# Patient Record
Sex: FEMALE | Race: WHITE | NOT HISPANIC OR LATINO
[De-identification: names, ages, dates, MRNs, and addresses within clinical notes are randomized per-mention and may not be internally consistent; named-entity substitution may affect disease eponyms.]

---

## 2017-01-10 ENCOUNTER — RECORD ABSTRACTING (OUTPATIENT)
Age: 52
End: 2017-01-10

## 2017-01-10 DIAGNOSIS — Z87.898 PERSONAL HISTORY OF OTHER SPECIFIED CONDITIONS: ICD-10-CM

## 2017-01-10 DIAGNOSIS — N83.201 UNSPECIFIED OVARIAN CYST, RIGHT SIDE: ICD-10-CM

## 2017-01-12 ENCOUNTER — APPOINTMENT (OUTPATIENT)
Dept: CARDIOLOGY | Facility: CLINIC | Age: 52
End: 2017-01-12

## 2017-01-12 VITALS
SYSTOLIC BLOOD PRESSURE: 140 MMHG | WEIGHT: 155 LBS | HEART RATE: 82 BPM | HEIGHT: 62 IN | DIASTOLIC BLOOD PRESSURE: 80 MMHG | BODY MASS INDEX: 28.52 KG/M2

## 2017-01-17 ENCOUNTER — OUTPATIENT (OUTPATIENT)
Dept: OUTPATIENT SERVICES | Facility: HOSPITAL | Age: 52
LOS: 1 days | Discharge: HOME | End: 2017-01-17

## 2017-01-18 RX ORDER — LEVETIRACETAM 250 MG/1
250 TABLET, FILM COATED ORAL TWICE DAILY
Refills: 0 | Status: ACTIVE | COMMUNITY

## 2017-01-19 ENCOUNTER — TRANSCRIPTION ENCOUNTER (OUTPATIENT)
Age: 52
End: 2017-01-19

## 2017-06-27 DIAGNOSIS — I25.10 ATHEROSCLEROTIC HEART DISEASE OF NATIVE CORONARY ARTERY WITHOUT ANGINA PECTORIS: ICD-10-CM

## 2017-06-27 DIAGNOSIS — R06.02 SHORTNESS OF BREATH: ICD-10-CM

## 2017-06-27 DIAGNOSIS — J44.9 CHRONIC OBSTRUCTIVE PULMONARY DISEASE, UNSPECIFIED: ICD-10-CM

## 2017-06-27 DIAGNOSIS — I10 ESSENTIAL (PRIMARY) HYPERTENSION: ICD-10-CM

## 2017-06-27 DIAGNOSIS — E78.4 OTHER HYPERLIPIDEMIA: ICD-10-CM

## 2017-06-27 DIAGNOSIS — F17.210 NICOTINE DEPENDENCE, CIGARETTES, UNCOMPLICATED: ICD-10-CM

## 2017-09-26 ENCOUNTER — APPOINTMENT (OUTPATIENT)
Dept: CARDIOLOGY | Facility: CLINIC | Age: 52
End: 2017-09-26

## 2017-09-26 VITALS
SYSTOLIC BLOOD PRESSURE: 132 MMHG | DIASTOLIC BLOOD PRESSURE: 80 MMHG | WEIGHT: 161 LBS | HEART RATE: 64 BPM | BODY MASS INDEX: 29.63 KG/M2 | HEIGHT: 62 IN

## 2017-09-26 DIAGNOSIS — F17.200 NICOTINE DEPENDENCE, UNSPECIFIED, UNCOMPLICATED: ICD-10-CM

## 2017-10-20 ENCOUNTER — INPATIENT (INPATIENT)
Facility: HOSPITAL | Age: 52
LOS: 4 days | Discharge: HOME | End: 2017-10-25
Attending: HOSPITALIST | Admitting: FAMILY MEDICINE

## 2017-10-20 DIAGNOSIS — F41.9 ANXIETY DISORDER, UNSPECIFIED: ICD-10-CM

## 2017-10-20 DIAGNOSIS — J45.909 UNSPECIFIED ASTHMA, UNCOMPLICATED: ICD-10-CM

## 2017-10-20 DIAGNOSIS — K29.00 ACUTE GASTRITIS WITHOUT BLEEDING: ICD-10-CM

## 2017-10-20 DIAGNOSIS — R07.89 OTHER CHEST PAIN: ICD-10-CM

## 2017-10-20 DIAGNOSIS — K44.9 DIAPHRAGMATIC HERNIA WITHOUT OBSTRUCTION OR GANGRENE: ICD-10-CM

## 2017-10-20 DIAGNOSIS — K63.5 POLYP OF COLON: ICD-10-CM

## 2017-10-20 DIAGNOSIS — G40.909 EPILEPSY, UNSPECIFIED, NOT INTRACTABLE, WITHOUT STATUS EPILEPTICUS: ICD-10-CM

## 2017-10-20 DIAGNOSIS — F93.0 SEPARATION ANXIETY DISORDER OF CHILDHOOD: ICD-10-CM

## 2017-10-20 DIAGNOSIS — G56.00 CARPAL TUNNEL SYNDROME, UNSPECIFIED UPPER LIMB: ICD-10-CM

## 2017-10-20 DIAGNOSIS — R56.9 UNSPECIFIED CONVULSIONS: ICD-10-CM

## 2017-10-20 DIAGNOSIS — K56.609 UNSPECIFIED INTESTINAL OBSTRUCTION, UNSPECIFIED AS TO PARTIAL VERSUS COMPLETE OBSTRUCTION: ICD-10-CM

## 2017-10-20 DIAGNOSIS — R53.1 WEAKNESS: ICD-10-CM

## 2017-10-20 DIAGNOSIS — G43.909 MIGRAINE, UNSPECIFIED, NOT INTRACTABLE, WITHOUT STATUS MIGRAINOSUS: ICD-10-CM

## 2017-10-26 DIAGNOSIS — R10.9 UNSPECIFIED ABDOMINAL PAIN: ICD-10-CM

## 2017-10-26 DIAGNOSIS — F32.9 MAJOR DEPRESSIVE DISORDER, SINGLE EPISODE, UNSPECIFIED: ICD-10-CM

## 2017-10-26 DIAGNOSIS — K63.5 POLYP OF COLON: ICD-10-CM

## 2017-10-26 DIAGNOSIS — K29.80 DUODENITIS WITHOUT BLEEDING: ICD-10-CM

## 2017-10-26 DIAGNOSIS — F41.1 GENERALIZED ANXIETY DISORDER: ICD-10-CM

## 2017-10-26 DIAGNOSIS — G40.909 EPILEPSY, UNSPECIFIED, NOT INTRACTABLE, WITHOUT STATUS EPILEPTICUS: ICD-10-CM

## 2017-10-26 DIAGNOSIS — Z72.0 TOBACCO USE: ICD-10-CM

## 2017-10-26 DIAGNOSIS — K20.9 ESOPHAGITIS, UNSPECIFIED: ICD-10-CM

## 2017-10-26 DIAGNOSIS — K29.70 GASTRITIS, UNSPECIFIED, WITHOUT BLEEDING: ICD-10-CM

## 2017-10-26 DIAGNOSIS — K59.00 CONSTIPATION, UNSPECIFIED: ICD-10-CM

## 2017-10-26 DIAGNOSIS — J45.909 UNSPECIFIED ASTHMA, UNCOMPLICATED: ICD-10-CM

## 2017-10-26 DIAGNOSIS — G43.909 MIGRAINE, UNSPECIFIED, NOT INTRACTABLE, WITHOUT STATUS MIGRAINOSUS: ICD-10-CM

## 2017-10-26 DIAGNOSIS — K52.9 NONINFECTIVE GASTROENTERITIS AND COLITIS, UNSPECIFIED: ICD-10-CM

## 2017-11-06 ENCOUNTER — EMERGENCY (EMERGENCY)
Facility: HOSPITAL | Age: 52
LOS: 0 days | Discharge: HOME | End: 2017-11-07
Admitting: FAMILY MEDICINE

## 2017-11-06 DIAGNOSIS — J45.909 UNSPECIFIED ASTHMA, UNCOMPLICATED: ICD-10-CM

## 2017-11-06 DIAGNOSIS — G56.00 CARPAL TUNNEL SYNDROME, UNSPECIFIED UPPER LIMB: ICD-10-CM

## 2017-11-06 DIAGNOSIS — Z79.899 OTHER LONG TERM (CURRENT) DRUG THERAPY: ICD-10-CM

## 2017-11-06 DIAGNOSIS — K44.9 DIAPHRAGMATIC HERNIA WITHOUT OBSTRUCTION OR GANGRENE: ICD-10-CM

## 2017-11-06 DIAGNOSIS — R10.9 UNSPECIFIED ABDOMINAL PAIN: ICD-10-CM

## 2017-11-06 DIAGNOSIS — Z88.8 ALLERGY STATUS TO OTHER DRUGS, MEDICAMENTS AND BIOLOGICAL SUBSTANCES: ICD-10-CM

## 2017-11-06 DIAGNOSIS — F93.0 SEPARATION ANXIETY DISORDER OF CHILDHOOD: ICD-10-CM

## 2017-11-06 DIAGNOSIS — G40.909 EPILEPSY, UNSPECIFIED, NOT INTRACTABLE, WITHOUT STATUS EPILEPTICUS: ICD-10-CM

## 2017-11-06 DIAGNOSIS — R10.84 GENERALIZED ABDOMINAL PAIN: ICD-10-CM

## 2017-11-06 DIAGNOSIS — F32.9 MAJOR DEPRESSIVE DISORDER, SINGLE EPISODE, UNSPECIFIED: ICD-10-CM

## 2017-11-06 DIAGNOSIS — R07.89 OTHER CHEST PAIN: ICD-10-CM

## 2017-11-06 DIAGNOSIS — K56.609 UNSPECIFIED INTESTINAL OBSTRUCTION, UNSPECIFIED AS TO PARTIAL VERSUS COMPLETE OBSTRUCTION: ICD-10-CM

## 2017-11-06 DIAGNOSIS — F41.9 ANXIETY DISORDER, UNSPECIFIED: ICD-10-CM

## 2017-11-06 DIAGNOSIS — R56.9 UNSPECIFIED CONVULSIONS: ICD-10-CM

## 2017-11-06 DIAGNOSIS — K63.5 POLYP OF COLON: ICD-10-CM

## 2017-11-06 DIAGNOSIS — Z87.891 PERSONAL HISTORY OF NICOTINE DEPENDENCE: ICD-10-CM

## 2017-11-06 DIAGNOSIS — Z88.1 ALLERGY STATUS TO OTHER ANTIBIOTIC AGENTS STATUS: ICD-10-CM

## 2017-11-06 DIAGNOSIS — Z90.711 ACQUIRED ABSENCE OF UTERUS WITH REMAINING CERVICAL STUMP: ICD-10-CM

## 2017-11-06 DIAGNOSIS — R11.2 NAUSEA WITH VOMITING, UNSPECIFIED: ICD-10-CM

## 2017-11-06 DIAGNOSIS — K29.00 ACUTE GASTRITIS WITHOUT BLEEDING: ICD-10-CM

## 2017-11-06 DIAGNOSIS — R53.1 WEAKNESS: ICD-10-CM

## 2017-11-06 DIAGNOSIS — G43.909 MIGRAINE, UNSPECIFIED, NOT INTRACTABLE, WITHOUT STATUS MIGRAINOSUS: ICD-10-CM

## 2017-11-18 ENCOUNTER — INPATIENT (INPATIENT)
Facility: HOSPITAL | Age: 52
LOS: 10 days | Discharge: HOME | End: 2017-11-29
Attending: SURGERY | Admitting: FAMILY MEDICINE

## 2017-11-18 DIAGNOSIS — G40.909 EPILEPSY, UNSPECIFIED, NOT INTRACTABLE, WITHOUT STATUS EPILEPTICUS: ICD-10-CM

## 2017-11-18 DIAGNOSIS — J45.909 UNSPECIFIED ASTHMA, UNCOMPLICATED: ICD-10-CM

## 2017-11-18 DIAGNOSIS — K44.9 DIAPHRAGMATIC HERNIA WITHOUT OBSTRUCTION OR GANGRENE: ICD-10-CM

## 2017-11-18 DIAGNOSIS — K56.609 UNSPECIFIED INTESTINAL OBSTRUCTION, UNSPECIFIED AS TO PARTIAL VERSUS COMPLETE OBSTRUCTION: ICD-10-CM

## 2017-11-18 DIAGNOSIS — R07.89 OTHER CHEST PAIN: ICD-10-CM

## 2017-11-18 DIAGNOSIS — F41.9 ANXIETY DISORDER, UNSPECIFIED: ICD-10-CM

## 2017-11-18 DIAGNOSIS — G43.909 MIGRAINE, UNSPECIFIED, NOT INTRACTABLE, WITHOUT STATUS MIGRAINOSUS: ICD-10-CM

## 2017-11-18 DIAGNOSIS — K29.00 ACUTE GASTRITIS WITHOUT BLEEDING: ICD-10-CM

## 2017-11-18 DIAGNOSIS — R53.1 WEAKNESS: ICD-10-CM

## 2017-11-18 DIAGNOSIS — K63.5 POLYP OF COLON: ICD-10-CM

## 2017-11-18 DIAGNOSIS — R56.9 UNSPECIFIED CONVULSIONS: ICD-10-CM

## 2017-11-18 DIAGNOSIS — G56.00 CARPAL TUNNEL SYNDROME, UNSPECIFIED UPPER LIMB: ICD-10-CM

## 2017-11-18 DIAGNOSIS — F93.0 SEPARATION ANXIETY DISORDER OF CHILDHOOD: ICD-10-CM

## 2017-12-01 DIAGNOSIS — E78.5 HYPERLIPIDEMIA, UNSPECIFIED: ICD-10-CM

## 2017-12-01 DIAGNOSIS — K44.9 DIAPHRAGMATIC HERNIA WITHOUT OBSTRUCTION OR GANGRENE: ICD-10-CM

## 2017-12-01 DIAGNOSIS — F32.9 MAJOR DEPRESSIVE DISORDER, SINGLE EPISODE, UNSPECIFIED: ICD-10-CM

## 2017-12-01 DIAGNOSIS — D64.9 ANEMIA, UNSPECIFIED: ICD-10-CM

## 2017-12-01 DIAGNOSIS — K29.70 GASTRITIS, UNSPECIFIED, WITHOUT BLEEDING: ICD-10-CM

## 2017-12-01 DIAGNOSIS — F41.9 ANXIETY DISORDER, UNSPECIFIED: ICD-10-CM

## 2017-12-01 DIAGNOSIS — K56.51 INTESTINAL ADHESIONS [BANDS], WITH PARTIAL OBSTRUCTION: ICD-10-CM

## 2017-12-01 DIAGNOSIS — G43.909 MIGRAINE, UNSPECIFIED, NOT INTRACTABLE, WITHOUT STATUS MIGRAINOSUS: ICD-10-CM

## 2017-12-01 DIAGNOSIS — G56.00 CARPAL TUNNEL SYNDROME, UNSPECIFIED UPPER LIMB: ICD-10-CM

## 2017-12-01 DIAGNOSIS — J45.909 UNSPECIFIED ASTHMA, UNCOMPLICATED: ICD-10-CM

## 2017-12-01 DIAGNOSIS — G89.29 OTHER CHRONIC PAIN: ICD-10-CM

## 2017-12-01 DIAGNOSIS — G40.909 EPILEPSY, UNSPECIFIED, NOT INTRACTABLE, WITHOUT STATUS EPILEPTICUS: ICD-10-CM

## 2017-12-07 DIAGNOSIS — K58.9 IRRITABLE BOWEL SYNDROME WITHOUT DIARRHEA: ICD-10-CM

## 2017-12-07 DIAGNOSIS — K56.1 INTUSSUSCEPTION: ICD-10-CM

## 2017-12-07 DIAGNOSIS — M79.7 FIBROMYALGIA: ICD-10-CM

## 2017-12-07 DIAGNOSIS — R18.8 OTHER ASCITES: ICD-10-CM

## 2018-01-25 ENCOUNTER — APPOINTMENT (OUTPATIENT)
Dept: CARDIOLOGY | Facility: CLINIC | Age: 53
End: 2018-01-25

## 2018-04-02 ENCOUNTER — OUTPATIENT (OUTPATIENT)
Dept: OUTPATIENT SERVICES | Facility: HOSPITAL | Age: 53
LOS: 1 days | Discharge: HOME | End: 2018-04-02

## 2018-04-02 VITALS
OXYGEN SATURATION: 97 % | TEMPERATURE: 96 F | HEART RATE: 79 BPM | HEIGHT: 62 IN | SYSTOLIC BLOOD PRESSURE: 143 MMHG | RESPIRATION RATE: 18 BRPM | WEIGHT: 149.91 LBS | DIASTOLIC BLOOD PRESSURE: 63 MMHG

## 2018-04-02 DIAGNOSIS — Z01.818 ENCOUNTER FOR OTHER PREPROCEDURAL EXAMINATION: ICD-10-CM

## 2018-04-02 DIAGNOSIS — Z98.890 OTHER SPECIFIED POSTPROCEDURAL STATES: Chronic | ICD-10-CM

## 2018-04-02 LAB
ALBUMIN SERPL ELPH-MCNC: 4.7 G/DL — SIGNIFICANT CHANGE UP (ref 3.5–5.2)
ALP SERPL-CCNC: 118 U/L — HIGH (ref 30–115)
ALT FLD-CCNC: 24 U/L — SIGNIFICANT CHANGE UP (ref 0–41)
ANION GAP SERPL CALC-SCNC: 14 MMOL/L — SIGNIFICANT CHANGE UP (ref 7–14)
APTT BLD: 30.8 SEC — SIGNIFICANT CHANGE UP (ref 27–39.2)
AST SERPL-CCNC: 21 U/L — SIGNIFICANT CHANGE UP (ref 0–41)
BASOPHILS # BLD AUTO: 0.03 K/UL — SIGNIFICANT CHANGE UP (ref 0–0.2)
BASOPHILS NFR BLD AUTO: 0.4 % — SIGNIFICANT CHANGE UP (ref 0–1)
BILIRUB SERPL-MCNC: 0.2 MG/DL — SIGNIFICANT CHANGE UP (ref 0.2–1.2)
BUN SERPL-MCNC: 21 MG/DL — HIGH (ref 10–20)
CALCIUM SERPL-MCNC: 9.5 MG/DL — SIGNIFICANT CHANGE UP (ref 8.5–10.1)
CHLORIDE SERPL-SCNC: 102 MMOL/L — SIGNIFICANT CHANGE UP (ref 98–110)
CO2 SERPL-SCNC: 28 MMOL/L — SIGNIFICANT CHANGE UP (ref 17–32)
CREAT SERPL-MCNC: 0.6 MG/DL — LOW (ref 0.7–1.5)
EOSINOPHIL # BLD AUTO: 0.03 K/UL — SIGNIFICANT CHANGE UP (ref 0–0.7)
EOSINOPHIL NFR BLD AUTO: 0.4 % — SIGNIFICANT CHANGE UP (ref 0–8)
GLUCOSE SERPL-MCNC: 105 MG/DL — HIGH (ref 70–99)
HCT VFR BLD CALC: 37 % — SIGNIFICANT CHANGE UP (ref 37–47)
HGB BLD-MCNC: 12.4 G/DL — SIGNIFICANT CHANGE UP (ref 12–16)
IMM GRANULOCYTES NFR BLD AUTO: 0.3 % — SIGNIFICANT CHANGE UP (ref 0.1–0.3)
INR BLD: 1.02 RATIO — SIGNIFICANT CHANGE UP (ref 0.65–1.3)
LYMPHOCYTES # BLD AUTO: 2.5 K/UL — SIGNIFICANT CHANGE UP (ref 1.2–3.4)
LYMPHOCYTES # BLD AUTO: 32.5 % — SIGNIFICANT CHANGE UP (ref 20.5–51.1)
MCHC RBC-ENTMCNC: 31.3 PG — HIGH (ref 27–31)
MCHC RBC-ENTMCNC: 33.5 G/DL — SIGNIFICANT CHANGE UP (ref 32–37)
MCV RBC AUTO: 93.4 FL — SIGNIFICANT CHANGE UP (ref 81–99)
MONOCYTES # BLD AUTO: 0.45 K/UL — SIGNIFICANT CHANGE UP (ref 0.1–0.6)
MONOCYTES NFR BLD AUTO: 5.8 % — SIGNIFICANT CHANGE UP (ref 1.7–9.3)
NEUTROPHILS # BLD AUTO: 4.67 K/UL — SIGNIFICANT CHANGE UP (ref 1.4–6.5)
NEUTROPHILS NFR BLD AUTO: 60.6 % — SIGNIFICANT CHANGE UP (ref 42.2–75.2)
NRBC # BLD: 0 /100 WBCS — SIGNIFICANT CHANGE UP (ref 0–0)
PLATELET # BLD AUTO: 241 K/UL — SIGNIFICANT CHANGE UP (ref 130–400)
POTASSIUM SERPL-MCNC: 4.2 MMOL/L — SIGNIFICANT CHANGE UP (ref 3.5–5)
POTASSIUM SERPL-SCNC: 4.2 MMOL/L — SIGNIFICANT CHANGE UP (ref 3.5–5)
PROT SERPL-MCNC: 7.5 G/DL — SIGNIFICANT CHANGE UP (ref 6–8)
PROTHROM AB SERPL-ACNC: 11 SEC — SIGNIFICANT CHANGE UP (ref 9.95–12.87)
RBC # BLD: 3.96 M/UL — LOW (ref 4.2–5.4)
RBC # FLD: 15.1 % — HIGH (ref 11.5–14.5)
SODIUM SERPL-SCNC: 144 MMOL/L — SIGNIFICANT CHANGE UP (ref 135–146)
WBC # BLD: 7.7 K/UL — SIGNIFICANT CHANGE UP (ref 4.8–10.8)
WBC # FLD AUTO: 7.7 K/UL — SIGNIFICANT CHANGE UP (ref 4.8–10.8)

## 2018-04-02 RX ORDER — CLONAZEPAM 1 MG
1 TABLET ORAL
Qty: 0 | Refills: 0 | COMMUNITY

## 2018-04-02 NOTE — H&P PST ADULT - PSH
History of surgery  4 rght knee sx  1 left knee sx  cholecystectomy  rotator cuff right  abdominal sx-- adhesions  c section  ovarian cystectomy  breast cystectomies  colonoscopy   endoscopies  parital hysterectomy

## 2018-04-02 NOTE — H&P PST ADULT - REASON FOR ADMISSION
excision of granuloma abdomen wall    operation 11/17-- due to abdominal adhesions- now for above proecdure

## 2018-04-02 NOTE — H&P PST ADULT - PMH
Anxiety    Back pain  henriated discs  GERD (gastroesophageal reflux disease)    Hiatal hernia    Hypercholesteremia    Migraines    Seasonal allergic rhinitis, unspecified trigger    Seizures  last one 6 yrs ago

## 2018-04-04 LAB — LEVETIRACETAM SERPL-MCNC: 4.1 MCG/ML — LOW (ref 12–46)

## 2018-04-13 NOTE — ASU PATIENT PROFILE, ADULT - PSH
H/O rotator cuff surgery  right  History of appendectomy    History of surgery  4 rght knee sx  1 left knee sx  cholecystectomy  rotator cuff right  abdominal sx-- adhesions  c section  ovarian cystectomy  breast cystectomies  colonoscopy   endoscopies  parital hysterectomy  S/P cholecystectomy    S/p partial hysterectomy with remaining cervical stump

## 2018-04-16 ENCOUNTER — RESULT REVIEW (OUTPATIENT)
Age: 53
End: 2018-04-16

## 2018-04-16 ENCOUNTER — OUTPATIENT (OUTPATIENT)
Dept: OUTPATIENT SERVICES | Facility: HOSPITAL | Age: 53
LOS: 1 days | Discharge: HOME | End: 2018-04-16

## 2018-04-16 VITALS
HEIGHT: 62 IN | DIASTOLIC BLOOD PRESSURE: 62 MMHG | RESPIRATION RATE: 17 BRPM | WEIGHT: 149.91 LBS | SYSTOLIC BLOOD PRESSURE: 102 MMHG | HEART RATE: 84 BPM | TEMPERATURE: 96 F | OXYGEN SATURATION: 94 %

## 2018-04-16 VITALS — HEART RATE: 69 BPM | RESPIRATION RATE: 18 BRPM | SYSTOLIC BLOOD PRESSURE: 136 MMHG | DIASTOLIC BLOOD PRESSURE: 74 MMHG

## 2018-04-16 DIAGNOSIS — L92.3 FOREIGN BODY GRANULOMA OF THE SKIN AND SUBCUTANEOUS TISSUE: ICD-10-CM

## 2018-04-16 DIAGNOSIS — Z98.890 OTHER SPECIFIED POSTPROCEDURAL STATES: Chronic | ICD-10-CM

## 2018-04-16 DIAGNOSIS — Z90.711 ACQUIRED ABSENCE OF UTERUS WITH REMAINING CERVICAL STUMP: Chronic | ICD-10-CM

## 2018-04-16 DIAGNOSIS — Z90.49 ACQUIRED ABSENCE OF OTHER SPECIFIED PARTS OF DIGESTIVE TRACT: Chronic | ICD-10-CM

## 2018-04-16 RX ORDER — OXYCODONE AND ACETAMINOPHEN 5; 325 MG/1; MG/1
1 TABLET ORAL ONCE
Qty: 0 | Refills: 0 | Status: DISCONTINUED | OUTPATIENT
Start: 2018-04-16 | End: 2018-04-16

## 2018-04-16 RX ORDER — MORPHINE SULFATE 50 MG/1
2 CAPSULE, EXTENDED RELEASE ORAL
Qty: 0 | Refills: 0 | Status: DISCONTINUED | OUTPATIENT
Start: 2018-04-16 | End: 2018-04-16

## 2018-04-16 RX ORDER — SODIUM CHLORIDE 9 MG/ML
1000 INJECTION, SOLUTION INTRAVENOUS
Qty: 0 | Refills: 0 | Status: DISCONTINUED | OUTPATIENT
Start: 2018-04-16 | End: 2018-05-01

## 2018-04-16 RX ORDER — ONDANSETRON 8 MG/1
4 TABLET, FILM COATED ORAL ONCE
Qty: 0 | Refills: 0 | Status: DISCONTINUED | OUTPATIENT
Start: 2018-04-16 | End: 2018-05-01

## 2018-04-16 RX ORDER — OXYCODONE AND ACETAMINOPHEN 5; 325 MG/1; MG/1
2 TABLET ORAL ONCE
Qty: 0 | Refills: 0 | Status: DISCONTINUED | OUTPATIENT
Start: 2018-04-16 | End: 2018-04-16

## 2018-04-16 RX ADMIN — SODIUM CHLORIDE 100 MILLILITER(S): 9 INJECTION, SOLUTION INTRAVENOUS at 09:19

## 2018-04-16 RX ADMIN — OXYCODONE AND ACETAMINOPHEN 2 TABLET(S): 5; 325 TABLET ORAL at 09:58

## 2018-04-16 NOTE — BRIEF OPERATIVE NOTE - PROCEDURE
<<-----Click on this checkbox to enter Procedure Excision  04/16/2018  granuloma abdominal wall  Active  ABULANOV

## 2018-04-17 LAB — SURGICAL PATHOLOGY STUDY: SIGNIFICANT CHANGE UP

## 2018-04-18 LAB
CULTURE RESULTS: SIGNIFICANT CHANGE UP
CULTURE RESULTS: SIGNIFICANT CHANGE UP
SPECIMEN SOURCE: SIGNIFICANT CHANGE UP
SPECIMEN SOURCE: SIGNIFICANT CHANGE UP

## 2018-08-13 ENCOUNTER — EMERGENCY (EMERGENCY)
Facility: HOSPITAL | Age: 53
LOS: 0 days | Discharge: HOME | End: 2018-08-13
Attending: EMERGENCY MEDICINE | Admitting: EMERGENCY MEDICINE

## 2018-08-13 VITALS
RESPIRATION RATE: 18 BRPM | HEART RATE: 72 BPM | OXYGEN SATURATION: 93 % | DIASTOLIC BLOOD PRESSURE: 67 MMHG | TEMPERATURE: 97 F | SYSTOLIC BLOOD PRESSURE: 163 MMHG

## 2018-08-13 DIAGNOSIS — R11.2 NAUSEA WITH VOMITING, UNSPECIFIED: ICD-10-CM

## 2018-08-13 DIAGNOSIS — Z98.890 OTHER SPECIFIED POSTPROCEDURAL STATES: Chronic | ICD-10-CM

## 2018-08-13 DIAGNOSIS — Z90.711 ACQUIRED ABSENCE OF UTERUS WITH REMAINING CERVICAL STUMP: Chronic | ICD-10-CM

## 2018-08-13 DIAGNOSIS — R14.0 ABDOMINAL DISTENSION (GASEOUS): ICD-10-CM

## 2018-08-13 DIAGNOSIS — Z79.899 OTHER LONG TERM (CURRENT) DRUG THERAPY: ICD-10-CM

## 2018-08-13 DIAGNOSIS — Z88.0 ALLERGY STATUS TO PENICILLIN: ICD-10-CM

## 2018-08-13 DIAGNOSIS — R10.9 UNSPECIFIED ABDOMINAL PAIN: ICD-10-CM

## 2018-08-13 DIAGNOSIS — R10.84 GENERALIZED ABDOMINAL PAIN: ICD-10-CM

## 2018-08-13 DIAGNOSIS — Z88.1 ALLERGY STATUS TO OTHER ANTIBIOTIC AGENTS STATUS: ICD-10-CM

## 2018-08-13 DIAGNOSIS — Z90.49 ACQUIRED ABSENCE OF OTHER SPECIFIED PARTS OF DIGESTIVE TRACT: Chronic | ICD-10-CM

## 2018-08-13 PROBLEM — K21.9 GASTRO-ESOPHAGEAL REFLUX DISEASE WITHOUT ESOPHAGITIS: Chronic | Status: ACTIVE | Noted: 2018-04-02

## 2018-08-13 PROBLEM — E78.00 PURE HYPERCHOLESTEROLEMIA, UNSPECIFIED: Chronic | Status: ACTIVE | Noted: 2018-04-02

## 2018-08-13 PROBLEM — F41.9 ANXIETY DISORDER, UNSPECIFIED: Chronic | Status: ACTIVE | Noted: 2018-04-02

## 2018-08-13 PROBLEM — M54.9 DORSALGIA, UNSPECIFIED: Chronic | Status: ACTIVE | Noted: 2018-04-02

## 2018-08-13 PROBLEM — G43.909 MIGRAINE, UNSPECIFIED, NOT INTRACTABLE, WITHOUT STATUS MIGRAINOSUS: Chronic | Status: ACTIVE | Noted: 2018-04-02

## 2018-08-13 PROBLEM — R56.9 UNSPECIFIED CONVULSIONS: Chronic | Status: ACTIVE | Noted: 2018-04-02

## 2018-08-13 PROBLEM — J30.2 OTHER SEASONAL ALLERGIC RHINITIS: Chronic | Status: ACTIVE | Noted: 2018-04-02

## 2018-08-13 PROBLEM — K44.9 DIAPHRAGMATIC HERNIA WITHOUT OBSTRUCTION OR GANGRENE: Chronic | Status: ACTIVE | Noted: 2018-04-02

## 2018-08-13 LAB
ALBUMIN SERPL ELPH-MCNC: 4.5 G/DL — SIGNIFICANT CHANGE UP (ref 3.5–5.2)
ALP SERPL-CCNC: 103 U/L — SIGNIFICANT CHANGE UP (ref 30–115)
ALT FLD-CCNC: 20 U/L — SIGNIFICANT CHANGE UP (ref 0–41)
ANION GAP SERPL CALC-SCNC: 14 MMOL/L — SIGNIFICANT CHANGE UP (ref 7–14)
AST SERPL-CCNC: 18 U/L — SIGNIFICANT CHANGE UP (ref 0–41)
BILIRUB SERPL-MCNC: <0.2 MG/DL — SIGNIFICANT CHANGE UP (ref 0.2–1.2)
BUN SERPL-MCNC: 10 MG/DL — SIGNIFICANT CHANGE UP (ref 10–20)
CALCIUM SERPL-MCNC: 9.6 MG/DL — SIGNIFICANT CHANGE UP (ref 8.5–10.1)
CHLORIDE SERPL-SCNC: 103 MMOL/L — SIGNIFICANT CHANGE UP (ref 98–110)
CO2 SERPL-SCNC: 24 MMOL/L — SIGNIFICANT CHANGE UP (ref 17–32)
CREAT SERPL-MCNC: 0.7 MG/DL — SIGNIFICANT CHANGE UP (ref 0.7–1.5)
GAS PNL BLDV: SIGNIFICANT CHANGE UP
GLUCOSE SERPL-MCNC: 97 MG/DL — SIGNIFICANT CHANGE UP (ref 70–99)
HCG SERPL QL: NEGATIVE — SIGNIFICANT CHANGE UP
HCT VFR BLD CALC: 37.9 % — SIGNIFICANT CHANGE UP (ref 37–47)
HGB BLD-MCNC: 13 G/DL — SIGNIFICANT CHANGE UP (ref 12–16)
LIDOCAIN IGE QN: 12 U/L — SIGNIFICANT CHANGE UP (ref 7–60)
MCHC RBC-ENTMCNC: 31.5 PG — HIGH (ref 27–31)
MCHC RBC-ENTMCNC: 34.3 G/DL — SIGNIFICANT CHANGE UP (ref 32–37)
MCV RBC AUTO: 91.8 FL — SIGNIFICANT CHANGE UP (ref 81–99)
NRBC # BLD: 0 /100 WBCS — SIGNIFICANT CHANGE UP (ref 0–0)
PLATELET # BLD AUTO: 238 K/UL — SIGNIFICANT CHANGE UP (ref 130–400)
POTASSIUM SERPL-MCNC: 4.2 MMOL/L — SIGNIFICANT CHANGE UP (ref 3.5–5)
POTASSIUM SERPL-SCNC: 4.2 MMOL/L — SIGNIFICANT CHANGE UP (ref 3.5–5)
PROT SERPL-MCNC: 7.3 G/DL — SIGNIFICANT CHANGE UP (ref 6–8)
RBC # BLD: 4.13 M/UL — LOW (ref 4.2–5.4)
RBC # FLD: 13.2 % — SIGNIFICANT CHANGE UP (ref 11.5–14.5)
SODIUM SERPL-SCNC: 141 MMOL/L — SIGNIFICANT CHANGE UP (ref 135–146)
TYPE + AB SCN PNL BLD: SIGNIFICANT CHANGE UP
WBC # BLD: 5.47 K/UL — SIGNIFICANT CHANGE UP (ref 4.8–10.8)
WBC # FLD AUTO: 5.47 K/UL — SIGNIFICANT CHANGE UP (ref 4.8–10.8)

## 2018-08-13 RX ORDER — ONDANSETRON 8 MG/1
4 TABLET, FILM COATED ORAL ONCE
Qty: 0 | Refills: 0 | Status: COMPLETED | OUTPATIENT
Start: 2018-08-13 | End: 2018-08-13

## 2018-08-13 RX ORDER — IOHEXOL 300 MG/ML
30 INJECTION, SOLUTION INTRAVENOUS ONCE
Qty: 0 | Refills: 0 | Status: COMPLETED | OUTPATIENT
Start: 2018-08-13 | End: 2018-08-13

## 2018-08-13 RX ORDER — SODIUM CHLORIDE 9 MG/ML
1000 INJECTION, SOLUTION INTRAVENOUS ONCE
Qty: 0 | Refills: 0 | Status: COMPLETED | OUTPATIENT
Start: 2018-08-13 | End: 2018-08-13

## 2018-08-13 RX ORDER — MORPHINE SULFATE 50 MG/1
4 CAPSULE, EXTENDED RELEASE ORAL ONCE
Qty: 0 | Refills: 0 | Status: DISCONTINUED | OUTPATIENT
Start: 2018-08-13 | End: 2018-08-13

## 2018-08-13 RX ADMIN — IOHEXOL 30 MILLILITER(S): 300 INJECTION, SOLUTION INTRAVENOUS at 12:47

## 2018-08-13 RX ADMIN — MORPHINE SULFATE 4 MILLIGRAM(S): 50 CAPSULE, EXTENDED RELEASE ORAL at 16:29

## 2018-08-13 RX ADMIN — ONDANSETRON 4 MILLIGRAM(S): 8 TABLET, FILM COATED ORAL at 14:47

## 2018-08-13 RX ADMIN — SODIUM CHLORIDE 1000 MILLILITER(S): 9 INJECTION, SOLUTION INTRAVENOUS at 14:47

## 2018-08-13 NOTE — ED PROVIDER NOTE - PHYSICAL EXAMINATION
Well appearing NAD non toxic. NCAT EOMI conjunctiva nml. No nasal discharge. MMM. Neck supple, non tender, full ROM. RRR no MRG +S1S2. CTA b/l. Abd s mildly tender throughout lower abdominal no rebound no guarding ND +BS, +closing abdominal scars. Ext WWP x4, moving all extremities, no edema. 2+ equal pulses throughout. Cooperative, appropriate.

## 2018-08-13 NOTE — ED PROVIDER NOTE - MEDICAL DECISION MAKING DETAILS
pt ct noted and reviewed with her, to follow up with Dr. Martinez and vascular as outpt, return precautions discussed with pt

## 2018-08-13 NOTE — ED PROVIDER NOTE - NS ED ROS FT
Constitutional:  See HPI.   Eyes:  No visual changes, eye pain or discharge.  ENMT:  No hearing changes, pain, discharge or infections. No neck pain or stiffness.  Cardiac:  No chest pain, SOB or edema. No chest pain with exertion.  Respiratory:  No cough or respiratory distress. No hemoptysis.  :  No dysuria, frequency, hematuria  MS:  No joint pain or back pain.  Neuro:  No LOC. No headache or weakness.    Skin:  No skin rash.

## 2018-08-13 NOTE — ED PROVIDER NOTE - ATTENDING CONTRIBUTION TO CARE
52 year old female, pmhx multiple abdominal surgeries, presenting with a several day history of worsening diffuse abdominal pain and distention. Also states she has not had a BM in several days.     Vital Signs: I have reviewed the initial vital signs.  Constitutional: NAD, well-nourished, appears stated age, no acute distress.  HEENT: Airway patent, moist MM, no erythema/swelling/deformity of oral structures. EOMI, PERRLA.  CV: regular rate, regular rhythm, well-perfused extremities, 2+ b/l DP and radial pulses equal.  Lungs: BCTA, no increased WOB.  ABD: (+) Diffusely tender abdomen, no guarding or rebound, no pulsatile mass, no hernias. (+) well healing abdominal scars  MSK: Neck supple, nontender, nl ROM, no stepoff. Chest nontender. Back nontender in TLS spine or to b/l bony structures or flanks. Ext nontender, nl rom, no deformity.   INTEG: Skin warm, dry, no rash.  NEURO: A&Ox3, CN II-XII intact, normal strength 5/5 all 4 ext, nl sensation throughout, normal speech and coordination.  PSYCH: Calm, cooperative, normal affect and interaction.    Will do labs, CT, consult surgery PRN, IVF

## 2018-08-13 NOTE — ED PROVIDER NOTE - OBJECTIVE STATEMENT
51yo F hx LBO MAKAYLA by Dr. Martinez, DL seizure do anxiety pw abdominal pain x3yrs since surgery but sig worse over past mo- this AM, +n/v x3, NB NB, no other complaints- no fevers/chills, chest pain, shortness of breath, numbness/weakness, back pain, dysuria/hematuria. +BM today

## 2018-08-31 ENCOUNTER — APPOINTMENT (OUTPATIENT)
Dept: VASCULAR SURGERY | Facility: CLINIC | Age: 53
End: 2018-08-31
Payer: MEDICARE

## 2018-08-31 VITALS
SYSTOLIC BLOOD PRESSURE: 100 MMHG | DIASTOLIC BLOOD PRESSURE: 80 MMHG | BODY MASS INDEX: 28.34 KG/M2 | HEIGHT: 62 IN | WEIGHT: 154 LBS

## 2018-08-31 DIAGNOSIS — I77.1 STRICTURE OF ARTERY: ICD-10-CM

## 2018-08-31 DIAGNOSIS — I70.0 ATHEROSCLEROSIS OF AORTA: ICD-10-CM

## 2018-08-31 PROCEDURE — 99203 OFFICE O/P NEW LOW 30 MIN: CPT

## 2018-11-01 ENCOUNTER — INPATIENT (INPATIENT)
Facility: HOSPITAL | Age: 53
LOS: 0 days | Discharge: LEFT BEFORE DISCHARGE | End: 2018-11-01
Attending: INTERNAL MEDICINE | Admitting: INTERNAL MEDICINE

## 2018-11-01 VITALS
DIASTOLIC BLOOD PRESSURE: 58 MMHG | RESPIRATION RATE: 18 BRPM | HEART RATE: 86 BPM | SYSTOLIC BLOOD PRESSURE: 124 MMHG | TEMPERATURE: 97 F | OXYGEN SATURATION: 96 %

## 2018-11-01 VITALS
TEMPERATURE: 97 F | HEART RATE: 85 BPM | RESPIRATION RATE: 18 BRPM | SYSTOLIC BLOOD PRESSURE: 130 MMHG | DIASTOLIC BLOOD PRESSURE: 60 MMHG | OXYGEN SATURATION: 98 %

## 2018-11-01 DIAGNOSIS — Z98.890 OTHER SPECIFIED POSTPROCEDURAL STATES: Chronic | ICD-10-CM

## 2018-11-01 DIAGNOSIS — Z90.711 ACQUIRED ABSENCE OF UTERUS WITH REMAINING CERVICAL STUMP: Chronic | ICD-10-CM

## 2018-11-01 DIAGNOSIS — Z90.49 ACQUIRED ABSENCE OF OTHER SPECIFIED PARTS OF DIGESTIVE TRACT: Chronic | ICD-10-CM

## 2018-11-01 DIAGNOSIS — Z88.1 ALLERGY STATUS TO OTHER ANTIBIOTIC AGENTS STATUS: ICD-10-CM

## 2018-11-01 LAB
ALBUMIN SERPL ELPH-MCNC: 4.4 G/DL — SIGNIFICANT CHANGE UP (ref 3.5–5.2)
ALP SERPL-CCNC: 92 U/L — SIGNIFICANT CHANGE UP (ref 30–115)
ALT FLD-CCNC: 30 U/L — SIGNIFICANT CHANGE UP (ref 0–41)
ANION GAP SERPL CALC-SCNC: 12 MMOL/L — SIGNIFICANT CHANGE UP (ref 7–14)
AST SERPL-CCNC: 24 U/L — SIGNIFICANT CHANGE UP (ref 0–41)
BASOPHILS # BLD AUTO: 0.03 K/UL — SIGNIFICANT CHANGE UP (ref 0–0.2)
BASOPHILS NFR BLD AUTO: 0.5 % — SIGNIFICANT CHANGE UP (ref 0–1)
BILIRUB SERPL-MCNC: 0.2 MG/DL — SIGNIFICANT CHANGE UP (ref 0.2–1.2)
BUN SERPL-MCNC: 12 MG/DL — SIGNIFICANT CHANGE UP (ref 10–20)
CALCIUM SERPL-MCNC: 9.4 MG/DL — SIGNIFICANT CHANGE UP (ref 8.5–10.1)
CHLORIDE SERPL-SCNC: 101 MMOL/L — SIGNIFICANT CHANGE UP (ref 98–110)
CO2 SERPL-SCNC: 26 MMOL/L — SIGNIFICANT CHANGE UP (ref 17–32)
CREAT SERPL-MCNC: 0.8 MG/DL — SIGNIFICANT CHANGE UP (ref 0.7–1.5)
D DIMER BLD IA.RAPID-MCNC: 70 NG/ML DDU — SIGNIFICANT CHANGE UP (ref 0–230)
EOSINOPHIL # BLD AUTO: 0.05 K/UL — SIGNIFICANT CHANGE UP (ref 0–0.7)
EOSINOPHIL NFR BLD AUTO: 0.8 % — SIGNIFICANT CHANGE UP (ref 0–8)
GLUCOSE SERPL-MCNC: 105 MG/DL — HIGH (ref 70–99)
HCT VFR BLD CALC: 35.9 % — LOW (ref 37–47)
HGB BLD-MCNC: 12 G/DL — SIGNIFICANT CHANGE UP (ref 12–16)
IMM GRANULOCYTES NFR BLD AUTO: 0.3 % — SIGNIFICANT CHANGE UP (ref 0.1–0.3)
LIDOCAIN IGE QN: 25 U/L — SIGNIFICANT CHANGE UP (ref 7–60)
LYMPHOCYTES # BLD AUTO: 2 K/UL — SIGNIFICANT CHANGE UP (ref 1.2–3.4)
LYMPHOCYTES # BLD AUTO: 32.5 % — SIGNIFICANT CHANGE UP (ref 20.5–51.1)
MCHC RBC-ENTMCNC: 32.1 PG — HIGH (ref 27–31)
MCHC RBC-ENTMCNC: 33.4 G/DL — SIGNIFICANT CHANGE UP (ref 32–37)
MCV RBC AUTO: 96 FL — SIGNIFICANT CHANGE UP (ref 81–99)
MONOCYTES # BLD AUTO: 0.42 K/UL — SIGNIFICANT CHANGE UP (ref 0.1–0.6)
MONOCYTES NFR BLD AUTO: 6.8 % — SIGNIFICANT CHANGE UP (ref 1.7–9.3)
NEUTROPHILS # BLD AUTO: 3.64 K/UL — SIGNIFICANT CHANGE UP (ref 1.4–6.5)
NEUTROPHILS NFR BLD AUTO: 59.1 % — SIGNIFICANT CHANGE UP (ref 42.2–75.2)
NRBC # BLD: 0 /100 WBCS — SIGNIFICANT CHANGE UP (ref 0–0)
PLATELET # BLD AUTO: 236 K/UL — SIGNIFICANT CHANGE UP (ref 130–400)
POTASSIUM SERPL-MCNC: 4.8 MMOL/L — SIGNIFICANT CHANGE UP (ref 3.5–5)
POTASSIUM SERPL-SCNC: 4.8 MMOL/L — SIGNIFICANT CHANGE UP (ref 3.5–5)
PROT SERPL-MCNC: 7.1 G/DL — SIGNIFICANT CHANGE UP (ref 6–8)
RBC # BLD: 3.74 M/UL — LOW (ref 4.2–5.4)
RBC # FLD: 13.8 % — SIGNIFICANT CHANGE UP (ref 11.5–14.5)
SODIUM SERPL-SCNC: 139 MMOL/L — SIGNIFICANT CHANGE UP (ref 135–146)
TROPONIN T SERPL-MCNC: <0.01 NG/ML — SIGNIFICANT CHANGE UP
WBC # BLD: 6.16 K/UL — SIGNIFICANT CHANGE UP (ref 4.8–10.8)
WBC # FLD AUTO: 6.16 K/UL — SIGNIFICANT CHANGE UP (ref 4.8–10.8)

## 2018-11-01 RX ORDER — ASPIRIN/CALCIUM CARB/MAGNESIUM 324 MG
325 TABLET ORAL ONCE
Qty: 0 | Refills: 0 | Status: COMPLETED | OUTPATIENT
Start: 2018-11-01 | End: 2018-11-01

## 2018-11-01 RX ADMIN — Medication 325 MILLIGRAM(S): at 13:41

## 2018-11-01 NOTE — H&P ADULT - NSHPLABSRESULTS_GEN_ALL_CORE
Labs:      11-01                          12.0   6.16  )-----------( 236      ( 01 Nov 2018 11:08 )             35.9       139  |  101  |  12  ----------------------------<  105<H>  4.8   |  26  |  0.8    Ca    9.4      01 Nov 2018 11:08    TPro  7.1  /  Alb  4.4  /  TBili  0.2  /  DBili  x   /  AST  24  /  ALT  30  /  AlkPhos  92  11-01    CARDIAC MARKERS ( 01 Nov 2018 11:08 )  x     / <0.01 ng/mL / x     / x     / x        LIVER FUNCTIONS - ( 01 Nov 2018 11:08 )  Alb: 4.4 g/dL / Pro: 7.1 g/dL / ALK PHOS: 92 U/L / ALT: 30 U/L / AST: 24 U/L / GGT: x           < from: Xray Chest 2 Views PA/Lat (11.01.18 @ 11:13) >    Impression:      No radiographic evidence of acute cardiopulmonary disease.    < end of copied text >

## 2018-11-01 NOTE — ED PROVIDER NOTE - MEDICAL DECISION MAKING DETAILS
52 Y/O F DYSLIPIDEMIA, +SMOKER, C/O CP TODAY. ALL DIAGNOSTIC TESTING REVIEWED. ABNORMAL CCTA IN 2015. DR. GIBSON PAGED, AWAITING CALL BACK. WILL ADMIT TO TELEMETRY FOR FURTHER EVALUATION.

## 2018-11-01 NOTE — ED PROVIDER NOTE - NS ED ROS FT
GEN:  no fever, no chills  NEURO:  + migraine headache, + dizziness  ENT: no sore throat, no runny nose  CV:  + chest pain, no SOB  RESP:  no dyspnea, + cough  GI:  no nausea, no vomiting, + chronic abdominal pain, no diarrhea, no constipation  :  no dysuria, no urinary frequency, no hematuria  MSK:  + chronic back pain, no edema  SKIN:  no rash, no bruising  HEME: no hematochezia, no melena

## 2018-11-01 NOTE — ED PROVIDER NOTE - PHYSICAL EXAMINATION
CONSTITUTIONAL: well developed, nontoxic appearing, in no acute distress, speaking in full sentences  SKIN: warm, dry, no rash, cap refill < 2 seconds  HEENT: normocephalic, atraumatic, no conjunctival erythema, moist mucous membranes, patent airway  NECK: supple, no masses  CV:  regular rate, regular rhythm, 2+ radial pulses bilaterally  RESP: no wheezes, no rales, no rhonchi, normal work of breathing  ABD: soft, nontender, nondistended, no rebound, no guarding  MSK: normal ROM, no cyanosis, no edema, + homans on L side, no swelling, no overlying erythema or warmth  NEURO: alert, oriented, grossly unremarkable  PSYCH: cooperative, appropriate

## 2018-11-01 NOTE — ED PROVIDER NOTE - OBJECTIVE STATEMENT
52 yo F with hx of anxiety, GERD, chronic back pain, hiatal hernia, HLD, migraines, seizures, LBO, MAKAYLA, allergic rhinitis, smoker who presents with acute onset of sharp, constant, midsternal cp radiating to L shoulder and under R breast which started at rest at 3 am today. Associated with tingling in L arm and lightheadedness. No sob, n, v, unilateral weakness, palpitations. Took pepcid and aspirin without improvement. Not worse with deep breaths. Pt has had similar sx before, had stress test and echo 2-3 yrs ago which was normal. No leg swelling, recent surgery, hormone use, hx of clots.     Cardiologist: Dr. Moreno 52 yo F with hx of anxiety, GERD, chronic back pain, hiatal hernia, HLD, migraines, seizures, LBO, MAKAYLA, allergic rhinitis, smoker who presents with acute onset of sharp, constant, midsternal cp radiating to L shoulder and under R breast which started at rest at 3 am today. Associated with tingling in L arm and lightheadedness. No sob, n, v, unilateral weakness, palpitations. Took pepcid and aspirin without improvement. Not worse with deep breaths. Pt has had similar sx before. Nuclear stress test 2015 showed mixed plaque in proximal LAD with mild luminal narrowing. No leg swelling, recent surgery, hormone use, hx of clots.     Cardiologist: Dr. Moreno

## 2018-11-01 NOTE — ED PROVIDER NOTE - PROGRESS NOTE DETAILS
Labs, trop, d-dimer, ekg, cxr normal. HEART score 3, TING score 2. Will admit to tele. Updated pt and  at bedside, agreeable with plan. I personally evaluated the patient. I reviewed the Resident’s or Physician Assistant’s note (as assigned above), and agree with the findings and plan except as documented in my note.   54 Y/O F HTN, DYSLIPIDEMIA, 1 PPD SMOKER, GERD, MULTIPLE PRIOR ABD SURGERIES, C/O L SIDED CHEST PAIN SINCE 3 AM TODAY. CP AWOKE PT FROM SLEEP. CP IS DULL AND RADIATES TO THE L SHOULDER AND R CHEST. + ASSOCIATED L ARM NUMBNESS. NO SOB, DIAPHORESIS, NAUSEA. CP IS NOT PLEURITIC OR EXERTIONAL. CCTA 2015 WAS ABNORMAL. I personally evaluated the patient. I reviewed the Resident’s or Physician Assistant’s note (as assigned above), and agree with the findings and plan except as documented in my note.   54 Y/O F DYSLIPIDEMIA, 1 PPD SMOKER, GERD, MULTIPLE PRIOR ABD SURGERIES, C/O L SIDED CHEST PAIN SINCE 3 AM TODAY. CP AWOKE PT FROM SLEEP. CP IS DULL AND RADIATES TO THE L SHOULDER AND R CHEST. + ASSOCIATED L ARM NUMBNESS. NO SOB, DIAPHORESIS, NAUSEA. CP IS NOT PLEURITIC OR EXERTIONAL. CCTA 2015 WAS ABNORMAL. NO COUGH, FEVER, CHILLS. NO LEG PAIN OR EDEMA. VITALS NOTED. ALERT OX3 NAD WELL APPEARING. NECK SUPPLE. NO JVD. LUNGS CLEAR B/L. RRR S1S2. ABD- SOFT NONTENDER. NO LEG EDEMA. NO CALF TENDERNESS. CASE D/W DR. GIBSON, PT WITH CARDIAC CATH 1/2017 WITH NO CORONARY DISEASE. RECOMMENDS PT BE DISCHARGED TO FOLLOW UP AS AN OUTPT IN HIS OFFICE.

## 2018-11-01 NOTE — ED ADULT NURSE NOTE - NSIMPLEMENTINTERV_GEN_ALL_ED
Implemented All Universal Safety Interventions:  [place] to call system. Call bell, personal items and telephone within reach. Instruct patient to call for assistance. Room bathroom lighting operational. Non-slip footwear when patient is off stretcher. Physically safe environment: no spills, clutter or unnecessary equipment. Stretcher in lowest position, wheels locked, appropriate side rails in place.

## 2018-11-01 NOTE — ED PROVIDER NOTE - NSFOLLOWUPINSTRUCTIONS_ED_ALL_ED_FT
Follow up with Dr. Moreno  Return to the emergency department if you develop worsening chest pain, shortness of breath, or palpitations

## 2018-11-08 DIAGNOSIS — Z90.710 ACQUIRED ABSENCE OF BOTH CERVIX AND UTERUS: ICD-10-CM

## 2018-11-08 DIAGNOSIS — R07.9 CHEST PAIN, UNSPECIFIED: ICD-10-CM

## 2018-11-08 DIAGNOSIS — F17.200 NICOTINE DEPENDENCE, UNSPECIFIED, UNCOMPLICATED: ICD-10-CM

## 2018-11-08 DIAGNOSIS — K21.9 GASTRO-ESOPHAGEAL REFLUX DISEASE WITHOUT ESOPHAGITIS: ICD-10-CM

## 2018-11-08 DIAGNOSIS — F41.9 ANXIETY DISORDER, UNSPECIFIED: ICD-10-CM

## 2018-11-08 DIAGNOSIS — E78.5 HYPERLIPIDEMIA, UNSPECIFIED: ICD-10-CM

## 2018-11-08 DIAGNOSIS — Z88.1 ALLERGY STATUS TO OTHER ANTIBIOTIC AGENTS STATUS: ICD-10-CM

## 2018-11-08 DIAGNOSIS — Z90.49 ACQUIRED ABSENCE OF OTHER SPECIFIED PARTS OF DIGESTIVE TRACT: ICD-10-CM

## 2018-11-08 DIAGNOSIS — M54.9 DORSALGIA, UNSPECIFIED: ICD-10-CM

## 2018-11-08 DIAGNOSIS — G40.909 EPILEPSY, UNSPECIFIED, NOT INTRACTABLE, WITHOUT STATUS EPILEPTICUS: ICD-10-CM

## 2018-11-08 DIAGNOSIS — Z88.0 ALLERGY STATUS TO PENICILLIN: ICD-10-CM

## 2018-11-08 DIAGNOSIS — G89.29 OTHER CHRONIC PAIN: ICD-10-CM

## 2018-11-08 DIAGNOSIS — K44.9 DIAPHRAGMATIC HERNIA WITHOUT OBSTRUCTION OR GANGRENE: ICD-10-CM

## 2018-11-15 ENCOUNTER — APPOINTMENT (OUTPATIENT)
Dept: CARDIOLOGY | Facility: CLINIC | Age: 53
End: 2018-11-15

## 2018-11-15 VITALS
HEART RATE: 83 BPM | WEIGHT: 156 LBS | SYSTOLIC BLOOD PRESSURE: 130 MMHG | BODY MASS INDEX: 28.71 KG/M2 | DIASTOLIC BLOOD PRESSURE: 80 MMHG | HEIGHT: 62 IN

## 2018-11-15 NOTE — REASON FOR VISIT
[Follow-Up - Clinic] : a clinic follow-up of [Coronary Artery Disease] : coronary artery disease [Hyperlipidemia] : hyperlipidemia [Hypertension] : hypertension [FreeTextEntry1] : COPD, atherosclerotic heart disease

## 2018-11-15 NOTE — ADDENDUM
[FreeTextEntry1] : This note was documented by Jayne Traore on 1/12/17 acting as scribe for Morris Moreno M.D..\par \par I, Morris Moreno M.D, certify that all of the documentation, medical decision making, and assessment within this note are true and correct.

## 2018-11-15 NOTE — HISTORY OF PRESENT ILLNESS
[FreeTextEntry1] : The patient is a 51 year old female presenting for a cardiac follow up with a history of HTN, HLD, COPD and atherosclerotic heart disease. Pt just left the hospital for chest pain. BP was extremely high. CHest pain began at lower chest, spread to left side of chest, then patient experienced pain on the left side of her face. Pt smokes, reduced to half a pack a day. Patient complains of SOB. \par CTA came back positive. Calcium plaque. \par Emphysema \par \par cath 1/2017 showed mild cad\par cholesterol is over 350\par rec increasing statin to 80mg a day

## 2018-11-15 NOTE — PHYSICAL EXAM
[General Appearance - Well Developed] : well developed [Normal Appearance] : normal appearance [Well Groomed] : well groomed [General Appearance - Well Nourished] : well nourished [No Deformities] : no deformities [General Appearance - In No Acute Distress] : no acute distress [Normal Conjunctiva] : the conjunctiva exhibited no abnormalities [Eyelids - No Xanthelasma] : the eyelids demonstrated no xanthelasmas [Normal Oral Mucosa] : normal oral mucosa [No Oral Pallor] : no oral pallor [No Oral Cyanosis] : no oral cyanosis [Normal Jugular Venous A Waves Present] : normal jugular venous A waves present [Normal Jugular Venous V Waves Present] : normal jugular venous V waves present [No Jugular Venous Worrell A Waves] : no jugular venous worrell A waves [Respiration, Rhythm And Depth] : normal respiratory rhythm and effort [Exaggerated Use Of Accessory Muscles For Inspiration] : no accessory muscle use [Auscultation Breath Sounds / Voice Sounds] : lungs were clear to auscultation bilaterally [Heart Rate And Rhythm] : heart rate and rhythm were normal [Heart Sounds] : normal S1 and S2 [Murmurs] : no murmurs present [Abdomen Soft] : soft [Abdomen Tenderness] : non-tender [Abdomen Mass (___ Cm)] : no abdominal mass palpated [Nail Clubbing] : no clubbing of the fingernails [Cyanosis, Localized] : no localized cyanosis [Petechial Hemorrhages (___cm)] : no petechial hemorrhages [Skin Color & Pigmentation] : normal skin color and pigmentation [] : no rash [No Venous Stasis] : no venous stasis [Skin Lesions] : no skin lesions [No Skin Ulcers] : no skin ulcer [No Xanthoma] : no  xanthoma was observed [Oriented To Time, Place, And Person] : oriented to person, place, and time [Affect] : the affect was normal [Mood] : the mood was normal [No Anxiety] : not feeling anxious [FreeTextEntry1] : no pedal edema

## 2019-02-14 ENCOUNTER — OUTPATIENT (OUTPATIENT)
Dept: OUTPATIENT SERVICES | Facility: HOSPITAL | Age: 54
LOS: 1 days | Discharge: HOME | End: 2019-02-14

## 2019-02-14 DIAGNOSIS — Z90.49 ACQUIRED ABSENCE OF OTHER SPECIFIED PARTS OF DIGESTIVE TRACT: Chronic | ICD-10-CM

## 2019-02-14 DIAGNOSIS — Z98.890 OTHER SPECIFIED POSTPROCEDURAL STATES: Chronic | ICD-10-CM

## 2019-02-14 DIAGNOSIS — J44.9 CHRONIC OBSTRUCTIVE PULMONARY DISEASE, UNSPECIFIED: ICD-10-CM

## 2019-02-14 DIAGNOSIS — J06.9 ACUTE UPPER RESPIRATORY INFECTION, UNSPECIFIED: ICD-10-CM

## 2019-02-14 DIAGNOSIS — Z01.82 ENCOUNTER FOR ALLERGY TESTING: ICD-10-CM

## 2019-02-14 DIAGNOSIS — K21.9 GASTRO-ESOPHAGEAL REFLUX DISEASE WITHOUT ESOPHAGITIS: ICD-10-CM

## 2019-02-14 DIAGNOSIS — G43.109 MIGRAINE WITH AURA, NOT INTRACTABLE, WITHOUT STATUS MIGRAINOSUS: ICD-10-CM

## 2019-02-14 DIAGNOSIS — Z90.711 ACQUIRED ABSENCE OF UTERUS WITH REMAINING CERVICAL STUMP: Chronic | ICD-10-CM

## 2019-02-14 DIAGNOSIS — R05 COUGH: ICD-10-CM

## 2019-02-14 DIAGNOSIS — K29.00 ACUTE GASTRITIS WITHOUT BLEEDING: ICD-10-CM

## 2019-02-14 DIAGNOSIS — J30.81 ALLERGIC RHINITIS DUE TO ANIMAL (CAT) (DOG) HAIR AND DANDER: ICD-10-CM

## 2019-03-14 ENCOUNTER — APPOINTMENT (OUTPATIENT)
Dept: OTOLARYNGOLOGY | Facility: CLINIC | Age: 54
End: 2019-03-14

## 2019-03-19 ENCOUNTER — APPOINTMENT (OUTPATIENT)
Dept: OTOLARYNGOLOGY | Facility: CLINIC | Age: 54
End: 2019-03-19
Payer: MEDICARE

## 2019-03-19 VITALS — BODY MASS INDEX: 28.71 KG/M2 | WEIGHT: 156 LBS | HEIGHT: 62 IN

## 2019-03-19 DIAGNOSIS — H61.20 IMPACTED CERUMEN, UNSPECIFIED EAR: ICD-10-CM

## 2019-03-19 PROCEDURE — 31575 DIAGNOSTIC LARYNGOSCOPY: CPT

## 2019-03-19 PROCEDURE — 99204 OFFICE O/P NEW MOD 45 MIN: CPT | Mod: 25

## 2019-03-19 PROCEDURE — 69210 REMOVE IMPACTED EAR WAX UNI: CPT

## 2019-03-19 NOTE — PHYSICAL EXAM
[de-identified] : left sided tender swelling. [de-identified] : right impacted wax cleaned with curette [Midline] : trachea located in midline position [Normal] : orientation to person, place, and time: normal

## 2019-03-19 NOTE — HISTORY OF PRESENT ILLNESS
[de-identified] : Patient presents today w/ sore throat and hoarseness. Patient admits this has been going on for 3 months. Patient saw another ENT told to have vocal cord nodule. Patient states throat is constantly dry. Diet is consisting of soup. Patient has been on flonase, nystatin and steroids. Current every day smoker. \par pt has bad allergies. ALso voice is changing. Starting to have dysphagia to solids.\par No weight loss.

## 2019-03-29 ENCOUNTER — FORM ENCOUNTER (OUTPATIENT)
Age: 54
End: 2019-03-29

## 2019-03-30 ENCOUNTER — OUTPATIENT (OUTPATIENT)
Dept: OUTPATIENT SERVICES | Facility: HOSPITAL | Age: 54
LOS: 1 days | Discharge: HOME | End: 2019-03-30

## 2019-03-30 DIAGNOSIS — Z98.890 OTHER SPECIFIED POSTPROCEDURAL STATES: Chronic | ICD-10-CM

## 2019-03-30 DIAGNOSIS — J38.7 OTHER DISEASES OF LARYNX: ICD-10-CM

## 2019-03-30 DIAGNOSIS — Z90.49 ACQUIRED ABSENCE OF OTHER SPECIFIED PARTS OF DIGESTIVE TRACT: Chronic | ICD-10-CM

## 2019-03-30 DIAGNOSIS — Z90.711 ACQUIRED ABSENCE OF UTERUS WITH REMAINING CERVICAL STUMP: Chronic | ICD-10-CM

## 2019-04-03 ENCOUNTER — APPOINTMENT (OUTPATIENT)
Dept: OTOLARYNGOLOGY | Facility: CLINIC | Age: 54
End: 2019-04-03
Payer: MEDICARE

## 2019-04-03 PROCEDURE — 99214 OFFICE O/P EST MOD 30 MIN: CPT | Mod: 25

## 2019-04-03 PROCEDURE — 31575 DIAGNOSTIC LARYNGOSCOPY: CPT

## 2019-04-03 NOTE — PHYSICAL EXAM
[de-identified] : left sided tender swelling. [de-identified] : right impacted wax cleaned with curette [Midline] : trachea located in midline position [Normal] : no rashes

## 2019-04-03 NOTE — HISTORY OF PRESENT ILLNESS
[de-identified] : Patient presents today w/ sore throat and hoarseness. Patient admits this has been going on for 3 months. Patient saw another ENT told to have vocal cord nodule. Patient states throat is constantly dry. Diet is consisting of soup. Patient has been on flonase, nystatin and steroids. Current every day smoker. \par pt has bad allergies. ALso voice is changing. Starting to have dysphagia to solids.\par No weight loss.  [FreeTextEntry1] : 4/3/19 Patient is following up for CT results that showed laryngeal swelling without discrete mass.

## 2019-04-03 NOTE — ASSESSMENT
[FreeTextEntry1] : CT is WNL. SMall superficial mucosal lesion on aryepiglottic fold, stable. \par Referred to SLP for swallowing evaluation.

## 2019-04-29 ENCOUNTER — OUTPATIENT (OUTPATIENT)
Dept: OUTPATIENT SERVICES | Facility: HOSPITAL | Age: 54
LOS: 1 days | Discharge: HOME | End: 2019-04-29

## 2019-04-29 DIAGNOSIS — Z90.711 ACQUIRED ABSENCE OF UTERUS WITH REMAINING CERVICAL STUMP: Chronic | ICD-10-CM

## 2019-04-29 DIAGNOSIS — Z98.890 OTHER SPECIFIED POSTPROCEDURAL STATES: Chronic | ICD-10-CM

## 2019-04-29 DIAGNOSIS — Z90.49 ACQUIRED ABSENCE OF OTHER SPECIFIED PARTS OF DIGESTIVE TRACT: Chronic | ICD-10-CM

## 2019-05-01 ENCOUNTER — APPOINTMENT (OUTPATIENT)
Dept: OTOLARYNGOLOGY | Facility: CLINIC | Age: 54
End: 2019-05-01

## 2019-05-10 DIAGNOSIS — R13.0 APHAGIA: ICD-10-CM

## 2019-05-17 ENCOUNTER — APPOINTMENT (OUTPATIENT)
Dept: OTOLARYNGOLOGY | Facility: CLINIC | Age: 54
End: 2019-05-17
Payer: MEDICARE

## 2019-05-17 PROCEDURE — 31575 DIAGNOSTIC LARYNGOSCOPY: CPT

## 2019-05-17 PROCEDURE — 69210 REMOVE IMPACTED EAR WAX UNI: CPT

## 2019-05-17 PROCEDURE — 99214 OFFICE O/P EST MOD 30 MIN: CPT | Mod: 25

## 2019-05-17 NOTE — HISTORY OF PRESENT ILLNESS
[de-identified] : Pt returns to the office as a follow up on dysphagia. Pt completed speech and swallow evaluation; results are in the chart and were reviewed today in details with the patient.\par A previous CT scan of the neck was WNL except slight thickening of the aryepiglottic fold.

## 2019-05-17 NOTE — PHYSICAL EXAM
[Midline] : trachea located in midline position [Normal] : no rashes [de-identified] : Pain on palpation of Left TMJ [de-identified] : impacted wax cleaned with curette

## 2019-07-15 ENCOUNTER — APPOINTMENT (OUTPATIENT)
Dept: OTOLARYNGOLOGY | Facility: CLINIC | Age: 54
End: 2019-07-15
Payer: MEDICARE

## 2019-07-15 VITALS — WEIGHT: 156 LBS | HEIGHT: 62 IN | BODY MASS INDEX: 28.71 KG/M2

## 2019-07-15 PROCEDURE — 69210 REMOVE IMPACTED EAR WAX UNI: CPT

## 2019-07-15 PROCEDURE — 31575 DIAGNOSTIC LARYNGOSCOPY: CPT

## 2019-07-15 PROCEDURE — 99214 OFFICE O/P EST MOD 30 MIN: CPT | Mod: 25

## 2019-07-15 NOTE — PHYSICAL EXAM
[de-identified] : bilateral impacted wax cleaned with curette [Midline] : trachea located in midline position [Normal] : no rashes

## 2019-07-15 NOTE — HISTORY OF PRESENT ILLNESS
[FreeTextEntry1] : Patient following up on dysphagia and sore throat. Patient still c/o throat burning and hoarse voice. She has been using Nystatin swish and swallow for the past 2 days. \par Patient had a previous CT neck WNL.\par \par She still complains of dysphagia with solids. She had seen SLP in the past, cleared for normal diet.

## 2019-08-08 ENCOUNTER — APPOINTMENT (OUTPATIENT)
Dept: CARDIOLOGY | Facility: CLINIC | Age: 54
End: 2019-08-08
Payer: MEDICARE

## 2019-08-08 ENCOUNTER — OTHER (OUTPATIENT)
Age: 54
End: 2019-08-08

## 2019-08-08 VITALS
DIASTOLIC BLOOD PRESSURE: 72 MMHG | BODY MASS INDEX: 29.44 KG/M2 | HEART RATE: 101 BPM | WEIGHT: 160 LBS | HEIGHT: 62 IN | SYSTOLIC BLOOD PRESSURE: 132 MMHG

## 2019-08-08 PROCEDURE — 93000 ELECTROCARDIOGRAM COMPLETE: CPT

## 2019-08-08 PROCEDURE — 99214 OFFICE O/P EST MOD 30 MIN: CPT

## 2019-08-08 NOTE — PHYSICAL EXAM
[General Appearance - Well Developed] : well developed [Normal Appearance] : normal appearance [Well Groomed] : well groomed [General Appearance - Well Nourished] : well nourished [General Appearance - In No Acute Distress] : no acute distress [No Deformities] : no deformities [Eyelids - No Xanthelasma] : the eyelids demonstrated no xanthelasmas [Normal Conjunctiva] : the conjunctiva exhibited no abnormalities [Normal Oral Mucosa] : normal oral mucosa [No Oral Cyanosis] : no oral cyanosis [No Oral Pallor] : no oral pallor [Normal Jugular Venous V Waves Present] : normal jugular venous V waves present [Normal Jugular Venous A Waves Present] : normal jugular venous A waves present [No Jugular Venous Worrell A Waves] : no jugular venous worrell A waves [Exaggerated Use Of Accessory Muscles For Inspiration] : no accessory muscle use [Respiration, Rhythm And Depth] : normal respiratory rhythm and effort [Auscultation Breath Sounds / Voice Sounds] : lungs were clear to auscultation bilaterally [Heart Rate And Rhythm] : heart rate and rhythm were normal [Heart Sounds] : normal S1 and S2 [Murmurs] : no murmurs present [Abdomen Tenderness] : non-tender [Abdomen Soft] : soft [Nail Clubbing] : no clubbing of the fingernails [Abdomen Mass (___ Cm)] : no abdominal mass palpated [Cyanosis, Localized] : no localized cyanosis [Petechial Hemorrhages (___cm)] : no petechial hemorrhages [Skin Color & Pigmentation] : normal skin color and pigmentation [] : no rash [No Venous Stasis] : no venous stasis [No Skin Ulcers] : no skin ulcer [Skin Lesions] : no skin lesions [Oriented To Time, Place, And Person] : oriented to person, place, and time [No Xanthoma] : no  xanthoma was observed [Affect] : the affect was normal [Mood] : the mood was normal [No Anxiety] : not feeling anxious [FreeTextEntry1] : no pedal edema

## 2019-08-09 ENCOUNTER — OUTPATIENT (OUTPATIENT)
Dept: OUTPATIENT SERVICES | Facility: HOSPITAL | Age: 54
LOS: 1 days | Discharge: HOME | End: 2019-08-09

## 2019-08-09 DIAGNOSIS — Z98.890 OTHER SPECIFIED POSTPROCEDURAL STATES: Chronic | ICD-10-CM

## 2019-08-09 DIAGNOSIS — Z90.711 ACQUIRED ABSENCE OF UTERUS WITH REMAINING CERVICAL STUMP: Chronic | ICD-10-CM

## 2019-08-09 DIAGNOSIS — Z90.49 ACQUIRED ABSENCE OF OTHER SPECIFIED PARTS OF DIGESTIVE TRACT: Chronic | ICD-10-CM

## 2019-08-12 ENCOUNTER — OUTPATIENT (OUTPATIENT)
Dept: OUTPATIENT SERVICES | Facility: HOSPITAL | Age: 54
LOS: 1 days | Discharge: HOME | End: 2019-08-12

## 2019-08-12 DIAGNOSIS — Z90.49 ACQUIRED ABSENCE OF OTHER SPECIFIED PARTS OF DIGESTIVE TRACT: Chronic | ICD-10-CM

## 2019-08-12 DIAGNOSIS — K02.63 DENTAL CARIES ON SMOOTH SURFACE PENETRATING INTO PULP: ICD-10-CM

## 2019-08-12 DIAGNOSIS — Z90.711 ACQUIRED ABSENCE OF UTERUS WITH REMAINING CERVICAL STUMP: Chronic | ICD-10-CM

## 2019-08-12 DIAGNOSIS — Z98.890 OTHER SPECIFIED POSTPROCEDURAL STATES: Chronic | ICD-10-CM

## 2019-09-16 ENCOUNTER — APPOINTMENT (OUTPATIENT)
Dept: CARDIOLOGY | Facility: CLINIC | Age: 54
End: 2019-09-16
Payer: MEDICARE

## 2019-09-16 PROCEDURE — 93306 TTE W/DOPPLER COMPLETE: CPT

## 2019-10-24 ENCOUNTER — OUTPATIENT (OUTPATIENT)
Dept: OUTPATIENT SERVICES | Facility: HOSPITAL | Age: 54
LOS: 1 days | Discharge: HOME | End: 2019-10-24

## 2019-10-24 DIAGNOSIS — Z98.890 OTHER SPECIFIED POSTPROCEDURAL STATES: Chronic | ICD-10-CM

## 2019-10-24 DIAGNOSIS — Z90.49 ACQUIRED ABSENCE OF OTHER SPECIFIED PARTS OF DIGESTIVE TRACT: Chronic | ICD-10-CM

## 2019-10-24 DIAGNOSIS — Z90.711 ACQUIRED ABSENCE OF UTERUS WITH REMAINING CERVICAL STUMP: Chronic | ICD-10-CM

## 2019-10-24 DIAGNOSIS — Z01.21 ENCOUNTER FOR DENTAL EXAMINATION AND CLEANING WITH ABNORMAL FINDINGS: ICD-10-CM

## 2019-11-11 ENCOUNTER — APPOINTMENT (OUTPATIENT)
Dept: OTOLARYNGOLOGY | Facility: CLINIC | Age: 54
End: 2019-11-11

## 2019-12-16 ENCOUNTER — APPOINTMENT (OUTPATIENT)
Dept: OTOLARYNGOLOGY | Facility: CLINIC | Age: 54
End: 2019-12-16
Payer: MEDICARE

## 2019-12-16 DIAGNOSIS — B37.0 CANDIDAL STOMATITIS: ICD-10-CM

## 2019-12-16 DIAGNOSIS — H92.09 OTALGIA, UNSPECIFIED EAR: ICD-10-CM

## 2019-12-16 PROCEDURE — 99214 OFFICE O/P EST MOD 30 MIN: CPT | Mod: 25

## 2019-12-16 PROCEDURE — 31575 DIAGNOSTIC LARYNGOSCOPY: CPT

## 2019-12-16 NOTE — HISTORY OF PRESENT ILLNESS
[FreeTextEntry1] : Pt returns to the office as a follow up on dysphagia, hoarseness and otalgia. \par She saw the GI doctor and still cannot swallow, solids and liquids. She had a previous CT neck that was unremarkable and was cleared by SLP in the past. She is on omeprazole and pepcid. \par Pt recently went to urgent care and was diagnosed with sinusitis and otitis externa on 12/11/19. She went to  because of the left ear pain for a couple of weeks. She was given antibiotics drops (cortisporin) and doxicycline. She still does not feel better. Still complains of throat pain and left ear pain. No dizziness.

## 2019-12-16 NOTE — PHYSICAL EXAM
[de-identified] : left TMJ pain on palpation [Midline] : trachea located in midline position [Normal] : no rashes

## 2019-12-29 ENCOUNTER — EMERGENCY (EMERGENCY)
Facility: HOSPITAL | Age: 54
LOS: 0 days | Discharge: HOME | End: 2019-12-29
Attending: EMERGENCY MEDICINE | Admitting: EMERGENCY MEDICINE
Payer: MEDICARE

## 2019-12-29 VITALS
RESPIRATION RATE: 20 BRPM | TEMPERATURE: 97 F | DIASTOLIC BLOOD PRESSURE: 74 MMHG | SYSTOLIC BLOOD PRESSURE: 162 MMHG | HEART RATE: 65 BPM | OXYGEN SATURATION: 98 % | HEIGHT: 62 IN | WEIGHT: 149.91 LBS

## 2019-12-29 DIAGNOSIS — G43.909 MIGRAINE, UNSPECIFIED, NOT INTRACTABLE, WITHOUT STATUS MIGRAINOSUS: ICD-10-CM

## 2019-12-29 DIAGNOSIS — R07.89 OTHER CHEST PAIN: ICD-10-CM

## 2019-12-29 DIAGNOSIS — Z88.1 ALLERGY STATUS TO OTHER ANTIBIOTIC AGENTS STATUS: ICD-10-CM

## 2019-12-29 DIAGNOSIS — Z98.890 OTHER SPECIFIED POSTPROCEDURAL STATES: Chronic | ICD-10-CM

## 2019-12-29 DIAGNOSIS — Z90.49 ACQUIRED ABSENCE OF OTHER SPECIFIED PARTS OF DIGESTIVE TRACT: Chronic | ICD-10-CM

## 2019-12-29 DIAGNOSIS — Z88.0 ALLERGY STATUS TO PENICILLIN: ICD-10-CM

## 2019-12-29 DIAGNOSIS — Z90.711 ACQUIRED ABSENCE OF UTERUS WITH REMAINING CERVICAL STUMP: Chronic | ICD-10-CM

## 2019-12-29 DIAGNOSIS — R56.9 UNSPECIFIED CONVULSIONS: ICD-10-CM

## 2019-12-29 DIAGNOSIS — Z88.6 ALLERGY STATUS TO ANALGESIC AGENT: ICD-10-CM

## 2019-12-29 DIAGNOSIS — I10 ESSENTIAL (PRIMARY) HYPERTENSION: ICD-10-CM

## 2019-12-29 DIAGNOSIS — F17.210 NICOTINE DEPENDENCE, CIGARETTES, UNCOMPLICATED: ICD-10-CM

## 2019-12-29 DIAGNOSIS — E78.5 HYPERLIPIDEMIA, UNSPECIFIED: ICD-10-CM

## 2019-12-29 DIAGNOSIS — R07.9 CHEST PAIN, UNSPECIFIED: ICD-10-CM

## 2019-12-29 LAB
ALBUMIN SERPL ELPH-MCNC: 4.1 G/DL — SIGNIFICANT CHANGE UP (ref 3.5–5.2)
ALP SERPL-CCNC: 137 U/L — HIGH (ref 30–115)
ALT FLD-CCNC: 52 U/L — HIGH (ref 0–41)
ANION GAP SERPL CALC-SCNC: 13 MMOL/L — SIGNIFICANT CHANGE UP (ref 7–14)
APTT BLD: 32.8 SEC — SIGNIFICANT CHANGE UP (ref 27–39.2)
AST SERPL-CCNC: 37 U/L — SIGNIFICANT CHANGE UP (ref 0–41)
BASOPHILS # BLD AUTO: 0.03 K/UL — SIGNIFICANT CHANGE UP (ref 0–0.2)
BASOPHILS NFR BLD AUTO: 0.7 % — SIGNIFICANT CHANGE UP (ref 0–1)
BILIRUB SERPL-MCNC: <0.2 MG/DL — SIGNIFICANT CHANGE UP (ref 0.2–1.2)
BUN SERPL-MCNC: 12 MG/DL — SIGNIFICANT CHANGE UP (ref 10–20)
CALCIUM SERPL-MCNC: 9.4 MG/DL — SIGNIFICANT CHANGE UP (ref 8.5–10.1)
CHLORIDE SERPL-SCNC: 103 MMOL/L — SIGNIFICANT CHANGE UP (ref 98–110)
CO2 SERPL-SCNC: 24 MMOL/L — SIGNIFICANT CHANGE UP (ref 17–32)
CREAT SERPL-MCNC: 0.7 MG/DL — SIGNIFICANT CHANGE UP (ref 0.7–1.5)
D DIMER BLD IA.RAPID-MCNC: 91 NG/ML DDU — SIGNIFICANT CHANGE UP (ref 0–230)
EOSINOPHIL # BLD AUTO: 0.04 K/UL — SIGNIFICANT CHANGE UP (ref 0–0.7)
EOSINOPHIL NFR BLD AUTO: 0.9 % — SIGNIFICANT CHANGE UP (ref 0–8)
GLUCOSE SERPL-MCNC: 95 MG/DL — SIGNIFICANT CHANGE UP (ref 70–99)
HCT VFR BLD CALC: 38.1 % — SIGNIFICANT CHANGE UP (ref 37–47)
HGB BLD-MCNC: 12.9 G/DL — SIGNIFICANT CHANGE UP (ref 12–16)
IMM GRANULOCYTES NFR BLD AUTO: 0.2 % — SIGNIFICANT CHANGE UP (ref 0.1–0.3)
INR BLD: 0.97 RATIO — SIGNIFICANT CHANGE UP (ref 0.65–1.3)
LYMPHOCYTES # BLD AUTO: 1.57 K/UL — SIGNIFICANT CHANGE UP (ref 1.2–3.4)
LYMPHOCYTES # BLD AUTO: 34.4 % — SIGNIFICANT CHANGE UP (ref 20.5–51.1)
MCHC RBC-ENTMCNC: 32.3 PG — HIGH (ref 27–31)
MCHC RBC-ENTMCNC: 33.9 G/DL — SIGNIFICANT CHANGE UP (ref 32–37)
MCV RBC AUTO: 95.5 FL — SIGNIFICANT CHANGE UP (ref 81–99)
MONOCYTES # BLD AUTO: 0.31 K/UL — SIGNIFICANT CHANGE UP (ref 0.1–0.6)
MONOCYTES NFR BLD AUTO: 6.8 % — SIGNIFICANT CHANGE UP (ref 1.7–9.3)
NEUTROPHILS # BLD AUTO: 2.6 K/UL — SIGNIFICANT CHANGE UP (ref 1.4–6.5)
NEUTROPHILS NFR BLD AUTO: 57 % — SIGNIFICANT CHANGE UP (ref 42.2–75.2)
NRBC # BLD: 0 /100 WBCS — SIGNIFICANT CHANGE UP (ref 0–0)
NT-PROBNP SERPL-SCNC: 88 PG/ML — SIGNIFICANT CHANGE UP (ref 0–300)
PLATELET # BLD AUTO: 214 K/UL — SIGNIFICANT CHANGE UP (ref 130–400)
POTASSIUM SERPL-MCNC: 4.3 MMOL/L — SIGNIFICANT CHANGE UP (ref 3.5–5)
POTASSIUM SERPL-SCNC: 4.3 MMOL/L — SIGNIFICANT CHANGE UP (ref 3.5–5)
PROT SERPL-MCNC: 7 G/DL — SIGNIFICANT CHANGE UP (ref 6–8)
PROTHROM AB SERPL-ACNC: 11.2 SEC — SIGNIFICANT CHANGE UP (ref 9.95–12.87)
RBC # BLD: 3.99 M/UL — LOW (ref 4.2–5.4)
RBC # FLD: 13 % — SIGNIFICANT CHANGE UP (ref 11.5–14.5)
SODIUM SERPL-SCNC: 140 MMOL/L — SIGNIFICANT CHANGE UP (ref 135–146)
TROPONIN T SERPL-MCNC: <0.01 NG/ML — SIGNIFICANT CHANGE UP
TROPONIN T SERPL-MCNC: <0.01 NG/ML — SIGNIFICANT CHANGE UP
WBC # BLD: 4.56 K/UL — LOW (ref 4.8–10.8)
WBC # FLD AUTO: 4.56 K/UL — LOW (ref 4.8–10.8)

## 2019-12-29 PROCEDURE — 71045 X-RAY EXAM CHEST 1 VIEW: CPT | Mod: 26

## 2019-12-29 PROCEDURE — 93010 ELECTROCARDIOGRAM REPORT: CPT | Mod: 76

## 2019-12-29 PROCEDURE — 99218: CPT

## 2019-12-29 PROCEDURE — 99284 EMERGENCY DEPT VISIT MOD MDM: CPT

## 2019-12-29 RX ORDER — FAMOTIDINE 10 MG/ML
20 INJECTION INTRAVENOUS ONCE
Refills: 0 | Status: COMPLETED | OUTPATIENT
Start: 2019-12-29 | End: 2019-12-29

## 2019-12-29 RX ORDER — ACETAMINOPHEN 500 MG
650 TABLET ORAL ONCE
Refills: 0 | Status: COMPLETED | OUTPATIENT
Start: 2019-12-29 | End: 2019-12-29

## 2019-12-29 RX ADMIN — FAMOTIDINE 104 MILLIGRAM(S): 10 INJECTION INTRAVENOUS at 20:36

## 2019-12-29 RX ADMIN — Medication 650 MILLIGRAM(S): at 17:26

## 2019-12-29 NOTE — ED PROVIDER NOTE - CLINICAL SUMMARY MEDICAL DECISION MAKING FREE TEXT BOX
Pt presented to ED for chest pain. HEART score medium. ED labs, EKG, CXR wnl. No evidence of acute ischemia. Placed in obs for chest pain protocol. Pt stable on disposition.

## 2019-12-29 NOTE — ED PROVIDER NOTE - OBJECTIVE STATEMENT
Pt is a 53 y/o female with hx of HTN, DLD, presents to ED for substernal chest pain for 2 days, moderate, sharp, nonexertional, radiates to left jaw. + diaphoresis during episode today. No SOB, n/v, leg pain/swelling, fever, cough.

## 2019-12-29 NOTE — ED CDU PROVIDER INITIAL DAY NOTE - OBJECTIVE STATEMENT
54 y.o. female with pmx of hld, migraines, seizure disorder, smoker comes to ed complain of chest pain x 2 days. states had stress test 2 years ago with dr. wyatt.

## 2019-12-29 NOTE — ED PROVIDER NOTE - OTHER FINDINGS
NSR at 87. Normal axis, normal intervals. no acute ST-T changes. NSR at 83. Normal axis. normal intervals. no acute ST-T changes.

## 2019-12-29 NOTE — ED CDU PROVIDER INITIAL DAY NOTE - PROGRESS NOTE DETAILS
received signout from Jaison Whitten - pt in obs for evaluation of cp in am; pt cardiologist is Dr. Moreno - last stress test 2 yrs ago wnl as per pt; pt currently c/o mild epigastric abdominal pain; 2nd ce drawn pending; will continue to follow;

## 2019-12-29 NOTE — ED CDU PROVIDER INITIAL DAY NOTE - ATTENDING CONTRIBUTION TO CARE
53yo F with PMHx HTN, HLD, presents for chest pain. Troponin negative. CXR WNL. EKG nonischemic. 55yo F with PMHx HTN, HLD, presents for chest pain. Troponin negative x1. CXR WNL. EKG nonischemic.

## 2019-12-29 NOTE — ED ADULT TRIAGE NOTE - CHIEF COMPLAINT QUOTE
Pt reports intermittent midsternal chest pain for two days which increased in severity and began radiating to left jaw today.

## 2019-12-30 VITALS
RESPIRATION RATE: 18 BRPM | OXYGEN SATURATION: 99 % | SYSTOLIC BLOOD PRESSURE: 172 MMHG | HEART RATE: 75 BPM | DIASTOLIC BLOOD PRESSURE: 79 MMHG | TEMPERATURE: 98 F

## 2019-12-30 PROCEDURE — 93010 ELECTROCARDIOGRAM REPORT: CPT

## 2019-12-30 PROCEDURE — 75574 CT ANGIO HRT W/3D IMAGE: CPT | Mod: 26

## 2019-12-30 PROCEDURE — 99217: CPT

## 2019-12-30 RX ORDER — METOPROLOL TARTRATE 50 MG
100 TABLET ORAL ONCE
Refills: 0 | Status: COMPLETED | OUTPATIENT
Start: 2019-12-30 | End: 2019-12-30

## 2019-12-30 RX ADMIN — Medication 100 MILLIGRAM(S): at 06:30

## 2019-12-30 NOTE — ED CDU PROVIDER DISPOSITION NOTE - CLINICAL COURSE
53yo F in EDOU for chest pain, resolved. CCTA showing: Mixed plaque in the proximal LAD contributing to mild stenosis. This appears similar to prior CTA coronary dated 5/18/2015. Calcium score 16, 89th percentile for age/gender/ethnicity. CAD-RADS 2. Troponin negative x2. CXR WNL. EKGs nonischemic. Cleared by cardiology for discharge and outpatient f/u.

## 2019-12-30 NOTE — CONSULT NOTE ADULT - SUBJECTIVE AND OBJECTIVE BOX
Date of Admission: 12-29-19    CHIEF COMPLAINT: Patient is a 54y old  Female who presents with a chief complaint of chest pain    HPI: 55 y/o female with hx of HTN, DLD, presents to ED for substernal chest pain for 2 days, moderate, sharp, nonexertional, radiates to left jaw. + diaphoresis during episode today. No SOB, n/v, leg pain/swelling, fever, cough.      PAST MEDICAL & SURGICAL HISTORY:  Anxiety  Hiatal hernia  GERD (gastroesophageal reflux disease)  Seasonal allergic rhinitis, unspecified trigger  Hypercholesteremia  Migraines  Seizures: last one 6 yrs ago  Back pain: henriated discs  S/P cholecystectomy  History of appendectomy  H/O rotator cuff surgery: right  S/p partial hysterectomy with remaining cervical stump  History of surgery: 4 rght knee sx  1 left knee sx  cholecystectomy  rotator cuff right  abdominal sx-- adhesions  c section  ovarian cystectomy  breast cystectomies  colonoscopy   endoscopies  parital hysterectomy      FAMILY HISTORY:  [x] no pertinent family history of premature cardiovascular disease in first degree relatives.    SOCIAL HISTORY:    [  ] Non-smoker  [  ] Ex-Smoker  [x] No alcohol use  [x] No illicit drug use    Allergies    Cipro (Anaphylaxis; Urticaria; Hives)  penicillins (Unknown)  tramadol (Unknown)  vancomycin (Unknown)    REVIEW OF SYSTEMS:  CONSTITUTIONAL: No fever, weight loss, or fatigue  CARDIOLOGY: (+) Chest pain. No dyspnea of exertion, or syncopal episodes.   RESPIRATORY: No shortness of breath, cough, wheezing.   NEUROLOGICAL: No weakness, no focal deficits to report.  GI: No BRBPR, no N,V,diarrhea.    PSYCHIATRY: Normal mood and affect.  HEENT: No nasal discharge, no ecchymosis  SKIN: No ecchymosis, no breakdown  MUSCULOSKELETAL: Full range of motion x4.   EXTREM: No leg swelling or erythema.    PHYSICAL EXAM:  T(C): 36.4 (12-30-19 @ 08:12), Max: 36.6 (12-30-19 @ 06:24)  HR: 58 (12-30-19 @ 08:34) (58 - 85)  BP: 162/77 (12-30-19 @ 08:12) (159/74 - 165/72)  RR: 18 (12-30-19 @ 08:12) (18 - 20)  SpO2: 96% (12-30-19 @ 08:12) (96% - 98%)  Wt(kg): --  I&O's Summary      General Appearance: Well appearing, normal for age and gender. 	  Neck: Normal JVP, no bruit.   Eyes: No xanthomalasia, Extra Ocular muscles intact.   Cardiovascular: Regular rate and rhythm S1 S2, No JVD, No murmurs.  Respiratory: Lungs clear to auscultation. No wheezes, rales or rhonchi.  Psychiatry: Alert and oriented x 3, Mood & affect appropriate  Gastrointestinal:  Soft, Non-tender  Skin/Integumen: No rashes, No ecchymoses, No cyanosis	  Neurologic: Non-focal deficits.  Musculoskeletal/ extremities: Normal range of motion, No clubbing, cyanosis or edema  Vascular: Peripheral pulses palpable 2+ bilaterally    LABS:	 	                        12.9   4.56  )-----------( 214      ( 29 Dec 2019 14:45 )             38.1     12-29    140  |  103  |  12  ----------------------------<  95  4.3   |  24  |  0.7    Ca    9.4      29 Dec 2019 14:45    TPro  7.0  /  Alb  4.1  /  TBili  <0.2  /  DBili  x   /  AST  37  /  ALT  52<H>  /  AlkPhos  137<H>  12-29    CARDIAC MARKERS ( 29 Dec 2019 19:00 )  x     / <0.01 ng/mL / x     / x     / x      CARDIAC MARKERS ( 29 Dec 2019 14:45 )  x     / <0.01 ng/mL / x     / x     / x          PT/INR - ( 29 Dec 2019 14:45 )   PT: 11.20 sec;   INR: 0.97 ratio    PTT - ( 29 Dec 2019 14:45 )  PTT:32.8 sec    CARDIAC MARKERS:      TELEMETRY EVENTS: 	    ECG: < from: 12 Lead ECG (12.30.19 @ 01:04) >  Diagnosis Line Normal sinus rhythm  Normal ECG     	  RADIOLOGY: < from: CT Heart with Coronaries (12.30.19 @ 09:23) >  There is a right dominant coronary arterial system.     Left Main Artery: Patent     Left Anterior Descending Artery: Mixed plaque in the proximal LAD contributing to mild stenosis. This appears similar to prior CTA coronary dated 5/18/2015    Left Circumflex Artery: Patent     Right Coronary Artery: Mildly degraded by motion. Appears patent.     CARDIAC MORPHOLOGY: The cardiac chambers are normal in size.There is no pericardial effusion.    IMAGED AORTA: The thoracic aorta is normal in caliber.    IMAGED EXTRACARDIAC FINDINGS:  Centrilobular emphysema. Bibasilar scarring/subsegmental atelectasis. Degenerative changes of the spine.    IMPRESSION:   Mixed plaque in the proximal LAD contributing to mild stenosis. This appears similar to prior CTA coronary dated 5/18/2015.    The total Agatston coronary artery calcium score equals 16, which corresponds to 89th percentile for age, gender and ethnicity.     CAD-RADS 2.	    PREVIOUS DIAGNOSTIC TESTING:    [x] Echocardiogram: 5/3/2014 Echo EF 55%, no regional wall abnormalities, trivial MVR  [x] Catheterization: 1/17/2017 Non-obstructive CAD. LM normal, CAD LI, Circ LI, RCA LI  [ ] Stress Test:  	  	  Home Medications:  atorvastatin 80 mg oral tablet: 1 tab(s) orally once a day (16 Apr 2018 10:21)  busPIRone 10 mg oral tablet: 1 tab(s) orally 3 times a day (16 Apr 2018 10:21)  Duexis 800 mg-26.6 mg oral tablet: 1 tab(s) orally 3 times a day (16 Apr 2018 10:21)  Fioricet oral tablet: 1 tab(s) orally 3 times a day, As Needed (16 Apr 2018 10:21)  Incruse Ellipta 62.5 mcg/inh inhalation powder: inhaled once a day (16 Apr 2018 10:21)  Keppra 250 mg oral tablet: 1 tab(s) orally 2 times a day (16 Apr 2018 10:21)  KlonoPIN 2 mg oral tablet: 1 tab(s) orally 4 times a day, As Needed (02 Apr 2018 15:16)  Linzess 145 mcg oral capsule: 1 cap(s) orally once a day (02 Apr 2018 15:16)  Symbicort 160 mcg-4.5 mcg/inh inhalation aerosol: 2 puff(s) inhaled 2 times a day, As Needed (02 Apr 2018 15:16)  venlafaxine extended release: 150 mg in am  75 mg in pm (02 Apr 2018 15:16)    MEDICATIONS  (STANDING):    MEDICATIONS  (PRN): Date of Admission: 12-29-19    CHIEF COMPLAINT: Patient is a 54y old  Female who presents with a chief complaint of chest pain    HPI: 55 y/o female with hx of HTN, DLD, Anxiety, Migraines, Seizure, Chronic back pain with h/o multiple back injections, GERD, hiatal hernia, h/o multiple abdominal surgery presented with parasternal chest pain described as moderate, sharp, nonexertional associated with neck pain. Pt denies SOB, cough, fever, chills, palpitations.    PAST MEDICAL & SURGICAL HISTORY:  Anxiety  Hiatal hernia  GERD (gastroesophageal reflux disease)  Seasonal allergic rhinitis, unspecified trigger  Hypercholesteremia  Migraines  Seizures: last one 6 yrs ago  Back pain: henriated discs  S/P cholecystectomy  History of appendectomy  H/O rotator cuff surgery: right  S/p partial hysterectomy with remaining cervical stump  History of surgery: 4 rght knee sx  1 left knee sx  cholecystectomy  rotator cuff right  abdominal sx-- adhesions  c section  ovarian cystectomy  breast cystectomies  colonoscopy   endoscopies  parital hysterectomy      FAMILY HISTORY:  [x] no pertinent family history of premature cardiovascular disease in first degree relatives.    SOCIAL HISTORY:    [x] Active Smoker 1/2 pack daily  [x] No alcohol use  [x] No illicit drug use    Allergies    Cipro (Anaphylaxis; Urticaria; Hives)  penicillins (Unknown)  tramadol (Unknown)  vancomycin (Unknown)    REVIEW OF SYSTEMS:  CONSTITUTIONAL: No fever, weight loss, or fatigue  CARDIOLOGY: (+) Chest pain. No dyspnea of exertion, or syncopal episodes.   RESPIRATORY: No shortness of breath, cough, wheezing.   NEUROLOGICAL: No weakness, no focal deficits to report.  GI: No BRBPR, no N,V,diarrhea.    PSYCHIATRY: Normal mood and affect.  HEENT: No nasal discharge, no ecchymosis  SKIN: No ecchymosis, no breakdown  MUSCULOSKELETAL: Full range of motion x4. (+) Neck and back pain.  EXTREM: No leg swelling or erythema.    PHYSICAL EXAM:  T(C): 36.4 (12-30-19 @ 08:12), Max: 36.6 (12-30-19 @ 06:24)  HR: 58 (12-30-19 @ 08:34) (58 - 85)  BP: 162/77 (12-30-19 @ 08:12) (159/74 - 165/72)  RR: 18 (12-30-19 @ 08:12) (18 - 20)  SpO2: 96% (12-30-19 @ 08:12) (96% - 98%)  Wt(kg): --  I&O's Summary      General Appearance: Well appearing, normal for age and gender. 	  Neck: Normal JVP, no bruit. (+) Neck tenderness.  Eyes: No xanthomalasia, Extra Ocular muscles intact.   Cardiovascular: Regular rate and rhythm S1 S2, No JVD, No murmurs. (+) Chest wall tenderness.  Respiratory: Decreased air entry bilaterally. No wheezes, rales or rhonchi.  Psychiatry: Alert and oriented x 3, Mood & affect appropriate  Gastrointestinal:  Soft, tenderness over RLQ, Non-distended. Multiple prior surgical scar.  Skin/Integumen: No rashes, No ecchymoses, No cyanosis	  Neurologic: Non-focal deficits.  Musculoskeletal/ extremities: Normal range of motion, No clubbing, cyanosis or edema. (+) Back tenderness.  Vascular: Peripheral pulses palpable bilaterally    LABS:	 	                        12.9   4.56  )-----------( 214      ( 29 Dec 2019 14:45 )             38.1     12-29    140  |  103  |  12  ----------------------------<  95  4.3   |  24  |  0.7    Ca    9.4      29 Dec 2019 14:45    TPro  7.0  /  Alb  4.1  /  TBili  <0.2  /  DBili  x   /  AST  37  /  ALT  52<H>  /  AlkPhos  137<H>  12-29    CARDIAC MARKERS ( 29 Dec 2019 19:00 )  x     / <0.01 ng/mL / x     / x     / x      CARDIAC MARKERS ( 29 Dec 2019 14:45 )  x     / <0.01 ng/mL / x     / x     / x          PT/INR - ( 29 Dec 2019 14:45 )   PT: 11.20 sec;   INR: 0.97 ratio    PTT - ( 29 Dec 2019 14:45 )  PTT:32.8 sec    CARDIAC MARKERS:      TELEMETRY EVENTS: 	    ECG: < from: 12 Lead ECG (12.30.19 @ 01:04) >  Diagnosis Line Normal sinus rhythm  Normal ECG     	  RADIOLOGY: < from: CT Heart with Coronaries (12.30.19 @ 09:23) >  There is a right dominant coronary arterial system.     Left Main Artery: Patent     Left Anterior Descending Artery: Mixed plaque in the proximal LAD contributing to mild stenosis. This appears similar to prior CTA coronary dated 5/18/2015    Left Circumflex Artery: Patent     Right Coronary Artery: Mildly degraded by motion. Appears patent.     CARDIAC MORPHOLOGY: The cardiac chambers are normal in size.There is no pericardial effusion.    IMAGED AORTA: The thoracic aorta is normal in caliber.    IMAGED EXTRACARDIAC FINDINGS:  Centrilobular emphysema. Bibasilar scarring/subsegmental atelectasis. Degenerative changes of the spine.    IMPRESSION:   Mixed plaque in the proximal LAD contributing to mild stenosis. This appears similar to prior CTA coronary dated 5/18/2015.    The total Agatston coronary artery calcium score equals 16, which corresponds to 89th percentile for age, gender and ethnicity.     CAD-RADS 2.	    PREVIOUS DIAGNOSTIC TESTING:    [x] Echocardiogram: 5/3/2014 Echo EF 55%, no regional wall abnormalities, trivial MVR  [x] Catheterization: 1/17/2017 Non-obstructive CAD. LM normal, CAD LI, Circ LI, RCA LI  	  Home Medications:  aspirin 81 mg po q24h  atorvastatin 80 mg oral tablet: 1 tab(s) orally once a day (16 Apr 2018 10:21)  busPIRone 10 mg oral tablet: 1 tab(s) orally 3 times a day (16 Apr 2018 10:21)  Duexis 800 mg-26.6 mg oral tablet: 1 tab(s) orally 3 times a day (16 Apr 2018 10:21)  Fioricet oral tablet: 1 tab(s) orally 3 times a day, As Needed (16 Apr 2018 10:21)  Incruse Ellipta 62.5 mcg/inh inhalation powder: inhaled once a day (16 Apr 2018 10:21)  Keppra 250 mg oral tablet: 1 tab(s) orally 2 times a day (16 Apr 2018 10:21)  KlonoPIN 2 mg oral tablet: 1 tab(s) orally 4 times a day, As Needed (02 Apr 2018 15:16)  Linzess 145 mcg oral capsule: 1 cap(s) orally once a day (02 Apr 2018 15:16)  Symbicort 160 mcg-4.5 mcg/inh inhalation aerosol: 2 puff(s) inhaled 2 times a day, As Needed (02 Apr 2018 15:16)  venlafaxine extended release: 150 mg in am  75 mg in pm (02 Apr 2018 15:16)    MEDICATIONS  (STANDING):    MEDICATIONS  (PRN):

## 2019-12-30 NOTE — ED CDU PROVIDER DISPOSITION NOTE - NSFOLLOWUPINSTRUCTIONS_ED_ALL_ED_FT
Chest Pain    Chest pain can be caused by many different conditions which may or may not be dangerous. Causes include heartburn, lung infections, heart attack, blood clot in lungs, skin infections, strain or damage to muscle, cartilage, or bones, etc. In addition to a history and physical examination, an electrocardiogram (ECG) or other lab tests may have been performed to determine the cause of your chest pain. Follow up with your primary care provider or with a cardiologist as instructed.     SEEK IMMEDIATE MEDICAL CARE IF YOU HAVE ANY OF THE FOLLOWING SYMPTOMS: worsening chest pain, coughing up blood, unexplained back/neck/jaw pain, severe abdominal pain, dizziness or lightheadedness, fainting, shortness of breath, sweaty or clammy skin, vomiting, or racing heart beat. These symptoms may represent a serious problem that is an emergency. Do not wait to see if the symptoms will go away. Get medical help right away. Call 911 and do not drive yourself to the hospital. Chest Pain    Chest pain can be caused by many different conditions which may or may not be dangerous. Causes include heartburn, lung infections, heart attack, blood clot in lungs, skin infections, strain or damage to muscle, cartilage, or bones, etc. In addition to a history and physical examination, an electrocardiogram (ECG) or other lab tests may have been performed to determine the cause of your chest pain. Follow up with your primary care provider or with a cardiologist as instructed.     SEEK IMMEDIATE MEDICAL CARE IF YOU HAVE ANY OF THE FOLLOWING SYMPTOMS: worsening chest pain, coughing up blood, unexplained back/neck/jaw pain, severe abdominal pain, dizziness or lightheadedness, fainting, shortness of breath, sweaty or clammy skin, vomiting, or racing heart beat. These symptoms may represent a serious problem that is an emergency. Do not wait to see if the symptoms will go away. Get medical help right away. Call 911 and do not drive yourself to the hospital.      Hypertension    Hypertension, commonly called high blood pressure, is when the force of blood pumping through your arteries is too strong. Hypertension forces your heart to work harder to pump blood. Your arteries may become narrow or stiff. Having untreated or uncontrolled hypertension for a long period of time can cause heart attack, stroke, kidney disease, and other problems. If started on a medication, take exactly as prescribed by your health care professional. Maintain a healthy lifestyle and follow up with your primary care physician.    SEEK IMMEDIATE MEDICAL CARE IF YOU HAVE ANY OF THE FOLLOWING SYMPTOMS: severe headache, confusion, chest pain, abdominal pain, vomiting, or shortness of breath.    PLease follow upwith dr. coker, please return for any concerns as discussed.

## 2019-12-30 NOTE — ED CDU PROVIDER SUBSEQUENT DAY NOTE - NS ED ROS FT
Constitutional: (-) fever  Eyes/ENT: (-) blurry vision, (-) epistaxis  Cardiovascular: (+) chest pain now resolved, (-) syncope  Respiratory: (-) cough, (-) shortness of breath  Gastrointestinal: (-) vomiting, (-) diarrhea  Musculoskeletal: (-) neck pain, (-) back pain, (-) joint pain  Integumentary: (-) rash, (-) edema  Neurological: (-) headache, (-) altered mental status  Psychiatric: (-) hallucinations  Allergic/Immunologic: (-) pruritus

## 2019-12-30 NOTE — ED CDU PROVIDER SUBSEQUENT DAY NOTE - HISTORY
54 y.o. female with pmx of hld, migraines, seizure disorder, smoker comes to ed complain of chest pain since yesterdays afternoon, substernal radiating to l. jaw and neck; now resolved. denies any sob, any swelling of her legs. states had stress test 2 years ago with dr. wyatt. no cough, congestion, runny nose. denies any inc. physical activity.

## 2019-12-30 NOTE — ED CDU PROVIDER SUBSEQUENT DAY NOTE - MEDICAL DECISION MAKING DETAILS
55yo F in EDOU for chest pain that resolved. CCTA showing: Mixed plaque in the proximal LAD contributing to mild stenosis. This appears similar to prior CTA coronary dated 5/18/2015. Calcium score 16, 89th percentile for age/gender/ethnicity. CAD-RADS 2. Troponin negative x2. CXR WNL. EKGs nonischemic. Cleared by cardiology for discharge and outpatient f/u.

## 2019-12-30 NOTE — ED CDU PROVIDER SUBSEQUENT DAY NOTE - PHYSICAL EXAMINATION
VITAL SIGNS: I have reviewed nursing notes and confirm.  CONSTITUTIONAL: Well-developed; well-nourished; in no acute distress. pt comfortable.  SKIN: skin exam is warm and dry, no acute rash.   HEAD: Normocephalic; atraumatic.  EYES:  EOM intact; conjunctiva and sclera clear.  ENT: No nasal discharge; airway clear. moist oral mucosa  NECK: Supple; non tender.  CARD: S1, S2 normal; no murmurs, gallops, or rubs. Regular rate and rhythm. posterior tibial and radial pulses 2+  RESP: No wheezes, rales or rhonchi. cta b/l. no use of accessory muscles. no retractions  ABD: Normal bowel sounds; soft; non-distended; non-tender; no rebound. .  EXT: Normal ROM. No  cyanosis or edema.  BACK: No cva tenderness  LYMPH: No acute cervical adenopathy.  NEURO: Alert, oriented, grossly unremarkable.    PSYCH: Cooperative, appropriate.

## 2019-12-30 NOTE — CONSULT NOTE ADULT - ASSESSMENT
Assessment:  Atypical chest pain likely 2/2 musculoskeletal etiology/GI etiology  Non-obstructive CAD as per CCTA and prior cath in 2017    Plan:  c/w aspirin and statin   encourage smoking cessation    awaiting attending evaluation Assessment:  Atypical chest pain likely 2/2 musculoskeletal etiology/GI etiology  Non-obstructive CAD as per CCTA and prior cath in 2017    Plan:  c/w aspirin and statin   encourage smoking cessation  outpatient follow up with Dr. Moreno  can discharge from cardiac stand point  pain control

## 2019-12-30 NOTE — ED CDU PROVIDER DISPOSITION NOTE - PATIENT PORTAL LINK FT
You can access the FollowMyHealth Patient Portal offered by [facility] by registering at the following website: [email or web address]. By joining [email or web address]’s FollowMyHealth portal, you will also be able to view your health information using other applications (apps) compatible with our system.

## 2019-12-30 NOTE — ED CDU PROVIDER SUBSEQUENT DAY NOTE - PROGRESS NOTE DETAILS
sign out received from JUSTICE LISA; pt has been asymptomatic. pt asymptomatic at the moment; had ccta done; waiting on results discussed with pt results; multiple attempts to call dr. dee coker Pt evaluated by Dr. Hossein stevens to be discharged.  pt will f/u otupatient.  discussed with pt. Pt evaluated by Dr. Ascencio; hilda to be discharged.  pt will f/u otupatient.  discussed with pt. discussed with pt all results and including track of BP. pt asymptomatic; feeling thankful and will follow up.

## 2019-12-30 NOTE — ED ADULT NURSE REASSESSMENT NOTE - NS ED NURSE REASSESS COMMENT FT1
Patient c/o chest pain .OBS PA notified .EKG done .No orders received . Will continue to moniter
Patient received from [facility] .On cardiac moniter right now .Will continue to moniter
Pt looks much comfortable after receiving medication for her chest pains. Deniers pain, vital signs checked and recorded, safety precautions in place.
Received pt alert andorientedx4, c/o epigastric and chest pain, JUSTICE Barron notified. Pt is talking on the phone, IV intact

## 2019-12-30 NOTE — ED CDU PROVIDER DISPOSITION NOTE - CARE PROVIDER_API CALL
Morris Moreno (MD)  Cardiovascular Disease; Internal Medicine; Interventional Cardiology  [address] [place] [address]  Phone: [phone]  Fax: [phone]  Follow Up Time:

## 2020-01-08 ENCOUNTER — APPOINTMENT (OUTPATIENT)
Dept: CARDIOLOGY | Facility: CLINIC | Age: 55
End: 2020-01-08
Payer: MEDICARE

## 2020-01-08 VITALS
BODY MASS INDEX: 29.44 KG/M2 | HEART RATE: 96 BPM | WEIGHT: 160 LBS | SYSTOLIC BLOOD PRESSURE: 152 MMHG | DIASTOLIC BLOOD PRESSURE: 76 MMHG | HEIGHT: 62 IN

## 2020-01-08 PROCEDURE — 93000 ELECTROCARDIOGRAM COMPLETE: CPT

## 2020-01-08 PROCEDURE — 99214 OFFICE O/P EST MOD 30 MIN: CPT

## 2020-01-08 RX ORDER — UMECLIDINIUM 62.5 UG/1
62.5 AEROSOL, POWDER ORAL DAILY
Refills: 3 | Status: ACTIVE | COMMUNITY

## 2020-01-08 NOTE — PHYSICAL EXAM
[General Appearance - Well Developed] : well developed [Normal Appearance] : normal appearance [Well Groomed] : well groomed [General Appearance - Well Nourished] : well nourished [General Appearance - In No Acute Distress] : no acute distress [Normal Conjunctiva] : the conjunctiva exhibited no abnormalities [No Deformities] : no deformities [No Oral Pallor] : no oral pallor [Normal Oral Mucosa] : normal oral mucosa [Eyelids - No Xanthelasma] : the eyelids demonstrated no xanthelasmas [Normal Jugular Venous A Waves Present] : normal jugular venous A waves present [No Oral Cyanosis] : no oral cyanosis [No Jugular Venous Worrell A Waves] : no jugular venous worrell A waves [Normal Jugular Venous V Waves Present] : normal jugular venous V waves present [Exaggerated Use Of Accessory Muscles For Inspiration] : no accessory muscle use [Respiration, Rhythm And Depth] : normal respiratory rhythm and effort [Heart Rate And Rhythm] : heart rate and rhythm were normal [Auscultation Breath Sounds / Voice Sounds] : lungs were clear to auscultation bilaterally [Murmurs] : no murmurs present [Heart Sounds] : normal S1 and S2 [Abdomen Soft] : soft [Abdomen Tenderness] : non-tender [Abdomen Mass (___ Cm)] : no abdominal mass palpated [Cyanosis, Localized] : no localized cyanosis [Nail Clubbing] : no clubbing of the fingernails [FreeTextEntry1] : no pedal edema [Petechial Hemorrhages (___cm)] : no petechial hemorrhages [Skin Color & Pigmentation] : normal skin color and pigmentation [] : no rash [No Venous Stasis] : no venous stasis [No Skin Ulcers] : no skin ulcer [Skin Lesions] : no skin lesions [Affect] : the affect was normal [No Xanthoma] : no  xanthoma was observed [Oriented To Time, Place, And Person] : oriented to person, place, and time [Mood] : the mood was normal [No Anxiety] : not feeling anxious

## 2020-01-08 NOTE — HISTORY OF PRESENT ILLNESS
[FreeTextEntry1] : The patient is a 51 year old female presenting for a cardiac follow up with a history of HTN, HLD, COPD and atherosclerotic heart disease. Pt just left the hospital for chest pain. BP was extremely high. CHest pain began at lower chest, spread to left side of chest, then patient experienced pain on the left side of her face. Pt smokes, reduced to half a pack a day. Patient complains of SOB. \par CTA came back positive. Calcium plaque. \par Emphysema \par \par cath 1/2017 showed mild cad\par cholesterol is over 350\par rec increasing statin to 80mg a day\par \par 12/30/2019 seen in er\par cta of coronary was done\par calcium score of 16\par mild plaque in lad

## 2020-01-09 ENCOUNTER — MEDICATION RENEWAL (OUTPATIENT)
Age: 55
End: 2020-01-09

## 2020-01-09 RX ORDER — ATORVASTATIN CALCIUM 80 MG/1
80 TABLET, FILM COATED ORAL
Qty: 90 | Refills: 3 | Status: ACTIVE | COMMUNITY
Start: 1900-01-01 | End: 1900-01-01

## 2020-01-13 ENCOUNTER — APPOINTMENT (OUTPATIENT)
Dept: OTOLARYNGOLOGY | Facility: CLINIC | Age: 55
End: 2020-01-13
Payer: MEDICARE

## 2020-01-13 PROCEDURE — 31575 DIAGNOSTIC LARYNGOSCOPY: CPT

## 2020-01-13 PROCEDURE — 99214 OFFICE O/P EST MOD 30 MIN: CPT | Mod: 25

## 2020-01-13 PROCEDURE — 69210 REMOVE IMPACTED EAR WAX UNI: CPT

## 2020-01-13 NOTE — ASSESSMENT
[FreeTextEntry1] : recurrent dysphonia and sore throat. History of laryngeal asymmetry on previous CT. Patient was not compliant with meds.

## 2020-01-13 NOTE — PHYSICAL EXAM
[Laryngoscopy Performed] : laryngoscopy was performed, see procedure section for findings [Midline] : trachea located in midline position [Normal] : no rashes [de-identified] : right impacted wax cleaned with curette

## 2020-01-13 NOTE — HISTORY OF PRESENT ILLNESS
[FreeTextEntry1] : Pt returns to the office as a follow up on dysphagia and hoarseness. \par Pt feels a lump in her throat some days. Other days she feels fine. Her voice also fluctuates. She has not been consistently using the Nystatin. She had a CT scan 8 months ago. No dysphagia. \par TMJ is better from a pain standpoint. \par She was admitted to the hospital for a heart issue about 3 weeks ago.

## 2020-01-18 ENCOUNTER — OUTPATIENT (OUTPATIENT)
Dept: OUTPATIENT SERVICES | Facility: HOSPITAL | Age: 55
LOS: 1 days | Discharge: HOME | End: 2020-01-18
Payer: MEDICARE

## 2020-01-18 DIAGNOSIS — Z90.711 ACQUIRED ABSENCE OF UTERUS WITH REMAINING CERVICAL STUMP: Chronic | ICD-10-CM

## 2020-01-18 DIAGNOSIS — J02.9 ACUTE PHARYNGITIS, UNSPECIFIED: ICD-10-CM

## 2020-01-18 DIAGNOSIS — Z98.890 OTHER SPECIFIED POSTPROCEDURAL STATES: Chronic | ICD-10-CM

## 2020-01-18 DIAGNOSIS — Z90.49 ACQUIRED ABSENCE OF OTHER SPECIFIED PARTS OF DIGESTIVE TRACT: Chronic | ICD-10-CM

## 2020-01-18 PROCEDURE — 70491 CT SOFT TISSUE NECK W/DYE: CPT | Mod: 26

## 2020-02-05 ENCOUNTER — APPOINTMENT (OUTPATIENT)
Dept: OTOLARYNGOLOGY | Facility: CLINIC | Age: 55
End: 2020-02-05
Payer: MEDICARE

## 2020-02-05 DIAGNOSIS — M26.609 UNSPECIFIED TEMPOROMANDIBULAR JOINT DISORDER: ICD-10-CM

## 2020-02-05 DIAGNOSIS — R49.0 DYSPHONIA: ICD-10-CM

## 2020-02-05 DIAGNOSIS — J38.7 OTHER DISEASES OF LARYNX: ICD-10-CM

## 2020-02-05 PROCEDURE — 99214 OFFICE O/P EST MOD 30 MIN: CPT | Mod: 25

## 2020-02-05 PROCEDURE — 69210 REMOVE IMPACTED EAR WAX UNI: CPT

## 2020-02-05 PROCEDURE — 31575 DIAGNOSTIC LARYNGOSCOPY: CPT

## 2020-02-05 NOTE — PHYSICAL EXAM
[Midline] : trachea located in midline position [Normal] : no rashes [de-identified] : bilateral impacted wax cleaned with curette

## 2020-02-05 NOTE — ASSESSMENT
[FreeTextEntry1] : CT images reviewed today with patient. \par \par TMJ instructions given. \par \par Referred to GI for bad acid reflux. \par \par

## 2020-02-05 NOTE — HISTORY OF PRESENT ILLNESS
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 Patient sent for CT neck;\par Patient still having dysphagia. mailnly in the morning. swallowing gets better throughout the day.  She feels her lump on the left side of her neck has increased in size. STill smoking. \par She complains of a left ear pain, she has a history of TMJ issues and was given instructions last visit.\par \par She has a history of a deviated septum, s/p surgery. \par \par SHe has acid reflux and takes pepcid and omeprazole.

## 2020-02-06 ENCOUNTER — APPOINTMENT (OUTPATIENT)
Dept: CARDIOLOGY | Facility: CLINIC | Age: 55
End: 2020-02-06
Payer: MEDICARE

## 2020-02-06 VITALS
WEIGHT: 159 LBS | HEIGHT: 62 IN | DIASTOLIC BLOOD PRESSURE: 78 MMHG | BODY MASS INDEX: 29.26 KG/M2 | SYSTOLIC BLOOD PRESSURE: 152 MMHG | HEART RATE: 92 BPM

## 2020-02-06 DIAGNOSIS — J44.9 CHRONIC OBSTRUCTIVE PULMONARY DISEASE, UNSPECIFIED: ICD-10-CM

## 2020-02-06 PROCEDURE — 93000 ELECTROCARDIOGRAM COMPLETE: CPT

## 2020-02-06 PROCEDURE — 99214 OFFICE O/P EST MOD 30 MIN: CPT

## 2020-05-21 NOTE — PHYSICAL EXAM
[General Appearance - Well Developed] : well developed [Normal Appearance] : normal appearance [Well Groomed] : well groomed [No Deformities] : no deformities [General Appearance - Well Nourished] : well nourished [General Appearance - In No Acute Distress] : no acute distress [Normal Conjunctiva] : the conjunctiva exhibited no abnormalities [Eyelids - No Xanthelasma] : the eyelids demonstrated no xanthelasmas [Normal Oral Mucosa] : normal oral mucosa [No Oral Pallor] : no oral pallor [Normal Jugular Venous A Waves Present] : normal jugular venous A waves present [No Oral Cyanosis] : no oral cyanosis [Normal Jugular Venous V Waves Present] : normal jugular venous V waves present [No Jugular Venous Worrell A Waves] : no jugular venous worrell A waves [Respiration, Rhythm And Depth] : normal respiratory rhythm and effort [Exaggerated Use Of Accessory Muscles For Inspiration] : no accessory muscle use [Auscultation Breath Sounds / Voice Sounds] : lungs were clear to auscultation bilaterally [Heart Rate And Rhythm] : heart rate and rhythm were normal [Heart Sounds] : normal S1 and S2 [Abdomen Tenderness] : non-tender [Abdomen Soft] : soft [Murmurs] : no murmurs present [Abdomen Mass (___ Cm)] : no abdominal mass palpated [Cyanosis, Localized] : no localized cyanosis [Petechial Hemorrhages (___cm)] : no petechial hemorrhages [Nail Clubbing] : no clubbing of the fingernails [Skin Color & Pigmentation] : normal skin color and pigmentation [] : no rash [No Venous Stasis] : no venous stasis [Skin Lesions] : no skin lesions [No Xanthoma] : no  xanthoma was observed [No Skin Ulcers] : no skin ulcer [Affect] : the affect was normal [Oriented To Time, Place, And Person] : oriented to person, place, and time [Mood] : the mood was normal [No Anxiety] : not feeling anxious [FreeTextEntry1] : no pedal edema

## 2020-05-21 NOTE — REASON FOR VISIT
[Coronary Artery Disease] : coronary artery disease [Follow-Up - Clinic] : a clinic follow-up of [Hypertension] : hypertension [Hyperlipidemia] : hyperlipidemia [FreeTextEntry1] : COPD, atherosclerotic heart disease

## 2020-05-31 ENCOUNTER — INPATIENT (INPATIENT)
Facility: HOSPITAL | Age: 55
LOS: 3 days | Discharge: HOME | End: 2020-06-04
Attending: SURGERY | Admitting: SURGERY
Payer: MEDICARE

## 2020-05-31 VITALS
HEART RATE: 120 BPM | SYSTOLIC BLOOD PRESSURE: 182 MMHG | DIASTOLIC BLOOD PRESSURE: 96 MMHG | OXYGEN SATURATION: 98 % | RESPIRATION RATE: 20 BRPM | TEMPERATURE: 98 F

## 2020-05-31 DIAGNOSIS — E78.5 HYPERLIPIDEMIA, UNSPECIFIED: ICD-10-CM

## 2020-05-31 DIAGNOSIS — R10.9 UNSPECIFIED ABDOMINAL PAIN: ICD-10-CM

## 2020-05-31 DIAGNOSIS — Z88.0 ALLERGY STATUS TO PENICILLIN: ICD-10-CM

## 2020-05-31 DIAGNOSIS — Z79.899 OTHER LONG TERM (CURRENT) DRUG THERAPY: ICD-10-CM

## 2020-05-31 DIAGNOSIS — Z98.890 OTHER SPECIFIED POSTPROCEDURAL STATES: Chronic | ICD-10-CM

## 2020-05-31 DIAGNOSIS — R33.9 RETENTION OF URINE, UNSPECIFIED: ICD-10-CM

## 2020-05-31 DIAGNOSIS — Z90.49 ACQUIRED ABSENCE OF OTHER SPECIFIED PARTS OF DIGESTIVE TRACT: Chronic | ICD-10-CM

## 2020-05-31 DIAGNOSIS — Z79.82 LONG TERM (CURRENT) USE OF ASPIRIN: ICD-10-CM

## 2020-05-31 DIAGNOSIS — Z90.711 ACQUIRED ABSENCE OF UTERUS WITH REMAINING CERVICAL STUMP: Chronic | ICD-10-CM

## 2020-05-31 DIAGNOSIS — K56.50 INTESTINAL ADHESIONS [BANDS], UNSPECIFIED AS TO PARTIAL VERSUS COMPLETE OBSTRUCTION: ICD-10-CM

## 2020-05-31 DIAGNOSIS — E83.42 HYPOMAGNESEMIA: ICD-10-CM

## 2020-05-31 DIAGNOSIS — M17.11 UNILATERAL PRIMARY OSTEOARTHRITIS, RIGHT KNEE: ICD-10-CM

## 2020-05-31 DIAGNOSIS — E78.00 PURE HYPERCHOLESTEROLEMIA, UNSPECIFIED: ICD-10-CM

## 2020-05-31 DIAGNOSIS — J30.2 OTHER SEASONAL ALLERGIC RHINITIS: ICD-10-CM

## 2020-05-31 DIAGNOSIS — F41.9 ANXIETY DISORDER, UNSPECIFIED: ICD-10-CM

## 2020-05-31 DIAGNOSIS — F32.9 MAJOR DEPRESSIVE DISORDER, SINGLE EPISODE, UNSPECIFIED: ICD-10-CM

## 2020-05-31 DIAGNOSIS — G40.909 EPILEPSY, UNSPECIFIED, NOT INTRACTABLE, WITHOUT STATUS EPILEPTICUS: ICD-10-CM

## 2020-05-31 DIAGNOSIS — Z87.891 PERSONAL HISTORY OF NICOTINE DEPENDENCE: ICD-10-CM

## 2020-05-31 DIAGNOSIS — G89.29 OTHER CHRONIC PAIN: ICD-10-CM

## 2020-05-31 DIAGNOSIS — M47.9 SPONDYLOSIS, UNSPECIFIED: ICD-10-CM

## 2020-05-31 DIAGNOSIS — I25.10 ATHEROSCLEROTIC HEART DISEASE OF NATIVE CORONARY ARTERY WITHOUT ANGINA PECTORIS: ICD-10-CM

## 2020-05-31 LAB
ALBUMIN SERPL ELPH-MCNC: 5.4 G/DL — HIGH (ref 3.5–5.2)
ALP SERPL-CCNC: 199 U/L — HIGH (ref 30–115)
ALT FLD-CCNC: 39 U/L — SIGNIFICANT CHANGE UP (ref 0–41)
ANION GAP SERPL CALC-SCNC: 21 MMOL/L — HIGH (ref 7–14)
APTT BLD: 34.8 SEC — SIGNIFICANT CHANGE UP (ref 27–39.2)
AST SERPL-CCNC: 32 U/L — SIGNIFICANT CHANGE UP (ref 0–41)
BASOPHILS # BLD AUTO: 0.04 K/UL — SIGNIFICANT CHANGE UP (ref 0–0.2)
BASOPHILS NFR BLD AUTO: 0.3 % — SIGNIFICANT CHANGE UP (ref 0–1)
BILIRUB SERPL-MCNC: 0.5 MG/DL — SIGNIFICANT CHANGE UP (ref 0.2–1.2)
BLD GP AB SCN SERPL QL: SIGNIFICANT CHANGE UP
BUN SERPL-MCNC: 22 MG/DL — HIGH (ref 10–20)
CALCIUM SERPL-MCNC: 10.5 MG/DL — HIGH (ref 8.5–10.1)
CHLORIDE SERPL-SCNC: 99 MMOL/L — SIGNIFICANT CHANGE UP (ref 98–110)
CO2 SERPL-SCNC: 23 MMOL/L — SIGNIFICANT CHANGE UP (ref 17–32)
CREAT SERPL-MCNC: 1.4 MG/DL — SIGNIFICANT CHANGE UP (ref 0.7–1.5)
EOSINOPHIL # BLD AUTO: 0.03 K/UL — SIGNIFICANT CHANGE UP (ref 0–0.7)
EOSINOPHIL NFR BLD AUTO: 0.3 % — SIGNIFICANT CHANGE UP (ref 0–8)
GLUCOSE SERPL-MCNC: 164 MG/DL — HIGH (ref 70–99)
HCT VFR BLD CALC: 45.1 % — SIGNIFICANT CHANGE UP (ref 37–47)
HGB BLD-MCNC: 15.9 G/DL — SIGNIFICANT CHANGE UP (ref 12–16)
IMM GRANULOCYTES NFR BLD AUTO: 0.4 % — HIGH (ref 0.1–0.3)
INR BLD: 1.05 RATIO — SIGNIFICANT CHANGE UP (ref 0.65–1.3)
LACTATE SERPL-SCNC: 2 MMOL/L — SIGNIFICANT CHANGE UP (ref 0.7–2)
LIDOCAIN IGE QN: 18 U/L — SIGNIFICANT CHANGE UP (ref 7–60)
LYMPHOCYTES # BLD AUTO: 1.62 K/UL — SIGNIFICANT CHANGE UP (ref 1.2–3.4)
LYMPHOCYTES # BLD AUTO: 13.8 % — LOW (ref 20.5–51.1)
MCHC RBC-ENTMCNC: 33 PG — HIGH (ref 27–31)
MCHC RBC-ENTMCNC: 35.3 G/DL — SIGNIFICANT CHANGE UP (ref 32–37)
MCV RBC AUTO: 93.6 FL — SIGNIFICANT CHANGE UP (ref 81–99)
MONOCYTES # BLD AUTO: 0.86 K/UL — HIGH (ref 0.1–0.6)
MONOCYTES NFR BLD AUTO: 7.3 % — SIGNIFICANT CHANGE UP (ref 1.7–9.3)
NEUTROPHILS # BLD AUTO: 9.11 K/UL — HIGH (ref 1.4–6.5)
NEUTROPHILS NFR BLD AUTO: 77.9 % — HIGH (ref 42.2–75.2)
NRBC # BLD: 0 /100 WBCS — SIGNIFICANT CHANGE UP (ref 0–0)
PLATELET # BLD AUTO: 319 K/UL — SIGNIFICANT CHANGE UP (ref 130–400)
POTASSIUM SERPL-MCNC: 4.1 MMOL/L — SIGNIFICANT CHANGE UP (ref 3.5–5)
POTASSIUM SERPL-SCNC: 4.1 MMOL/L — SIGNIFICANT CHANGE UP (ref 3.5–5)
PROT SERPL-MCNC: 8.9 G/DL — HIGH (ref 6–8)
PROTHROM AB SERPL-ACNC: 12.1 SEC — SIGNIFICANT CHANGE UP (ref 9.95–12.87)
RBC # BLD: 4.82 M/UL — SIGNIFICANT CHANGE UP (ref 4.2–5.4)
RBC # FLD: 12.7 % — SIGNIFICANT CHANGE UP (ref 11.5–14.5)
SODIUM SERPL-SCNC: 143 MMOL/L — SIGNIFICANT CHANGE UP (ref 135–146)
WBC # BLD: 11.71 K/UL — HIGH (ref 4.8–10.8)
WBC # FLD AUTO: 11.71 K/UL — HIGH (ref 4.8–10.8)

## 2020-05-31 PROCEDURE — 74018 RADEX ABDOMEN 1 VIEW: CPT | Mod: 26

## 2020-05-31 PROCEDURE — 71045 X-RAY EXAM CHEST 1 VIEW: CPT | Mod: 26

## 2020-05-31 PROCEDURE — 99223 1ST HOSP IP/OBS HIGH 75: CPT

## 2020-05-31 PROCEDURE — 43753 TX GASTRO INTUB W/ASP: CPT

## 2020-05-31 PROCEDURE — 99285 EMERGENCY DEPT VISIT HI MDM: CPT | Mod: 25

## 2020-05-31 PROCEDURE — 74177 CT ABD & PELVIS W/CONTRAST: CPT | Mod: 26

## 2020-05-31 RX ORDER — SODIUM CHLORIDE 9 MG/ML
1000 INJECTION INTRAMUSCULAR; INTRAVENOUS; SUBCUTANEOUS ONCE
Refills: 0 | Status: COMPLETED | OUTPATIENT
Start: 2020-05-31 | End: 2020-05-31

## 2020-05-31 RX ORDER — MORPHINE SULFATE 50 MG/1
4 CAPSULE, EXTENDED RELEASE ORAL ONCE
Refills: 0 | Status: DISCONTINUED | OUTPATIENT
Start: 2020-05-31 | End: 2020-05-31

## 2020-05-31 RX ORDER — IBUPROFEN AND FAMOTIDINE 26.6; 8 MG/1; MG/1
1 TABLET, FILM COATED ORAL
Qty: 0 | Refills: 0 | DISCHARGE

## 2020-05-31 RX ORDER — ONDANSETRON 8 MG/1
4 TABLET, FILM COATED ORAL ONCE
Refills: 0 | Status: COMPLETED | OUTPATIENT
Start: 2020-05-31 | End: 2020-05-31

## 2020-05-31 RX ORDER — VENLAFAXINE HCL 75 MG
0 CAPSULE, EXT RELEASE 24 HR ORAL
Qty: 0 | Refills: 0 | DISCHARGE

## 2020-05-31 RX ORDER — ATORVASTATIN CALCIUM 80 MG/1
1 TABLET, FILM COATED ORAL
Qty: 0 | Refills: 0 | DISCHARGE

## 2020-05-31 RX ORDER — SODIUM CHLORIDE 9 MG/ML
1000 INJECTION, SOLUTION INTRAVENOUS ONCE
Refills: 0 | Status: COMPLETED | OUTPATIENT
Start: 2020-05-31 | End: 2020-05-31

## 2020-05-31 RX ORDER — UMECLIDINIUM 62.5 UG/1
0 AEROSOL, POWDER ORAL
Qty: 0 | Refills: 0 | DISCHARGE

## 2020-05-31 RX ORDER — IOHEXOL 300 MG/ML
30 INJECTION, SOLUTION INTRAVENOUS ONCE
Refills: 0 | Status: COMPLETED | OUTPATIENT
Start: 2020-05-31 | End: 2020-05-31

## 2020-05-31 RX ORDER — LEVETIRACETAM 250 MG/1
1 TABLET, FILM COATED ORAL
Qty: 0 | Refills: 0 | DISCHARGE

## 2020-05-31 RX ORDER — CLONAZEPAM 1 MG
1 TABLET ORAL
Qty: 0 | Refills: 0 | DISCHARGE

## 2020-05-31 RX ORDER — LINACLOTIDE 145 UG/1
1 CAPSULE, GELATIN COATED ORAL
Qty: 0 | Refills: 0 | DISCHARGE

## 2020-05-31 RX ORDER — MORPHINE SULFATE 50 MG/1
2 CAPSULE, EXTENDED RELEASE ORAL EVERY 4 HOURS
Refills: 0 | Status: DISCONTINUED | OUTPATIENT
Start: 2020-05-31 | End: 2020-06-03

## 2020-05-31 RX ORDER — BUDESONIDE AND FORMOTEROL FUMARATE DIHYDRATE 160; 4.5 UG/1; UG/1
2 AEROSOL RESPIRATORY (INHALATION)
Qty: 0 | Refills: 0 | DISCHARGE

## 2020-05-31 RX ADMIN — ONDANSETRON 4 MILLIGRAM(S): 8 TABLET, FILM COATED ORAL at 13:17

## 2020-05-31 RX ADMIN — IOHEXOL 30 MILLILITER(S): 300 INJECTION, SOLUTION INTRAVENOUS at 13:18

## 2020-05-31 RX ADMIN — MORPHINE SULFATE 4 MILLIGRAM(S): 50 CAPSULE, EXTENDED RELEASE ORAL at 13:18

## 2020-05-31 RX ADMIN — SODIUM CHLORIDE 1000 MILLILITER(S): 9 INJECTION, SOLUTION INTRAVENOUS at 20:11

## 2020-05-31 RX ADMIN — MORPHINE SULFATE 2 MILLIGRAM(S): 50 CAPSULE, EXTENDED RELEASE ORAL at 20:55

## 2020-05-31 RX ADMIN — MORPHINE SULFATE 4 MILLIGRAM(S): 50 CAPSULE, EXTENDED RELEASE ORAL at 16:41

## 2020-05-31 RX ADMIN — SODIUM CHLORIDE 1000 MILLILITER(S): 9 INJECTION INTRAMUSCULAR; INTRAVENOUS; SUBCUTANEOUS at 13:17

## 2020-05-31 NOTE — ED PROVIDER NOTE - OBJECTIVE STATEMENT
55 y/o F, PMHx Hiatal Hernia, Dyslipidemia & Chronic Back Pain, presents to the ED with complaints of abdominal pain x two days. She admits to associated nausea and several episodes of emesis; denies fever, chills, chest pain, dyspnea, and diarrhea. Her last BM was two days ago. Her prior abdominal surgical hx includes a cholecystectomy, appendectomy, partial hysterectomy & lysis of adhesions x 2.

## 2020-05-31 NOTE — H&P ADULT - NSICDXPASTSURGICALHX_GEN_ALL_CORE_FT
PAST SURGICAL HISTORY:  H/O rotator cuff surgery right    History of appendectomy     History of surgery 4 rght knee sx  1 left knee sx  cholecystectomy  rotator cuff right  abdominal sx-- adhesions  c section  ovarian cystectomy  breast cystectomies  colonoscopy   endoscopies  parital hysterectomy    S/P cholecystectomy     S/p partial hysterectomy with remaining cervical stump

## 2020-05-31 NOTE — CONSULT NOTE ADULT - ASSESSMENT
54 year old female with a past medical history significant for several intra abdominal surgeries presents to ED with diffuse abdominal pain and not moving her bowels x 2 days. Her abdomen is tender on exam. CT is consistent with a small bowel obstruction. WBC 11.8, lactate 2.0.  - Place NG tube and put to suction  - IVF  - NPO except meds

## 2020-05-31 NOTE — H&P ADULT - ATTENDING COMMENTS
I examined the patient and discussed my plan with the resident   the patient has sbo with hx multiple surgeries for obstruction.  she has abdominal pain, and tenderness.  given the expectation that this is adhesive again I will offer her a course of non-operative management with the stipulation that she has a low chance of successful management without surgery.  if she clinically deteriorates she should go to the operating room

## 2020-05-31 NOTE — H&P ADULT - ASSESSMENT
54F with history of multiple high grade SBOs requiring MAKAYLA, last SBO 2 years ago now presenting with n/d No PO tolerance, no gas or BM for 4 days and distended diffusely tender abdomen. CT scan is consistent with SBO     Plan:   Admit to surgery   NPO, IVF  NGT, Gooden  Repeat labs, Including coags, type and screen,  obstructive series in AM   All meds IV

## 2020-05-31 NOTE — H&P ADULT - NSHPPHYSICALEXAM_GEN_ALL_CORE
PHYSICAL EXAM:  GENERAL: A&Ox3, NAD  HEENT: Normocephalic, atraumatic  CHEST/LUNG: Bilateral breath sounds  HEART: Regular rate and rhythm  ABDOMEN: distended, diffusely tender,  EXTREMITIES:  Moving all extremities, pulses throughout, no deformities

## 2020-05-31 NOTE — H&P ADULT - HISTORY OF PRESENT ILLNESS
54F PMHx hiatal hernia, dyslipidemia and chronic back pain, s/p cholecystectomy, appendectomy, partial hysterectomy,  section, MAKAYLA x 2 presents to the ED with abdominal pain x 2 days. States she has not has a bowel movement or passed gas x 2 days. Denies fever, SOB, diarrhea.

## 2020-05-31 NOTE — ED ADULT NURSE NOTE - NSIMPLEMENTINTERV_GEN_ALL_ED
Implemented All Universal Safety Interventions:  [place] to call system. Call bell, personal items and telephone within reach. Instruct patient to call for assistance. Room bathroom lighting operational. Non-slip footwear when patient is off stretcher. Physically safe environment: no spills, clutter or unnecessary equipment. Stretcher in lowest position, wheels locked, appropriate side rails in place.

## 2020-05-31 NOTE — ED PROVIDER NOTE - PROGRESS NOTE DETAILS
ATTENDING NOTE:   53 y/o F to ED with abdominal pain, Pt with HX of complicated abdominal surgeries including SPO and adhesion. Last surgery done by Dr. Szymanski. Pt reports n/v, unable to tolerate PO, and concern for SPO.   AVSS; exam as noted. CTAB. RRR. Abdomen with diffuse tenderness, (+) bowel sounds. Neuro nonfocal.

## 2020-05-31 NOTE — ED PROCEDURE NOTE - CPROC ED GASTRIC INTUB DETAIL1
The nasogastric tube (see size above) was inserted via the anatomic location./Placement was confirmed by aspiration of gastric secretions./Bowel sounds present to 4 quadrants.

## 2020-05-31 NOTE — ED ADULT NURSE NOTE - OBJECTIVE STATEMENT
Pt presents to ED c/o 2 day history of intermittent abdominal pain with N/V increasing in severity today. Pt denies fever, denies diarrhea.

## 2020-05-31 NOTE — CONSULT NOTE ADULT - SUBJECTIVE AND OBJECTIVE BOX
RAMÍREZ RODARTE [id number]    HPI: 54F PMHx hiatal hernia, dyslipidemia and chronic back pain, s/p cholecystectomy, appendectomy, partial hysterectomy,  section, MAKAYLA x 2 presents to the ED with abdominal pain x 2 days. States she has not has a bowel movement or passed gas x 2 days. Denies fever, SOB, diarrhea.      PAST MEDICAL & SURGICAL HISTORY:  Anxiety  Hiatal hernia  GERD (gastroesophageal reflux disease)  Seasonal allergic rhinitis, unspecified trigger  Hypercholesteremia  Migraines  Seizures: last one 6 yrs ago  Back pain: herniated discs  S/P cholecystectomy  History of appendectomy  H/O rotator cuff surgery: right  S/p partial hysterectomy with remaining cervical stump  History of surgery: 4 rght knee sx  1 left knee sx  cholecystectomy  rotator cuff right  abdominal sx-- adhesions  c section  ovarian cystectomy  breast cystectomies  colonoscopy   endoscopies  parital hysterectomy        MEDICATIONS  (STANDING):    MEDICATIONS  (PRN):      Allergies    Cipro (Anaphylaxis; Urticaria; Hives)  penicillins (Unknown)  tramadol (Unknown)  vancomycin (Unknown)    Intolerances        REVIEW OF SYSTEMS    [X] A ten-point review of systems was otherwise negative except as noted.  [ ] Due to altered mental status/intubation, subjective information were not able to be obtained from the patient. History was obtained, to the extent possible, from review of the chart and collateral sources of information.      Vital Signs Last 24 Hrs  T(C): 36.2 (31 May 2020 16:02), Max: 36.7 (31 May 2020 12:41)  T(F): 97.1 (31 May 2020 16:02), Max: 98.1 (31 May 2020 12:41)  HR: 94 (31 May 2020 16:02) (94 - 120)  BP: 129/65 (31 May 2020 16:02) (129/65 - 182/96)  BP(mean): --  RR: 18 (31 May 2020 16:02) (18 - 20)  SpO2: 97% (31 May 2020 16:02) (97% - 98%)    PHYSICAL EXAM:  GENERAL: NAD, well-appearing  CHEST/LUNG: Clear to auscultation bilaterally  HEART: Regular rate and rhythm  ABDOMEN: Soft, Nontender, Nondistended;   EXTREMITIES:  No clubbing, cyanosis, or edema      LABS:  Labs:  CAPILLARY BLOOD GLUCOSE                              15.9   11.71 )-----------( 319      ( 31 May 2020 13:27 )             45.1       Auto Neutrophil %: 77.9 % (20 @ 13:27)  Auto Immature Granulocyte %: 0.4 % (20 @ 13:27)        143  |  99  |  22<H>  ----------------------------<  164<H>  4.1   |  23  |  1.4      Calcium, Total Serum: 10.5 mg/dL (20 @ 13:27)      LFTs:             8.9  | 0.5  | 32       ------------------[199     ( 31 May 2020 13:27 )  5.4  | x    | 39          Lipase:18     Amylase:x         Lactate, Blood: 2.0 mmol/L (20 @ 13:27)      Coags:     12.10  ----< 1.05    ( 31 May 2020 14:12 )     34.8                        RADIOLOGY & ADDITIONAL STUDIES:  < from: Xray Chest 1 View- PORTABLE-Urgent (20 @ 14:50) >  IMPRESSION:      No radiographic evidence of acute cardiopulmonary disease.     No free air.    < end of copied text >    < from: Xray Abdomen 1 View Portable, IMMEDIATE (20 @ 14:49) >  INTERPRETATION:  Clinical History / Reason for exam: Abdominal pain  Single image  Comparison 2017  Contrast is seen in the stomach and several loops of mild to moderately distended small bowel. No contrast is seen in the colon.  No free air is seen.  Small bowel obstruction cannot be excluded on this exam. Follow-up studies suggested.  Impression  No contrast seen in the colon yet. Mild to moderately distended small bowel. Follow-up exam suggested.    < end of copied text >    < from: CT Abdomen and Pelvis w/ Oral Cont and w/ IV Cont (20 @ 15:33) >  IMPRESSION:     Multiple dilated and fluid-filled small bowel loops with abrupt change in caliber the lower mid abdomen/pelvis. Findings consistent with small bowel obstruction.    < end of copied text >

## 2020-05-31 NOTE — H&P ADULT - NSICDXPASTMEDICALHX_GEN_ALL_CORE_FT
PAST MEDICAL HISTORY:  Anxiety     Back pain henriated discs    GERD (gastroesophageal reflux disease)     Hiatal hernia     Hypercholesteremia     Migraines     Seasonal allergic rhinitis, unspecified trigger     Seizures last one 6 yrs ago

## 2020-05-31 NOTE — H&P ADULT - NSHPLABSRESULTS_GEN_ALL_CORE
LABS:                         15.9   11.71 )-----------( 319      ( 31 May 2020 13:27 )             45.1     05-31    143  |  99  |  22<H>  ----------------------------<  164<H>  4.1   |  23  |  1.4    Ca    10.5<H>      31 May 2020 13:27    TPro  8.9<H>  /  Alb  5.4<H>  /  TBili  0.5  /  DBili  x   /  AST  32  /  ALT  39  /  AlkPhos  199<H>  05-31    PT/INR - ( 31 May 2020 14:12 )   PT: 12.10 sec;   INR: 1.05 ratio    PTT - ( 31 May 2020 14:12 )  PTT:34.8 sec      Lactate, Blood: 2.0 mmol/L (05-31 @ 13:27)      RADIOLOGY, EKG & ADDITIONAL TESTS: Reviewed.   < from: CT Abdomen and Pelvis w/ Oral Cont and w/ IV Cont (05.31.20 @ 15:33) >    IMPRESSION:     Multiple dilated and fluid-filled small bowel loops with abrupt change in caliber the lower mid abdomen/pelvis. Findings consistent with small bowel obstruction.    < end of copied text >

## 2020-06-01 LAB
A1C WITH ESTIMATED AVERAGE GLUCOSE RESULT: 6.1 % — HIGH (ref 4–5.6)
ANION GAP SERPL CALC-SCNC: 11 MMOL/L — SIGNIFICANT CHANGE UP (ref 7–14)
ANION GAP SERPL CALC-SCNC: 12 MMOL/L — SIGNIFICANT CHANGE UP (ref 7–14)
ANION GAP SERPL CALC-SCNC: 13 MMOL/L — SIGNIFICANT CHANGE UP (ref 7–14)
APTT BLD: 31.8 SEC — SIGNIFICANT CHANGE UP (ref 27–39.2)
APTT BLD: 33 SEC — SIGNIFICANT CHANGE UP (ref 27–39.2)
BASOPHILS # BLD AUTO: 0.02 K/UL — SIGNIFICANT CHANGE UP (ref 0–0.2)
BASOPHILS # BLD AUTO: 0.03 K/UL — SIGNIFICANT CHANGE UP (ref 0–0.2)
BASOPHILS NFR BLD AUTO: 0.3 % — SIGNIFICANT CHANGE UP (ref 0–1)
BASOPHILS NFR BLD AUTO: 0.5 % — SIGNIFICANT CHANGE UP (ref 0–1)
BLD GP AB SCN SERPL QL: SIGNIFICANT CHANGE UP
BLD GP AB SCN SERPL QL: SIGNIFICANT CHANGE UP
BUN SERPL-MCNC: 14 MG/DL — SIGNIFICANT CHANGE UP (ref 10–20)
BUN SERPL-MCNC: 19 MG/DL — SIGNIFICANT CHANGE UP (ref 10–20)
BUN SERPL-MCNC: 19 MG/DL — SIGNIFICANT CHANGE UP (ref 10–20)
CALCIUM SERPL-MCNC: 9 MG/DL — SIGNIFICANT CHANGE UP (ref 8.5–10.1)
CALCIUM SERPL-MCNC: 9.3 MG/DL — SIGNIFICANT CHANGE UP (ref 8.5–10.1)
CALCIUM SERPL-MCNC: 9.5 MG/DL — SIGNIFICANT CHANGE UP (ref 8.5–10.1)
CHLORIDE SERPL-SCNC: 101 MMOL/L — SIGNIFICANT CHANGE UP (ref 98–110)
CHLORIDE SERPL-SCNC: 101 MMOL/L — SIGNIFICANT CHANGE UP (ref 98–110)
CHLORIDE SERPL-SCNC: 103 MMOL/L — SIGNIFICANT CHANGE UP (ref 98–110)
CK MB CFR SERPL CALC: 2.3 NG/ML — SIGNIFICANT CHANGE UP (ref 0.6–6.3)
CO2 SERPL-SCNC: 26 MMOL/L — SIGNIFICANT CHANGE UP (ref 17–32)
CO2 SERPL-SCNC: 27 MMOL/L — SIGNIFICANT CHANGE UP (ref 17–32)
CO2 SERPL-SCNC: 27 MMOL/L — SIGNIFICANT CHANGE UP (ref 17–32)
CREAT SERPL-MCNC: 0.6 MG/DL — LOW (ref 0.7–1.5)
CREAT SERPL-MCNC: 0.7 MG/DL — SIGNIFICANT CHANGE UP (ref 0.7–1.5)
CREAT SERPL-MCNC: 0.7 MG/DL — SIGNIFICANT CHANGE UP (ref 0.7–1.5)
EOSINOPHIL # BLD AUTO: 0.05 K/UL — SIGNIFICANT CHANGE UP (ref 0–0.7)
EOSINOPHIL # BLD AUTO: 0.07 K/UL — SIGNIFICANT CHANGE UP (ref 0–0.7)
EOSINOPHIL NFR BLD AUTO: 0.8 % — SIGNIFICANT CHANGE UP (ref 0–8)
EOSINOPHIL NFR BLD AUTO: 1.1 % — SIGNIFICANT CHANGE UP (ref 0–8)
ESTIMATED AVERAGE GLUCOSE: 128 MG/DL — HIGH (ref 68–114)
GAS PNL BLDA: SIGNIFICANT CHANGE UP
GLUCOSE BLDC GLUCOMTR-MCNC: 107 MG/DL — HIGH (ref 70–99)
GLUCOSE SERPL-MCNC: 105 MG/DL — HIGH (ref 70–99)
GLUCOSE SERPL-MCNC: 107 MG/DL — HIGH (ref 70–99)
GLUCOSE SERPL-MCNC: 112 MG/DL — HIGH (ref 70–99)
HCT VFR BLD CALC: 34.6 % — LOW (ref 37–47)
HCT VFR BLD CALC: 35.3 % — LOW (ref 37–47)
HCT VFR BLD CALC: 36.6 % — LOW (ref 37–47)
HCT VFR BLD CALC: 37 % — SIGNIFICANT CHANGE UP (ref 37–47)
HCV AB S/CO SERPL IA: 0.03 COI — SIGNIFICANT CHANGE UP
HCV AB SERPL-IMP: SIGNIFICANT CHANGE UP
HGB BLD-MCNC: 12 G/DL — SIGNIFICANT CHANGE UP (ref 12–16)
HGB BLD-MCNC: 12.3 G/DL — SIGNIFICANT CHANGE UP (ref 12–16)
HGB BLD-MCNC: 12.7 G/DL — SIGNIFICANT CHANGE UP (ref 12–16)
HGB BLD-MCNC: 12.8 G/DL — SIGNIFICANT CHANGE UP (ref 12–16)
IMM GRANULOCYTES NFR BLD AUTO: 0.3 % — SIGNIFICANT CHANGE UP (ref 0.1–0.3)
IMM GRANULOCYTES NFR BLD AUTO: 0.6 % — HIGH (ref 0.1–0.3)
INR BLD: 1.03 RATIO — SIGNIFICANT CHANGE UP (ref 0.65–1.3)
INR BLD: 1.06 RATIO — SIGNIFICANT CHANGE UP (ref 0.65–1.3)
LACTATE SERPL-SCNC: 0.8 MMOL/L — SIGNIFICANT CHANGE UP (ref 0.7–2)
LACTATE SERPL-SCNC: 1.1 MMOL/L — SIGNIFICANT CHANGE UP (ref 0.7–2)
LYMPHOCYTES # BLD AUTO: 1.64 K/UL — SIGNIFICANT CHANGE UP (ref 1.2–3.4)
LYMPHOCYTES # BLD AUTO: 1.93 K/UL — SIGNIFICANT CHANGE UP (ref 1.2–3.4)
LYMPHOCYTES # BLD AUTO: 25.5 % — SIGNIFICANT CHANGE UP (ref 20.5–51.1)
LYMPHOCYTES # BLD AUTO: 29.1 % — SIGNIFICANT CHANGE UP (ref 20.5–51.1)
MAGNESIUM SERPL-MCNC: 1.6 MG/DL — LOW (ref 1.8–2.4)
MAGNESIUM SERPL-MCNC: 1.8 MG/DL — SIGNIFICANT CHANGE UP (ref 1.8–2.4)
MAGNESIUM SERPL-MCNC: 1.9 MG/DL — SIGNIFICANT CHANGE UP (ref 1.8–2.4)
MCHC RBC-ENTMCNC: 32.7 PG — HIGH (ref 27–31)
MCHC RBC-ENTMCNC: 32.8 PG — HIGH (ref 27–31)
MCHC RBC-ENTMCNC: 33 PG — HIGH (ref 27–31)
MCHC RBC-ENTMCNC: 33.1 PG — HIGH (ref 27–31)
MCHC RBC-ENTMCNC: 34.6 G/DL — SIGNIFICANT CHANGE UP (ref 32–37)
MCHC RBC-ENTMCNC: 34.7 G/DL — SIGNIFICANT CHANGE UP (ref 32–37)
MCHC RBC-ENTMCNC: 34.7 G/DL — SIGNIFICANT CHANGE UP (ref 32–37)
MCHC RBC-ENTMCNC: 34.8 G/DL — SIGNIFICANT CHANGE UP (ref 32–37)
MCV RBC AUTO: 94.3 FL — SIGNIFICANT CHANGE UP (ref 81–99)
MCV RBC AUTO: 94.6 FL — SIGNIFICANT CHANGE UP (ref 81–99)
MCV RBC AUTO: 94.9 FL — SIGNIFICANT CHANGE UP (ref 81–99)
MCV RBC AUTO: 95.4 FL — SIGNIFICANT CHANGE UP (ref 81–99)
MONOCYTES # BLD AUTO: 0.57 K/UL — SIGNIFICANT CHANGE UP (ref 0.1–0.6)
MONOCYTES # BLD AUTO: 0.64 K/UL — HIGH (ref 0.1–0.6)
MONOCYTES NFR BLD AUTO: 10 % — HIGH (ref 1.7–9.3)
MONOCYTES NFR BLD AUTO: 8.6 % — SIGNIFICANT CHANGE UP (ref 1.7–9.3)
NEUTROPHILS # BLD AUTO: 3.99 K/UL — SIGNIFICANT CHANGE UP (ref 1.4–6.5)
NEUTROPHILS # BLD AUTO: 4.05 K/UL — SIGNIFICANT CHANGE UP (ref 1.4–6.5)
NEUTROPHILS NFR BLD AUTO: 60.1 % — SIGNIFICANT CHANGE UP (ref 42.2–75.2)
NEUTROPHILS NFR BLD AUTO: 63.1 % — SIGNIFICANT CHANGE UP (ref 42.2–75.2)
NRBC # BLD: 0 /100 WBCS — SIGNIFICANT CHANGE UP (ref 0–0)
PHOSPHATE SERPL-MCNC: 2.9 MG/DL — SIGNIFICANT CHANGE UP (ref 2.1–4.9)
PHOSPHATE SERPL-MCNC: 2.9 MG/DL — SIGNIFICANT CHANGE UP (ref 2.1–4.9)
PHOSPHATE SERPL-MCNC: 3.2 MG/DL — SIGNIFICANT CHANGE UP (ref 2.1–4.9)
PLATELET # BLD AUTO: 199 K/UL — SIGNIFICANT CHANGE UP (ref 130–400)
PLATELET # BLD AUTO: 203 K/UL — SIGNIFICANT CHANGE UP (ref 130–400)
PLATELET # BLD AUTO: 212 K/UL — SIGNIFICANT CHANGE UP (ref 130–400)
PLATELET # BLD AUTO: 235 K/UL — SIGNIFICANT CHANGE UP (ref 130–400)
POTASSIUM SERPL-MCNC: 3.8 MMOL/L — SIGNIFICANT CHANGE UP (ref 3.5–5)
POTASSIUM SERPL-MCNC: 3.9 MMOL/L — SIGNIFICANT CHANGE UP (ref 3.5–5)
POTASSIUM SERPL-MCNC: 3.9 MMOL/L — SIGNIFICANT CHANGE UP (ref 3.5–5)
POTASSIUM SERPL-SCNC: 3.8 MMOL/L — SIGNIFICANT CHANGE UP (ref 3.5–5)
POTASSIUM SERPL-SCNC: 3.9 MMOL/L — SIGNIFICANT CHANGE UP (ref 3.5–5)
POTASSIUM SERPL-SCNC: 3.9 MMOL/L — SIGNIFICANT CHANGE UP (ref 3.5–5)
PROTHROM AB SERPL-ACNC: 11.9 SEC — SIGNIFICANT CHANGE UP (ref 9.95–12.87)
PROTHROM AB SERPL-ACNC: 12.2 SEC — SIGNIFICANT CHANGE UP (ref 9.95–12.87)
RBC # BLD: 3.67 M/UL — LOW (ref 4.2–5.4)
RBC # BLD: 3.72 M/UL — LOW (ref 4.2–5.4)
RBC # BLD: 3.87 M/UL — LOW (ref 4.2–5.4)
RBC # BLD: 3.88 M/UL — LOW (ref 4.2–5.4)
RBC # FLD: 12.6 % — SIGNIFICANT CHANGE UP (ref 11.5–14.5)
RBC # FLD: 12.8 % — SIGNIFICANT CHANGE UP (ref 11.5–14.5)
RBC # FLD: 12.9 % — SIGNIFICANT CHANGE UP (ref 11.5–14.5)
RBC # FLD: 13 % — SIGNIFICANT CHANGE UP (ref 11.5–14.5)
SARS-COV-2 RNA SPEC QL NAA+PROBE: SIGNIFICANT CHANGE UP
SODIUM SERPL-SCNC: 140 MMOL/L — SIGNIFICANT CHANGE UP (ref 135–146)
SODIUM SERPL-SCNC: 140 MMOL/L — SIGNIFICANT CHANGE UP (ref 135–146)
SODIUM SERPL-SCNC: 141 MMOL/L — SIGNIFICANT CHANGE UP (ref 135–146)
TROPONIN T SERPL-MCNC: <0.01 NG/ML — SIGNIFICANT CHANGE UP
WBC # BLD: 6.41 K/UL — SIGNIFICANT CHANGE UP (ref 4.8–10.8)
WBC # BLD: 6.42 K/UL — SIGNIFICANT CHANGE UP (ref 4.8–10.8)
WBC # BLD: 6.63 K/UL — SIGNIFICANT CHANGE UP (ref 4.8–10.8)
WBC # BLD: 7.62 K/UL — SIGNIFICANT CHANGE UP (ref 4.8–10.8)
WBC # FLD AUTO: 6.41 K/UL — SIGNIFICANT CHANGE UP (ref 4.8–10.8)
WBC # FLD AUTO: 6.42 K/UL — SIGNIFICANT CHANGE UP (ref 4.8–10.8)
WBC # FLD AUTO: 6.63 K/UL — SIGNIFICANT CHANGE UP (ref 4.8–10.8)
WBC # FLD AUTO: 7.62 K/UL — SIGNIFICANT CHANGE UP (ref 4.8–10.8)

## 2020-06-01 PROCEDURE — 93306 TTE W/DOPPLER COMPLETE: CPT | Mod: 26

## 2020-06-01 PROCEDURE — 93970 EXTREMITY STUDY: CPT | Mod: 26

## 2020-06-01 PROCEDURE — 74021 RADEX ABDOMEN 3+ VIEWS: CPT | Mod: 26

## 2020-06-01 PROCEDURE — 71045 X-RAY EXAM CHEST 1 VIEW: CPT | Mod: 26

## 2020-06-01 PROCEDURE — 93010 ELECTROCARDIOGRAM REPORT: CPT

## 2020-06-01 PROCEDURE — 99233 SBSQ HOSP IP/OBS HIGH 50: CPT

## 2020-06-01 RX ORDER — PROCHLORPERAZINE MALEATE 5 MG
10 TABLET ORAL ONCE
Refills: 0 | Status: COMPLETED | OUTPATIENT
Start: 2020-06-01 | End: 2020-06-01

## 2020-06-01 RX ORDER — GLUCAGON INJECTION, SOLUTION 0.5 MG/.1ML
1 INJECTION, SOLUTION SUBCUTANEOUS ONCE
Refills: 0 | Status: DISCONTINUED | OUTPATIENT
Start: 2020-06-01 | End: 2020-06-01

## 2020-06-01 RX ORDER — DEXTROSE 50 % IN WATER 50 %
12.5 SYRINGE (ML) INTRAVENOUS ONCE
Refills: 0 | Status: DISCONTINUED | OUTPATIENT
Start: 2020-06-01 | End: 2020-06-01

## 2020-06-01 RX ORDER — MORPHINE SULFATE 50 MG/1
2 CAPSULE, EXTENDED RELEASE ORAL ONCE
Refills: 0 | Status: DISCONTINUED | OUTPATIENT
Start: 2020-06-01 | End: 2020-06-01

## 2020-06-01 RX ORDER — PANTOPRAZOLE SODIUM 20 MG/1
40 TABLET, DELAYED RELEASE ORAL ONCE
Refills: 0 | Status: COMPLETED | OUTPATIENT
Start: 2020-06-01 | End: 2020-06-01

## 2020-06-01 RX ORDER — ALBUTEROL 90 UG/1
2 AEROSOL, METERED ORAL EVERY 6 HOURS
Refills: 0 | Status: DISCONTINUED | OUTPATIENT
Start: 2020-06-01 | End: 2020-06-04

## 2020-06-01 RX ORDER — SODIUM CHLORIDE 9 MG/ML
1000 INJECTION, SOLUTION INTRAVENOUS
Refills: 0 | Status: DISCONTINUED | OUTPATIENT
Start: 2020-06-01 | End: 2020-06-01

## 2020-06-01 RX ORDER — PANTOPRAZOLE SODIUM 20 MG/1
40 TABLET, DELAYED RELEASE ORAL DAILY
Refills: 0 | Status: DISCONTINUED | OUTPATIENT
Start: 2020-06-01 | End: 2020-06-03

## 2020-06-01 RX ORDER — LEVETIRACETAM 250 MG/1
250 TABLET, FILM COATED ORAL EVERY 12 HOURS
Refills: 0 | Status: DISCONTINUED | OUTPATIENT
Start: 2020-06-01 | End: 2020-06-03

## 2020-06-01 RX ORDER — TIOTROPIUM BROMIDE 18 UG/1
1 CAPSULE ORAL; RESPIRATORY (INHALATION) DAILY
Refills: 0 | Status: DISCONTINUED | OUTPATIENT
Start: 2020-06-01 | End: 2020-06-04

## 2020-06-01 RX ORDER — DEXTROSE MONOHYDRATE, SODIUM CHLORIDE, AND POTASSIUM CHLORIDE 50; .745; 4.5 G/1000ML; G/1000ML; G/1000ML
1000 INJECTION, SOLUTION INTRAVENOUS
Refills: 0 | Status: DISCONTINUED | OUTPATIENT
Start: 2020-06-01 | End: 2020-06-03

## 2020-06-01 RX ORDER — DEXTROSE 50 % IN WATER 50 %
15 SYRINGE (ML) INTRAVENOUS ONCE
Refills: 0 | Status: DISCONTINUED | OUTPATIENT
Start: 2020-06-01 | End: 2020-06-01

## 2020-06-01 RX ORDER — ONDANSETRON 8 MG/1
4 TABLET, FILM COATED ORAL EVERY 6 HOURS
Refills: 0 | Status: DISCONTINUED | OUTPATIENT
Start: 2020-06-01 | End: 2020-06-04

## 2020-06-01 RX ORDER — HEPARIN SODIUM 5000 [USP'U]/ML
5000 INJECTION INTRAVENOUS; SUBCUTANEOUS EVERY 8 HOURS
Refills: 0 | Status: DISCONTINUED | OUTPATIENT
Start: 2020-06-01 | End: 2020-06-04

## 2020-06-01 RX ADMIN — MORPHINE SULFATE 2 MILLIGRAM(S): 50 CAPSULE, EXTENDED RELEASE ORAL at 09:59

## 2020-06-01 RX ADMIN — ONDANSETRON 4 MILLIGRAM(S): 8 TABLET, FILM COATED ORAL at 11:39

## 2020-06-01 RX ADMIN — MORPHINE SULFATE 2 MILLIGRAM(S): 50 CAPSULE, EXTENDED RELEASE ORAL at 17:21

## 2020-06-01 RX ADMIN — MORPHINE SULFATE 2 MILLIGRAM(S): 50 CAPSULE, EXTENDED RELEASE ORAL at 05:28

## 2020-06-01 RX ADMIN — MORPHINE SULFATE 2 MILLIGRAM(S): 50 CAPSULE, EXTENDED RELEASE ORAL at 01:05

## 2020-06-01 RX ADMIN — HEPARIN SODIUM 5000 UNIT(S): 5000 INJECTION INTRAVENOUS; SUBCUTANEOUS at 05:28

## 2020-06-01 RX ADMIN — ALBUTEROL 2 PUFF(S): 90 AEROSOL, METERED ORAL at 21:55

## 2020-06-01 RX ADMIN — PANTOPRAZOLE SODIUM 40 MILLIGRAM(S): 20 TABLET, DELAYED RELEASE ORAL at 05:28

## 2020-06-01 RX ADMIN — ONDANSETRON 4 MILLIGRAM(S): 8 TABLET, FILM COATED ORAL at 17:04

## 2020-06-01 RX ADMIN — DEXTROSE MONOHYDRATE, SODIUM CHLORIDE, AND POTASSIUM CHLORIDE 100 MILLILITER(S): 50; .745; 4.5 INJECTION, SOLUTION INTRAVENOUS at 17:01

## 2020-06-01 RX ADMIN — Medication 10 MILLIGRAM(S): at 18:05

## 2020-06-01 RX ADMIN — LEVETIRACETAM 400 MILLIGRAM(S): 250 TABLET, FILM COATED ORAL at 18:44

## 2020-06-01 RX ADMIN — MORPHINE SULFATE 2 MILLIGRAM(S): 50 CAPSULE, EXTENDED RELEASE ORAL at 18:08

## 2020-06-01 RX ADMIN — HEPARIN SODIUM 5000 UNIT(S): 5000 INJECTION INTRAVENOUS; SUBCUTANEOUS at 14:14

## 2020-06-01 RX ADMIN — MORPHINE SULFATE 2 MILLIGRAM(S): 50 CAPSULE, EXTENDED RELEASE ORAL at 20:38

## 2020-06-01 RX ADMIN — SODIUM CHLORIDE 100 MILLILITER(S): 9 INJECTION, SOLUTION INTRAVENOUS at 05:27

## 2020-06-01 RX ADMIN — MORPHINE SULFATE 2 MILLIGRAM(S): 50 CAPSULE, EXTENDED RELEASE ORAL at 14:12

## 2020-06-01 RX ADMIN — HEPARIN SODIUM 5000 UNIT(S): 5000 INJECTION INTRAVENOUS; SUBCUTANEOUS at 23:34

## 2020-06-01 RX ADMIN — PANTOPRAZOLE SODIUM 40 MILLIGRAM(S): 20 TABLET, DELAYED RELEASE ORAL at 18:07

## 2020-06-01 NOTE — PROGRESS NOTE ADULT - ATTENDING COMMENTS
I examined the patient and discussed my plan with the resident  the patient is showing some improvement.  she continues to complain of pain but she does not have tenderness when she is distracted  will obtain kub and book for tomorrow if she fails to improve

## 2020-06-01 NOTE — PROGRESS NOTE ADULT - SUBJECTIVE AND OBJECTIVE BOX
RAMÍREZ RODARTE  SI-N ER Hold 005 A (SI-N ER Hold)            Patient was evaluated and examined  by bedside, crying due to abdominal pain and nausea, Patient reports that abdominal pain got progressively worse over past 2 weeks. no diarrhea, no cough, no fever. post CT abd/pelvis assessment, pt. was dx. with SBO, tentatively scheduled for explorat. laparotomy in am. for now remains NPO, NGT to suction, IVF, pain management.    REVIEW OF SYSTEMS:  please see pertinent positives mentioned above, all other 12 ROS negative    PMH: Dyslipidemia, Anxiety, Chronic DJD of spine, Right knee OA, h/o Seizure( seizure free for past 20 years), GERD    PSH:  s/p cholecystectomy, appendectomy, partial hysterectomy,  section    Allergies: Cipro, Toradol, Penicillin.     SH: smokes 1/3 PPD, occasional alcohol use, uses prescribed inhaled medical marijuana in oil based form.     FH: mother- breast ca, father- prostate ca      T(C): , Max: 37.1 (20 @ 15:54)  HR: 987 (20 @ 15:54)  BP: 127/73 (20 @ 15:54)  RR: 18 (20 @ 15:54)  SpO2: 96% (20 @ 15:54)  CAPILLARY BLOOD GLUCOSE      POCT Blood Glucose.: 107 mg/dL (2020 12:54)      PHYSICAL EXAM:  General: NAD, AAOX3, patient is laying comfortably in bed  HEENT: AT, NC, Supple, NO JVD, NO CB, NGT   Lungs: good breath sounds  B/L, no wheezing, no rhonchi  CVS: normal S1, S2, RRR, NO M/G/R  Abdomen:  bowel sounds present hypoactive, tender diffusely, distended, well healed scars present on abd. wall.  Extremities: no edema, no clubbing, no cyanosis, positive peripheral pulses b/l  Neuro: no acute focal neurological deficits  Skin: no rash, no ecchymosis      LAB  CBC  Date: 20 @ 11:30  Mean cell Llnpzhpvsd60.1  Mean cell Hemoglobin Conc34.8  Mean cell Volum 94.9  Platelet count-Automate 199  RBC Count 3.72  Red Cell Distrib Width12.8  WBC Count6.41  % Albumin, Urine--  Hematocrit 35.3  Hemoglobin 12.3  CBC  Date: 20 @ 05:40  Mean cell Oeybdmxghd42.0  Mean cell Hemoglobin Conc34.6  Mean cell Volum 95.4  Platelet count-Automate 212  RBC Count 3.88  Red Cell Distrib Width13.0  WBC Count6.63  % Albumin, Urine--  Hematocrit 37.0  Hemoglobin 12.8  CBC  Date: 20 @ 02:40  Mean cell Piplxkwfix00.8  Mean cell Hemoglobin Conc34.7  Mean cell Volum 94.6  Platelet count-Automate 235  RBC Count 3.87  Red Cell Distrib Width12.9  WBC Count7.62  % Albumin, Urine--  Hematocrit 36.6  Hemoglobin 12.7  CBC  Date: 20 @ 13:27  Mean cell Hkdpqfgatr37.0  Mean cell Hemoglobin Conc35.3  Mean cell Volum 93.6  Platelet count-Automate 319  RBC Count 4.82  Red Cell Distrib Width12.7  WBC Count11.71  % Albumin, Urine--  Hematocrit 45.1  Hemoglobin 15.9    [facility]  20 @ 05:40  Blood Gas Arterial-Calcium,Ionized--  Blood Urea Nitrogen, Serum 19 mg/dL [10 - 20]  Carbon Dioxide, Serum27 mmol/L [17 - 32]  Chloride, Dbjoz653 mmol/L [98 - 110]  Creatinie, Serum0.7 mg/dL [0.7 - 1.5]  Glucose, Ydfpy628 mg/dL<H> [70 - 99]  Potassium, Serum3.8 mmol/L [3.5 - 5.0]  Sodium, Serum 141 mmol/L [135 - 146]  [facility]  20 @ 02:40  Blood Gas Arterial-Calcium,Ionized--  Blood Urea Nitrogen, Serum 19 mg/dL [10 - 20]  Carbon Dioxide, Serum26 mmol/L [17 - 32]  Chloride, Agvpt214 mmol/L [98 - 110]  Creatinie, Serum0.7 mg/dL [0.7 - 1.5]  Glucose, Mkcuk898 mg/dL<H> [70 - 99]  Potassium, Serum3.9 mmol/L [3.5 - 5.0]  Sodium, Serum 140 mmol/L [135 - 146]  [facility]  20 @ 02:13  Blood Gas Arterial-Calcium,Ionized1.21 mmoL/L [1.12 - 1.30]    [facility]  20 @ 13:27  Blood Gas Arterial-Calcium,Ionized--  Blood Urea Nitrogen, Serum 22 mg/dL<H> [10 - 20]  Carbon Dioxide, Serum23 mmol/L [17 - 32]  Chloride, Serum99 mmol/L [98 - 110]  Creatinie, Serum1.4 mg/dL [0.7 - 1.5]  Glucose, Zuhxr410 mg/dL<H> [70 - 99]  Potassium, Serum4.1 mmol/L [3.5 - 5.0]  Sodium, Serum 143 mmol/L [135 - 146]        PT/INR - ( 2020 05:40 )   PT: 11.90 sec;   INR: 1.03 ratio         PTT - ( 2020 05:40 )  PTT:31.8 sec          Medications:  dextrose 5% + sodium chloride 0.45% with potassium chloride 20 mEq/L 1000 milliLiter(s) IV Continuous <Continuous>  heparin   Injectable 5000 Unit(s) SubCutaneous every 8 hours  morphine  - Injectable 2 milliGRAM(s) IV Push every 4 hours PRN  morphine  - Injectable 2 milliGRAM(s) IV Push once  ondansetron Injectable 4 milliGRAM(s) IV Push every 6 hours PRN  pantoprazole  Injectable 40 milliGRAM(s) IV Push daily  prochlorperazine   Injectable 10 milliGRAM(s) IV Push once        Assessment and Plan:  # SBO- maintain NPO, IVF, NGT to suction, patient is medically stable for Surgical repair of SBO.  RCRI preop score- O , class 1 risk.   2d echo - normal  CXR-no opacities, no pulmonary congestion  -covid screening -negative    # h/o Anxiety- patient was taking Klonopin 2 mg po every 6 hours - meanwhile NPO- switch to IV Ativan - 0.5 mg every 6 hours (hold if pt. sedated)    # H/o Seizures- patient reports that she was seizure free for past 20 years , resume on Keppra tx. 250 mg IV twice daily.    # Chronic DJD of spine with chronic back pain, right knee OA- patient was scheduled for elective epidural injection with pain management.    # h/o Dyslipidemia- resume on statins once patient will be started on PO diet    # h/o Depression- stable, resume on po meds once patient will be started on diet.     daily GI and DVT proph.

## 2020-06-01 NOTE — PROGRESS NOTE ADULT - ASSESSMENT
54F with history of multiple high grade SBOs requiring MAKAYLA, last SBO 2 years ago now presenting with n/d. No PO tolerance, no gas or BM for 4 days and distended diffusely tender abdomen. CT scan is consistent with SBO. NGT placed with ~200cc output.    Plan:   ·	NPO, IVF  ·	NGT to LCWS  ·	plan to trial non-operative management  ·	add on to OR schedule tomorrow for ex lap, possible bowel resection, possible ostomy

## 2020-06-01 NOTE — PROGRESS NOTE ADULT - SUBJECTIVE AND OBJECTIVE BOX
GENERAL SURGERY PROGRESS NOTE     GÓMEZDICKSONRAMÍREZ CHENG  54y  Female  Hospital day :1d  POD:  Procedure:     OVERNIGHT EVENTS:  NGT with ~500cc bilious output. No flatus or BM.     T(F): 98.5 (06-01-20 @ 07:29), Max: 98.5 (06-01-20 @ 07:29)  HR: 70 (06-01-20 @ 07:29) (70 - 120)  BP: 109/53 (06-01-20 @ 07:29) (109/53 - 182/96)  RR: 18 (06-01-20 @ 07:29) (16 - 20)  SpO2: 96% (06-01-20 @ 07:29) (96% - 99%)    DIET/FLUIDS: lactated ringers. 1000 milliLiter(s) IV Continuous <Continuous>    URINE:  Indwelling Urethral Catheter:     Connect To:  Straight Drainage/Gravity    Indication:  Urine Output Monitoring in Critically Ill (05-31-20 @ 15:50)    AC/ proph: heparin   Injectable 5000 Unit(s) SubCutaneous every 8 hours    PHYSICAL EXAM:  GENERAL: NAD, NGT in place  CHEST/LUNG: Clear to auscultation bilaterally  HEART: Regular rate and rhythm  ABDOMEN: Soft, mildly TTP  EXTREMITIES: No clubbing, cyanosis, or edema    LABS                   12.8   6.63  )-----------( 212      ( 01 Jun 2020 05:40 )             37.0       Auto Neutrophil %: 60.1 % (06-01-20 @ 05:40)  Auto Immature Granulocyte %: 0.6 % (06-01-20 @ 05:40)  Auto Neutrophil %: 77.9 % (05-31-20 @ 13:27)  Auto Immature Granulocyte %: 0.4 % (05-31-20 @ 13:27)    06-01    140  |  103  |  19  ----------------------------<  112<H>  3.9   |  26  |  0.7    Calcium, Total Serum: 9.3 mg/dL (06-01-20 @ 02:40)    LFTs:             8.9  | 0.5  | 32       ------------------[199     ( 31 May 2020 13:27 )  5.4  | x    | 39          Lipase:18     Amylase:x        Lactate, Blood: 0.8 mmol/L (06-01-20 @ 02:40)  Blood Gas Arterial, Lactate: 0.7 mmoL/L (06-01-20 @ 02:13)  Lactate, Blood: 2.0 mmol/L (05-31-20 @ 13:27)    ABG - ( 01 Jun 2020 02:13 )  pH: 7.37  /  pCO2: 48    /  pO2: 69    / HCO3: 28    / Base Excess: 1.9   /  SaO2: 93        Coags:     11.90  ----< 1.03    ( 01 Jun 2020 05:40 )     31.8     CARDIAC MARKERS ( 01 Jun 2020 01:48 )  x     / <0.01 ng/mL / x     / x     / 2.3 ng/mL      RADIOLOGY & ADDITIONAL TESTS:  < from: CT Abdomen and Pelvis w/ Oral Cont and w/ IV Cont (05.31.20 @ 15:33) >  PERITONEUM/MESENTERY/BOWEL: Multiple dilated and fluid-filled loops of small bowel, measuring up to 4.5 cm. There is abrupt change in caliber in the lower mid abdomen/pelvis (series 3 image 55). Findings are consistent with small bowel obstruction. Prior appendectomy. No pneumoperitoneum or ascites.    BONES/SOFT TISSUES: No acute osseous abnormality. Mild degenerative changes of the visualized thoracolumbar spine.    OTHER: Calcified atherosclerotic disease of the aorta and branch vessels.      IMPRESSION:   Multiple dilated and fluid-filled small bowel loops with abrupt change in caliber the lower mid abdomen/pelvis. Findings consistent with small bowel obstruction.

## 2020-06-02 LAB
ANION GAP SERPL CALC-SCNC: 14 MMOL/L — SIGNIFICANT CHANGE UP (ref 7–14)
BASOPHILS # BLD AUTO: 0.02 K/UL — SIGNIFICANT CHANGE UP (ref 0–0.2)
BASOPHILS NFR BLD AUTO: 0.4 % — SIGNIFICANT CHANGE UP (ref 0–1)
BUN SERPL-MCNC: 5 MG/DL — LOW (ref 10–20)
CALCIUM SERPL-MCNC: 9.5 MG/DL — SIGNIFICANT CHANGE UP (ref 8.5–10.1)
CHLORIDE SERPL-SCNC: 99 MMOL/L — SIGNIFICANT CHANGE UP (ref 98–110)
CO2 SERPL-SCNC: 28 MMOL/L — SIGNIFICANT CHANGE UP (ref 17–32)
CREAT SERPL-MCNC: 0.5 MG/DL — LOW (ref 0.7–1.5)
EOSINOPHIL # BLD AUTO: 0.03 K/UL — SIGNIFICANT CHANGE UP (ref 0–0.7)
EOSINOPHIL NFR BLD AUTO: 0.5 % — SIGNIFICANT CHANGE UP (ref 0–8)
GLUCOSE SERPL-MCNC: 121 MG/DL — HIGH (ref 70–99)
HCT VFR BLD CALC: 34.8 % — LOW (ref 37–47)
HGB BLD-MCNC: 11.9 G/DL — LOW (ref 12–16)
IMM GRANULOCYTES NFR BLD AUTO: 0.4 % — HIGH (ref 0.1–0.3)
LYMPHOCYTES # BLD AUTO: 1.26 K/UL — SIGNIFICANT CHANGE UP (ref 1.2–3.4)
LYMPHOCYTES # BLD AUTO: 22.3 % — SIGNIFICANT CHANGE UP (ref 20.5–51.1)
MAGNESIUM SERPL-MCNC: 1.7 MG/DL — LOW (ref 1.8–2.4)
MCHC RBC-ENTMCNC: 31.5 PG — HIGH (ref 27–31)
MCHC RBC-ENTMCNC: 34.2 G/DL — SIGNIFICANT CHANGE UP (ref 32–37)
MCV RBC AUTO: 92.1 FL — SIGNIFICANT CHANGE UP (ref 81–99)
MONOCYTES # BLD AUTO: 0.55 K/UL — SIGNIFICANT CHANGE UP (ref 0.1–0.6)
MONOCYTES NFR BLD AUTO: 9.8 % — HIGH (ref 1.7–9.3)
MRSA PCR RESULT.: NEGATIVE — SIGNIFICANT CHANGE UP
NEUTROPHILS # BLD AUTO: 3.76 K/UL — SIGNIFICANT CHANGE UP (ref 1.4–6.5)
NEUTROPHILS NFR BLD AUTO: 66.6 % — SIGNIFICANT CHANGE UP (ref 42.2–75.2)
NRBC # BLD: 0 /100 WBCS — SIGNIFICANT CHANGE UP (ref 0–0)
PHOSPHATE SERPL-MCNC: 3.7 MG/DL — SIGNIFICANT CHANGE UP (ref 2.1–4.9)
PLATELET # BLD AUTO: 212 K/UL — SIGNIFICANT CHANGE UP (ref 130–400)
POTASSIUM SERPL-MCNC: 3.5 MMOL/L — SIGNIFICANT CHANGE UP (ref 3.5–5)
POTASSIUM SERPL-SCNC: 3.5 MMOL/L — SIGNIFICANT CHANGE UP (ref 3.5–5)
RBC # BLD: 3.78 M/UL — LOW (ref 4.2–5.4)
RBC # FLD: 12.1 % — SIGNIFICANT CHANGE UP (ref 11.5–14.5)
SODIUM SERPL-SCNC: 141 MMOL/L — SIGNIFICANT CHANGE UP (ref 135–146)
WBC # BLD: 5.64 K/UL — SIGNIFICANT CHANGE UP (ref 4.8–10.8)
WBC # FLD AUTO: 5.64 K/UL — SIGNIFICANT CHANGE UP (ref 4.8–10.8)

## 2020-06-02 PROCEDURE — 99232 SBSQ HOSP IP/OBS MODERATE 35: CPT

## 2020-06-02 PROCEDURE — 99233 SBSQ HOSP IP/OBS HIGH 50: CPT

## 2020-06-02 RX ORDER — POLYETHYLENE GLYCOL 3350 17 G/17G
17 POWDER, FOR SOLUTION ORAL
Refills: 0 | Status: DISCONTINUED | OUTPATIENT
Start: 2020-06-02 | End: 2020-06-04

## 2020-06-02 RX ORDER — SODIUM CHLORIDE 9 MG/ML
500 INJECTION, SOLUTION INTRAVENOUS ONCE
Refills: 0 | Status: COMPLETED | OUTPATIENT
Start: 2020-06-02 | End: 2020-06-02

## 2020-06-02 RX ORDER — MAGNESIUM SULFATE 500 MG/ML
2 VIAL (ML) INJECTION ONCE
Refills: 0 | Status: COMPLETED | OUTPATIENT
Start: 2020-06-02 | End: 2020-06-02

## 2020-06-02 RX ORDER — SENNA PLUS 8.6 MG/1
2 TABLET ORAL AT BEDTIME
Refills: 0 | Status: DISCONTINUED | OUTPATIENT
Start: 2020-06-02 | End: 2020-06-04

## 2020-06-02 RX ORDER — MORPHINE SULFATE 50 MG/1
2 CAPSULE, EXTENDED RELEASE ORAL ONCE
Refills: 0 | Status: DISCONTINUED | OUTPATIENT
Start: 2020-06-02 | End: 2020-06-02

## 2020-06-02 RX ADMIN — HEPARIN SODIUM 5000 UNIT(S): 5000 INJECTION INTRAVENOUS; SUBCUTANEOUS at 06:10

## 2020-06-02 RX ADMIN — DEXTROSE MONOHYDRATE, SODIUM CHLORIDE, AND POTASSIUM CHLORIDE 100 MILLILITER(S): 50; .745; 4.5 INJECTION, SOLUTION INTRAVENOUS at 06:10

## 2020-06-02 RX ADMIN — POLYETHYLENE GLYCOL 3350 17 GRAM(S): 17 POWDER, FOR SOLUTION ORAL at 19:12

## 2020-06-02 RX ADMIN — Medication 10 MILLIGRAM(S): at 19:11

## 2020-06-02 RX ADMIN — Medication 50 GRAM(S): at 04:18

## 2020-06-02 RX ADMIN — MORPHINE SULFATE 2 MILLIGRAM(S): 50 CAPSULE, EXTENDED RELEASE ORAL at 16:57

## 2020-06-02 RX ADMIN — MORPHINE SULFATE 2 MILLIGRAM(S): 50 CAPSULE, EXTENDED RELEASE ORAL at 21:06

## 2020-06-02 RX ADMIN — ALBUTEROL 2 PUFF(S): 90 AEROSOL, METERED ORAL at 03:00

## 2020-06-02 RX ADMIN — MORPHINE SULFATE 2 MILLIGRAM(S): 50 CAPSULE, EXTENDED RELEASE ORAL at 01:33

## 2020-06-02 RX ADMIN — ONDANSETRON 4 MILLIGRAM(S): 8 TABLET, FILM COATED ORAL at 19:11

## 2020-06-02 RX ADMIN — HEPARIN SODIUM 5000 UNIT(S): 5000 INJECTION INTRAVENOUS; SUBCUTANEOUS at 21:22

## 2020-06-02 RX ADMIN — SODIUM CHLORIDE 500 MILLILITER(S): 9 INJECTION, SOLUTION INTRAVENOUS at 06:47

## 2020-06-02 RX ADMIN — MORPHINE SULFATE 2 MILLIGRAM(S): 50 CAPSULE, EXTENDED RELEASE ORAL at 03:40

## 2020-06-02 RX ADMIN — PANTOPRAZOLE SODIUM 40 MILLIGRAM(S): 20 TABLET, DELAYED RELEASE ORAL at 12:09

## 2020-06-02 RX ADMIN — HEPARIN SODIUM 5000 UNIT(S): 5000 INJECTION INTRAVENOUS; SUBCUTANEOUS at 13:20

## 2020-06-02 RX ADMIN — LEVETIRACETAM 400 MILLIGRAM(S): 250 TABLET, FILM COATED ORAL at 21:20

## 2020-06-02 RX ADMIN — SENNA PLUS 2 TABLET(S): 8.6 TABLET ORAL at 21:20

## 2020-06-02 RX ADMIN — DEXTROSE MONOHYDRATE, SODIUM CHLORIDE, AND POTASSIUM CHLORIDE 100 MILLILITER(S): 50; .745; 4.5 INJECTION, SOLUTION INTRAVENOUS at 21:22

## 2020-06-02 RX ADMIN — LEVETIRACETAM 400 MILLIGRAM(S): 250 TABLET, FILM COATED ORAL at 06:10

## 2020-06-02 NOTE — PROGRESS NOTE ADULT - SUBJECTIVE AND OBJECTIVE BOX
GENERAL SURGERY PROGRESS NOTE     RAMÍREZ RODARTE  [address] [place] [address] day :2d  POD:  Procedure:   Surgical Attending: Charlie Cohn  Overnight events: C/o of abdominal pain overnight, crampy in nature.     T(F): 98 (06-01-20 @ 20:34), Max: 98.7 (06-01-20 @ 15:54)  HR: 85 (06-01-20 @ 20:34) (70 - 987)  BP: 126/60 (06-01-20 @ 20:34) (109/53 - 134/68)  ABP: --  ABP(mean): --  RR: 18 (06-01-20 @ 20:34) (18 - 18)  SpO2: 95% (06-01-20 @ 20:34) (95% - 98%)    06-01-20 @ 07:01  -  06-02-20 @ 02:34  --------------------------------------------------------  IN:    dextrose 5% + sodium chloride 0.45% with potassium chloride 20 mEq/L: 400 mL  Total IN: 400 mL    OUT:    Indwelling Catheter - Urethral: 200 mL    Voided: 200 mL  Total OUT: 400 mL    Total NET: 0 mL    DIET/FLUIDS: dextrose 5% + sodium chloride 0.45% with potassium chloride 20 mEq/L 1000 milliLiter(s) (100 mL/Hr) IV Continuous <Continuous>    GI proph:  pantoprazole  Injectable 40 milliGRAM(s) IV Push daily    AC/ proph: heparin   Injectable 5000 Unit(s) SubCutaneous every 8 hours    ABx:     PHYSICAL EXAM:  GENERAL: NAD, well-appearing  CHEST/LUNG: Clear to auscultation bilaterally  HEART: Regular rate and rhythm  ABDOMEN: Soft, distended, TTP in upper quadrants no guarding or rebound  EXTREMITIES:  No clubbing, cyanosis, or edema    LABS  Labs:  CAPILLARY BLOOD GLUCOSE      POCT Blood Glucose.: 107 mg/dL (01 Jun 2020 12:54)                      12.0   6.42  )-----------( 203      ( 01 Jun 2020 20:58 )             34.6       Auto Neutrophil %: 63.1 % (06-01-20 @ 20:58)  Auto Immature Granulocyte %: 0.3 % (06-01-20 @ 20:58)  Auto Neutrophil %: 60.1 % (06-01-20 @ 05:40)  Auto Immature Granulocyte %: 0.6 % (06-01-20 @ 05:40)    06-01    140  |  101  |  14  ----------------------------<  107<H>  3.9   |  27  |  0.6<L>      Calcium, Total Serum: 9.0 mg/dL (06-01-20 @ 20:58)    LFTs:             8.9  | 0.5  | 32       ------------------[199     ( 31 May 2020 13:27 )  5.4  | x    | 39          Lipase:18     Amylase:x         Lactate, Blood: 1.1 mmol/L (06-01-20 @ 05:40)  Lactate, Blood: 0.8 mmol/L (06-01-20 @ 02:40)  Blood Gas Arterial, Lactate: 0.7 mmoL/L (06-01-20 @ 02:13)  Lactate, Blood: 2.0 mmol/L (05-31-20 @ 13:27)    ABG - ( 01 Jun 2020 02:13 )  pH: 7.37  /  pCO2: 48    /  pO2: 69    / HCO3: 28    / Base Excess: 1.9   /  SaO2: 93            Coags:     11.90  ----< 1.03    ( 01 Jun 2020 05:40 )     31.8        CARDIAC MARKERS ( 01 Jun 2020 11:30 )  x     / <0.01 ng/mL / x     / x     / x      CARDIAC MARKERS ( 01 Jun 2020 05:40 )  x     / <0.01 ng/mL / x     / x     / x      CARDIAC MARKERS ( 01 Jun 2020 01:48 )  x     / <0.01 ng/mL / x     / x     / 2.3 ng/mL        RADIOLOGY & ADDITIONAL TESTS:  no new imaging

## 2020-06-02 NOTE — CONSULT NOTE ADULT - SUBJECTIVE AND OBJECTIVE BOX
Patient is a 54y old  Female who presents with a chief complaint of MEDICAL CONSULT (2020 17:49)    HPI:  54F PMHx hiatal hernia, dyslipidemia and chronic back pain, s/p cholecystectomy, appendectomy, partial hysterectomy,  section, MAKAYLA x 2 presents to the ED with abdominal pain x 2 days. States she has not has a bowel movement or passed gas x 2 days. Denies fever, SOB, diarrhea. (31 May 2020 18:00)    cardiology fellow addendum:  pt was found to have SBO and Exploratory laparotomy with possible bowel resection/ ostomy surgery was scheduled. Cardiology was consulted for preop risk assessment. pt has chronic knee pain and back pain which limits her functional ability but otherwise denies any anginal symptoms at baseline. Pt states that she had stabbing b/l chest pain when she first  presented to ED with abd pain however the pain was resolved with pain meds given in ED. Currently pt denies any CP or SOB.       ROS:  All other systems reviewed and are negative    PAST MEDICAL & SURGICAL HISTORY  Anxiety  Hiatal hernia  GERD (gastroesophageal reflux disease)  Seasonal allergic rhinitis, unspecified trigger  Hypercholesteremia  Migraines  Seizures: last one 6 yrs ago  Back pain: henriated discs  S/P cholecystectomy  History of appendectomy  H/O rotator cuff surgery: right  S/p partial hysterectomy with remaining cervical stump  History of surgery: 4 rght knee sx  1 left knee sx  cholecystectomy  rotator cuff right  abdominal sx-- adhesions  c section  ovarian cystectomy  breast cystectomies  colonoscopy   endoscopies  parital hysterectomy      FAMILY HISTORY:  FAMILY HISTORY:  NC    SOCIAL HISTORY:  [+]smoker  [-]Alcohol  [-]Drug    ALLERGIES:  Cipro (Anaphylaxis; Urticaria; Hives)  penicillins (Unknown)  tramadol (Unknown)  vancomycin (Unknown)      MEDICATIONS:  MEDICATIONS  (STANDING):  ALBUTerol    90 MICROgram(s) HFA Inhaler 2 Puff(s) Inhalation every 6 hours  dextrose 5% + sodium chloride 0.45% with potassium chloride 20 mEq/L 1000 milliLiter(s) (100 mL/Hr) IV Continuous <Continuous>  heparin   Injectable 5000 Unit(s) SubCutaneous every 8 hours  lactated ringers Bolus 500 milliLiter(s) IV Bolus once  levETIRAcetam  IVPB 250 milliGRAM(s) IV Intermittent every 12 hours  pantoprazole  Injectable 40 milliGRAM(s) IV Push daily  tiotropium 18 MICROgram(s) Capsule 1 Capsule(s) Inhalation daily    MEDICATIONS  (PRN):  LORazepam   Injectable 0.5 milliGRAM(s) IV Push every 6 hours PRN Anxiety  morphine  - Injectable 2 milliGRAM(s) IV Push every 4 hours PRN Severe Pain (7 - 10)  ondansetron Injectable 4 milliGRAM(s) IV Push every 6 hours PRN Nausea      HOME MEDICATIONS:  Home Medications:  atorvastatin 80 mg oral tablet: 1 tab(s) orally once a day (31 May 2020 21:25)  busPIRone 10 mg oral tablet: 1 tab(s) orally 2 times a day (31 May 2020 21:25)  cyclobenzaprine 10 mg oral tablet: 1 tab(s) orally 3 times a day (31 May 2020 21:25)  desloratadine 5 mg oral tablet: 1 tab(s) orally once a day (31 May 2020 21:25)  dicyclomine 20 mg oral tablet: 1 tab(s) orally 4 times a day (31 May 2020 21:25)  Incruse Ellipta 62.5 mcg/inh inhalation powder:  (31 May 2020 21:24)  KlonoPIN 2 mg oral tablet: 1 tab(s) orally 3 times a day (31 May 2020 21:24)  omeprazole 40 mg oral delayed release capsule: 1 cap(s) orally once a day (31 May 2020 21:24)  rizatriptan 10 mg oral tablet: 1 tab(s) orally once a day (31 May 2020 21:24)  tiZANidine 4 mg oral tablet: 2 tab(s) orally every 8 hours (31 May 2020 21:24)      VITALS:   T(F): 97.4 ( @ 05:14), Max: 98.7 ( @ 15:54)  HR: 80 ( @ 05:14) (70 - 987)  BP: 122/59 ( @ 05:14) (109/53 - 182/96)  BP(mean): --  RR: 18 ( @ 05:14) (16 - 20)  SpO2: 95% ( @ 20:34) (95% - 99%)    I&O's Summary    2020 07:01  -  2020 06:43  --------------------------------------------------------  IN: 600 mL / OUT: 400 mL / NET: 200 mL        PHYSICAL EXAM:  GEN: Alert and oriented X 3, No acute distress, appears weak   HEENT: no pallor  NECK: Supple, no JVD  LUNGS: Clear to auscultation bilaterally  CARDIOVASCULAR: S1/S2 regular, no murmurs or rubs  ABD: BS+  EXT: No Lower extremity edema/cyanosis  NEURO: AAOx3    LABS:                        12.0   6.42  )-----------( 203      ( 2020 20:58 )             34.6     06-01    140  |  101  |  14  ----------------------------<  107<H>  3.9   |  27  |  0.6<L>    Ca    9.0      2020 20:58  Phos  2.9     06-01  Mg     1.6     06-01    TPro  8.9<H>  /  Alb  5.4<H>  /  TBili  0.5  /  DBili  x   /  AST  32  /  ALT  39  /  AlkPhos  199<H>  05-31    PT/INR - ( 2020 05:40 )   PT: 11.90 sec;   INR: 1.03 ratio         PTT - ( 2020 05:40 )  PTT:31.8 sec  Troponin T, Serum: <0.01 ng/mL (20 @ 11:30)    CARDIAC MARKERS ( 2020 11:30 )  x     / <0.01 ng/mL / x     / x     / x      CARDIAC MARKERS ( 2020 05:40 )  x     / <0.01 ng/mL / x     / x     / x      CARDIAC MARKERS ( 2020 01:48 )  x     / <0.01 ng/mL / x     / x     / 2.3 ng/mL        Troponin trend:        Hemoglobin A1C   Thyroid      RADIOLOGY:  -CXR:  < from: Xray Chest 1 View AP/PA (20 @ 18:12) >  IMPRESSION:      No radiographic evidence of acute cardiopulmonary disease.    Enteric tube tip is seen in the stomach.    < end of copied text >    -CT:  < from: CT Abdomen and Pelvis w/ Oral Cont and w/ IV Cont (20 @ 15:33) >  IMPRESSION:     Multiple dilated and fluid-filled small bowel loops with abrupt change in caliber the lower mid abdomen/pelvis. Findings consistent with small bowel obstruction.    < end of copied text >    -TTE:  < from: Transthoracic Echocardiogram (20 @ 16:27) >  Summary:   1. LV Ejection Fraction by Walker's Method with a biplane EF of 77 %.   2. Normal left ventricular size and wall thicknesses, with normalsystolic and diastolic function.   3. The mean global longitudinal peak strain by speckle tracking is -17.0% which is borderline normal.   4. LA volume Index is 10.3 ml/m² ml/m2.    < end of copied text >    -CCTA:  < from: CT Heart with Coronaries (19 @ 09:23) >    IMPRESSION:   Mixed plaque in the proximal LAD contributing to mild stenosis. This appears similar to prior CTA coronary dated 2015.    The total Agatston coronary artery calcium score equals 16, which corresponds to 89th percentile for age, gender and ethnicity.     CAD-RADS 2.    < end of copied text >    -CATHETERIZATION:  and 2017: No significant CAD except minor luminal irregularities     ECG:  < from: 12 Lead ECG (20 @ 02:15) >  Diagnosis Line *** Poor data quality, interpretation may be adversely affected  Normal sinus rhythm  Baseline artifact  Abnormal ECG    < end of copied text >    TELEMETRY EVENTS:  none Patient is a 54y old  Female who presents with a chief complaint of abdominal pain (2020 17:49)    HPI:  54F PMHx hiatal hernia, dyslipidemia and chronic back pain, s/p cholecystectomy, appendectomy, partial hysterectomy,  section, MAKAYLA x 2 presents to the ED with abdominal pain x 2 days. States she has not has a bowel movement or passed gas x 2 days. Denies fever, SOB, diarrhea. (31 May 2020 18:00)    cardiology fellow addendum:  pt was found to have SBO and Exploratory laparotomy with possible bowel resection/ ostomy surgery was scheduled. Cardiology was consulted for preop risk assessment. pt has chronic knee pain and back pain which limits her functional ability but otherwise denies any anginal symptoms at baseline. Pt states that she had stabbing b/l chest pain when she first presented to ED with abd pain however the pain was resolved with pain meds given in ED. Currently pt denies any CP or SOB.         PAST MEDICAL & SURGICAL HISTORY  Anxiety  Hiatal hernia  GERD (gastroesophageal reflux disease)  Seasonal allergic rhinitis, unspecified trigger  Hypercholesteremia  Migraines  Seizures: last one 6 yrs ago  Back pain: henriated discs  S/P cholecystectomy  History of appendectomy  H/O rotator cuff surgery: right  S/p partial hysterectomy with remaining cervical stump  History of surgery: 4 rght knee sx  1 left knee sx  cholecystectomy  rotator cuff right  abdominal sx-- adhesions  c section  ovarian cystectomy  breast cystectomies  colonoscopy   endoscopies  parital hysterectomy      FAMILY HISTORY:  FAMILY HISTORY:  NC    SOCIAL HISTORY:  [+]smoker  [-]Alcohol  [-]Drug    ALLERGIES:  Cipro (Anaphylaxis; Urticaria; Hives)  penicillins (Unknown)  tramadol (Unknown)  vancomycin (Unknown)      MEDICATIONS  (STANDING):  ALBUTerol    90 MICROgram(s) HFA Inhaler 2 Puff(s) Inhalation every 6 hours  dextrose 5% + sodium chloride 0.45% with potassium chloride 20 mEq/L 1000 milliLiter(s) (100 mL/Hr) IV Continuous <Continuous>  heparin   Injectable 5000 Unit(s) SubCutaneous every 8 hours  lactated ringers Bolus 500 milliLiter(s) IV Bolus once  levETIRAcetam  IVPB 250 milliGRAM(s) IV Intermittent every 12 hours  pantoprazole  Injectable 40 milliGRAM(s) IV Push daily  tiotropium 18 MICROgram(s) Capsule 1 Capsule(s) Inhalation daily    MEDICATIONS  (PRN):  LORazepam   Injectable 0.5 milliGRAM(s) IV Push every 6 hours PRN Anxiety  morphine  - Injectable 2 milliGRAM(s) IV Push every 4 hours PRN Severe Pain (7 - 10)  ondansetron Injectable 4 milliGRAM(s) IV Push every 6 hours PRN Nausea      Home Medications:  atorvastatin 80 mg oral tablet: 1 tab(s) orally once a day (31 May 2020 21:25)  busPIRone 10 mg oral tablet: 1 tab(s) orally 2 times a day (31 May 2020 21:25)  cyclobenzaprine 10 mg oral tablet: 1 tab(s) orally 3 times a day (31 May 2020 21:25)  desloratadine 5 mg oral tablet: 1 tab(s) orally once a day (31 May 2020 21:25)  dicyclomine 20 mg oral tablet: 1 tab(s) orally 4 times a day (31 May 2020 21:25)  Incruse Ellipta 62.5 mcg/inh inhalation powder:  (31 May 2020 21:24)  KlonoPIN 2 mg oral tablet: 1 tab(s) orally 3 times a day (31 May 2020 21:24)  omeprazole 40 mg oral delayed release capsule: 1 cap(s) orally once a day (31 May 2020 21:24)  rizatriptan 10 mg oral tablet: 1 tab(s) orally once a day (31 May 2020 21:24)  tiZANidine 4 mg oral tablet: 2 tab(s) orally every 8 hours (31 May 2020 21:24)      REVIEW OF SYSTEMS:  CONSTITUTIONAL: No fever, weight loss, fatigue  NECK: No pain or stiffness  RESPIRATORY: No cough, wheezing, shortness of breath  CARDIOVASCULAR: See HPI  GASTROINTESTINAL: See HPI  GENITOURINARY: No dysuria, frequency, hematuria, incontinence  NEUROLOGICAL: No headaches, memory loss, loss of strength, numbness, tremors  SKIN: No itching, burning, rashes, lesions   ENDOCRINE: No heat/cold intolerance or hair loss  MUSCULOSKELETAL: No joint pain or swelling  HEME/LYMPH: No easy bruising or bleeding gums    VITALS:   T(F): 97.4 ( @ 05:14), Max: 98.7 ( @ 15:54)  HR: 80 ( @ 05:14) (70 - 987)  BP: 122/59 ( @ 05:14) (109/53 - 182/96)  BP(mean): --  RR: 18 ( @ 05:14) (16 - 20)  SpO2: 95% ( @ 20:34) (95% - 99%)    I&O's Summary    2020 07:01  -  2020 06:43  --------------------------------------------------------  IN: 600 mL / OUT: 400 mL / NET: 200 mL        PHYSICAL EXAM:  GEN: Alert and oriented X 3, No acute distress, appears weak   HEENT: no pallor  NECK: Supple, no JVD  LUNGS: Clear to auscultation bilaterally  CARDIOVASCULAR: S1/S2 regular, no murmurs or rubs  ABD: BS+  EXT: No Lower extremity edema/cyanosis  NEURO: AAOx3      LABS:                        12.0   6.42  )-----------( 203      ( 2020 20:58 )             34.6     06-01    140  |  101  |  14  ----------------------------<  107<H>  3.9   |  27  |  0.6<L>    Ca    9.0      2020 20:58  Phos  2.9     06-01  Mg     1.6     06-01    TPro  8.9<H>  /  Alb  5.4<H>  /  TBili  0.5  /  DBili  x   /  AST  32  /  ALT  39  /  AlkPhos  199<H>  05-31    PT/INR - ( 2020 05:40 )   PT: 11.90 sec;   INR: 1.03 ratio      PTT - ( 2020 05:40 )  PTT:31.8 sec      Troponin T, Serum: <0.01 ng/mL (20 @ 11:30)    CARDIAC MARKERS ( 2020 11:30 )  x     / <0.01 ng/mL / x     / x     / x      CARDIAC MARKERS ( 2020 05:40 )  x     / <0.01 ng/mL / x     / x     / x      CARDIAC MARKERS ( 2020 01:48 )  x     / <0.01 ng/mL / x     / x     / 2.3 ng/mL          < from: Xray Chest 1 View AP/PA (20 @ 18:12) >  IMPRESSION:      No radiographic evidence of acute cardiopulmonary disease.    Enteric tube tip is seen in the stomach.    < end of copied text >        < from: CT Abdomen and Pelvis w/ Oral Cont and w/ IV Cont (20 @ 15:33) >  IMPRESSION:     Multiple dilated and fluid-filled small bowel loops with abrupt change in caliber the lower mid abdomen/pelvis. Findings consistent with small bowel obstruction.    < end of copied text >        < from: Transthoracic Echocardiogram (20 @ 16:27) >  Summary:   1. LV Ejection Fraction by Walker's Method with a biplane EF of 77 %.   2. Normal left ventricular size and wall thicknesses, with normal systolic and diastolic function.   3. The mean global longitudinal peak strain by speckle tracking is -17.0% which is borderline normal.   4. LA volume Index is 10.3 ml/m² ml/m2.    < end of copied text >        < from: CT Heart with Coronaries (19 @ 09:23) >    IMPRESSION:   Mixed plaque in the proximal LAD contributing to mild stenosis. This appears similar to prior CTA coronary dated 2015.    The total Agatston coronary artery calcium score equals 16, which corresponds to 89th percentile for age, gender and ethnicity.     CAD-RADS 2.    < end of copied text >    -CATHETERIZATION:  and 2017: No significant CAD except minor luminal irregularities       < from: 12 Lead ECG (20 @ 02:15) >  Diagnosis Line *** Poor data quality, interpretation may be adversely affected  Normal sinus rhythm  Baseline artifact  Abnormal ECG    < end of copied text >    TELEMETRY EVENTS:  none

## 2020-06-02 NOTE — CONSULT NOTE ADULT - ASSESSMENT
54F PMHx hiatal hernia, dyslipidemia and chronic back pain, s/p cholecystectomy, appendectomy, partial hysterectomy,  section, MAKAYLA x 2 presents to the ED with abdominal pain x 2 days. pt was found to have SBO and Exploratory laparotomy with possible bowel resection/ ostomy surgery was scheduled. Cardiology was consulted for preop risk assessment.     METs ~4  RCRI 0  denies any anginal symptoms  recent chest pain is atypical 54F PMHx hiatal hernia, dyslipidemia and chronic back pain, s/p cholecystectomy, appendectomy, partial hysterectomy,  section, MAKAYLA x 2 presents to the ED with abdominal pain x 2 days. pt was found to have SBO and Exploratory laparotomy with possible bowel resection/ ostomy surgery was scheduled. Cardiology was consulted for preop risk assessment.     METs ~4  RCRI 0  denies any anginal symptoms  recent chest pain is atypical and likely due to GI etiology ; CE -ve x 3  ECG- SR, +artifact, otherwise no significant ST-T changes   ECHO- unremarkable  Tele- no events  h/o mild stable CAD based on prior cardiac caths (, ) and CCTA 6 months ago  Pt is at low to moderate cardiac risk for the planned intermediate risk surgery  no further cardiac work up needed  replete Mg  Medical therapy with ASA and statin (can start them when surgically cleared)  smoking cessation counseling

## 2020-06-02 NOTE — CHART NOTE - NSCHARTNOTEFT_GEN_A_CORE
Patient: RAMÍREZ RODARTE  MRN: [id number]  54y Female  Location: [facility] [id number]  20 @ 03:36    Vitals:  T(F): 98 (20 @ 20:34), Max: 98.7 (20 @ 15:54)  HR: 85 (20 @ 20:34) (70 - 987)  BP: 126/60 (20 @ 20:34) (109/53 - 134/68)  RR: 18 (20 @ 20:34) (18 - 18)  SpO2: 95% (20 @ 20:34) (95% - 98%)    Procedure:   Consent in Chart: [x] Yes [  ] No  Diet: Please avoid eating any foods that are high in fat/grease, or foods that make you gassy or cause you to experience GI upset, including spicy foods for 2-3 weeks after your procedure.  Fluids: dextrose 5% + sodium chloride 0.45% with potassium chloride 20 mEq/L 1000 milliLiter(s) (100 mL/Hr) IV Continuous <Continuous>  magnesium sulfate  IVPB 2 Gram(s) IV Intermittent once    EK Lead ECG:   Ventricular Rate 84 BPM    Atrial Rate 84 BPM    P-R Interval 148 ms    QRS Duration 70 ms    Q-T Interval 378 ms    QTC Calculation(Bezet) 446 ms    P Axis 63 degrees    R Axis 33 degrees    T Axis 21 degrees    Diagnosis Line *** Poor data quality, interpretation may be adversely affected  Normal sinus rhythm  Baseline artifact  Abnormal ECG    Confirmed by Valencia Moreno MD ([id number]) on 2020 8:06:44 AM (20 @ 02:15)    CXR:  Xray Chest 1 View- PORTABLE-Urgent:   EXAM:  XR CHEST PORTABLE URGENT 1V            PROCEDURE DATE:  2020            INTERPRETATION:  Clinical History / Reason for exam: Chest pain    Comparison : Chest radiograph 2020.    Technique/Positioning: Frontal radiograph of the chest.    Findings:    Support devices: Enteric tube with tip in the stomach    Cardiac/mediastinum/hilum: Stable.    Lung parenchyma/Pleura: No focal consolidation, pleural effusion or pneumothorax.    Skeleton/soft tissues: Stable.    Impression:    Noradiographic evidence of acute cardiopulmonary disease.                  HILARIO WONG M.D., RESIDENT RADIOLOGIST  This document has been electronically signed.  SAIRA ALMAZAN M.D., ATTENDING RADIOLOGIST  This document has been electronically signed. 2020 10:53AM             (20 @ 02:37)    Pre-OP Labs:  CAPILLARY BLOOD GLUCOSE      POCT Blood Glucose.: 107 mg/dL (2020 12:54)                          12.0   6.42  )-----------( 203      ( 2020 20:58 )             34.6     06-    140  |  101  |  14  ----------------------------<  107<H>  3.9   |  27  |  0.6<L>    Ca    9.0      2020 20:58  Phos  2.9     06  Mg     1.6         TPro  8.9<H>  /  Alb  5.4<H>  /  TBili  0.5  /  DBili  x   /  AST  32  /  ALT  39  /  AlkPhos  199<H>  05-31    PT/INR - ( 2020 05:40 )   PT: 11.90 sec;   INR: 1.03 ratio         PTT - ( 2020 05:40 )  PTT:31.8 sec    Type & Screen:     Anticoagulation/Antiplatelet: HSQ

## 2020-06-02 NOTE — CONSULT NOTE ADULT - ATTENDING COMMENTS
Cardiologist:  Dr. Morris Moreno    Agree with assessment above    Stable mild nonobstructive 1v CAD    EKG:  Poor quality, NSR, no acute ischemic ST changes    Low-risk for perioperative MACE

## 2020-06-02 NOTE — PROGRESS NOTE ADULT - ASSESSMENT
54F with history of multiple high grade SBOs requiring MAKAYLA, last SBO 2 years ago now presenting with n/d. No PO tolerance, no gas or BM for 4 days and distended diffusely tender abdomen. CT scan is consistent with SBO. NGT placed.    Plan:   NPO, IVF  NGT to [facility]  Plan for OR today  ex lap, possible bowel resection, possible ostomy  Consent in chart

## 2020-06-02 NOTE — PROGRESS NOTE ADULT - ATTENDING COMMENTS
I examined the patient and discussed my plan with the resident  the patient has significant clinical improvement though she continues to deny flatus.  kub shows transit of contrast to right colon.  I removed her ngt and I'm encouraging walking.  n oor for now

## 2020-06-03 DIAGNOSIS — M54.9 DORSALGIA, UNSPECIFIED: ICD-10-CM

## 2020-06-03 LAB
ANION GAP SERPL CALC-SCNC: 13 MMOL/L — SIGNIFICANT CHANGE UP (ref 7–14)
BASOPHILS # BLD AUTO: 0.02 K/UL — SIGNIFICANT CHANGE UP (ref 0–0.2)
BASOPHILS NFR BLD AUTO: 0.4 % — SIGNIFICANT CHANGE UP (ref 0–1)
BUN SERPL-MCNC: 5 MG/DL — LOW (ref 10–20)
CALCIUM SERPL-MCNC: 9.6 MG/DL — SIGNIFICANT CHANGE UP (ref 8.5–10.1)
CHLORIDE SERPL-SCNC: 96 MMOL/L — LOW (ref 98–110)
CO2 SERPL-SCNC: 26 MMOL/L — SIGNIFICANT CHANGE UP (ref 17–32)
CREAT SERPL-MCNC: 0.5 MG/DL — LOW (ref 0.7–1.5)
EOSINOPHIL # BLD AUTO: 0.05 K/UL — SIGNIFICANT CHANGE UP (ref 0–0.7)
EOSINOPHIL NFR BLD AUTO: 0.9 % — SIGNIFICANT CHANGE UP (ref 0–8)
GLUCOSE BLDC GLUCOMTR-MCNC: 120 MG/DL — HIGH (ref 70–99)
GLUCOSE BLDC GLUCOMTR-MCNC: 148 MG/DL — HIGH (ref 70–99)
GLUCOSE SERPL-MCNC: 122 MG/DL — HIGH (ref 70–99)
HCT VFR BLD CALC: 33.8 % — LOW (ref 37–47)
HGB BLD-MCNC: 11.7 G/DL — LOW (ref 12–16)
IMM GRANULOCYTES NFR BLD AUTO: 0.4 % — HIGH (ref 0.1–0.3)
LYMPHOCYTES # BLD AUTO: 1.7 K/UL — SIGNIFICANT CHANGE UP (ref 1.2–3.4)
LYMPHOCYTES # BLD AUTO: 29.9 % — SIGNIFICANT CHANGE UP (ref 20.5–51.1)
MAGNESIUM SERPL-MCNC: 1.6 MG/DL — LOW (ref 1.8–2.4)
MCHC RBC-ENTMCNC: 31.7 PG — HIGH (ref 27–31)
MCHC RBC-ENTMCNC: 34.6 G/DL — SIGNIFICANT CHANGE UP (ref 32–37)
MCV RBC AUTO: 91.6 FL — SIGNIFICANT CHANGE UP (ref 81–99)
MONOCYTES # BLD AUTO: 0.56 K/UL — SIGNIFICANT CHANGE UP (ref 0.1–0.6)
MONOCYTES NFR BLD AUTO: 9.9 % — HIGH (ref 1.7–9.3)
NEUTROPHILS # BLD AUTO: 3.33 K/UL — SIGNIFICANT CHANGE UP (ref 1.4–6.5)
NEUTROPHILS NFR BLD AUTO: 58.5 % — SIGNIFICANT CHANGE UP (ref 42.2–75.2)
NRBC # BLD: 0 /100 WBCS — SIGNIFICANT CHANGE UP (ref 0–0)
PHOSPHATE SERPL-MCNC: 3.3 MG/DL — SIGNIFICANT CHANGE UP (ref 2.1–4.9)
PLATELET # BLD AUTO: 221 K/UL — SIGNIFICANT CHANGE UP (ref 130–400)
POTASSIUM SERPL-MCNC: 3.7 MMOL/L — SIGNIFICANT CHANGE UP (ref 3.5–5)
POTASSIUM SERPL-SCNC: 3.7 MMOL/L — SIGNIFICANT CHANGE UP (ref 3.5–5)
RBC # BLD: 3.69 M/UL — LOW (ref 4.2–5.4)
RBC # FLD: 12.1 % — SIGNIFICANT CHANGE UP (ref 11.5–14.5)
SODIUM SERPL-SCNC: 135 MMOL/L — SIGNIFICANT CHANGE UP (ref 135–146)
WBC # BLD: 5.68 K/UL — SIGNIFICANT CHANGE UP (ref 4.8–10.8)
WBC # FLD AUTO: 5.68 K/UL — SIGNIFICANT CHANGE UP (ref 4.8–10.8)

## 2020-06-03 PROCEDURE — 99232 SBSQ HOSP IP/OBS MODERATE 35: CPT

## 2020-06-03 RX ORDER — LORATADINE 10 MG/1
10 TABLET ORAL DAILY
Refills: 0 | Status: DISCONTINUED | OUTPATIENT
Start: 2020-06-03 | End: 2020-06-04

## 2020-06-03 RX ORDER — CLONAZEPAM 1 MG
2 TABLET ORAL THREE TIMES A DAY
Refills: 0 | Status: DISCONTINUED | OUTPATIENT
Start: 2020-06-03 | End: 2020-06-04

## 2020-06-03 RX ORDER — ATORVASTATIN CALCIUM 80 MG/1
80 TABLET, FILM COATED ORAL AT BEDTIME
Refills: 0 | Status: DISCONTINUED | OUTPATIENT
Start: 2020-06-03 | End: 2020-06-04

## 2020-06-03 RX ORDER — ACETAMINOPHEN 500 MG
650 TABLET ORAL EVERY 6 HOURS
Refills: 0 | Status: DISCONTINUED | OUTPATIENT
Start: 2020-06-03 | End: 2020-06-04

## 2020-06-03 RX ORDER — MINERAL OIL
30 OIL (ML) MISCELLANEOUS DAILY
Refills: 0 | Status: DISCONTINUED | OUTPATIENT
Start: 2020-06-03 | End: 2020-06-04

## 2020-06-03 RX ORDER — KETOROLAC TROMETHAMINE 30 MG/ML
15 SYRINGE (ML) INJECTION EVERY 6 HOURS
Refills: 0 | Status: DISCONTINUED | OUTPATIENT
Start: 2020-06-03 | End: 2020-06-03

## 2020-06-03 RX ORDER — POTASSIUM CHLORIDE 20 MEQ
20 PACKET (EA) ORAL ONCE
Refills: 0 | Status: COMPLETED | OUTPATIENT
Start: 2020-06-03 | End: 2020-06-03

## 2020-06-03 RX ORDER — LEVETIRACETAM 250 MG/1
250 TABLET, FILM COATED ORAL
Refills: 0 | Status: DISCONTINUED | OUTPATIENT
Start: 2020-06-03 | End: 2020-06-04

## 2020-06-03 RX ORDER — MINERAL OIL
30 OIL (ML) MISCELLANEOUS ONCE
Refills: 0 | Status: COMPLETED | OUTPATIENT
Start: 2020-06-03 | End: 2020-06-03

## 2020-06-03 RX ORDER — PANTOPRAZOLE SODIUM 20 MG/1
40 TABLET, DELAYED RELEASE ORAL
Refills: 0 | Status: DISCONTINUED | OUTPATIENT
Start: 2020-06-03 | End: 2020-06-04

## 2020-06-03 RX ORDER — DEXTROSE MONOHYDRATE, SODIUM CHLORIDE, AND POTASSIUM CHLORIDE 50; .745; 4.5 G/1000ML; G/1000ML; G/1000ML
1000 INJECTION, SOLUTION INTRAVENOUS
Refills: 0 | Status: DISCONTINUED | OUTPATIENT
Start: 2020-06-03 | End: 2020-06-04

## 2020-06-03 RX ORDER — MAGNESIUM SULFATE 500 MG/ML
1 VIAL (ML) INJECTION ONCE
Refills: 0 | Status: COMPLETED | OUTPATIENT
Start: 2020-06-03 | End: 2020-06-03

## 2020-06-03 RX ADMIN — LEVETIRACETAM 250 MILLIGRAM(S): 250 TABLET, FILM COATED ORAL at 18:08

## 2020-06-03 RX ADMIN — POLYETHYLENE GLYCOL 3350 17 GRAM(S): 17 POWDER, FOR SOLUTION ORAL at 05:56

## 2020-06-03 RX ADMIN — MORPHINE SULFATE 2 MILLIGRAM(S): 50 CAPSULE, EXTENDED RELEASE ORAL at 11:48

## 2020-06-03 RX ADMIN — Medication 10 MILLIGRAM(S): at 18:07

## 2020-06-03 RX ADMIN — MORPHINE SULFATE 2 MILLIGRAM(S): 50 CAPSULE, EXTENDED RELEASE ORAL at 05:54

## 2020-06-03 RX ADMIN — Medication 100 GRAM(S): at 01:46

## 2020-06-03 RX ADMIN — Medication 2 MILLIGRAM(S): at 22:08

## 2020-06-03 RX ADMIN — Medication 10 MILLIGRAM(S): at 11:34

## 2020-06-03 RX ADMIN — Medication 50 MILLIEQUIVALENT(S): at 06:16

## 2020-06-03 RX ADMIN — Medication 650 MILLIGRAM(S): at 23:51

## 2020-06-03 RX ADMIN — DEXTROSE MONOHYDRATE, SODIUM CHLORIDE, AND POTASSIUM CHLORIDE 75 MILLILITER(S): 50; .745; 4.5 INJECTION, SOLUTION INTRAVENOUS at 18:06

## 2020-06-03 RX ADMIN — DEXTROSE MONOHYDRATE, SODIUM CHLORIDE, AND POTASSIUM CHLORIDE 100 MILLILITER(S): 50; .745; 4.5 INJECTION, SOLUTION INTRAVENOUS at 11:17

## 2020-06-03 RX ADMIN — HEPARIN SODIUM 5000 UNIT(S): 5000 INJECTION INTRAVENOUS; SUBCUTANEOUS at 21:26

## 2020-06-03 RX ADMIN — Medication 10 MILLIGRAM(S): at 06:18

## 2020-06-03 RX ADMIN — MORPHINE SULFATE 2 MILLIGRAM(S): 50 CAPSULE, EXTENDED RELEASE ORAL at 01:06

## 2020-06-03 RX ADMIN — SENNA PLUS 2 TABLET(S): 8.6 TABLET ORAL at 21:26

## 2020-06-03 RX ADMIN — POLYETHYLENE GLYCOL 3350 17 GRAM(S): 17 POWDER, FOR SOLUTION ORAL at 18:08

## 2020-06-03 RX ADMIN — LORATADINE 10 MILLIGRAM(S): 10 TABLET ORAL at 18:06

## 2020-06-03 RX ADMIN — HEPARIN SODIUM 5000 UNIT(S): 5000 INJECTION INTRAVENOUS; SUBCUTANEOUS at 05:58

## 2020-06-03 RX ADMIN — Medication 30 MILLILITER(S): at 11:33

## 2020-06-03 RX ADMIN — Medication 10 MILLIGRAM(S): at 18:12

## 2020-06-03 RX ADMIN — Medication 650 MILLIGRAM(S): at 18:12

## 2020-06-03 RX ADMIN — HEPARIN SODIUM 5000 UNIT(S): 5000 INJECTION INTRAVENOUS; SUBCUTANEOUS at 14:18

## 2020-06-03 RX ADMIN — PANTOPRAZOLE SODIUM 40 MILLIGRAM(S): 20 TABLET, DELAYED RELEASE ORAL at 11:35

## 2020-06-03 RX ADMIN — Medication 50 MILLIEQUIVALENT(S): at 03:12

## 2020-06-03 RX ADMIN — ATORVASTATIN CALCIUM 80 MILLIGRAM(S): 80 TABLET, FILM COATED ORAL at 21:26

## 2020-06-03 RX ADMIN — LEVETIRACETAM 400 MILLIGRAM(S): 250 TABLET, FILM COATED ORAL at 09:45

## 2020-06-03 NOTE — CHART NOTE - NSCHARTNOTEFT_GEN_A_CORE
Spoke with pain management, [place]  Patient with home cannabis prescription   Recommend 15 mg IV toradol PRM, Tylenol OTC  Toradol 10 mg PO x5 days + Tylenol for discharge  Agree with plan  Will follow pain control

## 2020-06-03 NOTE — PHYSICAL THERAPY INITIAL EVALUATION ADULT - ADDITIONAL COMMENTS
2nd floor apartment with elevator, as per pt baseline R knee issues, uses a brace, unsure what type.

## 2020-06-03 NOTE — PROGRESS NOTE ADULT - SUBJECTIVE AND OBJECTIVE BOX
GENERAL SURGERY PROGRESS NOTE     RAMÍREZ RODARTE  54y  Female  Hospital day :3d  POD:  Procedure:   OVERNIGHT EVENTS: Uneventful    T(F): 98.7 (06-03-20 @ 04:55), Max: 98.9 (06-02-20 @ 14:18)  HR: 86 (06-03-20 @ 04:55) (80 - 86)  BP: 184/77 (06-03-20 @ 04:55) (122/59 - 184/77)  ABP: --  ABP(mean): --  RR: 18 (06-03-20 @ 04:55) (18 - 18)  SpO2: 95% (06-02-20 @ 18:46) (95% - 95%)    DIET/FLUIDS: dextrose 5% + sodium chloride 0.45% with potassium chloride 20 mEq/L 1000 milliLiter(s) IV Continuous <Continuous>    NG:                                                                                DRAINS:     BM:     EMESIS:     URINE:   06-02-20 @ 07:01  -  06-03-20 @ 07:00  --------------------------------------------------------  OUT: 3200 mL     Indwelling Urethral Catheter:     Connect To:  Straight Drainage/[place]    Indication:  Urinary Retention / Obstruction (06-02-20 @ 23:43)    GI proph:  pantoprazole  Injectable 40 milliGRAM(s) IV Push daily    AC/ proph: heparin   Injectable 5000 Unit(s) SubCutaneous every 8 hours    ABx:     PHYSICAL EXAM:  GENERAL: NAD, well-appearing  CHEST/LUNG: Clear to auscultation bilaterally  HEART: Regular rate and rhythm  ABDOMEN: Soft, Nontender, Nondistended;   EXTREMITIES:  No clubbing, cyanosis, or edema      LABS  Labs:  CAPILLARY BLOOD GLUCOSE                              11.9   5.64  )-----------( 212      ( 02 Jun 2020 21:23 )             34.8       Auto Neutrophil %: 66.6 % (06-02-20 @ 21:23)  Auto Immature Granulocyte %: 0.4 % (06-02-20 @ 21:23)    06-02    141  |  99  |  5<L>  ----------------------------<  121<H>  3.5   |  28  |  0.5<L>      eGFR if Non African American: 109 mL/min/1.73M2 (06-02-20 @ 21:23)      LFTs:     Lactate, Blood: 1.1 mmol/L (06-01-20 @ 05:40)  Lactate, Blood: 0.8 mmol/L (06-01-20 @ 02:40)  Blood Gas Arterial, Lactate: 0.7 mmoL/L (06-01-20 @ 02:13)  Lactate, Blood: 2.0 mmol/L (05-31-20 @ 13:27)    ABG - ( 01 Jun 2020 02:13 )  pH: 7.37  /  pCO2: 48    /  pO2: 69    / HCO3: 28    / Base Excess: 1.9   /  SaO2: 93                Coags:    CARDIAC MARKERS ( 01 Jun 2020 11:30 )  x     / <0.01 ng/mL / x     / x     / x                      RADIOLOGY & ADDITIONAL TESTS:      No new studies

## 2020-06-03 NOTE — CONSULT NOTE ADULT - PROBLEM SELECTOR RECOMMENDATION 9
Con't acetaminophen   Tablet .. 650 milliGRAM(s) Oral every 6 hours  Con't clonazePAM  Tablet 2 milliGRAM(s) Oral three times a day PRN  Start Tramadol 50mg PO Q6hrs PRN   Patient to f/u with her pain service after discharge

## 2020-06-03 NOTE — CONSULT NOTE ADULT - SUBJECTIVE AND OBJECTIVE BOX
54F PMHx hiatal hernia, dyslipidemia and chronic back pain, s/p cholecystectomy, appendectomy, partial hysterectomy,  section. Presented to the ED with abdominal pain x 2 days. Patient had BM today. Patient is a client of Dr. Bourgeois. Patient had an appt. for tomorrow to get an epidural inj.       Allergies    Cipro (Anaphylaxis; Urticaria; Hives)  penicillins (Unknown)  Toradol (Rash; Urticaria; Anaphylaxis (Mild to Mod))  tramadol (Unknown)  vancomycin (Unknown)    Intolerances        PAST MEDICAL & SURGICAL HISTORY:  Anxiety  Hiatal hernia  GERD (gastroesophageal reflux disease)  Seasonal allergic rhinitis, unspecified trigger  Hypercholesteremia  Migraines  Seizures: last one 6 yrs ago  Back pain: henriated discs  S/P cholecystectomy  History of appendectomy  H/O rotator cuff surgery: right  S/p partial hysterectomy with remaining cervical stump  History of surgery: 4 rght knee sx  1 left knee sx  cholecystectomy  rotator cuff right  abdominal sx-- adhesions  c section  ovarian cystectomy  breast cystectomies  colonoscopy   endoscopies  parital hysterectomy        SOCIAL HISTORY:  Denies Smoking, Alcohol, or Drug Use    Cipro (Anaphylaxis; Urticaria; Hives)  penicillins (Unknown)  Toradol (Rash; Urticaria; Anaphylaxis (Mild to Mod))  tramadol (Unknown)  vancomycin (Unknown)      Current PAIN MEDICATIONS:  acetaminophen   Tablet .. 650 milliGRAM(s) Oral every 6 hours  busPIRone 10 milliGRAM(s) Oral two times a day  clonazePAM  Tablet 2 milliGRAM(s) Oral three times a day PRN  levETIRAcetam 250 milliGRAM(s) Oral two times a day  ondansetron Injectable 4 milliGRAM(s) IV Push every 6 hours PRN    Heme:  heparin   Injectable 5000 Unit(s) SubCutaneous every 8 hours    Antibiotics:    Cardiovascular:    GI:  bisacodyl Suppository 10 milliGRAM(s) Rectal <User Schedule>  mineral oil 30 milliLiter(s) Oral daily  pantoprazole    Tablet 40 milliGRAM(s) Oral before breakfast  polyethylene glycol 3350 17 Gram(s) Oral two times a day  senna 2 Tablet(s) Oral at bedtime    Endocrine:  atorvastatin 80 milliGRAM(s) Oral at bedtime    All Other Medications:  dextrose 5% + sodium chloride 0.45% with potassium chloride 20 mEq/L 1000 milliLiter(s) IV Continuous <Continuous>      Vital Signs Last 24 Hrs  T(C): 36.2 (2020 14:49), Max: 37.1 (2020 21:00)  T(F): 97.1 (2020 14:49), Max: 98.8 (2020 21:00)  HR: 88 (2020 14:49) (84 - 90)  BP: 132/79 (2020 14:49) (132/79 - 184/77)  BP(mean): --  RR: 18 (2020 14:49) (18 - 18)  SpO2: 95% (2020 18:46) (95% - 95%)      20 @ 07:01  -  20 @ 07:00  --------------------------------------------------------  IN: 1260 mL / OUT: 3200 mL / NET: -1940 mL    20 @ 07:01  -  20 @ 17:25  --------------------------------------------------------  IN: 0 mL / OUT: 550 mL / NET: -550 mL        LABS:                          11.9   5.64  )-----------( 212      ( 2020 21:23 )             34.8     0602    141  |  99  |  5<L>  ----------------------------<  121<H>  3.5   |  28  |  0.5<L>    Ca    9.5      2020 21:23  Phos  3.7     0602  Mg     1.7     0602            RADIOLOGY:  EXAM:  DUPLEX SCAN EXT VEINS LOWER BI            PROCEDURE DATE:  2020            INTERPRETATION:  Clinical History / Reason for exam: The patient is a 54-year-old female with leg swelling. A venous duplex examination was performed to evaluate the patient for deep venous thrombosis of the lower extremities.    The common femoral, great saphenous, femoral, popliteal and small saphenous veins were visualized bilaterally with no evidence of deep venous thrombosis    All veins were fully compressible.  There was presence of spontaneous flow, augmentation with distal compression and phasicity.    The anterior tibial veins were  patent     The posterior tibial veins were  patent      The peroneal veins were patent.    Impression:    No evidence of deep venous thrombosis or superficial thrombophlebitis in the bilateral lower extremities.    ICD-10:M79.89    ROSA ELENA SMITH M.D., ATTENDING VASCULAR  This document has been electronically signed. 2020  7:46AM      Drug Screen:        [ ]  [place]  Reviewed on 6/3/20, 5:31P  Patient Name: Jonn ElliottBirth Date: 1965  Address: [address] [place] [id number]: Female  Rx Written	Rx Dispensed	Drug	Quantity	Days Supply	Prescriber Name  2020	clonazepam 2 mg tablet	120	30	Raghavendra Umana MD  Payment Method Medicare  Dispenser [facility] #[id number]    Patient Name: Jonn NelsonBirth Date: 1965  Address: [address] [place] [id number]: Female  Rx Written	Rx Dispensed	Drug	Quantity	Days Supply	Prescriber Name  2020	vireo red (19:1) 10mg thc and 0.53mg cbd/0.42ml oral soln	1	10	Luis Mcmullen MD  Payment Method Cash  [facility] [place]  10/24/2019	2019	vireo yellow (6:1) 10mg thc and 1.76mg cbd/0.417ml oral soln	1	25	Luis Mcmullen MD  [facility] Method Cash  [facility]  [facility]	10/25/2019	vireo yellow (6:1) 10mg thc and 1.76mg cbd/0.417ml oral soln	1	13	Luis Mcmullen MD  Payment Method Cash  [facility]  2019	vireo yellow (6:1) 10mg thc and 1.67mg cbd/0.417ml oral soln	1	15	Luis Mcmullen MD  Payment Method Cash  [facility]  2019	forte lozenge 5mg thc: <0.1mg cbd/lozenge	1	15	Luis Mcmullen MD  Payment Method Cash  [facility]  [facility]	balance equal 8mg thc and 8mg cbd/capsule 15s	1	15	Luis Mcmullen MD  Payment Method Cash  [facility]  2019	balance equal 8mg thc and 8mg cbd/capsule 30s	1	15	Luis Mcmullen MD  Payment Method Cash  [facility]  2019	balance equal 8mg thc and 8mg cbd/capsule 15s	1	20	Luis Mcmullen MD  Payment Method Cash  [facility]  2019	balance equal 8mg thc and 8mg cbd/capsule 15s	1	7	Luis Mcmullen MD  Payment Method Cash  [facility]  2019	balance equal 8mg thc and 8mg cbd/capsule 15s	1	10	Luis Mcmullen MD  Payment Method Cash  [place] [place]    Patient Name: Jonn Ryan Date: 1965  Address: [address] [place] [id number]: Female  Rx Written	Rx Dispensed	Drug	Quantity	Days Supply	Prescriber Name  2020	04/15/2020	clonazepam 2 mg tablet	120	30	Raghavendra Umana MD  Payment Method Medicare  Dispenser [facility] #[id number]  2020	clonazepam 2 mg tablet	120	30	Raghavendra Umana MD  Payment Method Medicare  Dispenser [facility] #[id number]  2020	clonazepam 2 mg tablet	120	30	Raghavendra Umana MD  Payment Method Other  Dispenser [facility] #[id number]  01/15/2020	2020	clonazepam 2 mg tablet	120	30	Raghavendra Umana MD  Payment Method Other  Dispenser [facility] #[id number]  2019	clonazepam 2 mg tablet	120	30	Raghavendra Umana MD  Payment Method Medicare  Dispenser [facility] #[id number]

## 2020-06-03 NOTE — PROGRESS NOTE ADULT - ATTENDING COMMENTS
I examined the patient and discussed my plan with the resident  the patient is passing gas.  she can have clears, arauz out, ambulate  she continues to have pain but I suspect this is not related to obstruction but rather is chronic pain from adhesions and scaring from previous operations.  I will have pain management see her for chronic abdominal pain

## 2020-06-03 NOTE — CONSULT NOTE ADULT - ASSESSMENT
REVIEW OF SYSTEMS    General:	NAD   Skin/Breast:	Neg  Ophthalmologic:	Neg  ENMT: Neg	  Respiratory and Thorax: Neg	  Cardiovascular:	Neg  Gastrointestinal:	chronic pain   Genitourinary:	Neg  Musculoskeletal:	Chronic pain   Neurological:	Neg  Psychiatric:	Neg  Hematology/Lymphatics:	Neg  Endocrine:	Neg        PHYSICAL EXAM:    GENERAL: NAD, well-groomed, well-developed  HEAD:  Atraumatic, Normocephalic  EYES: EOMI, PERRLA, conjunctiva and sclera clear  ENMT: No tonsillar erythema, exudates, or enlargement; Moist mucous membranes, Good dentition, No lesions  NECK: Supple, No JVD, Normal thyroid  NERVOUS SYSTEM:  Alert & Oriented X3, Good concentration; Motor Strength 5/5 B/L upper and lower extremities; DTRs  CHEST/LUNG: Clear to percussion bilaterally; No rales, rhonchi, wheezing, or rubs  HEART: Regular rate and rhythm; No murmurs, rubs, or gallops  ABDOMEN: Soft, Nontender, Nondistended; Bowel sounds present  EXTREMITIES: No clubbing, cyanosis, or edema  LYMPH: No lymphadenopathy noted  SKIN: No rashes or lesions      Patient lying in bed supine. Patient with c/o lower back pain. Patient with complaint of abd pain as well. Pain is 8-9/10 to the back and less to the abd. Pain described as dull, constant, non-radiating. Patient states she get Hives with Toradol, but does take coated ASA. Most likely not allergic to NSAID. Patient is ambulatory.

## 2020-06-03 NOTE — PROGRESS NOTE ADULT - ASSESSMENT
54F with history of multiple high grade SBOs requiring MAKAYLA, last SBO 2 years ago now presenting with n/d. No PO tolerance, no gas or BM for 4 days and distended diffusely tender abdomen. CT scan is consistent with SBO. NGT discontinued 6/2.     Plan:   NPO, IVF  NGT to LCWS  FU GI function

## 2020-06-03 NOTE — CONSULT NOTE ADULT - CONSULT REASON
Patient presented with abdominal pain second to recurrent SBO. Patient has history of multiple abdominal surgeries, chronic back pain. SBO resolved, pain managment consult requested.

## 2020-06-04 ENCOUNTER — TRANSCRIPTION ENCOUNTER (OUTPATIENT)
Age: 55
End: 2020-06-04

## 2020-06-04 VITALS
RESPIRATION RATE: 20 BRPM | SYSTOLIC BLOOD PRESSURE: 160 MMHG | TEMPERATURE: 98 F | DIASTOLIC BLOOD PRESSURE: 70 MMHG | HEART RATE: 84 BPM

## 2020-06-04 PROCEDURE — 99238 HOSP IP/OBS DSCHRG MGMT 30/<: CPT

## 2020-06-04 RX ORDER — MAGNESIUM SULFATE 500 MG/ML
2 VIAL (ML) INJECTION ONCE
Refills: 0 | Status: COMPLETED | OUTPATIENT
Start: 2020-06-04 | End: 2020-06-04

## 2020-06-04 RX ORDER — TRAMADOL HYDROCHLORIDE 50 MG/1
0.5 TABLET ORAL
Qty: 6 | Refills: 0
Start: 2020-06-04 | End: 2020-06-06

## 2020-06-04 RX ORDER — TRAMADOL HYDROCHLORIDE 50 MG/1
25 TABLET ORAL EVERY 6 HOURS
Refills: 0 | Status: DISCONTINUED | OUTPATIENT
Start: 2020-06-04 | End: 2020-06-04

## 2020-06-04 RX ORDER — LEVETIRACETAM 250 MG/1
1 TABLET, FILM COATED ORAL
Qty: 0 | Refills: 0 | DISCHARGE
Start: 2020-06-04

## 2020-06-04 RX ORDER — ACETAMINOPHEN 500 MG
2 TABLET ORAL
Qty: 0 | Refills: 0 | DISCHARGE
Start: 2020-06-04

## 2020-06-04 RX ADMIN — HEPARIN SODIUM 5000 UNIT(S): 5000 INJECTION INTRAVENOUS; SUBCUTANEOUS at 06:01

## 2020-06-04 RX ADMIN — Medication 650 MILLIGRAM(S): at 12:23

## 2020-06-04 RX ADMIN — Medication 30 MILLILITER(S): at 12:23

## 2020-06-04 RX ADMIN — Medication 10 MILLIGRAM(S): at 06:01

## 2020-06-04 RX ADMIN — POLYETHYLENE GLYCOL 3350 17 GRAM(S): 17 POWDER, FOR SOLUTION ORAL at 05:51

## 2020-06-04 RX ADMIN — Medication 50 GRAM(S): at 05:51

## 2020-06-04 RX ADMIN — PANTOPRAZOLE SODIUM 40 MILLIGRAM(S): 20 TABLET, DELAYED RELEASE ORAL at 08:24

## 2020-06-04 RX ADMIN — Medication 650 MILLIGRAM(S): at 06:01

## 2020-06-04 RX ADMIN — LEVETIRACETAM 250 MILLIGRAM(S): 250 TABLET, FILM COATED ORAL at 06:02

## 2020-06-04 RX ADMIN — LORATADINE 10 MILLIGRAM(S): 10 TABLET ORAL at 12:23

## 2020-06-04 NOTE — DISCHARGE NOTE PROVIDER - CARE PROVIDERS DIRECT ADDRESSES
[email or web address]

## 2020-06-04 NOTE — DISCHARGE NOTE PROVIDER - CARE PROVIDER_API CALL
Amrit Bourgeois (MD)  Anesthesiology; Pain Medicine  [address] [place] [address]  Phone: [phone]  Fax: [phone]  Follow Up Time:     Charlie Cohn  SURGERY  [address] [place] [id number]  Phone: [phone]  Fax: [phone]  Follow Up Time:

## 2020-06-04 NOTE — PROGRESS NOTE ADULT - SUBJECTIVE AND OBJECTIVE BOX
Pain Management Progress Note -     54F PMHx hiatal hernia, dyslipidemia and chronic back pain, s/p cholecystectomy, appendectomy, partial hysterectomy,  section, MAKAYLA x 2 presented to the ED with abdominal pain x 2 days. Discovered to have SBO which has resolved. Re-confirmed today with patient regarding her allergies. Patient stated clearly yesterday and today that she is not allergic to Tramadol as she has taken it in the past with no issues. Reconciled the allergy list. Patient sitting on the bed with minimal pain. Moving all ext's. Back pain is controlled. Patient will f/u with Dr. Bourgeois. Will sign off. Recall for any uncontrolled pain.     Cipro (Anaphylaxis; Urticaria; Hives)  Toradol (Rash; Urticaria; Anaphylaxis (Mild to Mod))      SMALL BOWEL OBSTRUCTION  ^SMALL BOWEL OBSTRUCTION  Handoff  MEWS Score  Anxiety  Hiatal hernia  GERD (gastroesophageal reflux disease)  Seasonal allergic rhinitis, unspecified trigger  Hypercholesteremia  Migraines  Seizures  Back pain  Small bowel obstruction  Back pain  S/P cholecystectomy  History of appendectomy  H/O rotator cuff surgery  S/p partial hysterectomy with remaining cervical stump  History of surgery  ABD PAIN VOMITING  90+      heparin   Injectable  ondansetron Injectable  tiotropium 18 MICROgram(s) Capsule  ALBUTerol    90 MICROgram(s) HFA Inhaler  polyethylene glycol 3350  senna  bisacodyl Suppository  mineral oil  clonazePAM  Tablet  levETIRAcetam  loratadine  atorvastatin  busPIRone  pantoprazole    Tablet  acetaminophen   Tablet ..      acetaminophen   Tablet .. 650 milliGRAM(s) Oral every 6 hours  ALBUTerol    90 MICROgram(s) HFA Inhaler 2 Puff(s) Inhalation every 6 hours  atorvastatin 80 milliGRAM(s) Oral at bedtime  bisacodyl Suppository 10 milliGRAM(s) Rectal <User Schedule>  busPIRone 10 milliGRAM(s) Oral two times a day  clonazePAM  Tablet 2 milliGRAM(s) Oral three times a day PRN  heparin   Injectable 5000 Unit(s) SubCutaneous every 8 hours  levETIRAcetam 250 milliGRAM(s) Oral two times a day  loratadine 10 milliGRAM(s) Oral daily  mineral oil 30 milliLiter(s) Oral daily  ondansetron Injectable 4 milliGRAM(s) IV Push every 6 hours PRN  pantoprazole    Tablet 40 milliGRAM(s) Oral before breakfast  polyethylene glycol 3350 17 Gram(s) Oral two times a day  senna 2 Tablet(s) Oral at bedtime  tiotropium 18 MICROgram(s) Capsule 1 Capsule(s) Inhalation daily       @ 21:89939 mL/min/1.73M2          Hemoglobin: 11.7 g/dL ( @ 21:00)  Hemoglobin: 11.9 g/dL ( @ 21:23)        T(C): 35.7 (20 @ 05:20), Max: 36.2 (20 @ 14:49)  HR: 66 (20 @ 05:20) (66 - 88)  BP: 120/65 (20 @ 05:20) (120/65 - 166/77)  RR: 18 (20 @ 05:20) (18 - 18)  SpO2: 100% (20 @ 05:20) (95% - 100%)  Wt(kg): --     REVIEW OF SYSTEMS    General:	NAD   Skin/Breast:	Neg  Ophthalmologic:	Neg  ENMT: Neg	  Respiratory and Thorax: Neg	  Cardiovascular:	Neg  Gastrointestinal:	Neg  Genitourinary:	Neg  Musculoskeletal:	Neg  Neurological:	Neg  Psychiatric:	Neg  Hematology/Lymphatics:	Neg  Endocrine:	Neg        PHYSICAL EXAM:    GENERAL: NAD, well-groomed, well-developed  HEAD:  Atraumatic, Normocephalic  EYES: EOMI, PERRLA, conjunctiva and sclera clear  ENMT: No tonsillar erythema, exudates, or enlargement; Moist mucous membranes, Good dentition, No lesions  NECK: Supple, No JVD, Normal thyroid  NERVOUS SYSTEM:  Alert & Oriented X3, Good concentration; Motor Strength 5/5 B/L upper and lower extremities; DTRs 2+ intact and symmetric  CHEST/LUNG: Clear to percussion bilaterally; No rales, rhonchi, wheezing, or rubs  HEART: Regular rate and rhythm; No murmurs, rubs, or gallops  ABDOMEN: Soft, Nontender, Nondistended; Bowel sounds present  EXTREMITIES:  2+ Peripheral Pulses, No clubbing, cyanosis, or edema  LYMPH: No lymphadenopathy noted  SKIN: No rashes or lesions

## 2020-06-04 NOTE — DISCHARGE NOTE PROVIDER - NSDCFUSCHEDAPPT_GEN_ALL_CORE_FT
RAMÍREZ RODARTE ; 06/24/2020 ; NPP Otolaryng [address] ; 07/08/2020 ; NPP Cardio [address] ; 07/09/2020 ; NPP Cardio [address]

## 2020-06-04 NOTE — DISCHARGE NOTE PROVIDER - HOSPITAL COURSE
54F PMHx hiatal hernia, dyslipidemia and chronic back pain, s/p cholecystectomy, appendectomy, partial hysterectomy,  section, MAKAYLA x 2 presents to the ED with abdominal pain x 2 days. States she has not has a bowel movement or passed gas x 2 days. Denies fever, SOB, diarrhea. CT scan showed SBO, a NGT was placed and the patient was managed non-operatively. The patient had bowel function, NG tube was removed and diet was advanced. The patient tolerated diet, ambulated and is still passing gas, having bowel movements. The patient will be discharged to home with follow up with Dr. Cohn in 2 weeks. The patient was seen by pain management and will be seen in the office today,  after discharge.

## 2020-06-04 NOTE — PROGRESS NOTE ADULT - ASSESSMENT
54F with history of multiple high grade SBOs requiring MAKAYLA, last SBO 2 years ago now presenting with n/d. No PO tolerance, no gas or BM for 4 days and distended diffusely tender abdomen. CT scan is consistent with SBO. NGT discontinued 6/2.  patient had bowel movement and passed gas with tolerating clears .     Plan:   F/U diet advancement   FU GI function  pain control

## 2020-06-04 NOTE — DISCHARGE NOTE NURSING/CASE MANAGEMENT/SOCIAL WORK - PATIENT PORTAL LINK FT
You can access the FollowMyHealth Patient Portal offered by [facility] by registering at the following website: [email or web address]. By joining [facility]’s FollowMyHealth portal, you will also be able to view your health information using other applications (apps) compatible with our system.

## 2020-06-04 NOTE — DISCHARGE NOTE PROVIDER - NSDCMRMEDTOKEN_GEN_ALL_CORE_FT
acetaminophen 325 mg oral tablet: 2 tab(s) orally every 6 hours  atorvastatin 80 mg oral tablet: 1 tab(s) orally once a day  busPIRone 10 mg oral tablet: 1 tab(s) orally 2 times a day  cyclobenzaprine 10 mg oral tablet: 1 tab(s) orally 3 times a day  desloratadine 5 mg oral tablet: 1 tab(s) orally once a day  dicyclomine 20 mg oral tablet: 1 tab(s) orally 4 times a day  Incruse Ellipta 62.5 mcg/inh inhalation powder:   KlonoPIN 2 mg oral tablet: 1 tab(s) orally 3 times a day  levETIRAcetam 250 mg oral tablet: 1 tab(s) orally 2 times a day  omeprazole 40 mg oral delayed release capsule: 1 cap(s) orally once a day  rizatriptan 10 mg oral tablet: 1 tab(s) orally once a day  tiZANidine 4 mg oral tablet: 2 tab(s) orally every 8 hours  traMADol 50 mg oral tablet: 0.5 tab(s) orally every 6 hours, As needed, Severe Pain (7 - 10) MDD:4

## 2020-06-04 NOTE — PROGRESS NOTE ADULT - ATTENDING COMMENTS
I examined the patient with the pa and discussed my plan with the resident  the patient is having bowel function, tolerating po diet and ambulating.  she reports some residual pain but this is controlled  she can be discharged with outpatient follow-up

## 2020-06-04 NOTE — PROGRESS NOTE ADULT - SUBJECTIVE AND OBJECTIVE BOX
GENERAL SURGERY PROGRESS NOTE     RAMÍREZ RODARTE  54y  Female  Hospital day :4d  POD:  Procedure:   OVERNIGHT EVENTS: no acute overnight events     T(F): 96.9 (06-03-20 @ 21:25), Max: 98.7 (06-03-20 @ 04:55)  HR: 67 (06-03-20 @ 21:25) (67 - 90)  BP: 166/77 (06-03-20 @ 21:25) (132/79 - 184/77)  RR: 18 (06-03-20 @ 21:25) (18 - 18)  SpO2: 95% (06-03-20 @ 21:25) (95% - 95%)    DIET/FLUIDS: dextrose 5% + sodium chloride 0.45% with potassium chloride 20 mEq/L 1000 milliLiter(s) IV Continuous <Continuous>      06-02-20 @ 07:01  -  06-03-20 @ 07:00  --------------------------------------------------------  OUT: 3200 mL       GI proph:  pantoprazole    Tablet 40 milliGRAM(s) Oral before breakfast    AC/ proph: heparin   Injectable 5000 Unit(s) SubCutaneous every 8 hours    ABx:     PHYSICAL EXAM:  GENERAL: NAD, well-appearing  CHEST/LUNG: Clear to auscultation bilaterally  HEART: Regular rate and rhythm  ABDOMEN: Soft, Nontender, Nondistended;   EXTREMITIES:  No clubbing, cyanosis, or edema      LABS  Labs:  CAPILLARY BLOOD GLUCOSE      POCT Blood Glucose.: 120 mg/dL (03 Jun 2020 17:58)  POCT Blood Glucose.: 148 mg/dL (03 Jun 2020 12:48)                          11.7   5.68  )-----------( 221      ( 03 Jun 2020 21:00 )             33.8       Auto Neutrophil %: 58.5 % (06-03-20 @ 21:00)  Auto Immature Granulocyte %: 0.4 % (06-03-20 @ 21:00)    06-03    135  |  96<L>  |  5<L>  ----------------------------<  122<H>  3.7   |  26  |  0.5<L>      Calcium, Total Serum: 9.6 mg/dL (06-03-20 @ 21:00)      LFTs:     Lactate, Blood: 1.1 mmol/L (06-01-20 @ 05:40)    ABG - ( 01 Jun 2020 02:13 )  pH: 7.37  /  pCO2: 48    /  pO2: 69    / HCO3: 28    / Base Excess: 1.9   /  SaO2: 93              RADIOLOGY & ADDITIONAL TESTS:  No new studies

## 2020-06-04 NOTE — DISCHARGE NOTE PROVIDER - NSDCCPCAREPLAN_GEN_ALL_CORE_FT
PRINCIPAL DISCHARGE DIAGNOSIS  Diagnosis: Small bowel obstruction  Assessment and Plan of Treatment: Continue regular diet  Follow up with Dr. Cohn in 2 weeks, call to schedule the appointment, [phone].  Please call the office or return to ED if unable to tolerate diet or symptoms of SBO return.      SECONDARY DISCHARGE DIAGNOSES  Diagnosis: Chronic pain  Assessment and Plan of Treatment: Please follow up with your pain management physician for management of chronic pain.

## 2020-06-25 NOTE — ED PROVIDER NOTE - NS ED ATTENDING STATEMENT MOD
----- Message from Bola Jolley MD sent at 6/25/2020  9:54 AM CDT -----  Regarding: Repeat CBC  Repeat CBC recommended for patient in 2 weeks.  Patient will probably request external labs to be performed, I sent for a print the should have been be electronically signed.  Thank you!     I have personally seen and examined this patient.  I have fully participated in the care of this patient. I have reviewed all pertinent clinical information, including history, physical exam, plan and the Resident’s note and agree except as noted.

## 2020-07-08 ENCOUNTER — APPOINTMENT (OUTPATIENT)
Dept: CARDIOLOGY | Facility: CLINIC | Age: 55
End: 2020-07-08

## 2020-07-09 ENCOUNTER — APPOINTMENT (OUTPATIENT)
Dept: CARDIOLOGY | Facility: CLINIC | Age: 55
End: 2020-07-09

## 2020-07-13 ENCOUNTER — APPOINTMENT (OUTPATIENT)
Dept: OTOLARYNGOLOGY | Facility: CLINIC | Age: 55
End: 2020-07-13
Payer: MEDICARE

## 2020-07-13 DIAGNOSIS — Z87.09 PERSONAL HISTORY OF OTHER DISEASES OF THE RESPIRATORY SYSTEM: ICD-10-CM

## 2020-07-13 DIAGNOSIS — H61.20 IMPACTED CERUMEN, UNSPECIFIED EAR: ICD-10-CM

## 2020-07-13 PROCEDURE — 69210 REMOVE IMPACTED EAR WAX UNI: CPT

## 2020-07-13 PROCEDURE — 99214 OFFICE O/P EST MOD 30 MIN: CPT | Mod: 25

## 2020-07-13 PROCEDURE — 31575 DIAGNOSTIC LARYNGOSCOPY: CPT

## 2020-07-13 NOTE — HISTORY OF PRESENT ILLNESS
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 Patient admits voice comes and goes. Some dysphagia depending on the food she eats. Patient was recently hospitalized secondary to stomach issues and states to have had episodes of vomiting, which have worsened her symptoms.  She continues to take medications prescribed by her GI. She continues to have dysphagia with solid foods. 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 Patient sent for CT neck;\par Patient still having dysphagia. mainly in the morning. swallowing gets better throughout the day.  She feels her lump on the left side of her neck has increased in size. STill smoking. \par She complains of a left ear pain, she has a history of TMJ issues and was given instructions last visit.\par \par She has a history of a deviated septum, s/p surgery. \par \par SHe has acid reflux and takes pepcid and omeprazole.

## 2020-07-13 NOTE — PHYSICAL EXAM
[de-identified] : impacted waqx cleaned with curette [Midline] : trachea located in midline position [Normal] : no rashes

## 2020-07-15 NOTE — CDI QUERY NOTE - NSCDIOTHERTXTBX_GEN_ALL_CORE_HH
Pt's creatinine was 1.4 on admission  6/1/20: Pt's creatinine was 0.7  6/2/20: creatinine was 0.5    IVf of 1000cc's of N/S bolus given on 5/31/20  Creatinine was monitored      Based on the above clinical indicators and your professional judgement please determine:   >KELLI  >Other (please specify)  >Unable to determine
Dr. Kilgore,    55 y/o female admitted with SBO on 5/31/20.       Magnesium on admission was 1.8       Magnesium on 6/1/20 was 1.6    Treatment:  Magnesium sulfate IV 6/2/20                    Magnesium sulfate IV 6/3/20    Please clarify the correlating condition associated with the above clinical indicators and treatment.           Hypomagnesemia         Other (please specify)         Unable to determine      Thank you for your assistance.  Ophelia Vang

## 2020-09-12 ENCOUNTER — OUTPATIENT (OUTPATIENT)
Dept: OUTPATIENT SERVICES | Facility: HOSPITAL | Age: 55
LOS: 1 days | Discharge: HOME | End: 2020-09-12

## 2020-09-12 DIAGNOSIS — Z98.890 OTHER SPECIFIED POSTPROCEDURAL STATES: Chronic | ICD-10-CM

## 2020-09-12 DIAGNOSIS — Z90.711 ACQUIRED ABSENCE OF UTERUS WITH REMAINING CERVICAL STUMP: Chronic | ICD-10-CM

## 2020-09-12 DIAGNOSIS — Z90.49 ACQUIRED ABSENCE OF OTHER SPECIFIED PARTS OF DIGESTIVE TRACT: Chronic | ICD-10-CM

## 2020-09-12 DIAGNOSIS — Z11.59 ENCOUNTER FOR SCREENING FOR OTHER VIRAL DISEASES: ICD-10-CM

## 2020-10-21 ENCOUNTER — APPOINTMENT (OUTPATIENT)
Dept: CARDIOLOGY | Facility: CLINIC | Age: 55
End: 2020-10-21
Payer: MEDICARE

## 2020-10-21 VITALS
DIASTOLIC BLOOD PRESSURE: 80 MMHG | BODY MASS INDEX: 27.98 KG/M2 | SYSTOLIC BLOOD PRESSURE: 152 MMHG | HEART RATE: 88 BPM | TEMPERATURE: 97.2 F | WEIGHT: 153 LBS

## 2020-10-21 PROCEDURE — 99214 OFFICE O/P EST MOD 30 MIN: CPT

## 2020-10-21 PROCEDURE — 99072 ADDL SUPL MATRL&STAF TM PHE: CPT

## 2020-10-21 PROCEDURE — 93000 ELECTROCARDIOGRAM COMPLETE: CPT

## 2020-10-21 NOTE — PHYSICAL EXAM
[General Appearance - Well Developed] : well developed [Normal Appearance] : normal appearance [Well Groomed] : well groomed [General Appearance - Well Nourished] : well nourished [No Deformities] : no deformities [General Appearance - In No Acute Distress] : no acute distress [Normal Conjunctiva] : the conjunctiva exhibited no abnormalities [Eyelids - No Xanthelasma] : the eyelids demonstrated no xanthelasmas [Respiration, Rhythm And Depth] : normal respiratory rhythm and effort [Exaggerated Use Of Accessory Muscles For Inspiration] : no accessory muscle use [Auscultation Breath Sounds / Voice Sounds] : lungs were clear to auscultation bilaterally [Heart Rate And Rhythm] : heart rate and rhythm were normal [Heart Sounds] : normal S1 and S2 [Murmurs] : no murmurs present [Abdomen Soft] : soft [Abdomen Tenderness] : non-tender [Nail Clubbing] : no clubbing of the fingernails [Cyanosis, Localized] : no localized cyanosis [Skin Color & Pigmentation] : normal skin color and pigmentation [] : no rash [No Skin Ulcers] : no skin ulcer [Oriented To Time, Place, And Person] : oriented to person, place, and time [FreeTextEntry1] : Reproducible substernal anterior chest wall pain with bony deformity

## 2020-10-21 NOTE — DISCUSSION/SUMMARY
[FreeTextEntry1] : Musculoskeletal chest wall / sternum pain / possible fracture?\par Continue ASA and Lipitor 80 mg\par Follow up with PCP - consider dedicated XR rib series\par Follow up 6 months

## 2020-10-21 NOTE — HISTORY OF PRESENT ILLNESS
[FreeTextEntry1] : 54 yo F with h/o HTN, HLD, COPD and atherosclerotic heart disease. Pt just left the hospital for chest pain. BP was extremely high. CHest pain began at lower chest, spread to left side of chest, then patient experienced pain on the left side of her face. Pt smokes, reduced to half a pack a day. Patient complains of SOB.\par \par CTA came back positive. Calcium plaque. \par Emphysema \par \par cath 1/2017 showed mild cad\par cholesterol is over 350\par rec increasing statin to 80 mg a day\par \par 12/30/2019 seen in er\par cta of coronary was done\par calcium score of 16\par mild plaque in lad\par \par Reproducible substernal anterior chest wall pain with bony deformity\par Pain is positional, radiates to jaw, limits mobility\par No angina\par CTA reviewed\par \par \par EKG (10/21/2020):  NSR, no ST-T changes\par

## 2020-10-23 ENCOUNTER — APPOINTMENT (OUTPATIENT)
Dept: OTOLARYNGOLOGY | Facility: CLINIC | Age: 55
End: 2020-10-23

## 2020-12-09 ENCOUNTER — INPATIENT (INPATIENT)
Facility: HOSPITAL | Age: 55
LOS: 1 days | Discharge: HOME | End: 2020-12-11
Attending: HOSPITALIST | Admitting: HOSPITALIST
Payer: MEDICARE

## 2020-12-09 VITALS
HEIGHT: 62 IN | TEMPERATURE: 98 F | HEART RATE: 87 BPM | OXYGEN SATURATION: 95 % | RESPIRATION RATE: 20 BRPM | DIASTOLIC BLOOD PRESSURE: 72 MMHG | SYSTOLIC BLOOD PRESSURE: 131 MMHG

## 2020-12-09 DIAGNOSIS — Z98.890 OTHER SPECIFIED POSTPROCEDURAL STATES: Chronic | ICD-10-CM

## 2020-12-09 DIAGNOSIS — Z90.711 ACQUIRED ABSENCE OF UTERUS WITH REMAINING CERVICAL STUMP: Chronic | ICD-10-CM

## 2020-12-09 DIAGNOSIS — Z90.49 ACQUIRED ABSENCE OF OTHER SPECIFIED PARTS OF DIGESTIVE TRACT: Chronic | ICD-10-CM

## 2020-12-09 LAB
ALBUMIN SERPL ELPH-MCNC: 3.7 G/DL — SIGNIFICANT CHANGE UP (ref 3.5–5.2)
ALP SERPL-CCNC: 112 U/L — SIGNIFICANT CHANGE UP (ref 30–115)
ALT FLD-CCNC: 57 U/L — HIGH (ref 0–41)
ANION GAP SERPL CALC-SCNC: 10 MMOL/L — SIGNIFICANT CHANGE UP (ref 7–14)
APPEARANCE UR: CLEAR — SIGNIFICANT CHANGE UP
APTT BLD: 29.1 SEC — SIGNIFICANT CHANGE UP (ref 27–39.2)
AST SERPL-CCNC: 35 U/L — SIGNIFICANT CHANGE UP (ref 0–41)
BASOPHILS # BLD AUTO: 0.03 K/UL — SIGNIFICANT CHANGE UP (ref 0–0.2)
BASOPHILS NFR BLD AUTO: 0.5 % — SIGNIFICANT CHANGE UP (ref 0–1)
BILIRUB SERPL-MCNC: <0.2 MG/DL — SIGNIFICANT CHANGE UP (ref 0.2–1.2)
BILIRUB UR-MCNC: NEGATIVE — SIGNIFICANT CHANGE UP
BLD GP AB SCN SERPL QL: SIGNIFICANT CHANGE UP
BUN SERPL-MCNC: 15 MG/DL — SIGNIFICANT CHANGE UP (ref 10–20)
CALCIUM SERPL-MCNC: 9.1 MG/DL — SIGNIFICANT CHANGE UP (ref 8.5–10.1)
CHLORIDE SERPL-SCNC: 108 MMOL/L — SIGNIFICANT CHANGE UP (ref 98–110)
CO2 SERPL-SCNC: 25 MMOL/L — SIGNIFICANT CHANGE UP (ref 17–32)
COLOR SPEC: COLORLESS — SIGNIFICANT CHANGE UP
CREAT SERPL-MCNC: 0.7 MG/DL — SIGNIFICANT CHANGE UP (ref 0.7–1.5)
DIFF PNL FLD: NEGATIVE — SIGNIFICANT CHANGE UP
EOSINOPHIL # BLD AUTO: 0.06 K/UL — SIGNIFICANT CHANGE UP (ref 0–0.7)
EOSINOPHIL NFR BLD AUTO: 1 % — SIGNIFICANT CHANGE UP (ref 0–8)
GLUCOSE SERPL-MCNC: 111 MG/DL — HIGH (ref 70–99)
GLUCOSE UR QL: NEGATIVE — SIGNIFICANT CHANGE UP
HCT VFR BLD CALC: 32.7 % — LOW (ref 37–47)
HGB BLD-MCNC: 11 G/DL — LOW (ref 12–16)
IMM GRANULOCYTES NFR BLD AUTO: 0.5 % — HIGH (ref 0.1–0.3)
INR BLD: 0.97 RATIO — SIGNIFICANT CHANGE UP (ref 0.65–1.3)
KETONES UR-MCNC: NEGATIVE — SIGNIFICANT CHANGE UP
LACTATE SERPL-SCNC: 1 MMOL/L — SIGNIFICANT CHANGE UP (ref 0.7–2)
LEUKOCYTE ESTERASE UR-ACNC: NEGATIVE — SIGNIFICANT CHANGE UP
LIDOCAIN IGE QN: 15 U/L — SIGNIFICANT CHANGE UP (ref 7–60)
LYMPHOCYTES # BLD AUTO: 1.37 K/UL — SIGNIFICANT CHANGE UP (ref 1.2–3.4)
LYMPHOCYTES # BLD AUTO: 21.7 % — SIGNIFICANT CHANGE UP (ref 20.5–51.1)
MAGNESIUM SERPL-MCNC: 1.6 MG/DL — LOW (ref 1.8–2.4)
MCHC RBC-ENTMCNC: 32.1 PG — HIGH (ref 27–31)
MCHC RBC-ENTMCNC: 33.6 G/DL — SIGNIFICANT CHANGE UP (ref 32–37)
MCV RBC AUTO: 95.3 FL — SIGNIFICANT CHANGE UP (ref 81–99)
MONOCYTES # BLD AUTO: 0.47 K/UL — SIGNIFICANT CHANGE UP (ref 0.1–0.6)
MONOCYTES NFR BLD AUTO: 7.5 % — SIGNIFICANT CHANGE UP (ref 1.7–9.3)
NEUTROPHILS # BLD AUTO: 4.34 K/UL — SIGNIFICANT CHANGE UP (ref 1.4–6.5)
NEUTROPHILS NFR BLD AUTO: 68.8 % — SIGNIFICANT CHANGE UP (ref 42.2–75.2)
NITRITE UR-MCNC: NEGATIVE — SIGNIFICANT CHANGE UP
NRBC # BLD: 0 /100 WBCS — SIGNIFICANT CHANGE UP (ref 0–0)
PH UR: 6.5 — SIGNIFICANT CHANGE UP (ref 5–8)
PLATELET # BLD AUTO: 241 K/UL — SIGNIFICANT CHANGE UP (ref 130–400)
POTASSIUM SERPL-MCNC: 3.7 MMOL/L — SIGNIFICANT CHANGE UP (ref 3.5–5)
POTASSIUM SERPL-SCNC: 3.7 MMOL/L — SIGNIFICANT CHANGE UP (ref 3.5–5)
PROT SERPL-MCNC: 5.9 G/DL — LOW (ref 6–8)
PROT UR-MCNC: NEGATIVE — SIGNIFICANT CHANGE UP
PROTHROM AB SERPL-ACNC: 11.2 SEC — SIGNIFICANT CHANGE UP (ref 9.95–12.87)
RBC # BLD: 3.43 M/UL — LOW (ref 4.2–5.4)
RBC # FLD: 13.1 % — SIGNIFICANT CHANGE UP (ref 11.5–14.5)
SARS-COV-2 RNA SPEC QL NAA+PROBE: SIGNIFICANT CHANGE UP
SODIUM SERPL-SCNC: 143 MMOL/L — SIGNIFICANT CHANGE UP (ref 135–146)
SP GR SPEC: 1.01 — SIGNIFICANT CHANGE UP (ref 1.01–1.03)
TROPONIN T SERPL-MCNC: <0.01 NG/ML — SIGNIFICANT CHANGE UP
UROBILINOGEN FLD QL: SIGNIFICANT CHANGE UP
WBC # BLD: 6.3 K/UL — SIGNIFICANT CHANGE UP (ref 4.8–10.8)
WBC # FLD AUTO: 6.3 K/UL — SIGNIFICANT CHANGE UP (ref 4.8–10.8)

## 2020-12-09 PROCEDURE — 93010 ELECTROCARDIOGRAM REPORT: CPT

## 2020-12-09 PROCEDURE — 71046 X-RAY EXAM CHEST 2 VIEWS: CPT | Mod: 26

## 2020-12-09 PROCEDURE — 99285 EMERGENCY DEPT VISIT HI MDM: CPT

## 2020-12-09 PROCEDURE — 74177 CT ABD & PELVIS W/CONTRAST: CPT | Mod: 26

## 2020-12-09 RX ORDER — MAGNESIUM SULFATE 500 MG/ML
2 VIAL (ML) INJECTION ONCE
Refills: 0 | Status: COMPLETED | OUTPATIENT
Start: 2020-12-09 | End: 2020-12-09

## 2020-12-09 RX ORDER — IOHEXOL 300 MG/ML
30 INJECTION, SOLUTION INTRAVENOUS ONCE
Refills: 0 | Status: COMPLETED | OUTPATIENT
Start: 2020-12-09 | End: 2020-12-09

## 2020-12-09 RX ORDER — ONDANSETRON 8 MG/1
4 TABLET, FILM COATED ORAL ONCE
Refills: 0 | Status: COMPLETED | OUTPATIENT
Start: 2020-12-09 | End: 2020-12-09

## 2020-12-09 RX ORDER — MORPHINE SULFATE 50 MG/1
4 CAPSULE, EXTENDED RELEASE ORAL ONCE
Refills: 0 | Status: DISCONTINUED | OUTPATIENT
Start: 2020-12-09 | End: 2020-12-09

## 2020-12-09 RX ADMIN — MORPHINE SULFATE 4 MILLIGRAM(S): 50 CAPSULE, EXTENDED RELEASE ORAL at 13:58

## 2020-12-09 RX ADMIN — MORPHINE SULFATE 4 MILLIGRAM(S): 50 CAPSULE, EXTENDED RELEASE ORAL at 16:57

## 2020-12-09 RX ADMIN — ONDANSETRON 4 MILLIGRAM(S): 8 TABLET, FILM COATED ORAL at 13:58

## 2020-12-09 RX ADMIN — MORPHINE SULFATE 4 MILLIGRAM(S): 50 CAPSULE, EXTENDED RELEASE ORAL at 22:14

## 2020-12-09 RX ADMIN — Medication 50 GRAM(S): at 17:02

## 2020-12-09 RX ADMIN — IOHEXOL 30 MILLILITER(S): 300 INJECTION, SOLUTION INTRAVENOUS at 13:58

## 2020-12-09 NOTE — ED PROVIDER NOTE - ATTENDING CONTRIBUTION TO CARE
55 yr old f w/ a pmh significant for SBO, hiatal hernia, HLD, chronic back pain s/p cholecystectomy, appendectomy, partial hysterectomy,  section who presents today with abdominal pain. Pt had an endoscopy on  and states that her abdominal pain, has gotten progressively worse. Pt has not had a bowel movement, and has not passed any gas.     VITAL SIGNS: I have reviewed nursing notes and confirm.  CONSTITUTIONAL: non-toxic, well appearing  SKIN: no rash, no petechiae.  EYES: PERRL, EOMI, pink conjunctiva, anicteric  ENT: tongue midline, no exudates, MMM  NECK: Supple; no meningismus, no JVD  CARD: RRR, no murmurs, equal radial pulses bilaterally 2+  RESP: CTAB, no respiratory distress  ABD: Soft, diffuse abdominal tenderness , non-distended, no peritoneal signs, no HSM, no CVA tenderness  EXT: Normal ROM x4. No edema. No calves tenderness  NEURO: Alert, oriented. CN2-12 intact, equal strength bilaterally, nl gait.  PSYCH: Cooperative, appropriate.    a/p  55 yr old f that presents with abdominal pain   -labs  -imaging  -pain management  -dispo as per above

## 2020-12-09 NOTE — ED PROVIDER NOTE - PHYSICAL EXAMINATION
VITAL SIGNS: I have reviewed nursing notes and confirm.  CONSTITUTIONAL: Well-developed; well-nourished; in no acute distress.  SKIN: Skin exam is warm and dry, no acute rash.  HEAD: Normocephalic; atraumatic.  EYES: PERRL, EOM intact; conjunctiva and sclera clear.  ENT: No nasal discharge; airway clear.   NECK: Supple; non tender.  CARD: S1, S2 normal; no murmurs, gallops, or rubs. Regular rate and rhythm.  RESP: Clear to auscultation bilaterally. No wheezes, rales or rhonchi.  ABD: Normal bowel sounds; soft; non-distended; +diffuse tenderness. No rebound tenderness or guarding.   EXT: Normal ROM. No edema.  LYMPH: No acute cervical adenopathy.  NEURO: Alert, oriented. Grossly unremarkable. No focal deficits.  PSYCH: Cooperative, appropriate.

## 2020-12-09 NOTE — CONSULT NOTE ADULT - SUBJECTIVE AND OBJECTIVE BOX
GENERAL SURGERY CONSULT NOTE    Patient: RAMÍREZ RODARTE , 55y (63)Female   MRN: [id number]  Location: [facility] ED  Visit: 20 Emergency  Date: 20 @ 20:54    HPI: 54 yo F with no pmhx hiatal hernia, dyslipidemia and chronic back pain, s/p cholecystectomy, appendectomy, partial hysterectomy,  section, MAKAYLA x 2 presenting with abdominal pain x weeks. Symptoms are moderate. Patient start to complain of abdominal pain since thanksgiving which is increased by eating and relieved by defecation. patient usually have 3 bowel during week . bowel movements improves with bowel reg which the patient usually takes.  No alleviating/aggravating factors. Reports nausea and vomiting. No cp, sob, fever, chills, diarrhea, back pain, urinary symptoms, headache, dizziness, paresthesias, or weakness. Last bowel movement 4 days ago.      surgical history   2017 exp lap , MAKAYLA   2018 WIDE EXICION OF ABDOMINAL GRANULOMA     PAST MEDICAL & SURGICAL HISTORY:  Anxiety    Hiatal hernia    GERD (gastroesophageal reflux disease)    Seasonal allergic rhinitis, unspecified trigger    Hypercholesteremia    Migraines    Seizures  last one 6 yrs ago    Back pain  henriated discs    S/P cholecystectomy    History of appendectomy    H/O rotator cuff surgery  right    S/p partial hysterectomy with remaining cervical stump    History of surgery  4 rght knee sx  1 left knee sx  cholecystectomy  rotator cuff right  abdominal sx-- adhesions  c section  ovarian cystectomy  breast cystectomies  colonoscopy   endoscopies  parital hysterectomy        Home Medications:  acetaminophen 325 mg oral tablet: 2 tab(s) orally every 6 hours (2020 13:31)  atorvastatin 80 mg oral tablet: 1 tab(s) orally once a day (2020 12:55)  busPIRone 10 mg oral tablet: 1 tab(s) orally 2 times a day (2020 12:55)  cyclobenzaprine 10 mg oral tablet: 1 tab(s) orally 3 times a day (2020 12:55)  desloratadine 5 mg oral tablet: 1 tab(s) orally once a day (2020 12:55)  dicyclomine 20 mg oral tablet: 1 tab(s) orally 4 times a day (2020 12:55)  Incruse Ellipta 62.5 mcg/inh inhalation powder:  (2020 12:55)  KlonoPIN 2 mg oral tablet: 1 tab(s) orally 3 times a day (2020 12:55)  levETIRAcetam 250 mg oral tablet: 1 tab(s) orally 2 times a day (2020 13:31)  omeprazole 40 mg oral delayed release capsule: 1 cap(s) orally once a day (2020 12:55)  rizatriptan 10 mg oral tablet: 1 tab(s) orally once a day (2020 12:55)  tiZANidine 4 mg oral tablet: 2 tab(s) orally every 8 hours (2020 12:55)        VITALS:  T(F): 97.9 (20 @ 16:07), Max: 98.4 (20 @ 12:46)  HR: 77 (20 @ 16:07) (77 - 87)  BP: 152/75 (20 @ 16:07) (131/72 - 152/75)  RR: 20 (20 @ 16:07) (20 - 20)  SpO2: 98% (20 @ 16:07) (95% - 98%)    PHYSICAL EXAM:  General: NAD, AAOx3, calm and cooperative  HEENT: NCAT, ABDI, EOMI, Trachea ML, Neck supple  Cardiac: RRR S1, S2, no Murmurs, rubs or gallops  Respiratory: CTAB, normal respiratory effort, breath sounds equal BL, no wheeze, rhonchi or crackles  Abdomen: Soft, non-distended, mild epigastric tenderness , no rebound, no guarding. +BS.      MEDICATIONS  (STANDING):    MEDICATIONS  (PRN):      LAB/STUDIES:                        11.0   6.30  )-----------( 241      ( 09 Dec 2020 14:25 )             32.7         143  |  108  |  15  ----------------------------<  111<H>  3.7   |  25  |  0.7    Ca    9.1      09 Dec 2020 14:25  Mg     1.6     12    TPro  5.9<L>  /  Alb  3.7  /  TBili  <0.2  /  DBili  x   /  AST  35  /  ALT  57<H>  /  AlkPhos  112  12    PT/INR - ( 09 Dec 2020 14:25 )   PT: 11.20 sec;   INR: 0.97 ratio         PTT - ( 09 Dec 2020 14:25 )  PTT:29.1 sec  LIVER FUNCTIONS - ( 09 Dec 2020 14:25 )  Alb: 3.7 g/dL / Pro: 5.9 g/dL / ALK PHOS: 112 U/L / ALT: 57 U/L / AST: 35 U/L / GGT: x           Urinalysis Basic - ( 09 Dec 2020 13:37 )    Color: Colorless / Appearance: Clear / S.008 / pH: x  Gluc: x / Ketone: Negative  / Bili: Negative / Urobili: <2 mg/dL   Blood: x / Protein: Negative / Nitrite: Negative   Leuk Esterase: Negative / RBC: x / WBC x   Sq Epi: x / Non Sq Epi: x / Bacteria: x      CARDIAC MARKERS ( 09 Dec 2020 14:25 )  x     / <0.01 ng/mL / x     / x     / x                  IMAGING:    < from: CT Abdomen and Pelvis w/ Oral Cont and w/ IV Cont (20 @ 17:18) >  IMPRESSION:  No evidence of acute/inflammatory process in the abdomen or pelvis. Oral contrast reaches the mid transverse colon.      < end of copied text >     GENERAL SURGERY CONSULT NOTE    Patient: RAMÍREZ RODARTE , 55y (63)Female   MRN: [id number]  Location: [facility] ED  Visit: 20 Emergency  Date: 20 @ 20:54    HPI: 56 yo F with no pmhx hiatal hernia, dyslipidemia and chronic back pain, s/p cholecystectomy, appendectomy, partial hysterectomy,  section, MAKAYLA x 2 presenting with abdominal pain x weeks. Symptoms are moderate. Patient start to complain of abdominal pain since thanksgiving which is increased by eating and relieved by defecation. patient usually have 3 bowel during week . bowel movements improves with bowel reg which the patient usually takes.  No alleviating/aggravating factors. Reports nausea and vomiting. No cp, sob, fever, chills, diarrhea, back pain, urinary symptoms, headache, dizziness, paresthesias, or weakness. Last bowel movement 4 days ago.      surgical history    open resection of appendiceal stump   2017 exp lap , MAKAYLA   2018 WIDE EXICION OF ABDOMINAL GRANULOMA     PAST MEDICAL & SURGICAL HISTORY:  Anxiety    Hiatal hernia    GERD (gastroesophageal reflux disease)    Seasonal allergic rhinitis, unspecified trigger    Hypercholesteremia    Migraines    Seizures  last one 6 yrs ago    Back pain  henriated discs    S/P cholecystectomy    History of appendectomy    H/O rotator cuff surgery  right    S/p partial hysterectomy with remaining cervical stump    History of surgery  4 rght knee sx  1 left knee sx  cholecystectomy  rotator cuff right  abdominal sx-- adhesions  c section  ovarian cystectomy  breast cystectomies  colonoscopy   endoscopies  parital hysterectomy        Home Medications:  acetaminophen 325 mg oral tablet: 2 tab(s) orally every 6 hours (2020 13:31)  atorvastatin 80 mg oral tablet: 1 tab(s) orally once a day (2020 12:55)  busPIRone 10 mg oral tablet: 1 tab(s) orally 2 times a day (2020 12:55)  cyclobenzaprine 10 mg oral tablet: 1 tab(s) orally 3 times a day (2020 12:55)  desloratadine 5 mg oral tablet: 1 tab(s) orally once a day (2020 12:55)  dicyclomine 20 mg oral tablet: 1 tab(s) orally 4 times a day (2020 12:55)  Incruse Ellipta 62.5 mcg/inh inhalation powder:  (2020 12:55)  KlonoPIN 2 mg oral tablet: 1 tab(s) orally 3 times a day (2020 12:55)  levETIRAcetam 250 mg oral tablet: 1 tab(s) orally 2 times a day (2020 13:31)  omeprazole 40 mg oral delayed release capsule: 1 cap(s) orally once a day (2020 12:55)  rizatriptan 10 mg oral tablet: 1 tab(s) orally once a day (2020 12:55)  tiZANidine 4 mg oral tablet: 2 tab(s) orally every 8 hours (2020 12:55)        VITALS:  T(F): 97.9 (20 @ 16:07), Max: 98.4 (20 @ 12:46)  HR: 77 (20 @ 16:07) (77 - 87)  BP: 152/75 (20 @ 16:07) (131/72 - 152/75)  RR: 20 (20 @ 16:07) (20 - 20)  SpO2: 98% (20 @ 16:07) (95% - 98%)    PHYSICAL EXAM:  General: NAD, AAOx3, calm and cooperative  HEENT: NCAT, ABDI, EOMI, Trachea ML, Neck supple  Cardiac: RRR S1, S2, no Murmurs, rubs or gallops  Respiratory: CTAB, normal respiratory effort, breath sounds equal BL, no wheeze, rhonchi or crackles  Abdomen: Soft, non-distended, mild epigastric tenderness , no rebound, no guarding. +BS.      MEDICATIONS  (STANDING):    MEDICATIONS  (PRN):      LAB/STUDIES:                        11.0   6.30  )-----------( 241      ( 09 Dec 2020 14:25 )             32.7         143  |  108  |  15  ----------------------------<  111<H>  3.7   |  25  |  0.7    Ca    9.1      09 Dec 2020 14:25  Mg     1.6         TPro  5.9<L>  /  Alb  3.7  /  TBili  <0.2  /  DBili  x   /  AST  35  /  ALT  57<H>  /  AlkPhos  112  12-09    PT/INR - ( 09 Dec 2020 14:25 )   PT: 11.20 sec;   INR: 0.97 ratio         PTT - ( 09 Dec 2020 14:25 )  PTT:29.1 sec  LIVER FUNCTIONS - ( 09 Dec 2020 14:25 )  Alb: 3.7 g/dL / Pro: 5.9 g/dL / ALK PHOS: 112 U/L / ALT: 57 U/L / AST: 35 U/L / GGT: x           Urinalysis Basic - ( 09 Dec 2020 13:37 )    Color: Colorless / Appearance: Clear / S.008 / pH: x  Gluc: x / Ketone: Negative  / Bili: Negative / Urobili: <2 mg/dL   Blood: x / Protein: Negative / Nitrite: Negative   Leuk Esterase: Negative / RBC: x / WBC x   Sq Epi: x / Non Sq Epi: x / Bacteria: x      CARDIAC MARKERS ( 09 Dec 2020 14:25 )  x     / <0.01 ng/mL / x     / x     / x                  IMAGING:    < from: CT Abdomen and Pelvis w/ Oral Cont and w/ IV Cont (20 @ 17:18) >  IMPRESSION:  No evidence of acute/inflammatory process in the abdomen or pelvis. Oral contrast reaches the mid transverse colon.      < end of copied text >

## 2020-12-09 NOTE — CONSULT NOTE ADULT - ASSESSMENT
ASSESSMENT:  55yF w/  pmhx hiatal hernia, dyslipidemia and chronic back pain who presented with abdominal pain . Physical exam findings, imaging, and labs as documented above.     PLAN:  - no acute surgical intervention , no signs of bowel obstruction on ct scan   - CLD and the advance diet as tolerated   - bowel reg senna minalax   - monitor bowel function   - GI consult   - admit to medicine     Above plan discussed with Dr. Martinez ASSESSMENT:  55yF w/  pmhx hiatal hernia, dyslipidemia and chronic back pain who presented with abdominal pain . Physical exam findings, imaging, and labs as documented above.     PLAN:  - no acute surgical intervention , no signs of bowel obstruction on ct scan   - CLD and the advance diet as tolerated   - bowel reg senna minalax   - monitor bowel function   - GI consult   - admit to medicine     Above plan discussed with Dr. Martinez    Senior Resident Note:  ASSESSMENT  This is a 56 y/o F patient with extensive PSH that includes multiple Ex lap and MAKAYLA for adhesive SBO since 2011, presenting with 2-3 weeks of epigastric abdominal pain, nausea, and vomiting of varying severity. Pain seems to be exacerbated by large meals however patient is a poor historian and unable to provide specific details regarding her chief complaint. In the ED , she has mild tenderness in the epigastrium, but abdomen is soft non-distended. Labs and imaging without significant abnormality. Contrast from CT A/P progresses to the mid transverse colon. She follows with a pain management specialist for chronic abdominal pain and fibromyalgia which she is on multiple anxiolytics an narcotics. She has constipation and takes Metamucil. Normal bowel habit is 2-3 BM/week. Last upper endoscopy was 12/1 with esophagitis, gastritis, and duodenitis.    PLAN  Constipation, avoid narcotics, start bowel regimen: senna, miralax, and Dulcolax sup  Can be placed on liquid diet and advanced as tolerated, IVF   Zofran PRN for nausea, PPI for reflux, consider Carafate if no resolution of GERD Sx  GI consult  Patient will need to ambulate, monitor for bowel function, no clinical or objective evidence of obstruction given imaging findings as above  No acute surgical intervention, team will follow  Senior Surgical Resident Note  I have edited the above note and agree with the current treatment plan  Above plan was discussed with Dr. Martinez , patient, and the ED team  ---------------------------------------------------------------------------------------  12-09-20 @ 21:13

## 2020-12-09 NOTE — ED PROVIDER NOTE - PROGRESS NOTE DETAILS
Surgery consulted Surgery bedside Spoke to GI fellow covering Dr. Avila service states will see patient tomorrow if patient is admitted.

## 2020-12-09 NOTE — ED PROVIDER NOTE - CLINICAL SUMMARY MEDICAL DECISION MAKING FREE TEXT BOX
55 yr old f that presents with abdominal pain. labs, ekg, imaging obtained. pt cleared by surgery. pt admitted to medicine for intractable abdominal pain.

## 2020-12-09 NOTE — ED PROVIDER NOTE - NS ED ROS FT
Review of Systems:  	•	CONSTITUTIONAL - no fever, no diaphoresis, no chills  	•	SKIN - no rash  	•	HEMATOLOGIC - no bleeding, no bruising  	•	EYES - no eye pain, no blurry vision  	•	ENT - no congestion  	•	RESPIRATORY - no shortness of breath, no cough  	•	CARDIAC - no chest pain, no palpitations  	•	GI - + abd pain, + nausea, + vomiting, no diarrhea, no constipation  	•	GENITO-URINARY - no dysuria; no hematuria, no increased urinary frequency  	•	MUSCULOSKELETAL - no joint paint, no swelling, no redness  	•	NEUROLOGIC - no weakness, no headache, no paresthesias, no LOC  	•	PSYCH - no anxiety, no depression  	All other ROS are negative except as documented in HPI.

## 2020-12-09 NOTE — ED PROVIDER NOTE - OBJECTIVE STATEMENT
54 yo F with no pmhx hiatal hernia, dyslipidemia and chronic back pain, s/p cholecystectomy, appendectomy, partial hysterectomy,  section, MAKAYLA x 2 presenting with abdominal pain x weeks. Symptoms are moderate. No alleviating/aggravating factors. Reports nausea and vomiting. No cp, sob, fever, chills, diarrhea, back pain, urinary symptoms, headache, dizziness, paresthesias, or weakness. Last bowel movement 4 days ago.

## 2020-12-10 LAB
ALBUMIN SERPL ELPH-MCNC: 3.7 G/DL — SIGNIFICANT CHANGE UP (ref 3.5–5.2)
ALP SERPL-CCNC: 118 U/L — HIGH (ref 30–115)
ALT FLD-CCNC: 66 U/L — HIGH (ref 0–41)
ANION GAP SERPL CALC-SCNC: 10 MMOL/L — SIGNIFICANT CHANGE UP (ref 7–14)
AST SERPL-CCNC: 57 U/L — HIGH (ref 0–41)
BASOPHILS # BLD AUTO: 0.02 K/UL — SIGNIFICANT CHANGE UP (ref 0–0.2)
BASOPHILS NFR BLD AUTO: 0.4 % — SIGNIFICANT CHANGE UP (ref 0–1)
BILIRUB SERPL-MCNC: 0.8 MG/DL — SIGNIFICANT CHANGE UP (ref 0.2–1.2)
BUN SERPL-MCNC: 10 MG/DL — SIGNIFICANT CHANGE UP (ref 10–20)
CALCIUM SERPL-MCNC: 8.9 MG/DL — SIGNIFICANT CHANGE UP (ref 8.5–10.1)
CHLORIDE SERPL-SCNC: 104 MMOL/L — SIGNIFICANT CHANGE UP (ref 98–110)
CO2 SERPL-SCNC: 25 MMOL/L — SIGNIFICANT CHANGE UP (ref 17–32)
CREAT SERPL-MCNC: 0.6 MG/DL — LOW (ref 0.7–1.5)
CULTURE RESULTS: SIGNIFICANT CHANGE UP
EOSINOPHIL # BLD AUTO: 0.06 K/UL — SIGNIFICANT CHANGE UP (ref 0–0.7)
EOSINOPHIL NFR BLD AUTO: 1.3 % — SIGNIFICANT CHANGE UP (ref 0–8)
GLUCOSE SERPL-MCNC: 101 MG/DL — HIGH (ref 70–99)
HCT VFR BLD CALC: 33.2 % — LOW (ref 37–47)
HGB BLD-MCNC: 11 G/DL — LOW (ref 12–16)
IMM GRANULOCYTES NFR BLD AUTO: 0.2 % — SIGNIFICANT CHANGE UP (ref 0.1–0.3)
LYMPHOCYTES # BLD AUTO: 1.59 K/UL — SIGNIFICANT CHANGE UP (ref 1.2–3.4)
LYMPHOCYTES # BLD AUTO: 33.5 % — SIGNIFICANT CHANGE UP (ref 20.5–51.1)
MAGNESIUM SERPL-MCNC: 1.8 MG/DL — SIGNIFICANT CHANGE UP (ref 1.8–2.4)
MCHC RBC-ENTMCNC: 31.2 PG — HIGH (ref 27–31)
MCHC RBC-ENTMCNC: 33.1 G/DL — SIGNIFICANT CHANGE UP (ref 32–37)
MCV RBC AUTO: 94.1 FL — SIGNIFICANT CHANGE UP (ref 81–99)
MONOCYTES # BLD AUTO: 0.38 K/UL — SIGNIFICANT CHANGE UP (ref 0.1–0.6)
MONOCYTES NFR BLD AUTO: 8 % — SIGNIFICANT CHANGE UP (ref 1.7–9.3)
NEUTROPHILS # BLD AUTO: 2.68 K/UL — SIGNIFICANT CHANGE UP (ref 1.4–6.5)
NEUTROPHILS NFR BLD AUTO: 56.6 % — SIGNIFICANT CHANGE UP (ref 42.2–75.2)
NRBC # BLD: 0 /100 WBCS — SIGNIFICANT CHANGE UP (ref 0–0)
PLATELET # BLD AUTO: 234 K/UL — SIGNIFICANT CHANGE UP (ref 130–400)
POTASSIUM SERPL-MCNC: 3.6 MMOL/L — SIGNIFICANT CHANGE UP (ref 3.5–5)
POTASSIUM SERPL-SCNC: 3.6 MMOL/L — SIGNIFICANT CHANGE UP (ref 3.5–5)
PROT SERPL-MCNC: 6 G/DL — SIGNIFICANT CHANGE UP (ref 6–8)
RBC # BLD: 3.53 M/UL — LOW (ref 4.2–5.4)
RBC # FLD: 13.1 % — SIGNIFICANT CHANGE UP (ref 11.5–14.5)
SODIUM SERPL-SCNC: 139 MMOL/L — SIGNIFICANT CHANGE UP (ref 135–146)
SPECIMEN SOURCE: SIGNIFICANT CHANGE UP
WBC # BLD: 4.74 K/UL — LOW (ref 4.8–10.8)
WBC # FLD AUTO: 4.74 K/UL — LOW (ref 4.8–10.8)

## 2020-12-10 PROCEDURE — 99223 1ST HOSP IP/OBS HIGH 75: CPT

## 2020-12-10 PROCEDURE — 99223 1ST HOSP IP/OBS HIGH 75: CPT | Mod: AI

## 2020-12-10 RX ORDER — SENNA PLUS 8.6 MG/1
2 TABLET ORAL AT BEDTIME
Refills: 0 | Status: DISCONTINUED | OUTPATIENT
Start: 2020-12-10 | End: 2020-12-11

## 2020-12-10 RX ORDER — OMEPRAZOLE 10 MG/1
1 CAPSULE, DELAYED RELEASE ORAL
Qty: 0 | Refills: 0 | DISCHARGE

## 2020-12-10 RX ORDER — LEVETIRACETAM 250 MG/1
250 TABLET, FILM COATED ORAL
Refills: 0 | Status: DISCONTINUED | OUTPATIENT
Start: 2020-12-10 | End: 2020-12-11

## 2020-12-10 RX ORDER — MAGNESIUM SULFATE 500 MG/ML
2 VIAL (ML) INJECTION ONCE
Refills: 0 | Status: DISCONTINUED | OUTPATIENT
Start: 2020-12-10 | End: 2020-12-10

## 2020-12-10 RX ORDER — CLONAZEPAM 1 MG
2 TABLET ORAL EVERY 8 HOURS
Refills: 0 | Status: DISCONTINUED | OUTPATIENT
Start: 2020-12-10 | End: 2020-12-11

## 2020-12-10 RX ORDER — ENOXAPARIN SODIUM 100 MG/ML
40 INJECTION SUBCUTANEOUS DAILY
Refills: 0 | Status: DISCONTINUED | OUTPATIENT
Start: 2020-12-10 | End: 2020-12-11

## 2020-12-10 RX ORDER — LACTULOSE 10 G/15ML
10 SOLUTION ORAL EVERY 8 HOURS
Refills: 0 | Status: DISCONTINUED | OUTPATIENT
Start: 2020-12-10 | End: 2020-12-10

## 2020-12-10 RX ORDER — IPRATROPIUM BROMIDE 0.2 MG/ML
1 SOLUTION, NON-ORAL INHALATION EVERY 6 HOURS
Refills: 0 | Status: DISCONTINUED | OUTPATIENT
Start: 2020-12-10 | End: 2020-12-11

## 2020-12-10 RX ORDER — CYCLOBENZAPRINE HYDROCHLORIDE 10 MG/1
1 TABLET, FILM COATED ORAL
Qty: 0 | Refills: 0 | DISCHARGE

## 2020-12-10 RX ORDER — POLYETHYLENE GLYCOL 3350 17 G/17G
17 POWDER, FOR SOLUTION ORAL EVERY 12 HOURS
Refills: 0 | Status: DISCONTINUED | OUTPATIENT
Start: 2020-12-10 | End: 2020-12-11

## 2020-12-10 RX ORDER — PANTOPRAZOLE SODIUM 20 MG/1
40 TABLET, DELAYED RELEASE ORAL EVERY 12 HOURS
Refills: 0 | Status: DISCONTINUED | OUTPATIENT
Start: 2020-12-10 | End: 2020-12-11

## 2020-12-10 RX ORDER — FAMOTIDINE 10 MG/ML
40 INJECTION INTRAVENOUS DAILY
Refills: 0 | Status: DISCONTINUED | OUTPATIENT
Start: 2020-12-10 | End: 2020-12-11

## 2020-12-10 RX ORDER — INFLUENZA VIRUS VACCINE 15; 15; 15; 15 UG/.5ML; UG/.5ML; UG/.5ML; UG/.5ML
0.5 SUSPENSION INTRAMUSCULAR ONCE
Refills: 0 | Status: DISCONTINUED | OUTPATIENT
Start: 2020-12-10 | End: 2020-12-11

## 2020-12-10 RX ORDER — MORPHINE SULFATE 50 MG/1
2 CAPSULE, EXTENDED RELEASE ORAL ONCE
Refills: 0 | Status: DISCONTINUED | OUTPATIENT
Start: 2020-12-10 | End: 2020-12-10

## 2020-12-10 RX ORDER — METHOCARBAMOL 500 MG/1
500 TABLET, FILM COATED ORAL EVERY 8 HOURS
Refills: 0 | Status: DISCONTINUED | OUTPATIENT
Start: 2020-12-10 | End: 2020-12-11

## 2020-12-10 RX ORDER — ACETAMINOPHEN 500 MG
650 TABLET ORAL EVERY 6 HOURS
Refills: 0 | Status: DISCONTINUED | OUTPATIENT
Start: 2020-12-10 | End: 2020-12-11

## 2020-12-10 RX ORDER — TRAMADOL HYDROCHLORIDE 50 MG/1
25 TABLET ORAL EVERY 6 HOURS
Refills: 0 | Status: DISCONTINUED | OUTPATIENT
Start: 2020-12-10 | End: 2020-12-10

## 2020-12-10 RX ORDER — CHLORHEXIDINE GLUCONATE 213 G/1000ML
1 SOLUTION TOPICAL
Refills: 0 | Status: DISCONTINUED | OUTPATIENT
Start: 2020-12-10 | End: 2020-12-11

## 2020-12-10 RX ORDER — SODIUM CHLORIDE 9 MG/ML
1000 INJECTION, SOLUTION INTRAVENOUS
Refills: 0 | Status: DISCONTINUED | OUTPATIENT
Start: 2020-12-10 | End: 2020-12-11

## 2020-12-10 RX ORDER — SIMETHICONE 80 MG/1
80 TABLET, CHEWABLE ORAL EVERY 8 HOURS
Refills: 0 | Status: DISCONTINUED | OUTPATIENT
Start: 2020-12-10 | End: 2020-12-11

## 2020-12-10 RX ORDER — ONDANSETRON 8 MG/1
4 TABLET, FILM COATED ORAL ONCE
Refills: 0 | Status: COMPLETED | OUTPATIENT
Start: 2020-12-10 | End: 2020-12-10

## 2020-12-10 RX ORDER — POLYETHYLENE GLYCOL 3350 17 G/17G
17 POWDER, FOR SOLUTION ORAL
Refills: 0 | Status: DISCONTINUED | OUTPATIENT
Start: 2020-12-10 | End: 2020-12-11

## 2020-12-10 RX ORDER — RIZATRIPTAN BENZOATE 5 MG/1
1 TABLET ORAL
Qty: 0 | Refills: 0 | DISCHARGE

## 2020-12-10 RX ORDER — DESLORATADINE 5 MG/1
1 TABLET, FILM COATED ORAL
Qty: 0 | Refills: 0 | DISCHARGE

## 2020-12-10 RX ORDER — TRAMADOL HYDROCHLORIDE 50 MG/1
50 TABLET ORAL EVERY 8 HOURS
Refills: 0 | Status: DISCONTINUED | OUTPATIENT
Start: 2020-12-10 | End: 2020-12-11

## 2020-12-10 RX ORDER — ATORVASTATIN CALCIUM 80 MG/1
80 TABLET, FILM COATED ORAL AT BEDTIME
Refills: 0 | Status: DISCONTINUED | OUTPATIENT
Start: 2020-12-10 | End: 2020-12-11

## 2020-12-10 RX ADMIN — TRAMADOL HYDROCHLORIDE 50 MILLIGRAM(S): 50 TABLET ORAL at 22:25

## 2020-12-10 RX ADMIN — Medication 650 MILLIGRAM(S): at 18:39

## 2020-12-10 RX ADMIN — Medication 10 MILLIGRAM(S): at 06:30

## 2020-12-10 RX ADMIN — TRAMADOL HYDROCHLORIDE 50 MILLIGRAM(S): 50 TABLET ORAL at 13:30

## 2020-12-10 RX ADMIN — FAMOTIDINE 40 MILLIGRAM(S): 10 INJECTION INTRAVENOUS at 12:50

## 2020-12-10 RX ADMIN — SIMETHICONE 80 MILLIGRAM(S): 80 TABLET, CHEWABLE ORAL at 13:02

## 2020-12-10 RX ADMIN — PANTOPRAZOLE SODIUM 40 MILLIGRAM(S): 20 TABLET, DELAYED RELEASE ORAL at 17:28

## 2020-12-10 RX ADMIN — ENOXAPARIN SODIUM 40 MILLIGRAM(S): 100 INJECTION SUBCUTANEOUS at 12:51

## 2020-12-10 RX ADMIN — LEVETIRACETAM 250 MILLIGRAM(S): 250 TABLET, FILM COATED ORAL at 06:29

## 2020-12-10 RX ADMIN — TRAMADOL HYDROCHLORIDE 25 MILLIGRAM(S): 50 TABLET ORAL at 01:38

## 2020-12-10 RX ADMIN — ATORVASTATIN CALCIUM 80 MILLIGRAM(S): 80 TABLET, FILM COATED ORAL at 21:58

## 2020-12-10 RX ADMIN — TRAMADOL HYDROCHLORIDE 50 MILLIGRAM(S): 50 TABLET ORAL at 12:49

## 2020-12-10 RX ADMIN — Medication 2 MILLIGRAM(S): at 17:46

## 2020-12-10 RX ADMIN — LACTULOSE 10 GRAM(S): 10 SOLUTION ORAL at 13:01

## 2020-12-10 RX ADMIN — PANTOPRAZOLE SODIUM 40 MILLIGRAM(S): 20 TABLET, DELAYED RELEASE ORAL at 06:30

## 2020-12-10 RX ADMIN — METHOCARBAMOL 500 MILLIGRAM(S): 500 TABLET, FILM COATED ORAL at 06:29

## 2020-12-10 RX ADMIN — METHOCARBAMOL 500 MILLIGRAM(S): 500 TABLET, FILM COATED ORAL at 21:58

## 2020-12-10 RX ADMIN — POLYETHYLENE GLYCOL 3350 17 GRAM(S): 17 POWDER, FOR SOLUTION ORAL at 17:29

## 2020-12-10 RX ADMIN — TRAMADOL HYDROCHLORIDE 50 MILLIGRAM(S): 50 TABLET ORAL at 21:11

## 2020-12-10 RX ADMIN — SENNA PLUS 2 TABLET(S): 8.6 TABLET ORAL at 21:58

## 2020-12-10 RX ADMIN — Medication 10 MILLIGRAM(S): at 17:45

## 2020-12-10 RX ADMIN — LEVETIRACETAM 250 MILLIGRAM(S): 250 TABLET, FILM COATED ORAL at 17:45

## 2020-12-10 RX ADMIN — Medication 1 ENEMA: at 17:23

## 2020-12-10 RX ADMIN — POLYETHYLENE GLYCOL 3350 17 GRAM(S): 17 POWDER, FOR SOLUTION ORAL at 06:30

## 2020-12-10 RX ADMIN — SIMETHICONE 80 MILLIGRAM(S): 80 TABLET, CHEWABLE ORAL at 21:59

## 2020-12-10 RX ADMIN — METHOCARBAMOL 500 MILLIGRAM(S): 500 TABLET, FILM COATED ORAL at 13:01

## 2020-12-10 RX ADMIN — SODIUM CHLORIDE 75 MILLILITER(S): 9 INJECTION, SOLUTION INTRAVENOUS at 01:54

## 2020-12-10 RX ADMIN — ONDANSETRON 4 MILLIGRAM(S): 8 TABLET, FILM COATED ORAL at 06:28

## 2020-12-10 RX ADMIN — Medication 2 MILLIGRAM(S): at 06:29

## 2020-12-10 RX ADMIN — Medication 1 PUFF(S): at 08:23

## 2020-12-10 RX ADMIN — Medication 650 MILLIGRAM(S): at 17:28

## 2020-12-10 RX ADMIN — MORPHINE SULFATE 2 MILLIGRAM(S): 50 CAPSULE, EXTENDED RELEASE ORAL at 03:13

## 2020-12-10 RX ADMIN — SENNA PLUS 2 TABLET(S): 8.6 TABLET ORAL at 01:40

## 2020-12-10 NOTE — H&P ADULT - HISTORY OF PRESENT ILLNESS
Chief Complaint: Abdominal Pain       Past Medical History: Abdominal adhesions s/p lysis, GERD with Arellano's Esophagus, Gastritis, Esophageal erosions, Seizure disorder, Migraines, HLD, and anxiety       Past Surgical History: Appendectomy at age 15, in 2011 had open resection of appendiceal stump, 2017 exp lap lysis of adhesions, 2018 wide excision of abdominal granuloma          History of present illness goes back to around Thanksgiving when the patient began to have worsening abdominal pain on top of her baseline chronic pain. She said it was moderate in intensity was constant, and relieved with defecation. She had an EDG done by her GI DrAnant Avila which showed diffuse inflammation from the esophagus to the duodenum. She presented to the ED for further evaluation since her pain was worsening. She endorsed constipation of 4 days and abdominal pain.       In the ED, her vitals were stable. CT A/P was unremarkable. Surgery was consulted and recommended no intervention, likely constipation. She will be admitted for management of abdominal pain and GI consultation.       ROS: Denies headache, changes in vision, chest pain, palpitations, shortness of breath, cough, fever, chills, nausea, vomiting, diarrhea, and

## 2020-12-10 NOTE — H&P ADULT - NSHPLABSRESULTS_GEN_ALL_CORE
( @ 14:25)                      11.0  6.30 )-----------( 241                 32.7    Neutrophils = 4.34 (68.8%)  Lymphocytes = 1.37 (21.7%)  Eosinophils = 0.06 (1.0%)  Basophils = 0.03 (0.5%)  Monocytes = 0.47 (7.5%)  Bands = --%        143  |  108  |  15  ----------------------------<  111<H>  3.7   |  25  |  0.7    Ca    9.1      09 Dec 2020 14:25  Mg     1.6         TPro  5.9<L>  /  Alb  3.7  /  TBili  <0.2  /  DBili  x   /  AST  35  /  ALT  57<H>  /  AlkPhos  112      ( 09 Dec 2020 14:25 )   PT: 11.20 sec;   INR: 0.97 ratio;       PTT:29.1 sec  CARDIAC MARKERS ( 09 Dec 2020 14:25 )  Trop <0.01 ng/mL / CK x     / CKMB x           RVP:          Tox:         Urinalysis Basic - ( 09 Dec 2020 13:37 )    Color: Colorless / Appearance: Clear / S.008 / pH: x  Gluc: x / Ketone: Negative  / Bili: Negative / Urobili: <2 mg/dL   Blood: x / Protein: Negative / Nitrite: Negative   Leuk Esterase: Negative / RBC: x / WBC x   Sq Epi: x / Non Sq Epi: x / Bacteria: x

## 2020-12-10 NOTE — CONSULT NOTE ADULT - SUBJECTIVE AND OBJECTIVE BOX
Gastroenterology Consultation:    Patient is a 55y old  Female who presents with a chief complaint of Abdominal Pain (10 Dec 2020 10:46)      Admitted on: 12-09-20  HPI:  Chief Complaint: Abdominal Pain       Past Medical History: Abdominal adhesions s/p lysis, GERD with Arellano's Esophagus, Gastritis, Esophageal erosions, Seizure disorder, Migraines, HLD, and anxiety       Past Surgical History: Appendectomy at age 15, in 2011 had open resection of appendiceal stump, 2017 exp lap lysis of adhesions, 2018 wide excision of abdominal granuloma          History of present illness goes back to around Thanksgiving when the patient began to have worsening abdominal pain on top of her baseline chronic pain. She said it was moderate in intensity was constant, and relieved with defecation. She had an EDG done by her GI DrAnant Avila which showed diffuse inflammation from the esophagus to the duodenum. She presented to the ED for further evaluation since her pain was worsening. She endorsed constipation of 4 days and abdominal pain.       In the ED, her vitals were stable. CT A/P was unremarkable. Surgery was consulted and recommended no intervention, likely constipation. She will be admitted for management of abdominal pain and GI consultation.       ROS: Denies headache, changes in vision, chest pain, palpitations, shortness of breath, cough, fever, chills, nausea, vomiting, diarrhea, and  (10 Dec 2020 00:33)      Prior records Reviewed (Y/N):  History obtained from person other than patient (Y/N):    Prior EGD:    Prior Colonoscopy:      PAST MEDICAL & SURGICAL HISTORY:  Anxiety  Hiatal hernia  GERD (gastroesophageal reflux disease)  Seasonal allergic rhinitis, unspecified trigger  Hypercholesteremia  Migraines  Seizures last one 6 yrs ago  Back pain henriated discs  S/P cholecystectomy  History of appendectomy  H/O rotator cuff surgery right  S/p partial hysterectomy with remaining cervical stump  History of surgery  4 rght knee sx  1 left knee sx  cholecystectomy  rotator cuff right  abdominal sx-- adhesions  c section  ovarian cystectomy  breast cystectomies  colonoscopy   endoscopies  parital hysterectomy        FAMILY HISTORY:      Social History:  Tobacco:  Alcohol:  Drugs:    Home Medications:  acetaminophen 325 mg oral tablet: 2 tab(s) orally every 6 hours (10 Dec 2020 00:54)  atorvastatin 80 mg oral tablet: 1 tab(s) orally once a day (10 Dec 2020 00:54)  busPIRone 10 mg oral tablet: 1 tab(s) orally 2 times a day (10 Dec 2020 00:54)  Fioricet oral tablet: 1 tab(s) orally every 4 hours, As Needed (10 Dec 2020 00:54)  Incruse Ellipta 62.5 mcg/inh inhalation powder:  (10 Dec 2020 00:54)  KlonoPIN 2 mg oral tablet: 1 tab(s) orally 3 times a day (10 Dec 2020 00:54)  levETIRAcetam 250 mg oral tablet: 1 tab(s) orally 2 times a day (10 Dec 2020 00:54)  Pepcid 40 mg oral tablet: 1 tab(s) orally once a day (at bedtime) (10 Dec 2020 00:54)  Protonix 40 mg oral delayed release tablet: 1 tab(s) orally 2 times a day (10 Dec 2020 00:54)  tiZANidine 4 mg oral tablet: 2 tab(s) orally every 8 hours (10 Dec 2020 00:54)    MEDICATIONS  (STANDING):  atorvastatin 80 milliGRAM(s) Oral at bedtime  busPIRone 10 milliGRAM(s) Oral two times a day  chlorhexidine 4% Liquid 1 Application(s) Topical <User Schedule>  enoxaparin Injectable 40 milliGRAM(s) SubCutaneous daily  famotidine    Tablet 40 milliGRAM(s) Oral daily  influenza   Vaccine 0.5 milliLiter(s) IntraMuscular once  ipratropium 17 MICROgram(s) HFA Inhaler 1 Puff(s) Inhalation every 6 hours  lactated ringers. 1000 milliLiter(s) (75 mL/Hr) IV Continuous <Continuous>  lactulose Syrup 10 Gram(s) Oral every 8 hours  levETIRAcetam 250 milliGRAM(s) Oral two times a day  methocarbamol 500 milliGRAM(s) Oral every 8 hours  pantoprazole    Tablet 40 milliGRAM(s) Oral every 12 hours  senna 2 Tablet(s) Oral at bedtime  simethicone 80 milliGRAM(s) Chew every 8 hours    MEDICATIONS  (PRN):  acetaminophen   Tablet .. 650 milliGRAM(s) Oral every 6 hours PRN Mild Pain (1 - 3)  clonazePAM  Tablet 2 milliGRAM(s) Oral every 8 hours PRN anxiety  polyethylene glycol 3350 17 Gram(s) Oral every 12 hours PRN Constipation  traMADol 50 milliGRAM(s) Oral every 8 hours PRN Moderate Pain (4 - 6)      Allergies  Cipro (Anaphylaxis; Urticaria; Hives)  Toradol (Rash; Urticaria; Anaphylaxis (Mild to Mod))      Review of Systems:       Physical Examination:  T(C): 36 (12-10-20 @ 12:20), Max: 36.6 (12-09-20 @ 16:07)  HR: 80 (12-10-20 @ 12:20) (60 - 80)  BP: 139/65 (12-10-20 @ 12:20) (139/65 - 152/75)  RR: 19 (12-10-20 @ 12:20) (18 - 20)  SpO2: 95% (12-10-20 @ 08:27) (95% - 98%)    Data: (reviewed by attending)                        11.0   4.74  )-----------( 234      ( 10 Dec 2020 05:32 )             33.2     Hgb Trend:  11.0  12-10-20 @ 05:32  11.0  12-09-20 @ 14:25      12-10    139  |  104  |  10  ----------------------------<  101<H>  3.6   |  25  |  0.6<L>    Ca    8.9      10 Dec 2020 05:32  Mg     1.8     12-10    TPro  6.0  /  Alb  3.7  /  TBili  0.8  /  DBili  x   /  AST  57<H>  /  ALT  66<H>  /  AlkPhos  118<H>  12-10    Liver panel trend:  TBili 0.8   /   AST 57   /   ALT 66   /   AlkP 118   /   Tptn 6.0   /   Alb 3.7    /   DBili --      12-10  TBili <0.2   /   AST 35   /   ALT 57   /   AlkP 112   /   Tptn 5.9   /   Alb 3.7    /   DBili --      12-09      PT/INR - ( 09 Dec 2020 14:25 )   PT: 11.20 sec;   INR: 0.97 ratio         PTT - ( 09 Dec 2020 14:25 )  PTT:29.1 sec        Radiology:(reviewed by attending)  CT Abdomen and Pelvis w/ Oral Cont and w/ IV Cont:   EXAM:  CT ABDOMEN AND PELVIS OC IC            PROCEDURE DATE:  12/09/2020            INTERPRETATION:  CLINICAL STATEMENT: Abdominal pain.    TECHNIQUE: Contiguous axial CT images were obtained from the lower chest to the pubic symphysis following administration of 100 cc Omnipaque 350 intravenous contrast.  Oral contrast was administered. Reformatted images in the coronal and sagittal planes were acquired.    COMPARISON CT: 5/31/2020    FINDINGS:    LOWER CHEST: Bibasilar subsegmental atelectasis.    HEPATOBILIARY: Patient is post cholecystectomy. Mild intrahepatic and common biliary ductal dilatation, likely secondary to postcholecystectomy state.    SPLEEN: Unremarkable.    PANCREAS: Unremarkable.    ADRENAL GLANDS: Unremarkable.    KIDNEYS: Symmetric renal enhancement. No hydronephrosis. Bilateral renal hypodensities without significant change.    ABDOMINOPELVIC NODES: No enlarged abdominal or pelvic lymph nodes.    PELVIC ORGANS: Uterus is not definitely visualized. Correlate with surgical history.    PERITONEUM/MESENTERY/BOWEL: No bowel obstruction, ascites or free intraperitoneal air. Patient is post appendectomy. Oral contrast reaches the mid transverse colon. Sigmoid diverticulosis.    BONES/SOFT TISSUES: Degenerative changes of the spine. Atherosclerotic vascular calcifications.      IMPRESSION:  No evidence of acute/inflammatory process in the abdomen or pelvis. Oral contrast reaches the mid transverse colon.              ZEFERINO GUPTA MD; Attending Radiologist  This document has been electronically signed. Dec  9 2020  6:34PM (12-09-20 @ 17:18)       Gastroenterology Consultation:    Patient is a 55y old  Female who presents with a chief complaint of Abdominal Pain (10 Dec 2020 10:46)      Admitted on: 12-09-20  HPI:  Chief Complaint: Abdominal Pain       Past Medical History: IBS-C, Abdominal adhesions s/p lysis, GERD with Arellano's Esophagus, Gastritis, Esophageal erosions, Seizure disorder, Migraines, HLD, and anxiety       Past Surgical History: Appendectomy at age 15, in 2011 had open resection of appendiceal stump, 2017 exp lap lysis of adhesions, 2018 wide excision of abdominal granuloma          History of present illness goes back to around Thanksgiving when the patient began to have worsening abdominal pain. the pain is diffuse, excruciating, does not radiate, worse in RLQ and epigastric, relieved with BM. she also tool linzess and had a BM and that releived her pain. but her pain recurred and last BM last weekend despite being on Linzess for 4 days. she is also on miralax qd or bid without relief  her pain occurs at night, the constipation was associated with minimal amount intermittent blood per rectum. no weight loss  she also complains of nausea, vomiting and inability to tolerate oral intake for the last couple of days.       In the ED, her vitals were stable. CT A/P was unremarkable. Surgery was consulted and recommended no intervention, likely constipation. She will be admitted for management of abdominal pain and GI consultation.     Prior records Reviewed (Y/N):Y  History obtained from person other than patient (Y/N):N    Prior EGD: Esophagitis grade II, Baret's, gastritis, duodenitis     Prior Colonoscopy: last year, does not remember       PAST MEDICAL & SURGICAL HISTORY:  Anxiety  Hiatal hernia  GERD (gastroesophageal reflux disease)  Seasonal allergic rhinitis, unspecified trigger  Hypercholesteremia  Migraines  Seizures last one 6 yrs ago  Back pain henriated discs  S/P cholecystectomy  History of appendectomy  H/O rotator cuff surgery right  S/p partial hysterectomy with remaining cervical stump  History of surgery  4 rght knee sx  1 left knee sx  cholecystectomy  rotator cuff right  abdominal sx-- adhesions  c section  ovarian cystectomy  breast cystectomies  colonoscopy   endoscopies  parital hysterectomy        FAMILY HISTORY: breast cancer (mother)       Social History:  Tobacco: 0.5 pack/day  Alcohol: glass of wine socially   Drugs:ashley. on medical marijuana     Home Medications:  acetaminophen 325 mg oral tablet: 2 tab(s) orally every 6 hours (10 Dec 2020 00:54)  atorvastatin 80 mg oral tablet: 1 tab(s) orally once a day (10 Dec 2020 00:54)  busPIRone 10 mg oral tablet: 1 tab(s) orally 2 times a day (10 Dec 2020 00:54)  Fioricet oral tablet: 1 tab(s) orally every 4 hours, As Needed (10 Dec 2020 00:54)  Incruse Ellipta 62.5 mcg/inh inhalation powder:  (10 Dec 2020 00:54)  KlonoPIN 2 mg oral tablet: 1 tab(s) orally 3 times a day (10 Dec 2020 00:54)  levETIRAcetam 250 mg oral tablet: 1 tab(s) orally 2 times a day (10 Dec 2020 00:54)  Pepcid 40 mg oral tablet: 1 tab(s) orally once a day (at bedtime) (10 Dec 2020 00:54)  Protonix 40 mg oral delayed release tablet: 1 tab(s) orally 2 times a day (10 Dec 2020 00:54)  tiZANidine 4 mg oral tablet: 2 tab(s) orally every 8 hours (10 Dec 2020 00:54)    MEDICATIONS  (STANDING):  atorvastatin 80 milliGRAM(s) Oral at bedtime  busPIRone 10 milliGRAM(s) Oral two times a day  chlorhexidine 4% Liquid 1 Application(s) Topical <User Schedule>  enoxaparin Injectable 40 milliGRAM(s) SubCutaneous daily  famotidine    Tablet 40 milliGRAM(s) Oral daily  influenza   Vaccine 0.5 milliLiter(s) IntraMuscular once  ipratropium 17 MICROgram(s) HFA Inhaler 1 Puff(s) Inhalation every 6 hours  lactated ringers. 1000 milliLiter(s) (75 mL/Hr) IV Continuous <Continuous>  lactulose Syrup 10 Gram(s) Oral every 8 hours  levETIRAcetam 250 milliGRAM(s) Oral two times a day  methocarbamol 500 milliGRAM(s) Oral every 8 hours  pantoprazole    Tablet 40 milliGRAM(s) Oral every 12 hours  senna 2 Tablet(s) Oral at bedtime  simethicone 80 milliGRAM(s) Chew every 8 hours    MEDICATIONS  (PRN):  acetaminophen   Tablet .. 650 milliGRAM(s) Oral every 6 hours PRN Mild Pain (1 - 3)  clonazePAM  Tablet 2 milliGRAM(s) Oral every 8 hours PRN anxiety  polyethylene glycol 3350 17 Gram(s) Oral every 12 hours PRN Constipation  traMADol 50 milliGRAM(s) Oral every 8 hours PRN Moderate Pain (4 - 6)      Allergies  Cipro (Anaphylaxis; Urticaria; Hives)  Toradol (Rash; Urticaria; Anaphylaxis (Mild to Mod))      Review of Systems:   CONSTITUTIONAL: No weakness, fevers or chills  EYES/ENT: No visual changes;  No vertigo or throat pain   NECK: No pain or stiffness  RESPIRATORY: No cough, wheezing, hemoptysis; No shortness of breath  CARDIOVASCULAR: No chest pain or palpitations  GASTROINTESTINAL: see HPI  GENITOURINARY: +polyuria   NEUROLOGICAL: No numbness or weakness  SKIN: No itching, rashes    Physical Examination:  T(C): 36 (12-10-20 @ 12:20), Max: 36.6 (12-09-20 @ 16:07)  HR: 80 (12-10-20 @ 12:20) (60 - 80)  BP: 139/65 (12-10-20 @ 12:20) (139/65 - 152/75)  RR: 19 (12-10-20 @ 12:20) (18 - 20)  SpO2: 95% (12-10-20 @ 08:27) (95% - 98%)    GENERAL: NAD, speaks in full sentences, no signs of respiratory distress  HEAD:  Atraumatic, Normocephalic  EYES: EOMI, PERRLA, non-icteric, no injected sclera  NECK: Supple, No JVD  CHEST/LUNG: Clear to auscultation bilaterally; No wheeze; No crackles; No accessory muscles used  HEART: Regular rate and rhythm; No murmurs;   ABDOMEN: Soft, Nondistended; Bowel sounds present; No guarding, RLQ and epigastric tenderness   PSYCH: AAOx3  NEUROLOGY: non-focal  SKIN: No rashes or lesions    Data: (reviewed by attending)                        11.0   4.74  )-----------( 234      ( 10 Dec 2020 05:32 )             33.2     Hgb Trend:  11.0  12-10-20 @ 05:32  11.0  12-09-20 @ 14:25      12-10    139  |  104  |  10  ----------------------------<  101<H>  3.6   |  25  |  0.6<L>    Ca    8.9      10 Dec 2020 05:32  Mg     1.8     12-10    TPro  6.0  /  Alb  3.7  /  TBili  0.8  /  DBili  x   /  AST  57<H>  /  ALT  66<H>  /  AlkPhos  118<H>  12-10    Liver panel trend:  TBili 0.8   /   AST 57   /   ALT 66   /   AlkP 118   /   Tptn 6.0   /   Alb 3.7    /   DBili --      12-10  TBili <0.2   /   AST 35   /   ALT 57   /   AlkP 112   /   Tptn 5.9   /   Alb 3.7    /   DBili --      12-09      PT/INR - ( 09 Dec 2020 14:25 )   PT: 11.20 sec;   INR: 0.97 ratio         PTT - ( 09 Dec 2020 14:25 )  PTT:29.1 sec        Radiology:(reviewed by attending)  CT Abdomen and Pelvis w/ Oral Cont and w/ IV Cont:   EXAM:  CT ABDOMEN AND PELVIS OC IC            PROCEDURE DATE:  12/09/2020            INTERPRETATION:  CLINICAL STATEMENT: Abdominal pain.    TECHNIQUE: Contiguous axial CT images were obtained from the lower chest to the pubic symphysis following administration of 100 cc Omnipaque 350 intravenous contrast.  Oral contrast was administered. Reformatted images in the coronal and sagittal planes were acquired.    COMPARISON CT: 5/31/2020    FINDINGS:    LOWER CHEST: Bibasilar subsegmental atelectasis.    HEPATOBILIARY: Patient is post cholecystectomy. Mild intrahepatic and common biliary ductal dilatation, likely secondary to postcholecystectomy state.    SPLEEN: Unremarkable.    PANCREAS: Unremarkable.    ADRENAL GLANDS: Unremarkable.    KIDNEYS: Symmetric renal enhancement. No hydronephrosis. Bilateral renal hypodensities without significant change.    ABDOMINOPELVIC NODES: No enlarged abdominal or pelvic lymph nodes.    PELVIC ORGANS: Uterus is not definitely visualized. Correlate with surgical history.    PERITONEUM/MESENTERY/BOWEL: No bowel obstruction, ascites or free intraperitoneal air. Patient is post appendectomy. Oral contrast reaches the mid transverse colon. Sigmoid diverticulosis.    BONES/SOFT TISSUES: Degenerative changes of the spine. Atherosclerotic vascular calcifications.      IMPRESSION:  No evidence of acute/inflammatory process in the abdomen or pelvis. Oral contrast reaches the mid transverse colon.              ZEFERINO GUPTA MD; Attending Radiologist  This document has been electronically signed. Dec  9 2020  6:34PM (12-09-20 @ 17:18)       Gastroenterology Consultation:    Patient is a 55y old  Female who presents with a chief complaint of Abdominal Pain (10 Dec 2020 10:46)      Admitted on: 12-09-20  HPI:  Chief Complaint: Abdominal Pain       Past Medical History: IBS-C, Abdominal adhesions s/p lysis, GERD with Arellano's Esophagus, Gastritis, Esophageal erosions, Seizure disorder, Migraines, HLD, and anxiety       Past Surgical History: Appendectomy at age 15, in 2011 had open resection of appendiceal stump, 2017 exp lap lysis of adhesions, 2018 wide excision of abdominal granuloma          History of present illness goes back to around Thanksgiving when the patient began to have worsening abdominal pain. the pain is diffuse, excruciating, does not radiate, worse in RLQ and epigastric, relieved with BM. she also tool linzess and had a BM and that releived her pain. but her pain recurred and last BM last weekend despite being on Linzess for 4 days. (she is not compliant with Linzess and takes it PRN more than daily) she is also on miralax qd or bid.  her pain occurs at night, the constipation was associated with minimal amount intermittent blood per rectum that she attributes to urologic procedures recently done. no weight loss  she also complains of nausea, vomiting and inability to tolerate oral intake for the last couple of days.       In the ED, her vitals were stable. CT A/P was unremarkable. Surgery was consulted and recommended no intervention, likely constipation. She will be admitted for management of abdominal pain and GI consultation.     Prior records Reviewed (Y/N):Y  History obtained from person other than patient (Y/N):N    Prior EGD: Esophagitis grade II, Baret's, gastritis, duodenitis     Prior Colonoscopy: 2017 good prep, dr Nur, 2 polyps (colonic mucosa)      PAST MEDICAL & SURGICAL HISTORY:  Anxiety  Hiatal hernia  GERD (gastroesophageal reflux disease)  Seasonal allergic rhinitis, unspecified trigger  Hypercholesteremia  Migraines  Seizures last one 6 yrs ago  Back pain henriated discs  S/P cholecystectomy  History of appendectomy  H/O rotator cuff surgery right  S/p partial hysterectomy with remaining cervical stump  History of surgery  4 rght knee sx  1 left knee sx  cholecystectomy  rotator cuff right  abdominal sx-- adhesions  c section  ovarian cystectomy  breast cystectomies  colonoscopy   endoscopies  parital hysterectomy        FAMILY HISTORY: breast cancer (mother)       Social History:  Tobacco: 0.5 pack/day  Alcohol: glass of wine socially   Drugs:ashley. on medical marijuana     Home Medications:  acetaminophen 325 mg oral tablet: 2 tab(s) orally every 6 hours (10 Dec 2020 00:54)  atorvastatin 80 mg oral tablet: 1 tab(s) orally once a day (10 Dec 2020 00:54)  busPIRone 10 mg oral tablet: 1 tab(s) orally 2 times a day (10 Dec 2020 00:54)  Fioricet oral tablet: 1 tab(s) orally every 4 hours, As Needed (10 Dec 2020 00:54)  Incruse Ellipta 62.5 mcg/inh inhalation powder:  (10 Dec 2020 00:54)  KlonoPIN 2 mg oral tablet: 1 tab(s) orally 3 times a day (10 Dec 2020 00:54)  levETIRAcetam 250 mg oral tablet: 1 tab(s) orally 2 times a day (10 Dec 2020 00:54)  Pepcid 40 mg oral tablet: 1 tab(s) orally once a day (at bedtime) (10 Dec 2020 00:54)  Protonix 40 mg oral delayed release tablet: 1 tab(s) orally 2 times a day (10 Dec 2020 00:54)  tiZANidine 4 mg oral tablet: 2 tab(s) orally every 8 hours (10 Dec 2020 00:54)    MEDICATIONS  (STANDING):  atorvastatin 80 milliGRAM(s) Oral at bedtime  busPIRone 10 milliGRAM(s) Oral two times a day  chlorhexidine 4% Liquid 1 Application(s) Topical <User Schedule>  enoxaparin Injectable 40 milliGRAM(s) SubCutaneous daily  famotidine    Tablet 40 milliGRAM(s) Oral daily  influenza   Vaccine 0.5 milliLiter(s) IntraMuscular once  ipratropium 17 MICROgram(s) HFA Inhaler 1 Puff(s) Inhalation every 6 hours  lactated ringers. 1000 milliLiter(s) (75 mL/Hr) IV Continuous <Continuous>  lactulose Syrup 10 Gram(s) Oral every 8 hours  levETIRAcetam 250 milliGRAM(s) Oral two times a day  methocarbamol 500 milliGRAM(s) Oral every 8 hours  pantoprazole    Tablet 40 milliGRAM(s) Oral every 12 hours  senna 2 Tablet(s) Oral at bedtime  simethicone 80 milliGRAM(s) Chew every 8 hours    MEDICATIONS  (PRN):  acetaminophen   Tablet .. 650 milliGRAM(s) Oral every 6 hours PRN Mild Pain (1 - 3)  clonazePAM  Tablet 2 milliGRAM(s) Oral every 8 hours PRN anxiety  polyethylene glycol 3350 17 Gram(s) Oral every 12 hours PRN Constipation  traMADol 50 milliGRAM(s) Oral every 8 hours PRN Moderate Pain (4 - 6)      Allergies  Cipro (Anaphylaxis; Urticaria; Hives)  Toradol (Rash; Urticaria; Anaphylaxis (Mild to Mod))      Review of Systems:   CONSTITUTIONAL: No weakness, fevers or chills  EYES/ENT: No visual changes;  No vertigo or throat pain   NECK: No pain or stiffness  RESPIRATORY: No cough, wheezing, hemoptysis; No shortness of breath  CARDIOVASCULAR: No chest pain or palpitations  GASTROINTESTINAL: see HPI  GENITOURINARY: +polyuria   NEUROLOGICAL: No numbness or weakness  SKIN: No itching, rashes    Physical Examination:  T(C): 36 (12-10-20 @ 12:20), Max: 36.6 (12-09-20 @ 16:07)  HR: 80 (12-10-20 @ 12:20) (60 - 80)  BP: 139/65 (12-10-20 @ 12:20) (139/65 - 152/75)  RR: 19 (12-10-20 @ 12:20) (18 - 20)  SpO2: 95% (12-10-20 @ 08:27) (95% - 98%)    GENERAL: NAD, speaks in full sentences, no signs of respiratory distress  HEAD:  Atraumatic, Normocephalic  EYES: EOMI, PERRLA, non-icteric, no injected sclera  NECK: Supple, No JVD  CHEST/LUNG: Clear to auscultation bilaterally; No wheeze; No crackles; No accessory muscles used  HEART: Regular rate and rhythm; No murmurs;   ABDOMEN: Soft, Nondistended; Bowel sounds present; No guarding, RLQ and epigastric tenderness   PSYCH: AAOx3  NEUROLOGY: non-focal  SKIN: No rashes or lesions    Data: (reviewed by attending)                        11.0   4.74  )-----------( 234      ( 10 Dec 2020 05:32 )             33.2     Hgb Trend:  11.0  12-10-20 @ 05:32  11.0  12-09-20 @ 14:25      12-10    139  |  104  |  10  ----------------------------<  101<H>  3.6   |  25  |  0.6<L>    Ca    8.9      10 Dec 2020 05:32  Mg     1.8     12-10    TPro  6.0  /  Alb  3.7  /  TBili  0.8  /  DBili  x   /  AST  57<H>  /  ALT  66<H>  /  AlkPhos  118<H>  12-10    Liver panel trend:  TBili 0.8   /   AST 57   /   ALT 66   /   AlkP 118   /   Tptn 6.0   /   Alb 3.7    /   DBili --      12-10  TBili <0.2   /   AST 35   /   ALT 57   /   AlkP 112   /   Tptn 5.9   /   Alb 3.7    /   DBili --      12-09      PT/INR - ( 09 Dec 2020 14:25 )   PT: 11.20 sec;   INR: 0.97 ratio         PTT - ( 09 Dec 2020 14:25 )  PTT:29.1 sec        Radiology:(reviewed by attending)  CT Abdomen and Pelvis w/ Oral Cont and w/ IV Cont:   EXAM:  CT ABDOMEN AND PELVIS OC IC            PROCEDURE DATE:  12/09/2020            INTERPRETATION:  CLINICAL STATEMENT: Abdominal pain.    TECHNIQUE: Contiguous axial CT images were obtained from the lower chest to the pubic symphysis following administration of 100 cc Omnipaque 350 intravenous contrast.  Oral contrast was administered. Reformatted images in the coronal and sagittal planes were acquired.    COMPARISON CT: 5/31/2020    FINDINGS:    LOWER CHEST: Bibasilar subsegmental atelectasis.    HEPATOBILIARY: Patient is post cholecystectomy. Mild intrahepatic and common biliary ductal dilatation, likely secondary to postcholecystectomy state.    SPLEEN: Unremarkable.    PANCREAS: Unremarkable.    ADRENAL GLANDS: Unremarkable.    KIDNEYS: Symmetric renal enhancement. No hydronephrosis. Bilateral renal hypodensities without significant change.    ABDOMINOPELVIC NODES: No enlarged abdominal or pelvic lymph nodes.    PELVIC ORGANS: Uterus is not definitely visualized. Correlate with surgical history.    PERITONEUM/MESENTERY/BOWEL: No bowel obstruction, ascites or free intraperitoneal air. Patient is post appendectomy. Oral contrast reaches the mid transverse colon. Sigmoid diverticulosis.    BONES/SOFT TISSUES: Degenerative changes of the spine. Atherosclerotic vascular calcifications.      IMPRESSION:  No evidence of acute/inflammatory process in the abdomen or pelvis. Oral contrast reaches the mid transverse colon.              ZEFERINO GUPTA MD; Attending Radiologist  This document has been electronically signed. Dec  9 2020  6:34PM (12-09-20 @ 17:18)

## 2020-12-10 NOTE — H&P ADULT - ATTENDING COMMENTS
56 YO F with a PMH of Abdominal adhesions s/p lysis, GERD with Arellano's Esophagus, Gastritis, Esophageal erosions, SZ disorder, Migraines, HLD, and anxiety who presents to the hospital with a c/o ABD pain for the past x 2 weeks. Pt has difficulty describing pain, but states its diffuse. Alleviated with BM. Denies any N/V/D, fevers/chills, hematemesis, or black/bloody stools. ROS positive for constipation for the past x 4 days. In the ED, CT-AP w/ PO contrast was negative for acute process. Surgery consulted and states there is no acute surgical intervention, wants GI consult, and bowel regimen.     Physical exam shows pt in NAD. VSS, afebrile, not hypoxic on RA. A&Ox3. Non-focal neuro exam. Muscle strength/sensation intact. CTA B/L with no W/C/R. RRR, no M/G/R. ABD is soft and non-tender, normoactive BSs. LEs without swelling. No rashes. Labs and radiology as above.     ABD pain, possibly due to constipation. Surgery is following. Bowel regimen. Advance diet as tolerated. Anti-emetics PRN. No opioids. PPI.     Magnesium deficiency. Replace. No QTc prolongation.     Hx of Abdominal adhesions s/p lysis, GERD with Arellano's Esophagus, Gastritis, Esophageal erosions, SZ disorder, Migraines, HLD, and anxiety. Restart home meds. DVT PPX. Inform PCP of pt's admission to hospital. My note supersedes the residents note.

## 2020-12-10 NOTE — CONSULT NOTE ADULT - ATTENDING COMMENTS
above noted discussed case with surgical resident abdomen soft ct scan noted
54 yo f with constipation and distension  Plans as noted above

## 2020-12-10 NOTE — H&P ADULT - NSHPPHYSICALEXAM_GEN_ALL_CORE
PHYSICAL EXAM:  GENERAL: NAD, lying in bed comfortably  HEAD:  Atraumatic, Normocephalic  EYES: EOMI, PERRLA, conjunctiva and sclera clear  ENT: Moist mucous membranes  NECK: Supple, No JVD  CHEST/LUNG: Clear to auscultation bilaterally; No rales, rhonchi, wheezing, or rubs. Unlabored respirations  HEART: Regular rate and rhythm; No murmurs, rubs, or gallops  ABDOMEN: Bowel sounds present; Soft, Nontender, Nondistended. No hepatomegaly  EXTREMITIES:  2+ Peripheral Pulses, brisk capillary refill. No clubbing, cyanosis, or edema  NERVOUS SYSTEM:  Alert & Oriented X3, speech clear. No deficits   MSK: FROM all 4 extremities, full and equal strength  SKIN: numerous scars from abdominal surgeries

## 2020-12-10 NOTE — PROGRESS NOTE ADULT - SUBJECTIVE AND OBJECTIVE BOX
RAMÍREZ RODARTE 55y Female  MRN#: [id number]   CODE STATUS:________      SUBJECTIVE  Patient is a 55y old Female who presents with a chief complaint of Abdominal Pain (10 Dec 2020 09:54)  Currently admitted to medicine with the primary diagnosis of Intractable abdominal pain      Today is hospital day 1d, and this morning she is           OBJECTIVE  PAST MEDICAL & SURGICAL HISTORY  Anxiety    Hiatal hernia    GERD (gastroesophageal reflux disease)    Seasonal allergic rhinitis, unspecified trigger    Hypercholesteremia    Migraines    Seizures  last one 6 yrs ago    Back pain  henriated discs    S/P cholecystectomy    History of appendectomy    H/O rotator cuff surgery  right    S/p partial hysterectomy with remaining cervical stump    History of surgery  4 rght knee sx  1 left knee sx  cholecystectomy  rotator cuff right  abdominal sx-- adhesions  c section  ovarian cystectomy  breast cystectomies  colonoscopy   endoscopies  parital hysterectomy      ALLERGIES:  Cipro (Anaphylaxis; Urticaria; Hives)  Toradol (Rash; Urticaria; Anaphylaxis (Mild to Mod))    MEDICATIONS:  STANDING MEDICATIONS  atorvastatin 80 milliGRAM(s) Oral at bedtime  busPIRone 10 milliGRAM(s) Oral two times a day  chlorhexidine 4% Liquid 1 Application(s) Topical <User Schedule>  enoxaparin Injectable 40 milliGRAM(s) SubCutaneous daily  famotidine    Tablet 40 milliGRAM(s) Oral daily  influenza   Vaccine 0.5 milliLiter(s) IntraMuscular once  ipratropium 17 MICROgram(s) HFA Inhaler 1 Puff(s) Inhalation every 6 hours  lactated ringers. 1000 milliLiter(s) IV Continuous <Continuous>  levETIRAcetam 250 milliGRAM(s) Oral two times a day  methocarbamol 500 milliGRAM(s) Oral every 8 hours  pantoprazole    Tablet 40 milliGRAM(s) Oral every 12 hours  senna 2 Tablet(s) Oral at bedtime  simethicone 80 milliGRAM(s) Chew every 8 hours    PRN MEDICATIONS  acetaminophen   Tablet .. 650 milliGRAM(s) Oral every 6 hours PRN  clonazePAM  Tablet 2 milliGRAM(s) Oral every 8 hours PRN  polyethylene glycol 3350 17 Gram(s) Oral every 12 hours PRN  traMADol 50 milliGRAM(s) Oral every 8 hours PRN      VITAL SIGNS: Last 24 Hours  T(C): 35.8 (10 Dec 2020 05:01), Max: 36.9 (09 Dec 2020 12:46)  T(F): 96.4 (10 Dec 2020 05:01), Max: 98.4 (09 Dec 2020 12:46)  HR: 60 (10 Dec 2020 08:27) (60 - 87)  BP: 150/63 (10 Dec 2020 05:01) (131/72 - 152/75)  BP(mean): --  RR: 18 (10 Dec 2020 05:01) (18 - 20)  SpO2: 95% (10 Dec 2020 08:27) (95% - 98%)    LABS:                        11.0   4.74  )-----------( 234      ( 10 Dec 2020 05:32 )             33.2     1210    139  |  104  |  10  ----------------------------<  101<H>  3.6   |  25  |  0.6<L>    Ca    8.9      10 Dec 2020 05:32  Mg     1.8     1210    TPro  6.0  /  Alb  3.7  /  TBili  0.8  /  DBili  x   /  AST  57<H>  /  ALT  66<H>  /  AlkPhos  118<H>  12-10    PT/INR - ( 09 Dec 2020 14:25 )   PT: 11.20 sec;   INR: 0.97 ratio         PTT - ( 09 Dec 2020 14:25 )  PTT:29.1 sec  Urinalysis Basic - ( 09 Dec 2020 13:37 )    Color: Colorless / Appearance: Clear / S.008 / pH: x  Gluc: x / Ketone: Negative  / Bili: Negative / Urobili: <2 mg/dL   Blood: x / Protein: Negative / Nitrite: Negative   Leuk Esterase: Negative / RBC: x / WBC x   Sq Epi: x / Non Sq Epi: x / Bacteria: x        Troponin T, Serum: <0.01 ng/mL (20 @ 14:25)  Lactate, Blood: 1.0 mmol/L (20 @ 14:25)      CARDIAC MARKERS ( 09 Dec 2020 14:25 )  x     / <0.01 ng/mL / x     / x     / x          RADIOLOGY:      PHYSICAL EXAM:    GENERAL: NAD, well-developed, AAOx3  HEENT:  Atraumatic, Normocephalic. EOMI, PERRLA, conjunctiva and sclera clear, No JVD  PULMONARY: Clear to auscultation bilaterally; No wheeze  CARDIOVASCULAR: Regular rate and rhythm; No murmurs, rubs, or gallops  GASTROINTESTINAL: Soft, Nontender, Nondistended; Bowel sounds present  MUSCULOSKELETAL:  2+ Peripheral Pulses, No clubbing, cyanosis, or edema  NEUROLOGY: non-focal  SKIN: No rashes or lesions       RAMÍREZ BRANCH 55y Female  MRN#: [id number]   CODE STATUS:________      SUBJECTIVE  Patient is a 55y old Female who presents with a chief complaint of Abdominal Pain (10 Dec 2020 09:54)  Currently admitted to medicine with the primary diagnosis of Intractable abdominal pain      Today is hospital day 1d. Ms. Branch was seen this morning and is in pain. She has diffuse abdominal pain motioning over the RLQ rated at 8/10 in intensity. This is the same pain she had on presentation to the ED. She is conversational but speaks slowly and has some trouble remembering events in her past when asked about her history of present illness. She answers all questions appropriately and is AAOx3. She has not had a bowel movement for the past 5 days but can ambulate at her baseline, however she mentions she has significant pain after ambulation which is normal for her. She ate half her breakfast but says she is nauseous midway during the interview and pushes the breakfast away. She denies headache, dizziness, chest pain, palpitations, shortness of breath, urinary symptoms, new joint pains, and rashes.          OBJECTIVE  PAST MEDICAL & SURGICAL HISTORY  Anxiety    Hiatal hernia    GERD (gastroesophageal reflux disease)    Seasonal allergic rhinitis, unspecified trigger    Hypercholesteremia    Migraines    Seizures  last one 6 yrs ago    Back pain  henriated discs    S/P cholecystectomy    History of appendectomy    H/O rotator cuff surgery  right    S/p partial hysterectomy with remaining cervical stump    History of surgery  4 rght knee sx  1 left knee sx  cholecystectomy  rotator cuff right  abdominal sx-- adhesions  c section  ovarian cystectomy  breast cystectomies  colonoscopy   endoscopies  parital hysterectomy      ALLERGIES:  Cipro (Anaphylaxis; Urticaria; Hives)  Toradol (Rash; Urticaria; Anaphylaxis (Mild to Mod))    MEDICATIONS:  STANDING MEDICATIONS  atorvastatin 80 milliGRAM(s) Oral at bedtime  busPIRone 10 milliGRAM(s) Oral two times a day  chlorhexidine 4% Liquid 1 Application(s) Topical <User Schedule>  enoxaparin Injectable 40 milliGRAM(s) SubCutaneous daily  famotidine    Tablet 40 milliGRAM(s) Oral daily  influenza   Vaccine 0.5 milliLiter(s) IntraMuscular once  ipratropium 17 MICROgram(s) HFA Inhaler 1 Puff(s) Inhalation every 6 hours  lactated ringers. 1000 milliLiter(s) IV Continuous <Continuous>  levETIRAcetam 250 milliGRAM(s) Oral two times a day  methocarbamol 500 milliGRAM(s) Oral every 8 hours  pantoprazole    Tablet 40 milliGRAM(s) Oral every 12 hours  senna 2 Tablet(s) Oral at bedtime  simethicone 80 milliGRAM(s) Chew every 8 hours    PRN MEDICATIONS  acetaminophen   Tablet .. 650 milliGRAM(s) Oral every 6 hours PRN  clonazePAM  Tablet 2 milliGRAM(s) Oral every 8 hours PRN  polyethylene glycol 3350 17 Gram(s) Oral every 12 hours PRN  traMADol 50 milliGRAM(s) Oral every 8 hours PRN      VITAL SIGNS: Last 24 Hours  T(C): 35.8 (10 Dec 2020 05:01), Max: 36.9 (09 Dec 2020 12:46)  T(F): 96.4 (10 Dec 2020 05:01), Max: 98.4 (09 Dec 2020 12:46)  HR: 60 (10 Dec 2020 08:27) (60 - 87)  BP: 150/63 (10 Dec 2020 05:01) (131/72 - 152/75)  BP(mean): --  RR: 18 (10 Dec 2020 05:01) (18 - 20)  SpO2: 95% (10 Dec 2020 08:27) (95% - 98%)    LABS:                        11.0   4.74  )-----------( 234      ( 10 Dec 2020 05:32 )             33.2     12-10    139  |  104  |  10  ----------------------------<  101<H>  3.6   |  25  |  0.6<L>    Ca    8.9      10 Dec 2020 05:32  Mg     1.8     12-10    TPro  6.0  /  Alb  3.7  /  TBili  0.8  /  DBili  x   /  AST  57<H>  /  ALT  66<H>  /  AlkPhos  118<H>  12-10    PT/INR - ( 09 Dec 2020 14:25 )   PT: 11.20 sec;   INR: 0.97 ratio         PTT - ( 09 Dec 2020 14:25 )  PTT:29.1 sec  Urinalysis Basic - ( 09 Dec 2020 13:37 )    Color: Colorless / Appearance: Clear / S.008 / pH: x  Gluc: x / Ketone: Negative  / Bili: Negative / Urobili: <2 mg/dL   Blood: x / Protein: Negative / Nitrite: Negative   Leuk Esterase: Negative / RBC: x / WBC x   Sq Epi: x / Non Sq Epi: x / Bacteria: x        Troponin T, Serum: <0.01 ng/mL (20 @ 14:25)  Lactate, Blood: 1.0 mmol/L (20 @ 14:25)      CARDIAC MARKERS ( 09 Dec 2020 14:25 )  x     / <0.01 ng/mL / x     / x     / x          RADIOLOGY:      PHYSICAL EXAM:    GENERAL: NAD, well-developed, AAOx3,   HEENT:  Atraumatic, Normocephalic. EOMI, PERRLA, conjunctiva and sclera clear, No JVD  PULMONARY: Clear to auscultation bilaterally; No wheeze  CARDIOVASCULAR: chest wall examination demonstrates slight pectus cavernatum with prominent costochondral junctions, S1 and S2 faintly heard, early systolic grade I/VI decrescendo murmur at LUSB, Regular rate and rhythm; No rubs, or gallops  GASTROINTESTINAL: multiple surgical scars present on lower abdomen, Soft, slightly distended, particularly tender in RLQ, but tenderness also present in RUQ and LUQ as well; Bowel sounds hyperactive   MUSCULOSKELETAL:  tenderness over bilateral second costochondral junction, and medial and lateral humeral epicondyles bilaterally. 2+ Peripheral Pulses, No clubbing, cyanosis, or edema  Lower extremities: No edema, no erythema, nontender to palpation bilaterally  NEUROLOGY: non-focal  SKIN: No rashes or lesions

## 2020-12-10 NOTE — PROGRESS NOTE ADULT - SUBJECTIVE AND OBJECTIVE BOX
Progress Note: General Surgery  Patient: RAMÍREZ RODARTE , 55y (1965)Female   MRN: [id number]  Location: [address]   Visit: 20 Inpatient  Date: 12-10-20 @ 09:54    Procedure/Diagnosis:     Events/ 24h: No acute events overnight. Pain controlled.    Vitals: T(F): 96.4 (12-10-20 @ 05:01), Max: 98.4 (20 @ 12:46)  HR: 60 (12-10-20 @ 08:27)  BP: 150/63 (12-10-20 @ 05:01) (131/72 - 152/75)  RR: 18 (12-10-20 @ 05:01)  SpO2: 95% (12-10-20 @ 08:27)    In:   Out:   Net:     Diet: Diet, DASH/TLC:   Sodium & Cholesterol Restricted (12-10-20 @ 01:13)    IV Fluids: lactated ringers. 1000 milliLiter(s) (75 mL/Hr) IV Continuous <Continuous>      Physical Examination:  General Appearance: NAD  HEENT: EOMI, sclera non-icteric.  Heart: RRR   Lungs: CTABL.   Abdomen:  Soft, signs of tenderness don't align with abdominal palpation, nondistended.   MSK/Extremities: Warm & well-perfused.   Skin: Warm, dry. No jaundice.       Medications: [Standing]  atorvastatin 80 milliGRAM(s) Oral at bedtime  busPIRone 10 milliGRAM(s) Oral two times a day  chlorhexidine 4% Liquid 1 Application(s) Topical <User Schedule>  enoxaparin Injectable 40 milliGRAM(s) SubCutaneous daily  famotidine    Tablet 40 milliGRAM(s) Oral daily  influenza   Vaccine 0.5 milliLiter(s) IntraMuscular once  ipratropium 17 MICROgram(s) HFA Inhaler 1 Puff(s) Inhalation every 6 hours  lactated ringers. 1000 milliLiter(s) (75 mL/Hr) IV Continuous <Continuous>  levETIRAcetam 250 milliGRAM(s) Oral two times a day  methocarbamol 500 milliGRAM(s) Oral every 8 hours  pantoprazole    Tablet 40 milliGRAM(s) Oral every 12 hours  senna 2 Tablet(s) Oral at bedtime    DVT Prophylaxis: enoxaparin Injectable 40 milliGRAM(s) SubCutaneous daily    GI Prophylaxis: famotidine    Tablet 40 milliGRAM(s) Oral daily  pantoprazole    Tablet 40 milliGRAM(s) Oral every 12 hours    Antibiotics:   Anticoagulation:   Medications:[PRN]  acetaminophen   Tablet .. 650 milliGRAM(s) Oral every 6 hours PRN  clonazePAM  Tablet 2 milliGRAM(s) Oral every 8 hours PRN  polyethylene glycol 3350 17 Gram(s) Oral every 12 hours PRN      Labs:                        11.0   4.74  )-----------( 234      ( 10 Dec 2020 05:32 )             33.2     12-10    139  |  104  |  10  ----------------------------<  101<H>  3.6   |  25  |  0.6<L>    Ca    8.9      10 Dec 2020 05:32  Mg     1.8     12-10    TPro  6.0  /  Alb  3.7  /  TBili  0.8  /  DBili  x   /  AST  57<H>  /  ALT  66<H>  /  AlkPhos  118<H>  12-10    LIVER FUNCTIONS - ( 10 Dec 2020 05:32 )  Alb: 3.7 g/dL / Pro: 6.0 g/dL / ALK PHOS: 118 U/L / ALT: 66 U/L / AST: 57 U/L / GGT: x           PT/INR - ( 09 Dec 2020 14:25 )   PT: 11.20 sec;   INR: 0.97 ratio         PTT - ( 09 Dec 2020 14:25 )  PTT:29.1 sec    CARDIAC MARKERS ( 09 Dec 2020 14:25 )  x     / <0.01 ng/mL / x     / x     / x          Urine/Micro:    Urinalysis Basic - ( 09 Dec 2020 13:37 )    Color: Colorless / Appearance: Clear / S.008 / pH: x  Gluc: x / Ketone: Negative  / Bili: Negative / Urobili: <2 mg/dL   Blood: x / Protein: Negative / Nitrite: Negative   Leuk Esterase: Negative / RBC: x / WBC x   Sq Epi: x / Non Sq Epi: x / Bacteria: x        Imaging:     Assessment:  55y Female patient admitted for constipation.    Plan:  Avoid Opiates except home dose of methadone   Aggressive bowel reg  Low fiber diet  Ambulate multiple times a day      Date/Time: 12-10-20 @ 09:54

## 2020-12-10 NOTE — CONSULT NOTE ADULT - ASSESSMENT
a 56 yo with history of IBS-C, Abdominal adhesions s/p lysis, GERD with Arellano's Esophagus, Gastritis, Esophageal erosions, Seizure disorder, Migraines, HLD, and anxiety presented for abdominal pain and constipation    *IBS-Constipation  -patient coming with worsening abdominal pain and constipation. passing flatus. no clinical signs of obstruction   -CT noted no obstruction, fecal load mainly descending colon  -Lipase normal, lactate normal, LFTs normal, No leukocytosis, no fever no chills  -patient needs bowel regimen: please start miralax BID, give 2 fleet enemas now  -discontinue lactulose, it is not a good long term standing medications (abdominal bloating and discomfort)  -avoid opioids  -check TSH  -correct electrolytes as needed   -patient needs repeat colonoscopy as outpatient in the setting of intermittent minimal blood per rectum  -will follow     *Patient does not have her denture, consider soft mashed diet to avoid food impaction

## 2020-12-10 NOTE — H&P ADULT - ASSESSMENT
55F with an extensive GI and surgical history presenting for evaluation of progressive abdominal pain    # Abdominal Pain  - Could be secondary to constipation or factitious given she has requested morphine. No obstruction or volvulus on CT.   - Will place GI consult to Dr. Avila  - continue with home pain regimen for now  - Bowel regimen with Senna and Miralax   - Nothing to do from a surgical standpoint   - Abdomen not acute    # GERD/BE/gastritis  - Continue Protonix 40mg PO twice daily and Pepcid 40mg PO daily   - Can add Carafate if pain persists   - S/p EGD on 12/1   - GI follow-up     # HLD  - Lipitor 80mg PO daily     # Seizure disorder  - Keppra 250mg PO twice daily     # Migraine  - Takes Fioricet at home   - treat PRN    # Anxiety  - Klonopin 2mg PO three times daily PRN     DVT ppx: Lovenox 40 mg SubQ daily   GI ppx: Protonix 40mg twice daily   Diet: DASH/TLC  Activity: IAT   Dispo: From home   Code: FULL

## 2020-12-11 ENCOUNTER — TRANSCRIPTION ENCOUNTER (OUTPATIENT)
Age: 55
End: 2020-12-11

## 2020-12-11 VITALS — HEART RATE: 60 BPM | OXYGEN SATURATION: 95 %

## 2020-12-11 LAB
ALBUMIN SERPL ELPH-MCNC: 3.6 G/DL — SIGNIFICANT CHANGE UP (ref 3.5–5.2)
ALP SERPL-CCNC: 120 U/L — HIGH (ref 30–115)
ALT FLD-CCNC: 66 U/L — HIGH (ref 0–41)
ANION GAP SERPL CALC-SCNC: 10 MMOL/L — SIGNIFICANT CHANGE UP (ref 7–14)
AST SERPL-CCNC: 41 U/L — SIGNIFICANT CHANGE UP (ref 0–41)
BASOPHILS # BLD AUTO: 0.01 K/UL — SIGNIFICANT CHANGE UP (ref 0–0.2)
BASOPHILS NFR BLD AUTO: 0.3 % — SIGNIFICANT CHANGE UP (ref 0–1)
BILIRUB SERPL-MCNC: 0.5 MG/DL — SIGNIFICANT CHANGE UP (ref 0.2–1.2)
BUN SERPL-MCNC: 9 MG/DL — LOW (ref 10–20)
CALCIUM SERPL-MCNC: 9.1 MG/DL — SIGNIFICANT CHANGE UP (ref 8.5–10.1)
CHLORIDE SERPL-SCNC: 105 MMOL/L — SIGNIFICANT CHANGE UP (ref 98–110)
CO2 SERPL-SCNC: 27 MMOL/L — SIGNIFICANT CHANGE UP (ref 17–32)
CREAT SERPL-MCNC: 0.6 MG/DL — LOW (ref 0.7–1.5)
EOSINOPHIL # BLD AUTO: 0.05 K/UL — SIGNIFICANT CHANGE UP (ref 0–0.7)
EOSINOPHIL NFR BLD AUTO: 1.3 % — SIGNIFICANT CHANGE UP (ref 0–8)
GLUCOSE SERPL-MCNC: 108 MG/DL — HIGH (ref 70–99)
HCT VFR BLD CALC: 32.1 % — LOW (ref 37–47)
HGB BLD-MCNC: 10.9 G/DL — LOW (ref 12–16)
IMM GRANULOCYTES NFR BLD AUTO: 0.3 % — SIGNIFICANT CHANGE UP (ref 0.1–0.3)
LYMPHOCYTES # BLD AUTO: 1.2 K/UL — SIGNIFICANT CHANGE UP (ref 1.2–3.4)
LYMPHOCYTES # BLD AUTO: 32.2 % — SIGNIFICANT CHANGE UP (ref 20.5–51.1)
MAGNESIUM SERPL-MCNC: 1.6 MG/DL — LOW (ref 1.8–2.4)
MCHC RBC-ENTMCNC: 31.8 PG — HIGH (ref 27–31)
MCHC RBC-ENTMCNC: 34 G/DL — SIGNIFICANT CHANGE UP (ref 32–37)
MCV RBC AUTO: 93.6 FL — SIGNIFICANT CHANGE UP (ref 81–99)
MONOCYTES # BLD AUTO: 0.29 K/UL — SIGNIFICANT CHANGE UP (ref 0.1–0.6)
MONOCYTES NFR BLD AUTO: 7.8 % — SIGNIFICANT CHANGE UP (ref 1.7–9.3)
NEUTROPHILS # BLD AUTO: 2.17 K/UL — SIGNIFICANT CHANGE UP (ref 1.4–6.5)
NEUTROPHILS NFR BLD AUTO: 58.1 % — SIGNIFICANT CHANGE UP (ref 42.2–75.2)
NRBC # BLD: 0 /100 WBCS — SIGNIFICANT CHANGE UP (ref 0–0)
PLATELET # BLD AUTO: 208 K/UL — SIGNIFICANT CHANGE UP (ref 130–400)
POTASSIUM SERPL-MCNC: 3.2 MMOL/L — LOW (ref 3.5–5)
POTASSIUM SERPL-SCNC: 3.2 MMOL/L — LOW (ref 3.5–5)
PROT SERPL-MCNC: 5.8 G/DL — LOW (ref 6–8)
RBC # BLD: 3.43 M/UL — LOW (ref 4.2–5.4)
RBC # FLD: 12.7 % — SIGNIFICANT CHANGE UP (ref 11.5–14.5)
SODIUM SERPL-SCNC: 142 MMOL/L — SIGNIFICANT CHANGE UP (ref 135–146)
TSH SERPL-MCNC: 2.86 UIU/ML — SIGNIFICANT CHANGE UP (ref 0.27–4.2)
WBC # BLD: 3.73 K/UL — LOW (ref 4.8–10.8)
WBC # FLD AUTO: 3.73 K/UL — LOW (ref 4.8–10.8)

## 2020-12-11 PROCEDURE — 99232 SBSQ HOSP IP/OBS MODERATE 35: CPT

## 2020-12-11 PROCEDURE — 99239 HOSP IP/OBS DSCHRG MGMT >30: CPT

## 2020-12-11 RX ORDER — POLYETHYLENE GLYCOL 3350 17 G/17G
17 POWDER, FOR SOLUTION ORAL
Qty: 0 | Refills: 0 | DISCHARGE
Start: 2020-12-11

## 2020-12-11 RX ORDER — TRAMADOL HYDROCHLORIDE 50 MG/1
1 TABLET ORAL
Qty: 0 | Refills: 0 | DISCHARGE
Start: 2020-12-11 | End: 2020-12-13

## 2020-12-11 RX ORDER — TRAMADOL HYDROCHLORIDE 50 MG/1
0.5 TABLET ORAL
Qty: 6 | Refills: 0
Start: 2020-12-11 | End: 2020-12-13

## 2020-12-11 RX ADMIN — METHOCARBAMOL 500 MILLIGRAM(S): 500 TABLET, FILM COATED ORAL at 05:38

## 2020-12-11 RX ADMIN — Medication 10 MILLIGRAM(S): at 05:38

## 2020-12-11 RX ADMIN — TRAMADOL HYDROCHLORIDE 50 MILLIGRAM(S): 50 TABLET ORAL at 05:39

## 2020-12-11 RX ADMIN — LEVETIRACETAM 250 MILLIGRAM(S): 250 TABLET, FILM COATED ORAL at 05:38

## 2020-12-11 RX ADMIN — TRAMADOL HYDROCHLORIDE 50 MILLIGRAM(S): 50 TABLET ORAL at 06:53

## 2020-12-11 RX ADMIN — SIMETHICONE 80 MILLIGRAM(S): 80 TABLET, CHEWABLE ORAL at 05:37

## 2020-12-11 RX ADMIN — PANTOPRAZOLE SODIUM 40 MILLIGRAM(S): 20 TABLET, DELAYED RELEASE ORAL at 05:38

## 2020-12-11 RX ADMIN — POLYETHYLENE GLYCOL 3350 17 GRAM(S): 17 POWDER, FOR SOLUTION ORAL at 05:38

## 2020-12-11 NOTE — PROGRESS NOTE ADULT - SUBJECTIVE AND OBJECTIVE BOX
Gastroenterology progress note:     Patient is a 55y old  Female who presents with a chief complaint of Abdominal Pain (11 Dec 2020 11:22)       Admitted on: 12-09-20    We are following the patient for IBS-C     Interval History: patient is post enema, has multiple BM, pain improved from 10/10 to 7/10 and feels this is around her baseline     Patient's medical problems are improving    Prior records reviewed (Y/N): Y  History obtained from someone other than patient (Y/N):N       PAST MEDICAL & SURGICAL HISTORY:  Anxiety    Hiatal hernia    GERD (gastroesophageal reflux disease)    Seasonal allergic rhinitis, unspecified trigger    Hypercholesteremia    Migraines    Seizures  last one 6 yrs ago    Back pain  henriated discs    S/P cholecystectomy    History of appendectomy    H/O rotator cuff surgery  right    S/p partial hysterectomy with remaining cervical stump    History of surgery  4 rght knee sx  1 left knee sx  cholecystectomy  rotator cuff right  abdominal sx-- adhesions  c section  ovarian cystectomy  breast cystectomies  colonoscopy   endoscopies  parital hysterectomy        MEDICATIONS  (STANDING):  atorvastatin 80 milliGRAM(s) Oral at bedtime  busPIRone 10 milliGRAM(s) Oral two times a day  chlorhexidine 4% Liquid 1 Application(s) Topical <User Schedule>  enoxaparin Injectable 40 milliGRAM(s) SubCutaneous daily  famotidine    Tablet 40 milliGRAM(s) Oral daily  influenza   Vaccine 0.5 milliLiter(s) IntraMuscular once  ipratropium 17 MICROgram(s) HFA Inhaler 1 Puff(s) Inhalation every 6 hours  levETIRAcetam 250 milliGRAM(s) Oral two times a day  methocarbamol 500 milliGRAM(s) Oral every 8 hours  pantoprazole    Tablet 40 milliGRAM(s) Oral every 12 hours  polyethylene glycol 3350 17 Gram(s) Oral two times a day  saline laxative (FLEET) Rectal Enema 1 Enema Rectal daily  senna 2 Tablet(s) Oral at bedtime  simethicone 80 milliGRAM(s) Chew every 8 hours    MEDICATIONS  (PRN):  acetaminophen   Tablet .. 650 milliGRAM(s) Oral every 6 hours PRN Mild Pain (1 - 3)  clonazePAM  Tablet 2 milliGRAM(s) Oral every 8 hours PRN anxiety  polyethylene glycol 3350 17 Gram(s) Oral every 12 hours PRN Constipation  traMADol 50 milliGRAM(s) Oral every 8 hours PRN Moderate Pain (4 - 6)      Allergies  Cipro (Anaphylaxis; Urticaria; Hives)  Toradol (Rash; Urticaria; Anaphylaxis (Mild to Mod))      Review of Systems:   General: no fever  HEENT: no hemoptysis  Cardiovascular:  No Chest Pain, No Palpitations  Respiratory:  No Cough, No Dyspnea  Gastrointestinal:  As described in HPI  Hematology: no bruising or hematoma   Neurology: no new motor deficit  Skin: no new rash    Physical Examination:  T(C): 35.7 (12-11-20 @ 05:06), Max: 36.2 (12-10-20 @ 20:28)  HR: 60 (12-11-20 @ 08:10) (60 - 72)  BP: 130/64 (12-11-20 @ 05:06) (130/64 - 158/76)  RR: 18 (12-11-20 @ 05:06) (18 - 19)  SpO2: 95% (12-11-20 @ 08:10) (95% - 99%)        Constitutional: No acute distress.  Head: normocephalic  Neck: no palpable thyroid  Eyes: EOMI  Respiratory:  No signs of respiratory distress. Lung sounds are clear bilaterally.  Cardiovascular:  S1 S2, Regular rate and rhythm.  Abdominal: Abdomen is soft, symmetric, and non-tender without distention.    Extremities: no pitting edema  Skin: No rashes, No Jaundice.        Data: (reviewed by attending)                        10.9   3.73  )-----------( 208      ( 11 Dec 2020 07:20 )             32.1     Hgb trend:  10.9  12-11-20 @ 07:20  11.0  12-10-20 @ 05:32  11.0  12-09-20 @ 14:25      12-11    142  |  105  |  9<L>  ----------------------------<  108<H>  3.2<L>   |  27  |  0.6<L>    Ca    9.1      11 Dec 2020 07:20  Mg     1.6     12-11    TPro  5.8<L>  /  Alb  3.6  /  TBili  0.5  /  DBili  x   /  AST  41  /  ALT  66<H>  /  AlkPhos  120<H>  12-11    Liver panel trend:  TBili 0.5   /   AST 41   /   ALT 66   /   AlkP 120   /   Tptn 5.8   /   Alb 3.6    /   DBili --      12-11  TBili 0.8   /   AST 57   /   ALT 66   /   AlkP 118   /   Tptn 6.0   /   Alb 3.7    /   DBili --      12-10  TBili <0.2   /   AST 35   /   ALT 57   /   AlkP 112   /   Tptn 5.9   /   Alb 3.7    /   DBili --      12-09      PT/INR - ( 09 Dec 2020 14:25 )   PT: 11.20 sec;   INR: 0.97 ratio         PTT - ( 09 Dec 2020 14:25 )  PTT:29.1 sec    Culture - Urine (collected 09 Dec 2020 13:37)  Source: .Urine Clean Catch (Midstream)  Final Report (10 Dec 2020 17:25):    <10,000 CFU/mL Normal Urogenital Agustina         Radiology: (reviewed by attending)  none new

## 2020-12-11 NOTE — PROGRESS NOTE ADULT - SUBJECTIVE AND OBJECTIVE BOX
RAMÍREZ RODARTE 55y Female  MRN#: [id number]   CODE STATUS:________      SUBJECTIVE  Patient is a 55y old Female who presents with a chief complaint of Abdominal Pain (11 Dec 2020 09:38)  Currently admitted to medicine with the primary diagnosis of Intractable abdominal pain      Today is hospital day 2d, and this morning she is           OBJECTIVE  PAST MEDICAL & SURGICAL HISTORY  Anxiety    Hiatal hernia    GERD (gastroesophageal reflux disease)    Seasonal allergic rhinitis, unspecified trigger    Hypercholesteremia    Migraines    Seizures  last one 6 yrs ago    Back pain  henriated discs    S/P cholecystectomy    History of appendectomy    H/O rotator cuff surgery  right    S/p partial hysterectomy with remaining cervical stump    History of surgery  4 rght knee sx  1 left knee sx  cholecystectomy  rotator cuff right  abdominal sx-- adhesions  c section  ovarian cystectomy  breast cystectomies  colonoscopy   endoscopies  parital hysterectomy      ALLERGIES:  Cipro (Anaphylaxis; Urticaria; Hives)  Toradol (Rash; Urticaria; Anaphylaxis (Mild to Mod))    MEDICATIONS:  STANDING MEDICATIONS  atorvastatin 80 milliGRAM(s) Oral at bedtime  busPIRone 10 milliGRAM(s) Oral two times a day  chlorhexidine 4% Liquid 1 Application(s) Topical <User Schedule>  enoxaparin Injectable 40 milliGRAM(s) SubCutaneous daily  famotidine    Tablet 40 milliGRAM(s) Oral daily  influenza   Vaccine 0.5 milliLiter(s) IntraMuscular once  ipratropium 17 MICROgram(s) HFA Inhaler 1 Puff(s) Inhalation every 6 hours  levETIRAcetam 250 milliGRAM(s) Oral two times a day  methocarbamol 500 milliGRAM(s) Oral every 8 hours  pantoprazole    Tablet 40 milliGRAM(s) Oral every 12 hours  polyethylene glycol 3350 17 Gram(s) Oral two times a day  saline laxative (FLEET) Rectal Enema 1 Enema Rectal daily  senna 2 Tablet(s) Oral at bedtime  simethicone 80 milliGRAM(s) Chew every 8 hours    PRN MEDICATIONS  acetaminophen   Tablet .. 650 milliGRAM(s) Oral every 6 hours PRN  clonazePAM  Tablet 2 milliGRAM(s) Oral every 8 hours PRN  polyethylene glycol 3350 17 Gram(s) Oral every 12 hours PRN  traMADol 50 milliGRAM(s) Oral every 8 hours PRN      VITAL SIGNS: Last 24 Hours  T(C): 35.7 (11 Dec 2020 05:06), Max: 36.2 (10 Dec 2020 20:28)  T(F): 96.3 (11 Dec 2020 05:06), Max: 97.1 (10 Dec 2020 20:28)  HR: 60 (11 Dec 2020 08:10) (60 - 80)  BP: 130/64 (11 Dec 2020 05:06) (130/64 - 158/76)  BP(mean): --  RR: 18 (11 Dec 2020 05:06) (18 - 19)  SpO2: 95% (11 Dec 2020 08:10) (95% - 99%)    LABS:                        10.9   3.73  )-----------( 208      ( 11 Dec 2020 07:20 )             32.1     12-    142  |  105  |  9<L>  ----------------------------<  108<H>  3.2<L>   |  27  |  0.6<L>    Ca    9.1      11 Dec 2020 07:20  Mg     1.6     12-11    TPro  5.8<L>  /  Alb  3.6  /  TBili  0.5  /  DBili  x   /  AST  41  /  ALT  66<H>  /  AlkPhos  120<H>  12-11    PT/INR - ( 09 Dec 2020 14:25 )   PT: 11.20 sec;   INR: 0.97 ratio         PTT - ( 09 Dec 2020 14:25 )  PTT:29.1 sec  Urinalysis Basic - ( 09 Dec 2020 13:37 )    Color: Colorless / Appearance: Clear / S.008 / pH: x  Gluc: x / Ketone: Negative  / Bili: Negative / Urobili: <2 mg/dL   Blood: x / Protein: Negative / Nitrite: Negative   Leuk Esterase: Negative / RBC: x / WBC x   Sq Epi: x / Non Sq Epi: x / Bacteria: x            Culture - Urine (collected 09 Dec 2020 13:37)  Source: .Urine Clean Catch (Midstream)  Final Report (10 Dec 2020 17:25):    <10,000 CFU/mL Normal Urogenital Agustina      CARDIAC MARKERS ( 09 Dec 2020 14:25 )  x     / <0.01 ng/mL / x     / x     / x          RADIOLOGY:      PHYSICAL EXAM:    GENERAL: NAD, well-developed, AAOx3  HEENT:  Atraumatic, Normocephalic. EOMI, PERRLA, conjunctiva and sclera clear, No JVD  PULMONARY: Clear to auscultation bilaterally; No wheeze  CARDIOVASCULAR: Regular rate and rhythm; No murmurs, rubs, or gallops  GASTROINTESTINAL: Soft, Nontender, Nondistended; Bowel sounds present  MUSCULOSKELETAL:  2+ Peripheral Pulses, No clubbing, cyanosis, or edema  NEUROLOGY: non-focal  SKIN: No rashes or lesions       RAMÍREZ BRANCH 55y Female  MRN#: [id number]   CODE STATUS:________      SUBJECTIVE  Patient is a 55y old Female who presents with a chief complaint of Abdominal Pain (11 Dec 2020 09:38)  Currently admitted to medicine with the primary diagnosis of Intractable abdominal pain      Today is hospital day 2d. Ms. Branch was seen at bedside this morning and reports slight symptomatic improvement. She confirms 4 bowel movements last night. Her pain is only minimally improved, now rated as 7/10 rather than the 8/10 it was yesterday. She urinates without issues. She is able to eat without issues. She walks at her baseline. She denies fevers, chills, headache, dizziness, nausea, vomiting, dysuria, joint pains, or rashes.          OBJECTIVE  PAST MEDICAL & SURGICAL HISTORY  Anxiety    Hiatal hernia    GERD (gastroesophageal reflux disease)    Seasonal allergic rhinitis, unspecified trigger    Hypercholesteremia    Migraines    Seizures  last one 6 yrs ago    Back pain  henriated discs    S/P cholecystectomy    History of appendectomy    H/O rotator cuff surgery  right    S/p partial hysterectomy with remaining cervical stump    History of surgery  4 rght knee sx  1 left knee sx  cholecystectomy  rotator cuff right  abdominal sx-- adhesions  c section  ovarian cystectomy  breast cystectomies  colonoscopy   endoscopies  parital hysterectomy      ALLERGIES:  Cipro (Anaphylaxis; Urticaria; Hives)  Toradol (Rash; Urticaria; Anaphylaxis (Mild to Mod))    MEDICATIONS:  STANDING MEDICATIONS  atorvastatin 80 milliGRAM(s) Oral at bedtime  busPIRone 10 milliGRAM(s) Oral two times a day  chlorhexidine 4% Liquid 1 Application(s) Topical <User Schedule>  enoxaparin Injectable 40 milliGRAM(s) SubCutaneous daily  famotidine    Tablet 40 milliGRAM(s) Oral daily  influenza   Vaccine 0.5 milliLiter(s) IntraMuscular once  ipratropium 17 MICROgram(s) HFA Inhaler 1 Puff(s) Inhalation every 6 hours  levETIRAcetam 250 milliGRAM(s) Oral two times a day  methocarbamol 500 milliGRAM(s) Oral every 8 hours  pantoprazole    Tablet 40 milliGRAM(s) Oral every 12 hours  polyethylene glycol 3350 17 Gram(s) Oral two times a day  saline laxative (FLEET) Rectal Enema 1 Enema Rectal daily  senna 2 Tablet(s) Oral at bedtime  simethicone 80 milliGRAM(s) Chew every 8 hours    PRN MEDICATIONS  acetaminophen   Tablet .. 650 milliGRAM(s) Oral every 6 hours PRN  clonazePAM  Tablet 2 milliGRAM(s) Oral every 8 hours PRN  polyethylene glycol 3350 17 Gram(s) Oral every 12 hours PRN  traMADol 50 milliGRAM(s) Oral every 8 hours PRN      VITAL SIGNS: Last 24 Hours  T(C): 35.7 (11 Dec 2020 05:06), Max: 36.2 (10 Dec 2020 20:28)  T(F): 96.3 (11 Dec 2020 05:06), Max: 97.1 (10 Dec 2020 20:28)  HR: 60 (11 Dec 2020 08:10) (60 - 80)  BP: 130/64 (11 Dec 2020 05:06) (130/64 - 158/76)  BP(mean): --  RR: 18 (11 Dec 2020 05:06) (18 - 19)  SpO2: 95% (11 Dec 2020 08:10) (95% - 99%)    LABS:                        10.9   3.73  )-----------( 208      ( 11 Dec 2020 07:20 )             32.1     12-    142  |  105  |  9<L>  ----------------------------<  108<H>  3.2<L>   |  27  |  0.6<L>    Ca    9.1      11 Dec 2020 07:20  Mg     1.6     12-    TPro  5.8<L>  /  Alb  3.6  /  TBili  0.5  /  DBili  x   /  AST  41  /  ALT  66<H>  /  AlkPhos  120<H>  12-11    PT/INR - ( 09 Dec 2020 14:25 )   PT: 11.20 sec;   INR: 0.97 ratio         PTT - ( 09 Dec 2020 14:25 )  PTT:29.1 sec  Urinalysis Basic - ( 09 Dec 2020 13:37 )    Color: Colorless / Appearance: Clear / S.008 / pH: x  Gluc: x / Ketone: Negative  / Bili: Negative / Urobili: <2 mg/dL   Blood: x / Protein: Negative / Nitrite: Negative   Leuk Esterase: Negative / RBC: x / WBC x   Sq Epi: x / Non Sq Epi: x / Bacteria: x            Culture - Urine (collected 09 Dec 2020 13:37)  Source: .Urine Clean Catch (Midstream)  Final Report (10 Dec 2020 17:25):    <10,000 CFU/mL Normal Urogenital Agustina      CARDIAC MARKERS ( 09 Dec 2020 14:25 )  x     / <0.01 ng/mL / x     / x     / x          RADIOLOGY:      PHYSICAL EXAM:    GENERAL: NAD, AAOx3  HEENT:  Atraumatic, Normocephalic, conjunctiva and sclera clear  PULMONARY: Clear to auscultation bilaterally; No wheeze  CARDIOVASCULAR: S1 and S2 present, regular rate and rhythm; No murmurs, rubs, or gallops  GASTROINTESTINAL: surgical scars present, diffusely tender to palpation, slightly distended; Bowel sounds present  NEUROLOGY: non-focal  LOWER EXTREMITIES: No edema, no erythema, nontender to palpation bilaterally

## 2020-12-11 NOTE — DISCHARGE NOTE PROVIDER - HOSPITAL COURSE
Chief Complaint: Abdominal Pain   Past Medical History: Abdominal adhesions s/p lysis, GERD with Arellano's Esophagus, Gastritis, Esophageal erosions, Seizure disorder, Migraines, HLD, and anxiety   Past Surgical History: Appendectomy at age 15, in 2011 had open resection of appendiceal stump, 2017 exp lap lysis of adhesions, 2018 wide excision of abdominal granuloma    History of present illness goes back to around Thanksgiving when the patient began to have worsening abdominal pain on top of her baseline chronic pain. She said it was moderate in intensity was constant, and relieved with defecation. She had an EDG done by her GI DrAnant Avila which showed diffuse inflammation from the esophagus to the duodenum. She presented to the ED for further evaluation since her pain was worsening. She endorsed constipation of 4 days and abdominal pain.   In the ED, her vitals were stable. CT A/P was unremarkable. Surgery was consulted and recommended no intervention, likely constipation. She will be admitted for management of abdominal pain and GI consultation.   ROS: Denies headache, changes in vision, chest pain, palpitations, shortness of breath, cough, fever, chills, nausea, vomiting, diarrhea.    # Abdominal Pain  - Could be secondary to constipation or factitious given she has requested morphine. No obstruction or volvulus on CT.   - spoke to Dr. Avila and recommends for Dr. Yanez to see the patient   - continue with home pain regimen for now  - Bowel regimen with Senna and Miralax   - For pain, avoid opioids. On tramadol 50mg q8  - Nothing to do from a surgical standpoint, recommends multiple ambulation  - F/u GI outpatient    # GERD/BE/gastritis  - Continue Protonix 40mg PO twice daily and Pepcid 40mg PO daily   - Can add Carafate if pain persists   - S/p EGD on 12/1   - GI follow-up     # HLD  - Lipitor 80mg PO daily     # Seizure disorder  - Keppra 250mg PO twice daily     # Migraine  - Takes Fioricet at home   - treat PRN    # Anxiety  - Klonopin 2mg PO three times daily PRN

## 2020-12-11 NOTE — DISCHARGE NOTE PROVIDER - NSDCMRMEDTOKEN_GEN_ALL_CORE_FT
atorvastatin 80 mg oral tablet: 1 tab(s) orally once a day  busPIRone 10 mg oral tablet: 1 tab(s) orally 2 times a day  Fioricet oral tablet: 1 tab(s) orally every 4 hours, As Needed  Incruse Ellipta 62.5 mcg/inh inhalation powder:   KlonoPIN 2 mg oral tablet: 1 tab(s) orally 3 times a day  levETIRAcetam 250 mg oral tablet: 1 tab(s) orally 2 times a day  Pepcid 40 mg oral tablet: 1 tab(s) orally once a day (at bedtime)  polyethylene glycol 3350 oral powder for reconstitution: 17 gram(s) orally 2 times a day  Protonix 40 mg oral delayed release tablet: 1 tab(s) orally 2 times a day  tiZANidine 4 mg oral tablet: 2 tab(s) orally every 8 hours  traMADol 50 mg oral tablet: 0.5 tab(s) orally every 6 hours, As needed, Severe Pain (7 - 10) MDD:2 full tabs

## 2020-12-11 NOTE — PROGRESS NOTE ADULT - ASSESSMENT
a 54 yo with history of IBS-C, Abdominal adhesions s/p lysis, GERD with Arellano's Esophagus, Gastritis, Esophageal erosions, Seizure disorder, Migraines, HLD, and anxiety presented for abdominal pain and constipation    *IBS-Constipation  -pain improved, has multiple BM  -CT noted no obstruction, fecal load mainly descending colon  -Lipase normal, lactate normal, LFTs normal, No leukocytosis, no fever no chills  -c/w miralax BID and senna. Miralax can be titrated depending on patient's response   -avoid opioids  -check TSH  -correct electrolytes as needed   -patient needs repeat colonoscopy as outpatient in the setting of intermittent minimal blood per rectum  -will follow     *Patient does not have her denture, consider soft mashed diet to avoid food impaction  call for questions
55F with an extensive GI and surgical history presenting for evaluation of progressive abdominal pain    # Abdominal Pain  - Could be secondary to constipation or factitious given she has requested morphine. No obstruction or volvulus on CT.   - spoke to Dr. Avila and recommends for Dr. Yanez to see the patient   - continue with home pain regimen for now  - Bowel regimen with Senna and Miralax   - For pain, avoid opioids. On tramadol 50mg q8  - Nothing to do from a surgical standpoint, recommends multiple ambulation  - F/u GI outpatient    # GERD/BE/gastritis  - Continue Protonix 40mg PO twice daily and Pepcid 40mg PO daily   - Can add Carafate if pain persists   - S/p EGD on 12/1   - GI follow-up     # HLD  - Lipitor 80mg PO daily     # Seizure disorder  - Keppra 250mg PO twice daily     # Migraine  - Takes Fioricet at home   - treat PRN    # Anxiety  - Klonopin 2mg PO three times daily PRN     DVT ppx: Lovenox 40 mg SubQ daily   GI ppx: Protonix 40mg twice daily   Diet: low fiber diet  Activity: IAT   Dispo: From home   Code: FULL
55F with an extensive GI and surgical history presenting for evaluation of progressive abdominal pain    # Abdominal Pain  - Could be secondary to constipation or factitious given she has requested morphine. No obstruction or volvulus on CT.   - spoke to Dr. Avila and recommends for Dr. Yanez to see the patient   - continue with home pain regimen for now  - Bowel regimen with Senna and Miralax   - For pain, avoid opioids. On tramadol 50mg q8  - Start bentyl 10mg PO q6 if abdominal cramps  - Nothing to do from a surgical standpoint, recommends multiple ambulation  - Lactulose q8    # GERD/BE/gastritis  - Continue Protonix 40mg PO twice daily and Pepcid 40mg PO daily   - Can add Carafate if pain persists   - S/p EGD on 12/1   - GI follow-up     # HLD  - Lipitor 80mg PO daily     # Seizure disorder  - Keppra 250mg PO twice daily     # Migraine  - Takes Fioricet at home   - treat PRN    # Anxiety  - Klonopin 2mg PO three times daily PRN     DVT ppx: Lovenox 40 mg SubQ daily   GI ppx: Protonix 40mg twice daily   Diet: low fiber diet  Activity: IAT   Dispo: From home   Code: FULL

## 2020-12-11 NOTE — PROGRESS NOTE ADULT - ATTENDING COMMENTS
above noted discussed case with surgical resident abdomen soft no distension mild tenderness
54 yo f with constipation.  Follow up with Dr Avila, continue bowel regimen.  Workup to be done as outpatient with Dr Avila  See above for details

## 2020-12-11 NOTE — DISCHARGE NOTE PROVIDER - CARE PROVIDER_API CALL
Raghavendra Umana  Pediatrics  [facility] [place] [address]  Phone: [phone]  Fax: [phone]  Follow Up Time: 2 weeks    Javier Avila  GASTROENTEROLOGY  [address] [place] [address]  Phone: [phone]  Fax: [phone]  Follow Up Time: 1 week

## 2020-12-11 NOTE — DISCHARGE NOTE PROVIDER - NSDCCPCAREPLAN_GEN_ALL_CORE_FT
PRINCIPAL DISCHARGE DIAGNOSIS  Diagnosis: Intractable abdominal pain  Assessment and Plan of Treatment: We think that your abdominal pain was like related to constipation. We put you on some bowel regiment and you were able to have some bowel movement.   We recommend a low fat diet and a low fiber diet.  We recommend continuing miralax and senna  Continue to manage pain with Tramadol  F/u with GI

## 2020-12-11 NOTE — PROGRESS NOTE ADULT - SUBJECTIVE AND OBJECTIVE BOX
Progress Note: General Surgery  Patient: RAMÍREZ RODARTE , 55y (1965)Female   MRN: [id number]  Location: [address]  Visit: 20 Inpatient  Date: 20 @ 09:38    Procedure/Diagnosis:     Events/ 24h: No acute events overnight. Pain controlled. Pt has 4 bowel movements    Vitals: T(F): 96.3 (20 @ 05:06), Max: 97.1 (12-10-20 @ 20:28)  HR: 60 (20 @ 08:10)  BP: 130/64 (20 @ 05:06) (130/64 - 158/76)  RR: 18 (20 @ 05:06)  SpO2: 95% (20 @ 08:10)    In:   Out:   Net:     Diet: Diet, DASH/TLC:   Sodium & Cholesterol Restricted (12-10-20 @ 01:13)    IV Fluids:     Physical Examination:  General Appearance: NAD  HEENT: EOMI, sclera non-icteric.  Heart: RRR   Lungs: CTABL.   Abdomen:  Soft, nontender, nondistended.   MSK/Extremities: Warm & well-perfused.   Skin: Warm, dry. No jaundice.       Medications: [Standing]  atorvastatin 80 milliGRAM(s) Oral at bedtime  busPIRone 10 milliGRAM(s) Oral two times a day  chlorhexidine 4% Liquid 1 Application(s) Topical <User Schedule>  enoxaparin Injectable 40 milliGRAM(s) SubCutaneous daily  famotidine    Tablet 40 milliGRAM(s) Oral daily  influenza   Vaccine 0.5 milliLiter(s) IntraMuscular once  ipratropium 17 MICROgram(s) HFA Inhaler 1 Puff(s) Inhalation every 6 hours  levETIRAcetam 250 milliGRAM(s) Oral two times a day  methocarbamol 500 milliGRAM(s) Oral every 8 hours  pantoprazole    Tablet 40 milliGRAM(s) Oral every 12 hours  polyethylene glycol 3350 17 Gram(s) Oral two times a day  saline laxative (FLEET) Rectal Enema 1 Enema Rectal daily  senna 2 Tablet(s) Oral at bedtime  simethicone 80 milliGRAM(s) Chew every 8 hours    DVT Prophylaxis: enoxaparin Injectable 40 milliGRAM(s) SubCutaneous daily    GI Prophylaxis: famotidine    Tablet 40 milliGRAM(s) Oral daily  pantoprazole    Tablet 40 milliGRAM(s) Oral every 12 hours    Antibiotics:   Anticoagulation:   Medications:[PRN]  acetaminophen   Tablet .. 650 milliGRAM(s) Oral every 6 hours PRN  clonazePAM  Tablet 2 milliGRAM(s) Oral every 8 hours PRN  polyethylene glycol 3350 17 Gram(s) Oral every 12 hours PRN  traMADol 50 milliGRAM(s) Oral every 8 hours PRN      Labs:                        10.9   3.73  )-----------( 208      ( 11 Dec 2020 07:20 )             32.1     12-    142  |  105  |  9<L>  ----------------------------<  108<H>  3.2<L>   |  27  |  0.6<L>    Ca    9.1      11 Dec 2020 07:20  Mg     1.6     12    TPro  5.8<L>  /  Alb  3.6  /  TBili  0.5  /  DBili  x   /  AST  41  /  ALT  66<H>  /  AlkPhos  120<H>  12-    LIVER FUNCTIONS - ( 11 Dec 2020 07:20 )  Alb: 3.6 g/dL / Pro: 5.8 g/dL / ALK PHOS: 120 U/L / ALT: 66 U/L / AST: 41 U/L / GGT: x           PT/INR - ( 09 Dec 2020 14:25 )   PT: 11.20 sec;   INR: 0.97 ratio         PTT - ( 09 Dec 2020 14:25 )  PTT:29.1 sec    CARDIAC MARKERS ( 09 Dec 2020 14:25 )  x     / <0.01 ng/mL / x     / x     / x          Urine/Micro:    Culture - Urine (collected 09 Dec 2020 13:37)  Source: .Urine Clean Catch (Midstream)  Final Report (10 Dec 2020 17:25):    <10,000 CFU/mL Normal Urogenital Agustina      Urinalysis Basic - ( 09 Dec 2020 13:37 )    Color: Colorless / Appearance: Clear / S.008 / pH: x  Gluc: x / Ketone: Negative  / Bili: Negative / Urobili: <2 mg/dL   Blood: x / Protein: Negative / Nitrite: Negative   Leuk Esterase: Negative / RBC: x / WBC x   Sq Epi: x / Non Sq Epi: x / Bacteria: x        Imaging:     Assessment:  55y Female patient with constipation    Plan:  avoid narcotics  bowel reg  DVT/GI ppx  OOBAT  IS  Pain control    Thank you for the consult. Call surgery PRN    Date/Time: 20 @ 09:38

## 2020-12-11 NOTE — DISCHARGE NOTE NURSING/CASE MANAGEMENT/SOCIAL WORK - PATIENT PORTAL LINK FT
You can access the FollowMyHealth Patient Portal offered by [facility] by registering at the following website: [email or web address]. By joining [employer]’s FollowMyHealth portal, you will also be able to view your health information using other applications (apps) compatible with our system.

## 2020-12-11 NOTE — DISCHARGE NOTE PROVIDER - PROVIDER TOKENS
PROVIDER:[TOKEN:[[id number]],FOLLOWUP:[2 weeks]],PROVIDER:[TOKEN:[[id number]],FOLLOWUP:[1 week]]

## 2020-12-15 DIAGNOSIS — M54.9 DORSALGIA, UNSPECIFIED: ICD-10-CM

## 2020-12-15 DIAGNOSIS — G43.909 MIGRAINE, UNSPECIFIED, NOT INTRACTABLE, WITHOUT STATUS MIGRAINOSUS: ICD-10-CM

## 2020-12-15 DIAGNOSIS — R10.9 UNSPECIFIED ABDOMINAL PAIN: ICD-10-CM

## 2020-12-15 DIAGNOSIS — E61.2 MAGNESIUM DEFICIENCY: ICD-10-CM

## 2020-12-15 DIAGNOSIS — K59.00 CONSTIPATION, UNSPECIFIED: ICD-10-CM

## 2020-12-15 DIAGNOSIS — K21.9 GASTRO-ESOPHAGEAL REFLUX DISEASE WITHOUT ESOPHAGITIS: ICD-10-CM

## 2020-12-15 DIAGNOSIS — Z88.8 ALLERGY STATUS TO OTHER DRUGS, MEDICAMENTS AND BIOLOGICAL SUBSTANCES: ICD-10-CM

## 2020-12-15 DIAGNOSIS — K22.70 BARRETT'S ESOPHAGUS WITHOUT DYSPLASIA: ICD-10-CM

## 2020-12-15 DIAGNOSIS — F41.9 ANXIETY DISORDER, UNSPECIFIED: ICD-10-CM

## 2020-12-15 DIAGNOSIS — K29.70 GASTRITIS, UNSPECIFIED, WITHOUT BLEEDING: ICD-10-CM

## 2020-12-15 DIAGNOSIS — E78.5 HYPERLIPIDEMIA, UNSPECIFIED: ICD-10-CM

## 2020-12-15 DIAGNOSIS — G40.909 EPILEPSY, UNSPECIFIED, NOT INTRACTABLE, WITHOUT STATUS EPILEPTICUS: ICD-10-CM

## 2020-12-15 DIAGNOSIS — G89.29 OTHER CHRONIC PAIN: ICD-10-CM

## 2020-12-23 PROBLEM — Z87.09 HISTORY OF SORE THROAT: Status: RESOLVED | Noted: 2019-03-19 | Resolved: 2020-12-23

## 2021-01-27 NOTE — ED CDU PROVIDER DISPOSITION NOTE - NS ED MD EM OBS MULTI DAY STAY
[id number] - High Complexity

## 2021-03-02 PROBLEM — Z00.00 ENCOUNTER FOR PREVENTIVE HEALTH EXAMINATION: Noted: 2021-03-02

## 2021-03-04 ENCOUNTER — APPOINTMENT (OUTPATIENT)
Dept: OBGYN | Facility: CLINIC | Age: 56
End: 2021-03-04
Payer: MEDICARE

## 2021-03-04 VITALS — HEIGHT: 62 IN | TEMPERATURE: 97.2 F | BODY MASS INDEX: 26.68 KG/M2 | WEIGHT: 145 LBS

## 2021-03-04 DIAGNOSIS — N89.8 OTHER SPECIFIED NONINFLAMMATORY DISORDERS OF VAGINA: ICD-10-CM

## 2021-03-04 DIAGNOSIS — N95.2 POSTMENOPAUSAL ATROPHIC VAGINITIS: ICD-10-CM

## 2021-03-04 LAB
BILIRUB UR QL STRIP: NORMAL
CLARITY UR: CLEAR
GLUCOSE UR-MCNC: NORMAL
HCG UR QL: NORMAL EU/DL
HGB UR QL STRIP.AUTO: NORMAL
KETONES UR-MCNC: NORMAL
LEUKOCYTE ESTERASE UR QL STRIP: NORMAL
NITRITE UR QL STRIP: NORMAL
PH UR STRIP: 5.5
PROT UR STRIP-MCNC: NORMAL
SP GR UR STRIP: 1.02

## 2021-03-04 PROCEDURE — 81003 URINALYSIS AUTO W/O SCOPE: CPT | Mod: QW

## 2021-03-04 PROCEDURE — 99386 PREV VISIT NEW AGE 40-64: CPT

## 2021-03-04 PROCEDURE — 99072 ADDL SUPL MATRL&STAF TM PHE: CPT

## 2021-03-08 LAB — HPV HIGH+LOW RISK DNA PNL CVX: NOT DETECTED

## 2021-03-15 LAB — CYTOLOGY CVX/VAG DOC THIN PREP: ABNORMAL

## 2021-04-18 ENCOUNTER — EMERGENCY (EMERGENCY)
Facility: HOSPITAL | Age: 56
LOS: 0 days | Discharge: HOME | End: 2021-04-18
Attending: EMERGENCY MEDICINE | Admitting: EMERGENCY MEDICINE
Payer: MEDICARE

## 2021-04-18 VITALS
TEMPERATURE: 98 F | WEIGHT: 145.06 LBS | DIASTOLIC BLOOD PRESSURE: 87 MMHG | RESPIRATION RATE: 18 BRPM | OXYGEN SATURATION: 96 % | SYSTOLIC BLOOD PRESSURE: 202 MMHG | HEART RATE: 80 BPM

## 2021-04-18 VITALS
DIASTOLIC BLOOD PRESSURE: 78 MMHG | HEART RATE: 75 BPM | RESPIRATION RATE: 16 BRPM | OXYGEN SATURATION: 95 % | TEMPERATURE: 97 F | SYSTOLIC BLOOD PRESSURE: 182 MMHG

## 2021-04-18 DIAGNOSIS — E78.5 HYPERLIPIDEMIA, UNSPECIFIED: ICD-10-CM

## 2021-04-18 DIAGNOSIS — Z98.890 OTHER SPECIFIED POSTPROCEDURAL STATES: Chronic | ICD-10-CM

## 2021-04-18 DIAGNOSIS — R51.9 HEADACHE, UNSPECIFIED: ICD-10-CM

## 2021-04-18 DIAGNOSIS — M79.602 PAIN IN LEFT ARM: ICD-10-CM

## 2021-04-18 DIAGNOSIS — Z90.711 ACQUIRED ABSENCE OF UTERUS WITH REMAINING CERVICAL STUMP: Chronic | ICD-10-CM

## 2021-04-18 DIAGNOSIS — Z88.1 ALLERGY STATUS TO OTHER ANTIBIOTIC AGENTS STATUS: ICD-10-CM

## 2021-04-18 DIAGNOSIS — M54.9 DORSALGIA, UNSPECIFIED: ICD-10-CM

## 2021-04-18 DIAGNOSIS — F17.210 NICOTINE DEPENDENCE, CIGARETTES, UNCOMPLICATED: ICD-10-CM

## 2021-04-18 DIAGNOSIS — Z90.49 ACQUIRED ABSENCE OF OTHER SPECIFIED PARTS OF DIGESTIVE TRACT: Chronic | ICD-10-CM

## 2021-04-18 DIAGNOSIS — R07.9 CHEST PAIN, UNSPECIFIED: ICD-10-CM

## 2021-04-18 DIAGNOSIS — Z88.0 ALLERGY STATUS TO PENICILLIN: ICD-10-CM

## 2021-04-18 DIAGNOSIS — R68.84 JAW PAIN: ICD-10-CM

## 2021-04-18 DIAGNOSIS — Z88.9 ALLERGY STATUS TO UNSPECIFIED DRUGS, MEDICAMENTS AND BIOLOGICAL SUBSTANCES: ICD-10-CM

## 2021-04-18 DIAGNOSIS — I25.10 ATHEROSCLEROTIC HEART DISEASE OF NATIVE CORONARY ARTERY WITHOUT ANGINA PECTORIS: ICD-10-CM

## 2021-04-18 DIAGNOSIS — R07.89 OTHER CHEST PAIN: ICD-10-CM

## 2021-04-18 DIAGNOSIS — I10 ESSENTIAL (PRIMARY) HYPERTENSION: ICD-10-CM

## 2021-04-18 DIAGNOSIS — Z88.6 ALLERGY STATUS TO ANALGESIC AGENT: ICD-10-CM

## 2021-04-18 LAB
ALBUMIN SERPL ELPH-MCNC: 4.5 G/DL — SIGNIFICANT CHANGE UP (ref 3.5–5.2)
ALP SERPL-CCNC: 122 U/L — HIGH (ref 30–115)
ALT FLD-CCNC: 66 U/L — HIGH (ref 0–41)
ANION GAP SERPL CALC-SCNC: 13 MMOL/L — SIGNIFICANT CHANGE UP (ref 7–14)
AST SERPL-CCNC: 35 U/L — SIGNIFICANT CHANGE UP (ref 0–41)
BASOPHILS # BLD AUTO: 0.02 K/UL — SIGNIFICANT CHANGE UP (ref 0–0.2)
BASOPHILS NFR BLD AUTO: 0.3 % — SIGNIFICANT CHANGE UP (ref 0–1)
BILIRUB SERPL-MCNC: <0.2 MG/DL — SIGNIFICANT CHANGE UP (ref 0.2–1.2)
BUN SERPL-MCNC: 17 MG/DL — SIGNIFICANT CHANGE UP (ref 10–20)
CALCIUM SERPL-MCNC: 9.9 MG/DL — SIGNIFICANT CHANGE UP (ref 8.5–10.1)
CHLORIDE SERPL-SCNC: 104 MMOL/L — SIGNIFICANT CHANGE UP (ref 98–110)
CO2 SERPL-SCNC: 26 MMOL/L — SIGNIFICANT CHANGE UP (ref 17–32)
CREAT SERPL-MCNC: 0.7 MG/DL — SIGNIFICANT CHANGE UP (ref 0.7–1.5)
D DIMER BLD IA.RAPID-MCNC: 110 NG/ML DDU — SIGNIFICANT CHANGE UP (ref 0–230)
EOSINOPHIL # BLD AUTO: 0.05 K/UL — SIGNIFICANT CHANGE UP (ref 0–0.7)
EOSINOPHIL NFR BLD AUTO: 0.8 % — SIGNIFICANT CHANGE UP (ref 0–8)
GLUCOSE SERPL-MCNC: 124 MG/DL — HIGH (ref 70–99)
HCG SERPL QL: NEGATIVE — SIGNIFICANT CHANGE UP
HCT VFR BLD CALC: 38.9 % — SIGNIFICANT CHANGE UP (ref 37–47)
HGB BLD-MCNC: 13.3 G/DL — SIGNIFICANT CHANGE UP (ref 12–16)
IMM GRANULOCYTES NFR BLD AUTO: 0.3 % — SIGNIFICANT CHANGE UP (ref 0.1–0.3)
LYMPHOCYTES # BLD AUTO: 2.07 K/UL — SIGNIFICANT CHANGE UP (ref 1.2–3.4)
LYMPHOCYTES # BLD AUTO: 31.6 % — SIGNIFICANT CHANGE UP (ref 20.5–51.1)
MAGNESIUM SERPL-MCNC: 1.5 MG/DL — LOW (ref 1.8–2.4)
MCHC RBC-ENTMCNC: 31 PG — SIGNIFICANT CHANGE UP (ref 27–31)
MCHC RBC-ENTMCNC: 34.2 G/DL — SIGNIFICANT CHANGE UP (ref 32–37)
MCV RBC AUTO: 90.7 FL — SIGNIFICANT CHANGE UP (ref 81–99)
MONOCYTES # BLD AUTO: 0.51 K/UL — SIGNIFICANT CHANGE UP (ref 0.1–0.6)
MONOCYTES NFR BLD AUTO: 7.8 % — SIGNIFICANT CHANGE UP (ref 1.7–9.3)
NEUTROPHILS # BLD AUTO: 3.89 K/UL — SIGNIFICANT CHANGE UP (ref 1.4–6.5)
NEUTROPHILS NFR BLD AUTO: 59.2 % — SIGNIFICANT CHANGE UP (ref 42.2–75.2)
NRBC # BLD: 0 /100 WBCS — SIGNIFICANT CHANGE UP (ref 0–0)
NT-PROBNP SERPL-SCNC: 169 PG/ML — SIGNIFICANT CHANGE UP (ref 0–300)
PLATELET # BLD AUTO: 250 K/UL — SIGNIFICANT CHANGE UP (ref 130–400)
POTASSIUM SERPL-MCNC: 3.3 MMOL/L — LOW (ref 3.5–5)
POTASSIUM SERPL-SCNC: 3.3 MMOL/L — LOW (ref 3.5–5)
PROT SERPL-MCNC: 6.9 G/DL — SIGNIFICANT CHANGE UP (ref 6–8)
RBC # BLD: 4.29 M/UL — SIGNIFICANT CHANGE UP (ref 4.2–5.4)
RBC # FLD: 14.1 % — SIGNIFICANT CHANGE UP (ref 11.5–14.5)
SARS-COV-2 RNA SPEC QL NAA+PROBE: SIGNIFICANT CHANGE UP
SODIUM SERPL-SCNC: 143 MMOL/L — SIGNIFICANT CHANGE UP (ref 135–146)
TROPONIN T SERPL-MCNC: <0.01 NG/ML — SIGNIFICANT CHANGE UP
TROPONIN T SERPL-MCNC: <0.01 NG/ML — SIGNIFICANT CHANGE UP
WBC # BLD: 6.56 K/UL — SIGNIFICANT CHANGE UP (ref 4.8–10.8)
WBC # FLD AUTO: 6.56 K/UL — SIGNIFICANT CHANGE UP (ref 4.8–10.8)

## 2021-04-18 PROCEDURE — 99236 HOSP IP/OBS SAME DATE HI 85: CPT

## 2021-04-18 PROCEDURE — 93016 CV STRESS TEST SUPVJ ONLY: CPT

## 2021-04-18 PROCEDURE — 78452 HT MUSCLE IMAGE SPECT MULT: CPT | Mod: 26,MA

## 2021-04-18 PROCEDURE — 93010 ELECTROCARDIOGRAM REPORT: CPT

## 2021-04-18 PROCEDURE — 93018 CV STRESS TEST I&R ONLY: CPT

## 2021-04-18 PROCEDURE — 71275 CT ANGIOGRAPHY CHEST: CPT | Mod: 26,MA

## 2021-04-18 PROCEDURE — 71045 X-RAY EXAM CHEST 1 VIEW: CPT | Mod: 26

## 2021-04-18 PROCEDURE — 70450 CT HEAD/BRAIN W/O DYE: CPT | Mod: 26,MA

## 2021-04-18 PROCEDURE — 74174 CTA ABD&PLVS W/CONTRAST: CPT | Mod: 26,MA

## 2021-04-18 RX ORDER — PANTOPRAZOLE SODIUM 20 MG/1
40 TABLET, DELAYED RELEASE ORAL ONCE
Refills: 0 | Status: COMPLETED | OUTPATIENT
Start: 2021-04-18 | End: 2021-04-18

## 2021-04-18 RX ORDER — SODIUM CHLORIDE 9 MG/ML
1000 INJECTION, SOLUTION INTRAVENOUS ONCE
Refills: 0 | Status: COMPLETED | OUTPATIENT
Start: 2021-04-18 | End: 2021-04-18

## 2021-04-18 RX ORDER — MORPHINE SULFATE 50 MG/1
2 CAPSULE, EXTENDED RELEASE ORAL ONCE
Refills: 0 | Status: DISCONTINUED | OUTPATIENT
Start: 2021-04-18 | End: 2021-04-18

## 2021-04-18 RX ORDER — LEVETIRACETAM 250 MG/1
250 TABLET, FILM COATED ORAL
Refills: 0 | Status: DISCONTINUED | OUTPATIENT
Start: 2021-04-18 | End: 2021-04-18

## 2021-04-18 RX ORDER — POTASSIUM CHLORIDE 20 MEQ
40 PACKET (EA) ORAL ONCE
Refills: 0 | Status: DISCONTINUED | OUTPATIENT
Start: 2021-04-18 | End: 2021-04-18

## 2021-04-18 RX ORDER — MAGNESIUM SULFATE 500 MG/ML
2 VIAL (ML) INJECTION ONCE
Refills: 0 | Status: COMPLETED | OUTPATIENT
Start: 2021-04-18 | End: 2021-04-18

## 2021-04-18 RX ORDER — LEVETIRACETAM 250 MG/1
250 TABLET, FILM COATED ORAL ONCE
Refills: 0 | Status: COMPLETED | OUTPATIENT
Start: 2021-04-18 | End: 2021-04-18

## 2021-04-18 RX ORDER — MAGNESIUM SULFATE 500 MG/ML
2 VIAL (ML) INJECTION ONCE
Refills: 0 | Status: DISCONTINUED | OUTPATIENT
Start: 2021-04-18 | End: 2021-04-18

## 2021-04-18 RX ORDER — POTASSIUM CHLORIDE 20 MEQ
20 PACKET (EA) ORAL ONCE
Refills: 0 | Status: COMPLETED | OUTPATIENT
Start: 2021-04-18 | End: 2021-04-18

## 2021-04-18 RX ORDER — LIDOCAINE 4 G/100G
1 CREAM TOPICAL
Qty: 12 | Refills: 0
Start: 2021-04-18 | End: 2021-04-20

## 2021-04-18 RX ORDER — ADENOSINE 3 MG/ML
60 INJECTION INTRAVENOUS ONCE
Refills: 0 | Status: DISCONTINUED | OUTPATIENT
Start: 2021-04-18 | End: 2021-04-18

## 2021-04-18 RX ORDER — DIPHENHYDRAMINE HCL 50 MG
50 CAPSULE ORAL ONCE
Refills: 0 | Status: COMPLETED | OUTPATIENT
Start: 2021-04-18 | End: 2021-04-18

## 2021-04-18 RX ORDER — DEXAMETHASONE 0.5 MG/5ML
10 ELIXIR ORAL ONCE
Refills: 0 | Status: COMPLETED | OUTPATIENT
Start: 2021-04-18 | End: 2021-04-18

## 2021-04-18 RX ORDER — METOCLOPRAMIDE HCL 10 MG
10 TABLET ORAL ONCE
Refills: 0 | Status: COMPLETED | OUTPATIENT
Start: 2021-04-18 | End: 2021-04-18

## 2021-04-18 RX ORDER — LISINOPRIL 2.5 MG/1
10 TABLET ORAL ONCE
Refills: 0 | Status: COMPLETED | OUTPATIENT
Start: 2021-04-18 | End: 2021-04-18

## 2021-04-18 RX ORDER — CLONAZEPAM 1 MG
2 TABLET ORAL ONCE
Refills: 0 | Status: DISCONTINUED | OUTPATIENT
Start: 2021-04-18 | End: 2021-04-18

## 2021-04-18 RX ORDER — LISINOPRIL 2.5 MG/1
5 TABLET ORAL ONCE
Refills: 0 | Status: COMPLETED | OUTPATIENT
Start: 2021-04-18 | End: 2021-04-18

## 2021-04-18 RX ADMIN — LEVETIRACETAM 250 MILLIGRAM(S): 250 TABLET, FILM COATED ORAL at 10:40

## 2021-04-18 RX ADMIN — Medication 2 MILLIGRAM(S): at 17:16

## 2021-04-18 RX ADMIN — Medication 104 MILLIGRAM(S): at 04:53

## 2021-04-18 RX ADMIN — MORPHINE SULFATE 2 MILLIGRAM(S): 50 CAPSULE, EXTENDED RELEASE ORAL at 09:46

## 2021-04-18 RX ADMIN — Medication 102 MILLIGRAM(S): at 04:53

## 2021-04-18 RX ADMIN — PANTOPRAZOLE SODIUM 40 MILLIGRAM(S): 20 TABLET, DELAYED RELEASE ORAL at 13:28

## 2021-04-18 RX ADMIN — Medication 50 GRAM(S): at 06:32

## 2021-04-18 RX ADMIN — Medication 20 MILLIEQUIVALENT(S): at 06:32

## 2021-04-18 RX ADMIN — MORPHINE SULFATE 2 MILLIGRAM(S): 50 CAPSULE, EXTENDED RELEASE ORAL at 08:00

## 2021-04-18 RX ADMIN — LISINOPRIL 5 MILLIGRAM(S): 2.5 TABLET ORAL at 17:16

## 2021-04-18 RX ADMIN — SODIUM CHLORIDE 1000 MILLILITER(S): 9 INJECTION, SOLUTION INTRAVENOUS at 04:53

## 2021-04-18 NOTE — ED CDU PROVIDER INITIAL DAY NOTE - PROGRESS NOTE DETAILS
received signout from Dr. Valderrama; pt states had cath by Dr. Moreno with mild disease - report not found in out system; initial ce/ekg neg; covid sent; pt had ct chest negative for dissection; will repeat ce/ekg and plan for pharm nuclear stress test in am; Pharm nuc unremarkable, appropriate EF. Lengthy discussion with patient concerning possible causes including being on the lookout for appearance of vesicular rash, smoking and coffee cessation as she has GERD, and following up with her pain management providers as she has chronic back pain undergoing nerve blocks. Provided copies of all labs and testing. Discussed "linear hypodensity in small bowel" with radiologist. Likely thought to be a calcification within lumen, or something ingested. Non-contrast CT to better characterize is non-emergent. Pt has had multiple bowel surgeries but no CTs available for comparison in PACs. This was noted to patient, provided CT copy and instructed to f/u with PMD. Pharm nuc unremarkable, appropriate EF. Lengthy discussion with patient concerning possible causes including being on the lookout for appearance of vesicular rash, smoking and coffee cessation as she has GERD, and following up with her pain management providers as she has chronic back pain undergoing nerve blocks. Provided copies of all labs and testing. Pt requesting her daily dose of Klonopin "10mg". Verified with patient's pharmacy and pt is on Klonopin 2mg QID.

## 2021-04-18 NOTE — ED ADULT NURSE REASSESSMENT NOTE - NS ED NURSE REASSESS COMMENT FT1
patient complaining of chest pain, NSR noted on monitor. [facility] [place] made aware. PA to assess patient at bedside.

## 2021-04-18 NOTE — ED CDU PROVIDER DISPOSITION NOTE - PROVIDER TOKENS
PROVIDER:[TOKEN:[[id number]]],PROVIDER:[TOKEN:[[id number]]],PROVIDER:[TOKEN:[[id number]]]

## 2021-04-18 NOTE — ED CDU PROVIDER INITIAL DAY NOTE - OBJECTIVE STATEMENT
55 yold female to ED Pmhx Htn, Hld, cad; pt c/o chest pain started last night described as pressure/tightness with mild radiation to Left arm/jaw with mild nausea, sob; pt states had cath ~ 1 yr ago by Dr. Moreno - show mild disease not stents as per pt; pt denies n/v/fever, chills, neck or back pain;

## 2021-04-18 NOTE — ED PROVIDER NOTE - NSCAREINITIATED _GEN_ER
Vaccine Information Statement(s) was given today. This has been reviewed, questions answered, and verbal consent given by Patient for injection(s) and administration of Influenza (Inactivated).    1. Does the patient have a moderate to severe fever?  No  2. Has the patient had a serious reaction to a flu shot before?   No  3. Has the patient ever had Guillian Cranston Syndrome within 6 weeks of a previous flu shot?  No  4. Does the patient have a serious allergy to eggs?  No  5. Is the patient less that 6 months of age?  No    Patient is eligible to receive the vaccine based on all questions being answered as 'No'.          Patient tolerated without incident. See immunization grid for documentation.  
Luis Omalley(Resident)

## 2021-04-18 NOTE — ED ADULT NURSE NOTE - NSIMPLEMENTINTERV_GEN_ALL_ED
Implemented All Universal Safety Interventions:  [place] to call system. Call bell, personal items and telephone within reach. Instruct patient to call for assistance. Room bathroom lighting operational. Non-slip footwear when patient is off stretcher. Physically safe environment: no spills, clutter or unnecessary equipment. Stretcher in lowest position, wheels locked, appropriate side rails in place.

## 2021-04-18 NOTE — ED CDU PROVIDER DISPOSITION NOTE - CARE PROVIDERS DIRECT ADDRESSES
,[email or web address] - [email or web address]

## 2021-04-18 NOTE — ED CDU PROVIDER INITIAL DAY NOTE - ATTENDING CONTRIBUTION TO CARE
Pt has a history of HTN, no tob use, + reflux, hx of cardiac cath by Dr. Moreno showed a small branch disease. This study not found in the EMR. PT reports she cannot take aspirin due to her stomach. Take cholesterol medication. Pt states yesterday her blood pressure was elevated and she was having chest pain that radiated to he jaw and left arm. No associated nausea, vomiting, diaphoresis or shortness of breath.

## 2021-04-18 NOTE — ED CDU PROVIDER DISPOSITION NOTE - PATIENT PORTAL LINK FT
You can access the FollowMyHealth Patient Portal offered by [facility] by registering at the following website: [email or web address]. By joining "[employer]’s FollowMyHealth portal, you will also be able to view your health information using other applications (apps) compatible with our system.

## 2021-04-18 NOTE — ED CDU PROVIDER INITIAL DAY NOTE - MEDICAL DECISION MAKING DETAILS
Pt presents with chest pain. Pt did a recent angiogram, however this report cannot be found. Will do stress test.

## 2021-04-18 NOTE — ED ADULT TRIAGE NOTE - CHIEF COMPLAINT QUOTE
Pt c/o chest pain radiates to LUE, back and neck started couple hours ago. C/o sob and headache. Has history of migraines.

## 2021-04-18 NOTE — ED PROVIDER NOTE - PHYSICAL EXAMINATION
CONSTITUTIONAL: Well-developed; well-nourished; in mild distress  SKIN: warm, dry  HEAD: Normocephalic; atraumatic.  EYES: PERRL, EOMI, normal sclera and conjunctiva   ENT: No nasal discharge; airway clear.  NECK: Supple; non tender.  CARD: S1, S2 normal; no murmurs, gallops, or rubs. Regular rate and rhythm.   RESP: No wheezes, rales or rhonchi.  ABD: soft ntnd  EXT: Normal ROM.  No clubbing, cyanosis or edema.   LYMPH: No acute cervical adenopathy.  NEURO: Alert, oriented, grossly unremarkable  PSYCH: Cooperative, appropriate.

## 2021-04-18 NOTE — ED CDU PROVIDER INITIAL DAY NOTE - FAMILY HISTORY
Father  Still living? Yes, Estimated age: Age Unknown  Coronary artery disease, Age at diagnosis: 61-70

## 2021-04-18 NOTE — ED CDU PROVIDER DISPOSITION NOTE - CARE PROVIDER_API CALL
Morris Moreno)  Cardiovascular Disease; Internal Medicine; Interventional Cardiology  [address] [place] [address] [address]  [place] [address]  Phone: [phone]  Fax: [phone]  Follow Up Time:     Amrit Bourgeois)  Anesthesiology; Pain Medicine  [address] [place] [address]  Phone: [phone]  Fax: [phone]  Follow Up Time:     Javier Avila  GASTROENTEROLOGY  [address] [place] [address]  Phone: [phone]  Fax: [phone]  Follow Up Time:

## 2021-04-18 NOTE — ED PROVIDER NOTE - ATTENDING CONTRIBUTION TO CARE
Patient presents to ED for evaluation of the chest pain with intermittent radiation to neck/head, no trauma.   Vitals reviewed.   Lungs: CTA  abd: +BS, NT, ND, soft  CNS: awake, alert, o x 3, no focal neurologic deficits.   A/P: Chest pain,   labs, EKG, imaging,   reevaluation.

## 2021-04-18 NOTE — ED PROVIDER NOTE - OBJECTIVE STATEMENT
pt is a 55 yof w/ htn, dld here for constant chest pain for the last few hours. pt states it radiates up to jaw and head. no n/v/d, no fever, no abd pain.

## 2021-04-23 ENCOUNTER — RESULT CHARGE (OUTPATIENT)
Age: 56
End: 2021-04-23

## 2021-04-23 ENCOUNTER — APPOINTMENT (OUTPATIENT)
Dept: CARDIOLOGY | Facility: CLINIC | Age: 56
End: 2021-04-23
Payer: MEDICARE

## 2021-04-23 VITALS
BODY MASS INDEX: 24.51 KG/M2 | DIASTOLIC BLOOD PRESSURE: 80 MMHG | HEART RATE: 88 BPM | TEMPERATURE: 96.5 F | SYSTOLIC BLOOD PRESSURE: 110 MMHG | WEIGHT: 134 LBS

## 2021-04-23 VITALS
SYSTOLIC BLOOD PRESSURE: 120 MMHG | BODY MASS INDEX: 25.97 KG/M2 | HEART RATE: 74 BPM | DIASTOLIC BLOOD PRESSURE: 70 MMHG | TEMPERATURE: 97.8 F | WEIGHT: 142 LBS

## 2021-04-23 DIAGNOSIS — E78.2 MIXED HYPERLIPIDEMIA: ICD-10-CM

## 2021-04-23 PROCEDURE — 93000 ELECTROCARDIOGRAM COMPLETE: CPT

## 2021-04-23 PROCEDURE — 99072 ADDL SUPL MATRL&STAF TM PHE: CPT

## 2021-04-23 PROCEDURE — 99214 OFFICE O/P EST MOD 30 MIN: CPT

## 2021-04-23 RX ORDER — OMEPRAZOLE 40 MG/1
40 CAPSULE, DELAYED RELEASE ORAL
Refills: 3 | Status: DISCONTINUED | COMMUNITY
End: 2021-04-23

## 2021-04-23 RX ORDER — NIFEDIPINE 60 MG/1
60 TABLET, EXTENDED RELEASE ORAL
Qty: 120 | Refills: 0 | Status: DISCONTINUED | COMMUNITY
Start: 2021-04-23 | End: 2021-04-23

## 2021-04-23 RX ORDER — BENZONATATE 100 MG/1
100 CAPSULE ORAL 3 TIMES DAILY
Qty: 12 | Refills: 0 | Status: DISCONTINUED | COMMUNITY
End: 2021-04-23

## 2021-04-23 RX ORDER — UBROGEPANT 50 MG/1
50 TABLET ORAL DAILY
Refills: 0 | Status: DISCONTINUED | COMMUNITY
End: 2021-04-23

## 2021-04-23 RX ORDER — IPRATROPIUM BROMIDE 42 UG/1
0.06 SPRAY NASAL 3 TIMES DAILY
Refills: 0 | Status: DISCONTINUED | COMMUNITY
End: 2021-04-23

## 2021-04-23 RX ORDER — CLONAZEPAM 2 MG/1
2 TABLET ORAL 4 TIMES DAILY
Refills: 0 | Status: DISCONTINUED | COMMUNITY
End: 2021-04-23

## 2021-04-23 RX ORDER — TIZANIDINE HYDROCHLORIDE 2 MG/1
2 CAPSULE ORAL 4 TIMES DAILY
Refills: 0 | Status: DISCONTINUED | COMMUNITY
End: 2021-04-23

## 2021-04-23 RX ORDER — FAMOTIDINE 40 MG/1
40 TABLET, FILM COATED ORAL TWICE DAILY
Refills: 0 | Status: DISCONTINUED | COMMUNITY
End: 2021-04-23

## 2021-04-23 NOTE — HISTORY OF PRESENT ILLNESS
[FreeTextEntry1] : 54 yo F with HTN, HLD, current smoker, recent admission for hypertensive urgency and chest pain.  Imaging and records reviewed.  CTA negative for dissection.  Adenosine NST on 4/18/2021 was negative.  New elevated BP for the past two months.  Dr. Umana prescribed Lisinopril 5 mg.  BP log reviewed 130-170 / .    Chronic pain goes to pain management.\par \par \par EKG (4/23/2021):  NSR, no ST-T changes\par CCTA (2019):  pLAD <50%, CACS 16\par Cath (1/17/17):  Diffuse MLI, Mild global LV dysfunction, EF 45%\par \par 7/2017\par Chol  360\par LDL  245\par HDL  74\par Trig  206\par

## 2021-04-23 NOTE — PHYSICAL EXAM
[General Appearance - Well Developed] : well developed [General Appearance - In No Acute Distress] : no acute distress [Normal Conjunctiva] : the conjunctiva exhibited no abnormalities [Eyelids - No Xanthelasma] : the eyelids demonstrated no xanthelasmas [Respiration, Rhythm And Depth] : normal respiratory rhythm and effort [Exaggerated Use Of Accessory Muscles For Inspiration] : no accessory muscle use [Auscultation Breath Sounds / Voice Sounds] : lungs were clear to auscultation bilaterally [Heart Rate And Rhythm] : heart rate and rhythm were normal [Heart Sounds] : normal S1 and S2 [Murmurs] : no murmurs present [Abdomen Soft] : soft [Abdomen Tenderness] : non-tender [Abdomen Mass (___ Cm)] : no abdominal mass palpated [Abnormal Walk] : normal gait [Gait - Sufficient For Exercise Testing] : the gait was sufficient for exercise testing [Nail Clubbing] : no clubbing of the fingernails [Cyanosis, Localized] : no localized cyanosis [Petechial Hemorrhages (___cm)] : no petechial hemorrhages [Skin Color & Pigmentation] : normal skin color and pigmentation [] : no rash [No Skin Ulcers] : no skin ulcer [Oriented To Time, Place, And Person] : oriented to person, place, and time [FreeTextEntry1] : no JVD

## 2021-04-23 NOTE — HISTORY OF PRESENT ILLNESS
[FreeTextEntry1] : 56 yo F with HTN, HLD, current smoker, recent admission for hypertensive urgency and chest pain.  Imaging and records reviewed.  CTA negative for dissection.  Adenosine NST on 4/18/2021 was negative.  New elevated BP for the past two months.  Dr. Umana prescribed Lisinopril 5 mg.  BP log reviewed 130-170 / .    Chronic pain goes to pain management.\par \par \par EKG (4/23/2021):  NSR, no ST-T changes\par CCTA (2019):  pLAD <50%, CACS 16\par Cath (1/17/17):  Diffuse MLI, Mild global LV dysfunction, EF 45%\par \par 7/2017\par Chol  360\par LDL  245\par HDL  74\par Trig  206\par

## 2021-04-23 NOTE — ASSESSMENT
[FreeTextEntry1] : New onset Uncontrolled Hypertension with recent admission for hypertensive urgency\par Nonobstructive CAD\par \par Risk Factors:  HTN, HLD, current smoker\par \par EKG (4/23/2021):  NSR, no ST-T changes\par \par Start Nifedipine 60 mg BID\par Continue Lisinopril\par Renal duplex US to r/o renal artery stenosis\par Follow up with Dr. Umana on Monday\par Follow up 1 month

## 2021-04-23 NOTE — PHYSICAL EXAM
[General Appearance - Well Developed] : well developed [General Appearance - In No Acute Distress] : no acute distress [Normal Conjunctiva] : the conjunctiva exhibited no abnormalities [Eyelids - No Xanthelasma] : the eyelids demonstrated no xanthelasmas [Respiration, Rhythm And Depth] : normal respiratory rhythm and effort [Exaggerated Use Of Accessory Muscles For Inspiration] : no accessory muscle use [Auscultation Breath Sounds / Voice Sounds] : lungs were clear to auscultation bilaterally [Heart Rate And Rhythm] : heart rate and rhythm were normal [Heart Sounds] : normal S1 and S2 [Murmurs] : no murmurs present [Abdomen Soft] : soft [Abdomen Tenderness] : non-tender [Abdomen Mass (___ Cm)] : no abdominal mass palpated [Gait - Sufficient For Exercise Testing] : the gait was sufficient for exercise testing [Abnormal Walk] : normal gait [Nail Clubbing] : no clubbing of the fingernails [Cyanosis, Localized] : no localized cyanosis [Petechial Hemorrhages (___cm)] : no petechial hemorrhages [Skin Color & Pigmentation] : normal skin color and pigmentation [] : no rash [No Skin Ulcers] : no skin ulcer [Oriented To Time, Place, And Person] : oriented to person, place, and time [FreeTextEntry1] : no JVD

## 2021-04-29 ENCOUNTER — APPOINTMENT (OUTPATIENT)
Dept: OBGYN | Facility: CLINIC | Age: 56
End: 2021-04-29
Payer: MEDICARE

## 2021-04-29 PROCEDURE — 99072 ADDL SUPL MATRL&STAF TM PHE: CPT

## 2021-04-29 PROCEDURE — 76830 TRANSVAGINAL US NON-OB: CPT

## 2021-05-03 ENCOUNTER — RESULT REVIEW (OUTPATIENT)
Age: 56
End: 2021-05-03

## 2021-05-03 ENCOUNTER — OUTPATIENT (OUTPATIENT)
Dept: OUTPATIENT SERVICES | Facility: HOSPITAL | Age: 56
LOS: 1 days | Discharge: HOME | End: 2021-05-03
Payer: MEDICARE

## 2021-05-03 DIAGNOSIS — I10 ESSENTIAL (PRIMARY) HYPERTENSION: ICD-10-CM

## 2021-05-03 DIAGNOSIS — Z98.890 OTHER SPECIFIED POSTPROCEDURAL STATES: Chronic | ICD-10-CM

## 2021-05-03 DIAGNOSIS — Z90.49 ACQUIRED ABSENCE OF OTHER SPECIFIED PARTS OF DIGESTIVE TRACT: Chronic | ICD-10-CM

## 2021-05-03 DIAGNOSIS — Z90.711 ACQUIRED ABSENCE OF UTERUS WITH REMAINING CERVICAL STUMP: Chronic | ICD-10-CM

## 2021-05-03 PROCEDURE — 76775 US EXAM ABDO BACK WALL LIM: CPT | Mod: 26,76

## 2021-05-25 NOTE — ED CDU PROVIDER SUBSEQUENT DAY NOTE - ATTENDING CONTRIBUTION TO CARE
4 53yo F with PMHx HLD, seizure disorder, migraines, +smoker, presents for chest pain for 1 day, substernal, radiating to left neck, noexertional. Pain has since resolved. Denies SOB, nausea/vomiting, diaphoresis, leg swelling, cough, fever. Staes had negative stress test 2 years ago (Dr. Moreno).     No overnight events. In EDOU for chest pain protocol. Pending CCTA.    Vital signs reviewed  GENERAL: Patient nontoxic appearing, NAD  HEAD: NCAT  EYES: Anicteric  ENT: MMM  RESPIRATORY: Normal respiratory effort. CTA B/L. No wheezing, rales, rhonchi  CARDIOVASCULAR: Regular rate and rhythm  ABDOMEN: Soft. Nondistended. Nontender. No guarding or rebound.   MUSCULOSKELETAL/EXTREMITIES: Brisk cap refill. Equal radial pulses. No leg edema. No chest wall tenderness  SKIN:  Warm and dry  NEURO: AAOx3. No gross FND.

## 2021-05-26 ENCOUNTER — RESULT REVIEW (OUTPATIENT)
Age: 56
End: 2021-05-26

## 2021-05-26 ENCOUNTER — APPOINTMENT (OUTPATIENT)
Dept: CARDIOLOGY | Facility: CLINIC | Age: 56
End: 2021-05-26
Payer: MEDICARE

## 2021-05-26 VITALS
OXYGEN SATURATION: 96 % | TEMPERATURE: 97.3 F | DIASTOLIC BLOOD PRESSURE: 80 MMHG | WEIGHT: 137 LBS | HEART RATE: 96 BPM | BODY MASS INDEX: 25.06 KG/M2 | SYSTOLIC BLOOD PRESSURE: 140 MMHG

## 2021-05-26 PROCEDURE — 99214 OFFICE O/P EST MOD 30 MIN: CPT

## 2021-05-26 PROCEDURE — 99072 ADDL SUPL MATRL&STAF TM PHE: CPT

## 2021-05-26 RX ORDER — LISINOPRIL 5 MG/1
5 TABLET ORAL DAILY
Qty: 90 | Refills: 3 | Status: DISCONTINUED | COMMUNITY
End: 2021-05-26

## 2021-05-26 NOTE — PHYSICAL EXAM
[Well Developed] : well developed [Well Nourished] : well nourished [No Acute Distress] : no acute distress [Normal Conjunctiva] : normal conjunctiva [Normal Venous Pressure] : normal venous pressure [No Carotid Bruit] : no carotid bruit [Normal S1, S2] : normal S1, S2 [No Murmur] : no murmur [No Rub] : no rub [No Gallop] : no gallop [Clear Lung Fields] : clear lung fields [Good Air Entry] : good air entry [No Respiratory Distress] : no respiratory distress  [Soft] : abdomen soft [Non Tender] : non-tender [No Masses/organomegaly] : no masses/organomegaly [Normal Bowel Sounds] : normal bowel sounds [Normal Gait] : normal gait [No Edema] : no edema [No Cyanosis] : no cyanosis [No Clubbing] : no clubbing [No Varicosities] : no varicosities [No Rash] : no rash [No Skin Lesions] : no skin lesions [Moves all extremities] : moves all extremities [No Focal Deficits] : no focal deficits [Normal Speech] : normal speech [Normal memory] : normal memory [Alert and Oriented] : alert and oriented

## 2021-05-26 NOTE — ASSESSMENT
[FreeTextEntry1] : Renal US duplex consistent with bilateral Renal artery stenosis\par New onset Uncontrolled Hypertension with recent admission for hypertensive urgency\par Nonobstructive CAD\par \par Risk Factors:  HTN, HLD, current smoker\par \par EKG (4/23/2021):  NSR, no ST-T changes\par \par CTA Pelvis to evaluate bilateral RA stenosis\par Discontinue Lisinopril\par Continue Nifedipine 60 mg BID\par Follow up 2-4 weeks

## 2021-05-26 NOTE — HISTORY OF PRESENT ILLNESS
[FreeTextEntry1] : 54 yo F with HTN, HLD, current smoker, recent admission for hypertensive urgency and chest pain.  Imaging and records reviewed.  CTA negative for dissection.  Adenosine NST on 4/18/2021 was negative.  New elevated BP for the past two months.  Dr. Umana prescribed Lisinopril 5 mg.  BP log reviewed 130-170 / .  Chronic pain goes to pain management.\par \par Renal duplex consistent with bilateral renal artery stenosis.\par \par \par EKG (4/23/2021):  NSR, no ST-T changes\par CCTA (2019):  pLAD <50%, CACS 16\par Cath (1/17/17):  Diffuse MLI, Mild global LV dysfunction, EF 45%\par \par 7/2017\par Chol  360\par LDL  245\par HDL  74\par Trig  206\par

## 2021-06-07 LAB
ANION GAP SERPL CALC-SCNC: 13 MMOL/L
BUN SERPL-MCNC: 17 MG/DL
CALCIUM SERPL-MCNC: 9.9 MG/DL
CHLORIDE SERPL-SCNC: 103 MMOL/L
CO2 SERPL-SCNC: 26 MMOL/L
CREAT SERPL-MCNC: 0.6 MG/DL
GLUCOSE SERPL-MCNC: 112 MG/DL
POTASSIUM SERPL-SCNC: 4.3 MMOL/L
SODIUM SERPL-SCNC: 142 MMOL/L

## 2021-06-23 ENCOUNTER — APPOINTMENT (OUTPATIENT)
Dept: CARDIOLOGY | Facility: CLINIC | Age: 56
End: 2021-06-23

## 2021-07-06 LAB
ALBUMIN SERPL ELPH-MCNC: 4.5 G/DL
ALP BLD-CCNC: 131 U/L
ALT SERPL-CCNC: 41 U/L
ANION GAP SERPL CALC-SCNC: 11 MMOL/L
AST SERPL-CCNC: 26 U/L
BILIRUB SERPL-MCNC: <0.2 MG/DL
BUN SERPL-MCNC: 12 MG/DL
CALCIUM SERPL-MCNC: 9.7 MG/DL
CHLORIDE SERPL-SCNC: 106 MMOL/L
CO2 SERPL-SCNC: 27 MMOL/L
CREAT SERPL-MCNC: 0.9 MG/DL
GLUCOSE SERPL-MCNC: 118 MG/DL
POTASSIUM SERPL-SCNC: 4.9 MMOL/L
PROT SERPL-MCNC: 6.8 G/DL
SODIUM SERPL-SCNC: 144 MMOL/L

## 2021-07-26 PROBLEM — I10 ESSENTIAL (PRIMARY) HYPERTENSION: Chronic | Status: ACTIVE | Noted: 2021-04-18

## 2021-08-31 ENCOUNTER — APPOINTMENT (OUTPATIENT)
Dept: CARDIOLOGY | Facility: CLINIC | Age: 56
End: 2021-08-31

## 2021-09-08 ENCOUNTER — RESULT CHARGE (OUTPATIENT)
Age: 56
End: 2021-09-08

## 2021-09-08 ENCOUNTER — APPOINTMENT (OUTPATIENT)
Dept: CARDIOLOGY | Facility: CLINIC | Age: 56
End: 2021-09-08
Payer: MEDICARE

## 2021-09-08 VITALS
WEIGHT: 135 LBS | HEART RATE: 95 BPM | SYSTOLIC BLOOD PRESSURE: 140 MMHG | HEIGHT: 62 IN | BODY MASS INDEX: 24.84 KG/M2 | DIASTOLIC BLOOD PRESSURE: 70 MMHG | TEMPERATURE: 97.2 F

## 2021-09-08 PROCEDURE — 99214 OFFICE O/P EST MOD 30 MIN: CPT

## 2021-09-08 PROCEDURE — 93000 ELECTROCARDIOGRAM COMPLETE: CPT

## 2021-09-08 NOTE — HISTORY OF PRESENT ILLNESS
[FreeTextEntry1] : 55 yo F with HTN, HLD, current smoker, recent admission for hypertensive urgency and chest pain.  Imaging and records reviewed.  CTA negative for dissection.  Adenosine NST on 4/18/2021 was negative.  New elevated BP for the past two months.  Dr. Umana prescribed Lisinopril 5 mg.  BP log reviewed 130-170 / .  Chronic pain goes to pain management.\par \par Renal duplex consistent with bilateral renal artery stenosis.  [facility] refused to do the test.\par \par \par EKG (9/8/2021):  NSR, no ST-T changes\par EKG (4/23/2021):  NSR, no ST-T changes\par CCTA (2019):  pLAD <50%, CACS 16\par Cath (1/17/17):  Diffuse MLI, Mild global LV dysfunction, EF 45%\par \par 7/2017\par Chol  360\par LDL  245\par HDL  74\par Trig  206\par

## 2021-09-08 NOTE — ASSESSMENT
[FreeTextEntry1] : Renal US duplex consistent with bilateral Renal artery stenosis\par New onset uncontrolled Hypertension with recent admission for hypertensive urgency\par Nonobstructive CAD\par \par Risk Factors:  HTN, HLD, current smoker\par \par EKG (4/23/2021):  NSR, no ST-T changes\par \par CTA ordered but [facility] refused to do the test.\par \par CTA Pelvis to evaluate bilateral RA stenosis reordered - [facility]\par Continue Nifedipine 60 mg BID\par Avoid ACEi/ARB\par Follow up 1 month

## 2021-09-24 LAB
ANION GAP SERPL CALC-SCNC: 13 MMOL/L
BUN SERPL-MCNC: 13 MG/DL
CALCIUM SERPL-MCNC: 9.2 MG/DL
CHLORIDE SERPL-SCNC: 104 MMOL/L
CO2 SERPL-SCNC: 26 MMOL/L
CREAT SERPL-MCNC: 0.6 MG/DL
GLUCOSE SERPL-MCNC: 81 MG/DL
POTASSIUM SERPL-SCNC: 4 MMOL/L
SODIUM SERPL-SCNC: 143 MMOL/L

## 2021-10-08 ENCOUNTER — OUTPATIENT (OUTPATIENT)
Dept: OUTPATIENT SERVICES | Facility: HOSPITAL | Age: 56
LOS: 1 days | Discharge: HOME | End: 2021-10-08
Payer: MEDICARE

## 2021-10-08 DIAGNOSIS — Z98.890 OTHER SPECIFIED POSTPROCEDURAL STATES: Chronic | ICD-10-CM

## 2021-10-08 DIAGNOSIS — I70.1 ATHEROSCLEROSIS OF RENAL ARTERY: ICD-10-CM

## 2021-10-08 DIAGNOSIS — Z90.711 ACQUIRED ABSENCE OF UTERUS WITH REMAINING CERVICAL STUMP: Chronic | ICD-10-CM

## 2021-10-08 DIAGNOSIS — Z90.49 ACQUIRED ABSENCE OF OTHER SPECIFIED PARTS OF DIGESTIVE TRACT: Chronic | ICD-10-CM

## 2021-10-08 PROCEDURE — 74174 CTA ABD&PLVS W/CONTRAST: CPT | Mod: 26

## 2021-11-01 ENCOUNTER — INPATIENT (INPATIENT)
Facility: HOSPITAL | Age: 56
LOS: 11 days | Discharge: HOME HEALTH | End: 2021-11-13
Attending: SURGERY | Admitting: SURGERY
Payer: MEDICARE

## 2021-11-01 VITALS
WEIGHT: 139.99 LBS | HEART RATE: 101 BPM | OXYGEN SATURATION: 97 % | TEMPERATURE: 98 F | RESPIRATION RATE: 20 BRPM | SYSTOLIC BLOOD PRESSURE: 149 MMHG | HEIGHT: 62 IN | DIASTOLIC BLOOD PRESSURE: 87 MMHG

## 2021-11-01 DIAGNOSIS — Z90.711 ACQUIRED ABSENCE OF UTERUS WITH REMAINING CERVICAL STUMP: Chronic | ICD-10-CM

## 2021-11-01 DIAGNOSIS — Z90.49 ACQUIRED ABSENCE OF OTHER SPECIFIED PARTS OF DIGESTIVE TRACT: Chronic | ICD-10-CM

## 2021-11-01 DIAGNOSIS — Z98.890 OTHER SPECIFIED POSTPROCEDURAL STATES: Chronic | ICD-10-CM

## 2021-11-01 LAB
ALBUMIN SERPL ELPH-MCNC: 4.3 G/DL — SIGNIFICANT CHANGE UP (ref 3.5–5.2)
ALP SERPL-CCNC: 140 U/L — HIGH (ref 30–115)
ALT FLD-CCNC: 24 U/L — SIGNIFICANT CHANGE UP (ref 0–41)
ANION GAP SERPL CALC-SCNC: 10 MMOL/L — SIGNIFICANT CHANGE UP (ref 7–14)
APPEARANCE UR: CLEAR — SIGNIFICANT CHANGE UP
AST SERPL-CCNC: 18 U/L — SIGNIFICANT CHANGE UP (ref 0–41)
BACTERIA # UR AUTO: NEGATIVE — SIGNIFICANT CHANGE UP
BASOPHILS # BLD AUTO: 0.03 K/UL — SIGNIFICANT CHANGE UP (ref 0–0.2)
BASOPHILS NFR BLD AUTO: 0.5 % — SIGNIFICANT CHANGE UP (ref 0–1)
BILIRUB SERPL-MCNC: 0.2 MG/DL — SIGNIFICANT CHANGE UP (ref 0.2–1.2)
BILIRUB UR-MCNC: NEGATIVE — SIGNIFICANT CHANGE UP
BUN SERPL-MCNC: 9 MG/DL — LOW (ref 10–20)
CALCIUM SERPL-MCNC: 9.5 MG/DL — SIGNIFICANT CHANGE UP (ref 8.5–10.1)
CHLORIDE SERPL-SCNC: 106 MMOL/L — SIGNIFICANT CHANGE UP (ref 98–110)
CO2 SERPL-SCNC: 27 MMOL/L — SIGNIFICANT CHANGE UP (ref 17–32)
COLOR SPEC: YELLOW — SIGNIFICANT CHANGE UP
CREAT SERPL-MCNC: 0.7 MG/DL — SIGNIFICANT CHANGE UP (ref 0.7–1.5)
DIFF PNL FLD: NEGATIVE — SIGNIFICANT CHANGE UP
EOSINOPHIL # BLD AUTO: 0.07 K/UL — SIGNIFICANT CHANGE UP (ref 0–0.7)
EOSINOPHIL NFR BLD AUTO: 1.2 % — SIGNIFICANT CHANGE UP (ref 0–8)
EPI CELLS # UR: 3 /HPF — SIGNIFICANT CHANGE UP (ref 0–5)
GLUCOSE SERPL-MCNC: 107 MG/DL — HIGH (ref 70–99)
GLUCOSE UR QL: NEGATIVE — SIGNIFICANT CHANGE UP
HCT VFR BLD CALC: 37.5 % — SIGNIFICANT CHANGE UP (ref 37–47)
HGB BLD-MCNC: 12.8 G/DL — SIGNIFICANT CHANGE UP (ref 12–16)
HYALINE CASTS # UR AUTO: 0 /LPF — SIGNIFICANT CHANGE UP (ref 0–7)
IMM GRANULOCYTES NFR BLD AUTO: 0.2 % — SIGNIFICANT CHANGE UP (ref 0.1–0.3)
KETONES UR-MCNC: NEGATIVE — SIGNIFICANT CHANGE UP
LACTATE SERPL-SCNC: 0.7 MMOL/L — SIGNIFICANT CHANGE UP (ref 0.7–2)
LEUKOCYTE ESTERASE UR-ACNC: NEGATIVE — SIGNIFICANT CHANGE UP
LIDOCAIN IGE QN: 10 U/L — SIGNIFICANT CHANGE UP (ref 7–60)
LYMPHOCYTES # BLD AUTO: 1.43 K/UL — SIGNIFICANT CHANGE UP (ref 1.2–3.4)
LYMPHOCYTES # BLD AUTO: 24.8 % — SIGNIFICANT CHANGE UP (ref 20.5–51.1)
MCHC RBC-ENTMCNC: 32 PG — HIGH (ref 27–31)
MCHC RBC-ENTMCNC: 34.1 G/DL — SIGNIFICANT CHANGE UP (ref 32–37)
MCV RBC AUTO: 93.8 FL — SIGNIFICANT CHANGE UP (ref 81–99)
MONOCYTES # BLD AUTO: 0.51 K/UL — SIGNIFICANT CHANGE UP (ref 0.1–0.6)
MONOCYTES NFR BLD AUTO: 8.8 % — SIGNIFICANT CHANGE UP (ref 1.7–9.3)
NEUTROPHILS # BLD AUTO: 3.72 K/UL — SIGNIFICANT CHANGE UP (ref 1.4–6.5)
NEUTROPHILS NFR BLD AUTO: 64.5 % — SIGNIFICANT CHANGE UP (ref 42.2–75.2)
NITRITE UR-MCNC: NEGATIVE — SIGNIFICANT CHANGE UP
NRBC # BLD: 0 /100 WBCS — SIGNIFICANT CHANGE UP (ref 0–0)
PH UR: 7 — SIGNIFICANT CHANGE UP (ref 5–8)
PLATELET # BLD AUTO: 245 K/UL — SIGNIFICANT CHANGE UP (ref 130–400)
POTASSIUM SERPL-MCNC: 4.4 MMOL/L — SIGNIFICANT CHANGE UP (ref 3.5–5)
POTASSIUM SERPL-SCNC: 4.4 MMOL/L — SIGNIFICANT CHANGE UP (ref 3.5–5)
PROT SERPL-MCNC: 6.8 G/DL — SIGNIFICANT CHANGE UP (ref 6–8)
PROT UR-MCNC: NEGATIVE — SIGNIFICANT CHANGE UP
RBC # BLD: 4 M/UL — LOW (ref 4.2–5.4)
RBC # FLD: 13.2 % — SIGNIFICANT CHANGE UP (ref 11.5–14.5)
RBC CASTS # UR COMP ASSIST: 1 /HPF — SIGNIFICANT CHANGE UP (ref 0–4)
SARS-COV-2 RNA SPEC QL NAA+PROBE: SIGNIFICANT CHANGE UP
SODIUM SERPL-SCNC: 143 MMOL/L — SIGNIFICANT CHANGE UP (ref 135–146)
SP GR SPEC: 1.01 — SIGNIFICANT CHANGE UP (ref 1.01–1.03)
UROBILINOGEN FLD QL: SIGNIFICANT CHANGE UP
WBC # BLD: 5.77 K/UL — SIGNIFICANT CHANGE UP (ref 4.8–10.8)
WBC # FLD AUTO: 5.77 K/UL — SIGNIFICANT CHANGE UP (ref 4.8–10.8)
WBC UR QL: 1 /HPF — SIGNIFICANT CHANGE UP (ref 0–5)

## 2021-11-01 PROCEDURE — 71045 X-RAY EXAM CHEST 1 VIEW: CPT | Mod: 26

## 2021-11-01 PROCEDURE — 74177 CT ABD & PELVIS W/CONTRAST: CPT | Mod: 26,MA

## 2021-11-01 PROCEDURE — 93010 ELECTROCARDIOGRAM REPORT: CPT | Mod: 77

## 2021-11-01 PROCEDURE — 93010 ELECTROCARDIOGRAM REPORT: CPT

## 2021-11-01 PROCEDURE — 74018 RADEX ABDOMEN 1 VIEW: CPT | Mod: 26

## 2021-11-01 PROCEDURE — 99285 EMERGENCY DEPT VISIT HI MDM: CPT

## 2021-11-01 RX ORDER — ATORVASTATIN CALCIUM 80 MG/1
80 TABLET, FILM COATED ORAL AT BEDTIME
Refills: 0 | Status: DISCONTINUED | OUTPATIENT
Start: 2021-11-01 | End: 2021-11-03

## 2021-11-01 RX ORDER — NIFEDIPINE 30 MG
60 TABLET, EXTENDED RELEASE 24 HR ORAL DAILY
Refills: 0 | Status: DISCONTINUED | OUTPATIENT
Start: 2021-11-01 | End: 2021-11-03

## 2021-11-01 RX ORDER — TIOTROPIUM BROMIDE 18 UG/1
1 CAPSULE ORAL; RESPIRATORY (INHALATION) DAILY
Refills: 0 | Status: DISCONTINUED | OUTPATIENT
Start: 2021-11-01 | End: 2021-11-03

## 2021-11-01 RX ORDER — MORPHINE SULFATE 50 MG/1
2 CAPSULE, EXTENDED RELEASE ORAL EVERY 6 HOURS
Refills: 0 | Status: DISCONTINUED | OUTPATIENT
Start: 2021-11-01 | End: 2021-11-02

## 2021-11-01 RX ORDER — ENOXAPARIN SODIUM 100 MG/ML
40 INJECTION SUBCUTANEOUS DAILY
Refills: 0 | Status: DISCONTINUED | OUTPATIENT
Start: 2021-11-01 | End: 2021-11-03

## 2021-11-01 RX ORDER — TIZANIDINE 4 MG/1
2 TABLET ORAL
Qty: 0 | Refills: 0 | DISCHARGE

## 2021-11-01 RX ORDER — MORPHINE SULFATE 50 MG/1
4 CAPSULE, EXTENDED RELEASE ORAL ONCE
Refills: 0 | Status: DISCONTINUED | OUTPATIENT
Start: 2021-11-01 | End: 2021-11-01

## 2021-11-01 RX ORDER — LEVETIRACETAM 250 MG/1
250 TABLET, FILM COATED ORAL
Refills: 0 | Status: DISCONTINUED | OUTPATIENT
Start: 2021-11-01 | End: 2021-11-02

## 2021-11-01 RX ORDER — ONDANSETRON 8 MG/1
4 TABLET, FILM COATED ORAL EVERY 8 HOURS
Refills: 0 | Status: DISCONTINUED | OUTPATIENT
Start: 2021-11-01 | End: 2021-11-03

## 2021-11-01 RX ORDER — MORPHINE SULFATE 50 MG/1
2 CAPSULE, EXTENDED RELEASE ORAL ONCE
Refills: 0 | Status: DISCONTINUED | OUTPATIENT
Start: 2021-11-01 | End: 2021-11-01

## 2021-11-01 RX ORDER — PANTOPRAZOLE SODIUM 20 MG/1
40 TABLET, DELAYED RELEASE ORAL DAILY
Refills: 0 | Status: DISCONTINUED | OUTPATIENT
Start: 2021-11-01 | End: 2021-11-03

## 2021-11-01 RX ORDER — ONDANSETRON 8 MG/1
4 TABLET, FILM COATED ORAL ONCE
Refills: 0 | Status: COMPLETED | OUTPATIENT
Start: 2021-11-01 | End: 2021-11-01

## 2021-11-01 RX ORDER — CLONAZEPAM 1 MG
2 TABLET ORAL
Refills: 0 | Status: DISCONTINUED | OUTPATIENT
Start: 2021-11-01 | End: 2021-11-03

## 2021-11-01 RX ORDER — SODIUM CHLORIDE 9 MG/ML
1000 INJECTION INTRAMUSCULAR; INTRAVENOUS; SUBCUTANEOUS ONCE
Refills: 0 | Status: COMPLETED | OUTPATIENT
Start: 2021-11-01 | End: 2021-11-01

## 2021-11-01 RX ORDER — CLONAZEPAM 1 MG
1 TABLET ORAL
Qty: 0 | Refills: 0 | DISCHARGE

## 2021-11-01 RX ORDER — PANTOPRAZOLE SODIUM 20 MG/1
40 TABLET, DELAYED RELEASE ORAL
Refills: 0 | Status: DISCONTINUED | OUTPATIENT
Start: 2021-11-01 | End: 2021-11-01

## 2021-11-01 RX ORDER — FAMOTIDINE 10 MG/ML
1 INJECTION INTRAVENOUS
Qty: 0 | Refills: 0 | DISCHARGE

## 2021-11-01 RX ORDER — SODIUM CHLORIDE 9 MG/ML
1000 INJECTION, SOLUTION INTRAVENOUS
Refills: 0 | Status: DISCONTINUED | OUTPATIENT
Start: 2021-11-01 | End: 2021-11-03

## 2021-11-01 RX ADMIN — MORPHINE SULFATE 2 MILLIGRAM(S): 50 CAPSULE, EXTENDED RELEASE ORAL at 23:38

## 2021-11-01 RX ADMIN — SODIUM CHLORIDE 1000 MILLILITER(S): 9 INJECTION INTRAMUSCULAR; INTRAVENOUS; SUBCUTANEOUS at 10:47

## 2021-11-01 RX ADMIN — MORPHINE SULFATE 4 MILLIGRAM(S): 50 CAPSULE, EXTENDED RELEASE ORAL at 19:24

## 2021-11-01 RX ADMIN — ONDANSETRON 4 MILLIGRAM(S): 8 TABLET, FILM COATED ORAL at 10:48

## 2021-11-01 RX ADMIN — SODIUM CHLORIDE 1000 MILLILITER(S): 9 INJECTION INTRAMUSCULAR; INTRAVENOUS; SUBCUTANEOUS at 19:24

## 2021-11-01 RX ADMIN — MORPHINE SULFATE 2 MILLIGRAM(S): 50 CAPSULE, EXTENDED RELEASE ORAL at 19:24

## 2021-11-01 RX ADMIN — MORPHINE SULFATE 4 MILLIGRAM(S): 50 CAPSULE, EXTENDED RELEASE ORAL at 10:48

## 2021-11-01 RX ADMIN — ONDANSETRON 4 MILLIGRAM(S): 8 TABLET, FILM COATED ORAL at 19:30

## 2021-11-01 RX ADMIN — Medication 60 MILLIGRAM(S): at 23:29

## 2021-11-01 RX ADMIN — MORPHINE SULFATE 4 MILLIGRAM(S): 50 CAPSULE, EXTENDED RELEASE ORAL at 19:30

## 2021-11-01 RX ADMIN — MORPHINE SULFATE 2 MILLIGRAM(S): 50 CAPSULE, EXTENDED RELEASE ORAL at 14:39

## 2021-11-01 NOTE — ED PROVIDER NOTE - OBJECTIVE STATEMENT
57 yo F pmhx HTN, HLD, shx: appendectomy, hysterectomy, cholecystectomy presenting to the ED for evaluation of diffuse, crampy, constant, abd pain, worsening over 2 weeks, today associated with nausea and vomiting. Pt GI Dr Avila, prescribed Linzess recently for constipation, last BM yesterday, passing flatus today. Pt reports she had CTA abdomen and pelvis 10/08/21 outpatient, no evidence of RADHA. Denies any alleviating or worsening factors for symptoms. Denies fever, chills, diarrhea, chest pain, sob, weakness.

## 2021-11-01 NOTE — CONSULT NOTE ADULT - ASSESSMENT
ASSESSMENT:  Pt is a 55y/o F with a PMHx of HTN, HLD, anxiety, seizures, migraines GERD, and a SHX of appendectomy x2, cholecystectomy, hysterectomy unilateral oophorectomy, Hiatal hernia present to the ED with a chronic intermittent abdominal pain that gradually worsend in the past 2 weeks. Physical exam showed diffused tenderness focused in the RLQ w/ r/g and a CT scan finding as documented above     PLAN:  - medical opitmization  - cardiology consult  - possible OR this admission  - please obtain EKG and Preop labs (CBC. BMP, Mg, Phos, Coags, and T&S), Covid PCR  - Serial abdominal exams  - DVT and GI ppx      Lines/Tubes: PIV,     Above plan discussed with Dr. Juan Gomez: [id number]   ASSESSMENT:  Pt is a 55y/o F with a PMHx of HTN, HLD, anxiety, seizures, migraines GERD, and a SHX of appendectomy x2, cholecystectomy, hysterectomy unilateral oophorectomy, Hiatal hernia present to the ED with a chronic intermittent abdominal pain that gradually worsend in the past 2 weeks. Physical exam showed diffused tenderness focused in the RLQ w/ r/g and a CT scan finding as documented above     PLAN:  - medical optimization  - cardiology consult  - possible OR this admission  - please obtain EKG and Preop labs (CBC. BMP, Mg, Phos, Coags, and T&S), Covid PCR  - Serial abdominal exams  - DVT and GI ppx      Lines/Tubes: PIV,     Above plan discussed with Dr. Juan Gomez: [id number]   ASSESSMENT:  Pt is a 57y/o F with a PMHx of HTN, HLD, anxiety, seizures, migraines GERD, and a SHX of appendectomy x2, cholecystectomy, hysterectomy unilateral oophorectomy, Hiatal hernia present to the ED with a chronic intermittent abdominal pain that gradually worsend in the past 2 weeks. Physical exam showed diffused tenderness focused in the RLQ w/ r/g and a CT scan finding as documented above     PLAN:  - medical optimization  - cardiology consult  - possible OR this admission  - please obtain EKG and Preop labs (CBC. BMP, Mg, Phos, Coags, and T&S), Covid PCR  - Serial abdominal exams  - DVT and GI ppx      Lines/Tubes: PIV,   Senior Resident Addendum  Narrative as above, with abdominal pain with persistent foreign body, may need surgical intervention on this admission, will need risk stratification prior to OR.  Above plan discussed with Dr. Juan Gomez: [id number]

## 2021-11-01 NOTE — ED ADULT NURSE NOTE - NSICDXPASTMEDICALHX_GEN_ALL_CORE_FT
PAST MEDICAL HISTORY:  Anxiety     Back pain henriated discs    GERD (gastroesophageal reflux disease)     Hiatal hernia     HTN (hypertension)     Hypercholesteremia     Migraines     Seasonal allergic rhinitis, unspecified trigger     Seizures last one 6 yrs ago

## 2021-11-01 NOTE — H&P ADULT - HISTORY OF PRESENT ILLNESS
57 y/o F with a PMH of HTN, COPD, HLD, anxiety, seizure d/o, migraine, GERD, and multiple abdominal surgeries including appendectomy/resection of appendiceal stump, cholecystectomy, exploratory laparotomy with lysis of adhesions,  hysterectomy unilateral oophorectomy, Hiatal hernia present to the ED with a chronic intermittent abdominal pain that gradually worsened in the past 2 weeks. Pt states that the pain is periumbilical, and is associated with distention and constipation. Pt states that she has not had a BM in 2 days after she took MG citrate and has not passed flatus in 1 day. Pt reports that this pain is not new and has multiple bouts of SBO that have resolved. Pt last EGD and colonoscopy where earlier this year and follows-up with her GI regularly. She denies any h/o fever, cough, chest pain, headache, recent seizures, ingestion of foreign body, dizziness.  In the ED her vitals were stable, Labs were unremarkable, CT abdomen and pelvis with IV contrast showed - Mildly dilated small bowel loop in the mid abdomen with adjacent fat stranding/edema and an indeterminate 6 cm intraluminal tubular structure; no intraperitoneal free air. Findings may represent focal small bowel obstruction or infectious/inflammatory enteritis with focal ileus, likely related to the indeterminate tubular structure.  Patient is being admitted for the management of abdominal pain secondary enteritis vs obstruction d/t indeterminate 6cm intraluminal tubular structure - possible foreign body.

## 2021-11-01 NOTE — ED PROVIDER NOTE - PROGRESS NOTE DETAILS
ED Attending SHANON Garrido  Pt feeling better at this time, aware of current results, pending ct imaging results.  Pt endorsed to Dr. Morgan, please follow up imaging reassess. NC: surgery at bedside NC: Spoke to Dr Galaviz, admit to medicine for GI consult and surgery will follow pt, no plan for surgical intervention at this time.

## 2021-11-01 NOTE — ED PROVIDER NOTE - CLINICAL SUMMARY MEDICAL DECISION MAKING FREE TEXT BOX
56 female here for abdominal pain. Had screening labs imaging medications and reevaluation, seen by Surgical consultation for concern of SBO, plan is for inpatient admission for continued management.

## 2021-11-01 NOTE — ED PROVIDER NOTE - ATTENDING CONTRIBUTION TO CARE
57 yo f w/ pmhx of abdominal adhesions s/p lysis, sbo in 2017, GERD with Arellnao's Esophagus, Gastritis, Esophageal erosions, Seizure disorder, Migraines, HLD, anxiety  Appendectomy at age 15, in 2011 had open resection of appendiceal stump, 2017 exp lap lysis of adhesions, 2018 wide excision of abdominal granuloma  , hysterectomy, cholecystectomy, presents for ~ 2 weeks of generalized abd pain, crampy, constant, gradually worsening, moderate in intensity, non-radiating, no alleviating or precipitating factors, associated with nausea and vomiting, nbnb. Gi Dr. Avila- states on Linzess for constipation. pt repots on october 8th had cta abd and pelvis done to rule out renal artery stenosis, which was negative, as pt was having hard time controlling bp - follows with Dr. Moreno.    No fever, chills, cp,  pleuritic cp, sob, palpitations, diaphoresis, cough, ha/lh/dizziness, numbness/tingling, neck pain/ stiffness, diarrhea, constipation, melena/brbpr, urinary symptoms, vaginal bleeding/discharge/odor, trauma edema, calf pain/swelling/erythema, sick contacts, recent travel or rash.     On Exam: Vital Signs: I have reviewed the initial vital signs. Constitutional: Non toxic appearing pt sitting on stretcher speaking full sentences reporting pain to abd. Integumentary: No rash. No jaundice. EYES: No sclera Icterus. ENT: MMM NECK: Supple, non-tender, no meningeal signs. Cardiovascular: RRR, radial pulses 2/4 b/l. No JVD. Respiratory: BS present b/l, ctabl, no wheezing or crackles, no accessory muscle use, no stridor. Gastrointestinal: BS present throughout all 4 quadrants, soft, distended, diffuse pain to palpation, no rebound tenderness or guarding, no cvat. Musculoskeletal: FROM, no edema, no calf pain/swelling/erythema. Neurologic: AAOx3, motor 5/5 and sensation intact throughout upper and lowe ext, CN II-XII intact, No facial droop or slurring of speech. No focal deficits.

## 2021-11-01 NOTE — ED PROVIDER NOTE - NS ED MD DISPO DIVISION
Last office visit 1-18-18  Next office visit none scheduled  Last refill 1-3-18     [facility]

## 2021-11-01 NOTE — ED PROVIDER NOTE - CARE PLAN
Assessment and plan of treatment:	Plan: EKG, CXR, labs, ivf, pain control, anti emetic, urine, imaging, reassess.   Principal Discharge DX:	Abdominal pain  Assessment and plan of treatment:	Plan: EKG, CXR, labs, ivf, pain control, anti emetic, urine, imaging, reassess.  Secondary Diagnosis:	Enteritis  Secondary Diagnosis:	Small bowel obstruction  Secondary Diagnosis:	Nausea & vomiting   1

## 2021-11-01 NOTE — CONSULT NOTE ADULT - SUBJECTIVE AND OBJECTIVE BOX
Cardiologist: Dr. Moreno    HPI:  55 y/o F with a PMH of HTN, COPD, HLD, anxiety, seizure d/o, migraine, GERD, and multiple abdominal surgeries including appendectomy/resection of appendiceal stump, cholecystectomy, exploratory laparotomy with lysis of adhesions,  hysterectomy unilateral oophorectomy, Hiatal hernia present to the ED with a chronic intermittent abdominal pain that gradually worsened in the past 2 weeks. Pt states that the pain is periumbilical, and is associated with distention and constipation. Pt states that she has not had a BM in 2 days after she took MG citrate and has not passed flatus in 1 day. Pt reports that this pain is not new and has multiple bouts of SBO that have resolved. Pt last EGD and colonoscopy where earlier this year and follows-up with her GI regularly. She denies any h/o fever, cough, chest pain, headache, recent seizures, ingestion of foreign body, dizziness.  In the ED her vitals were stable, Labs were unremarkable, CT abdomen and pelvis with IV contrast showed - Mildly dilated small bowel loop in the mid abdomen with adjacent fat stranding/edema and an indeterminate 6 cm intraluminal tubular structure; no intraperitoneal free air. Findings may represent focal small bowel obstruction or infectious/inflammatory enteritis with focal ileus, likely related to the indeterminate tubular structure.  Patient is being admitted for the management of abdominal pain secondary enteritis vs obstruction d/t indeterminate 6cm intraluminal tubular structure - possible foreign body.   (01 Nov 2021 22:32)      PAST MEDICAL & SURGICAL HISTORY  Back pain  henriated discs    Seizures  last one 6 yrs ago    Migraines    Hypercholesteremia    Seasonal allergic rhinitis, unspecified trigger    GERD (gastroesophageal reflux disease)    Hiatal hernia    Anxiety    HTN (hypertension)    History of surgery  4 rght knee sx  1 left knee sx  cholecystectomy  rotator cuff right  abdominal sx-- adhesions  c section  ovarian cystectomy  breast cystectomies  colonoscopy   endoscopies  parital hysterectomy    S/p partial hysterectomy with remaining cervical stump    H/O rotator cuff surgery  right    History of appendectomy    S/P cholecystectomy    FAMILY HISTORY:  FAMILY HISTORY:  Coronary artery disease (Father)  dad cad @78    [ ] no pertinent family history of premature cardiovascular disease in first degree relatives.  Mother:   Father:   Siblings:     SOCIAL HISTORY:  []smoker  []Alcohol  []Drug    ALLERGIES:  Cipro (Anaphylaxis; Urticaria; Hives)  Cipro (Unknown)  Neurontin (Unknown)  penicillin (Unknown)  Toradol (Rash; Urticaria; Anaphylaxis (Mild to Mod))  Toradol (Unknown)    MEDICATIONS:  MEDICATIONS  (STANDING):  atorvastatin 80 milliGRAM(s) Oral at bedtime  enoxaparin Injectable 40 milliGRAM(s) SubCutaneous daily  lactated ringers. 1000 milliLiter(s) (100 mL/Hr) IV Continuous <Continuous>  levETIRAcetam 250 milliGRAM(s) Oral two times a day  NIFEdipine XL 60 milliGRAM(s) Oral daily  pantoprazole  Injectable 40 milliGRAM(s) IV Push daily  tiotropium 18 MICROgram(s) Capsule 1 Capsule(s) Inhalation daily    MEDICATIONS  (PRN):  clonazePAM  Tablet 2 milliGRAM(s) Oral four times a day PRN Anxiety  morphine  - Injectable 2 milliGRAM(s) IV Push every 6 hours PRN Severe Pain (7 - 10)  ondansetron Injectable 4 milliGRAM(s) IV Push every 8 hours PRN Nausea and/or Vomiting    HOME MEDICATIONS:  Home Medications:  atorvastatin 80 mg oral tablet: 1 tab(s) orally once a day (10 Dec 2020 00:54)  diclofenac sodium 75 mg oral delayed release tablet: 1 tab(s) orally 2 times a day (01 Nov 2021 22:45)  Fioricet oral tablet: 1 tab(s) orally 3 times a day, As Needed (01 Nov 2021 22:39)  Incruse Ellipta 62.5 mcg/inh inhalation powder:  (10 Dec 2020 00:54)  KlonoPIN 2 mg oral tablet: 1 tab(s) orally 4 times a day (01 Nov 2021 22:40)  levETIRAcetam 250 mg oral tablet: 1 tab(s) orally 2 times a day (10 Dec 2020 00:54)  Linzess 290 mcg oral capsule: 1 cap(s) orally once a day (01 Nov 2021 22:46)  Myrbetriq 50 mg oral tablet, extended release: 1 tab(s) orally once a day (01 Nov 2021 22:45)  NIFEdipine 60 mg oral tablet, extended release: 1 tab(s) orally 2 times a day (01 Nov 2021 22:47)  Protonix 40 mg oral delayed release tablet: 1 tab(s) orally 2 times a day (10 Dec 2020 00:54)  tiZANidine 4 mg oral tablet: 1 tab(s) orally once a day (01 Nov 2021 22:42)  traMADol 50 mg oral tablet: 1 tab(s) orally every 8 hours, As Needed - Severe Pain (7 - 10) - 10) MDD:2 full tabs (01 Nov 2021 22:39)  Ubrelvy 100 mg oral tablet: 1 tab(s) orally 2 times a day (01 Nov 2021 22:46)      VITALS:   T(F): 97.8 (11-01 @ 16:24), Max: 97.8 (11-01 @ 09:25)  HR: 91 (11-01 @ 16:24) (91 - 101)  BP: 170/71 (11-01 @ 16:24) (149/87 - 170/71)  BP(mean): --  RR: 20 (11-01 @ 16:24) (20 - 20)  SpO2: 96% (11-01 @ 16:24) (96% - 97%)      REVIEW OF SYSTEMS:  CONSTITUTIONAL: No weakness, fevers or chills  EYES: No visual changes  ENT: No vertigo or throat pain   NECK: No pain or stiffness  RESPIRATORY: No cough, wheezing, hemoptysis; No shortness of breath  CARDIOVASCULAR: No chest pain or palpitations  GASTROINTESTINAL: + abd pain pain. + nausea, vomiting. No hematemesis; No diarrhea. + constipation. No melena or hematochezia.  GENITOURINARY: No dysuria, frequency or hematuria  NEUROLOGICAL: No numbness or weakness  SKIN: No itching, no rashes  MSK: FROM x4    PHYSICAL EXAM:  NEURO: patient is awake , alert and oriented  GEN: Not in acute distress  NECK: no thyroid enlargement, no JVD  LUNGS: Clear to auscultation bilaterally   CARDIOVASCULAR: S1/S2 present, RRR , no murmurs or rubs, no carotid bruits,  + PP bilaterally  ABD: tender  EXT: No CAIO  SKIN: Intact    LABS:                        12.8   5.77  )-----------( 245      ( 01 Nov 2021 10:10 )             37.5     11-01    143  |  106  |  9<L>  ----------------------------<  107<H>  4.4   |  27  |  0.7    Ca    9.5      01 Nov 2021 10:10    TPro  6.8  /  Alb  4.3  /  TBili  0.2  /  DBili  x   /  AST  18  /  ALT  24  /  AlkPhos  140<H>  11-01      Lactate, Blood: 0.7 mmol/L (11-01-21 @ 10:10)    RADIOLOGY:  -CXR:  < from: Xray Chest 1 View- PORTABLE-Urgent (11.01.21 @ 12:37) >  Impression:    No radiographic evidence of acute cardiopulmonary disease.    < end of copied text >    -TTE:  < from: Transthoracic Echocardiogram (06.01.20 @ 16:27) >  Summary:   1. LV Ejection Fraction by Walker's Method with a biplane EF of 77 %.   2. Normal left ventricular size and wall thicknesses, with normalsystolic and diastolic function.   3. The mean global longitudinal peak strain by speckle tracking is -17.0% which is borderline normal.   4. LA volume Index is 10.3 ml/m² ml/m2.    < end of copied text >    -CCTA:  < from: CT Abdomen and Pelvis w/ IV Cont (11.01.21 @ 17:03) >  IMPRESSION:    Mildly dilated small bowel loop in the mid abdomen with adjacent fat stranding/edema and an indeterminate 6 cm intraluminal tubular structure; no intraperitoneal free air. Findings may represent focal small bowel obstruction or infectious/inflammatory enteritis with focal ileus, likely related to the indeterminate tubular structure.    < end of copied text >    -STRESS TEST:  < from: NM Nuclear Stress Pharmacologic Multiple (04.18.21 @ 15:11) >  IMPRESSION:    1. NORMAL ADENOSINE / REST MYOCARDIAL PERFUSION SPECT TOMOGRAPHY, WITH NO EVIDENCE FOR ISCHEMIA DURING ADENOSINE INFUSION.    2. NORMAL RESTING LEFT VENTRICULAR WALL MOTION AND WALL THICKENING.    3. LEFT VENTRICULAR EJECTION FRACTION OF  84 % WHICH IS WITHIN THE HYPERDYNAMIC RANGE.    < end of copied text >    -CATHETERIZATION:    ECG:  < from: 12 Lead ECG (11.01.21 @ 10:46) >  Ventricular Rate 76 BPM    Atrial Rate 76 BPM    P-R Interval 152 ms    QRS Duration 72 ms    Q-T Interval 412 ms    QTC Calculation(Bazett) 463 ms    P Axis 71 degrees    R Axis 36 degrees    T Axis 53 degrees    Diagnosis Line Normal sinus rhythm  Normal ECG    < end of copied text >    TELEMETRY EVENTS:

## 2021-11-01 NOTE — ED PROVIDER NOTE - NS ED ROS FT
Constitutional: (-) fever (-) malaise (-) diaphoresis (-) chills (-) wt. loss (-) body aches (-) night sweats  Eyes: (-) visual changes (-) eye pain (-) eye discharge (-) photophobia (-) FB sensation  ENMT: (-) nasal or chest congestion (-) runny nose (-) sore throat (-) hoarseness  (-) hearing changes (-) ear pain (-) ear discharge or infections (-) neck pain (-) neck stiffness  Cardiac: (-) chest pain  (-) palpitations (-) syncope (-) edema  Respiratory: (-) cough (-) SOB (-) BOWDEN  GI: (+) nausea (+) vomiting (-) diarrhea (+) abdominal pain  : (-) dysuria (-) increased frequency  (-) hematuria (-) incontinence  MS: (-) back pain (-) myalgia (-) muscle weakness (-)  joint pain  Neuro: (-) headache (-) dizziness (-) numbness/tingling to extremities B/L (-) weakness   Skin: (-) rash (-) laceration  GYN: (-) pelvic pain (-) abnormal bleeding (-) abnormal discharge or odor  Except as documented in the HPI, all other systems are negative.

## 2021-11-01 NOTE — ED PROVIDER NOTE - PHYSICAL EXAMINATION
GENERAL: Well-nourished, Well-developed. NAD.  HEAD: No visible or palpable bumps or hematomas. No ecchymosis behind ears B/L.  Eyes: PERRLA, EOMI. No asymmetry. No nystagmus. No conjunctival injection. Non-icteric sclera.  ENMT: MMM.   CVS: RRR. Normal S1,S2. No murmurs appreciated on auscultation   RESP: No use of accessory muscles. Chest rise symmetrical with good expansion. Lungs clear to auscultation B/L. No wheezing, rales, or rhonchi auscultated.  GI: Normal auscultation of bowel sounds in all 4 quadrants. (+)diffuse ttp. Soft, Nondistended. No guarding or rebound tenderness. No CVAT B/L.  Skin: Warm, Dry. No rashes or lesions. Good cap refill < 2 sec B/L.  EXT: Radial and pedal pulses present B/L. No calf tenderness or swelling B/L. No palpable cords. No pedal edema B/L.  Neuro: AA&O x 3. Sensation grossly intact. Strength 5/5 B/L. Gait within normal limits.

## 2021-11-01 NOTE — H&P ADULT - ASSESSMENT
55 y/o F with PMH of HTN, HLD, Anxiety, seizure d/o, migraine, GERD, multiple prior abdominal surgeries presenting with worsening colicky abdominal pain. Found to have dilated small bowel loops with adjascent fat stranding/edema and 6cm intraluminal tubular structure. Admitted for management of abdominal pain.    # Abdominal pain  # Possible intestinal foreign body  - Multiple prior surgeries  - Worsening abdominal pain - now constant  - Last BM 2 days ago; not passing flatus since yesterday  - CT abdomen (10/31/21) - Mildly dilated small bowel loop in the mid abdomen with adjacent fat stranding/edema and an indeterminate 6 cm intraluminal tubular structure; no intraperitoneal free air. Findings may represent focal small bowel obstruction or infectious/inflammatory enteritis with focal ileus, likely related to the indeterminate tubular structure.  - The intraluminal tubular hyperdensity present in prior CT abdomen as well since 04/21.   - surgery following  - Possible surgical intervention this admission  - GI eval  - IV hydration  - NPO  - Serial abdominal exam  - Preop cardiac risk stratification  - Pain control - morphine PRN    # HTN  - continue with nifedipine    # HLD  - continue with atorvastatin    # Anxiety  - continue with clonazepam    # COPD:   - Duonebs PRN  - On Incruse at home - Tiotropium while admitted    # Seizure disorder  - last seizure 15 years ago  - continue with keppra    # GERD  - PPI    # GI PPX: PPI  # DVT PPX: Lovenox  # Activity: IAT  # Diet: NPO  # Full code

## 2021-11-01 NOTE — ED ADULT NURSE NOTE - NSICDXFAMILYHX_GEN_ALL_CORE_FT
FAMILY HISTORY:  Father  Still living? Yes, Estimated age: Age Unknown  Coronary artery disease, Age at diagnosis: 61-70

## 2021-11-01 NOTE — CONSULT NOTE ADULT - ASSESSMENT
* Patient-based characteristics (Functional capacity)  Patient is able to achieve more than 4 MET (walk 4 blocks, climb 2 flights of stairs, etc...)          Y [X] / N []    High-risk patient features:  - Recent (<30 days) or active MI          Y [] / N [X]  - Unstable or severe angina          Y [] / N [X]  - Decompensated heart failure, or worsening or new-onset heart failure          Y [] / N [X]  - Severe valvular disease          Y [] / N [X]  - Significant arrhythmia (Tachy- or Bradyarrhythmia)          Y [] / N [X]    * Surgery/Procedure-based characteristics (Type of surgery)  - Low-risk procedure (outpatient procedure, elective, endoscopy, etc...)          Y [] / N []  - Elevated or Moderate-risk procedure (Inpatient)          Y [X] / N []  - High-risk procedure (urgent/emergent procedure, Intrathoracic, vascular, etc...)          Y [] / N []    * Revised Cardiac Risk Index (RCRI)  1- History of ischemic heart disease          Y [] / N [X]  2- History of congestive heart failure          Y [] / N [X]  3- History of stroke/TIA          Y [] / N [X]  4- History of insulin-dependent diabetes          Y [] / N [X]  5- Chronic kidney disease (Cr >2mg/dL)          Y [] / N [X]  6- Undergoing suprainguinal vascular, intraperitoneal, or intrathoracic surgery          Y [X] / N []    Class II risk (One factor) --> 6% risk (30-day risk of death, MI, or cardiac arrest)    * IMPRESSION & RECOMMENDATIONS:  -Moderate / High (>25%) -risk patient for a Moderate / High (>7%) -risk surgery/procedure  -This consult serves only as a olaf-operative cardiac risk stratification and evaluation to predict 30-days cardiac complications risk and mortality. The decision to proceed with the surgery/procedure is made by the performing physician and the patient

## 2021-11-01 NOTE — ED PROVIDER NOTE - IV ALTEPLASE DOOR HIDDEN
show
I will STOP taking the medications listed below when I get home from the hospital:    levothyroxine 50 mcg (0.05 mg) oral tablet  -- 1 tab(s) by mouth once a day    Vimpat 200 mg oral tablet  -- 1 tab(s) by mouth 2 times a day    Opdivo 10 mg/mL intravenous solution  --  intravenous    Avastin 25 mg/mL intravenous solution  --  intravenous    megestrol 40 mg/mL oral suspension  -- 5 milliliter(s) by mouth once a day    amLODIPine 5 mg oral tablet  -- 1 tab(s) by mouth once a day    minocycline  -- 2 cap(s) by mouth 2 times a day

## 2021-11-01 NOTE — H&P ADULT - NSHPSOCIALHISTORY_GEN_ALL_CORE
Substance Use (street drugs): ( ) never used  (  ) other: Smokes marijuana daily  Tobacco Usage:  (  ) never smoked   (   ) former smoker   (  x ) current smoker  1/2 PPD : >30 pack years (    ) pack year  Alcohol Usage: occasional

## 2021-11-01 NOTE — H&P ADULT - ATTENDING COMMENTS
patient seen and examined independently on morning rounds for the first time this morning, chart reviewed and discussed with medicine resident on interdisciplinary rounds and agree with the above overnight H/P and assessment and plan with the following addendum:     in brief, 55 yo woman with h/o htn, copd, anxiety, dyslipidemia, seizure, chronic migraine and h/o multiple abdominal surgeries (including JUAN with unilat salpingooophorectomy, hiatal hernia, appendectomy, cholecystectomy and ex lap with lysis of adhesions)_ who p/w worsening abdominal pain over last 2 weeks.  Admitted to Medicine service and initial CT abd/pelvis shows partial SBO with indeterminate 6 cm intraluminal tubular structure (? Foreign body--but chronic since prior ct)    ROS- as per above otherwise review of systems unremarkable    pcp- Dr. Umana    PE:  GEN-NAD, AAOx3, mild distress  HEENT- NCAT, PERRLA, EOMI, +NGT to suction  PULM- fair air entry with decreased bs bilateral bases  CVS- +s1/s2 RRR no murmurs  GI- soft +ttp RLQ/LLQ- ND +bs  EXT- no edema  SKIN- no rashes/no lesions    labs/radiology reviewed    a/p:  #Abdominal pain- partial SBO- ? intraluminal 6cm tubular structure- ? foreign body  -pain control  -keep npo  -NGT--confirm location with xray  -surgery following---plan for OR tomorrow 11/3 for likely ex lap--cardiology evaluated--patient with no h/o prior reaction to anaestesia, at baseline METS>4-  h/o multiple prior abdominal surgical procedures---RCRI score 2---6.6% risk for cardiac death, nonfaatal MI and nonfatal cardiac arrest  -patient is moderate to high risk for intermediate to high risk procedure  -appreciate cardiology recomm  -post-op pain control, early ambulation, monitor vs closely  -serial abdominal exam---stat KUB for any change in abd exam  -monitor vs closely    DVT/GI ppx  overall guarded prognosis    npo for OR tomorrow---upgrade to more monitored setting if any clinical change overnight    FULL CODE

## 2021-11-01 NOTE — H&P ADULT - NSHPLABSRESULTS_GEN_ALL_CORE
LABS: Personally reviewed labs, imaging, and ECG                          12.8   5.77  )-----------( 245      ( 2021 10:10 )             37.5           143  |  106  |  9<L>  ----------------------------<  107<H>  4.4   |  27  |  0.7    Ca    9.5      2021 10:10    TPro  6.8  /  Alb  4.3  /  TBili  0.2  /  DBili  x   /  AST  18  /  ALT  24  /  AlkPhos  140<H>         LIVER FUNCTIONS - ( 2021 10:10 )  Alb: 4.3 g/dL / Pro: 6.8 g/dL / ALK PHOS: 140 U/L / ALT: 24 U/L / AST: 18 U/L / GGT: x                    Urinalysis Basic - ( 2021 12:56 )    Color: Yellow / Appearance: Clear / S.010 / pH: x  Gluc: x / Ketone: Negative  / Bili: Negative / Urobili: <2 mg/dL   Blood: x / Protein: Negative / Nitrite: Negative   Leuk Esterase: Negative / RBC: 1 /HPF / WBC 1 /HPF   Sq Epi: x / Non Sq Epi: 3 /HPF / Bacteria: Negative            Lactate Trend   @ 10:10 Lactate:0.7       < from: CT Abdomen and Pelvis w/ IV Cont (21 @ 17:03) >    FINDINGS:    LOWER CHEST: Bibasilar dependent atelectasis.    HEPATOBILIARY:  Post cholecystectomy. Unremarkable liver.    SPLEEN: Unremarkable.    PANCREAS: Unremarkable.    ADRENAL GLANDS: Unremarkable.    KIDNEYS: Symmetric renal enhancement bilaterally. No hydronephrosis. Subcentimeter hypodensities in both kidneys, too small to characterize. Right lower pole cortical scarring.    ABDOMINOPELVIC NODES: No lymphadenopathy.    PELVIC ORGANS: Post supracervical hysterectomy.    PERITONEUM/MESENTERY/BOWEL: An approximately 6 cm tubular hyperdense structure seen within a focally dilated, fecalized small bowel loop in the mid abdomen measuring 4 cm with adjacent mesenteric fat stranding/edema. No pneumoperitoneum. Post appendectomy.    BONES/SOFT TISSUES: Unremarkable.    OTHER: Atherosclerotic vascular calcifications.      IMPRESSION:    Mildly dilated small bowel loop in the mid abdomen with adjacent fat stranding/edema and an indeterminate 6 cm intraluminal tubular structure; no intraperitoneal free air. Findings may represent focal small bowel obstruction or infectious/inflammatory enteritis with focal ileus, likely related to the indeterminate tubular structure.    < end of copied text >

## 2021-11-01 NOTE — H&P ADULT - NSHPPHYSICALEXAM_GEN_ALL_CORE
General: No acute distress; Pallor (-), Icterus (-), Cyanosis (-), Clubbing (-)  HEENT: Normocephalic, atraumatic, PERRLA, EOMI  PULM: Bilaterally equal and clear breath sounds, wheeze (-), rubs (-), crackles (-)  CVS: Normal S1 and S2, murmurs (-), rubs (-), gallops (-)   GI: Midline scar +; Soft, Distended, diffuse tenderness to palpation,  BS +  MSK: Edema (-), no muscle, bone or joint tenderness noted  SKIN: Warm and well perfused, no rashes noted  NEURO:  Alert and Oriented x 3; No gross focal neurological deficit noted

## 2021-11-01 NOTE — CONSULT NOTE ADULT - SUBJECTIVE AND OBJECTIVE BOX
RAMÍREZ RODARTE [id number]  56y Female      HPI:  Pt is a 55y/o F with a PMHx of HTN, HLD, anxiety, seizures, migraines GERD, and a SHX of appendectomy x2, cholecystectomy, hysterectomy unilateral oophorectomy, Hiatal hernia present to the ED with a chronic intermittent abdominal pain that gradually worsened in the past 2 weeks. Pt states that it is periumbilical, and is associated with distention and constipation. Pt states that she has not had a BM in 2 days after she took MG citrate and has not passed flatus in 1 day. Pt reports that this pain is not new and has multiple bouts of SBO that have resolved. Pt last EGD and colonoscopy where earlier this year and follows-up with her GI regularly. Pt denies HA, vision changes, SOB, Chest pain, palpitations, and dysuria. Pt states that she last ate yesterday. and is a 1/2 ppd smoker of tobacco and a daily user of medical marijuana     Surgery was consulted due to ct finding os dilated loops of small bowel around a FB.     In the ED upon initial assessment in the ED pt was vitally stable however she was in moderate distress and had generalized tenderness worsen in the RLQ w/ rebound and guarding. Laboratory results did not show an acute inflammatory process with a WBC of 5.7, latate of 0.7 and Lipase of 10 with normal electrolytes.     Pt was reexamined after discussion with attending. Pt states that she "went out for a smoke" upon exam pt was altered, more somnolent however was easily arousable and was  on exam.     PAST MEDICAL & SURGICAL HISTORY:  Back pain  henriated discs    Seizures  last one 6 yrs ago    Migraines    Hypercholesteremia    Seasonal allergic rhinitis, unspecified trigger    GERD (gastroesophageal reflux disease)    Hiatal hernia    Anxiety    HTN (hypertension)    History of surgery  4 rght knee sx  1 left knee sx  cholecystectomy  rotator cuff right  abdominal sx-- adhesions  c section  ovarian cystectomy  breast cystectomies  colonoscopy   endoscopies  parital hysterectomy    S/p partial hysterectomy with remaining cervical stump    H/O rotator cuff surgery  right    History of appendectomy    S/P cholecystectomy    MEDICATIONS  (STANDING):    MEDICATIONS  (PRN):    Allergies    Cipro (Anaphylaxis; Urticaria; Hives)  Cipro (Unknown)  Neurontin (Unknown)  penicillin (Unknown)  Toradol (Rash; Urticaria; Anaphylaxis (Mild to Mod))  Toradol (Unknown)    Intolerances    REVIEW OF SYSTEMS    [x ] A ten-point review of systems was otherwise negative except as noted.  [ ] Due to altered mental status/intubation, subjective information were not able to be obtained from the patient. History was obtained, to the extent possible, from review of the chart and collateral sources of information.    Vital Signs Last 24 Hrs  T(C): 36.6 (2021 16:24), Max: 36.6 (2021 09:25)  T(F): 97.8 (2021 16:24), Max: 97.8 (2021 09:25)  HR: 91 (2021 16:24) (91 - 101)  BP: 170/71 (2021 16:24) (149/87 - 170/71)  BP(mean): --  RR: 20 (2021 16:24) (20 - 20)  SpO2: 96% (:24) (96% - 97%)    PHYSICAL EXAM:  GENERAL: NAD, well-appearing  CHEST/LUNG: Clear to auscultation bilaterally  HEART: Regular rate and rhythm  ABDOMEN: Soft, diffuse tenderness in RLQ w/ rebound and guarding  EXTREMITIES:  No clubbing, cyanosis, or edema    LABS:  Labs:  CAPILLARY BLOOD GLUCOSE                     12.8   5.77  )-----------( 245      ( 2021 10:10 )             37.5       Auto Neutrophil %: 64.5 % (21 @ 10:10)  Auto Immature Granulocyte %: 0.2 % (21 @ 10:10)        143  |  106  |  9<L>  ----------------------------<  107<H>  4.4   |  27  |  0.7      Calcium, Total Serum: 9.5 mg/dL (21 @ 10:10)      LFTs:             6.8  | 0.2  | 18       ------------------[140     ( 2021 10:10 )  4.3  | x    | 24          Lipase:10     Amylase:x         Lactate, Blood: 0.7 mmol/L (21 @ 10:10)    Coags:    Urinalysis Basic - ( 2021 12:56 )    Color: Yellow / Appearance: Clear / S.010 / pH: x  Gluc: x / Ketone: Negative  / Bili: Negative / Urobili: <2 mg/dL   Blood: x / Protein: Negative / Nitrite: Negative   Leuk Esterase: Negative / RBC: 1 /HPF / WBC 1 /HPF   Sq Epi: x / Non Sq Epi: 3 /HPF / Bacteria: Negative    RADIOLOGY & ADDITIONAL STUDIES:  < from: CT Abdomen and Pelvis w/ IV Cont (21 @ 17:03) >    IMPRESSION:    Mildly dilated small bowel loop in the mid abdomen with adjacent fat stranding/edema and an indeterminate 6 cm intraluminal tubular structure; no intraperitoneal free air. Findings may represent focal small bowel obstruction or infectious/inflammatory enteritis with focal ileus, likely related to the indeterminate tubular structure.    < end of copied text >    < from: Xray Chest 1 View- PORTABLE-Urgent (21 @ 12:37) >    Impression:    No radiographic evidence of acute cardiopulmonary disease.    < end of copied text >

## 2021-11-02 LAB
A1C WITH ESTIMATED AVERAGE GLUCOSE RESULT: 6 % — HIGH (ref 4–5.6)
ALBUMIN SERPL ELPH-MCNC: 3.8 G/DL — SIGNIFICANT CHANGE UP (ref 3.5–5.2)
ALBUMIN SERPL ELPH-MCNC: 4 G/DL — SIGNIFICANT CHANGE UP (ref 3.5–5.2)
ALP SERPL-CCNC: 146 U/L — HIGH (ref 30–115)
ALP SERPL-CCNC: 165 U/L — HIGH (ref 30–115)
ALT FLD-CCNC: 36 U/L — SIGNIFICANT CHANGE UP (ref 0–41)
ALT FLD-CCNC: 48 U/L — HIGH (ref 0–41)
ANION GAP SERPL CALC-SCNC: 15 MMOL/L — HIGH (ref 7–14)
ANION GAP SERPL CALC-SCNC: 18 MMOL/L — HIGH (ref 7–14)
APTT BLD: 33.7 SEC — SIGNIFICANT CHANGE UP (ref 27–39.2)
APTT BLD: 40.4 SEC — HIGH (ref 27–39.2)
APTT BLD: 42.5 SEC — HIGH (ref 27–39.2)
AST SERPL-CCNC: 28 U/L — SIGNIFICANT CHANGE UP (ref 0–41)
AST SERPL-CCNC: 44 U/L — HIGH (ref 0–41)
BASOPHILS # BLD AUTO: 0.03 K/UL — SIGNIFICANT CHANGE UP (ref 0–0.2)
BASOPHILS # BLD AUTO: 0.03 K/UL — SIGNIFICANT CHANGE UP (ref 0–0.2)
BASOPHILS NFR BLD AUTO: 0.6 % — SIGNIFICANT CHANGE UP (ref 0–1)
BASOPHILS NFR BLD AUTO: 0.6 % — SIGNIFICANT CHANGE UP (ref 0–1)
BILIRUB SERPL-MCNC: 0.2 MG/DL — SIGNIFICANT CHANGE UP (ref 0.2–1.2)
BILIRUB SERPL-MCNC: 0.3 MG/DL — SIGNIFICANT CHANGE UP (ref 0.2–1.2)
BLD GP AB SCN SERPL QL: SIGNIFICANT CHANGE UP
BLD GP AB SCN SERPL QL: SIGNIFICANT CHANGE UP
BUN SERPL-MCNC: 10 MG/DL — SIGNIFICANT CHANGE UP (ref 10–20)
BUN SERPL-MCNC: 8 MG/DL — LOW (ref 10–20)
CALCIUM SERPL-MCNC: 8.6 MG/DL — SIGNIFICANT CHANGE UP (ref 8.5–10.1)
CALCIUM SERPL-MCNC: 9.1 MG/DL — SIGNIFICANT CHANGE UP (ref 8.5–10.1)
CHLORIDE SERPL-SCNC: 101 MMOL/L — SIGNIFICANT CHANGE UP (ref 98–110)
CHLORIDE SERPL-SCNC: 99 MMOL/L — SIGNIFICANT CHANGE UP (ref 98–110)
CHOLEST SERPL-MCNC: 149 MG/DL — SIGNIFICANT CHANGE UP
CO2 SERPL-SCNC: 21 MMOL/L — SIGNIFICANT CHANGE UP (ref 17–32)
CO2 SERPL-SCNC: 23 MMOL/L — SIGNIFICANT CHANGE UP (ref 17–32)
CREAT SERPL-MCNC: 0.5 MG/DL — LOW (ref 0.7–1.5)
CREAT SERPL-MCNC: 0.6 MG/DL — LOW (ref 0.7–1.5)
EOSINOPHIL # BLD AUTO: 0.03 K/UL — SIGNIFICANT CHANGE UP (ref 0–0.7)
EOSINOPHIL # BLD AUTO: 0.04 K/UL — SIGNIFICANT CHANGE UP (ref 0–0.7)
EOSINOPHIL NFR BLD AUTO: 0.6 % — SIGNIFICANT CHANGE UP (ref 0–8)
EOSINOPHIL NFR BLD AUTO: 0.8 % — SIGNIFICANT CHANGE UP (ref 0–8)
ESTIMATED AVERAGE GLUCOSE: 126 MG/DL — HIGH (ref 68–114)
GLUCOSE SERPL-MCNC: 72 MG/DL — SIGNIFICANT CHANGE UP (ref 70–99)
GLUCOSE SERPL-MCNC: 81 MG/DL — SIGNIFICANT CHANGE UP (ref 70–99)
HCT VFR BLD CALC: 33.8 % — LOW (ref 37–47)
HCT VFR BLD CALC: 34.6 % — LOW (ref 37–47)
HDLC SERPL-MCNC: 42 MG/DL — LOW
HGB BLD-MCNC: 11.4 G/DL — LOW (ref 12–16)
HGB BLD-MCNC: 11.7 G/DL — LOW (ref 12–16)
IMM GRANULOCYTES NFR BLD AUTO: 0.2 % — SIGNIFICANT CHANGE UP (ref 0.1–0.3)
IMM GRANULOCYTES NFR BLD AUTO: 0.2 % — SIGNIFICANT CHANGE UP (ref 0.1–0.3)
INR BLD: 0.97 RATIO — SIGNIFICANT CHANGE UP (ref 0.65–1.3)
INR BLD: 0.97 RATIO — SIGNIFICANT CHANGE UP (ref 0.65–1.3)
INR BLD: 1.03 RATIO — SIGNIFICANT CHANGE UP (ref 0.65–1.3)
LACTATE SERPL-SCNC: 1.2 MMOL/L — SIGNIFICANT CHANGE UP (ref 0.7–2)
LIPID PNL WITH DIRECT LDL SERPL: 77 MG/DL — SIGNIFICANT CHANGE UP
LYMPHOCYTES # BLD AUTO: 1.37 K/UL — SIGNIFICANT CHANGE UP (ref 1.2–3.4)
LYMPHOCYTES # BLD AUTO: 1.85 K/UL — SIGNIFICANT CHANGE UP (ref 1.2–3.4)
LYMPHOCYTES # BLD AUTO: 26.6 % — SIGNIFICANT CHANGE UP (ref 20.5–51.1)
LYMPHOCYTES # BLD AUTO: 35.9 % — SIGNIFICANT CHANGE UP (ref 20.5–51.1)
MAGNESIUM SERPL-MCNC: 1.3 MG/DL — LOW (ref 1.8–2.4)
MAGNESIUM SERPL-MCNC: 1.5 MG/DL — LOW (ref 1.8–2.4)
MCHC RBC-ENTMCNC: 31.8 PG — HIGH (ref 27–31)
MCHC RBC-ENTMCNC: 32.3 PG — HIGH (ref 27–31)
MCHC RBC-ENTMCNC: 33.7 G/DL — SIGNIFICANT CHANGE UP (ref 32–37)
MCHC RBC-ENTMCNC: 33.8 G/DL — SIGNIFICANT CHANGE UP (ref 32–37)
MCV RBC AUTO: 94.4 FL — SIGNIFICANT CHANGE UP (ref 81–99)
MCV RBC AUTO: 95.6 FL — SIGNIFICANT CHANGE UP (ref 81–99)
MONOCYTES # BLD AUTO: 0.47 K/UL — SIGNIFICANT CHANGE UP (ref 0.1–0.6)
MONOCYTES # BLD AUTO: 0.49 K/UL — SIGNIFICANT CHANGE UP (ref 0.1–0.6)
MONOCYTES NFR BLD AUTO: 9.1 % — SIGNIFICANT CHANGE UP (ref 1.7–9.3)
MONOCYTES NFR BLD AUTO: 9.5 % — HIGH (ref 1.7–9.3)
MRSA PCR RESULT.: NEGATIVE — SIGNIFICANT CHANGE UP
NEUTROPHILS # BLD AUTO: 2.74 K/UL — SIGNIFICANT CHANGE UP (ref 1.4–6.5)
NEUTROPHILS # BLD AUTO: 3.24 K/UL — SIGNIFICANT CHANGE UP (ref 1.4–6.5)
NEUTROPHILS NFR BLD AUTO: 53 % — SIGNIFICANT CHANGE UP (ref 42.2–75.2)
NEUTROPHILS NFR BLD AUTO: 62.9 % — SIGNIFICANT CHANGE UP (ref 42.2–75.2)
NON HDL CHOLESTEROL: 107 MG/DL — SIGNIFICANT CHANGE UP
NRBC # BLD: 0 /100 WBCS — SIGNIFICANT CHANGE UP (ref 0–0)
NRBC # BLD: 0 /100 WBCS — SIGNIFICANT CHANGE UP (ref 0–0)
PHOSPHATE SERPL-MCNC: 3.5 MG/DL — SIGNIFICANT CHANGE UP (ref 2.1–4.9)
PLATELET # BLD AUTO: 208 K/UL — SIGNIFICANT CHANGE UP (ref 130–400)
PLATELET # BLD AUTO: 221 K/UL — SIGNIFICANT CHANGE UP (ref 130–400)
POTASSIUM SERPL-MCNC: 3.6 MMOL/L — SIGNIFICANT CHANGE UP (ref 3.5–5)
POTASSIUM SERPL-MCNC: 3.8 MMOL/L — SIGNIFICANT CHANGE UP (ref 3.5–5)
POTASSIUM SERPL-SCNC: 3.6 MMOL/L — SIGNIFICANT CHANGE UP (ref 3.5–5)
POTASSIUM SERPL-SCNC: 3.8 MMOL/L — SIGNIFICANT CHANGE UP (ref 3.5–5)
PROT SERPL-MCNC: 6 G/DL — SIGNIFICANT CHANGE UP (ref 6–8)
PROT SERPL-MCNC: 6.2 G/DL — SIGNIFICANT CHANGE UP (ref 6–8)
PROTHROM AB SERPL-ACNC: 11.2 SEC — SIGNIFICANT CHANGE UP (ref 9.95–12.87)
PROTHROM AB SERPL-ACNC: 11.2 SEC — SIGNIFICANT CHANGE UP (ref 9.95–12.87)
PROTHROM AB SERPL-ACNC: 11.8 SEC — SIGNIFICANT CHANGE UP (ref 9.95–12.87)
RBC # BLD: 3.58 M/UL — LOW (ref 4.2–5.4)
RBC # BLD: 3.62 M/UL — LOW (ref 4.2–5.4)
RBC # FLD: 12.6 % — SIGNIFICANT CHANGE UP (ref 11.5–14.5)
RBC # FLD: 13.2 % — SIGNIFICANT CHANGE UP (ref 11.5–14.5)
SODIUM SERPL-SCNC: 138 MMOL/L — SIGNIFICANT CHANGE UP (ref 135–146)
SODIUM SERPL-SCNC: 139 MMOL/L — SIGNIFICANT CHANGE UP (ref 135–146)
TRIGL SERPL-MCNC: 122 MG/DL — SIGNIFICANT CHANGE UP
WBC # BLD: 5.15 K/UL — SIGNIFICANT CHANGE UP (ref 4.8–10.8)
WBC # BLD: 5.16 K/UL — SIGNIFICANT CHANGE UP (ref 4.8–10.8)
WBC # FLD AUTO: 5.15 K/UL — SIGNIFICANT CHANGE UP (ref 4.8–10.8)
WBC # FLD AUTO: 5.16 K/UL — SIGNIFICANT CHANGE UP (ref 4.8–10.8)

## 2021-11-02 PROCEDURE — 99223 1ST HOSP IP/OBS HIGH 75: CPT

## 2021-11-02 PROCEDURE — 99222 1ST HOSP IP/OBS MODERATE 55: CPT

## 2021-11-02 PROCEDURE — 71045 X-RAY EXAM CHEST 1 VIEW: CPT | Mod: 26

## 2021-11-02 RX ORDER — MAGNESIUM SULFATE 500 MG/ML
2 VIAL (ML) INJECTION
Refills: 0 | Status: COMPLETED | OUTPATIENT
Start: 2021-11-02 | End: 2021-11-03

## 2021-11-02 RX ORDER — LEVETIRACETAM 250 MG/1
250 TABLET, FILM COATED ORAL EVERY 12 HOURS
Refills: 0 | Status: DISCONTINUED | OUTPATIENT
Start: 2021-11-02 | End: 2021-11-03

## 2021-11-02 RX ORDER — INFLUENZA VIRUS VACCINE 15; 15; 15; 15 UG/.5ML; UG/.5ML; UG/.5ML; UG/.5ML
0.5 SUSPENSION INTRAMUSCULAR ONCE
Refills: 0 | Status: DISCONTINUED | OUTPATIENT
Start: 2021-11-02 | End: 2021-11-13

## 2021-11-02 RX ORDER — ACETAMINOPHEN 500 MG
1000 TABLET ORAL ONCE
Refills: 0 | Status: COMPLETED | OUTPATIENT
Start: 2021-11-02 | End: 2021-11-02

## 2021-11-02 RX ORDER — HYDROMORPHONE HYDROCHLORIDE 2 MG/ML
0.5 INJECTION INTRAMUSCULAR; INTRAVENOUS; SUBCUTANEOUS ONCE
Refills: 0 | Status: DISCONTINUED | OUTPATIENT
Start: 2021-11-02 | End: 2021-11-02

## 2021-11-02 RX ORDER — HYDROMORPHONE HYDROCHLORIDE 2 MG/ML
0.5 INJECTION INTRAMUSCULAR; INTRAVENOUS; SUBCUTANEOUS EVERY 4 HOURS
Refills: 0 | Status: DISCONTINUED | OUTPATIENT
Start: 2021-11-02 | End: 2021-11-03

## 2021-11-02 RX ADMIN — Medication 400 MILLIGRAM(S): at 16:44

## 2021-11-02 RX ADMIN — MORPHINE SULFATE 2 MILLIGRAM(S): 50 CAPSULE, EXTENDED RELEASE ORAL at 04:58

## 2021-11-02 RX ADMIN — PANTOPRAZOLE SODIUM 40 MILLIGRAM(S): 20 TABLET, DELAYED RELEASE ORAL at 11:30

## 2021-11-02 RX ADMIN — SODIUM CHLORIDE 100 MILLILITER(S): 9 INJECTION, SOLUTION INTRAVENOUS at 01:29

## 2021-11-02 RX ADMIN — MORPHINE SULFATE 2 MILLIGRAM(S): 50 CAPSULE, EXTENDED RELEASE ORAL at 06:18

## 2021-11-02 RX ADMIN — HYDROMORPHONE HYDROCHLORIDE 0.5 MILLIGRAM(S): 2 INJECTION INTRAMUSCULAR; INTRAVENOUS; SUBCUTANEOUS at 12:17

## 2021-11-02 RX ADMIN — LEVETIRACETAM 400 MILLIGRAM(S): 250 TABLET, FILM COATED ORAL at 21:28

## 2021-11-02 RX ADMIN — ENOXAPARIN SODIUM 40 MILLIGRAM(S): 100 INJECTION SUBCUTANEOUS at 11:28

## 2021-11-02 RX ADMIN — ONDANSETRON 4 MILLIGRAM(S): 8 TABLET, FILM COATED ORAL at 08:12

## 2021-11-02 RX ADMIN — Medication 1000 MILLIGRAM(S): at 16:44

## 2021-11-02 RX ADMIN — LEVETIRACETAM 250 MILLIGRAM(S): 250 TABLET, FILM COATED ORAL at 06:18

## 2021-11-02 RX ADMIN — TIOTROPIUM BROMIDE 1 CAPSULE(S): 18 CAPSULE ORAL; RESPIRATORY (INHALATION) at 09:35

## 2021-11-02 RX ADMIN — HYDROMORPHONE HYDROCHLORIDE 0.5 MILLIGRAM(S): 2 INJECTION INTRAMUSCULAR; INTRAVENOUS; SUBCUTANEOUS at 22:05

## 2021-11-02 RX ADMIN — MORPHINE SULFATE 4 MILLIGRAM(S): 50 CAPSULE, EXTENDED RELEASE ORAL at 01:27

## 2021-11-02 RX ADMIN — HYDROMORPHONE HYDROCHLORIDE 0.5 MILLIGRAM(S): 2 INJECTION INTRAMUSCULAR; INTRAVENOUS; SUBCUTANEOUS at 12:45

## 2021-11-02 RX ADMIN — HYDROMORPHONE HYDROCHLORIDE 0.5 MILLIGRAM(S): 2 INJECTION INTRAMUSCULAR; INTRAVENOUS; SUBCUTANEOUS at 21:44

## 2021-11-02 RX ADMIN — HYDROMORPHONE HYDROCHLORIDE 0.5 MILLIGRAM(S): 2 INJECTION INTRAMUSCULAR; INTRAVENOUS; SUBCUTANEOUS at 08:45

## 2021-11-02 RX ADMIN — Medication 25 GRAM(S): at 23:57

## 2021-11-02 RX ADMIN — HYDROMORPHONE HYDROCHLORIDE 0.5 MILLIGRAM(S): 2 INJECTION INTRAMUSCULAR; INTRAVENOUS; SUBCUTANEOUS at 17:22

## 2021-11-02 RX ADMIN — HYDROMORPHONE HYDROCHLORIDE 0.5 MILLIGRAM(S): 2 INJECTION INTRAMUSCULAR; INTRAVENOUS; SUBCUTANEOUS at 08:11

## 2021-11-02 RX ADMIN — Medication 2 MILLIGRAM(S): at 02:28

## 2021-11-02 NOTE — PROGRESS NOTE ADULT - SUBJECTIVE AND OBJECTIVE BOX
Patient is a 56y old  Female who presents with a chief complaint of Abdominal Pain (2021 09:49)      INTERVAL HPI/OVERNIGHT EVENTS: Pt reports being in pain overnight, and mild difficulty sleeping. Pt's abd pain is minimally decrease despite morphine. Pt reports last BM 2d ago, and inability to pass gas over 1d ago.       REVIEW OF SYSTEMS:  CONSTITUTIONAL: No fever or fatigue.  RESPIRATORY: No cough, wheezing, chills or hemoptysis; No shortness of breath  CARDIOVASCULAR: No chest pain, palpitations, dizziness, or leg swelling  GASTROINTESTINAL: (+) as per HPI  GENITOURINARY: No dysuria, frequency, hematuria, or incontinence      FAMILY HISTORY:  Coronary artery disease (Father)  dad cad @78      T(C): 36.7 (21 @ 02:00), Max: 36.7 (21 @ 02:00)  HR: 100 (21 @ 02:00) (87 - 100)  BP: 147/75 (21 @ 02:00) (135/67 - 170/71)  RR: 18 (21 @ 02:00) (18 - 20)  SpO2: 96% (21 @ 02:00) (96% - 100%)  Wt(kg): --Vital Signs Last 24 Hrs  T(C): 36.7 (2021 02:00), Max: 36.7 (2021 02:00)  T(F): 98 (2021 02:00), Max: 98 (2021 02:00)  HR: 100 (2021 02:00) (87 - 100)  BP: 147/75 (2021 02:00) (135/67 - 170/71)  BP(mean): --  RR: 18 (2021 02:00) (18 - 20)  SpO2: 96% (2021 02:00) (96% - 100%)  Cipro (Anaphylaxis; Urticaria; Hives)  Cipro (Unknown)  Neurontin (Unknown)  penicillin (Unknown)  Toradol (Rash; Urticaria; Anaphylaxis (Mild to Mod))  Toradol (Unknown)      PHYSICAL EXAM:  GENERAL: Appears in mild distress.  ENMT: No tonsillar erythema, exudates, or enlargement; Moist mucous membranes, Poor dentitionN  NERVOUS SYSTEM:  Alert & Oriented X3, Good concentration;   CHEST/LUNG: Clear to auscultation bilaterally; No rales, rhonchi, wheezing, or rubs  HEART: Regular rate and rhythm; No murmurs, rubs, or gallops  ABDOMEN: Distended, generalized pain throughout on light palpation. (+) BS  EXTREMITIES:  No clubbing, cyanosis, or edema      LABS:                        11.7   5.15  )-----------( 221      ( 2021 09:40 )             34.6           139  |  101  |  8<L>  ----------------------------<  81  3.8   |  23  |  0.6<L>    Ca    9.1      2021 09:40  Phos  3.5       Mg     1.5         TPro  6.2  /  Alb  3.8  /  TBili  0.3  /  DBili  x   /  AST  44<H>  /  ALT  48<H>  /  AlkPhos  165<H>              Urinalysis Basic - ( 2021 12:56 )    Color: Yellow / Appearance: Clear / S.010 / pH: x  Gluc: x / Ketone: Negative  / Bili: Negative / Urobili: <2 mg/dL   Blood: x / Protein: Negative / Nitrite: Negative   Leuk Esterase: Negative / RBC: 1 /HPF / WBC 1 /HPF   Sq Epi: x / Non Sq Epi: 3 /HPF / Bacteria: Negative      PT/INR - ( 2021 09:40 )   PT: 11.20 sec;   INR: 0.97 ratio       PTT - ( 2021 09:40 )  PTT:33.7 sec      RADIOLOGY & ADDITIONAL TESTS:    Xray Kidney Ureter Bladder (21 @ 23:37) >  Nonobstructive bowel gas pattern. Tubular radiopaque density seen on prior CT is faintly visualized to the right of the L4 vertebral body. Excreted contrast seen within the urinary bladder. Surgical clips are seen in the right upper quadrant of the abdomen. Stable visualized osseous structures.      CT Abdomen and Pelvis w/ IV Cont (21 @ 17:03) >  IMPRESSION:  Mildly dilated small bowel loop in the mid abdomen with adjacent fat stranding/edema and an indeterminate 6 cm intraluminal tubular structure; no intraperitoneal free air. Findings may represent focal small bowel obstruction or infectious/inflammatory enteritis with focal ileus, likely related to the indeterminate tubular structure.      Xray Chest 1 View- PORTABLE-Urgent (21 @ 12:37) >  Impression:  No radiographic evidence of acute cardiopulmonary disease.    CT Angio Abdomen and Pelvis w/ IV Cont (10.08.21 @ 14:50) >  IMPRESSION:  No CTA evidence for significant renal artery stenosis.  Intraluminal tubular small bowel linear hyperdensity, progressed more distal since prior examination.      CT Angio Abdomen and Pelvis w/ IV Cont (21 @ 05:22) >  MPRESSION:  No CTA evidence of aortic dissection or intramural hematoma.  Focal linear hyperdensity within loop of small bowel in the left lower quadrant. Follow-up noncontrast CT of the abdomen is recommended for further evaluation.        CT Abdomen and Pelvis w/ Oral Cont and w/ IV Cont (20 @ 17:18) >  IMPRESSION:  No evidence of acute/inflammatory process in the abdomen or pelvis. Oral contrast reaches the mid transverse colon.        Diagnosis Line Normal sinus rhythm  Normal ECG        atorvastatin 80 milliGRAM(s) Oral at bedtime  clonazePAM  Tablet 2 milliGRAM(s) Oral four times a day PRN  enoxaparin Injectable 40 milliGRAM(s) SubCutaneous daily  HYDROmorphone  Injectable 0.5 milliGRAM(s) IV Push every 4 hours PRN  influenza   Vaccine 0.5 milliLiter(s) IntraMuscular once  lactated ringers. 1000 milliLiter(s) IV Continuous <Continuous>  levETIRAcetam 250 milliGRAM(s) Oral two times a day  NIFEdipine XL 60 milliGRAM(s) Oral daily  ondansetron Injectable 4 milliGRAM(s) IV Push every 8 hours PRN  pantoprazole  Injectable 40 milliGRAM(s) IV Push daily  tiotropium 18 MICROgram(s) Capsule 1 Capsule(s) Inhalation daily         Patient is a 56y old  Female who presents with a chief complaint of Abdominal Pain (2021 09:49)      INTERVAL HPI/OVERNIGHT EVENTS: Pt reports being in pain overnight, and mild difficulty sleeping. Pt's abd pain is minimally decrease despite morphine. Pt reports last BM 2d ago, and inability to pass gas over 1d ago.       REVIEW OF SYSTEMS:  CONSTITUTIONAL: No fever or fatigue.  RESPIRATORY: No cough, wheezing, chills or hemoptysis; No shortness of breath  CARDIOVASCULAR: No chest pain, palpitations, dizziness, or leg swelling  GASTROINTESTINAL: (+) as per HPI  GENITOURINARY: No dysuria, frequency, hematuria, or incontinence      FAMILY HISTORY:  Coronary artery disease (Father)  dad cad @78      T(C): 36.7 (21 @ 02:00), Max: 36.7 (21 @ 02:00)  HR: 100 (21 @ 02:00) (87 - 100)  BP: 147/75 (21 @ 02:00) (135/67 - 170/71)  RR: 18 (21 @ 02:00) (18 - 20)  SpO2: 96% (21 @ 02:00) (96% - 100%)  Wt(kg): --Vital Signs Last 24 Hrs  T(C): 36.7 (2021 02:00), Max: 36.7 (2021 02:00)  T(F): 98 (2021 02:00), Max: 98 (2021 02:00)  HR: 100 (2021 02:00) (87 - 100)  BP: 147/75 (2021 02:00) (135/67 - 170/71)  BP(mean): --  RR: 18 (2021 02:00) (18 - 20)  SpO2: 96% (2021 02:00) (96% - 100%)  Cipro (Anaphylaxis; Urticaria; Hives)  Cipro (Unknown)  Neurontin (Unknown)  penicillin (Unknown)  Toradol (Rash; Urticaria; Anaphylaxis (Mild to Mod))  Toradol (Unknown)      PHYSICAL EXAM:  GENERAL: Appears in mild distress.  ENMT: No tonsillar erythema, exudates, or enlargement; Moist mucous membranes, Poor dentitionN  NERVOUS SYSTEM:  Alert & Oriented X3, Good concentration;   CHEST/LUNG: Clear to auscultation bilaterally; No rales, rhonchi, wheezing, or rubs  HEART: Regular rate and rhythm; No murmurs, rubs, or gallops  ABDOMEN: Distended, tender to light palpation in all 4Qs, 10/10. (+) BS  EXTREMITIES:  No clubbing, cyanosis, or edema      LABS:                        11.7   5.15  )-----------( 221      ( 2021 09:40 )             34.6       11    139  |  101  |  8<L>  ----------------------------<  81  3.8   |  23  |  0.6<L>    Ca    9.1      2021 09:40  Phos  3.5       Mg     1.5         TPro  6.2  /  Alb  3.8  /  TBili  0.3  /  DBili  x   /  AST  44<H>  /  ALT  48<H>  /  AlkPhos  165<H>              Urinalysis Basic - ( 2021 12:56 )    Color: Yellow / Appearance: Clear / S.010 / pH: x  Gluc: x / Ketone: Negative  / Bili: Negative / Urobili: <2 mg/dL   Blood: x / Protein: Negative / Nitrite: Negative   Leuk Esterase: Negative / RBC: 1 /HPF / WBC 1 /HPF   Sq Epi: x / Non Sq Epi: 3 /HPF / Bacteria: Negative      PT/INR - ( 2021 09:40 )   PT: 11.20 sec;   INR: 0.97 ratio       PTT - ( 2021 09:40 )  PTT:33.7 sec      RADIOLOGY & ADDITIONAL TESTS:    Xray Kidney Ureter Bladder (21 @ 23:37) >  Nonobstructive bowel gas pattern. Tubular radiopaque density seen on prior CT is faintly visualized to the right of the L4 vertebral body. Excreted contrast seen within the urinary bladder. Surgical clips are seen in the right upper quadrant of the abdomen. Stable visualized osseous structures.      CT Abdomen and Pelvis w/ IV Cont (21 @ 17:03) >  IMPRESSION:  Mildly dilated small bowel loop in the mid abdomen with adjacent fat stranding/edema and an indeterminate 6 cm intraluminal tubular structure; no intraperitoneal free air. Findings may represent focal small bowel obstruction or infectious/inflammatory enteritis with focal ileus, likely related to the indeterminate tubular structure.      Xray Chest 1 View- PORTABLE-Urgent (21 @ 12:37) >  Impression:  No radiographic evidence of acute cardiopulmonary disease.    CT Angio Abdomen and Pelvis w/ IV Cont (10.08.21 @ 14:50) >  IMPRESSION:  No CTA evidence for significant renal artery stenosis.  Intraluminal tubular small bowel linear hyperdensity, progressed more distal since prior examination.      CT Angio Abdomen and Pelvis w/ IV Cont (21 @ 05:22) >  MPRESSION:  No CTA evidence of aortic dissection or intramural hematoma.  Focal linear hyperdensity within loop of small bowel in the left lower quadrant. Follow-up noncontrast CT of the abdomen is recommended for further evaluation.        CT Abdomen and Pelvis w/ Oral Cont and w/ IV Cont (20 @ 17:18) >  IMPRESSION:  No evidence of acute/inflammatory process in the abdomen or pelvis. Oral contrast reaches the mid transverse colon.        Diagnosis Line Normal sinus rhythm  Normal ECG        atorvastatin 80 milliGRAM(s) Oral at bedtime  clonazePAM  Tablet 2 milliGRAM(s) Oral four times a day PRN  enoxaparin Injectable 40 milliGRAM(s) SubCutaneous daily  HYDROmorphone  Injectable 0.5 milliGRAM(s) IV Push every 4 hours PRN  influenza   Vaccine 0.5 milliLiter(s) IntraMuscular once  lactated ringers. 1000 milliLiter(s) IV Continuous <Continuous>  levETIRAcetam 250 milliGRAM(s) Oral two times a day  NIFEdipine XL 60 milliGRAM(s) Oral daily  ondansetron Injectable 4 milliGRAM(s) IV Push every 8 hours PRN  pantoprazole  Injectable 40 milliGRAM(s) IV Push daily  tiotropium 18 MICROgram(s) Capsule 1 Capsule(s) Inhalation daily

## 2021-11-02 NOTE — PROGRESS NOTE ADULT - ASSESSMENT
Pt is a 57 yo FM w/ PMH of HTN, HLD, anxiety, seizure 15 yrs ago, migraines, GERD, COPD, and extensive PSHx of cholecystectomy, appendectomy, hysterectomy, u/l oophorectomy, and hiatal hernia presented to the ED due to a 2wk hx of periumbilical intermittent worsening abdominal pain, that has progressed to involve the entire abdomen. Pt has had prior episodes of SBO which self-resolved. Pt presented to the ED Vitally stable, and with worsening abdominal pain. CT A/P demonstrated mildly dilated SB loops in the mid abdomen w/ adjacent fat stranding and an indeterminate 6cm intraluminal tubular structure without intraperitoneal free air. Given radiographic findings, pt's clinical hx and physical exam findings, surgery was consulted for SBO, and planning for an exploratory laparotomy with possible ostomy.     #Abd Pain 2/2 SBO, with 6cm intraluminal tubular structure  -Abd pain worsening: now generalized   -switched morphine po 2mg po q6 to Dilaudid 0.5mg IV q4  -11/1 - cardio consult has been done: Moderate risk for MACE; EKG normal  -Plan is for OR today: ex lap w/ possible ostomy.   -NPO  -LR @ 120  - NGT placed 11/2; f/u CXR done  - cont Zofran IV 4mg q8      #HTN   - cont Nifedipine 60mg po qd    #HLD  - cont Atorvastatin 80mg po qd    #Anxiety  - cont Klonopin 2mg po q6 prn     #seizure  - last episode 15yrs ago  - cont Keppra 250mg po q12    #COPD  - cont tiotropium 18microg qd      GI PPx: PTX  DVT PPx: LVX Pt is a 55 yo FM w/ PMH of HTN, HLD, anxiety, seizure 15 yrs ago, migraines, GERD, COPD, and extensive PSHx of cholecystectomy, appendectomy, hysterectomy, u/l oophorectomy, and hiatal hernia presented to the ED due to a 2wk hx of periumbilical intermittent worsening abdominal pain, that has progressed to involve the entire abdomen. Pt has had prior episodes of SBO which self-resolved. Pt presented to the ED Vitally stable, and with worsening abdominal pain. CT A/P demonstrated mildly dilated SB loops in the mid abdomen w/ adjacent fat stranding and an indeterminate 6cm intraluminal tubular structure without intraperitoneal free air. Given radiographic findings, pt's clinical hx and physical exam findings, surgery was consulted for SBO, and planning for an exploratory laparotomy with possible ostomy.     #Abd Pain 2/2 SBO, with 6cm intraluminal tubular structure  -Abd pain worsening: now generalized   -switched morphine po 2mg po q6 to Dilaudid 0.5mg IV q4  -11/1 - cardio clearance has been done: Moderate risk for MACE; EKG normal  -Plan is for OR likely tomorrow: ex lap w/ possible ostomy.   -NPO  -LR @ 120  - NGT placed 11/2; f/u CXR demonstrates proper placement  - cont Zofran IV 4mg q8  - serial abd exams  - Surgery requesting medical clearance   #HTN   - cont Nifedipine 60mg po qd    #HLD  - cont Atorvastatin 80mg po qd    #Anxiety  - cont Klonopin 2mg po q6 prn     #seizure  - last episode 15yrs ago  - cont Keppra 250mg po q12    #COPD  - cont tiotropium 18microg qd      GI PPx: PTX  DVT PPx: LVX  Activity; IAT  Full Code Pt is a 57 yo FM w/ PMH of HTN, HLD, anxiety, seizure 15 yrs ago, migraines, GERD, COPD, and extensive PSHx of cholecystectomy, appendectomy, hysterectomy, u/l oophorectomy, and hiatal hernia presented to the ED due to a 2wk hx of periumbilical intermittent worsening abdominal pain, that has progressed to involve the entire abdomen. Pt has had prior episodes of SBO which self-resolved. Pt presented to the ED Vitally stable, and with worsening abdominal pain. CT A/P demonstrated mildly dilated SB loops in the mid abdomen w/ adjacent fat stranding and an indeterminate 6cm intraluminal tubular structure without intraperitoneal free air. Given radiographic findings, pt's clinical hx and physical exam findings, surgery was consulted for SBO, and planning for an exploratory laparotomy with possible ostomy.     #Abd Pain 2/2 suspected SBO vs enteritis 6cm intraluminal tubular structure  -Abd pain worsening: now generalized   - CT A&P: Mildly dilated small bowel loop in the mid abdomen with adjacent fat stranding/edema and an indeterminate 6 cm intraluminal tubular structure; no intraperitoneal free air. Findings may represent focal small bowel obstruction or infectious/inflammatory enteritis with focal ileus, likely related to the indeterminate tubular structure.  -switched morphine po 2mg po q6 to Dilaudid 0.5mg IV q4  -11/1 - cardio clearance has been done: Moderate risk for MACE; EKG normal  -Plan is for OR likely tomorrow: ex lap w/ possible ostomy.   -NPO  -LR @ 120  - NGT placed 11/2; f/u CXR demonstrates proper placement  - cont Zofran IV 4mg q8  - serial abd exams  - Surgery requesting medical clearance   - tried to call Pt's gastroenterologist Dr. Isabel, left message with .     #HTN   - cont Nifedipine 60mg po qd    #HLD  - cont Atorvastatin 80mg po qd    #Anxiety  - cont Klonopin 2mg po q6 prn     #seizure  - last episode 15yrs ago  - cont Keppra 250mg po q12    #COPD  - cont tiotropium 18microg qd      GI PPx: PTX  DVT PPx: LVX  Activity; IAT  Full Code Pt is a 55 yo FM w/ PMH of HTN, HLD, anxiety, seizure 15 yrs ago, migraines, GERD, COPD, and extensive PSHx of cholecystectomy, appendectomy, hysterectomy, u/l oophorectomy, and hiatal hernia presented to the ED due to a 2wk hx of periumbilical intermittent worsening abdominal pain, that has progressed to involve the entire abdomen. Pt has had prior episodes of SBO which self-resolved. Pt presented to the ED Vitally stable, and with worsening abdominal pain. CT A/P demonstrated mildly dilated SB loops in the mid abdomen w/ adjacent fat stranding and an indeterminate 6cm intraluminal tubular structure without intraperitoneal free air. Given radiographic findings, pt's clinical hx and physical exam findings, surgery was consulted for SBO, and planning for an exploratory laparotomy with possible ostomy.     # Abd Pain 2/2 suspected SBO vs enteritis  # Intraluminal foreign object  -Abd pain worsening: now generalized   - CT A&P: Mildly dilated small bowel loop in the mid abdomen with adjacent fat stranding/edema and an indeterminate 6 cm intraluminal tubular structure; no intraperitoneal free air. Findings may represent focal small bowel obstruction or infectious/inflammatory enteritis with focal ileus, likely related to the indeterminate tubular structure.  - switched morphine po 2mg po q6 to Dilaudid 0.5mg IV q4  -11/1 - cardio clearance has been done: Moderate risk for MACE; EKG normal  -Plan is for OR likely tomorrow: ex lap w/ possible ostomy.   -NPO  -LR @ 120  - NGT placed 11/2; f/u CXR demonstrates proper placement  - cont Zofran IV 4mg q8  - serial abd exams  - Surgery requesting medical clearance   - tried to call Pt's gastroenterologist Dr. Isabel, left message with .     #HTN   - cont Nifedipine 60mg po qd    #HLD  - cont Atorvastatin 80mg po qd    #Anxiety  - cont Klonopin 2mg po q6 prn     #seizure  - last episode 15yrs ago  - cont Keppra 250mg po q12    #COPD  - cont tiotropium 18microg qd    GI PPx: PTX  DVT PPx: LVX  Activity; IAT  Full Code

## 2021-11-02 NOTE — PRE-ANESTHESIA EVALUATION ADULT - NSANTHPMHFT_GEN_ALL_CORE
5 y/o F with a PMH of HTN, COPD, HLD, anxiety, seizure d/o, migraine, GERD, and multiple abdominal surgeries including appendectomy/resection of appendiceal stump, cholecystectomy, exploratory laparotomy with lysis of adhesions,  hysterectomy unilateral oophorectomy, Hiatal hernia present to the ED with a chronic intermittent abdominal pain that gradually worsened in the past 2 weeks. Pt states that the pain is periumbilical, and is associated with distention and constipation. Pt states that she has not had a BM in 2 days after she took MG citrate and has not passed flatus in 1 day. Pt reports that this pain is not new and has multiple bouts of SBO that have resolved. Pt last EGD and colonoscopy where earlier this year and follows-up with her GI regularly. She denies any h/o fever, cough, chest pain, headache, recent seizures, ingestion of foreign body, dizziness.  In the ED her vitals were stable, Labs were unremarkable, CT abdomen and pelvis with IV contrast showed - Mildly dilated small bowel loop in the mid abdomen with adjacent fat stranding/edema and an indeterminate 6 cm intraluminal tubular structure; no intraperitoneal free air. Findings may represent focal small bowel obstruction or infectious/inflammatory enteritis with focal ileus, likely related to the indeterminate tubular structure.  Patient is being admitted for the management of abdominal pain secondary enteritis vs obstruction d/t indeterminate 6cm intraluminal tubular structure - possible foreign body.   (01 Nov 2021 22:32)      PAST MEDICAL & SURGICAL HISTORY  Back pain  henriated discs    Seizures  last one 6 yrs ago    Migraines    Hypercholesteremia    Seasonal allergic rhinitis, unspecified trigger    GERD (gastroesophageal reflux disease)    Hiatal hernia    Anxiety    HTN (hypertension)    History of surgery  4 rght knee sx  1 left knee sx  cholecystectomy  rotator cuff right  abdominal sx-- adhesions  c section  ovarian cystectomy  breast cystectomies  colonoscopy   endoscopies  parital hysterectomy    S/p partial hysterectomy with remaining cervical stump    H/O rotator cuff surgery  right    History of appendectomy    S/P cholecystectomy    FAMILY HISTORY:  FAMILY HISTORY:  Coronary artery disease (Father)  dad cad @78    [ ] no pertinent family history of premature cardiovascular disease in first degree relatives.  Mother:   Father:   Siblings:     SOCIAL HISTORY:  []smoker  []Alcohol  []Drug    ALLERGIES:  Cipro (Anaphylaxis; Urticaria; Hives)  Cipro (Unknown)  Neurontin (Unknown)  penicillin (Unknown)  Toradol (Rash; Urticaria; Anaphylaxis (Mild to Mod))  Toradol (Unknown)    MEDICATIONS:  MEDICATIONS  (STANDING):  atorvastatin 80 milliGRAM(s) Oral at bedtime  enoxaparin Injectable 40 milliGRAM(s) SubCutaneous daily  lactated ringers. 1000 milliLiter(s) (100 mL/Hr) IV Continuous <Continuous>  levETIRAcetam 250 milliGRAM(s) Oral two times a day  NIFEdipine XL 60 milliGRAM(s) Oral daily  pantoprazole  Injectable 40 milliGRAM(s) IV Push daily  tiotropium 18 MICROgram(s) Capsule 1 Capsule(s) Inhalation daily    MEDICATIONS  (PRN):  clonazePAM  Tablet 2 milliGRAM(s) Oral four times a day PRN Anxiety  morphine  - Injectable 2 milliGRAM(s) IV Push every 6 hours PRN Severe Pain (7 - 10)  ondansetron Injectable 4 milliGRAM(s) IV Push every 8 hours PRN Nausea and/or Vomiting    HOME MEDICATIONS:  Home Medications:  atorvastatin 80 mg oral tablet: 1 tab(s) orally once a day (10 Dec 2020 00:54)  diclofenac sodium 75 mg oral delayed release tablet: 1 tab(s) orally 2 times a day (01 Nov 2021 22:45)  Fioricet oral tablet: 1 tab(s) orally 3 times a day, As Needed (01 Nov 2021 22:39)  Incruse Ellipta 62.5 mcg/inh inhalation powder:  (10 Dec 2020 00:54)  KlonoPIN 2 mg oral tablet: 1 tab(s) orally 4 times a day (01 Nov 2021 22:40)  levETIRAcetam 250 mg oral tablet: 1 tab(s) orally 2 times a day (10 Dec 2020 00:54)  Linzess 290 mcg oral capsule: 1 cap(s) orally once a day (01 Nov 2021 22:46)  Myrbetriq 50 mg oral tablet, extended release: 1 tab(s) orally once a day (01 Nov 2021 22:45)  NIFEdipine 60 mg oral tablet, extended release: 1 tab(s) orally 2 times a day (01 Nov 2021 22:47)  Protonix 40 mg oral delayed release tablet: 1 tab(s) orally 2 times a day (10 Dec 2020 00:54)  tiZANidine 4 mg oral tablet: 1 tab(s) orally once a day (01 Nov 2021 22:42)  traMADol 50 mg oral tablet: 1 tab(s) orally every 8 hours, As Needed - Severe Pain (7 - 10) - 10) MDD:2 full tabs (01 Nov 2021 22:39)  Ubrelvy 100 mg oral tablet: 1 tab(s) orally 2 times a day (01 Nov 2021 22:46)      VITALS:   T(F): 97.8 (11-01 @ 16:24), Max: 97.8 (11-01 @ 09:25)  HR: 91 (11-01 @ 16:24) (91 - 101)  BP: 170/71 (11-01 @ 16:24) (149/87 - 170/71)  BP(mean): --  RR: 20 (11-01 @ 16:24) (20 - 20)  SpO2: 96% (11-01 @ 16:24) (96% - 97%)      REVIEW OF SYSTEMS:  CONSTITUTIONAL: No weakness, fevers or chills  EYES: No visual changes  ENT: No vertigo or throat pain   NECK: No pain or stiffness  RESPIRATORY: No cough, wheezing, hemoptysis; No shortness of breath  CARDIOVASCULAR: No chest pain or palpitations  GASTROINTESTINAL: + abd pain pain. + nausea, vomiting. No hematemesis; No diarrhea. + constipation. No melena or hematochezia.  GENITOURINARY: No dysuria, frequency or hematuria  NEUROLOGICAL: No numbness or weakness  SKIN: No itching, no rashes  MSK: FROM x4    PHYSICAL EXAM:  NEURO: patient is awake , alert and oriented  GEN: Not in acute distress  NECK: no thyroid enlargement, no JVD  LUNGS: Clear to auscultation bilaterally   CARDIOVASCULAR: S1/S2 present, RRR , no murmurs or rubs, no carotid bruits,  + PP bilaterally  ABD: tender  EXT: No CAIO  SKIN: Intact    LABS:                        12.8   5.77  )-----------( 245      ( 01 Nov 2021 10:10 )             37.5     11-01    143  |  106  |  9<L>  ----------------------------<  107<H>  4.4   |  27  |  0.7    Ca    9.5      01 Nov 2021 10:10    TPro  6.8  /  Alb  4.3  /  TBili  0.2  /  DBili  x   /  AST  18  /  ALT  24  /  AlkPhos  140<H>  11-01      Lactate, Blood: 0.7 mmol/L (11-01-21 @ 10:10)    RADIOLOGY:  -CXR:  < from: Xray Chest 1 View- PORTABLE-Urgent (11.01.21 @ 12:37) >  Impression:    No radiographic evidence of acute cardiopulmonary disease.    < end of copied text >    -TTE:  < from: Transthoracic Echocardiogram (06.01.20 @ 16:27) >  Summary:   1. LV Ejection Fraction by Walker's Method with a biplane EF of 77 %.   2. Normal left ventricular size and wall thicknesses, with normalsystolic and diastolic function.   3. The mean global longitudinal peak strain by speckle tracking is -17.0% which is borderline normal.   4. LA volume Index is 10.3 ml/m² ml/m2.    < end of copied text >    -CCTA:  < from: CT Abdomen and Pelvis w/ IV Cont (11.01.21 @ 17:03) >  IMPRESSION:    Mildly dilated small bowel loop in the mid abdomen with adjacent fat stranding/edema and an indeterminate 6 cm intraluminal tubular structure; no intraperitoneal free air. Findings may represent focal small bowel obstruction or infectious/inflammatory enteritis with focal ileus, likely related to the indeterminate tubular structure.    < end of copied text >    -STRESS TEST:  < from: NM Nuclear Stress Pharmacologic Multiple (04.18.21 @ 15:11) >  IMPRESSION:    1. NORMAL ADENOSINE / REST MYOCARDIAL PERFUSION SPECT TOMOGRAPHY, WITH NO EVIDENCE FOR ISCHEMIA DURING ADENOSINE INFUSION.    2. NORMAL RESTING LEFT VENTRICULAR WALL MOTION AND WALL THICKENING.    3. LEFT VENTRICULAR EJECTION FRACTION OF  84 % WHICH IS WITHIN THE HYPERDYNAMIC RANGE.    < end of copied text >    -CATHETERIZATION:    ECG:  < from: 12 Lead ECG (11.01.21 @ 10:46) >  Ventricular Rate 76 BPM    Atrial Rate 76 BPM    P-R Interval 152 ms

## 2021-11-02 NOTE — CONSULT NOTE ADULT - ATTENDING COMMENTS
Proceed with sx as planned  Moderate risk for MACE  recall prn
SBO due to a foreign body awaiting OR. Continue NG tube suction until surgery and strict NPO.
above noted discussed case with surgical resident and pt abdomen soft mild distension and tenderness ct scan noted

## 2021-11-02 NOTE — CONSULT NOTE ADULT - SUBJECTIVE AND OBJECTIVE BOX
Chief Complaint: Patient is a 56y old  Female who presents with a chief complaint of Abdominal Pain (01 Nov 2021 23:10)      HPI:    55 y/o F with a PMH of HTN, COPD, HLD, anxiety, seizure d/o, migraine, GERD, and multiple abdominal surgeries including appendectomy/resection of appendiceal stump, cholecystectomy, exploratory laparotomy with lysis of adhesions,  hysterectomy unilateral oophorectomy, Hiatal hernia present to the ED with a chronic intermittent abdominal pain that gradually worsened in the past 2 weeks. Pt states that the pain is periumbilical, and is associated with distention and constipation. Pt states that she has not had a BM in 2 days after she took MG citrate and has not passed flatus in 1 day. Pt reports that this pain is not new and has multiple bouts of SBO that have resolved. Pt last EGD and colonoscopy where earlier this year and follows-up with her GI regularly. She denies any h/o fever, cough, chest pain, headache, recent seizures, ingestion of foreign body, dizziness.  In the ED her vitals were stable, Labs were unremarkable,     CT abdomen and pelvis with IV contrast showed - Mildly dilated small bowel loop in the mid abdomen with adjacent fat stranding/edema and an indeterminate 6 cm intraluminal tubular structure; no intraperitoneal free air. Findings may represent focal small bowel obstruction or infectious/inflammatory enteritis with focal ileus, likely related to the indeterminate tubular structure.  Patient is being admitted for the management of abdominal pain secondary enteritis vs obstruction d/t indeterminate 6cm intraluminal tubular structure - possible foreign body.     Medications:  atorvastatin 80 milliGRAM(s) Oral at bedtime  clonazePAM  Tablet 2 milliGRAM(s) Oral four times a day PRN  enoxaparin Injectable 40 milliGRAM(s) SubCutaneous daily  influenza   Vaccine 0.5 milliLiter(s) IntraMuscular once  lactated ringers. 1000 milliLiter(s) IV Continuous <Continuous>  levETIRAcetam 250 milliGRAM(s) Oral two times a day  morphine  - Injectable 2 milliGRAM(s) IV Push every 6 hours PRN  NIFEdipine XL 60 milliGRAM(s) Oral daily  ondansetron Injectable 4 milliGRAM(s) IV Push every 8 hours PRN  pantoprazole  Injectable 40 milliGRAM(s) IV Push daily  tiotropium 18 MICROgram(s) Capsule 1 Capsule(s) Inhalation daily      PMHX/PSHX:  Back pain    Seizures    Migraines    Hypercholesteremia    Seasonal allergic rhinitis, unspecified trigger    GERD (gastroesophageal reflux disease)    Hiatal hernia    Anxiety    HTN (hypertension)    Osteoarthritis    Sciatica    Tobacco use disorder    History of surgery    S/p partial hysterectomy with remaining cervical stump    H/O rotator cuff surgery    History of appendectomy    S/P cholecystectomy        Family history:  Coronary artery disease (Father)        Social History:     Allergies:  Cipro (Anaphylaxis; Urticaria; Hives)  Cipro (Unknown)  Neurontin (Unknown)  penicillin (Unknown)  Toradol (Rash; Urticaria; Anaphylaxis (Mild to Mod))  Toradol (Unknown)        Review of Systems:  General:  No wt loss, fevers, chills, night sweats, fatigue or pruritis.  Eyes:  Good vision, no reported pain or redness.  ENT:  No sore throat, pain, runny nose, or difficulty swallowing  CV:  No pain, palpitations, hypo/hypertension  Resp:  No dyspnea, cough, tachypnea, wheezing  GI:  No pain, nausea, vomiting, dysphagia, heartburn, diarrhea, constipation, or weight loss. , No rectal bleeding, tarry stools, or hematemesis.  :  No pain, bleeding/discharges, incontinence, nocturia  Musculoskeletal:  No pain, weakness or fasciculations.  Neuro:  No weakness, tingling, memory problems or paresthesias  Psych:  No fatigue, insomnia, mood problems, depression  Endocrine:  No polyuria, polydipsia, cold/heat intolerance  Heme:  No petechiae, ecchymosis, easy bruisability  Skin:  No rash, pruritis, tattoos, scars, or edema      PHYSICAL EXAM:   Vital Signs:  Vital Signs Last 24 Hrs  T(C): 36.7 (02 Nov 2021 02:00), Max: 36.7 (02 Nov 2021 02:00)  T(F): 98 (02 Nov 2021 02:00), Max: 98 (02 Nov 2021 02:00)  HR: 100 (02 Nov 2021 02:00) (87 - 100)  BP: 147/75 (02 Nov 2021 02:00) (135/67 - 170/71)  BP(mean): --  RR: 18 (02 Nov 2021 02:00) (18 - 20)  SpO2: 96% (02 Nov 2021 02:00) (96% - 100%)  Daily Height in cm: 157.48 (02 Nov 2021 08:44)    Daily     T(C): 36.7 (11-02-21 @ 02:00), Max: 36.7 (11-02-21 @ 02:00)  HR: 100 (11-02-21 @ 02:00) (87 - 100)  BP: 147/75 (11-02-21 @ 02:00) (135/67 - 170/71)  RR: 18 (11-02-21 @ 02:00) (18 - 20)  SpO2: 96% (11-02-21 @ 02:00) (96% - 100%)    GENERAL:  Appears stated age, well-groomed, well-nourished, no distress  HEENT:  Conjunctivae clear and pink, no thyromegaly, nodules, adenopathy, no JVD, sclera -anicteric  CHEST:  Full & symmetric excursion, no increased effort, breath sounds clear  HEART:  Regular rhythm, S1, S2, no murmur/rub/S3/S4, no abdominal bruit, no edema  ABDOMEN:  Soft, non-tender, non-distended, normoactive bowel sounds,  no masses ,no hepato-splenomegaly, no signs of chronic liver disease  EXTREMITIES  no cyanosis,clubbing or edema  SKIN:  No rash/erythema/ecchymoses/petechiae/wounds/abscess/warm/dry  NEURO:  Alert, oriented, no asterixis, no tremor, no encephalopathy    LABS:                        12.8   5.77  )-----------( 245      ( 01 Nov 2021 10:10 )             37.5     11-01    143  |  106  |  9<L>  ----------------------------<  107<H>  4.4   |  27  |  0.7    Ca    9.5      01 Nov 2021 10:10    TPro  6.8  /  Alb  4.3  /  TBili  0.2  /  DBili  x   /  AST  18  /  ALT  24  /  AlkPhos  140<H>  11-01    LIVER FUNCTIONS - ( 01 Nov 2021 10:10 )  Alb: 4.3 g/dL / Pro: 6.8 g/dL / ALK PHOS: 140 U/L / ALT: 24 U/L / AST: 18 U/L / GGT: x               Amylase Serum--      Lipase serum10       Ammonia--    GGT--        Imaging:      < from: CT Abdomen and Pelvis w/ IV Cont (11.01.21 @ 17:03) >  IMPRESSION:    Mildly dilated small bowel loop in the mid abdomen with adjacent fat stranding/edema and an indeterminate 6 cm intraluminal tubular structure; no intraperitoneal free air. Findings may represent focal small bowel obstruction or infectious/inflammatory enteritis with focal ileus, likely related to the indeterminate tubular structure.      < end of copied text >      Assessment and Plan:    55 y/o F with a PMH of HTN, COPD, HLD, anxiety, seizure d/o, migraine, GERD, recurrent SBO and multiple abdominal surgeries including appendectomy/resection of appendiceal stump, cholecystectomy, exploratory laparotomy with lysis of adhesions,  hysterectomy unilateral oophorectomy, Hiatal hernia present to the ED with a chronic intermittent periumbilical abdominal pain that gradually worsened in the past 2 weeks associated with constipation and distension. Pain similar to previous episodes of SBO. Last EGD and colonoscopy where earlier this year. Found to have possible focal small bowel obstruction or infectious/inflammatory enteritis with focal ileus, likely related to the indeterminate tubular structure of 6cm on CT A/P. Gastroenterology consulted for 6cm tubular intraluminal structure.    # Abdominal pain possibly 2/2 SBO in setting of 6 cm tubular structure  - OR today        incomplete note          Chief Complaint: Patient is a 56y old  Female who presents with a chief complaint of Abdominal Pain (01 Nov 2021 23:10)      HPI:    55 y/o F with a PMH of HTN, COPD, HLD, anxiety, seizure d/o, migraine, GERD, and multiple abdominal surgeries including appendectomy/resection of appendiceal stump, cholecystectomy, exploratory laparotomy with lysis of adhesions,  hysterectomy unilateral oophorectomy, Hiatal hernia present to the ED with a chronic intermittent abdominal pain that gradually worsened in the past 2 weeks. Pt states that the pain is periumbilical, and is associated with distention and constipation. Pt states that she has not had a BM in 2 days after she took MG citrate and has not passed flatus in 1 day. Pt reports that this pain is not new and has multiple bouts of SBO that have resolved. Pt last EGD and colonoscopy where earlier this year and follows-up with her GI regularly. She denies any h/o fever, cough, chest pain, headache, recent seizures, ingestion of foreign body, dizziness.  In the ED her vitals were stable, Labs were unremarkable,     CT abdomen and pelvis with IV contrast showed - Mildly dilated small bowel loop in the mid abdomen with adjacent fat stranding/edema and an indeterminate 6 cm intraluminal tubular structure; no intraperitoneal free air. Findings may represent focal small bowel obstruction or infectious/inflammatory enteritis with focal ileus, likely related to the indeterminate tubular structure.  Patient is being admitted for the management of abdominal pain secondary enteritis vs obstruction d/t indeterminate 6cm intraluminal tubular structure - possible foreign body.       Medications:  atorvastatin 80 milliGRAM(s) Oral at bedtime  clonazePAM  Tablet 2 milliGRAM(s) Oral four times a day PRN  enoxaparin Injectable 40 milliGRAM(s) SubCutaneous daily  influenza   Vaccine 0.5 milliLiter(s) IntraMuscular once  lactated ringers. 1000 milliLiter(s) IV Continuous <Continuous>  levETIRAcetam 250 milliGRAM(s) Oral two times a day  morphine  - Injectable 2 milliGRAM(s) IV Push every 6 hours PRN  NIFEdipine XL 60 milliGRAM(s) Oral daily  ondansetron Injectable 4 milliGRAM(s) IV Push every 8 hours PRN  pantoprazole  Injectable 40 milliGRAM(s) IV Push daily  tiotropium 18 MICROgram(s) Capsule 1 Capsule(s) Inhalation daily      PMHX/PSHX:  Back pain    Seizures    Migraines    Hypercholesteremia    Seasonal allergic rhinitis, unspecified trigger    GERD (gastroesophageal reflux disease)    Hiatal hernia    Anxiety    HTN (hypertension)    Osteoarthritis    Sciatica    Tobacco use disorder    History of surgery    S/p partial hysterectomy with remaining cervical stump    H/O rotator cuff surgery    History of appendectomy    S/P cholecystectomy        Family history:  Coronary artery disease (Father)        Social History:     Allergies:  Cipro (Anaphylaxis; Urticaria; Hives)  Cipro (Unknown)  Neurontin (Unknown)  penicillin (Unknown)  Toradol (Rash; Urticaria; Anaphylaxis (Mild to Mod))  Toradol (Unknown)        Review of Systems:  General:  No wt loss, fevers, chills, night sweats, fatigue or pruritis.  Eyes:  Good vision, no reported pain or redness.  ENT:  No sore throat, pain, runny nose, or difficulty swallowing  CV:  No pain, palpitations, hypo/hypertension  Resp:  No dyspnea, cough, tachypnea, wheezing  GI:  + abdominal pain / nausea , constipation (last BM 2 days ago) , not passing gas   :  No pain, bleeding/discharges, incontinence, nocturia  Musculoskeletal:  No pain, weakness or fasciculations.  Neuro:  No weakness, tingling, memory problems or paresthesias  Psych:  No fatigue, insomnia, mood problems, depression  Endocrine:  No polyuria, polydipsia, cold/heat intolerance  Heme:  No petechiae, ecchymosis, easy bruisability  Skin:  No rash, pruritis, tattoos, scars, or edema      PHYSICAL EXAM:   Vital Signs:  Vital Signs Last 24 Hrs  T(C): 36.7 (02 Nov 2021 02:00), Max: 36.7 (02 Nov 2021 02:00)  T(F): 98 (02 Nov 2021 02:00), Max: 98 (02 Nov 2021 02:00)  HR: 100 (02 Nov 2021 02:00) (87 - 100)  BP: 147/75 (02 Nov 2021 02:00) (135/67 - 170/71)  BP(mean): --  RR: 18 (02 Nov 2021 02:00) (18 - 20)  SpO2: 96% (02 Nov 2021 02:00) (96% - 100%)  Daily Height in cm: 157.48 (02 Nov 2021 08:44)    Daily     T(C): 36.7 (11-02-21 @ 02:00), Max: 36.7 (11-02-21 @ 02:00)  HR: 100 (11-02-21 @ 02:00) (87 - 100)  BP: 147/75 (11-02-21 @ 02:00) (135/67 - 170/71)  RR: 18 (11-02-21 @ 02:00) (18 - 20)  SpO2: 96% (11-02-21 @ 02:00) (96% - 100%)    GENERAL:  Appears stated age, well-groomed, well-nourished, no distress  HEENT:  Conjunctivae clear and pink, no thyromegaly, nodules, adenopathy, no JVD, sclera -anicteric  CHEST:  Full & symmetric excursion, no increased effort, breath sounds clear  HEART:  Regular rhythm, S1, S2, no murmur/rub/S3/S4, no abdominal bruit, no edema  ABDOMEN:  Soft,  diffusely tender, non-distended,  bowel sounds hypoactive ,  no masses   EXTREMITIES  no cyanosis,clubbing or edema  SKIN:  No rash/erythema/ecchymoses/petechiae/wounds/abscess/warm/dry  NEURO:  Alert, oriented, no asterixis, no tremor, no encephalopathy    LABS:                        12.8   5.77  )-----------( 245      ( 01 Nov 2021 10:10 )             37.5     11-01    143  |  106  |  9<L>  ----------------------------<  107<H>  4.4   |  27  |  0.7    Ca    9.5      01 Nov 2021 10:10    TPro  6.8  /  Alb  4.3  /  TBili  0.2  /  DBili  x   /  AST  18  /  ALT  24  /  AlkPhos  140<H>  11-01    LIVER FUNCTIONS - ( 01 Nov 2021 10:10 )  Alb: 4.3 g/dL / Pro: 6.8 g/dL / ALK PHOS: 140 U/L / ALT: 24 U/L / AST: 18 U/L / GGT: x               Amylase Serum--      Lipase serum10       Ammonia--    GGT--        Imaging:      < from: CT Abdomen and Pelvis w/ IV Cont (11.01.21 @ 17:03) >  IMPRESSION:    Mildly dilated small bowel loop in the mid abdomen with adjacent fat stranding/edema and an indeterminate 6 cm intraluminal tubular structure; no intraperitoneal free air. Findings may represent focal small bowel obstruction or infectious/inflammatory enteritis with focal ileus, likely related to the indeterminate tubular structure.      < end of copied text >      Assessment and Plan:    55 y/o F with a PMH of HTN, COPD, HLD, anxiety, seizure d/o, migraine, GERD, recurrent SBO and multiple abdominal surgeries including appendectomy/resection of appendiceal stump, cholecystectomy, exploratory laparotomy with lysis of adhesions,  hysterectomy unilateral oophorectomy, Hiatal hernia present to the ED with a chronic intermittent periumbilical abdominal pain that gradually worsened in the past 2 weeks associated with constipation and distension. Pain similar to previous episodes of SBO. Last EGD and colonoscopy where earlier this year. Found to have possible focal small bowel obstruction or infectious/inflammatory enteritis with focal ileus, likely related to the indeterminate tubular structure of 6cm on CT A/P. Gastroenterology consulted for 6cm tubular intraluminal structure.    # Abdominal pain possibly 2/2 SBO in setting of 6 cm tubular structure  - CT A/P Mildly dilated small bowel loop in the mid abdomen with adjacent fat stranding/edema and an indeterminate 6 cm intraluminal tubular structure; no intraperitoneal free air. Findings may represent focal small bowel obstruction or infectious/inflammatory enteritis with focal ileus, likely related to the indeterminate tubular structure.  - OR with Surgery     incomplete note          Chief Complaint: Patient is a 56y old  Female who presents with a chief complaint of Abdominal Pain (01 Nov 2021 23:10)      HPI:    55 y/o F with a PMH of HTN, COPD, HLD, anxiety, seizure d/o, migraine, GERD, and multiple abdominal surgeries including appendectomy/resection of appendiceal stump, cholecystectomy, exploratory laparotomy with lysis of adhesions,  hysterectomy unilateral oophorectomy, Hiatal hernia present to the ED with a chronic intermittent abdominal pain that gradually worsened in the past 2 weeks. Pt states that the pain is periumbilical, and is associated with distention and constipation. Pt states that she has not had a BM in 2 days after she took MG citrate and has not passed flatus in 1 day. Pt reports that this pain is not new and has multiple bouts of SBO that have resolved. Pt last EGD and colonoscopy where earlier this year and follows-up with her GI regularly. She denies any h/o fever, cough, chest pain, headache, recent seizures, ingestion of foreign body, dizziness.  In the ED her vitals were stable, Labs were unremarkable,     CT abdomen and pelvis with IV contrast showed - Mildly dilated small bowel loop in the mid abdomen with adjacent fat stranding/edema and an indeterminate 6 cm intraluminal tubular structure; no intraperitoneal free air. Findings may represent focal small bowel obstruction or infectious/inflammatory enteritis with focal ileus, likely related to the indeterminate tubular structure.  Patient is being admitted for the management of abdominal pain secondary enteritis vs obstruction d/t indeterminate 6cm intraluminal tubular structure - possible foreign body.       Medications:  atorvastatin 80 milliGRAM(s) Oral at bedtime  clonazePAM  Tablet 2 milliGRAM(s) Oral four times a day PRN  enoxaparin Injectable 40 milliGRAM(s) SubCutaneous daily  influenza   Vaccine 0.5 milliLiter(s) IntraMuscular once  lactated ringers. 1000 milliLiter(s) IV Continuous <Continuous>  levETIRAcetam 250 milliGRAM(s) Oral two times a day  morphine  - Injectable 2 milliGRAM(s) IV Push every 6 hours PRN  NIFEdipine XL 60 milliGRAM(s) Oral daily  ondansetron Injectable 4 milliGRAM(s) IV Push every 8 hours PRN  pantoprazole  Injectable 40 milliGRAM(s) IV Push daily  tiotropium 18 MICROgram(s) Capsule 1 Capsule(s) Inhalation daily      PMHX/PSHX:  Back pain    Seizures    Migraines    Hypercholesteremia    Seasonal allergic rhinitis, unspecified trigger    GERD (gastroesophageal reflux disease)    Hiatal hernia    Anxiety    HTN (hypertension)    Osteoarthritis    Sciatica    Tobacco use disorder    History of surgery    S/p partial hysterectomy with remaining cervical stump    H/O rotator cuff surgery    History of appendectomy    S/P cholecystectomy        Family history:  Coronary artery disease (Father)        Social History:     Allergies:  Cipro (Anaphylaxis; Urticaria; Hives)  Cipro (Unknown)  Neurontin (Unknown)  penicillin (Unknown)  Toradol (Rash; Urticaria; Anaphylaxis (Mild to Mod))  Toradol (Unknown)        Review of Systems:  General:  No wt loss, fevers, chills, night sweats, fatigue or pruritis.  Eyes:  Good vision, no reported pain or redness.  ENT:  No sore throat, pain, runny nose, or difficulty swallowing  CV:  No pain, palpitations, hypo/hypertension  Resp:  No dyspnea, cough, tachypnea, wheezing  GI:  + abdominal pain / nausea , constipation (last BM 2 days ago) , not passing gas   :  No pain, bleeding/discharges, incontinence, nocturia  Musculoskeletal:  No pain, weakness or fasciculations.  Neuro:  No weakness, tingling, memory problems or paresthesias  Psych:  No fatigue, insomnia, mood problems, depression  Endocrine:  No polyuria, polydipsia, cold/heat intolerance  Heme:  No petechiae, ecchymosis, easy bruisability  Skin:  No rash, pruritis, tattoos, scars, or edema      PHYSICAL EXAM:   Vital Signs:  Vital Signs Last 24 Hrs  T(C): 36.7 (02 Nov 2021 02:00), Max: 36.7 (02 Nov 2021 02:00)  T(F): 98 (02 Nov 2021 02:00), Max: 98 (02 Nov 2021 02:00)  HR: 100 (02 Nov 2021 02:00) (87 - 100)  BP: 147/75 (02 Nov 2021 02:00) (135/67 - 170/71)  BP(mean): --  RR: 18 (02 Nov 2021 02:00) (18 - 20)  SpO2: 96% (02 Nov 2021 02:00) (96% - 100%)  Daily Height in cm: 157.48 (02 Nov 2021 08:44)    Daily     T(C): 36.7 (11-02-21 @ 02:00), Max: 36.7 (11-02-21 @ 02:00)  HR: 100 (11-02-21 @ 02:00) (87 - 100)  BP: 147/75 (11-02-21 @ 02:00) (135/67 - 170/71)  RR: 18 (11-02-21 @ 02:00) (18 - 20)  SpO2: 96% (11-02-21 @ 02:00) (96% - 100%)    GENERAL:  Appears stated age, well-groomed, well-nourished, no distress  HEENT:  Conjunctivae clear and pink, no thyromegaly, nodules, adenopathy, no JVD, sclera -anicteric  CHEST:  Full & symmetric excursion, no increased effort, breath sounds clear  HEART:  Regular rhythm, S1, S2, no murmur/rub/S3/S4, no abdominal bruit, no edema  ABDOMEN:  Soft,  diffusely tender, non-distended,  bowel sounds hypoactive ,  no masses   EXTREMITIES  no cyanosis,clubbing or edema  SKIN:  No rash/erythema/ecchymoses/petechiae/wounds/abscess/warm/dry  NEURO:  Alert, oriented, no asterixis, no tremor, no encephalopathy    LABS:                        12.8   5.77  )-----------( 245      ( 01 Nov 2021 10:10 )             37.5     11-01    143  |  106  |  9<L>  ----------------------------<  107<H>  4.4   |  27  |  0.7    Ca    9.5      01 Nov 2021 10:10    TPro  6.8  /  Alb  4.3  /  TBili  0.2  /  DBili  x   /  AST  18  /  ALT  24  /  AlkPhos  140<H>  11-01    LIVER FUNCTIONS - ( 01 Nov 2021 10:10 )  Alb: 4.3 g/dL / Pro: 6.8 g/dL / ALK PHOS: 140 U/L / ALT: 24 U/L / AST: 18 U/L / GGT: x               Amylase Serum--      Lipase serum10       Ammonia--    GGT--        Imaging:      < from: CT Abdomen and Pelvis w/ IV Cont (11.01.21 @ 17:03) >  IMPRESSION:    Mildly dilated small bowel loop in the mid abdomen with adjacent fat stranding/edema and an indeterminate 6 cm intraluminal tubular structure; no intraperitoneal free air. Findings may represent focal small bowel obstruction or infectious/inflammatory enteritis with focal ileus, likely related to the indeterminate tubular structure.      < end of copied text >      Assessment and Plan:    55 y/o F with a PMH of HTN, COPD, HLD, anxiety, seizure d/o, migraine, GERD, recurrent SBO and multiple abdominal surgeries including appendectomy/resection of appendiceal stump, cholecystectomy, exploratory laparotomy with lysis of adhesions,  hysterectomy unilateral oophorectomy, Hiatal hernia present to the ED with a chronic intermittent periumbilical abdominal pain that gradually worsened in the past 2 weeks associated with constipation and distension. Pain similar to previous episodes of SBO. Last EGD and colonoscopy where earlier this year. Found to have possible focal small bowel obstruction or infectious/inflammatory enteritis with focal ileus, likely related to the indeterminate tubular structure of 6cm on CT A/P. Gastroenterology consulted for 6cm tubular intraluminal structure.    # Abdominal pain possibly 2/2 SBO in setting of 6 cm tubular structure  - CT A/P Mildly dilated small bowel loop in the mid abdomen with adjacent fat stranding/edema and an indeterminate 6 cm intraluminal tubular structure; no intraperitoneal free air. Findings may represent focal small bowel obstruction or infectious/inflammatory enteritis with focal ileus, likely related to the indeterminate tubular structure.  - OR with Surgery

## 2021-11-03 ENCOUNTER — RESULT REVIEW (OUTPATIENT)
Age: 56
End: 2021-11-03

## 2021-11-03 LAB
BLD GP AB SCN SERPL QL: SIGNIFICANT CHANGE UP
COVID-19 SPIKE DOMAIN AB INTERP: POSITIVE
COVID-19 SPIKE DOMAIN ANTIBODY RESULT: 22.1 U/ML — HIGH
CULTURE RESULTS: SIGNIFICANT CHANGE UP
MAGNESIUM SERPL-MCNC: 1.8 MG/DL — SIGNIFICANT CHANGE UP (ref 1.8–2.4)
SARS-COV-2 IGG+IGM SERPL QL IA: 22.1 U/ML — HIGH
SARS-COV-2 IGG+IGM SERPL QL IA: POSITIVE
SPECIMEN SOURCE: SIGNIFICANT CHANGE UP

## 2021-11-03 PROCEDURE — 99233 SBSQ HOSP IP/OBS HIGH 50: CPT

## 2021-11-03 PROCEDURE — 88300 SURGICAL PATH GROSS: CPT | Mod: 26

## 2021-11-03 RX ORDER — HYDROMORPHONE HYDROCHLORIDE 2 MG/ML
1 INJECTION INTRAMUSCULAR; INTRAVENOUS; SUBCUTANEOUS
Refills: 0 | Status: DISCONTINUED | OUTPATIENT
Start: 2021-11-03 | End: 2021-11-03

## 2021-11-03 RX ORDER — SODIUM CHLORIDE 9 MG/ML
1000 INJECTION, SOLUTION INTRAVENOUS
Refills: 0 | Status: DISCONTINUED | OUTPATIENT
Start: 2021-11-03 | End: 2021-11-03

## 2021-11-03 RX ORDER — ENOXAPARIN SODIUM 100 MG/ML
40 INJECTION SUBCUTANEOUS DAILY
Refills: 0 | Status: DISCONTINUED | OUTPATIENT
Start: 2021-11-03 | End: 2021-11-13

## 2021-11-03 RX ORDER — NALOXONE HYDROCHLORIDE 4 MG/.1ML
0.1 SPRAY NASAL
Refills: 0 | Status: DISCONTINUED | OUTPATIENT
Start: 2021-11-03 | End: 2021-11-13

## 2021-11-03 RX ORDER — HYDROMORPHONE HYDROCHLORIDE 2 MG/ML
0.5 INJECTION INTRAMUSCULAR; INTRAVENOUS; SUBCUTANEOUS
Refills: 0 | Status: DISCONTINUED | OUTPATIENT
Start: 2021-11-03 | End: 2021-11-08

## 2021-11-03 RX ORDER — MEROPENEM 1 G/30ML
2000 INJECTION INTRAVENOUS EVERY 12 HOURS
Refills: 0 | Status: DISCONTINUED | OUTPATIENT
Start: 2021-11-03 | End: 2021-11-04

## 2021-11-03 RX ORDER — ONDANSETRON 8 MG/1
4 TABLET, FILM COATED ORAL EVERY 6 HOURS
Refills: 0 | Status: DISCONTINUED | OUTPATIENT
Start: 2021-11-03 | End: 2021-11-13

## 2021-11-03 RX ORDER — PANTOPRAZOLE SODIUM 20 MG/1
40 TABLET, DELAYED RELEASE ORAL DAILY
Refills: 0 | Status: DISCONTINUED | OUTPATIENT
Start: 2021-11-03 | End: 2021-11-13

## 2021-11-03 RX ORDER — KETOROLAC TROMETHAMINE 30 MG/ML
15 SYRINGE (ML) INJECTION ONCE
Refills: 0 | Status: DISCONTINUED | OUTPATIENT
Start: 2021-11-03 | End: 2021-11-03

## 2021-11-03 RX ORDER — SODIUM CHLORIDE 9 MG/ML
1000 INJECTION, SOLUTION INTRAVENOUS
Refills: 0 | Status: DISCONTINUED | OUTPATIENT
Start: 2021-11-03 | End: 2021-11-05

## 2021-11-03 RX ORDER — LEVETIRACETAM 250 MG/1
250 TABLET, FILM COATED ORAL EVERY 12 HOURS
Refills: 0 | Status: DISCONTINUED | OUTPATIENT
Start: 2021-11-03 | End: 2021-11-09

## 2021-11-03 RX ORDER — TIOTROPIUM BROMIDE 18 UG/1
1 CAPSULE ORAL; RESPIRATORY (INHALATION) DAILY
Refills: 0 | Status: DISCONTINUED | OUTPATIENT
Start: 2021-11-03 | End: 2021-11-13

## 2021-11-03 RX ORDER — ONDANSETRON 8 MG/1
4 TABLET, FILM COATED ORAL EVERY 8 HOURS
Refills: 0 | Status: DISCONTINUED | OUTPATIENT
Start: 2021-11-03 | End: 2021-11-13

## 2021-11-03 RX ORDER — HYDROMORPHONE HYDROCHLORIDE 2 MG/ML
30 INJECTION INTRAMUSCULAR; INTRAVENOUS; SUBCUTANEOUS
Refills: 0 | Status: DISCONTINUED | OUTPATIENT
Start: 2021-11-03 | End: 2021-11-08

## 2021-11-03 RX ORDER — MAGNESIUM SULFATE 500 MG/ML
2 VIAL (ML) INJECTION ONCE
Refills: 0 | Status: DISCONTINUED | OUTPATIENT
Start: 2021-11-03 | End: 2021-11-03

## 2021-11-03 RX ADMIN — Medication 15 MILLIGRAM(S): at 06:40

## 2021-11-03 RX ADMIN — SODIUM CHLORIDE 120 MILLILITER(S): 9 INJECTION, SOLUTION INTRAVENOUS at 08:21

## 2021-11-03 RX ADMIN — SODIUM CHLORIDE 120 MILLILITER(S): 9 INJECTION, SOLUTION INTRAVENOUS at 23:26

## 2021-11-03 RX ADMIN — SODIUM CHLORIDE 120 MILLILITER(S): 9 INJECTION, SOLUTION INTRAVENOUS at 19:30

## 2021-11-03 RX ADMIN — LEVETIRACETAM 400 MILLIGRAM(S): 250 TABLET, FILM COATED ORAL at 05:29

## 2021-11-03 RX ADMIN — HYDROMORPHONE HYDROCHLORIDE 30 MILLILITER(S): 2 INJECTION INTRAMUSCULAR; INTRAVENOUS; SUBCUTANEOUS at 19:30

## 2021-11-03 RX ADMIN — PANTOPRAZOLE SODIUM 40 MILLIGRAM(S): 20 TABLET, DELAYED RELEASE ORAL at 11:19

## 2021-11-03 RX ADMIN — HYDROMORPHONE HYDROCHLORIDE 0.5 MILLIGRAM(S): 2 INJECTION INTRAMUSCULAR; INTRAVENOUS; SUBCUTANEOUS at 02:24

## 2021-11-03 RX ADMIN — Medication 15 MILLIGRAM(S): at 06:13

## 2021-11-03 RX ADMIN — ONDANSETRON 4 MILLIGRAM(S): 8 TABLET, FILM COATED ORAL at 08:53

## 2021-11-03 RX ADMIN — Medication 25 GRAM(S): at 02:26

## 2021-11-03 RX ADMIN — Medication 2.5 MILLIGRAM(S): at 23:24

## 2021-11-03 RX ADMIN — HYDROMORPHONE HYDROCHLORIDE 0.5 MILLIGRAM(S): 2 INJECTION INTRAMUSCULAR; INTRAVENOUS; SUBCUTANEOUS at 10:24

## 2021-11-03 RX ADMIN — Medication 25 GRAM(S): at 05:31

## 2021-11-03 RX ADMIN — HYDROMORPHONE HYDROCHLORIDE 0.5 MILLIGRAM(S): 2 INJECTION INTRAMUSCULAR; INTRAVENOUS; SUBCUTANEOUS at 02:50

## 2021-11-03 RX ADMIN — HYDROMORPHONE HYDROCHLORIDE 0.5 MILLIGRAM(S): 2 INJECTION INTRAMUSCULAR; INTRAVENOUS; SUBCUTANEOUS at 08:53

## 2021-11-03 RX ADMIN — LEVETIRACETAM 400 MILLIGRAM(S): 250 TABLET, FILM COATED ORAL at 20:18

## 2021-11-03 NOTE — BRIEF OPERATIVE NOTE - OPERATION/FINDINGS
Frozen abdomen extensive enterolysis required. matted small bowel but foreign body was able to be palpated and enterotomy was done and foreign body was removed. Small bowel was closed with interrupted 2-O silk.

## 2021-11-03 NOTE — PROGRESS NOTE ADULT - SUBJECTIVE AND OBJECTIVE BOX
RAMÍREZ RODARTE 56y Female  MRN#: [id number]   Hospital Day: 2d    HPI:  57 y/o F with a PMH of HTN, COPD, HLD, anxiety, seizure d/o, migraine, GERD, and multiple abdominal surgeries including appendectomy/resection of appendiceal stump, cholecystectomy, exploratory laparotomy with lysis of adhesions,  hysterectomy unilateral oophorectomy, Hiatal hernia present to the ED with a chronic intermittent abdominal pain that gradually worsened in the past 2 weeks. Pt states that the pain is periumbilical, and is associated with distention and constipation. Pt states that she has not had a BM in 2 days after she took MG citrate and has not passed flatus in 1 day. Pt reports that this pain is not new and has multiple bouts of SBO that have resolved. Pt last EGD and colonoscopy where earlier this year and follows-up with her GI regularly. She denies any h/o fever, cough, chest pain, headache, recent seizures, ingestion of foreign body, dizziness.  In the ED her vitals were stable, Labs were unremarkable, CT abdomen and pelvis with IV contrast showed - Mildly dilated small bowel loop in the mid abdomen with adjacent fat stranding/edema and an indeterminate 6 cm intraluminal tubular structure; no intraperitoneal free air. Findings may represent focal small bowel obstruction or infectious/inflammatory enteritis with focal ileus, likely related to the indeterminate tubular structure.  Patient is being admitted for the management of abdominal pain secondary enteritis vs obstruction d/t indeterminate 6cm intraluminal tubular structure - possible foreign body.    (01 Nov 2021 22:32)      SUBJECTIVE  Patient is a 56y old Female who presents with a chief complaint of Abdominal Pain (03 Nov 2021 14:12)  Currently admitted to medicine with the primary diagnosis of Abdominal pain      INTERVAL HPI AND OVERNIGHT EVENTS:  Patient was examined and seen at bedside. This morning she is resting comfortably in bed and reports no issues or overnight events.    OBJECTIVE  PAST MEDICAL & SURGICAL HISTORY  Back pain  henriated discs    Seizures  last one 6 yrs ago    Migraines    Hypercholesteremia    Seasonal allergic rhinitis, unspecified trigger    GERD (gastroesophageal reflux disease)    Hiatal hernia    Anxiety    HTN (hypertension)    Osteoarthritis    Sciatica    Tobacco use disorder    History of surgery  4 rght knee sx  1 left knee sx  cholecystectomy  rotator cuff right  abdominal sx-- adhesions  c section  ovarian cystectomy  breast cystectomies  colonoscopy   endoscopies  parital hysterectomy    S/p partial hysterectomy with remaining cervical stump    H/O rotator cuff surgery  right    History of appendectomy    S/P cholecystectomy      ALLERGIES:  Cipro (Anaphylaxis; Urticaria; Hives)  Cipro (Unknown)  Neurontin (Unknown)  penicillin (Unknown)  Toradol (Rash; Urticaria; Anaphylaxis (Mild to Mod))  Toradol (Unknown)    MEDICATIONS:  STANDING MEDICATIONS  influenza   Vaccine 0.5 milliLiter(s) IntraMuscular once    PRN MEDICATIONS      VITAL SIGNS: Last 24 Hours  T(C): 36.7 (03 Nov 2021 13:58), Max: 36.9 (02 Nov 2021 20:50)  T(F): 98.1 (03 Nov 2021 13:53), Max: 98.5 (02 Nov 2021 20:50)  HR: 96 (03 Nov 2021 13:58) (83 - 96)  BP: 164/87 (03 Nov 2021 13:58) (127/75 - 172/74)  BP(mean): --  RR: 18 (03 Nov 2021 13:58) (18 - 20)  SpO2: 96% (03 Nov 2021 13:58) (92% - 97%)    LABS:                        11.4   5.16  )-----------( 208      ( 02 Nov 2021 20:00 )             33.8     11-02    138  |  99  |  10  ----------------------------<  72  3.6   |  21  |  0.5<L>    Ca    8.6      02 Nov 2021 20:00  Phos  3.5     11-02  Mg     1.8     11-03    TPro  6.0  /  Alb  4.0  /  TBili  0.2  /  DBili  x   /  AST  28  /  ALT  36  /  AlkPhos  146<H>  11-02    PT/INR - ( 02 Nov 2021 20:00 )   PT: 11.20 sec;   INR: 0.97 ratio         PTT - ( 02 Nov 2021 20:00 )  PTT:40.4 sec    RADIOLOGY: No New Imaging      PHYSICAL EXAM:  GENERAL: Appears in mild distress. NG tube in place at time of exam  ENMT: No tonsillar erythema, exudates, or enlargement; Moist mucous membranes, Poor dentition  NERVOUS SYSTEM:  Alert & Oriented X3, Good concentration; follows commands, moves all extremities  CHEST/LUNG: Clear to auscultation bilaterally; No rales, rhonchi, wheezing, or rubs  HEART: Regular rate and rhythm; No murmurs, rubs, or gallops  ABDOMEN: Distended, tender to light palpation in all 4Qs, 10/10. (+) BS  EXTREMITIES:  No clubbing, cyanosis, or edema   RAMÍREZ RODARTE 56y Female  MRN#: [id number]   Hospital Day: 2d    HPI:  55 y/o F with a PMH of HTN, COPD, HLD, anxiety, seizure d/o, migraine, GERD, and multiple abdominal surgeries including appendectomy/resection of appendiceal stump, cholecystectomy, exploratory laparotomy with lysis of adhesions,  hysterectomy unilateral oophorectomy, Hiatal hernia present to the ED with a chronic intermittent abdominal pain that gradually worsened in the past 2 weeks. Pt states that the pain is periumbilical, and is associated with distention and constipation. Pt states that she has not had a BM in 2 days after she took MG citrate and has not passed flatus in 1 day. Pt reports that this pain is not new and has multiple bouts of SBO that have resolved. Pt last EGD and colonoscopy where earlier this year and follows-up with her GI regularly. She denies any h/o fever, cough, chest pain, headache, recent seizures, ingestion of foreign body, dizziness.  In the ED her vitals were stable, Labs were unremarkable, CT abdomen and pelvis with IV contrast showed - Mildly dilated small bowel loop in the mid abdomen with adjacent fat stranding/edema and an indeterminate 6 cm intraluminal tubular structure; no intraperitoneal free air. Findings may represent focal small bowel obstruction or infectious/inflammatory enteritis with focal ileus, likely related to the indeterminate tubular structure.  Patient is being admitted for the management of abdominal pain secondary enteritis vs obstruction d/t indeterminate 6cm intraluminal tubular structure - possible foreign body.    (01 Nov 2021 22:32)      SUBJECTIVE  Patient is a 56y old Female who presents with a chief complaint of Abdominal Pain (03 Nov 2021 14:12)  Currently admitted to medicine with the primary diagnosis of Abdominal pain      INTERVAL HPI AND OVERNIGHT EVENTS:  Patient was examined and seen at bedside. This morning she is resting comfortably in bed and reports no issues or overnight events.    Complains of nausea but has not vomiting    OBJECTIVE  PAST MEDICAL & SURGICAL HISTORY  Back pain  henriated discs    Seizures  last one 6 yrs ago    Migraines    Hypercholesteremia    Seasonal allergic rhinitis, unspecified trigger    GERD (gastroesophageal reflux disease)    Hiatal hernia    Anxiety    HTN (hypertension)    Osteoarthritis    Sciatica    Tobacco use disorder    History of surgery  4 rght knee sx  1 left knee sx  cholecystectomy  rotator cuff right  abdominal sx-- adhesions  c section  ovarian cystectomy  breast cystectomies  colonoscopy   endoscopies  parital hysterectomy    S/p partial hysterectomy with remaining cervical stump    H/O rotator cuff surgery  right    History of appendectomy    S/P cholecystectomy      ALLERGIES:  Cipro (Anaphylaxis; Urticaria; Hives)  Cipro (Unknown)  Neurontin (Unknown)  penicillin (Unknown)  Toradol (Rash; Urticaria; Anaphylaxis (Mild to Mod))  Toradol (Unknown)    MEDICATIONS:  STANDING MEDICATIONS  influenza   Vaccine 0.5 milliLiter(s) IntraMuscular once    PRN MEDICATIONS      VITAL SIGNS: Last 24 Hours  T(C): 36.7 (03 Nov 2021 13:58), Max: 36.9 (02 Nov 2021 20:50)  T(F): 98.1 (03 Nov 2021 13:53), Max: 98.5 (02 Nov 2021 20:50)  HR: 96 (03 Nov 2021 13:58) (83 - 96)  BP: 164/87 (03 Nov 2021 13:58) (127/75 - 172/74)  BP(mean): --  RR: 18 (03 Nov 2021 13:58) (18 - 20)  SpO2: 96% (03 Nov 2021 13:58) (92% - 97%)    LABS:                        11.4   5.16  )-----------( 208      ( 02 Nov 2021 20:00 )             33.8     11-02    138  |  99  |  10  ----------------------------<  72  3.6   |  21  |  0.5<L>    Ca    8.6      02 Nov 2021 20:00  Phos  3.5     11-02  Mg     1.8     11-03    TPro  6.0  /  Alb  4.0  /  TBili  0.2  /  DBili  x   /  AST  28  /  ALT  36  /  AlkPhos  146<H>  11-02    PT/INR - ( 02 Nov 2021 20:00 )   PT: 11.20 sec;   INR: 0.97 ratio         PTT - ( 02 Nov 2021 20:00 )  PTT:40.4 sec    RADIOLOGY: No New Imaging      PHYSICAL EXAM:  GENERAL: Appears in mild distress. NG tube in place at time of exam  ENMT: No tonsillar erythema, exudates, or enlargement; Moist mucous membranes, Poor dentition  NERVOUS SYSTEM:  Alert & Oriented X3, Good concentration; follows commands, moves all extremities  CHEST/LUNG: Clear to auscultation bilaterally; No rales, rhonchi, wheezing, or rubs  HEART: Regular rate and rhythm; No murmurs, rubs, or gallops  ABDOMEN: Distended, tender to light palpation in all 4Qs, 10/10. (+) BS  EXTREMITIES:  No clubbing, cyanosis, or edema

## 2021-11-03 NOTE — PRE-ANESTHESIA EVALUATION ADULT - NSANTHOSAYNRD_GEN_A_CORE
No. SARKIS screening performed.  STOP BANG Legend: 0-2 = LOW Risk; 3-4 = INTERMEDIATE Risk; 5-8 = HIGH Risk

## 2021-11-03 NOTE — PROGRESS NOTE ADULT - ASSESSMENT
55 yo woman with h/o htn, copd, anxiety, dyslipidemia, seizure, chronic migraine and h/o multiple abdominal surgeries (including JUAN with unilat salpingooophorectomy, hiatal hernia, appendectomy, cholecystectomy and ex lap with lysis of adhesions)_ who p/w worsening abdominal pain over last 2 weeks.  Admitted to Medicine service and initial CT abd/pelvis shows partial SBO with indeterminate 6 cm intraluminal tubular structure (? Foreign body--but chronic since prior ct)    #Abdominal pain/Nausea/Vomiting - R/O SBO  -CT reviewed- ?intraluminal 6cm tubular structure/foreign body   -scheduled for ex lap with surgery today  -NPO, IVF  -NGT came out today while patient was vomiting  -pain control, antiemetics  -ambulate as tolerated  -serial abdominal exams   -patient follows with Dr. Avila as outpatient    #HTN/DL  - on Nifedipine and statin     #Anxiety   - on clonazepam prn    #seizure disorder  - last episode 15yrs ago  - cont Keppra 250mg IV q12 - switch to oral when able to take meds by mouth     #COPD - not in exacerbation   - cont tiotropium 18microg qd      Progress Note Handoff  Pending Consults: none  Pending Tests: labs  Pending Results: labs  Family Discussion: discussed nausea/vomiting, OR, labs and overall plan of care with pt and medical resident in detail -all questions answered.    Disposition: Home__x___/SNF______/Other_____/Unknown at this time_____    Please call me with any questions at extension [phone]

## 2021-11-03 NOTE — PROGRESS NOTE ADULT - SUBJECTIVE AND OBJECTIVE BOX
RAMÍREZ RODARTE  56y  Female      Patient is a 56y old female who presents with a chief complaint of Abdominal Pain (03 Nov 2021 04:07)      INTERVAL HPI/OVERNIGHT EVENTS:  Patient seen and examined earlier this morning.  Sitting comfortably in bed  Patient states she vomited this morning and her NGT came out  Patient denies abdominal pain      REVIEW OF SYSTEMS:  CONSTITUTIONAL: No fever, weight loss, or fatigue  EYES: No eye pain, visual disturbances, or discharge  ENMT:  No difficulty hearing, tinnitus, vertigo; No sinus or throat pain  NECK: No pain or stiffness  RESPIRATORY: No cough, wheezing, chills or hemoptysis; No shortness of breath  CARDIOVASCULAR: No chest pain, palpitations, dizziness, or leg swelling  GASTROINTESTINAL: No abdominal or epigastric pain. +nausea/vomiting  GENITOURINARY: No dysuria, frequency, hematuria, or incontinence  NEUROLOGICAL: No headaches, memory loss, loss of strength, numbness, or tremors  SKIN: No itching, burning, rashes, or lesions   LYMPH NODES: No enlarged glands  ENDOCRINE: No heat or cold intolerance; No hair loss  MUSCULOSKELETAL: No joint pain or swelling; No muscle, back, or extremity pain  PSYCHIATRIC: No depression, anxiety, mood swings, or difficulty sleeping  HEME/LYMPH: No easy bruising, or bleeding gums  ALLERY AND IMMUNOLOGIC: No hives or eczema    T(C): 36.7 (11-03-21 @ 13:58), Max: 38.2 (11-02-21 @ 15:08)  HR: 96 (11-03-21 @ 13:58) (83 - 99)  BP: 164/87 (11-03-21 @ 13:58) (127/75 - 172/74)  RR: 18 (11-03-21 @ 13:58) (18 - 20)  SpO2: 96% (11-03-21 @ 13:58) (92% - 97%) on ra    PHYSICAL EXAM:  GENERAL: NAD, well-groomed, well-developed  HEAD:  Atraumatic, Normocephalic  EYES: EOMI, PERRLA, conjunctiva and sclera clear  ENMT: No tonsillar erythema, exudates, or enlargement; Moist mucous membranes, Good dentition, No lesions  NECK: Supple, No JVD, Normal thyroid  NERVOUS SYSTEM:  Alert & Oriented X3, Good concentration; moves all extremities   CHEST/LUNG: good air entry   HEART: Regular rate and rhythm; No murmurs, rubs, or gallops  ABDOMEN: Soft, Nontender, Nondistended; Bowel sounds present  EXTREMITIES:  2+ Peripheral Pulses, No clubbing, cyanosis, or edema  LYMPH: No lymphadenopathy noted  SKIN: No rashes or lesions    Consultant(s) Notes Reviewed:  [x ] YES  [ ] NO  Care Discussed with Consultants/Other Providers [ x] YES  [ ] NO    LAB:                        11.4   5.16  )-----------( 208      ( 02 Nov 2021 20:00 )             33.8     11-02    138  |  99  |  10  ----------------------------<  72  3.6   |  21  |  0.5<L>    Ca    8.6      02 Nov 2021 20:00  Phos  3.5     11-02  Mg     1.8     11-03    TPro  6.0  /  Alb  4.0  /  TBili  0.2  /  DBili  x   /  AST  28  /  ALT  36  /  AlkPhos  146<H>  11-02    LIVER FUNCTIONS - ( 02 Nov 2021 20:00 )  Alb: 4.0 g/dL / Pro: 6.0 g/dL / ALK PHOS: 146 U/L / ALT: 36 U/L / AST: 28 U/L / GGT: x                 Drug Dosing Weight  Height (cm): 157.5 (03 Nov 2021 13:58)  Weight (kg): 63.6 (03 Nov 2021 13:58)  BMI (kg/m2): 25.6 (03 Nov 2021 13:58)  BSA (m2): 1.64 (03 Nov 2021 13:58)          I&O's Summary    02 Nov 2021 07:01  -  03 Nov 2021 07:00  --------------------------------------------------------  IN: 1080 mL / OUT: 1300 mL / NET: -220 mL    03 Nov 2021 07:01  -  03 Nov 2021 14:12  --------------------------------------------------------  IN: 0 mL / OUT: 200 mL / NET: -200 mL          RADIOLOGY & ADDITIONAL TESTS:  Imaging Personally Reviewed:  [x] YES  [ ] NO  < from: CT Abdomen and Pelvis w/ IV Cont (11.01.21 @ 17:03) >  IMPRESSION:  Mildly dilated small bowel loop in the mid abdomen with adjacent fat stranding/edema and an indeterminate 6 cm intraluminal tubular structure; no intraperitoneal free air. Findings may represent focal small bowel obstruction or infectious/inflammatory enteritis with focal ileus, likely related to the indeterminate tubular structure.  < end of copied text >        MEDS:  atorvastatin 80 milliGRAM(s) Oral at bedtime  clonazePAM  Tablet 2 milliGRAM(s) Oral four times a day PRN  enoxaparin Injectable 40 milliGRAM(s) SubCutaneous daily  HYDROmorphone  Injectable 0.5 milliGRAM(s) IV Push every 4 hours PRN  influenza   Vaccine 0.5 milliLiter(s) IntraMuscular once  lactated ringers. 1000 milliLiter(s) IV Continuous <Continuous>  levETIRAcetam  IVPB 250 milliGRAM(s) IV Intermittent every 12 hours  NIFEdipine XL 60 milliGRAM(s) Oral daily  ondansetron Injectable 4 milliGRAM(s) IV Push every 8 hours PRN  pantoprazole  Injectable 40 milliGRAM(s) IV Push daily  tiotropium 18 MICROgram(s) Capsule 1 Capsule(s) Inhalation daily

## 2021-11-03 NOTE — BRIEF OPERATIVE NOTE - NSICDXBRIEFPOSTOP_GEN_ALL_CORE_FT
POST-OP DIAGNOSIS:  Enteritis of small intestine due to foreign body 03-Nov-2021 18:28:05  Deni Pastor

## 2021-11-03 NOTE — PROGRESS NOTE ADULT - SUBJECTIVE AND OBJECTIVE BOX
GENERAL SURGERY PROGRESS NOTE    Patient: RAMÍREZ RODARTE , 56y (63)Female   MRN: [id number]  Location: [address]  Visit: 21 Inpatient  Date: 21 @ 04:08    Hospital Day #: 3      Procedure/Dx/Injuries: foreign body in small bowel    Events of past 24 hours:    PAST MEDICAL & SURGICAL HISTORY:  Back pain  henriated discs    Seizures  last one 6 yrs ago    Migraines    Hypercholesteremia    Seasonal allergic rhinitis, unspecified trigger    GERD (gastroesophageal reflux disease)    Hiatal hernia    Anxiety    HTN (hypertension)    Osteoarthritis    Sciatica    Tobacco use disorder    History of surgery  4 rght knee sx  1 left knee sx  cholecystectomy  rotator cuff right  abdominal sx-- adhesions  c section  ovarian cystectomy  breast cystectomies  colonoscopy   endoscopies  parital hysterectomy    S/p partial hysterectomy with remaining cervical stump    H/O rotator cuff surgery  right    History of appendectomy    S/P cholecystectomy        Vitals:   T(F): 98.5 (21 @ 20:50), Max: 100.8 (21 @ 15:08)  HR: 83 (21 @ 20:50)  BP: 127/75 (21 @ 20:50)  RR: 18 (21 @ 20:50)  SpO2: 97% (21 @ 20:50)      Diet, NPO after Midnight:      NPO Start Date: 2021,   NPO Start Time: 23:59  Diet, NPO      PHYSICAL EXAM:  General: NAD, AAOx3, calm and cooperative  Cardiac: RRR   Respiratory: CTAB, normal respiratory effort, breath sounds equal BL  Abdomen: Soft, non-distended, tender to palpation      MEDICATIONS  (STANDING):  atorvastatin 80 milliGRAM(s) Oral at bedtime  enoxaparin Injectable 40 milliGRAM(s) SubCutaneous daily  influenza   Vaccine 0.5 milliLiter(s) IntraMuscular once  lactated ringers. 1000 milliLiter(s) (120 mL/Hr) IV Continuous <Continuous>  levETIRAcetam  IVPB 250 milliGRAM(s) IV Intermittent every 12 hours  magnesium sulfate  IVPB 2 Gram(s) IV Intermittent every 2 hours  NIFEdipine XL 60 milliGRAM(s) Oral daily  pantoprazole  Injectable 40 milliGRAM(s) IV Push daily  tiotropium 18 MICROgram(s) Capsule 1 Capsule(s) Inhalation daily    MEDICATIONS  (PRN):  clonazePAM  Tablet 2 milliGRAM(s) Oral four times a day PRN Anxiety  HYDROmorphone  Injectable 0.5 milliGRAM(s) IV Push every 4 hours PRN Severe Pain (7 - 10)  ondansetron Injectable 4 milliGRAM(s) IV Push every 8 hours PRN Nausea and/or Vomiting      DVT PROPHYLAXIS: enoxaparin Injectable 40 milliGRAM(s) SubCutaneous daily    GI PROPHYLAXIS: pantoprazole  Injectable 40 milliGRAM(s) IV Push daily    ANTICOAGULATION:   ANTIBIOTICS:            LAB/STUDIES:  Labs:  CAPILLARY BLOOD GLUCOSE                              11.4   5.16  )-----------( 208      ( 2021 20:00 )             33.8       Auto Neutrophil %: 53.0 % (21 @ 20:00)  Auto Immature Granulocyte %: 0.2 % (21 @ 20:00)  Auto Neutrophil %: 62.9 % (21 @ 09:40)  Auto Immature Granulocyte %: 0.2 % (21 @ 09:40)        138  |  99  |  10  ----------------------------<  72  3.6   |  21  |  0.5<L>      Calcium, Total Serum: 8.6 mg/dL (21 @ 20:00)      LFTs:             6.0  | 0.2  | 28       ------------------[146     ( 2021 20:00 )  4.0  | x    | 36          Lipase:x      Amylase:x         Lactate, Blood: 1.2 mmol/L (21 @ 09:40)  Lactate, Blood: 0.7 mmol/L (21 @ 10:10)      Coags:     11.20  ----< 0.97    ( 2021 20:00 )     40.4                Urinalysis Basic - ( 2021 12:56 )    Color: Yellow / Appearance: Clear / S.010 / pH: x  Gluc: x / Ketone: Negative  / Bili: Negative / Urobili: <2 mg/dL   Blood: x / Protein: Negative / Nitrite: Negative   Leuk Esterase: Negative / RBC: 1 /HPF / WBC 1 /HPF   Sq Epi: x / Non Sq Epi: 3 /HPF / Bacteria: Negative    IMAGING:  INTERPRETATION:  CLINICAL STATEMENT: Diffuse abdominal pain    TECHNIQUE: Contiguous axial CT images were obtained from the lower chest to the pubic symphysis following administration of 100cc Optiray 320 intravenous contrast.  Oral contrast was not administered.  Reformatted images in the coronal and sagittal planes were acquired.    COMPARISON CT: 10/8/2021    OTHER STUDIES USED FOR CORRELATION: None.      FINDINGS:    LOWER CHEST: Bibasilar dependent atelectasis.    HEPATOBILIARY:  Post cholecystectomy. Unremarkable liver.    SPLEEN: Unremarkable.    PANCREAS: Unremarkable.    ADRENAL GLANDS: Unremarkable.    KIDNEYS: Symmetric renal enhancement bilaterally. No hydronephrosis. Subcentimeter hypodensities in both kidneys, too small to characterize. Right lower pole cortical scarring.    ABDOMINOPELVIC NODES: No lymphadenopathy.    PELVIC ORGANS: Post supracervical hysterectomy.    PERITONEUM/MESENTERY/BOWEL: An approximately 6 cm tubular hyperdense structure seen within a focally dilated, fecalized small bowel loop in the mid abdomen measuring 4 cm with adjacent mesenteric fat stranding/edema. No pneumoperitoneum. Post appendectomy.    BONES/SOFT TISSUES: Unremarkable.    OTHER: Atherosclerotic vascular calcifications.      IMPRESSION:    Mildly dilated small bowel loop in the mid abdomen with adjacent fat stranding/edema and an indeterminate 6 cm intraluminal tubular structure; no intraperitoneal free air. Findings may represent focal small bowel obstruction or infectious/inflammatory enteritis with focal ileus, likely related to the indeterminate tubular structure.    --- End of Report ---            ACCESS/ DEVICES:  [x ] Peripheral IV  [ ] Central Venous Line	[ ] R	[ ] L	[ ] IJ	[ ] Fem	[ ] SC	Placed:   [ ] Arterial Line		[ ] R	[ ] L	[ ] Fem	[ ] Rad	[ ] Ax	Placed:   [ ] PICC:					[ ] Mediport  [ ] Urinary Catheter,  Date Placed:   [ ] Chest tube: [ ] Right, [ ] Left  [ ] YARELI/Saw Drains     GENERAL SURGERY PROGRESS NOTE    Patient: RAMÍREZ RODARTE , 56y (63)Female   MRN: [id number]  Location: [address]  Visit: 21 Inpatient  Date: 21 @ 04:08    Hospital Day #: 3      Procedure/Dx/Injuries: foreign body in small bowel    Events of past 24 hours:    PAST MEDICAL & SURGICAL HISTORY:  Back pain  henriated discs    Seizures  last one 6 yrs ago    Migraines    Hypercholesteremia    Seasonal allergic rhinitis, unspecified trigger    GERD (gastroesophageal reflux disease)    Hiatal hernia    Anxiety    HTN (hypertension)    Osteoarthritis    Sciatica    Tobacco use disorder    History of surgery  4 rght knee sx  1 left knee sx  cholecystectomy  rotator cuff right  abdominal sx-- adhesions  c section  ovarian cystectomy  breast cystectomies  colonoscopy   endoscopies  parital hysterectomy    S/p partial hysterectomy with remaining cervical stump    H/O rotator cuff surgery  right    History of appendectomy    S/P cholecystectomy        Vitals:   T(F): 98.5 (21 @ 20:50), Max: 100.8 (21 @ 15:08)  HR: 83 (21 @ 20:50)  BP: 127/75 (21 @ 20:50)  RR: 18 (21 @ 20:50)  SpO2: 97% (21 @ 20:50)      Diet, NPO after Midnight:      NPO Start Date: 2021,   NPO Start Time: 23:59  Diet, NPO  LR @ 120 ml/hr   cc  UO: 900 cc    PHYSICAL EXAM:  General: NAD, AAOx3, calm and cooperative  Cardiac: RRR   Respiratory: CTAB, normal respiratory effort, breath sounds equal BL  Abdomen: Soft, non-distended, tender to palpation      MEDICATIONS  (STANDING):  atorvastatin 80 milliGRAM(s) Oral at bedtime  enoxaparin Injectable 40 milliGRAM(s) SubCutaneous daily  influenza   Vaccine 0.5 milliLiter(s) IntraMuscular once  lactated ringers. 1000 milliLiter(s) (120 mL/Hr) IV Continuous <Continuous>  levETIRAcetam  IVPB 250 milliGRAM(s) IV Intermittent every 12 hours  magnesium sulfate  IVPB 2 Gram(s) IV Intermittent every 2 hours  NIFEdipine XL 60 milliGRAM(s) Oral daily  pantoprazole  Injectable 40 milliGRAM(s) IV Push daily  tiotropium 18 MICROgram(s) Capsule 1 Capsule(s) Inhalation daily    MEDICATIONS  (PRN):  clonazePAM  Tablet 2 milliGRAM(s) Oral four times a day PRN Anxiety  HYDROmorphone  Injectable 0.5 milliGRAM(s) IV Push every 4 hours PRN Severe Pain (7 - 10)  ondansetron Injectable 4 milliGRAM(s) IV Push every 8 hours PRN Nausea and/or Vomiting      DVT PROPHYLAXIS: enoxaparin Injectable 40 milliGRAM(s) SubCutaneous daily    GI PROPHYLAXIS: pantoprazole  Injectable 40 milliGRAM(s) IV Push daily    ANTICOAGULATION:   ANTIBIOTICS:            LAB/STUDIES:  Labs:  CAPILLARY BLOOD GLUCOSE                              11.4   5.16  )-----------( 208      ( 2021 20:00 )             33.8       Auto Neutrophil %: 53.0 % (21 @ 20:00)  Auto Immature Granulocyte %: 0.2 % (21 @ 20:00)  Auto Neutrophil %: 62.9 % (21 @ 09:40)  Auto Immature Granulocyte %: 0.2 % (21 @ 09:40)        138  |  99  |  10  ----------------------------<  72  3.6   |  21  |  0.5<L>      Calcium, Total Serum: 8.6 mg/dL (21 @ 20:00)      LFTs:             6.0  | 0.2  | 28       ------------------[146     ( 2021 20:00 )  4.0  | x    | 36          Lipase:x      Amylase:x         Lactate, Blood: 1.2 mmol/L (21 @ 09:40)  Lactate, Blood: 0.7 mmol/L (21 @ 10:10)      Coags:     11.20  ----< 0.97    ( 2021 20:00 )     40.4                Urinalysis Basic - ( 2021 12:56 )    Color: Yellow / Appearance: Clear / S.010 / pH: x  Gluc: x / Ketone: Negative  / Bili: Negative / Urobili: <2 mg/dL   Blood: x / Protein: Negative / Nitrite: Negative   Leuk Esterase: Negative / RBC: 1 /HPF / WBC 1 /HPF   Sq Epi: x / Non Sq Epi: 3 /HPF / Bacteria: Negative    IMAGING:  INTERPRETATION:  CLINICAL STATEMENT: Diffuse abdominal pain    TECHNIQUE: Contiguous axial CT images were obtained from the lower chest to the pubic symphysis following administration of 100cc Optiray 320 intravenous contrast.  Oral contrast was not administered.  Reformatted images in the coronal and sagittal planes were acquired.    COMPARISON CT: 10/8/2021    OTHER STUDIES USED FOR CORRELATION: None.      FINDINGS:    LOWER CHEST: Bibasilar dependent atelectasis.    HEPATOBILIARY:  Post cholecystectomy. Unremarkable liver.    SPLEEN: Unremarkable.    PANCREAS: Unremarkable.    ADRENAL GLANDS: Unremarkable.    KIDNEYS: Symmetric renal enhancement bilaterally. No hydronephrosis. Subcentimeter hypodensities in both kidneys, too small to characterize. Right lower pole cortical scarring.    ABDOMINOPELVIC NODES: No lymphadenopathy.    PELVIC ORGANS: Post supracervical hysterectomy.    PERITONEUM/MESENTERY/BOWEL: An approximately 6 cm tubular hyperdense structure seen within a focally dilated, fecalized small bowel loop in the mid abdomen measuring 4 cm with adjacent mesenteric fat stranding/edema. No pneumoperitoneum. Post appendectomy.    BONES/SOFT TISSUES: Unremarkable.    OTHER: Atherosclerotic vascular calcifications.      IMPRESSION:    Mildly dilated small bowel loop in the mid abdomen with adjacent fat stranding/edema and an indeterminate 6 cm intraluminal tubular structure; no intraperitoneal free air. Findings may represent focal small bowel obstruction or infectious/inflammatory enteritis with focal ileus, likely related to the indeterminate tubular structure.    --- End of Report ---            ACCESS/ DEVICES:  [x ] Peripheral IV  [ ] Central Venous Line	[ ] R	[ ] L	[ ] IJ	[ ] Fem	[ ] SC	Placed:   [ ] Arterial Line		[ ] R	[ ] L	[ ] Fem	[ ] Rad	[ ] Ax	Placed:   [ ] PICC:					[ ] Mediport  [ ] Urinary Catheter,  Date Placed:   [ ] Chest tube: [ ] Right, [ ] Left  [ ] YARELI/Saw Drains

## 2021-11-03 NOTE — BRIEF OPERATIVE NOTE - NSICDXBRIEFPREOP_GEN_ALL_CORE_FT
PRE-OP DIAGNOSIS:  Enteritis of small intestine due to foreign body 03-Nov-2021 18:28:01  Deni Pastor

## 2021-11-03 NOTE — PROGRESS NOTE ADULT - ASSESSMENT
Pt is a 55 yo FM w/ PMH of HTN, HLD, anxiety, seizure 15 yrs ago, migraines, GERD, COPD, and extensive PSHx of cholecystectomy, appendectomy, hysterectomy, u/l oophorectomy, and hiatal hernia presented to the ED due to a 2wk hx of periumbilical intermittent worsening abdominal pain, that has progressed to involve the entire abdomen. Pt has had prior episodes of SBO which self-resolved. Pt presented to the ED Vitally stable, and with worsening abdominal pain. CT A/P demonstrated mildly dilated SB loops in the mid abdomen w/ adjacent fat stranding and an indeterminate 6cm intraluminal tubular structure without intraperitoneal free air. Given radiographic findings, pt's clinical hx and physical exam findings, surgery was consulted for SBO, and planning for an exploratory laparotomy with possible ostomy.     # Abd Pain 2/2 suspected SBO vs enteritis  # Intraluminal foreign object  -Abd pain worsening: now generalized   - CT A&P: Mildly dilated small bowel loop in the mid abdomen with adjacent fat stranding/edema and an indeterminate 6 cm intraluminal tubular structure; no intraperitoneal free air. Findings may represent focal small bowel obstruction or infectious/inflammatory enteritis with focal ileus, likely related to the indeterminate tubular structure.  - switched morphine po 2mg po q6 to Dilaudid 0.5mg IV q4  -11/1 - cardio clearance has been done: Moderate risk for MACE; EKG normal  -Plan is for OR likely tomorrow: ex lap w/ possible ostomy.   -NPO  -LR @ 120  - NGT placed 11/2; f/u CXR demonstrates proper placement  - cont Zofran IV 4mg q8  - serial abd exams  - surgery was consulted for SBO, and 11/3 for an exploratory laparotomy with possible ostomy.   - tried to call Pt's gastroenterologist Dr. Isabel, left message with .     #HTN   - cont Nifedipine 60mg po qd    #HLD  - cont Atorvastatin 80mg po qd    #Anxiety  - cont Klonopin 2mg po q6 prn     #seizure  - last episode 15yrs ago  - cont Keppra 250mg po q12    #COPD  - cont tiotropium 18microg qd    GI PPx: PTX  DVT PPx: LVX  Activity; IAT  Full Code Pt is a 55 yo FM w/ PMH of HTN, HLD, anxiety, seizure 15 yrs ago, migraines, GERD, COPD, and extensive PSHx of cholecystectomy, appendectomy, hysterectomy, u/l oophorectomy, and hiatal hernia presented to the ED due to a 2wk hx of periumbilical intermittent worsening abdominal pain, that has progressed to involve the entire abdomen. Pt has had prior episodes of SBO which self-resolved. Pt presented to the ED Vitally stable, and with worsening abdominal pain. CT A/P demonstrated mildly dilated SB loops in the mid abdomen w/ adjacent fat stranding and an indeterminate 6cm intraluminal tubular structure without intraperitoneal free air. Given radiographic findings, pt's clinical hx and physical exam findings, surgery was consulted for SBO, and planning for an exploratory laparotomy with possible ostomy.     # Abd Pain 2/2 suspected SBO vs enteritis  # Intraluminal foreign object  -Abd pain worsening: now generalized   - CT A&P: Mildly dilated small bowel loop in the mid abdomen with adjacent fat stranding/edema and an indeterminate 6 cm intraluminal tubular structure; no intraperitoneal free air. Findings may represent focal small bowel obstruction or infectious/inflammatory enteritis with focal ileus, likely related to the indeterminate tubular structure.  - switched morphine po 2mg po q6 to Dilaudid 0.5mg IV q4  -11/1 - cardio clearance has been done: Moderate risk for MACE; EKG normal  -Plan is for OR likely tomorrow: ex lap w/ possible ostomy.   -NPO  -LR @ 120  - NGT placed 11/2; f/u CXR demonstrates proper placement  - cont Zofran IV 4mg q8  - serial abd exams  - surgery was consulted for SBO, and 11/3 for an exploratory laparotomy with possible ostomy, plan to transfer to surgical service post Ex. Lap   - tried to call Pt's gastroenterologist Dr. Isabel, left message with .     #HTN   - cont Nifedipine 60mg po qd    #HLD  - cont Atorvastatin 80mg po qd    #Anxiety  - cont Klonopin 2mg po q6 prn     #seizure  - last episode 15yrs ago  - cont Keppra 250mg po q12    #COPD  - cont tiotropium 18microg qd    GI PPx: PTX  DVT PPx: LVX  Activity; IAT  Full Code

## 2021-11-03 NOTE — BRIEF OPERATIVE NOTE - NSICDXBRIEFPROCEDURE_GEN_ALL_CORE_FT
PROCEDURES:  Exploratory laparotomy 03-Nov-2021 18:23:40  Deni Pastor  Enterolysis 03-Nov-2021 18:23:56  Deni Pastor  Removal, foreign body, abdomen 03-Nov-2021 18:24:51  Deni Pastor

## 2021-11-04 LAB
ANION GAP SERPL CALC-SCNC: 17 MMOL/L — HIGH (ref 7–14)
ANION GAP SERPL CALC-SCNC: 19 MMOL/L — HIGH (ref 7–14)
APPEARANCE UR: ABNORMAL
BACTERIA # UR AUTO: NEGATIVE — SIGNIFICANT CHANGE UP
BASOPHILS # BLD AUTO: 0.01 K/UL — SIGNIFICANT CHANGE UP (ref 0–0.2)
BASOPHILS # BLD AUTO: 0.01 K/UL — SIGNIFICANT CHANGE UP (ref 0–0.2)
BASOPHILS # BLD AUTO: 0.02 K/UL — SIGNIFICANT CHANGE UP (ref 0–0.2)
BASOPHILS NFR BLD AUTO: 0.1 % — SIGNIFICANT CHANGE UP (ref 0–1)
BASOPHILS NFR BLD AUTO: 0.1 % — SIGNIFICANT CHANGE UP (ref 0–1)
BASOPHILS NFR BLD AUTO: 0.2 % — SIGNIFICANT CHANGE UP (ref 0–1)
BILIRUB UR-MCNC: NEGATIVE — SIGNIFICANT CHANGE UP
BUN SERPL-MCNC: 5 MG/DL — LOW (ref 10–20)
BUN SERPL-MCNC: 5 MG/DL — LOW (ref 10–20)
CALCIUM SERPL-MCNC: 8.1 MG/DL — LOW (ref 8.5–10.1)
CALCIUM SERPL-MCNC: 8.2 MG/DL — LOW (ref 8.5–10.1)
CHLORIDE SERPL-SCNC: 100 MMOL/L — SIGNIFICANT CHANGE UP (ref 98–110)
CHLORIDE SERPL-SCNC: 102 MMOL/L — SIGNIFICANT CHANGE UP (ref 98–110)
CO2 SERPL-SCNC: 16 MMOL/L — LOW (ref 17–32)
CO2 SERPL-SCNC: 19 MMOL/L — SIGNIFICANT CHANGE UP (ref 17–32)
COLOR SPEC: YELLOW — SIGNIFICANT CHANGE UP
CREAT SERPL-MCNC: 0.6 MG/DL — LOW (ref 0.7–1.5)
CREAT SERPL-MCNC: 0.6 MG/DL — LOW (ref 0.7–1.5)
DIFF PNL FLD: ABNORMAL
EOSINOPHIL # BLD AUTO: 0 K/UL — SIGNIFICANT CHANGE UP (ref 0–0.7)
EOSINOPHIL # BLD AUTO: 0.02 K/UL — SIGNIFICANT CHANGE UP (ref 0–0.7)
EOSINOPHIL # BLD AUTO: 0.02 K/UL — SIGNIFICANT CHANGE UP (ref 0–0.7)
EOSINOPHIL NFR BLD AUTO: 0 % — SIGNIFICANT CHANGE UP (ref 0–8)
EOSINOPHIL NFR BLD AUTO: 0.2 % — SIGNIFICANT CHANGE UP (ref 0–8)
EOSINOPHIL NFR BLD AUTO: 0.3 % — SIGNIFICANT CHANGE UP (ref 0–8)
EPI CELLS # UR: 1 /HPF — SIGNIFICANT CHANGE UP (ref 0–5)
GLUCOSE BLDC GLUCOMTR-MCNC: 137 MG/DL — HIGH (ref 70–99)
GLUCOSE SERPL-MCNC: 102 MG/DL — HIGH (ref 70–99)
GLUCOSE SERPL-MCNC: 114 MG/DL — HIGH (ref 70–99)
GLUCOSE UR QL: NEGATIVE — SIGNIFICANT CHANGE UP
HCG UR QL: NEGATIVE — SIGNIFICANT CHANGE UP
HCT VFR BLD CALC: 25.8 % — LOW (ref 37–47)
HCT VFR BLD CALC: 27.7 % — LOW (ref 37–47)
HCT VFR BLD CALC: 28.5 % — LOW (ref 37–47)
HGB BLD-MCNC: 9 G/DL — LOW (ref 12–16)
HGB BLD-MCNC: 9.6 G/DL — LOW (ref 12–16)
HGB BLD-MCNC: 9.7 G/DL — LOW (ref 12–16)
HYALINE CASTS # UR AUTO: 2 /LPF — SIGNIFICANT CHANGE UP (ref 0–7)
IMM GRANULOCYTES NFR BLD AUTO: 0.4 % — HIGH (ref 0.1–0.3)
IMM GRANULOCYTES NFR BLD AUTO: 0.4 % — HIGH (ref 0.1–0.3)
IMM GRANULOCYTES NFR BLD AUTO: 0.5 % — HIGH (ref 0.1–0.3)
KETONES UR-MCNC: ABNORMAL
LEUKOCYTE ESTERASE UR-ACNC: NEGATIVE — SIGNIFICANT CHANGE UP
LYMPHOCYTES # BLD AUTO: 0.68 K/UL — LOW (ref 1.2–3.4)
LYMPHOCYTES # BLD AUTO: 0.69 K/UL — LOW (ref 1.2–3.4)
LYMPHOCYTES # BLD AUTO: 0.69 K/UL — LOW (ref 1.2–3.4)
LYMPHOCYTES # BLD AUTO: 8 % — LOW (ref 20.5–51.1)
LYMPHOCYTES # BLD AUTO: 8.4 % — LOW (ref 20.5–51.1)
LYMPHOCYTES # BLD AUTO: 9.6 % — LOW (ref 20.5–51.1)
MAGNESIUM SERPL-MCNC: 1.2 MG/DL — LOW (ref 1.8–2.4)
MAGNESIUM SERPL-MCNC: 1.6 MG/DL — LOW (ref 1.8–2.4)
MCHC RBC-ENTMCNC: 31.4 PG — HIGH (ref 27–31)
MCHC RBC-ENTMCNC: 32.1 PG — HIGH (ref 27–31)
MCHC RBC-ENTMCNC: 32.4 PG — HIGH (ref 27–31)
MCHC RBC-ENTMCNC: 34 G/DL — SIGNIFICANT CHANGE UP (ref 32–37)
MCHC RBC-ENTMCNC: 34.7 G/DL — SIGNIFICANT CHANGE UP (ref 32–37)
MCHC RBC-ENTMCNC: 34.9 G/DL — SIGNIFICANT CHANGE UP (ref 32–37)
MCV RBC AUTO: 92.2 FL — SIGNIFICANT CHANGE UP (ref 81–99)
MCV RBC AUTO: 92.6 FL — SIGNIFICANT CHANGE UP (ref 81–99)
MCV RBC AUTO: 92.8 FL — SIGNIFICANT CHANGE UP (ref 81–99)
MONOCYTES # BLD AUTO: 0.46 K/UL — SIGNIFICANT CHANGE UP (ref 0.1–0.6)
MONOCYTES # BLD AUTO: 0.53 K/UL — SIGNIFICANT CHANGE UP (ref 0.1–0.6)
MONOCYTES # BLD AUTO: 0.59 K/UL — SIGNIFICANT CHANGE UP (ref 0.1–0.6)
MONOCYTES NFR BLD AUTO: 6.2 % — SIGNIFICANT CHANGE UP (ref 1.7–9.3)
MONOCYTES NFR BLD AUTO: 6.4 % — SIGNIFICANT CHANGE UP (ref 1.7–9.3)
MONOCYTES NFR BLD AUTO: 7.2 % — SIGNIFICANT CHANGE UP (ref 1.7–9.3)
NEUTROPHILS # BLD AUTO: 6.01 K/UL — SIGNIFICANT CHANGE UP (ref 1.4–6.5)
NEUTROPHILS # BLD AUTO: 6.82 K/UL — HIGH (ref 1.4–6.5)
NEUTROPHILS # BLD AUTO: 7.26 K/UL — HIGH (ref 1.4–6.5)
NEUTROPHILS NFR BLD AUTO: 83.2 % — HIGH (ref 42.2–75.2)
NEUTROPHILS NFR BLD AUTO: 83.5 % — HIGH (ref 42.2–75.2)
NEUTROPHILS NFR BLD AUTO: 85.3 % — HIGH (ref 42.2–75.2)
NITRITE UR-MCNC: NEGATIVE — SIGNIFICANT CHANGE UP
NRBC # BLD: 0 /100 WBCS — SIGNIFICANT CHANGE UP (ref 0–0)
PH UR: 6 — SIGNIFICANT CHANGE UP (ref 5–8)
PHOSPHATE SERPL-MCNC: 2.8 MG/DL — SIGNIFICANT CHANGE UP (ref 2.1–4.9)
PHOSPHATE SERPL-MCNC: 4.1 MG/DL — SIGNIFICANT CHANGE UP (ref 2.1–4.9)
PLATELET # BLD AUTO: 189 K/UL — SIGNIFICANT CHANGE UP (ref 130–400)
PLATELET # BLD AUTO: 209 K/UL — SIGNIFICANT CHANGE UP (ref 130–400)
PLATELET # BLD AUTO: 213 K/UL — SIGNIFICANT CHANGE UP (ref 130–400)
POTASSIUM SERPL-MCNC: 3.8 MMOL/L — SIGNIFICANT CHANGE UP (ref 3.5–5)
POTASSIUM SERPL-MCNC: 4.2 MMOL/L — SIGNIFICANT CHANGE UP (ref 3.5–5)
POTASSIUM SERPL-SCNC: 3.8 MMOL/L — SIGNIFICANT CHANGE UP (ref 3.5–5)
POTASSIUM SERPL-SCNC: 4.2 MMOL/L — SIGNIFICANT CHANGE UP (ref 3.5–5)
PROT UR-MCNC: ABNORMAL
RBC # BLD: 2.78 M/UL — LOW (ref 4.2–5.4)
RBC # BLD: 2.99 M/UL — LOW (ref 4.2–5.4)
RBC # BLD: 3.09 M/UL — LOW (ref 4.2–5.4)
RBC # FLD: 12.6 % — SIGNIFICANT CHANGE UP (ref 11.5–14.5)
RBC # FLD: 12.7 % — SIGNIFICANT CHANGE UP (ref 11.5–14.5)
RBC # FLD: 12.9 % — SIGNIFICANT CHANGE UP (ref 11.5–14.5)
RBC CASTS # UR COMP ASSIST: 8 /HPF — HIGH (ref 0–4)
SODIUM SERPL-SCNC: 136 MMOL/L — SIGNIFICANT CHANGE UP (ref 135–146)
SODIUM SERPL-SCNC: 137 MMOL/L — SIGNIFICANT CHANGE UP (ref 135–146)
SP GR SPEC: 1.03 — HIGH (ref 1.01–1.03)
UROBILINOGEN FLD QL: SIGNIFICANT CHANGE UP
WBC # BLD: 7.22 K/UL — SIGNIFICANT CHANGE UP (ref 4.8–10.8)
WBC # BLD: 8.18 K/UL — SIGNIFICANT CHANGE UP (ref 4.8–10.8)
WBC # BLD: 8.51 K/UL — SIGNIFICANT CHANGE UP (ref 4.8–10.8)
WBC # FLD AUTO: 7.22 K/UL — SIGNIFICANT CHANGE UP (ref 4.8–10.8)
WBC # FLD AUTO: 8.18 K/UL — SIGNIFICANT CHANGE UP (ref 4.8–10.8)
WBC # FLD AUTO: 8.51 K/UL — SIGNIFICANT CHANGE UP (ref 4.8–10.8)
WBC UR QL: 3 /HPF — SIGNIFICANT CHANGE UP (ref 0–5)

## 2021-11-04 PROCEDURE — 71045 X-RAY EXAM CHEST 1 VIEW: CPT | Mod: 26

## 2021-11-04 PROCEDURE — 71045 X-RAY EXAM CHEST 1 VIEW: CPT | Mod: 26,77

## 2021-11-04 RX ORDER — MAGNESIUM SULFATE 500 MG/ML
2 VIAL (ML) INJECTION ONCE
Refills: 0 | Status: COMPLETED | OUTPATIENT
Start: 2021-11-04 | End: 2021-11-04

## 2021-11-04 RX ORDER — ACETAMINOPHEN 500 MG
650 TABLET ORAL ONCE
Refills: 0 | Status: COMPLETED | OUTPATIENT
Start: 2021-11-04 | End: 2021-11-04

## 2021-11-04 RX ORDER — SODIUM CHLORIDE 9 MG/ML
500 INJECTION, SOLUTION INTRAVENOUS ONCE
Refills: 0 | Status: COMPLETED | OUTPATIENT
Start: 2021-11-04 | End: 2021-11-04

## 2021-11-04 RX ORDER — MEROPENEM 1 G/30ML
2000 INJECTION INTRAVENOUS ONCE
Refills: 0 | Status: DISCONTINUED | OUTPATIENT
Start: 2021-11-04 | End: 2021-11-04

## 2021-11-04 RX ORDER — PIPERACILLIN AND TAZOBACTAM 4; .5 G/20ML; G/20ML
3.38 INJECTION, POWDER, LYOPHILIZED, FOR SOLUTION INTRAVENOUS ONCE
Refills: 0 | Status: COMPLETED | OUTPATIENT
Start: 2021-11-04 | End: 2021-11-04

## 2021-11-04 RX ORDER — MAGNESIUM SULFATE 500 MG/ML
2 VIAL (ML) INJECTION
Refills: 0 | Status: COMPLETED | OUTPATIENT
Start: 2021-11-04 | End: 2021-11-04

## 2021-11-04 RX ORDER — ACETAMINOPHEN 500 MG
650 TABLET ORAL EVERY 6 HOURS
Refills: 0 | Status: DISCONTINUED | OUTPATIENT
Start: 2021-11-04 | End: 2021-11-04

## 2021-11-04 RX ORDER — PIPERACILLIN AND TAZOBACTAM 4; .5 G/20ML; G/20ML
3.38 INJECTION, POWDER, LYOPHILIZED, FOR SOLUTION INTRAVENOUS EVERY 8 HOURS
Refills: 0 | Status: COMPLETED | OUTPATIENT
Start: 2021-11-04 | End: 2021-11-11

## 2021-11-04 RX ORDER — PIPERACILLIN AND TAZOBACTAM 4; .5 G/20ML; G/20ML
3.38 INJECTION, POWDER, LYOPHILIZED, FOR SOLUTION INTRAVENOUS EVERY 8 HOURS
Refills: 0 | Status: DISCONTINUED | OUTPATIENT
Start: 2021-11-04 | End: 2021-11-04

## 2021-11-04 RX ORDER — MEROPENEM 1 G/30ML
2000 INJECTION INTRAVENOUS ONCE
Refills: 0 | Status: COMPLETED | OUTPATIENT
Start: 2021-11-04 | End: 2021-11-04

## 2021-11-04 RX ADMIN — ONDANSETRON 4 MILLIGRAM(S): 8 TABLET, FILM COATED ORAL at 11:40

## 2021-11-04 RX ADMIN — SODIUM CHLORIDE 1000 MILLILITER(S): 9 INJECTION, SOLUTION INTRAVENOUS at 02:00

## 2021-11-04 RX ADMIN — LEVETIRACETAM 400 MILLIGRAM(S): 250 TABLET, FILM COATED ORAL at 05:17

## 2021-11-04 RX ADMIN — Medication 50 GRAM(S): at 05:37

## 2021-11-04 RX ADMIN — Medication 650 MILLIGRAM(S): at 18:49

## 2021-11-04 RX ADMIN — Medication 50 GRAM(S): at 06:49

## 2021-11-04 RX ADMIN — PIPERACILLIN AND TAZOBACTAM 200 GRAM(S): 4; .5 INJECTION, POWDER, LYOPHILIZED, FOR SOLUTION INTRAVENOUS at 11:28

## 2021-11-04 RX ADMIN — SODIUM CHLORIDE 120 MILLILITER(S): 9 INJECTION, SOLUTION INTRAVENOUS at 14:56

## 2021-11-04 RX ADMIN — ENOXAPARIN SODIUM 40 MILLIGRAM(S): 100 INJECTION SUBCUTANEOUS at 11:29

## 2021-11-04 RX ADMIN — SODIUM CHLORIDE 1000 MILLILITER(S): 9 INJECTION, SOLUTION INTRAVENOUS at 00:38

## 2021-11-04 RX ADMIN — SODIUM CHLORIDE 120 MILLILITER(S): 9 INJECTION, SOLUTION INTRAVENOUS at 06:49

## 2021-11-04 RX ADMIN — PANTOPRAZOLE SODIUM 40 MILLIGRAM(S): 20 TABLET, DELAYED RELEASE ORAL at 11:30

## 2021-11-04 RX ADMIN — LEVETIRACETAM 400 MILLIGRAM(S): 250 TABLET, FILM COATED ORAL at 17:50

## 2021-11-04 RX ADMIN — PIPERACILLIN AND TAZOBACTAM 25 GRAM(S): 4; .5 INJECTION, POWDER, LYOPHILIZED, FOR SOLUTION INTRAVENOUS at 13:16

## 2021-11-04 RX ADMIN — PIPERACILLIN AND TAZOBACTAM 25 GRAM(S): 4; .5 INJECTION, POWDER, LYOPHILIZED, FOR SOLUTION INTRAVENOUS at 21:10

## 2021-11-04 RX ADMIN — TIOTROPIUM BROMIDE 1 CAPSULE(S): 18 CAPSULE ORAL; RESPIRATORY (INHALATION) at 08:52

## 2021-11-04 RX ADMIN — MEROPENEM 200 MILLIGRAM(S): 1 INJECTION INTRAVENOUS at 06:25

## 2021-11-04 NOTE — CONSULT NOTE ADULT - ASSESSMENT
57 y/o F with a PMH of HTN, COPD, HLD, anxiety, seizure d/o, migraine, GERD, and multiple abdominal surgeries including appendectomy/resection of appendiceal stump, cholecystectomy, exploratory laparotomy with lysis of adhesions,  hysterectomy unilateral oophorectomy, Hiatal hernia present to the ED with a chronic intermittent abdominal pain that gradually worsened in the past 2 weeks.   11/1 CT A/P : Mildly dilated small bowel loop in the mid abdomen with adjacent fat stranding/edema and an indeterminate 6 cm intraluminal tubular structure; no intraperitoneal free air. Findings may represent focal small bowel obstruction or infectious/inflammatory enteritis with focal ileus, likely related to the indeterminate tubular structure.      IMPRESSION;   Enteritis of small intestine due to foreign body   11/3 : Ex lap ; Enterolysis. Removal of foreign body    RECOMMENDATIONS;  Zosyn 4.5 gm iv q6h for circa 7 days starting 11/3  d/c meropenem  recall prn please

## 2021-11-04 NOTE — PROGRESS NOTE ADULT - SUBJECTIVE AND OBJECTIVE BOX
GENERAL SURGERY PROGRESS NOTE    Patient: RAMÍREZ RODARET , 56y ([birth year])Female   MRN: [id number]  Location: [address]  Visit: 11-01-21 Inpatient  Date: 11-04-21 @ 04:19    Hospital Day #: 4  Post-Op Day #: 1    Procedure/Dx/Injuries: exploratory laparotomy, enterolysis, removal of foreign body    Events of past 24 hours: hypotensive overnight, given 1L of IVF bolus with improvement in blood pressure    PAST MEDICAL & SURGICAL HISTORY:  Back pain  henriated discs    Seizures  last one 6 yrs ago    Migraines    Hypercholesteremia    Seasonal allergic rhinitis, unspecified trigger    GERD (gastroesophageal reflux disease)    Hiatal hernia    Anxiety    HTN (hypertension)    Osteoarthritis    Sciatica    Tobacco use disorder    History of surgery  4 rght knee sx  1 left knee sx  cholecystectomy  rotator cuff right  abdominal sx-- adhesions  c section  ovarian cystectomy  breast cystectomies  colonoscopy   endoscopies  parital hysterectomy    S/p partial hysterectomy with remaining cervical stump    H/O rotator cuff surgery  right    History of appendectomy    S/P cholecystectomy        Vitals:   T(F): 99.4 (11-03-21 @ 21:48), Max: 99.4 (11-03-21 @ 21:48)  HR: 92 (11-04-21 @ 03:02)  BP: 101/51 (11-04-21 @ 03:02)  RR: 16 (11-04-21 @ 00:15)  SpO2: 99% (11-04-21 @ 00:15)      Diet, NPO      Fluids: lactated ringers.: Solution, 1000 milliLiter(s) infuse at 120 mL/Hr      I & O's:    11-02-21 @ 07:01  -  11-03-21 @ 07:00  --------------------------------------------------------  IN:    Lactated Ringers: 1080 mL  Total IN: 1080 mL    OUT:    Nasogastric/Oral tube (mL): 400 mL    Voided (mL): 900 mL  Total OUT: 1300 mL    Total NET: -220 mL        Bowel Movement: : [] YES [] NO  Flatus: : [] YES [] NO    Physical Examination:  General Appearance: NAD, +ngt  Heart: RRR  Lungs: CTABL  Abdomen:  Soft, mildly tender, appropriate for post-op course, nondistended. No rigidity, guarding, or rebound tenderness. + emeterio with serosangenous output, +arauz  MSK/Extremities: Warm & well-perfused. Peripheral pulses intact.  Incisions/Wounds: midline abdominal Dressings in place, clean, dry and intact, no signs of active bleeding/drainage,    MEDICATIONS  (STANDING):  enoxaparin Injectable 40 milliGRAM(s) SubCutaneous daily  HYDROmorphone PCA (1 mG/mL) 30 milliLiter(s) PCA Continuous PCA Continuous  influenza   Vaccine 0.5 milliLiter(s) IntraMuscular once  lactated ringers. 1000 milliLiter(s) (120 mL/Hr) IV Continuous <Continuous>  levETIRAcetam  IVPB 250 milliGRAM(s) IV Intermittent every 12 hours  magnesium sulfate  IVPB 2 Gram(s) IV Intermittent every 2 hours  meropenem  IVPB 2000 milliGRAM(s) IV Intermittent every 12 hours  pantoprazole  Injectable 40 milliGRAM(s) IV Push daily  tiotropium 18 MICROgram(s) Capsule 1 Capsule(s) Inhalation daily    MEDICATIONS  (PRN):  HYDROmorphone  Injectable 0.5 milliGRAM(s) IV Push every 10 minutes PRN Moderate Pain (4 - 6)  naloxone Injectable 0.1 milliGRAM(s) IV Push every 3 minutes PRN For ANY of the following changes in patient status:  A. RR LESS THAN 10 breaths per minute, B. Oxygen saturation LESS THAN 90%, C. Sedation score of 6  ondansetron Injectable 4 milliGRAM(s) IV Push every 8 hours PRN Nausea and/or Vomiting  ondansetron Injectable 4 milliGRAM(s) IV Push every 6 hours PRN Nausea      DVT PROPHYLAXIS: enoxaparin Injectable 40 milliGRAM(s) SubCutaneous daily    GI PROPHYLAXIS: pantoprazole  Injectable 40 milliGRAM(s) IV Push daily    ANTICOAGULATION:   ANTIBIOTICS:  meropenem  IVPB 2000 milliGRAM(s)    LAB/STUDIES:  Labs:  CAPILLARY BLOOD GLUCOSE                          9.6    8.51  )-----------( 213      ( 04 Nov 2021 02:22 )             27.7       Auto Neutrophil %: 85.3 % (11-04-21 @ 02:22)  Auto Immature Granulocyte %: 0.4 % (11-04-21 @ 02:22)    11-04    137  |  102  |  5<L>  ----------------------------<  102<H>  4.2   |  16<L>  |  0.6<L>      Calcium, Total Serum: 8.1 mg/dL (11-04-21 @ 02:22)      LFTs:             6.0  | 0.2  | 28       ------------------[146     ( 02 Nov 2021 20:00 )  4.0  | x    | 36          Lipase:x      Amylase:x         Lactate, Blood: 1.2 mmol/L (11-02-21 @ 09:40)  Lactate, Blood: 0.7 mmol/L (11-01-21 @ 10:10)      Coags:     11.20  ----< 0.97    ( 02 Nov 2021 20:00 )     40.4      Culture - Urine (collected 01 Nov 2021 12:56)  Source: Clean Catch Clean Catch (Midstream)  Final Report (03 Nov 2021 16:31):    <10,000 CFU/mL Normal Urogenital Nohemy    IMAGING:      ACCESS/ DEVICES:  [ x] Peripheral IV  [ ] Central Venous Line	[ ] R	[ ] L	[ ] IJ	[ ] Fem	[ ] SC	Placed:   [ ] Arterial Line		[ ] R	[ ] L	[ ] Fem	[ ] Rad	[ ] Ax	Placed:   [ ] PICC:					[ ] Mediport  [ ] Urinary Catheter,  Date Placed:   [ ] Chest tube: [ ] Right, [ ] Left  [ ] EMETERIO/Saw Drains

## 2021-11-04 NOTE — PHARMACOTHERAPY INTERVENTION NOTE - COMMENTS
s/w md - recommended to change to alternative/ needs ID approval. also- pt has pcn allergy- as per md pt has rash to pcn & pt still needs the medication. recommended to d/c q12 dosing & just enter one time dose until seen by ID

## 2021-11-04 NOTE — CONSULT NOTE ADULT - SUBJECTIVE AND OBJECTIVE BOX
RAMÍREZ RODARTE  56y, Female  Allergy: Cipro (Anaphylaxis; Urticaria; Hives)  Cipro (Unknown)  Neurontin (Unknown)  penicillin (Unknown)  Toradol (Rash; Urticaria; Anaphylaxis (Mild to Mod))  Toradol (Unknown)      All historical available data reviewed.    HPI:  55 y/o F with a PMH of HTN, COPD, HLD, anxiety, seizure d/o, migraine, GERD, and multiple abdominal surgeries including appendectomy/resection of appendiceal stump, cholecystectomy, exploratory laparotomy with lysis of adhesions,  hysterectomy unilateral oophorectomy, Hiatal hernia present to the ED with a chronic intermittent abdominal pain that gradually worsened in the past 2 weeks. Pt states that the pain is periumbilical, and is associated with distention and constipation. Pt states that she has not had a BM in 2 days after she took MG citrate and has not passed flatus in 1 day. Pt reports that this pain is not new and has multiple bouts of SBO that have resolved. Pt last EGD and colonoscopy where earlier this year and follows-up with her GI regularly. She denies any h/o fever, cough, chest pain, headache, recent seizures, ingestion of foreign body, dizziness.   CT abdomen and pelvis with IV contrast showed - Mildly dilated small bowel loop in the mid abdomen with adjacent fat stranding/edema and an indeterminate 6 cm intraluminal tubular structure; no intraperitoneal free air. Findings may represent focal small bowel obstruction or infectious/inflammatory enteritis with focal ileus, likely related to the indeterminate tubular structure.  Patient is being admitted for the management of abdominal pain secondary enteritis vs obstruction d/t indeterminate 6cm intraluminal tubular structure - possible foreign body.    (01 Nov 2021 22:32)    Enteritis of small intestine due to foreign body 03-Nov-2021 18:28:05  Deni Pastor.  11/3 PROCEDURES:  Exploratory laparotomy 03-Nov-2021 18:23:40  Deni Pastor  Enterolysis 03-Nov-2021 18:23:56  Deni Pastor  Removal, foreign body, abdomen    FAMILY HISTORY:  Coronary artery disease (Father)  dad cad @78      PAST MEDICAL & SURGICAL HISTORY:  Back pain  henriated discs    Seizures  last one 6 yrs ago    Migraines    Hypercholesteremia    Seasonal allergic rhinitis, unspecified trigger    GERD (gastroesophageal reflux disease)    Hiatal hernia    Anxiety    HTN (hypertension)    Osteoarthritis    Sciatica    Tobacco use disorder    History of surgery  4 rght knee sx  1 left knee sx  cholecystectomy  rotator cuff right  abdominal sx-- adhesions  c section  ovarian cystectomy  breast cystectomies  colonoscopy   endoscopies  parital hysterectomy    S/p partial hysterectomy with remaining cervical stump    H/O rotator cuff surgery  right    History of appendectomy    S/P cholecystectomy          VITALS:  T(F): 99.4, Max: 99.4 (11-03-21 @ 21:48)  HR: 94  BP: 109/57  RR: 16Vital Signs Last 24 Hrs  T(C): 37.4 (03 Nov 2021 21:48), Max: 37.4 (03 Nov 2021 21:48)  T(F): 99.4 (03 Nov 2021 21:48), Max: 99.4 (03 Nov 2021 21:48)  HR: 94 (04 Nov 2021 07:30) (75 - 104)  BP: 109/57 (04 Nov 2021 04:15) (85/49 - 164/87)  BP(mean): --  RR: 16 (04 Nov 2021 00:15) (11 - 20)  SpO2: 92% (04 Nov 2021 07:30) (90% - 99%)    TESTS & MEASUREMENTS:                        9.6    8.51  )-----------( 213      ( 04 Nov 2021 02:22 )             27.7     11-04    137  |  102  |  5<L>  ----------------------------<  102<H>  4.2   |  16<L>  |  0.6<L>    Ca    8.1<L>      04 Nov 2021 02:22  Phos  4.1     11-04  Mg     1.2     11-04    TPro  6.0  /  Alb  4.0  /  TBili  0.2  /  DBili  x   /  AST  28  /  ALT  36  /  AlkPhos  146<H>  11-02    LIVER FUNCTIONS - ( 02 Nov 2021 20:00 )  Alb: 4.0 g/dL / Pro: 6.0 g/dL / ALK PHOS: 146 U/L / ALT: 36 U/L / AST: 28 U/L / GGT: x             Culture - Urine (collected 11-01-21 @ 12:56)  Source: Clean Catch Clean Catch (Midstream)  Final Report (11-03-21 @ 16:31):    <10,000 CFU/mL Normal Urogenital Agustina            RADIOLOGY & ADDITIONAL TESTS:  Personal review of radiological diagnostics performed  Echo and EKG results noted when applicable.     MEDICATIONS:  enoxaparin Injectable 40 milliGRAM(s) SubCutaneous daily  HYDROmorphone  Injectable 0.5 milliGRAM(s) IV Push every 10 minutes PRN  HYDROmorphone PCA (1 mG/mL) 30 milliLiter(s) PCA Continuous PCA Continuous  influenza   Vaccine 0.5 milliLiter(s) IntraMuscular once  lactated ringers. 1000 milliLiter(s) IV Continuous <Continuous>  levETIRAcetam  IVPB 250 milliGRAM(s) IV Intermittent every 12 hours  naloxone Injectable 0.1 milliGRAM(s) IV Push every 3 minutes PRN  ondansetron Injectable 4 milliGRAM(s) IV Push every 8 hours PRN  ondansetron Injectable 4 milliGRAM(s) IV Push every 6 hours PRN  pantoprazole  Injectable 40 milliGRAM(s) IV Push daily  tiotropium 18 MICROgram(s) Capsule 1 Capsule(s) Inhalation daily      ANTIBIOTICS:

## 2021-11-04 NOTE — PROGRESS NOTE ADULT - ASSESSMENT
Assessment:  56yF s/p exploratory laparotomy, enterolysis, removal of foreign body    Plan:  - NPO, IVF  - Monitor vitals  - Monitor post-op labs and replete as necessary  - Monitor for bowel function  - Continue Pain Medications if necessary  - Continue Antibiotics   - Monitor urine output and trial of void once Gooden removed  - monitor emeterio output   - monitor NGT output   - DVT and GI Prophylaxis  - Monitor wound and dressing for changes, redress as needed.    spectra [id number]

## 2021-11-05 LAB
ALBUMIN SERPL ELPH-MCNC: 3 G/DL — LOW (ref 3.5–5.2)
ALP SERPL-CCNC: 93 U/L — SIGNIFICANT CHANGE UP (ref 30–115)
ALT FLD-CCNC: 21 U/L — SIGNIFICANT CHANGE UP (ref 0–41)
ANION GAP SERPL CALC-SCNC: 12 MMOL/L — SIGNIFICANT CHANGE UP (ref 7–14)
ANION GAP SERPL CALC-SCNC: 12 MMOL/L — SIGNIFICANT CHANGE UP (ref 7–14)
AST SERPL-CCNC: 22 U/L — SIGNIFICANT CHANGE UP (ref 0–41)
BASOPHILS # BLD AUTO: 0.01 K/UL — SIGNIFICANT CHANGE UP (ref 0–0.2)
BASOPHILS NFR BLD AUTO: 0.1 % — SIGNIFICANT CHANGE UP (ref 0–1)
BILIRUB DIRECT SERPL-MCNC: <0.2 MG/DL — SIGNIFICANT CHANGE UP (ref 0–0.2)
BILIRUB INDIRECT FLD-MCNC: >0.1 MG/DL — LOW (ref 0.2–1.2)
BILIRUB SERPL-MCNC: 0.3 MG/DL — SIGNIFICANT CHANGE UP (ref 0.2–1.2)
BUN SERPL-MCNC: 4 MG/DL — LOW (ref 10–20)
BUN SERPL-MCNC: 6 MG/DL — LOW (ref 10–20)
CALCIUM SERPL-MCNC: 8.3 MG/DL — LOW (ref 8.5–10.1)
CALCIUM SERPL-MCNC: 8.5 MG/DL — SIGNIFICANT CHANGE UP (ref 8.5–10.1)
CHLORIDE SERPL-SCNC: 101 MMOL/L — SIGNIFICANT CHANGE UP (ref 98–110)
CHLORIDE SERPL-SCNC: 98 MMOL/L — SIGNIFICANT CHANGE UP (ref 98–110)
CO2 SERPL-SCNC: 23 MMOL/L — SIGNIFICANT CHANGE UP (ref 17–32)
CO2 SERPL-SCNC: 24 MMOL/L — SIGNIFICANT CHANGE UP (ref 17–32)
CREAT SERPL-MCNC: 0.5 MG/DL — LOW (ref 0.7–1.5)
CREAT SERPL-MCNC: <0.5 MG/DL — LOW (ref 0.7–1.5)
CULTURE RESULTS: NO GROWTH — SIGNIFICANT CHANGE UP
EOSINOPHIL # BLD AUTO: 0.08 K/UL — SIGNIFICANT CHANGE UP (ref 0–0.7)
EOSINOPHIL NFR BLD AUTO: 1 % — SIGNIFICANT CHANGE UP (ref 0–8)
GLUCOSE SERPL-MCNC: 121 MG/DL — HIGH (ref 70–99)
GLUCOSE SERPL-MCNC: 142 MG/DL — HIGH (ref 70–99)
HCT VFR BLD CALC: 21.9 % — LOW (ref 37–47)
HGB BLD-MCNC: 7.9 G/DL — LOW (ref 12–16)
IMM GRANULOCYTES NFR BLD AUTO: 0.5 % — HIGH (ref 0.1–0.3)
LYMPHOCYTES # BLD AUTO: 0.78 K/UL — LOW (ref 1.2–3.4)
LYMPHOCYTES # BLD AUTO: 9.9 % — LOW (ref 20.5–51.1)
MAGNESIUM SERPL-MCNC: 1.4 MG/DL — LOW (ref 1.8–2.4)
MAGNESIUM SERPL-MCNC: 1.7 MG/DL — LOW (ref 1.8–2.4)
MCHC RBC-ENTMCNC: 32.4 PG — HIGH (ref 27–31)
MCHC RBC-ENTMCNC: 36.1 G/DL — SIGNIFICANT CHANGE UP (ref 32–37)
MCV RBC AUTO: 89.8 FL — SIGNIFICANT CHANGE UP (ref 81–99)
MONOCYTES # BLD AUTO: 0.39 K/UL — SIGNIFICANT CHANGE UP (ref 0.1–0.6)
MONOCYTES NFR BLD AUTO: 4.9 % — SIGNIFICANT CHANGE UP (ref 1.7–9.3)
NEUTROPHILS # BLD AUTO: 6.59 K/UL — HIGH (ref 1.4–6.5)
NEUTROPHILS NFR BLD AUTO: 83.6 % — HIGH (ref 42.2–75.2)
NRBC # BLD: 0 /100 WBCS — SIGNIFICANT CHANGE UP (ref 0–0)
PHOSPHATE SERPL-MCNC: 1.4 MG/DL — LOW (ref 2.1–4.9)
PHOSPHATE SERPL-MCNC: 2.7 MG/DL — SIGNIFICANT CHANGE UP (ref 2.1–4.9)
PLATELET # BLD AUTO: 194 K/UL — SIGNIFICANT CHANGE UP (ref 130–400)
POTASSIUM SERPL-MCNC: 3.5 MMOL/L — SIGNIFICANT CHANGE UP (ref 3.5–5)
POTASSIUM SERPL-MCNC: 3.6 MMOL/L — SIGNIFICANT CHANGE UP (ref 3.5–5)
POTASSIUM SERPL-SCNC: 3.5 MMOL/L — SIGNIFICANT CHANGE UP (ref 3.5–5)
POTASSIUM SERPL-SCNC: 3.6 MMOL/L — SIGNIFICANT CHANGE UP (ref 3.5–5)
PROT SERPL-MCNC: 4.9 G/DL — LOW (ref 6–8)
RBC # BLD: 2.44 M/UL — LOW (ref 4.2–5.4)
RBC # FLD: 12.6 % — SIGNIFICANT CHANGE UP (ref 11.5–14.5)
SODIUM SERPL-SCNC: 134 MMOL/L — LOW (ref 135–146)
SODIUM SERPL-SCNC: 136 MMOL/L — SIGNIFICANT CHANGE UP (ref 135–146)
SPECIMEN SOURCE: SIGNIFICANT CHANGE UP
WBC # BLD: 7.89 K/UL — SIGNIFICANT CHANGE UP (ref 4.8–10.8)
WBC # FLD AUTO: 7.89 K/UL — SIGNIFICANT CHANGE UP (ref 4.8–10.8)

## 2021-11-05 RX ORDER — MAGNESIUM SULFATE 500 MG/ML
2 VIAL (ML) INJECTION ONCE
Refills: 0 | Status: DISCONTINUED | OUTPATIENT
Start: 2021-11-05 | End: 2021-11-05

## 2021-11-05 RX ORDER — POTASSIUM CHLORIDE 20 MEQ
20 PACKET (EA) ORAL
Refills: 0 | Status: DISCONTINUED | OUTPATIENT
Start: 2021-11-05 | End: 2021-11-05

## 2021-11-05 RX ORDER — POTASSIUM CHLORIDE 20 MEQ
20 PACKET (EA) ORAL ONCE
Refills: 0 | Status: COMPLETED | OUTPATIENT
Start: 2021-11-05 | End: 2021-11-05

## 2021-11-05 RX ORDER — SODIUM CHLORIDE 9 MG/ML
1000 INJECTION, SOLUTION INTRAVENOUS
Refills: 0 | Status: DISCONTINUED | OUTPATIENT
Start: 2021-11-05 | End: 2021-11-08

## 2021-11-05 RX ORDER — ACETAMINOPHEN 500 MG
1000 TABLET ORAL ONCE
Refills: 0 | Status: COMPLETED | OUTPATIENT
Start: 2021-11-05 | End: 2021-11-05

## 2021-11-05 RX ORDER — ACETAMINOPHEN 500 MG
650 TABLET ORAL ONCE
Refills: 0 | Status: COMPLETED | OUTPATIENT
Start: 2021-11-05 | End: 2021-11-05

## 2021-11-05 RX ADMIN — SODIUM CHLORIDE 120 MILLILITER(S): 9 INJECTION, SOLUTION INTRAVENOUS at 08:15

## 2021-11-05 RX ADMIN — Medication 50 MILLIEQUIVALENT(S): at 04:46

## 2021-11-05 RX ADMIN — SODIUM CHLORIDE 120 MILLILITER(S): 9 INJECTION, SOLUTION INTRAVENOUS at 09:57

## 2021-11-05 RX ADMIN — Medication 650 MILLIGRAM(S): at 06:40

## 2021-11-05 RX ADMIN — PIPERACILLIN AND TAZOBACTAM 25 GRAM(S): 4; .5 INJECTION, POWDER, LYOPHILIZED, FOR SOLUTION INTRAVENOUS at 06:42

## 2021-11-05 RX ADMIN — Medication 50 GRAM(S): at 00:25

## 2021-11-05 RX ADMIN — PIPERACILLIN AND TAZOBACTAM 25 GRAM(S): 4; .5 INJECTION, POWDER, LYOPHILIZED, FOR SOLUTION INTRAVENOUS at 22:12

## 2021-11-05 RX ADMIN — LEVETIRACETAM 400 MILLIGRAM(S): 250 TABLET, FILM COATED ORAL at 18:20

## 2021-11-05 RX ADMIN — Medication 650 MILLIGRAM(S): at 05:33

## 2021-11-05 RX ADMIN — SODIUM CHLORIDE 120 MILLILITER(S): 9 INJECTION, SOLUTION INTRAVENOUS at 16:45

## 2021-11-05 RX ADMIN — ENOXAPARIN SODIUM 40 MILLIGRAM(S): 100 INJECTION SUBCUTANEOUS at 12:35

## 2021-11-05 RX ADMIN — Medication 1000 MILLIGRAM(S): at 22:45

## 2021-11-05 RX ADMIN — Medication 400 MILLIGRAM(S): at 22:12

## 2021-11-05 RX ADMIN — PANTOPRAZOLE SODIUM 40 MILLIGRAM(S): 20 TABLET, DELAYED RELEASE ORAL at 12:35

## 2021-11-05 RX ADMIN — Medication 1000 MILLIGRAM(S): at 14:48

## 2021-11-05 RX ADMIN — TIOTROPIUM BROMIDE 1 CAPSULE(S): 18 CAPSULE ORAL; RESPIRATORY (INHALATION) at 08:25

## 2021-11-05 RX ADMIN — LEVETIRACETAM 400 MILLIGRAM(S): 250 TABLET, FILM COATED ORAL at 06:42

## 2021-11-05 RX ADMIN — Medication 400 MILLIGRAM(S): at 14:15

## 2021-11-05 RX ADMIN — HYDROMORPHONE HYDROCHLORIDE 30 MILLILITER(S): 2 INJECTION INTRAMUSCULAR; INTRAVENOUS; SUBCUTANEOUS at 16:49

## 2021-11-05 RX ADMIN — SODIUM CHLORIDE 120 MILLILITER(S): 9 INJECTION, SOLUTION INTRAVENOUS at 00:00

## 2021-11-05 RX ADMIN — PIPERACILLIN AND TAZOBACTAM 25 GRAM(S): 4; .5 INJECTION, POWDER, LYOPHILIZED, FOR SOLUTION INTRAVENOUS at 16:33

## 2021-11-05 NOTE — DIETITIAN INITIAL EVALUATION ADULT. - ADD RECOMMEND
1) Advance diet order per surgery team, if pt to continue NPO for more than 7 days and no plan for diet advancement . Consult NST to evaluate pt .

## 2021-11-05 NOTE — DIETITIAN INITIAL EVALUATION ADULT. - PERTINENT MEDS FT
MEDICATIONS  (STANDING):  dextrose 5% + sodium chloride 0.45% 1000 milliLiter(s) (120 mL/Hr) IV Continuous <Continuous>  pantoprazole  Injectable 40 milliGRAM(s) IV Push daily      MEDICATIONS  (PRN):  ondansetron Injectable 4 milliGRAM(s) IV Push every 6 hours PRN Nausea  ondansetron Injectable 4 milliGRAM(s) IV Push every 8 hours PRN Nausea and/or Vomiting

## 2021-11-05 NOTE — PROGRESS NOTE ADULT - ASSESSMENT
Assessment:  56yF s/p exploratory laparotomy, enterolysis, removal of foreign body    Plan:  - NPO, IVF  - Monitor vitals  - f/u blood cultures  - pain control: on Dilaudid PCA  - Continue Antibiotics: zosyn  - Monitor urine output and trial of void once Gooden removed  - monitor emeterio output   - monitor NGT output   - DVT and GI Prophylaxis  - Monitor wound and dressing for changes, redress as needed.    spectra [phone]

## 2021-11-05 NOTE — DIETITIAN INITIAL EVALUATION ADULT. - OTHER CALCULATIONS
Weight used: 63.6 kg -- dosing weight Energy needs:1413-1531kcal/day (MSJ 1.2-1.3 AF) Protein needs: 63-76 g/day ( 1.0-1.2 g/kg of dosing weight . Fluid needs: per LIP

## 2021-11-05 NOTE — DIETITIAN INITIAL EVALUATION ADULT. - OTHER INFO
Pertinent medical information: 55 y/o F with PMH of HTN, HLD, Anxiety, seizure d/o, migraine, GERD, multiple prior abdominal surgeries presenting with worsening colicky abdominal pain. Found to have dilated small bowel loops with adjascent fat stranding/edema and 6cm intraluminal tubular structure. Admitted for management of abdominal pain. Per surgery, s/p exploratory laparotomy, enterolysis, removal of foreign body. Per surgery, pt to be kept NPO.    Pertinent nutrition information: Patient was sleeping at time of RD visit, attempted to visit patient twice. Patient currently NPO since admission, Per RN, pt has abdominal pain

## 2021-11-05 NOTE — DIETITIAN INITIAL EVALUATION ADULT. - CONTINUE CURRENT NUTRITION CARE PLAN
goal: when medically feasible patients diet to advance and to meet ~75% of estimated energy and protein needs in the next 4d. Intervention meals and snacks, coordination of care

## 2021-11-05 NOTE — CONSULT NOTE ADULT - SUBJECTIVE AND OBJECTIVE BOX
Chief Complaint: abdominal pain    Admission HPI:  57 y/o F with a PMH of HTN, COPD, HLD, anxiety, seizure d/o, migraine, GERD, and multiple abdominal surgeries including appendectomy/resection of appendiceal stump, cholecystectomy, exploratory laparotomy with lysis of adhesions,  hysterectomy unilateral oophorectomy, Hiatal hernia present to the ED with a chronic intermittent abdominal pain that gradually worsened in the past 2 weeks. Pt states that the pain is periumbilical, and is associated with distention and constipation. Pt states that she has not had a BM in 2 days after she took MG citrate and has not passed flatus in 1 day. Pt reports that this pain is not new and has multiple bouts of SBO that have resolved. Pt last EGD and colonoscopy where earlier this year and follows-up with her GI regularly. She denies any h/o fever, cough, chest pain, headache, recent seizures, ingestion of foreign body, dizziness.  In the ED her vitals were stable, Labs were unremarkable, CT abdomen and pelvis with IV contrast showed - Mildly dilated small bowel loop in the mid abdomen with adjacent fat stranding/edema and an indeterminate 6 cm intraluminal tubular structure; no intraperitoneal free air. Findings may represent focal small bowel obstruction or infectious/inflammatory enteritis with focal ileus, likely related to the indeterminate tubular structure.  Patient is being admitted for the management of abdominal pain secondary enteritis vs obstruction d/t indeterminate 6cm intraluminal tubular structure - possible foreign body.     Pain HPI      PAST MEDICAL & SURGICAL HISTORY:  Back pain  henriated discs    Seizures  last one 6 yrs ago    Migraines    Hypercholesteremia    Seasonal allergic rhinitis, unspecified trigger    GERD (gastroesophageal reflux disease)    Hiatal hernia    Anxiety    HTN (hypertension)    Osteoarthritis    Sciatica    Tobacco use disorder    History of surgery  4 rght knee sx  1 left knee sx  cholecystectomy  rotator cuff right  abdominal sx-- adhesions  c section  ovarian cystectomy  breast cystectomies  colonoscopy   endoscopies  parital hysterectomy    S/p partial hysterectomy with remaining cervical stump    H/O rotator cuff surgery  right    History of appendectomy    S/P cholecystectomy        FAMILY HISTORY:  Coronary artery disease (Father)  dad cad @78        SOCIAL HISTORY:  [ ] Denies Smoking, Alcohol, or Drug Use    Allergies    Cipro (Anaphylaxis; Urticaria; Hives)  Cipro (Unknown)  Neurontin (Unknown)  penicillin (Unknown)  Toradol (Rash; Urticaria; Anaphylaxis (Mild to Mod))  Toradol (Unknown)    Intolerances        PAIN MEDICATIONS:  acetaminophen   IVPB .. 1000 milliGRAM(s) IV Intermittent once  HYDROmorphone  Injectable 0.5 milliGRAM(s) IV Push every 10 minutes PRN  HYDROmorphone PCA (1 mG/mL) 30 milliLiter(s) PCA Continuous PCA Continuous  levETIRAcetam  IVPB 250 milliGRAM(s) IV Intermittent every 12 hours  ondansetron Injectable 4 milliGRAM(s) IV Push every 6 hours PRN  ondansetron Injectable 4 milliGRAM(s) IV Push every 8 hours PRN    Heme:  enoxaparin Injectable 40 milliGRAM(s) SubCutaneous daily    Antibiotics:  piperacillin/tazobactam IVPB.. 3.375 Gram(s) IV Intermittent every 8 hours    Cardiovascular:    GI:  pantoprazole  Injectable 40 milliGRAM(s) IV Push daily    Endocrine:    All Other Medications:  dextrose 5% + sodium chloride 0.45% 1000 milliLiter(s) IV Continuous <Continuous>  influenza   Vaccine 0.5 milliLiter(s) IntraMuscular once  naloxone Injectable 0.1 milliGRAM(s) IV Push every 3 minutes PRN      REVIEW OF SYSTEMS:    CONSTITUTIONAL: No fever, weight loss, or fatigue  EYES: No eye pain, visual disturbances, or discharge  RESPIRATORY: No cough, wheezing, chills or hemoptysis; No shortness of breath  CARDIOVASCULAR: No chest pain, palpitations, dizziness, or leg swelling  GASTROINTESTINAL: AS HPI  GENITOURINARY: No dysuria, frequency, hematuria, or incontinence  NEUROLOGICAL: No headaches, memory loss, loss of strength, numbness, or tremors  MUSCULOSKELETAL: + Chronic LBO  PSYCHIATRIC: No depression, anxiety, mood swings, or difficulty sleeping        Vital Signs Last 24 Hrs  T(C): 37.3 (2021 06:40), Max: 37.9 (2021 17:30)  T(F): 99.1 (2021 06:40), Max: 100.3 (2021 17:30)  HR: 114 (2021 05:14) (88 - 114)  BP: 113/69 (2021 05:14) (102/57 - 113/69)  BP(mean): --  RR: 18 (2021 21:14) (18 - 19)  SpO2: 99% (2021 05:14) (85% - 99%)    PAIN SCORE:         SCALE USED: (1-10 VNRS)             PHYSICAL EXAM:    GENERAL: NAD, well-groomed, well-developed  NECK: Supple, No JVD, Normal thyroid  NERVOUS SYSTEM:  Alert & Oriented X3, Good concentration; Motor Strength 5/5 B/L upper and lower extremities; DTRs 2+ intact and symmetric  CHEST/LUNG: NON LABORED breathing  HEART: RRR  ABDOMEN:   EXTREMITIES:  2+ Peripheral Pulses, No clubbing, cyanosis, or edema          LABS:                          9.0    7.22  )-----------( 189      ( 2021 23:21 )             25.8     11-04    136  |  101  |  6<L>  ----------------------------<  121<H>  3.6   |  23  |  0.5<L>    Ca    8.3<L>      2021 23:21  Phos  2.7     11-04  Mg     1.7     11-04    TPro  4.9<L>  /  Alb  3.0<L>  /  TBili  0.3  /  DBili  <0.2  /  AST  22  /  ALT  21  /  AlkPhos  93  11-04      Urinalysis Basic - ( 2021 20:03 )    Color: Yellow / Appearance: Turbid / S.031 / pH: x  Gluc: x / Ketone: Large  / Bili: Negative / Urobili: <2 mg/dL   Blood: x / Protein: 30 mg/dL / Nitrite: Negative   Leuk Esterase: Negative / RBC: 8 /HPF / WBC 3 /HPF   Sq Epi: x / Non Sq Epi: 1 /HPF / Bacteria: Negative        RADIOLOGY:  < from: CT Abdomen and Pelvis w/ IV Cont (21 @ 17:03) >    EXAM:  CT ABDOMEN AND PELVIS IC            PROCEDURE DATE:  2021            INTERPRETATION:  CLINICAL STATEMENT: Diffuse abdominal pain    TECHNIQUE: Contiguous axial CT images were obtained from the lower chest to the pubic symphysis following administration of 100cc Optiray 320 intravenous contrast.  Oral contrast was not administered.  Reformatted images in the coronal and sagittal planes were acquired.    COMPARISON CT: 10/8/2021    OTHER STUDIES USED FOR CORRELATION: None.      FINDINGS:    LOWER CHEST: Bibasilar dependent atelectasis.    HEPATOBILIARY:  Post cholecystectomy. Unremarkable liver.    SPLEEN: Unremarkable.    PANCREAS: Unremarkable.    ADRENAL GLANDS: Unremarkable.    KIDNEYS: Symmetric renal enhancement bilaterally. No hydronephrosis. Subcentimeter hypodensities in both kidneys, too small to characterize. Right lower pole cortical scarring.    ABDOMINOPELVIC NODES: No lymphadenopathy.    PELVIC ORGANS: Post supracervical hysterectomy.    PERITONEUM/MESENTERY/BOWEL: An approximately 6 cm tubular hyperdense structure seen within a focally dilated, fecalized small bowel loop in the mid abdomen measuring 4 cm with adjacent mesenteric fat stranding/edema. No pneumoperitoneum. Post appendectomy.    BONES/SOFT TISSUES: Unremarkable.    OTHER: Atherosclerotic vascular calcifications.      IMPRESSION:    Mildly dilated small bowel loop in the mid abdomen with adjacent fat stranding/edema and an indeterminate 6 cm intraluminal tubular structure; no intraperitoneal free air. Findings may represent focal small bowel obstruction or infectious/inflammatory enteritis with focal ileus, likely related to the indeterminate tubular structure.    --- End of Report ---              GLADYS PERSON MD; Attending Radiologist  This document has been electronically signed.     < end of copied text >    Drug Screen:            [ x]  [place]     Tramadol BID Chief Complaint: abdominal pain    Admission HPI:  57 y/o F with a PMH of HTN, COPD, HLD, anxiety, seizure d/o, migraine, GERD, and multiple abdominal surgeries including appendectomy/resection of appendiceal stump, cholecystectomy, exploratory laparotomy with lysis of adhesions,  hysterectomy unilateral oophorectomy, Hiatal hernia present to the ED with a chronic intermittent abdominal pain that gradually worsened in the past 2 weeks. Pt states that the pain is periumbilical, and is associated with distention and constipation. Pt states that she has not had a BM in 2 days after she took MG citrate and has not passed flatus in 1 day. Pt reports that this pain is not new and has multiple bouts of SBO that have resolved. Pt last EGD and colonoscopy where earlier this year and follows-up with her GI regularly. She denies any h/o fever, cough, chest pain, headache, recent seizures, ingestion of foreign body, dizziness.  In the ED her vitals were stable, Labs were unremarkable, CT abdomen and pelvis with IV contrast showed - Mildly dilated small bowel loop in the mid abdomen with adjacent fat stranding/edema and an indeterminate 6 cm intraluminal tubular structure; no intraperitoneal free air. Findings may represent focal small bowel obstruction or infectious/inflammatory enteritis with focal ileus, likely related to the indeterminate tubular structure.  Patient is being admitted for the management of abdominal pain secondary enteritis vs obstruction d/t indeterminate 6cm intraluminal tubular structure - possible foreign body.     Pain HPI  Patient is s/p ex lap. Seen sitting up in bed. Pain is in the abdomen along the incision, the pain is currently 8/10, can get to 10/10 without using the PCA. She says that it is sharp with twisting and turning, but is able to take deep breaths . The pain is made better with the PCA, but feels the pain is not relieved enough. She is not yet, as per patient, moving bowel. No vomit ting as per patient. The pain is diffuse in the abdomen, no radiation. She has been on tramadol in the past, but not on high dose opioids. The PCA was reviewed with the RN on the floor, in the past 4 hours she had only about 1/4 of the boluses she can take, I educated the patient to this and alerted her to use the PCA more frequently if she is in pain because the PCA is currently being underutilized.     PAST MEDICAL & SURGICAL HISTORY:  Back pain  herniated discs    Seizures  last one 6 yrs ago    Migraines    Hypercholesteremia    Seasonal allergic rhinitis, unspecified trigger    GERD (gastroesophageal reflux disease)    Hiatal hernia    Anxiety    HTN (hypertension)    Osteoarthritis    Sciatica    Tobacco use disorder    History of surgery  4 rght knee sx  1 left knee sx  cholecystectomy  rotator cuff right  abdominal sx-- adhesions  c section  ovarian cystectomy  breast cystectomies  colonoscopy   endoscopies  parital hysterectomy    S/p partial hysterectomy with remaining cervical stump    H/O rotator cuff surgery  right    History of appendectomy    S/P cholecystectomy        FAMILY HISTORY:  Coronary artery disease (Father)  dad cad @78        SOCIAL HISTORY:  [ ] Denies Smoking, Alcohol, or Drug Use    Allergies    Cipro (Anaphylaxis; Urticaria; Hives)  Cipro (Unknown)  Neurontin (Unknown)  penicillin (Unknown)  Toradol (Rash; Urticaria; Anaphylaxis (Mild to Mod))  Toradol (Unknown)    Intolerances        PAIN MEDICATIONS:  acetaminophen   IVPB .. 1000 milliGRAM(s) IV Intermittent once  HYDROmorphone  Injectable 0.5 milliGRAM(s) IV Push every 10 minutes PRN  HYDROmorphone PCA (1 mG/mL) 30 milliLiter(s) PCA Continuous PCA Continuous  levETIRAcetam  IVPB 250 milliGRAM(s) IV Intermittent every 12 hours  ondansetron Injectable 4 milliGRAM(s) IV Push every 6 hours PRN  ondansetron Injectable 4 milliGRAM(s) IV Push every 8 hours PRN    Heme:  enoxaparin Injectable 40 milliGRAM(s) SubCutaneous daily    Antibiotics:  piperacillin/tazobactam IVPB.. 3.375 Gram(s) IV Intermittent every 8 hours    Cardiovascular:    GI:  pantoprazole  Injectable 40 milliGRAM(s) IV Push daily    Endocrine:    All Other Medications:  dextrose 5% + sodium chloride 0.45% 1000 milliLiter(s) IV Continuous <Continuous>  influenza   Vaccine 0.5 milliLiter(s) IntraMuscular once  naloxone Injectable 0.1 milliGRAM(s) IV Push every 3 minutes PRN      REVIEW OF SYSTEMS:    CONSTITUTIONAL: No fever, weight loss, or fatigue  EYES: No eye pain, visual disturbances, or discharge  RESPIRATORY: No cough, wheezing, chills or hemoptysis; No shortness of breath  CARDIOVASCULAR: No chest pain, palpitations, dizziness, or leg swelling  GASTROINTESTINAL: AS HPI  GENITOURINARY: No dysuria, frequency, hematuria, or incontinence  NEUROLOGICAL: No headaches, memory loss, loss of strength, numbness, or tremors  MUSCULOSKELETAL: + Chronic LBO  PSYCHIATRIC: No depression, anxiety, mood swings, or difficulty sleeping        Vital Signs Last 24 Hrs  T(C): 37.3 (2021 06:40), Max: 37.9 (2021 17:30)  T(F): 99.1 (2021 06:40), Max: 100.3 (2021 17:30)  HR: 114 (2021 05:14) (88 - 114)  BP: 113/69 (2021 05:14) (102/57 - 113/69)  BP(mean): --  RR: 18 (2021 21:14) (18 - 19)  SpO2: 99% (2021 05:14) (85% - 99%)    PAIN SCORE:      8   SCALE USED: (1-10 VNRS)             PHYSICAL EXAM:    GENERAL: NAD, well-groomed, well-developed  NECK: Supple, No JVD, Normal thyroid  NERVOUS SYSTEM:  Alert & Oriented X3, Good concentration; Motor Strength 5/5 B/L upper and lower extremities; DTRs 2+ intact and symmetric  CHEST/LUNG: NON LABORED breathing  HEART: RRR  ABDOMEN: Soft,, mild distended. Appropriately tender, midline incision, dressings c/d/i. YARELI in place  EXTREMITIES:  2+ Peripheral Pulses, No clubbing, cyanosis, or edema          LABS:                          9.0    7.22  )-----------( 189      ( 2021 23:21 )             25.8     11-04    136  |  101  |  6<L>  ----------------------------<  121<H>  3.6   |  23  |  0.5<L>    Ca    8.3<L>      2021 23:21  Phos  2.7     11-04  Mg     1.7     11-04    TPro  4.9<L>  /  Alb  3.0<L>  /  TBili  0.3  /  DBili  <0.2  /  AST  22  /  ALT  21  /  AlkPhos  93  11-04      Urinalysis Basic - ( 2021 20:03 )    Color: Yellow / Appearance: Turbid / S.031 / pH: x  Gluc: x / Ketone: Large  / Bili: Negative / Urobili: <2 mg/dL   Blood: x / Protein: 30 mg/dL / Nitrite: Negative   Leuk Esterase: Negative / RBC: 8 /HPF / WBC 3 /HPF   Sq Epi: x / Non Sq Epi: 1 /HPF / Bacteria: Negative        RADIOLOGY:  < from: CT Abdomen and Pelvis w/ IV Cont (21 @ 17:03) >    EXAM:  CT ABDOMEN AND PELVIS IC            PROCEDURE DATE:  2021            INTERPRETATION:  CLINICAL STATEMENT: Diffuse abdominal pain    TECHNIQUE: Contiguous axial CT images were obtained from the lower chest to the pubic symphysis following administration of 100cc Optiray 320 intravenous contrast.  Oral contrast was not administered.  Reformatted images in the coronal and sagittal planes were acquired.    COMPARISON CT: 10/8/2021    OTHER STUDIES USED FOR CORRELATION: None.      FINDINGS:    LOWER CHEST: Bibasilar dependent atelectasis.    HEPATOBILIARY:  Post cholecystectomy. Unremarkable liver.    SPLEEN: Unremarkable.    PANCREAS: Unremarkable.    ADRENAL GLANDS: Unremarkable.    KIDNEYS: Symmetric renal enhancement bilaterally. No hydronephrosis. Subcentimeter hypodensities in both kidneys, too small to characterize. Right lower pole cortical scarring.    ABDOMINOPELVIC NODES: No lymphadenopathy.    PELVIC ORGANS: Post supracervical hysterectomy.    PERITONEUM/MESENTERY/BOWEL: An approximately 6 cm tubular hyperdense structure seen within a focally dilated, fecalized small bowel loop in the mid abdomen measuring 4 cm with adjacent mesenteric fat stranding/edema. No pneumoperitoneum. Post appendectomy.    BONES/SOFT TISSUES: Unremarkable.    OTHER: Atherosclerotic vascular calcifications.      IMPRESSION:    Mildly dilated small bowel loop in the mid abdomen with adjacent fat stranding/edema and an indeterminate 6 cm intraluminal tubular structure; no intraperitoneal free air. Findings may represent focal small bowel obstruction or infectious/inflammatory enteritis with focal ileus, likely related to the indeterminate tubular structure.    --- End of Report ---              GLADYS PERSON MD; Attending Radiologist  This document has been electronically signed.     < end of copied text >    Drug Screen:            [ x]  [place]     Tramadol BID

## 2021-11-05 NOTE — CONSULT NOTE ADULT - TIME BILLING
Counseled patient about diagnostic testing and treatment plan. All questions answered.
Patient education  Coordination of Care

## 2021-11-05 NOTE — DIETITIAN INITIAL EVALUATION ADULT. - NAME AND PHONE
RD to monitor: diet order, body composition, energy intake, nutrition focused physical finding: high risk will f/u in 4days

## 2021-11-05 NOTE — CONSULT NOTE ADULT - CONSULT REQUESTED DATE/TIME
01-Nov-2021 23:10
02-Nov-2021 09:49
01-Nov-2021 21:50
05-Nov-2021 11:26
04-Nov-2021 09:13
05-Nov-2021 17:27

## 2021-11-05 NOTE — CONSULT NOTE ADULT - SUBJECTIVE AND OBJECTIVE BOX
HPI:  57 y/o F with a PMH of HTN, COPD, HLD, anxiety, seizure d/o, migraine, GERD, and multiple abdominal surgeries including appendectomy/resection of appendiceal stump, cholecystectomy, exploratory laparotomy with lysis of adhesions,  hysterectomy unilateral oophorectomy, Hiatal hernia present to the ED with a chronic intermittent abdominal pain that gradually worsened in the past 2 weeks. Pt states that the pain is periumbilical, and is associated with distention and constipation. Pt states that she has not had a BM in 2 days after she took MG citrate and has not passed flatus in 1 day. Pt reports that this pain is not new and has multiple bouts of SBO that have resolved. Pt last EGD and colonoscopy where earlier this year and follows-up with her GI regularly. She denies any h/o fever, cough, chest pain, headache, recent seizures, ingestion of foreign body, dizziness.  In the ED her vitals were stable, Labs were unremarkable, CT abdomen and pelvis with IV contrast showed - Mildly dilated small bowel loop in the mid abdomen with adjacent fat stranding/edema and an indeterminate 6 cm intraluminal tubular structure; no intraperitoneal free air. Findings may represent focal small bowel obstruction or infectious/inflammatory enteritis with focal ileus, likely related to the indeterminate tubular structure.  Patient is being admitted for the management of abdominal pain secondary enteritis vs obstruction d/t indeterminate 6cm intraluminal tubular structure - possible foreign body. s/p  exploratory  laparotomy, enterolysis, removal of foreign body on 11/3        PAST MEDICAL & SURGICAL HISTORY:  Back pain  henriated discs    Seizures  last one 6 yrs ago    Migraines    Hypercholesteremia    Seasonal allergic rhinitis, unspecified trigger    GERD (gastroesophageal reflux disease)    Hiatal hernia    Anxiety    HTN (hypertension)    Osteoarthritis    Sciatica    Tobacco use disorder    History of surgery  4 rght knee sx  1 left knee sx  cholecystectomy  rotator cuff right  abdominal sx-- adhesions  c section  ovarian cystectomy  breast cystectomies  colonoscopy   endoscopies  parital hysterectomy    S/p partial hysterectomy with remaining cervical stump    H/O rotator cuff surgery  right    History of appendectomy    S/P cholecystectomy        Hospital Course:    TODAY'S SUBJECTIVE & REVIEW OF SYMPTOMS:     Constitutional WNL   Cardio WNL   Resp WNL   GI WNL  Heme WNL  Endo WNL  Skin WNL  MSK pain  Neuro WNL  Cognitive WNL  Psych WNL      MEDICATIONS  (STANDING):  acetaminophen   IVPB .. 1000 milliGRAM(s) IV Intermittent once  dextrose 5% + sodium chloride 0.45% 1000 milliLiter(s) (120 mL/Hr) IV Continuous <Continuous>  enoxaparin Injectable 40 milliGRAM(s) SubCutaneous daily  HYDROmorphone PCA (1 mG/mL) 30 milliLiter(s) PCA Continuous PCA Continuous  influenza   Vaccine 0.5 milliLiter(s) IntraMuscular once  levETIRAcetam  IVPB 250 milliGRAM(s) IV Intermittent every 12 hours  pantoprazole  Injectable 40 milliGRAM(s) IV Push daily  piperacillin/tazobactam IVPB.. 3.375 Gram(s) IV Intermittent every 8 hours  tiotropium 18 MICROgram(s) Capsule 1 Capsule(s) Inhalation daily    MEDICATIONS  (PRN):  HYDROmorphone  Injectable 0.5 milliGRAM(s) IV Push every 10 minutes PRN Moderate Pain (4 - 6)  naloxone Injectable 0.1 milliGRAM(s) IV Push every 3 minutes PRN For ANY of the following changes in patient status:  A. RR LESS THAN 10 breaths per minute, B. Oxygen saturation LESS THAN 90%, C. Sedation score of 6  ondansetron Injectable 4 milliGRAM(s) IV Push every 6 hours PRN Nausea  ondansetron Injectable 4 milliGRAM(s) IV Push every 8 hours PRN Nausea and/or Vomiting      FAMILY HISTORY:  Coronary artery disease (Father)  dad cad @78        Allergies    Cipro (Anaphylaxis; Urticaria; Hives)  Cipro (Unknown)  Neurontin (Unknown)  penicillin (Unknown)  Toradol (Rash; Urticaria; Anaphylaxis (Mild to Mod))  Toradol (Unknown)    Intolerances        SOCIAL HISTORY:    [  ] Etoh  [  ] Smoking  [  ] Substance abuse     Home Environment:  [   ] Home Alone  [ x  ] Lives with Family  [   ] Home Health Aid    Dwelling:  [ x  ] Apartment  [   ] Private House  [   ] Adult Home  [   ] Skilled Nursing Facility      [   ] Short Term  [   ] Long Term  [   ] Stairs       Elevator [  x ]    FUNCTIONAL STATUS PTA: (Check all that apply)  Ambulation: [   x ]Independent    [   ] Dependent     [   ] Non-Ambulatory  Assistive Device: [   ] SA Cane  [   ]  Q Cane  [   ] Walker  [   ]  Wheelchair  ADL : [ x  ] Independent  [    ]  Dependent       Vital Signs Last 24 Hrs  T(C): 37.3 (2021 06:40), Max: 37.9 (2021 17:30)  T(F): 99.1 (2021 06:40), Max: 100.3 (2021 17:30)  HR: 114 (2021 05:14) (88 - 114)  BP: 113/69 (2021 05:14) (102/57 - 113/69)  BP(mean): --  RR: 18 (2021 21:14) (18 - 19)  SpO2: 99% (2021 05:14) (85% - 99%)      PHYSICAL EXAM: Awake & Alert  GENERAL: NAD  HEAD:  Normocephalic  CHEST/LUNG: Clear   HEART: S1S2+  ABDOMEN: Soft  EXTREMITIES:  no calf tenderness    NERVOUS SYSTEM:  Cranial Nerves 2-12 intact [   ] Abnormal  [   ]  ROM: WFL all extremities [ x  ]  Abnormal [   ]  Motor Strength: WFL all extremities  [  x ]  Abnormal [   ]  Sensation: intact to light touch [  x ] Abnormal [   ]    FUNCTIONAL STATUS:  Bed Mobility: Independent [   ]  Supervision [   ]  Needs Assistance [ x  ]  N/A [   ]  Transfers: Independent [   ]  Supervision [   ]  Needs Assistance [ x  ]  N/A [   ]   Ambulation: Independent [   ]  Supervision [   ]  Needs Assistance [   ]  N/A [   ]  ADL: Independent [   ] Requires Assistance [   ] N/A [   ]      LABS:                        9.0    7.22  )-----------( 189      ( 2021 23:21 )             25.8     11-04    136  |  101  |  6<L>  ----------------------------<  121<H>  3.6   |  23  |  0.5<L>    Ca    8.3<L>      2021 23:21  Phos  2.7     11-04  Mg     1.7     11-04    TPro  4.9<L>  /  Alb  3.0<L>  /  TBili  0.3  /  DBili  <0.2  /  AST  22  /  ALT  21  /  AlkPhos  93  11-04      Urinalysis Basic - ( 2021 20:03 )    Color: Yellow / Appearance: Turbid / S.031 / pH: x  Gluc: x / Ketone: Large  / Bili: Negative / Urobili: <2 mg/dL   Blood: x / Protein: 30 mg/dL / Nitrite: Negative   Leuk Esterase: Negative / RBC: 8 /HPF / WBC 3 /HPF   Sq Epi: x / Non Sq Epi: 1 /HPF / Bacteria: Negative        RADIOLOGY & ADDITIONAL STUDIES:

## 2021-11-05 NOTE — CONSULT NOTE ADULT - CONSULT REASON
Abdominal Pain with FB see on CT scan
exlap with hx chronic pain
gait dysfunction / sbo
abdominal pain
pre-op stratification for SBO
abscess

## 2021-11-05 NOTE — CONSULT NOTE ADULT - ASSESSMENT
IMPRESSION: Rehab of gait dysfunction / SBO, s/p laparotomy and  removal of foreign body    PRECAUTIONS: [   ] Cardiac  [   ] Respiratory  [   ] Seizures [   ] Contact Isolation  [   ] Droplet Isolation  [   ] Other    Weight Bearing Status:     RECOMMENDATION:    Out of Bed to Chair     DVT/Decubiti Prophylaxis    REHAB PLAN:     [  x  ] Bedside P/T 3-5 times a week   [    ]   Bedside O/T  2-3 times a week             [    ] Speech Therapy               [    ]  No Rehab Therapy Indicated   Conditioning/ROM                                    ADL  Bed Mobility                                               Conditioning/ROM  Transfers                                                     Bed Mobility  Sitting /Standing Balance                         Transfers                                        Gait Training                                               Sitting/Standing Balance  Stair Training [   ]Applicable                    Home equipment Eval                                                                        Splinting  [   ] Only      GOALS:   ADL   [    ]   Independent                    Transfers  [  x  ] Independent                          Ambulation  [   x ] Independent     [  x   ] With device                            [    ]  CG                                                         [    ]  CG                                                                  [    ] CG                            [    ] Min A                                                   [    ] Min A                                                              [    ] Min  A          DISCHARGE PLAN:   [    ]  Good candidate for Intensive Rehabilitation/Hospital based                                             Will tolerate 3hrs Intensive Rehab Daily                                       [     ]  Short Term Rehab in Skilled Nursing Facility                                       [ x    ]  Home with Outpatient or  services                                         [     ]  Possible Candidate for Intensive Hospital based Rehab

## 2021-11-05 NOTE — DIETITIAN INITIAL EVALUATION ADULT. - PHYSCIAL ASSESSMENT
surgical incision  no edema noted   abdominal pain note, no bowel movement documented alert and oriented/overweight

## 2021-11-06 LAB
ALBUMIN SERPL ELPH-MCNC: 3 G/DL — LOW (ref 3.5–5.2)
ALP SERPL-CCNC: 87 U/L — SIGNIFICANT CHANGE UP (ref 30–115)
ALT FLD-CCNC: 18 U/L — SIGNIFICANT CHANGE UP (ref 0–41)
ANION GAP SERPL CALC-SCNC: 17 MMOL/L — HIGH (ref 7–14)
AST SERPL-CCNC: 21 U/L — SIGNIFICANT CHANGE UP (ref 0–41)
BASOPHILS # BLD AUTO: 0.04 K/UL — SIGNIFICANT CHANGE UP (ref 0–0.2)
BASOPHILS NFR BLD AUTO: 0.4 % — SIGNIFICANT CHANGE UP (ref 0–1)
BILIRUB DIRECT SERPL-MCNC: <0.2 MG/DL — SIGNIFICANT CHANGE UP (ref 0–0.2)
BILIRUB INDIRECT FLD-MCNC: >0.1 MG/DL — LOW (ref 0.2–1.2)
BILIRUB SERPL-MCNC: 0.3 MG/DL — SIGNIFICANT CHANGE UP (ref 0.2–1.2)
BUN SERPL-MCNC: <3 MG/DL — LOW (ref 10–20)
CALCIUM SERPL-MCNC: 8.7 MG/DL — SIGNIFICANT CHANGE UP (ref 8.5–10.1)
CHLORIDE SERPL-SCNC: 98 MMOL/L — SIGNIFICANT CHANGE UP (ref 98–110)
CO2 SERPL-SCNC: 24 MMOL/L — SIGNIFICANT CHANGE UP (ref 17–32)
CREAT SERPL-MCNC: <0.5 MG/DL — LOW (ref 0.7–1.5)
EOSINOPHIL # BLD AUTO: 0.1 K/UL — SIGNIFICANT CHANGE UP (ref 0–0.7)
EOSINOPHIL NFR BLD AUTO: 1.1 % — SIGNIFICANT CHANGE UP (ref 0–8)
GLUCOSE SERPL-MCNC: 139 MG/DL — HIGH (ref 70–99)
HCT VFR BLD CALC: 24.4 % — LOW (ref 37–47)
HCT VFR BLD CALC: 25 % — LOW (ref 37–47)
HGB BLD-MCNC: 8.3 G/DL — LOW (ref 12–16)
HGB BLD-MCNC: 8.6 G/DL — LOW (ref 12–16)
IMM GRANULOCYTES NFR BLD AUTO: 0.4 % — HIGH (ref 0.1–0.3)
LYMPHOCYTES # BLD AUTO: 0.87 K/UL — LOW (ref 1.2–3.4)
LYMPHOCYTES # BLD AUTO: 9.2 % — LOW (ref 20.5–51.1)
MAGNESIUM SERPL-MCNC: 1.6 MG/DL — LOW (ref 1.8–2.4)
MCHC RBC-ENTMCNC: 31.7 PG — HIGH (ref 27–31)
MCHC RBC-ENTMCNC: 32 PG — HIGH (ref 27–31)
MCHC RBC-ENTMCNC: 34 G/DL — SIGNIFICANT CHANGE UP (ref 32–37)
MCHC RBC-ENTMCNC: 34.4 G/DL — SIGNIFICANT CHANGE UP (ref 32–37)
MCV RBC AUTO: 92.3 FL — SIGNIFICANT CHANGE UP (ref 81–99)
MCV RBC AUTO: 94.2 FL — SIGNIFICANT CHANGE UP (ref 81–99)
MONOCYTES # BLD AUTO: 0.6 K/UL — SIGNIFICANT CHANGE UP (ref 0.1–0.6)
MONOCYTES NFR BLD AUTO: 6.3 % — SIGNIFICANT CHANGE UP (ref 1.7–9.3)
NEUTROPHILS # BLD AUTO: 7.84 K/UL — HIGH (ref 1.4–6.5)
NEUTROPHILS NFR BLD AUTO: 82.6 % — HIGH (ref 42.2–75.2)
NRBC # BLD: 0 /100 WBCS — SIGNIFICANT CHANGE UP (ref 0–0)
NRBC # BLD: 0 /100 WBCS — SIGNIFICANT CHANGE UP (ref 0–0)
PHOSPHATE SERPL-MCNC: 2.6 MG/DL — SIGNIFICANT CHANGE UP (ref 2.1–4.9)
PLATELET # BLD AUTO: 226 K/UL — SIGNIFICANT CHANGE UP (ref 130–400)
PLATELET # BLD AUTO: 285 K/UL — SIGNIFICANT CHANGE UP (ref 130–400)
POTASSIUM SERPL-MCNC: 3.4 MMOL/L — LOW (ref 3.5–5)
POTASSIUM SERPL-SCNC: 3.4 MMOL/L — LOW (ref 3.5–5)
PROT SERPL-MCNC: 5.2 G/DL — LOW (ref 6–8)
RBC # BLD: 2.59 M/UL — LOW (ref 4.2–5.4)
RBC # BLD: 2.71 M/UL — LOW (ref 4.2–5.4)
RBC # FLD: 12.8 % — SIGNIFICANT CHANGE UP (ref 11.5–14.5)
RBC # FLD: 12.8 % — SIGNIFICANT CHANGE UP (ref 11.5–14.5)
SODIUM SERPL-SCNC: 139 MMOL/L — SIGNIFICANT CHANGE UP (ref 135–146)
WBC # BLD: 7.99 K/UL — SIGNIFICANT CHANGE UP (ref 4.8–10.8)
WBC # BLD: 9.49 K/UL — SIGNIFICANT CHANGE UP (ref 4.8–10.8)
WBC # FLD AUTO: 7.99 K/UL — SIGNIFICANT CHANGE UP (ref 4.8–10.8)
WBC # FLD AUTO: 9.49 K/UL — SIGNIFICANT CHANGE UP (ref 4.8–10.8)

## 2021-11-06 RX ORDER — ACETAMINOPHEN 500 MG
1000 TABLET ORAL ONCE
Refills: 0 | Status: COMPLETED | OUTPATIENT
Start: 2021-11-06 | End: 2021-11-07

## 2021-11-06 RX ORDER — MAGNESIUM SULFATE 500 MG/ML
2 VIAL (ML) INJECTION
Refills: 0 | Status: COMPLETED | OUTPATIENT
Start: 2021-11-06 | End: 2021-11-06

## 2021-11-06 RX ORDER — POTASSIUM PHOSPHATE, MONOBASIC POTASSIUM PHOSPHATE, DIBASIC 236; 224 MG/ML; MG/ML
30 INJECTION, SOLUTION INTRAVENOUS ONCE
Refills: 0 | Status: COMPLETED | OUTPATIENT
Start: 2021-11-06 | End: 2021-11-06

## 2021-11-06 RX ORDER — MAGNESIUM SULFATE 500 MG/ML
2 VIAL (ML) INJECTION ONCE
Refills: 0 | Status: COMPLETED | OUTPATIENT
Start: 2021-11-06 | End: 2021-11-07

## 2021-11-06 RX ORDER — POTASSIUM CHLORIDE 20 MEQ
20 PACKET (EA) ORAL ONCE
Refills: 0 | Status: COMPLETED | OUTPATIENT
Start: 2021-11-06 | End: 2021-11-07

## 2021-11-06 RX ADMIN — Medication 50 GRAM(S): at 04:03

## 2021-11-06 RX ADMIN — Medication 50 GRAM(S): at 02:55

## 2021-11-06 RX ADMIN — ENOXAPARIN SODIUM 40 MILLIGRAM(S): 100 INJECTION SUBCUTANEOUS at 12:50

## 2021-11-06 RX ADMIN — LEVETIRACETAM 400 MILLIGRAM(S): 250 TABLET, FILM COATED ORAL at 06:05

## 2021-11-06 RX ADMIN — HYDROMORPHONE HYDROCHLORIDE 30 MILLILITER(S): 2 INJECTION INTRAMUSCULAR; INTRAVENOUS; SUBCUTANEOUS at 15:53

## 2021-11-06 RX ADMIN — TIOTROPIUM BROMIDE 1 CAPSULE(S): 18 CAPSULE ORAL; RESPIRATORY (INHALATION) at 10:03

## 2021-11-06 RX ADMIN — ONDANSETRON 4 MILLIGRAM(S): 8 TABLET, FILM COATED ORAL at 06:19

## 2021-11-06 RX ADMIN — PIPERACILLIN AND TAZOBACTAM 25 GRAM(S): 4; .5 INJECTION, POWDER, LYOPHILIZED, FOR SOLUTION INTRAVENOUS at 14:10

## 2021-11-06 RX ADMIN — PANTOPRAZOLE SODIUM 40 MILLIGRAM(S): 20 TABLET, DELAYED RELEASE ORAL at 12:50

## 2021-11-06 RX ADMIN — POTASSIUM PHOSPHATE, MONOBASIC POTASSIUM PHOSPHATE, DIBASIC 83.33 MILLIMOLE(S): 236; 224 INJECTION, SOLUTION INTRAVENOUS at 06:15

## 2021-11-06 RX ADMIN — PIPERACILLIN AND TAZOBACTAM 25 GRAM(S): 4; .5 INJECTION, POWDER, LYOPHILIZED, FOR SOLUTION INTRAVENOUS at 06:06

## 2021-11-06 RX ADMIN — LEVETIRACETAM 400 MILLIGRAM(S): 250 TABLET, FILM COATED ORAL at 18:50

## 2021-11-06 RX ADMIN — PIPERACILLIN AND TAZOBACTAM 25 GRAM(S): 4; .5 INJECTION, POWDER, LYOPHILIZED, FOR SOLUTION INTRAVENOUS at 22:22

## 2021-11-06 NOTE — PROGRESS NOTE ADULT - ASSESSMENT
Assessment:  55 y/o F s/p exploratory laparotomy, enterolysis, removal of foreign body    Plan:  - NPO with sips and chips, IVF  - Monitor vitals  - f/u blood cultures  - appreciated pain management consult: dilaudid PCA, +IV tylenol   - Continue Antibiotics: zosyn  - monitor emeterio output   - DVT and GI Prophylaxis  - Monitor wound and dressing for changes, redress as needed.    spectra [id number]

## 2021-11-06 NOTE — PHYSICAL THERAPY INITIAL EVALUATION ADULT - SPECIFY REASON(S)
Chart reviewed. Pt. currently without activity orders. PT evaluation on hold pending activity orders as appropriate. Will follow.
pt amb Independently

## 2021-11-06 NOTE — PROGRESS NOTE ADULT - SUBJECTIVE AND OBJECTIVE BOX
GENERAL SURGERY PROGRESS NOTE    Patient: RAMÍREZ RODARTE , 56y (63)Female   MRN: [id number]  Location: [address]  Visit: 21 Inpatient  Date: 21 @ 00:59    Hospital Day #: 5  Post-Op Day #: 3    Procedure/Dx/Injuries:  exploratory laparotomy, enterolysis, removal of foreign body    Events of past 24 hours: NGT out, started on sips and chips, arauz out, voiding freely     PAST MEDICAL & SURGICAL HISTORY:  Back pain  henriated discs    Seizures  last one 6 yrs ago    Migraines    Hypercholesteremia    Seasonal allergic rhinitis, unspecified trigger    GERD (gastroesophageal reflux disease)    Hiatal hernia    Anxiety    HTN (hypertension)    Osteoarthritis    Sciatica    Tobacco use disorder    History of surgery  4 rght knee sx  1 left knee sx  cholecystectomy  rotator cuff right  abdominal sx-- adhesions  c section  ovarian cystectomy  breast cystectomies  colonoscopy   endoscopies  parital hysterectomy    S/p partial hysterectomy with remaining cervical stump    H/O rotator cuff surgery  right    History of appendectomy    S/P cholecystectomy        Vitals:   T(F): 99.2 (21 @ 22:59), Max: 100.2 (21 @ 05:14)  HR: 105 (21 @ 22:59)  BP: 124/69 (21 @ 22:59)  RR: 18 (21 @ 22:59)  SpO2: 99% (21 @ 05:14)      Diet, NPO:   With Ice Chips/Sips of Water      Fluids:     I & O's:    21 @ 07:01  -  21 @ 07:00  --------------------------------------------------------  IN:  Total IN: 0 mL    OUT:    Bulb (mL): 60 mL    Indwelling Catheter - Urethral (mL): 875 mL    Nasogastric/Oral tube (mL): 600 mL  Total OUT: 1535 mL    Total NET: -1535 mL        Bowel Movement: : [] YES [] NO  Flatus: : [] YES [] NO    Physical Examination:  General Appearance: NAD,  Heart: RRR  Lungs: CTABL  Abdomen:  Soft, mildly tender, appropriate for post-op course, nondistended. No rigidity, guarding, or rebound tenderness. + emeterio with serosangenous output,  MSK/Extremities: Warm & well-perfused. Peripheral pulses intact.  Incisions/Wounds: midline abdominal Dressings in place, clean, dry and intact, no signs of active bleeding/drainage,    MEDICATIONS  (STANDING):  dextrose 5% + sodium chloride 0.45% 1000 milliLiter(s) (120 mL/Hr) IV Continuous <Continuous>  enoxaparin Injectable 40 milliGRAM(s) SubCutaneous daily  HYDROmorphone PCA (1 mG/mL) 30 milliLiter(s) PCA Continuous PCA Continuous  influenza   Vaccine 0.5 milliLiter(s) IntraMuscular once  levETIRAcetam  IVPB 250 milliGRAM(s) IV Intermittent every 12 hours  pantoprazole  Injectable 40 milliGRAM(s) IV Push daily  piperacillin/tazobactam IVPB.. 3.375 Gram(s) IV Intermittent every 8 hours  tiotropium 18 MICROgram(s) Capsule 1 Capsule(s) Inhalation daily    MEDICATIONS  (PRN):  HYDROmorphone  Injectable 0.5 milliGRAM(s) IV Push every 10 minutes PRN Moderate Pain (4 - 6)  naloxone Injectable 0.1 milliGRAM(s) IV Push every 3 minutes PRN For ANY of the following changes in patient status:  A. RR LESS THAN 10 breaths per minute, B. Oxygen saturation LESS THAN 90%, C. Sedation score of 6  ondansetron Injectable 4 milliGRAM(s) IV Push every 6 hours PRN Nausea  ondansetron Injectable 4 milliGRAM(s) IV Push every 8 hours PRN Nausea and/or Vomiting      DVT PROPHYLAXIS: enoxaparin Injectable 40 milliGRAM(s) SubCutaneous daily    GI PROPHYLAXIS: pantoprazole  Injectable 40 milliGRAM(s) IV Push daily    ANTICOAGULATION:   ANTIBIOTICS:  piperacillin/tazobactam IVPB.. 3.375 Gram(s)            LAB/STUDIES:  Labs:  CAPILLARY BLOOD GLUCOSE                              7.9    7.89  )-----------( 194      ( 2021 22:55 )             21.9       Auto Neutrophil %: 83.6 % (21 @ 22:55)  Auto Immature Granulocyte %: 0.5 % (21 @ 22:55)        134<L>  |  98  |  4<L>  ----------------------------<  142<H>  3.5   |  24  |  <0.5<L>      Calcium, Total Serum: 8.5 mg/dL (21 @ 22:55)      LFTs:             5.2  | 0.3  | 21       ------------------[87      ( 2021 22:55 )  3.0  | <0.2 | 18            Urinalysis Basic - ( 2021 20:03 )    Color: Yellow / Appearance: Turbid / S.031 / pH: x  Gluc: x / Ketone: Large  / Bili: Negative / Urobili: <2 mg/dL   Blood: x / Protein: 30 mg/dL / Nitrite: Negative   Leuk Esterase: Negative / RBC: 8 /HPF / WBC 3 /HPF   Sq Epi: x / Non Sq Epi: 1 /HPF / Bacteria: Negative        Culture - Urine (collected 2021 20:03)  Source: Clean Catch Clean Catch (Midstream)  Final Report (2021 22:27):    No growth      IMAGING:  < from: Xray Chest 1 View- PORTABLE-Urgent (Xray Chest 1 View- PORTABLE-Urgent .) (21 @ 19:16) >    Impression:    No radiographic evidenceof acute cardiopulmonary disease.    < end of copied text >      ACCESS/ DEVICES:  [x ] Peripheral IV  [ ] Central Venous Line	[ ] R	[ ] L	[ ] IJ	[ ] Fem	[ ] SC	Placed:   [ ] Arterial Line		[ ] R	[ ] L	[ ] Fem	[ ] Rad	[ ] Ax	Placed:   [ ] PICC:					[ ] Mediport  [ ] Urinary Catheter,  Date Placed:   [ ] Chest tube: [ ] Right, [ ] Left  [ ] EMETERIO/Saw Drains

## 2021-11-07 LAB
ALBUMIN SERPL ELPH-MCNC: 3.5 G/DL — SIGNIFICANT CHANGE UP (ref 3.5–5.2)
ALP SERPL-CCNC: 111 U/L — SIGNIFICANT CHANGE UP (ref 30–115)
ALT FLD-CCNC: 20 U/L — SIGNIFICANT CHANGE UP (ref 0–41)
ANION GAP SERPL CALC-SCNC: 16 MMOL/L — HIGH (ref 7–14)
ANION GAP SERPL CALC-SCNC: 16 MMOL/L — HIGH (ref 7–14)
AST SERPL-CCNC: 23 U/L — SIGNIFICANT CHANGE UP (ref 0–41)
BASOPHILS # BLD AUTO: 0.02 K/UL — SIGNIFICANT CHANGE UP (ref 0–0.2)
BASOPHILS NFR BLD AUTO: 0.3 % — SIGNIFICANT CHANGE UP (ref 0–1)
BILIRUB DIRECT SERPL-MCNC: <0.2 MG/DL — SIGNIFICANT CHANGE UP (ref 0–0.2)
BILIRUB INDIRECT FLD-MCNC: >0.3 MG/DL — SIGNIFICANT CHANGE UP (ref 0.2–1.2)
BILIRUB SERPL-MCNC: 0.5 MG/DL — SIGNIFICANT CHANGE UP (ref 0.2–1.2)
BUN SERPL-MCNC: <3 MG/DL — LOW (ref 10–20)
BUN SERPL-MCNC: <3 MG/DL — LOW (ref 10–20)
CALCIUM SERPL-MCNC: 8.2 MG/DL — LOW (ref 8.5–10.1)
CALCIUM SERPL-MCNC: 8.9 MG/DL — SIGNIFICANT CHANGE UP (ref 8.5–10.1)
CHLORIDE SERPL-SCNC: 101 MMOL/L — SIGNIFICANT CHANGE UP (ref 98–110)
CHLORIDE SERPL-SCNC: 102 MMOL/L — SIGNIFICANT CHANGE UP (ref 98–110)
CO2 SERPL-SCNC: 23 MMOL/L — SIGNIFICANT CHANGE UP (ref 17–32)
CO2 SERPL-SCNC: 24 MMOL/L — SIGNIFICANT CHANGE UP (ref 17–32)
CREAT SERPL-MCNC: <0.5 MG/DL — LOW (ref 0.7–1.5)
CREAT SERPL-MCNC: <0.5 MG/DL — LOW (ref 0.7–1.5)
EOSINOPHIL # BLD AUTO: 0.09 K/UL — SIGNIFICANT CHANGE UP (ref 0–0.7)
EOSINOPHIL NFR BLD AUTO: 1.3 % — SIGNIFICANT CHANGE UP (ref 0–8)
GLUCOSE SERPL-MCNC: 122 MG/DL — HIGH (ref 70–99)
GLUCOSE SERPL-MCNC: 130 MG/DL — HIGH (ref 70–99)
HCT VFR BLD CALC: 23.8 % — LOW (ref 37–47)
HGB BLD-MCNC: 8.3 G/DL — LOW (ref 12–16)
IMM GRANULOCYTES NFR BLD AUTO: 0.8 % — HIGH (ref 0.1–0.3)
LYMPHOCYTES # BLD AUTO: 1.26 K/UL — SIGNIFICANT CHANGE UP (ref 1.2–3.4)
LYMPHOCYTES # BLD AUTO: 17.5 % — LOW (ref 20.5–51.1)
MAGNESIUM SERPL-MCNC: 2 MG/DL — SIGNIFICANT CHANGE UP (ref 1.8–2.4)
MCHC RBC-ENTMCNC: 32 PG — HIGH (ref 27–31)
MCHC RBC-ENTMCNC: 34.9 G/DL — SIGNIFICANT CHANGE UP (ref 32–37)
MCV RBC AUTO: 91.9 FL — SIGNIFICANT CHANGE UP (ref 81–99)
MONOCYTES # BLD AUTO: 0.75 K/UL — HIGH (ref 0.1–0.6)
MONOCYTES NFR BLD AUTO: 10.4 % — HIGH (ref 1.7–9.3)
NEUTROPHILS # BLD AUTO: 5 K/UL — SIGNIFICANT CHANGE UP (ref 1.4–6.5)
NEUTROPHILS NFR BLD AUTO: 69.7 % — SIGNIFICANT CHANGE UP (ref 42.2–75.2)
NRBC # BLD: 0 /100 WBCS — SIGNIFICANT CHANGE UP (ref 0–0)
PLATELET # BLD AUTO: 285 K/UL — SIGNIFICANT CHANGE UP (ref 130–400)
POTASSIUM SERPL-MCNC: 3.2 MMOL/L — LOW (ref 3.5–5)
POTASSIUM SERPL-MCNC: 3.4 MMOL/L — LOW (ref 3.5–5)
POTASSIUM SERPL-SCNC: 3.2 MMOL/L — LOW (ref 3.5–5)
POTASSIUM SERPL-SCNC: 3.4 MMOL/L — LOW (ref 3.5–5)
PROT SERPL-MCNC: 5.9 G/DL — LOW (ref 6–8)
RBC # BLD: 2.59 M/UL — LOW (ref 4.2–5.4)
RBC # FLD: 12.8 % — SIGNIFICANT CHANGE UP (ref 11.5–14.5)
SODIUM SERPL-SCNC: 140 MMOL/L — SIGNIFICANT CHANGE UP (ref 135–146)
SODIUM SERPL-SCNC: 142 MMOL/L — SIGNIFICANT CHANGE UP (ref 135–146)
WBC # BLD: 7.18 K/UL — SIGNIFICANT CHANGE UP (ref 4.8–10.8)
WBC # FLD AUTO: 7.18 K/UL — SIGNIFICANT CHANGE UP (ref 4.8–10.8)

## 2021-11-07 RX ORDER — POTASSIUM CHLORIDE 20 MEQ
20 PACKET (EA) ORAL
Refills: 0 | Status: COMPLETED | OUTPATIENT
Start: 2021-11-07 | End: 2021-11-07

## 2021-11-07 RX ORDER — CLONAZEPAM 1 MG
2 TABLET ORAL
Refills: 0 | Status: DISCONTINUED | OUTPATIENT
Start: 2021-11-07 | End: 2021-11-13

## 2021-11-07 RX ORDER — MAGNESIUM SULFATE 500 MG/ML
2 VIAL (ML) INJECTION ONCE
Refills: 0 | Status: COMPLETED | OUTPATIENT
Start: 2021-11-07 | End: 2021-11-07

## 2021-11-07 RX ORDER — ATORVASTATIN CALCIUM 80 MG/1
80 TABLET, FILM COATED ORAL AT BEDTIME
Refills: 0 | Status: DISCONTINUED | OUTPATIENT
Start: 2021-11-07 | End: 2021-11-13

## 2021-11-07 RX ORDER — ACETAMINOPHEN 500 MG
650 TABLET ORAL EVERY 6 HOURS
Refills: 0 | Status: DISCONTINUED | OUTPATIENT
Start: 2021-11-07 | End: 2021-11-13

## 2021-11-07 RX ORDER — METHOCARBAMOL 500 MG/1
500 TABLET, FILM COATED ORAL THREE TIMES A DAY
Refills: 0 | Status: DISCONTINUED | OUTPATIENT
Start: 2021-11-07 | End: 2021-11-13

## 2021-11-07 RX ORDER — NIFEDIPINE 30 MG
60 TABLET, EXTENDED RELEASE 24 HR ORAL
Refills: 0 | Status: DISCONTINUED | OUTPATIENT
Start: 2021-11-07 | End: 2021-11-13

## 2021-11-07 RX ADMIN — PIPERACILLIN AND TAZOBACTAM 25 GRAM(S): 4; .5 INJECTION, POWDER, LYOPHILIZED, FOR SOLUTION INTRAVENOUS at 22:14

## 2021-11-07 RX ADMIN — Medication 20 MILLIEQUIVALENT(S): at 09:32

## 2021-11-07 RX ADMIN — PIPERACILLIN AND TAZOBACTAM 25 GRAM(S): 4; .5 INJECTION, POWDER, LYOPHILIZED, FOR SOLUTION INTRAVENOUS at 05:31

## 2021-11-07 RX ADMIN — Medication 50 MILLIEQUIVALENT(S): at 05:44

## 2021-11-07 RX ADMIN — Medication 650 MILLIGRAM(S): at 17:34

## 2021-11-07 RX ADMIN — Medication 2 MILLIGRAM(S): at 23:27

## 2021-11-07 RX ADMIN — PANTOPRAZOLE SODIUM 40 MILLIGRAM(S): 20 TABLET, DELAYED RELEASE ORAL at 12:59

## 2021-11-07 RX ADMIN — LEVETIRACETAM 400 MILLIGRAM(S): 250 TABLET, FILM COATED ORAL at 05:13

## 2021-11-07 RX ADMIN — Medication 650 MILLIGRAM(S): at 12:58

## 2021-11-07 RX ADMIN — ENOXAPARIN SODIUM 40 MILLIGRAM(S): 100 INJECTION SUBCUTANEOUS at 12:59

## 2021-11-07 RX ADMIN — Medication 400 MILLIGRAM(S): at 03:48

## 2021-11-07 RX ADMIN — METHOCARBAMOL 500 MILLIGRAM(S): 500 TABLET, FILM COATED ORAL at 13:09

## 2021-11-07 RX ADMIN — METHOCARBAMOL 500 MILLIGRAM(S): 500 TABLET, FILM COATED ORAL at 22:13

## 2021-11-07 RX ADMIN — TIOTROPIUM BROMIDE 1 CAPSULE(S): 18 CAPSULE ORAL; RESPIRATORY (INHALATION) at 08:58

## 2021-11-07 RX ADMIN — Medication 1000 MILLIGRAM(S): at 04:46

## 2021-11-07 RX ADMIN — PIPERACILLIN AND TAZOBACTAM 25 GRAM(S): 4; .5 INJECTION, POWDER, LYOPHILIZED, FOR SOLUTION INTRAVENOUS at 14:15

## 2021-11-07 RX ADMIN — SODIUM CHLORIDE 120 MILLILITER(S): 9 INJECTION, SOLUTION INTRAVENOUS at 23:26

## 2021-11-07 RX ADMIN — Medication 650 MILLIGRAM(S): at 23:27

## 2021-11-07 RX ADMIN — Medication 50 GRAM(S): at 05:34

## 2021-11-07 RX ADMIN — LEVETIRACETAM 400 MILLIGRAM(S): 250 TABLET, FILM COATED ORAL at 17:34

## 2021-11-07 RX ADMIN — Medication 50 GRAM(S): at 12:58

## 2021-11-07 RX ADMIN — Medication 20 MILLIEQUIVALENT(S): at 12:57

## 2021-11-07 NOTE — PROGRESS NOTE ADULT - SUBJECTIVE AND OBJECTIVE BOX
GENERAL SURGERY PROGRESS NOTE    Patient: RAMÍREZ RODARTE , 56y ([birth year]Female   MRN: [id number]  Location: [address]  Visit: 11-01-21 Inpatient  Date: 11-07-21 @ 02:07    Hospital Day #: 7  Post-Op Day #: 4    Procedure/Dx/Injuries: exploratory laparotomy, enterolysis, removal of foreign body    Events of past 24 hours: Patient has some pain and hasn't gotten out of bed to chair today but is starting to pass gas.     PAST MEDICAL & SURGICAL HISTORY:  Back pain  henriated discs    Seizures  last one 6 yrs ago    Migraines    Hypercholesteremia    Seasonal allergic rhinitis, unspecified trigger    GERD (gastroesophageal reflux disease)    Hiatal hernia    Anxiety    HTN (hypertension)    Osteoarthritis    Sciatica    Tobacco use disorder    History of surgery  4 rght knee sx  1 left knee sx  cholecystectomy  rotator cuff right  abdominal sx-- adhesions  c section  ovarian cystectomy  breast cystectomies  colonoscopy   endoscopies  parital hysterectomy    S/p partial hysterectomy with remaining cervical stump    H/O rotator cuff surgery  right    History of appendectomy    S/P cholecystectomy        Vitals:   T(F): 98.7 (11-06-21 @ 19:54), Max: 98.7 (11-06-21 @ 05:20)  HR: 98 (11-06-21 @ 19:54)  BP: 133/77 (11-06-21 @ 19:54)  RR: 18 (11-06-21 @ 19:54)  SpO2: --      Diet, Clear Liquid      Fluids:     I & O's:    11-05-21 @ 07:01  -  11-06-21 @ 07:00  --------------------------------------------------------  IN:  Total IN: 0 mL    OUT:    Indwelling Catheter - Urethral (mL): 400 mL    Voided (mL): 200 mL  Total OUT: 600 mL    Total NET: -600 mL      PHYSICAL EXAM:  General: NAD, AAOx3, calm and cooperative  HEENT: NCAT, ABDI, EOMI, Trachea ML, Neck supple  Cardiac: RRR S1, S2, no Murmurs, rubs or gallops  Respiratory: CTAB, normal respiratory effort, breath sounds equal BL, no wheeze, rhonchi or crackles  Abdomen: Soft, slightly tender, dressings are C/D/I, RLQ YARELI drain in place with serosanguinous fluid.  Musculoskeletal: Strength 5/5 BL UE/LE, ROM intact, compartments soft  Neuro: Sensation grossly intact and equal throughout, no focal deficits  Skin: Warm/dry, normal color, no jaundice  Incision/wound: healing well, dressings in place, clean, dry and intact    MEDICATIONS  (STANDING):  acetaminophen   IVPB .. 1000 milliGRAM(s) IV Intermittent once  dextrose 5% + sodium chloride 0.45% 1000 milliLiter(s) (120 mL/Hr) IV Continuous <Continuous>  enoxaparin Injectable 40 milliGRAM(s) SubCutaneous daily  HYDROmorphone PCA (1 mG/mL) 30 milliLiter(s) PCA Continuous PCA Continuous  influenza   Vaccine 0.5 milliLiter(s) IntraMuscular once  levETIRAcetam  IVPB 250 milliGRAM(s) IV Intermittent every 12 hours  magnesium sulfate  IVPB 2 Gram(s) IV Intermittent once  pantoprazole  Injectable 40 milliGRAM(s) IV Push daily  piperacillin/tazobactam IVPB.. 3.375 Gram(s) IV Intermittent every 8 hours  potassium chloride  20 mEq/100 mL IVPB 20 milliEquivalent(s) IV Intermittent once  tiotropium 18 MICROgram(s) Capsule 1 Capsule(s) Inhalation daily    MEDICATIONS  (PRN):  HYDROmorphone  Injectable 0.5 milliGRAM(s) IV Push every 10 minutes PRN Moderate Pain (4 - 6)  naloxone Injectable 0.1 milliGRAM(s) IV Push every 3 minutes PRN For ANY of the following changes in patient status:  A. RR LESS THAN 10 breaths per minute, B. Oxygen saturation LESS THAN 90%, C. Sedation score of 6  ondansetron Injectable 4 milliGRAM(s) IV Push every 6 hours PRN Nausea  ondansetron Injectable 4 milliGRAM(s) IV Push every 8 hours PRN Nausea and/or Vomiting      DVT PROPHYLAXIS: enoxaparin Injectable 40 milliGRAM(s) SubCutaneous daily    GI PROPHYLAXIS: pantoprazole  Injectable 40 milliGRAM(s) IV Push daily    ANTICOAGULATION:   ANTIBIOTICS:  piperacillin/tazobactam IVPB.. 3.375 Gram(s)            LAB/STUDIES:  Labs:  CAPILLARY BLOOD GLUCOSE                              8.6    9.49  )-----------( 285      ( 06 Nov 2021 22:25 )             25.0       Auto Neutrophil %: 82.6 % (11-06-21 @ 22:25)  Auto Immature Granulocyte %: 0.4 % (11-06-21 @ 22:25)    11-06    139  |  98  |  <3<L>  ----------------------------<  139<H>  3.4<L>   |  24  |  <0.5<L>      Calcium, Total Serum: 8.7 mg/dL (11-06-21 @ 22:25)      LFTs:             5.9  | 0.5  | 23       ------------------[111     ( 06 Nov 2021 22:25 )  3.5  | <0.2 | 20          Lipase:x      Amylase:x                       Culture - Urine (collected 04 Nov 2021 20:03)  Source: Clean Catch Clean Catch (Midstream)  Final Report (05 Nov 2021 22:27):    No growth    Culture - Blood (collected 04 Nov 2021 18:47)  Source: .Blood Blood  Preliminary Report (06 Nov 2021 01:02):    No growth to date.        ACCESS/ DEVICES:  [ x] Peripheral IV  [x ] YARELI/Saw Drains

## 2021-11-07 NOTE — PROGRESS NOTE ADULT - ASSESSMENT
ASSESSMENT:  57 y/o F s/p exploratory laparotomy, enterolysis, removal of foreign body. Dressing was changed on 11/6.     PLAN:  - NPO with sips and chips, IVF  - Monitor vitals  - f/u blood cultures  - appreciated pain management consult: dilaudid PCA, +IV tylenol   - Continue Antibiotics: zosyn  - monitor emeterio output   - DVT and GI Prophylaxis  - Monitor wound and dressing for changes, redress as needed.      Lines/Tubes: PIV,  Saw/EMETERIO drains      GREEN TEAM SPECTRA: [id number]

## 2021-11-08 LAB
ANION GAP SERPL CALC-SCNC: 13 MMOL/L — SIGNIFICANT CHANGE UP (ref 7–14)
ANION GAP SERPL CALC-SCNC: 13 MMOL/L — SIGNIFICANT CHANGE UP (ref 7–14)
ANION GAP SERPL CALC-SCNC: 16 MMOL/L — HIGH (ref 7–14)
ANION GAP SERPL CALC-SCNC: 17 MMOL/L — HIGH (ref 7–14)
BASOPHILS # BLD AUTO: 0.03 K/UL — SIGNIFICANT CHANGE UP (ref 0–0.2)
BASOPHILS NFR BLD AUTO: 0.4 % — SIGNIFICANT CHANGE UP (ref 0–1)
BUN SERPL-MCNC: 6 MG/DL — LOW (ref 10–20)
BUN SERPL-MCNC: <3 MG/DL — LOW (ref 10–20)
CALCIUM SERPL-MCNC: 8.5 MG/DL — SIGNIFICANT CHANGE UP (ref 8.5–10.1)
CALCIUM SERPL-MCNC: 8.6 MG/DL — SIGNIFICANT CHANGE UP (ref 8.5–10.1)
CALCIUM SERPL-MCNC: 8.7 MG/DL — SIGNIFICANT CHANGE UP (ref 8.5–10.1)
CALCIUM SERPL-MCNC: 9.1 MG/DL — SIGNIFICANT CHANGE UP (ref 8.5–10.1)
CHLORIDE SERPL-SCNC: 102 MMOL/L — SIGNIFICANT CHANGE UP (ref 98–110)
CHLORIDE SERPL-SCNC: 103 MMOL/L — SIGNIFICANT CHANGE UP (ref 98–110)
CHLORIDE SERPL-SCNC: 104 MMOL/L — SIGNIFICANT CHANGE UP (ref 98–110)
CHLORIDE SERPL-SCNC: 104 MMOL/L — SIGNIFICANT CHANGE UP (ref 98–110)
CO2 SERPL-SCNC: 21 MMOL/L — SIGNIFICANT CHANGE UP (ref 17–32)
CO2 SERPL-SCNC: 21 MMOL/L — SIGNIFICANT CHANGE UP (ref 17–32)
CO2 SERPL-SCNC: 22 MMOL/L — SIGNIFICANT CHANGE UP (ref 17–32)
CO2 SERPL-SCNC: 23 MMOL/L — SIGNIFICANT CHANGE UP (ref 17–32)
CREAT SERPL-MCNC: 0.5 MG/DL — LOW (ref 0.7–1.5)
CREAT SERPL-MCNC: <0.5 MG/DL — LOW (ref 0.7–1.5)
EOSINOPHIL # BLD AUTO: 0.12 K/UL — SIGNIFICANT CHANGE UP (ref 0–0.7)
EOSINOPHIL NFR BLD AUTO: 1.7 % — SIGNIFICANT CHANGE UP (ref 0–8)
GLUCOSE SERPL-MCNC: 107 MG/DL — HIGH (ref 70–99)
GLUCOSE SERPL-MCNC: 117 MG/DL — HIGH (ref 70–99)
GLUCOSE SERPL-MCNC: 121 MG/DL — HIGH (ref 70–99)
GLUCOSE SERPL-MCNC: 153 MG/DL — HIGH (ref 70–99)
HCT VFR BLD CALC: 22.5 % — LOW (ref 37–47)
HGB BLD-MCNC: 7.6 G/DL — LOW (ref 12–16)
IMM GRANULOCYTES NFR BLD AUTO: 1 % — HIGH (ref 0.1–0.3)
LYMPHOCYTES # BLD AUTO: 1.53 K/UL — SIGNIFICANT CHANGE UP (ref 1.2–3.4)
LYMPHOCYTES # BLD AUTO: 21 % — SIGNIFICANT CHANGE UP (ref 20.5–51.1)
MAGNESIUM SERPL-MCNC: 1.7 MG/DL — LOW (ref 1.8–2.4)
MAGNESIUM SERPL-MCNC: 1.7 MG/DL — LOW (ref 1.8–2.4)
MAGNESIUM SERPL-MCNC: 2.6 MG/DL — HIGH (ref 1.8–2.4)
MCHC RBC-ENTMCNC: 31.9 PG — HIGH (ref 27–31)
MCHC RBC-ENTMCNC: 33.8 G/DL — SIGNIFICANT CHANGE UP (ref 32–37)
MCV RBC AUTO: 94.5 FL — SIGNIFICANT CHANGE UP (ref 81–99)
MONOCYTES # BLD AUTO: 0.82 K/UL — HIGH (ref 0.1–0.6)
MONOCYTES NFR BLD AUTO: 11.3 % — HIGH (ref 1.7–9.3)
NEUTROPHILS # BLD AUTO: 4.7 K/UL — SIGNIFICANT CHANGE UP (ref 1.4–6.5)
NEUTROPHILS NFR BLD AUTO: 64.6 % — SIGNIFICANT CHANGE UP (ref 42.2–75.2)
NRBC # BLD: 1 /100 WBCS — HIGH (ref 0–0)
PHOSPHATE SERPL-MCNC: 2.1 MG/DL — SIGNIFICANT CHANGE UP (ref 2.1–4.9)
PHOSPHATE SERPL-MCNC: 2.9 MG/DL — SIGNIFICANT CHANGE UP (ref 2.1–4.9)
PLATELET # BLD AUTO: 293 K/UL — SIGNIFICANT CHANGE UP (ref 130–400)
POTASSIUM SERPL-MCNC: 3.2 MMOL/L — LOW (ref 3.5–5)
POTASSIUM SERPL-MCNC: 3.6 MMOL/L — SIGNIFICANT CHANGE UP (ref 3.5–5)
POTASSIUM SERPL-MCNC: 3.6 MMOL/L — SIGNIFICANT CHANGE UP (ref 3.5–5)
POTASSIUM SERPL-MCNC: 3.8 MMOL/L — SIGNIFICANT CHANGE UP (ref 3.5–5)
POTASSIUM SERPL-SCNC: 3.2 MMOL/L — LOW (ref 3.5–5)
POTASSIUM SERPL-SCNC: 3.6 MMOL/L — SIGNIFICANT CHANGE UP (ref 3.5–5)
POTASSIUM SERPL-SCNC: 3.6 MMOL/L — SIGNIFICANT CHANGE UP (ref 3.5–5)
POTASSIUM SERPL-SCNC: 3.8 MMOL/L — SIGNIFICANT CHANGE UP (ref 3.5–5)
RBC # BLD: 2.38 M/UL — LOW (ref 4.2–5.4)
RBC # FLD: 13.2 % — SIGNIFICANT CHANGE UP (ref 11.5–14.5)
SARS-COV-2 RNA SPEC QL NAA+PROBE: SIGNIFICANT CHANGE UP
SODIUM SERPL-SCNC: 137 MMOL/L — SIGNIFICANT CHANGE UP (ref 135–146)
SODIUM SERPL-SCNC: 140 MMOL/L — SIGNIFICANT CHANGE UP (ref 135–146)
SODIUM SERPL-SCNC: 141 MMOL/L — SIGNIFICANT CHANGE UP (ref 135–146)
SODIUM SERPL-SCNC: 141 MMOL/L — SIGNIFICANT CHANGE UP (ref 135–146)
SURGICAL PATHOLOGY STUDY: SIGNIFICANT CHANGE UP
WBC # BLD: 7.27 K/UL — SIGNIFICANT CHANGE UP (ref 4.8–10.8)
WBC # FLD AUTO: 7.27 K/UL — SIGNIFICANT CHANGE UP (ref 4.8–10.8)

## 2021-11-08 RX ORDER — SENNA PLUS 8.6 MG/1
2 TABLET ORAL AT BEDTIME
Refills: 0 | Status: DISCONTINUED | OUTPATIENT
Start: 2021-11-08 | End: 2021-11-11

## 2021-11-08 RX ORDER — POTASSIUM CHLORIDE 20 MEQ
20 PACKET (EA) ORAL
Refills: 0 | Status: COMPLETED | OUTPATIENT
Start: 2021-11-08 | End: 2021-11-08

## 2021-11-08 RX ORDER — OXYCODONE HYDROCHLORIDE 5 MG/1
5 TABLET ORAL EVERY 4 HOURS
Refills: 0 | Status: DISCONTINUED | OUTPATIENT
Start: 2021-11-08 | End: 2021-11-13

## 2021-11-08 RX ORDER — MAGNESIUM SULFATE 500 MG/ML
2 VIAL (ML) INJECTION ONCE
Refills: 0 | Status: COMPLETED | OUTPATIENT
Start: 2021-11-08 | End: 2021-11-08

## 2021-11-08 RX ORDER — DEXTROSE MONOHYDRATE, SODIUM CHLORIDE, AND POTASSIUM CHLORIDE 50; .745; 4.5 G/1000ML; G/1000ML; G/1000ML
1000 INJECTION, SOLUTION INTRAVENOUS
Refills: 0 | Status: DISCONTINUED | OUTPATIENT
Start: 2021-11-08 | End: 2021-11-08

## 2021-11-08 RX ORDER — OXYCODONE HYDROCHLORIDE 5 MG/1
10 TABLET ORAL EVERY 4 HOURS
Refills: 0 | Status: DISCONTINUED | OUTPATIENT
Start: 2021-11-08 | End: 2021-11-13

## 2021-11-08 RX ADMIN — Medication 650 MILLIGRAM(S): at 05:31

## 2021-11-08 RX ADMIN — Medication 50 GRAM(S): at 11:23

## 2021-11-08 RX ADMIN — LEVETIRACETAM 400 MILLIGRAM(S): 250 TABLET, FILM COATED ORAL at 17:11

## 2021-11-08 RX ADMIN — Medication 650 MILLIGRAM(S): at 12:00

## 2021-11-08 RX ADMIN — Medication 2 MILLIGRAM(S): at 11:22

## 2021-11-08 RX ADMIN — ENOXAPARIN SODIUM 40 MILLIGRAM(S): 100 INJECTION SUBCUTANEOUS at 11:23

## 2021-11-08 RX ADMIN — OXYCODONE HYDROCHLORIDE 10 MILLIGRAM(S): 5 TABLET ORAL at 17:10

## 2021-11-08 RX ADMIN — ATORVASTATIN CALCIUM 80 MILLIGRAM(S): 80 TABLET, FILM COATED ORAL at 22:41

## 2021-11-08 RX ADMIN — Medication 20 MILLIEQUIVALENT(S): at 02:59

## 2021-11-08 RX ADMIN — Medication 650 MILLIGRAM(S): at 11:23

## 2021-11-08 RX ADMIN — OXYCODONE HYDROCHLORIDE 10 MILLIGRAM(S): 5 TABLET ORAL at 17:45

## 2021-11-08 RX ADMIN — TIOTROPIUM BROMIDE 1 CAPSULE(S): 18 CAPSULE ORAL; RESPIRATORY (INHALATION) at 08:18

## 2021-11-08 RX ADMIN — METHOCARBAMOL 500 MILLIGRAM(S): 500 TABLET, FILM COATED ORAL at 05:32

## 2021-11-08 RX ADMIN — Medication 20 MILLIEQUIVALENT(S): at 22:29

## 2021-11-08 RX ADMIN — LEVETIRACETAM 400 MILLIGRAM(S): 250 TABLET, FILM COATED ORAL at 07:02

## 2021-11-08 RX ADMIN — Medication 50 GRAM(S): at 02:59

## 2021-11-08 RX ADMIN — PANTOPRAZOLE SODIUM 40 MILLIGRAM(S): 20 TABLET, DELAYED RELEASE ORAL at 11:22

## 2021-11-08 RX ADMIN — Medication 20 MILLIEQUIVALENT(S): at 05:32

## 2021-11-08 RX ADMIN — Medication 20 MILLIEQUIVALENT(S): at 17:11

## 2021-11-08 RX ADMIN — Medication 20 MILLIEQUIVALENT(S): at 07:04

## 2021-11-08 RX ADMIN — METHOCARBAMOL 500 MILLIGRAM(S): 500 TABLET, FILM COATED ORAL at 22:37

## 2021-11-08 RX ADMIN — Medication 2 MILLIGRAM(S): at 17:10

## 2021-11-08 RX ADMIN — Medication 60 MILLIGRAM(S): at 05:32

## 2021-11-08 RX ADMIN — Medication 1 DROP(S): at 22:38

## 2021-11-08 RX ADMIN — OXYCODONE HYDROCHLORIDE 10 MILLIGRAM(S): 5 TABLET ORAL at 11:27

## 2021-11-08 RX ADMIN — PIPERACILLIN AND TAZOBACTAM 25 GRAM(S): 4; .5 INJECTION, POWDER, LYOPHILIZED, FOR SOLUTION INTRAVENOUS at 22:35

## 2021-11-08 RX ADMIN — METHOCARBAMOL 500 MILLIGRAM(S): 500 TABLET, FILM COATED ORAL at 14:56

## 2021-11-08 RX ADMIN — PIPERACILLIN AND TAZOBACTAM 25 GRAM(S): 4; .5 INJECTION, POWDER, LYOPHILIZED, FOR SOLUTION INTRAVENOUS at 14:56

## 2021-11-08 RX ADMIN — Medication 60 MILLIGRAM(S): at 17:11

## 2021-11-08 RX ADMIN — PIPERACILLIN AND TAZOBACTAM 25 GRAM(S): 4; .5 INJECTION, POWDER, LYOPHILIZED, FOR SOLUTION INTRAVENOUS at 07:02

## 2021-11-08 RX ADMIN — SENNA PLUS 2 TABLET(S): 8.6 TABLET ORAL at 22:38

## 2021-11-08 RX ADMIN — OXYCODONE HYDROCHLORIDE 10 MILLIGRAM(S): 5 TABLET ORAL at 12:00

## 2021-11-08 RX ADMIN — Medication 2 MILLIGRAM(S): at 05:33

## 2021-11-08 NOTE — PROGRESS NOTE ADULT - ASSESSMENT
ASSESSMENT:  57 y/o F s/p exploratory laparotomy, enterolysis, removal of foreign body. Dressing was changed on 11/6.     PLAN:  - CLD with IVF  - Pain control - tylenol, roboxin   - Home meds restarted   - Monitor vitals  - f/u blood cultures  - D/C PCA today   - Continue IV Abx  - Monitor emeterio output   - DVT and GI Prophylaxis  - Monitor wound and dressing for changes, redress as needed.      GREEN TEAM SPECTRA: [id number]

## 2021-11-08 NOTE — PROGRESS NOTE ADULT - SUBJECTIVE AND OBJECTIVE BOX
GENERAL SURGERY PROGRESS NOTE    Patient: RAMÍREZ RODARTE , 56y ([birth year])Female   MRN: [id number]  Location: [address]  Visit: 11-01-21 Inpatient  Date: 11-08-21 @ 03:06    Hospital Day #:8  Post-Op Day #:5    Procedure/Dx/Injuries: exploratory laparotomy, enterolysis, removal of foreign body    Events of past 24 hours: Pt seen and examined at bedside. Pt was started on PO tylenol and PO roboxin. Restarted home meds. Pt declined to take gabapentin. Replaced potassium throughout the day and overnight. AM BMP ordered. Pt tolerating CLD. Pt c/o bladder fullness causing abdominal pain and increased urine output overnight. IVF rate decreased. No acute overnight events. Afebrile, vitals are stable    PAST MEDICAL & SURGICAL HISTORY:  Back pain  henriated discs  Seizures  last one 6 yrs ago  Migraines  Hypercholesteremia  Seasonal allergic rhinitis, unspecified trigger  GERD (gastroesophageal reflux disease)  Hiatal hernia  Anxiety  HTN (hypertension)  Osteoarthritis  Sciatica  Tobacco use disorder  History of surgery  4 rght knee sx  1 left knee sx  cholecystectomy  rotator cuff right  abdominal sx-- adhesions  c section  ovarian cystectomy  breast cystectomies  colonoscopy   endoscopies  parital hysterectomy  S/p partial hysterectomy with remaining cervical stump  H/O rotator cuff surgery  right  History of appendectomy  S/P cholecystectomy    Vitals:   T(F): 98.2 (11-07-21 @ 21:00), Max: 98.2 (11-07-21 @ 21:00)  HR: 99 (11-07-21 @ 21:00)  BP: 144/99 (11-07-21 @ 21:00)  RR: 18 (11-07-21 @ 21:00)  SpO2: --    Diet, Clear Liquid    Fluids: dextrose 5% + sodium chloride 0.45% with potassium chloride 20 mEq/L: Solution, 1000 milliLiter(s) infuse at 75 mL/Hr    I & O's:    PHYSICAL EXAM:  General: NAD, AAOx3, calm and cooperative  HEENT: NCAT, EOMI  Cardiac: RRR S1, S2  Respiratory: CTAB, normal respiratory effort, breath sounds equal BL  Abdomen: Soft, non-distended, mild tenderness, no rebound, no guarding. YARELI drain in place   Skin: Warm/dry, normal color, no jaundice  Incision/wound: healing well, dressings in place, clean, dry and intact    MEDICATIONS  (STANDING):  acetaminophen     Tablet .. 650 milliGRAM(s) Oral every 6 hours  atorvastatin 80 milliGRAM(s) Oral at bedtime  clonazePAM  Tablet 2 milliGRAM(s) Oral four times a day  dextrose 5% + sodium chloride 0.45% with potassium chloride 20 mEq/L 1000 milliLiter(s) (75 mL/Hr) IV Continuous <Continuous>  enoxaparin Injectable 40 milliGRAM(s) SubCutaneous daily  HYDROmorphone PCA (1 mG/mL) 30 milliLiter(s) PCA Continuous PCA Continuous  influenza   Vaccine 0.5 milliLiter(s) IntraMuscular once  levETIRAcetam  IVPB 250 milliGRAM(s) IV Intermittent every 12 hours  methocarbamol 500 milliGRAM(s) Oral three times a day  NIFEdipine XL 60 milliGRAM(s) Oral two times a day  pantoprazole  Injectable 40 milliGRAM(s) IV Push daily  piperacillin/tazobactam IVPB.. 3.375 Gram(s) IV Intermittent every 8 hours  potassium chloride    Tablet ER 20 milliEquivalent(s) Oral every 2 hours  tiotropium 18 MICROgram(s) Capsule 1 Capsule(s) Inhalation daily    MEDICATIONS  (PRN):  HYDROmorphone  Injectable 0.5 milliGRAM(s) IV Push every 10 minutes PRN Moderate Pain (4 - 6)  naloxone Injectable 0.1 milliGRAM(s) IV Push every 3 minutes PRN For ANY of the following changes in patient status:  A. RR LESS THAN 10 breaths per minute, B. Oxygen saturation LESS THAN 90%, C. Sedation score of 6  ondansetron Injectable 4 milliGRAM(s) IV Push every 6 hours PRN Nausea  ondansetron Injectable 4 milliGRAM(s) IV Push every 8 hours PRN Nausea and/or Vomiting    DVT PROPHYLAXIS: enoxaparin Injectable 40 milliGRAM(s) SubCutaneous daily  GI PROPHYLAXIS: pantoprazole  Injectable 40 milliGRAM(s) IV Push daily    ANTIBIOTICS:  piperacillin/tazobactam IVPB.. 3.375 Gram(s)    LAB/STUDIES:  Labs:                        8.3    7.18  )-----------( 285      ( 07 Nov 2021 22:40 )             23.8       Auto Neutrophil %: 69.7 % (11-07-21 @ 22:40)  Auto Immature Granulocyte %: 0.8 % (11-07-21 @ 22:40)    11-07    141  |  103  |  <3<L>  ----------------------------<  153<H>  3.2<L>   |  21  |  <0.5<L>    Calcium, Total Serum: 8.7 mg/dL (11-07-21 @ 22:40)    LFTs:             5.9  | 0.5  | 23       ------------------[111     ( 06 Nov 2021 22:25 )  3.5  | <0.2 | 20          Lipase:x      Amylase:x        IMAGING:  No new imaging past 24hrs

## 2021-11-09 ENCOUNTER — APPOINTMENT (OUTPATIENT)
Dept: CARDIOLOGY | Facility: CLINIC | Age: 56
End: 2021-11-09

## 2021-11-09 ENCOUNTER — TRANSCRIPTION ENCOUNTER (OUTPATIENT)
Age: 56
End: 2021-11-09

## 2021-11-09 LAB
BASOPHILS # BLD AUTO: 0.03 K/UL — SIGNIFICANT CHANGE UP (ref 0–0.2)
BASOPHILS NFR BLD AUTO: 0.4 % — SIGNIFICANT CHANGE UP (ref 0–1)
EOSINOPHIL # BLD AUTO: 0.11 K/UL — SIGNIFICANT CHANGE UP (ref 0–0.7)
EOSINOPHIL NFR BLD AUTO: 1.5 % — SIGNIFICANT CHANGE UP (ref 0–8)
HCT VFR BLD CALC: 23.1 % — LOW (ref 37–47)
HGB BLD-MCNC: 7.8 G/DL — LOW (ref 12–16)
IMM GRANULOCYTES NFR BLD AUTO: 1.5 % — HIGH (ref 0.1–0.3)
LYMPHOCYTES # BLD AUTO: 1.52 K/UL — SIGNIFICANT CHANGE UP (ref 1.2–3.4)
LYMPHOCYTES # BLD AUTO: 20.3 % — LOW (ref 20.5–51.1)
MCHC RBC-ENTMCNC: 32.1 PG — HIGH (ref 27–31)
MCHC RBC-ENTMCNC: 33.8 G/DL — SIGNIFICANT CHANGE UP (ref 32–37)
MCV RBC AUTO: 95.1 FL — SIGNIFICANT CHANGE UP (ref 81–99)
MONOCYTES # BLD AUTO: 0.81 K/UL — HIGH (ref 0.1–0.6)
MONOCYTES NFR BLD AUTO: 10.8 % — HIGH (ref 1.7–9.3)
NEUTROPHILS # BLD AUTO: 4.92 K/UL — SIGNIFICANT CHANGE UP (ref 1.4–6.5)
NEUTROPHILS NFR BLD AUTO: 65.5 % — SIGNIFICANT CHANGE UP (ref 42.2–75.2)
NRBC # BLD: 0 /100 WBCS — SIGNIFICANT CHANGE UP (ref 0–0)
PLATELET # BLD AUTO: 321 K/UL — SIGNIFICANT CHANGE UP (ref 130–400)
RBC # BLD: 2.43 M/UL — LOW (ref 4.2–5.4)
RBC # FLD: 13.4 % — SIGNIFICANT CHANGE UP (ref 11.5–14.5)
WBC # BLD: 7.5 K/UL — SIGNIFICANT CHANGE UP (ref 4.8–10.8)
WBC # FLD AUTO: 7.5 K/UL — SIGNIFICANT CHANGE UP (ref 4.8–10.8)

## 2021-11-09 RX ORDER — POTASSIUM CHLORIDE 20 MEQ
20 PACKET (EA) ORAL ONCE
Refills: 0 | Status: COMPLETED | OUTPATIENT
Start: 2021-11-09 | End: 2021-11-09

## 2021-11-09 RX ORDER — LEVETIRACETAM 250 MG/1
250 TABLET, FILM COATED ORAL
Refills: 0 | Status: DISCONTINUED | OUTPATIENT
Start: 2021-11-09 | End: 2021-11-13

## 2021-11-09 RX ORDER — MAGNESIUM SULFATE 500 MG/ML
2 VIAL (ML) INJECTION ONCE
Refills: 0 | Status: COMPLETED | OUTPATIENT
Start: 2021-11-09 | End: 2021-11-09

## 2021-11-09 RX ADMIN — PANTOPRAZOLE SODIUM 40 MILLIGRAM(S): 20 TABLET, DELAYED RELEASE ORAL at 11:08

## 2021-11-09 RX ADMIN — Medication 650 MILLIGRAM(S): at 00:54

## 2021-11-09 RX ADMIN — Medication 650 MILLIGRAM(S): at 13:07

## 2021-11-09 RX ADMIN — OXYCODONE HYDROCHLORIDE 10 MILLIGRAM(S): 5 TABLET ORAL at 21:43

## 2021-11-09 RX ADMIN — Medication 60 MILLIGRAM(S): at 17:37

## 2021-11-09 RX ADMIN — Medication 2 MILLIGRAM(S): at 11:08

## 2021-11-09 RX ADMIN — Medication 2 MILLIGRAM(S): at 17:38

## 2021-11-09 RX ADMIN — OXYCODONE HYDROCHLORIDE 5 MILLIGRAM(S): 5 TABLET ORAL at 18:03

## 2021-11-09 RX ADMIN — METHOCARBAMOL 500 MILLIGRAM(S): 500 TABLET, FILM COATED ORAL at 05:53

## 2021-11-09 RX ADMIN — Medication 650 MILLIGRAM(S): at 17:37

## 2021-11-09 RX ADMIN — METHOCARBAMOL 500 MILLIGRAM(S): 500 TABLET, FILM COATED ORAL at 21:43

## 2021-11-09 RX ADMIN — METHOCARBAMOL 500 MILLIGRAM(S): 500 TABLET, FILM COATED ORAL at 11:12

## 2021-11-09 RX ADMIN — OXYCODONE HYDROCHLORIDE 5 MILLIGRAM(S): 5 TABLET ORAL at 17:38

## 2021-11-09 RX ADMIN — Medication 50 GRAM(S): at 02:14

## 2021-11-09 RX ADMIN — Medication 1 DROP(S): at 21:44

## 2021-11-09 RX ADMIN — Medication 50 MILLIEQUIVALENT(S): at 02:13

## 2021-11-09 RX ADMIN — PIPERACILLIN AND TAZOBACTAM 25 GRAM(S): 4; .5 INJECTION, POWDER, LYOPHILIZED, FOR SOLUTION INTRAVENOUS at 13:14

## 2021-11-09 RX ADMIN — OXYCODONE HYDROCHLORIDE 10 MILLIGRAM(S): 5 TABLET ORAL at 11:17

## 2021-11-09 RX ADMIN — Medication 2 MILLIGRAM(S): at 05:55

## 2021-11-09 RX ADMIN — ATORVASTATIN CALCIUM 80 MILLIGRAM(S): 80 TABLET, FILM COATED ORAL at 21:43

## 2021-11-09 RX ADMIN — OXYCODONE HYDROCHLORIDE 10 MILLIGRAM(S): 5 TABLET ORAL at 09:07

## 2021-11-09 RX ADMIN — SENNA PLUS 2 TABLET(S): 8.6 TABLET ORAL at 21:43

## 2021-11-09 RX ADMIN — Medication 1 DROP(S): at 11:12

## 2021-11-09 RX ADMIN — ENOXAPARIN SODIUM 40 MILLIGRAM(S): 100 INJECTION SUBCUTANEOUS at 11:09

## 2021-11-09 RX ADMIN — PIPERACILLIN AND TAZOBACTAM 25 GRAM(S): 4; .5 INJECTION, POWDER, LYOPHILIZED, FOR SOLUTION INTRAVENOUS at 05:52

## 2021-11-09 RX ADMIN — LEVETIRACETAM 250 MILLIGRAM(S): 250 TABLET, FILM COATED ORAL at 17:37

## 2021-11-09 RX ADMIN — Medication 650 MILLIGRAM(S): at 11:07

## 2021-11-09 RX ADMIN — OXYCODONE HYDROCHLORIDE 10 MILLIGRAM(S): 5 TABLET ORAL at 22:51

## 2021-11-09 RX ADMIN — Medication 2 MILLIGRAM(S): at 00:53

## 2021-11-09 RX ADMIN — LEVETIRACETAM 400 MILLIGRAM(S): 250 TABLET, FILM COATED ORAL at 05:51

## 2021-11-09 RX ADMIN — Medication 60 MILLIGRAM(S): at 05:49

## 2021-11-09 RX ADMIN — Medication 650 MILLIGRAM(S): at 18:03

## 2021-11-09 RX ADMIN — Medication 650 MILLIGRAM(S): at 05:50

## 2021-11-09 RX ADMIN — TIOTROPIUM BROMIDE 1 CAPSULE(S): 18 CAPSULE ORAL; RESPIRATORY (INHALATION) at 07:55

## 2021-11-09 RX ADMIN — Medication 650 MILLIGRAM(S): at 01:38

## 2021-11-09 RX ADMIN — Medication 1 DROP(S): at 05:53

## 2021-11-09 NOTE — PROGRESS NOTE ADULT - ASSESSMENT
Assessment:  57 y/o F s/p exploratory laparotomy, enterolysis, removal of foreign body    Plan:  - full liquid diet  - monitor for return of bowel function  - pain control  - Continue Antibiotics: zosyn  - monitor emeterio output   - DVT and GI Prophylaxis  - Monitor wound and dressing for changes, redress as needed.

## 2021-11-09 NOTE — PROGRESS NOTE ADULT - SUBJECTIVE AND OBJECTIVE BOX
GENERAL SURGERY PROGRESS NOTE    Patient: RAMÍREZ RODARTE , 56y ([birth year])Female   MRN: [id number]  Location: [address]  Visit: 11-01-21 Inpatient  Date: 11-09-21 @ 04:26    Hospital Day #: 8  Post-Op Day #: 5    Procedure/Dx/Injuries:  exploratory laparotomy, enterolysis, removal of foreign body    Events of past 24 hours: patient on bowel regimen, passing flatus, no bowel movements    PAST MEDICAL & SURGICAL HISTORY:  Back pain  henriated discs    Seizures  last one 6 yrs ago    Migraines    Hypercholesteremia    Seasonal allergic rhinitis, unspecified trigger    GERD (gastroesophageal reflux disease)    Hiatal hernia    Anxiety    HTN (hypertension)    Osteoarthritis    Sciatica    Tobacco use disorder    History of surgery  4 rght knee sx  1 left knee sx  cholecystectomy  rotator cuff right  abdominal sx-- adhesions  c section  ovarian cystectomy  breast cystectomies  colonoscopy   endoscopies  parital hysterectomy    S/p partial hysterectomy with remaining cervical stump    H/O rotator cuff surgery  right    History of appendectomy    S/P cholecystectomy        Vitals:   T(F): 99.1 (11-08-21 @ 19:52), Max: 99.1 (11-08-21 @ 19:52)  HR: 89 (11-08-21 @ 19:52)  BP: 116/58 (11-08-21 @ 19:52)  RR: 18 (11-08-21 @ 19:52)  SpO2: --      Diet, Full Liquid:   Supplement Feeding Modality:  Oral  Ensure Enlive Cans or Servings Per Day:  3       Frequency:  Three Times a day      Fluids:     I & O's:    11-07-21 @ 07:01  -  11-08-21 @ 07:00  --------------------------------------------------------  IN:  Total IN: 0 mL    OUT:    Bulb (mL): 0 mL    Nasogastric/Oral tube (mL): 30 mL  Total OUT: 30 mL    Total NET: -30 mL    Bowel Movement: : [] YES [] NO  Flatus: : [] YES [] NO    physical Examination:  General Appearance: NAD,  Heart: RRR  Lungs: CTABL  Abdomen:  Soft, mildly tender, appropriate for post-op course, nondistended. No rigidity, guarding, or rebound tenderness. + emeterio with serosangenous output,  MSK/Extremities: Warm & well-perfused. Peripheral pulses intact.  Incisions/Wounds: midline abdominal Dressings in place, clean, dry and intact, no signs of active bleeding/drainage,      MEDICATIONS  (STANDING):  acetaminophen     Tablet .. 650 milliGRAM(s) Oral every 6 hours  artificial  tears Solution 1 Drop(s) Both EYES three times a day  atorvastatin 80 milliGRAM(s) Oral at bedtime  clonazePAM  Tablet 2 milliGRAM(s) Oral four times a day  enoxaparin Injectable 40 milliGRAM(s) SubCutaneous daily  influenza   Vaccine 0.5 milliLiter(s) IntraMuscular once  levETIRAcetam  IVPB 250 milliGRAM(s) IV Intermittent every 12 hours  methocarbamol 500 milliGRAM(s) Oral three times a day  NIFEdipine XL 60 milliGRAM(s) Oral two times a day  pantoprazole  Injectable 40 milliGRAM(s) IV Push daily  piperacillin/tazobactam IVPB.. 3.375 Gram(s) IV Intermittent every 8 hours  senna 2 Tablet(s) Oral at bedtime  tiotropium 18 MICROgram(s) Capsule 1 Capsule(s) Inhalation daily    MEDICATIONS  (PRN):  naloxone Injectable 0.1 milliGRAM(s) IV Push every 3 minutes PRN For ANY of the following changes in patient status:  A. RR LESS THAN 10 breaths per minute, B. Oxygen saturation LESS THAN 90%, C. Sedation score of 6  ondansetron Injectable 4 milliGRAM(s) IV Push every 6 hours PRN Nausea  ondansetron Injectable 4 milliGRAM(s) IV Push every 8 hours PRN Nausea and/or Vomiting  oxyCODONE    IR 5 milliGRAM(s) Oral every 4 hours PRN Moderate Pain (4 - 6)  oxyCODONE    IR 10 milliGRAM(s) Oral every 4 hours PRN Severe Pain (7 - 10)      DVT PROPHYLAXIS: enoxaparin Injectable 40 milliGRAM(s) SubCutaneous daily    GI PROPHYLAXIS: pantoprazole  Injectable 40 milliGRAM(s) IV Push daily    ANTICOAGULATION:   ANTIBIOTICS:  piperacillin/tazobactam IVPB.. 3.375 Gram(s)    LAB/STUDIES:  Labs:  CAPILLARY BLOOD GLUCOSE                     7.6    7.27  )-----------( 293      ( 08 Nov 2021 23:13 )             22.5       Auto Neutrophil %: 64.6 % (11-08-21 @ 23:13)  Auto Immature Granulocyte %: 1.0 % (11-08-21 @ 23:13)    11-08    137  |  102  |  6<L>  ----------------------------<  107<H>  3.6   |  22  |  0.5<L>      Calcium, Total Serum: 8.5 mg/dL (11-08-21 @ 23:13)    IMAGING:      ACCESS/ DEVICES:  [ x] Peripheral IV  [ ] Central Venous Line	[ ] R	[ ] L	[ ] IJ	[ ] Fem	[ ] SC	Placed:   [ ] Arterial Line		[ ] R	[ ] L	[ ] Fem	[ ] Rad	[ ] Ax	Placed:   [ ] PICC:					[ ] Mediport  [ ] Urinary Catheter,  Date Placed:   [ ] Chest tube: [ ] Right, [ ] Left  [ ] EMETERIO/Saw Drains

## 2021-11-09 NOTE — DISCHARGE NOTE NURSING/CASE MANAGEMENT/SOCIAL WORK - NSDCPEWEB_GEN_ALL_CORE
[facility] for Tobacco Control website --- [email or web address] --- [email or web address]

## 2021-11-09 NOTE — ED PROVIDER NOTE - NS HIV RISK FACTOR YES
Declined THC, Urine Qualitative: Negative   HCG Quantitative, Serum (11.08.21 @ 14:25)   HCG Quantitative, Serum: <5.0  Thyroid Stimulating Hormone, Serum (11.08.21 @ 14:25)   Thyroid Stimulating Hormone, Serum: 1.55 uIU/mL                         13.4   8.96  )-----------( 342      ( 08 Nov 2021 14:25 )             42.7       Comprehensive Metabolic Panel (11.08.21 @ 14:25)   Sodium, Serum: 137 mmol/L   Potassium, Serum: 3.9 mmol/L   Chloride, Serum: 102 mmol/L   Carbon Dioxide, Serum: 26 mmol/L   Anion Gap, Serum: 9 mmol/L   Blood Urea Nitrogen, Serum: 11 mg/dL   Creatinine, Serum: 0.94 mg/dL   Glucose, Serum: 88 mg/dL   Calcium, Total Serum: 9.0 mg/dL   Protein Total, Serum: 7.0 g/dL   Albumin, Serum: 4.2 g/dL   Bilirubin Total, Serum: 0.3 mg/dL   Alkaline Phosphatase, Serum: 94 U/L   Aspartate Aminotransferase (AST/SGOT): 13 U/L   Alanine Aminotransferase (ALT/SGPT): 10 U/L   eGFR if Non : 84

## 2021-11-09 NOTE — DISCHARGE NOTE NURSING/CASE MANAGEMENT/SOCIAL WORK - NSDCPEEMAIL_GEN_ALL_CORE
[facility] for Tobacco Control email [email or web address]

## 2021-11-09 NOTE — DISCHARGE NOTE NURSING/CASE MANAGEMENT/SOCIAL WORK - PATIENT PORTAL LINK FT
You can access the FollowMyHealth Patient Portal offered by [facility] by registering at the following website: [email or web address]. By joining [facility]’s FollowMyHealth portal, you will also be able to view your health information using other applications (apps) compatible with our system.

## 2021-11-10 LAB
ANION GAP SERPL CALC-SCNC: 15 MMOL/L — HIGH (ref 7–14)
ANION GAP SERPL CALC-SCNC: 16 MMOL/L — HIGH (ref 7–14)
BASOPHILS # BLD AUTO: 0.04 K/UL — SIGNIFICANT CHANGE UP (ref 0–0.2)
BASOPHILS NFR BLD AUTO: 0.5 % — SIGNIFICANT CHANGE UP (ref 0–1)
BUN SERPL-MCNC: 10 MG/DL — SIGNIFICANT CHANGE UP (ref 10–20)
BUN SERPL-MCNC: 9 MG/DL — LOW (ref 10–20)
CALCIUM SERPL-MCNC: 8.3 MG/DL — LOW (ref 8.5–10.1)
CALCIUM SERPL-MCNC: 8.5 MG/DL — SIGNIFICANT CHANGE UP (ref 8.5–10.1)
CHLORIDE SERPL-SCNC: 105 MMOL/L — SIGNIFICANT CHANGE UP (ref 98–110)
CHLORIDE SERPL-SCNC: 106 MMOL/L — SIGNIFICANT CHANGE UP (ref 98–110)
CO2 SERPL-SCNC: 20 MMOL/L — SIGNIFICANT CHANGE UP (ref 17–32)
CO2 SERPL-SCNC: 20 MMOL/L — SIGNIFICANT CHANGE UP (ref 17–32)
CREAT SERPL-MCNC: 0.5 MG/DL — LOW (ref 0.7–1.5)
CREAT SERPL-MCNC: 0.8 MG/DL — SIGNIFICANT CHANGE UP (ref 0.7–1.5)
CULTURE RESULTS: SIGNIFICANT CHANGE UP
EOSINOPHIL # BLD AUTO: 0.1 K/UL — SIGNIFICANT CHANGE UP (ref 0–0.7)
EOSINOPHIL NFR BLD AUTO: 1.2 % — SIGNIFICANT CHANGE UP (ref 0–8)
GLUCOSE SERPL-MCNC: 110 MG/DL — HIGH (ref 70–99)
GLUCOSE SERPL-MCNC: 116 MG/DL — HIGH (ref 70–99)
HCT VFR BLD CALC: 22.6 % — LOW (ref 37–47)
HGB BLD-MCNC: 7.5 G/DL — LOW (ref 12–16)
IMM GRANULOCYTES NFR BLD AUTO: 1.6 % — HIGH (ref 0.1–0.3)
LYMPHOCYTES # BLD AUTO: 1.69 K/UL — SIGNIFICANT CHANGE UP (ref 1.2–3.4)
LYMPHOCYTES # BLD AUTO: 20.3 % — LOW (ref 20.5–51.1)
MAGNESIUM SERPL-MCNC: 1.6 MG/DL — LOW (ref 1.8–2.4)
MAGNESIUM SERPL-MCNC: 1.7 MG/DL — LOW (ref 1.8–2.4)
MCHC RBC-ENTMCNC: 31.6 PG — HIGH (ref 27–31)
MCHC RBC-ENTMCNC: 33.2 G/DL — SIGNIFICANT CHANGE UP (ref 32–37)
MCV RBC AUTO: 95.4 FL — SIGNIFICANT CHANGE UP (ref 81–99)
MONOCYTES # BLD AUTO: 0.81 K/UL — HIGH (ref 0.1–0.6)
MONOCYTES NFR BLD AUTO: 9.7 % — HIGH (ref 1.7–9.3)
NEUTROPHILS # BLD AUTO: 5.56 K/UL — SIGNIFICANT CHANGE UP (ref 1.4–6.5)
NEUTROPHILS NFR BLD AUTO: 66.7 % — SIGNIFICANT CHANGE UP (ref 42.2–75.2)
NRBC # BLD: 0 /100 WBCS — SIGNIFICANT CHANGE UP (ref 0–0)
PHOSPHATE SERPL-MCNC: 3.8 MG/DL — SIGNIFICANT CHANGE UP (ref 2.1–4.9)
PHOSPHATE SERPL-MCNC: 5.2 MG/DL — HIGH (ref 2.1–4.9)
PLATELET # BLD AUTO: 339 K/UL — SIGNIFICANT CHANGE UP (ref 130–400)
POTASSIUM SERPL-MCNC: 3.6 MMOL/L — SIGNIFICANT CHANGE UP (ref 3.5–5)
POTASSIUM SERPL-MCNC: 3.6 MMOL/L — SIGNIFICANT CHANGE UP (ref 3.5–5)
POTASSIUM SERPL-SCNC: 3.6 MMOL/L — SIGNIFICANT CHANGE UP (ref 3.5–5)
POTASSIUM SERPL-SCNC: 3.6 MMOL/L — SIGNIFICANT CHANGE UP (ref 3.5–5)
RBC # BLD: 2.37 M/UL — LOW (ref 4.2–5.4)
RBC # FLD: 13.7 % — SIGNIFICANT CHANGE UP (ref 11.5–14.5)
SODIUM SERPL-SCNC: 141 MMOL/L — SIGNIFICANT CHANGE UP (ref 135–146)
SODIUM SERPL-SCNC: 141 MMOL/L — SIGNIFICANT CHANGE UP (ref 135–146)
SPECIMEN SOURCE: SIGNIFICANT CHANGE UP
WBC # BLD: 8.33 K/UL — SIGNIFICANT CHANGE UP (ref 4.8–10.8)
WBC # FLD AUTO: 8.33 K/UL — SIGNIFICANT CHANGE UP (ref 4.8–10.8)

## 2021-11-10 PROCEDURE — 93970 EXTREMITY STUDY: CPT | Mod: 26

## 2021-11-10 RX ORDER — POTASSIUM CHLORIDE 20 MEQ
20 PACKET (EA) ORAL
Refills: 0 | Status: COMPLETED | OUTPATIENT
Start: 2021-11-10 | End: 2021-11-11

## 2021-11-10 RX ORDER — MAGNESIUM SULFATE 500 MG/ML
2 VIAL (ML) INJECTION ONCE
Refills: 0 | Status: COMPLETED | OUTPATIENT
Start: 2021-11-10 | End: 2021-11-10

## 2021-11-10 RX ORDER — MAGNESIUM SULFATE 500 MG/ML
2 VIAL (ML) INJECTION ONCE
Refills: 0 | Status: COMPLETED | OUTPATIENT
Start: 2021-11-10 | End: 2021-11-11

## 2021-11-10 RX ORDER — POTASSIUM CHLORIDE 20 MEQ
20 PACKET (EA) ORAL
Refills: 0 | Status: COMPLETED | OUTPATIENT
Start: 2021-11-10 | End: 2021-11-10

## 2021-11-10 RX ADMIN — Medication 60 MILLIGRAM(S): at 06:55

## 2021-11-10 RX ADMIN — OXYCODONE HYDROCHLORIDE 10 MILLIGRAM(S): 5 TABLET ORAL at 21:28

## 2021-11-10 RX ADMIN — OXYCODONE HYDROCHLORIDE 5 MILLIGRAM(S): 5 TABLET ORAL at 17:12

## 2021-11-10 RX ADMIN — Medication 60 MILLIGRAM(S): at 16:59

## 2021-11-10 RX ADMIN — METHOCARBAMOL 500 MILLIGRAM(S): 500 TABLET, FILM COATED ORAL at 21:27

## 2021-11-10 RX ADMIN — METHOCARBAMOL 500 MILLIGRAM(S): 500 TABLET, FILM COATED ORAL at 05:52

## 2021-11-10 RX ADMIN — SENNA PLUS 2 TABLET(S): 8.6 TABLET ORAL at 21:28

## 2021-11-10 RX ADMIN — Medication 650 MILLIGRAM(S): at 17:12

## 2021-11-10 RX ADMIN — Medication 650 MILLIGRAM(S): at 05:52

## 2021-11-10 RX ADMIN — ATORVASTATIN CALCIUM 80 MILLIGRAM(S): 80 TABLET, FILM COATED ORAL at 21:27

## 2021-11-10 RX ADMIN — Medication 1 DROP(S): at 13:07

## 2021-11-10 RX ADMIN — Medication 2 MILLIGRAM(S): at 05:51

## 2021-11-10 RX ADMIN — TIOTROPIUM BROMIDE 1 CAPSULE(S): 18 CAPSULE ORAL; RESPIRATORY (INHALATION) at 08:31

## 2021-11-10 RX ADMIN — Medication 650 MILLIGRAM(S): at 06:56

## 2021-11-10 RX ADMIN — Medication 1 DROP(S): at 05:52

## 2021-11-10 RX ADMIN — LEVETIRACETAM 250 MILLIGRAM(S): 250 TABLET, FILM COATED ORAL at 17:01

## 2021-11-10 RX ADMIN — Medication 2 MILLIGRAM(S): at 11:39

## 2021-11-10 RX ADMIN — ONDANSETRON 4 MILLIGRAM(S): 8 TABLET, FILM COATED ORAL at 21:31

## 2021-11-10 RX ADMIN — METHOCARBAMOL 500 MILLIGRAM(S): 500 TABLET, FILM COATED ORAL at 13:07

## 2021-11-10 RX ADMIN — Medication 50 MILLIEQUIVALENT(S): at 09:31

## 2021-11-10 RX ADMIN — Medication 1 DROP(S): at 21:34

## 2021-11-10 RX ADMIN — ENOXAPARIN SODIUM 40 MILLIGRAM(S): 100 INJECTION SUBCUTANEOUS at 11:38

## 2021-11-10 RX ADMIN — Medication 650 MILLIGRAM(S): at 00:58

## 2021-11-10 RX ADMIN — Medication 50 MILLIEQUIVALENT(S): at 05:44

## 2021-11-10 RX ADMIN — Medication 650 MILLIGRAM(S): at 11:38

## 2021-11-10 RX ADMIN — Medication 2 MILLIGRAM(S): at 17:11

## 2021-11-10 RX ADMIN — PIPERACILLIN AND TAZOBACTAM 25 GRAM(S): 4; .5 INJECTION, POWDER, LYOPHILIZED, FOR SOLUTION INTRAVENOUS at 05:43

## 2021-11-10 RX ADMIN — PIPERACILLIN AND TAZOBACTAM 25 GRAM(S): 4; .5 INJECTION, POWDER, LYOPHILIZED, FOR SOLUTION INTRAVENOUS at 21:27

## 2021-11-10 RX ADMIN — Medication 50 GRAM(S): at 05:43

## 2021-11-10 RX ADMIN — OXYCODONE HYDROCHLORIDE 10 MILLIGRAM(S): 5 TABLET ORAL at 05:51

## 2021-11-10 RX ADMIN — LEVETIRACETAM 250 MILLIGRAM(S): 250 TABLET, FILM COATED ORAL at 05:52

## 2021-11-10 RX ADMIN — Medication 650 MILLIGRAM(S): at 01:10

## 2021-11-10 RX ADMIN — PIPERACILLIN AND TAZOBACTAM 25 GRAM(S): 4; .5 INJECTION, POWDER, LYOPHILIZED, FOR SOLUTION INTRAVENOUS at 13:07

## 2021-11-10 RX ADMIN — OXYCODONE HYDROCHLORIDE 10 MILLIGRAM(S): 5 TABLET ORAL at 22:00

## 2021-11-10 RX ADMIN — Medication 2 MILLIGRAM(S): at 00:55

## 2021-11-10 RX ADMIN — PANTOPRAZOLE SODIUM 40 MILLIGRAM(S): 20 TABLET, DELAYED RELEASE ORAL at 13:07

## 2021-11-10 RX ADMIN — PIPERACILLIN AND TAZOBACTAM 25 GRAM(S): 4; .5 INJECTION, POWDER, LYOPHILIZED, FOR SOLUTION INTRAVENOUS at 00:52

## 2021-11-10 NOTE — PROGRESS NOTE ADULT - SUBJECTIVE AND OBJECTIVE BOX
GENERAL SURGERY PROGRESS NOTE    Patient: RAMÍREZ RODARTE , 56y ([birth year])Female   MRN: [id number]  Location: [address]  Visit: 11-01-21 Inpatient  Date: 11-10-21 @ 03:21    Hospital Day #: 10  Post-Op Day #: 7    Procedure/Dx/Injuries:  exploratory laparotomy, enterolysis, removal of foreign body    Events of past 24 hours:    PAST MEDICAL & SURGICAL HISTORY:  Back pain  henriated discs    Seizures  last one 6 yrs ago    Migraines    Hypercholesteremia    Seasonal allergic rhinitis, unspecified trigger    GERD (gastroesophageal reflux disease)    Hiatal hernia    Anxiety    HTN (hypertension)    Osteoarthritis    Sciatica    Tobacco use disorder    History of surgery  4 rght knee sx  1 left knee sx  cholecystectomy  rotator cuff right  abdominal sx-- adhesions  c section  ovarian cystectomy  breast cystectomies  colonoscopy   endoscopies  parital hysterectomy    S/p partial hysterectomy with remaining cervical stump    H/O rotator cuff surgery  right    History of appendectomy    S/P cholecystectomy        Vitals:   T(F): 97.9 (11-09-21 @ 20:43), Max: 97.9 (11-09-21 @ 20:43)  HR: 84 (11-09-21 @ 20:43)  BP: 97/52 (11-09-21 @ 20:43)  RR: 18 (11-09-21 @ 20:43)  SpO2: 98% (11-09-21 @ 08:36)          Fluids:     I & O's:    11-08-21 @ 07:01  -  11-09-21 @ 07:00  --------------------------------------------------------  IN:  Total IN: 0 mL    OUT:    Bulb (mL): 20 mL  Total OUT: 20 mL    Total NET: -20 mL        Bowel Movement: : [] YES [] NO  Flatus: : [] YES [] NO    physical Examination:  General Appearance: NAD,  Heart: RRR  Lungs: CTABL  Abdomen:  Soft, mildly tender, appropriate for post-op course, nondistended. No rigidity, guarding, or rebound tenderness. + emeterio with serosangenous output,  MSK/Extremities: Warm & well-perfused. Peripheral pulses intact.  Incisions/Wounds: midline abdominal Dressings in place, clean, dry and intact, no signs of active bleeding/drainage,    MEDICATIONS  (STANDING):  acetaminophen     Tablet .. 650 milliGRAM(s) Oral every 6 hours  artificial  tears Solution 1 Drop(s) Both EYES three times a day  atorvastatin 80 milliGRAM(s) Oral at bedtime  clonazePAM  Tablet 2 milliGRAM(s) Oral four times a day  enoxaparin Injectable 40 milliGRAM(s) SubCutaneous daily  influenza   Vaccine 0.5 milliLiter(s) IntraMuscular once  levETIRAcetam 250 milliGRAM(s) Oral two times a day  magnesium sulfate  IVPB 2 Gram(s) IV Intermittent once  methocarbamol 500 milliGRAM(s) Oral three times a day  NIFEdipine XL 60 milliGRAM(s) Oral two times a day  pantoprazole  Injectable 40 milliGRAM(s) IV Push daily  piperacillin/tazobactam IVPB.. 3.375 Gram(s) IV Intermittent every 8 hours  potassium chloride  20 mEq/100 mL IVPB 20 milliEquivalent(s) IV Intermittent every 2 hours  senna 2 Tablet(s) Oral at bedtime  tiotropium 18 MICROgram(s) Capsule 1 Capsule(s) Inhalation daily    MEDICATIONS  (PRN):  naloxone Injectable 0.1 milliGRAM(s) IV Push every 3 minutes PRN For ANY of the following changes in patient status:  A. RR LESS THAN 10 breaths per minute, B. Oxygen saturation LESS THAN 90%, C. Sedation score of 6  ondansetron Injectable 4 milliGRAM(s) IV Push every 6 hours PRN Nausea  ondansetron Injectable 4 milliGRAM(s) IV Push every 8 hours PRN Nausea and/or Vomiting  oxyCODONE    IR 5 milliGRAM(s) Oral every 4 hours PRN Moderate Pain (4 - 6)  oxyCODONE    IR 10 milliGRAM(s) Oral every 4 hours PRN Severe Pain (7 - 10)      DVT PROPHYLAXIS: enoxaparin Injectable 40 milliGRAM(s) SubCutaneous daily    GI PROPHYLAXIS: pantoprazole  Injectable 40 milliGRAM(s) IV Push daily    ANTICOAGULATION:   ANTIBIOTICS:  piperacillin/tazobactam IVPB.. 3.375 Gram(s)    LAB/STUDIES:  Labs:  CAPILLARY BLOOD GLUCOSE                        7.8    7.50  )-----------( 321      ( 09 Nov 2021 23:25 )             23.1       Auto Neutrophil %: 65.5 % (11-09-21 @ 23:25)  Auto Immature Granulocyte %: 1.5 % (11-09-21 @ 23:25)    11-09    141  |  105  |  10  ----------------------------<  116<H>  3.6   |  20  |  0.5<L>      Calcium, Total Serum: 8.5 mg/dL (11-09-21 @ 23:25)    IMAGING:      ACCESS/ DEVICES:  [ x] Peripheral IV  [ ] Central Venous Line	[ ] R	[ ] L	[ ] IJ	[ ] Fem	[ ] SC	Placed:   [ ] Arterial Line		[ ] R	[ ] L	[ ] Fem	[ ] Rad	[ ] Ax	Placed:   [ ] PICC:					[ ] Mediport  [ ] Urinary Catheter,  Date Placed:   [ ] Chest tube: [ ] Right, [ ] Left  [ ] EMETERIO/Saw Drains

## 2021-11-10 NOTE — PROGRESS NOTE ADULT - ASSESSMENT
Assessment:  55 y/o F s/p exploratory laparotomy, enterolysis, removal of foreign body    Plan:  - soft diet, ivl  - monitor for return of bowel function  - pain control  - Continue Antibiotics: zosyn  - monitor emeterio output   - DVT and GI Prophylaxis  - Monitor wound and dressing for changes, redress as needed.

## 2021-11-11 LAB
HCT VFR BLD CALC: 25.9 % — LOW (ref 37–47)
HGB BLD-MCNC: 8.5 G/DL — LOW (ref 12–16)
MCHC RBC-ENTMCNC: 31.5 PG — HIGH (ref 27–31)
MCHC RBC-ENTMCNC: 32.8 G/DL — SIGNIFICANT CHANGE UP (ref 32–37)
MCV RBC AUTO: 95.9 FL — SIGNIFICANT CHANGE UP (ref 81–99)
PLATELET # BLD AUTO: 423 K/UL — HIGH (ref 130–400)
RBC # BLD: 2.7 M/UL — LOW (ref 4.2–5.4)
RBC # FLD: 14 % — SIGNIFICANT CHANGE UP (ref 11.5–14.5)
WBC # BLD: 8.09 K/UL — SIGNIFICANT CHANGE UP (ref 4.8–10.8)
WBC # FLD AUTO: 8.09 K/UL — SIGNIFICANT CHANGE UP (ref 4.8–10.8)

## 2021-11-11 PROCEDURE — 74018 RADEX ABDOMEN 1 VIEW: CPT | Mod: 26

## 2021-11-11 PROCEDURE — 74021 RADEX ABDOMEN 3+ VIEWS: CPT | Mod: 26

## 2021-11-11 RX ORDER — MAGNESIUM SULFATE 500 MG/ML
2 VIAL (ML) INJECTION ONCE
Refills: 0 | Status: COMPLETED | OUTPATIENT
Start: 2021-11-11 | End: 2021-11-11

## 2021-11-11 RX ORDER — POLYETHYLENE GLYCOL 3350 17 G/17G
17 POWDER, FOR SOLUTION ORAL DAILY
Refills: 0 | Status: DISCONTINUED | OUTPATIENT
Start: 2021-11-11 | End: 2021-11-11

## 2021-11-11 RX ADMIN — Medication 650 MILLIGRAM(S): at 00:15

## 2021-11-11 RX ADMIN — OXYCODONE HYDROCHLORIDE 10 MILLIGRAM(S): 5 TABLET ORAL at 17:50

## 2021-11-11 RX ADMIN — OXYCODONE HYDROCHLORIDE 10 MILLIGRAM(S): 5 TABLET ORAL at 17:53

## 2021-11-11 RX ADMIN — TIOTROPIUM BROMIDE 1 CAPSULE(S): 18 CAPSULE ORAL; RESPIRATORY (INHALATION) at 08:46

## 2021-11-11 RX ADMIN — Medication 650 MILLIGRAM(S): at 18:21

## 2021-11-11 RX ADMIN — Medication 50 GRAM(S): at 03:02

## 2021-11-11 RX ADMIN — PANTOPRAZOLE SODIUM 40 MILLIGRAM(S): 20 TABLET, DELAYED RELEASE ORAL at 12:02

## 2021-11-11 RX ADMIN — OXYCODONE HYDROCHLORIDE 10 MILLIGRAM(S): 5 TABLET ORAL at 06:21

## 2021-11-11 RX ADMIN — Medication 650 MILLIGRAM(S): at 12:03

## 2021-11-11 RX ADMIN — Medication 650 MILLIGRAM(S): at 05:58

## 2021-11-11 RX ADMIN — LEVETIRACETAM 250 MILLIGRAM(S): 250 TABLET, FILM COATED ORAL at 17:50

## 2021-11-11 RX ADMIN — Medication 60 MILLIGRAM(S): at 05:58

## 2021-11-11 RX ADMIN — Medication 20 MILLIEQUIVALENT(S): at 00:09

## 2021-11-11 RX ADMIN — Medication 650 MILLIGRAM(S): at 13:44

## 2021-11-11 RX ADMIN — ATORVASTATIN CALCIUM 80 MILLIGRAM(S): 80 TABLET, FILM COATED ORAL at 21:55

## 2021-11-11 RX ADMIN — Medication 2 MILLIGRAM(S): at 12:04

## 2021-11-11 RX ADMIN — Medication 650 MILLIGRAM(S): at 17:49

## 2021-11-11 RX ADMIN — Medication 650 MILLIGRAM(S): at 00:08

## 2021-11-11 RX ADMIN — OXYCODONE HYDROCHLORIDE 10 MILLIGRAM(S): 5 TABLET ORAL at 12:01

## 2021-11-11 RX ADMIN — Medication 1 DROP(S): at 13:51

## 2021-11-11 RX ADMIN — Medication 1 DROP(S): at 06:21

## 2021-11-11 RX ADMIN — ENOXAPARIN SODIUM 40 MILLIGRAM(S): 100 INJECTION SUBCUTANEOUS at 12:06

## 2021-11-11 RX ADMIN — Medication 650 MILLIGRAM(S): at 06:38

## 2021-11-11 RX ADMIN — METHOCARBAMOL 500 MILLIGRAM(S): 500 TABLET, FILM COATED ORAL at 13:52

## 2021-11-11 RX ADMIN — OXYCODONE HYDROCHLORIDE 10 MILLIGRAM(S): 5 TABLET ORAL at 18:21

## 2021-11-11 RX ADMIN — METHOCARBAMOL 500 MILLIGRAM(S): 500 TABLET, FILM COATED ORAL at 05:58

## 2021-11-11 RX ADMIN — Medication 50 GRAM(S): at 06:49

## 2021-11-11 RX ADMIN — Medication 60 MILLIGRAM(S): at 17:50

## 2021-11-11 RX ADMIN — Medication 2 MILLIGRAM(S): at 00:08

## 2021-11-11 RX ADMIN — PIPERACILLIN AND TAZOBACTAM 25 GRAM(S): 4; .5 INJECTION, POWDER, LYOPHILIZED, FOR SOLUTION INTRAVENOUS at 05:57

## 2021-11-11 RX ADMIN — PIPERACILLIN AND TAZOBACTAM 25 GRAM(S): 4; .5 INJECTION, POWDER, LYOPHILIZED, FOR SOLUTION INTRAVENOUS at 13:51

## 2021-11-11 RX ADMIN — Medication 20 MILLIEQUIVALENT(S): at 02:55

## 2021-11-11 RX ADMIN — Medication 2 MILLIGRAM(S): at 06:05

## 2021-11-11 RX ADMIN — Medication 2 MILLIGRAM(S): at 17:49

## 2021-11-11 RX ADMIN — OXYCODONE HYDROCHLORIDE 10 MILLIGRAM(S): 5 TABLET ORAL at 23:08

## 2021-11-11 RX ADMIN — OXYCODONE HYDROCHLORIDE 10 MILLIGRAM(S): 5 TABLET ORAL at 21:56

## 2021-11-11 RX ADMIN — Medication 1 DROP(S): at 22:03

## 2021-11-11 RX ADMIN — OXYCODONE HYDROCHLORIDE 10 MILLIGRAM(S): 5 TABLET ORAL at 06:05

## 2021-11-11 RX ADMIN — METHOCARBAMOL 500 MILLIGRAM(S): 500 TABLET, FILM COATED ORAL at 21:56

## 2021-11-11 RX ADMIN — LEVETIRACETAM 250 MILLIGRAM(S): 250 TABLET, FILM COATED ORAL at 05:58

## 2021-11-11 NOTE — PROGRESS NOTE ADULT - SUBJECTIVE AND OBJECTIVE BOX
GENERAL SURGERY PROGRESS NOTE    Patient: RAMÍREZ RODARTE , 56y ([birth year])Female   MRN: [id number]  Location: [place] [address] [address]  Visit: 11-01-21 Inpatient  Date: 11-11-21 @ 03:18    Hospital Day #:10  Post-Op Day #:7    Procedure/Dx/Injuries: s/p ex-lap; entrolysis, removal of foreign body    Events of past 24 hours: Pt seen and examined at bedside. Pt had a negative LE venous duplex yesterday. Pt reports not passing gas for 2-3 days and increase abdominal pain. Pt has been ambulating without difficulties. Afebrile and vitals are stable     PAST MEDICAL & SURGICAL HISTORY:  Back pain  henriated discs  Seizures  last one 6 yrs ago  Migraines  Hypercholesteremia  Seasonal allergic rhinitis, unspecified trigger  GERD (gastroesophageal reflux disease)  Hiatal hernia  Anxiety  HTN (hypertension)  Osteoarthritis  Sciatica  Tobacco use disorder  History of surgery  4 rght knee sx  1 left knee sx  cholecystectomy  rotator cuff right  abdominal sx-- adhesions  c section  ovarian cystectomy  breast cystectomies  colonoscopy   endoscopies  parital hysterectomy  S/p partial hysterectomy with remaining cervical stump  H/O rotator cuff surgery  right  History of appendectomy  S/P cholecystectomy    Vitals:   T(F): 96.9 (11-10-21 @ 19:57), Max: 97.2 (11-10-21 @ 04:00)  HR: 97 (11-10-21 @ 19:57)  BP: 128/62 (11-10-21 @ 19:57)  RR: 18 (11-10-21 @ 19:57)  SpO2: 94% (11-10-21 @ 04:00)    Fluids:     I & O's:    11-09-21 @ 07:01  -  11-10-21 @ 07:00  --------------------------------------------------------  IN:  Total IN: 0 mL  OUT:    Voided (mL): 500 mL  Total OUT: 500 mL  Total NET: -500 mL    PHYSICAL EXAM:  General: NAD  HEENT: NCAT, EOMI  Cardiac: RRR S1, S2  Respiratory: CTAB, normal respiratory effort, breath sounds equal BL,  Abdomen: Soft, moderate abdominal pain in RLQ/LLQ; YARELI drain in place; abdominal binder in place  Skin: Warm/dry, normal color, no jaundice  Incision/wound: healing well    MEDICATIONS  (STANDING):  acetaminophen     Tablet .. 650 milliGRAM(s) Oral every 6 hours  artificial  tears Solution 1 Drop(s) Both EYES three times a day  atorvastatin 80 milliGRAM(s) Oral at bedtime  clonazePAM  Tablet 2 milliGRAM(s) Oral four times a day  enoxaparin Injectable 40 milliGRAM(s) SubCutaneous daily  influenza   Vaccine 0.5 milliLiter(s) IntraMuscular once  levETIRAcetam 250 milliGRAM(s) Oral two times a day  methocarbamol 500 milliGRAM(s) Oral three times a day  NIFEdipine XL 60 milliGRAM(s) Oral two times a day  pantoprazole  Injectable 40 milliGRAM(s) IV Push daily  piperacillin/tazobactam IVPB.. 3.375 Gram(s) IV Intermittent every 8 hours  potassium chloride    Tablet ER 20 milliEquivalent(s) Oral every 2 hours  senna 2 Tablet(s) Oral at bedtime  tiotropium 18 MICROgram(s) Capsule 1 Capsule(s) Inhalation daily    MEDICATIONS  (PRN):  naloxone Injectable 0.1 milliGRAM(s) IV Push every 3 minutes PRN For ANY of the following changes in patient status:  A. RR LESS THAN 10 breaths per minute, B. Oxygen saturation LESS THAN 90%, C. Sedation score of 6  ondansetron Injectable 4 milliGRAM(s) IV Push every 6 hours PRN Nausea  ondansetron Injectable 4 milliGRAM(s) IV Push every 8 hours PRN Nausea and/or Vomiting  oxyCODONE    IR 5 milliGRAM(s) Oral every 4 hours PRN Moderate Pain (4 - 6)  oxyCODONE    IR 10 milliGRAM(s) Oral every 4 hours PRN Severe Pain (7 - 10)    DVT PROPHYLAXIS: enoxaparin Injectable 40 milliGRAM(s) SubCutaneous daily  GI PROPHYLAXIS: pantoprazole  Injectable 40 milliGRAM(s) IV Push daily    ANTIBIOTICS:  piperacillin/tazobactam IVPB.. 3.375 Gram(s)    LAB/STUDIES:  Labs:                        7.5    8.33  )-----------( 339      ( 10 Nov 2021 22:14 )             22.6       Auto Neutrophil %: 66.7 % (11-10-21 @ 22:14)  Auto Immature Granulocyte %: 1.6 % (11-10-21 @ 22:14)    11-10    141  |  106  |  9<L>  ----------------------------<  110<H>  3.6   |  20  |  0.8    Calcium, Total Serum: 8.3 mg/dL (11-10-21 @ 22:14)    IMAGING:  < from: [facility] Duplex Lower Ext Vein Scan, Bilat (11.10.21 @ 11:35) >  IMPRESSION:  No evidence of deep venous thrombosis in either lower extremity.  --- End of Report ---

## 2021-11-11 NOTE — PROGRESS NOTE ADULT - ASSESSMENT
ASSESSMENT:  57 y/o F s/p exploratory laparotomy, enterolysis, removal of foreign body    Plan:  -Pt c/o increased abdominal pain, ordered KUB   -Monitor for return of bowel function  -Pain control  -Continue Antibiotics: zosyn  -Monitor YARELI output   -DVT and GI Prophylaxis    GREEN TEAM SPECTRA: [id number]

## 2021-11-11 NOTE — PROGRESS NOTE ADULT - ATTENDING COMMENTS
above noted discussed case with surgical resident abdomen soft no distension no bm yet
above noted discussed case with surgical resident and pt abdomen soft no distension having bm and flatus
above noted discussed case with surgical resident and pt abdomen soft no distension mild tenderness no flatus
above noted abdomen soft no distension mid tenderness labs noted no flatus
above noted discussed case with surgical resident abdomen soft no distension having flatus
above noted discussed case with surgical resident abdomen soft no distension passing flatus
above noted discussed case with surgical resident and p[t abdomen soft no distension no bm yet
above noted discussed case with surgical resident no bm for xrays

## 2021-11-12 ENCOUNTER — TRANSCRIPTION ENCOUNTER (OUTPATIENT)
Age: 56
End: 2021-11-12

## 2021-11-12 LAB
ANION GAP SERPL CALC-SCNC: 14 MMOL/L — SIGNIFICANT CHANGE UP (ref 7–14)
ANION GAP SERPL CALC-SCNC: 14 MMOL/L — SIGNIFICANT CHANGE UP (ref 7–14)
BASOPHILS # BLD AUTO: 0.03 K/UL — SIGNIFICANT CHANGE UP (ref 0–0.2)
BASOPHILS # BLD AUTO: 0.06 K/UL — SIGNIFICANT CHANGE UP (ref 0–0.2)
BASOPHILS NFR BLD AUTO: 0.4 % — SIGNIFICANT CHANGE UP (ref 0–1)
BASOPHILS NFR BLD AUTO: 0.7 % — SIGNIFICANT CHANGE UP (ref 0–1)
BUN SERPL-MCNC: 11 MG/DL — SIGNIFICANT CHANGE UP (ref 10–20)
BUN SERPL-MCNC: 12 MG/DL — SIGNIFICANT CHANGE UP (ref 10–20)
CALCIUM SERPL-MCNC: 8.4 MG/DL — LOW (ref 8.5–10.1)
CALCIUM SERPL-MCNC: 8.7 MG/DL — SIGNIFICANT CHANGE UP (ref 8.5–10.1)
CHLORIDE SERPL-SCNC: 105 MMOL/L — SIGNIFICANT CHANGE UP (ref 98–110)
CHLORIDE SERPL-SCNC: 107 MMOL/L — SIGNIFICANT CHANGE UP (ref 98–110)
CO2 SERPL-SCNC: 21 MMOL/L — SIGNIFICANT CHANGE UP (ref 17–32)
CO2 SERPL-SCNC: 21 MMOL/L — SIGNIFICANT CHANGE UP (ref 17–32)
CREAT SERPL-MCNC: 0.6 MG/DL — LOW (ref 0.7–1.5)
CREAT SERPL-MCNC: 0.7 MG/DL — SIGNIFICANT CHANGE UP (ref 0.7–1.5)
EOSINOPHIL # BLD AUTO: 0.07 K/UL — SIGNIFICANT CHANGE UP (ref 0–0.7)
EOSINOPHIL # BLD AUTO: 0.1 K/UL — SIGNIFICANT CHANGE UP (ref 0–0.7)
EOSINOPHIL NFR BLD AUTO: 1 % — SIGNIFICANT CHANGE UP (ref 0–8)
EOSINOPHIL NFR BLD AUTO: 1.2 % — SIGNIFICANT CHANGE UP (ref 0–8)
GLUCOSE SERPL-MCNC: 101 MG/DL — HIGH (ref 70–99)
GLUCOSE SERPL-MCNC: 120 MG/DL — HIGH (ref 70–99)
HCT VFR BLD CALC: 23.3 % — LOW (ref 37–47)
HGB BLD-MCNC: 7.8 G/DL — LOW (ref 12–16)
IMM GRANULOCYTES NFR BLD AUTO: 1.8 % — HIGH (ref 0.1–0.3)
IMM GRANULOCYTES NFR BLD AUTO: 2.6 % — HIGH (ref 0.1–0.3)
LYMPHOCYTES # BLD AUTO: 1.69 K/UL — SIGNIFICANT CHANGE UP (ref 1.2–3.4)
LYMPHOCYTES # BLD AUTO: 1.8 K/UL — SIGNIFICANT CHANGE UP (ref 1.2–3.4)
LYMPHOCYTES # BLD AUTO: 22.2 % — SIGNIFICANT CHANGE UP (ref 20.5–51.1)
LYMPHOCYTES # BLD AUTO: 23.4 % — SIGNIFICANT CHANGE UP (ref 20.5–51.1)
MAGNESIUM SERPL-MCNC: 1.6 MG/DL — LOW (ref 1.8–2.4)
MAGNESIUM SERPL-MCNC: 1.8 MG/DL — SIGNIFICANT CHANGE UP (ref 1.8–2.4)
MCHC RBC-ENTMCNC: 32.1 PG — HIGH (ref 27–31)
MCHC RBC-ENTMCNC: 33.5 G/DL — SIGNIFICANT CHANGE UP (ref 32–37)
MCV RBC AUTO: 95.9 FL — SIGNIFICANT CHANGE UP (ref 81–99)
MONOCYTES # BLD AUTO: 0.71 K/UL — HIGH (ref 0.1–0.6)
MONOCYTES # BLD AUTO: 0.87 K/UL — HIGH (ref 0.1–0.6)
MONOCYTES NFR BLD AUTO: 10.8 % — HIGH (ref 1.7–9.3)
MONOCYTES NFR BLD AUTO: 9.8 % — HIGH (ref 1.7–9.3)
NEUTROPHILS # BLD AUTO: 4.59 K/UL — SIGNIFICANT CHANGE UP (ref 1.4–6.5)
NEUTROPHILS # BLD AUTO: 5.05 K/UL — SIGNIFICANT CHANGE UP (ref 1.4–6.5)
NEUTROPHILS NFR BLD AUTO: 62.5 % — SIGNIFICANT CHANGE UP (ref 42.2–75.2)
NEUTROPHILS NFR BLD AUTO: 63.6 % — SIGNIFICANT CHANGE UP (ref 42.2–75.2)
NRBC # BLD: 0 /100 WBCS — SIGNIFICANT CHANGE UP (ref 0–0)
NRBC # BLD: 0 /100 WBCS — SIGNIFICANT CHANGE UP (ref 0–0)
PHOSPHATE SERPL-MCNC: 3.8 MG/DL — SIGNIFICANT CHANGE UP (ref 2.1–4.9)
PHOSPHATE SERPL-MCNC: 4.3 MG/DL — SIGNIFICANT CHANGE UP (ref 2.1–4.9)
PLATELET # BLD AUTO: 385 K/UL — SIGNIFICANT CHANGE UP (ref 130–400)
POTASSIUM SERPL-MCNC: 3.8 MMOL/L — SIGNIFICANT CHANGE UP (ref 3.5–5)
POTASSIUM SERPL-MCNC: 4.1 MMOL/L — SIGNIFICANT CHANGE UP (ref 3.5–5)
POTASSIUM SERPL-SCNC: 3.8 MMOL/L — SIGNIFICANT CHANGE UP (ref 3.5–5)
POTASSIUM SERPL-SCNC: 4.1 MMOL/L — SIGNIFICANT CHANGE UP (ref 3.5–5)
RBC # BLD: 2.43 M/UL — LOW (ref 4.2–5.4)
RBC # FLD: 13.9 % — SIGNIFICANT CHANGE UP (ref 11.5–14.5)
SODIUM SERPL-SCNC: 140 MMOL/L — SIGNIFICANT CHANGE UP (ref 135–146)
SODIUM SERPL-SCNC: 142 MMOL/L — SIGNIFICANT CHANGE UP (ref 135–146)
WBC # BLD: 7.22 K/UL — SIGNIFICANT CHANGE UP (ref 4.8–10.8)
WBC # FLD AUTO: 7.22 K/UL — SIGNIFICANT CHANGE UP (ref 4.8–10.8)

## 2021-11-12 RX ORDER — MAGNESIUM SULFATE 500 MG/ML
2 VIAL (ML) INJECTION ONCE
Refills: 0 | Status: COMPLETED | OUTPATIENT
Start: 2021-11-12 | End: 2021-11-13

## 2021-11-12 RX ORDER — SENNA PLUS 8.6 MG/1
2 TABLET ORAL AT BEDTIME
Refills: 0 | Status: DISCONTINUED | OUTPATIENT
Start: 2021-11-12 | End: 2021-11-13

## 2021-11-12 RX ORDER — POLYETHYLENE GLYCOL 3350 17 G/17G
17 POWDER, FOR SOLUTION ORAL
Qty: 714 | Refills: 0
Start: 2021-11-12 | End: 2021-11-25

## 2021-11-12 RX ORDER — POTASSIUM CHLORIDE 20 MEQ
20 PACKET (EA) ORAL ONCE
Refills: 0 | Status: COMPLETED | OUTPATIENT
Start: 2021-11-12 | End: 2021-11-13

## 2021-11-12 RX ORDER — POLYETHYLENE GLYCOL 3350 17 G/17G
17 POWDER, FOR SOLUTION ORAL
Refills: 0 | Status: DISCONTINUED | OUTPATIENT
Start: 2021-11-12 | End: 2021-11-13

## 2021-11-12 RX ORDER — ACETAMINOPHEN 500 MG
2 TABLET ORAL
Qty: 112 | Refills: 0
Start: 2021-11-12 | End: 2021-11-25

## 2021-11-12 RX ORDER — SENNA PLUS 8.6 MG/1
2 TABLET ORAL
Qty: 28 | Refills: 0
Start: 2021-11-12 | End: 2021-11-25

## 2021-11-12 RX ADMIN — LEVETIRACETAM 250 MILLIGRAM(S): 250 TABLET, FILM COATED ORAL at 05:29

## 2021-11-12 RX ADMIN — ATORVASTATIN CALCIUM 80 MILLIGRAM(S): 80 TABLET, FILM COATED ORAL at 21:39

## 2021-11-12 RX ADMIN — Medication 1 DROP(S): at 14:03

## 2021-11-12 RX ADMIN — Medication 650 MILLIGRAM(S): at 11:53

## 2021-11-12 RX ADMIN — OXYCODONE HYDROCHLORIDE 5 MILLIGRAM(S): 5 TABLET ORAL at 15:51

## 2021-11-12 RX ADMIN — Medication 2 MILLIGRAM(S): at 17:02

## 2021-11-12 RX ADMIN — ENOXAPARIN SODIUM 40 MILLIGRAM(S): 100 INJECTION SUBCUTANEOUS at 11:53

## 2021-11-12 RX ADMIN — PANTOPRAZOLE SODIUM 40 MILLIGRAM(S): 20 TABLET, DELAYED RELEASE ORAL at 11:52

## 2021-11-12 RX ADMIN — METHOCARBAMOL 500 MILLIGRAM(S): 500 TABLET, FILM COATED ORAL at 05:28

## 2021-11-12 RX ADMIN — Medication 2 MILLIGRAM(S): at 11:53

## 2021-11-12 RX ADMIN — Medication 650 MILLIGRAM(S): at 00:52

## 2021-11-12 RX ADMIN — OXYCODONE HYDROCHLORIDE 10 MILLIGRAM(S): 5 TABLET ORAL at 21:46

## 2021-11-12 RX ADMIN — Medication 650 MILLIGRAM(S): at 05:33

## 2021-11-12 RX ADMIN — Medication 650 MILLIGRAM(S): at 06:44

## 2021-11-12 RX ADMIN — METHOCARBAMOL 500 MILLIGRAM(S): 500 TABLET, FILM COATED ORAL at 14:03

## 2021-11-12 RX ADMIN — Medication 650 MILLIGRAM(S): at 17:10

## 2021-11-12 RX ADMIN — Medication 60 MILLIGRAM(S): at 17:02

## 2021-11-12 RX ADMIN — OXYCODONE HYDROCHLORIDE 10 MILLIGRAM(S): 5 TABLET ORAL at 09:42

## 2021-11-12 RX ADMIN — METHOCARBAMOL 500 MILLIGRAM(S): 500 TABLET, FILM COATED ORAL at 21:39

## 2021-11-12 RX ADMIN — Medication 650 MILLIGRAM(S): at 17:02

## 2021-11-12 RX ADMIN — TIOTROPIUM BROMIDE 1 CAPSULE(S): 18 CAPSULE ORAL; RESPIRATORY (INHALATION) at 08:15

## 2021-11-12 RX ADMIN — Medication 650 MILLIGRAM(S): at 15:51

## 2021-11-12 RX ADMIN — OXYCODONE HYDROCHLORIDE 5 MILLIGRAM(S): 5 TABLET ORAL at 14:09

## 2021-11-12 RX ADMIN — Medication 2 MILLIGRAM(S): at 05:43

## 2021-11-12 RX ADMIN — OXYCODONE HYDROCHLORIDE 10 MILLIGRAM(S): 5 TABLET ORAL at 22:27

## 2021-11-12 RX ADMIN — Medication 2 MILLIGRAM(S): at 00:50

## 2021-11-12 RX ADMIN — Medication 60 MILLIGRAM(S): at 05:27

## 2021-11-12 RX ADMIN — Medication 1 DROP(S): at 05:33

## 2021-11-12 RX ADMIN — Medication 1 DROP(S): at 21:39

## 2021-11-12 RX ADMIN — POLYETHYLENE GLYCOL 3350 17 GRAM(S): 17 POWDER, FOR SOLUTION ORAL at 16:57

## 2021-11-12 RX ADMIN — LEVETIRACETAM 250 MILLIGRAM(S): 250 TABLET, FILM COATED ORAL at 17:02

## 2021-11-12 RX ADMIN — OXYCODONE HYDROCHLORIDE 10 MILLIGRAM(S): 5 TABLET ORAL at 13:58

## 2021-11-12 RX ADMIN — Medication 650 MILLIGRAM(S): at 00:27

## 2021-11-12 RX ADMIN — SENNA PLUS 2 TABLET(S): 8.6 TABLET ORAL at 21:39

## 2021-11-12 NOTE — DISCHARGE NOTE PROVIDER - CARE PROVIDER_API CALL
Patricia Martinez)  Surgery  [address] [place] [address]  Phone: [phone]  Fax: [phone]  Follow Up Time: 2 weeks

## 2021-11-12 NOTE — DISCHARGE NOTE PROVIDER - NSDCMRMEDTOKEN_GEN_ALL_CORE_FT
acetaminophen 325 mg oral tablet: 2 tab(s) orally every 6 hours   atorvastatin 80 mg oral tablet: 1 tab(s) orally once a day  diclofenac sodium 75 mg oral delayed release tablet: 1 tab(s) orally 2 times a day  Fioricet oral tablet: 1 tab(s) orally 3 times a day, As Needed  Incruse Ellipta 62.5 mcg/inh inhalation powder:   KlonoPIN 2 mg oral tablet: 1 tab(s) orally 4 times a day  levETIRAcetam 250 mg oral tablet: 1 tab(s) orally 2 times a day  Linzess 290 mcg oral capsule: 1 cap(s) orally once a day  Myrbetriq 50 mg oral tablet, extended release: 1 tab(s) orally once a day  NIFEdipine 60 mg oral tablet, extended release: 1 tab(s) orally 2 times a day  oxycodone-acetaminophen 5 mg-325 mg oral tablet: 1 tab(s) orally every 8 hours, As Needed MDD:3   polyethylene glycol 3350 oral powder for reconstitution: 17 gram(s) orally 3 times a day   Protonix 40 mg oral delayed release tablet: 1 tab(s) orally 2 times a day  senna oral tablet: 2 tab(s) orally once a day (at bedtime)  tiZANidine 4 mg oral tablet: 1 tab(s) orally once a day  traMADol 50 mg oral tablet: 1 tab(s) orally every 8 hours, As Needed - Severe Pain (7 - 10) - 10) MDD:2 full tabs  Ubrelvy 100 mg oral tablet: 1 tab(s) orally 2 times a day

## 2021-11-12 NOTE — CHART NOTE - NSCHARTNOTEFT_GEN_A_CORE
57 yo FM w/ PMH of HTN, HLD, anxiety, seizure 15 yrs ago, migraines, GERD, COPD, and extensive PSHx of cholecystectomy, appendectomy, hysterectomy, u/l oophorectomy, and hiatal hernia presented to the ED due to a 2wk hx of periumbilical intermittent worsening abdominal pain, that has progressed to involve the entire abdomen. Pt has had prior episodes of SBO which self-resolved. Pt presented to the ED Vitally stable, and with worsening abdominal pain. CT A/P demonstrated mildly dilated SB loops in the mid abdomen w/ adjacent fat stranding and an indeterminate 6cm intraluminal tubular structure without intraperitoneal free air. Given radiographic findings, pt's clinical hx and physical exam findings, surgery was consulted for SBO, and planning for an exploratory laparotomy with possible ostomy.       The patient went for Ex. Lap w/ Dr. Martinez 11/3 and will be transferred to Surgery Green Team ([id number]) for further management. Pt signed out to surgery green team.    # Abd Pain 2/2 suspected SBO vs enteritis  # Intraluminal foreign object  -Abd pain worsening: now generalized   - CT A&P: Mildly dilated small bowel loop in the mid abdomen with adjacent fat stranding/edema and an indeterminate 6 cm intraluminal tubular structure; no intraperitoneal free air. Findings may represent focal small bowel obstruction or infectious/inflammatory enteritis with focal ileus, likely related to the indeterminate tubular structure.  - switched morphine po 2mg po q6 to Dilaudid 0.5mg IV q4  -11/1 - cardio clearance has been done: Moderate risk for MACE; EKG normal  -Plan is for OR likely tomorrow: ex lap w/ possible ostomy.   -NPO  -LR @ 120  - NGT placed 11/2; f/u CXR demonstrates proper placement  - cont Zofran IV 4mg q8  - serial abd exams  - surgery was consulted for SBO, and 11/3 for an exploratory laparotomy with possible ostomy, plan to transfer to surgical service post Ex. Lap   - tried to call Pt's gastroenterologist Dr. Isabel, left message with .     #HTN   - cont Nifedipine 60mg po qd    #HLD  - cont Atorvastatin 80mg po qd    #Anxiety  - cont Klonopin 2mg po q6 prn     #seizure  - last episode 15yrs ago  - cont Keppra 250mg po q12    #COPD  - cont tiotropium 18microg qd    GI PPx: PTX  DVT PPx: LVX  Activity; IAT  Full Code
As discussed with surgical resident, Ms. Branch is POD #9 from ex lap and enterolysis. She is still complaining of some abd pain that is well controlled with opioid regimen. Continue oxycodone-acetaminophen 5-325mg PO TID/prn x 7 days. Patient should follow up as soon as possible after discharge with her PCP or pain provider.
Post Operative Note  Patient: RAMÍREZ RODARTE 56y (1965) Female   MRN: [id number]  Location: [address]  Visit: 11-01-21 Inpatient  Date: 11-03-21 @ 22:57    Procedure: Enteritis of small intestine due to foreign body     S/P Exploratory laparotomy    Enterolysis    Removal, foreign body, abdomen    Subjective:   Nausea:  no, Vomiting: no, Ambulating:  no, Flatus:  no  Pain Assessment: Patient is complaining of moderate pain.    Objective:  Vitals: T(F): 99.4 (11-03-21 @ 21:48), Max: 99.4 (11-03-21 @ 21:48)  HR: 104 (11-03-21 @ 21:48)  BP: 135/63 (11-03-21 @ 21:48) (113/64 - 172/74)  RR: 16 (11-03-21 @ 21:48)  SpO2: 98% (11-03-21 @ 20:30)  Vent Settings:     In:   11-02-21 @ 07:01  -  11-03-21 @ 07:00  --------------------------------------------------------  IN: 1080 mL    11-03-21 @ 07:01  -  11-03-21 @ 22:57  --------------------------------------------------------  IN: 240 mL      IV Fluids: lactated ringers. 1000 milliLiter(s) (120 mL/Hr) IV Continuous <Continuous>      Out:   11-02-21 @ 07:01  -  11-03-21 @ 07:00  --------------------------------------------------------  OUT: 1300 mL    11-03-21 @ 07:01 - 11-03-21 @ 22:57  --------------------------------------------------------  OUT: 725 mL      EBL:     Voided Urine:   11-02-21 @ 07:01  -  11-03-21 @ 07:00  --------------------------------------------------------  OUT: 1300 mL    11-03-21 @ 07:01  -  11-03-21 @ 22:57  --------------------------------------------------------  OUT: 725 mL      Arauz Catheter: yes no   Drains:   EMETERIO:   11-03-21 @ 07:01  -  11-03-21 @ 22:57  --------------------------------------------------------  OUT: 5 mL     ,   Chest Tube:      NG Tube:   11-02-21 @ 07:01  -  11-03-21 @ 07:00  --------------------------------------------------------  OUT: 400 mL    11-03-21 @ 07:01  -  11-03-21 @ 22:57  --------------------------------------------------------  OUT: 200 mL        Physical Examination:  General Appearance: NAD, +ngt  Heart: RRR  Lungs: CTABL  Abdomen:  Soft, mildly tender, appropriate for post-op course, nondistended. No rigidity, guarding, or rebound tenderness. + emeterio with serosangenous output, +arauz  MSK/Extremities: Warm & well-perfused. Peripheral pulses intact.  Incisions/Wounds: midline abdominal Dressings in place, clean, dry and intact, no signs of active bleeding/drainage,    Medications: [Standing]  enalaprilat Injectable 2.5 milliGRAM(s) IV Push every 6 hours  enoxaparin Injectable 40 milliGRAM(s) SubCutaneous daily  HYDROmorphone  Injectable 0.5 milliGRAM(s) IV Push every 10 minutes PRN  HYDROmorphone PCA (1 mG/mL) 30 milliLiter(s) PCA Continuous PCA Continuous  influenza   Vaccine 0.5 milliLiter(s) IntraMuscular once  lactated ringers. 1000 milliLiter(s) IV Continuous <Continuous>  levETIRAcetam  IVPB 250 milliGRAM(s) IV Intermittent every 12 hours  meropenem  IVPB 2000 milliGRAM(s) IV Intermittent every 12 hours  naloxone Injectable 0.1 milliGRAM(s) IV Push every 3 minutes PRN  ondansetron Injectable 4 milliGRAM(s) IV Push every 8 hours PRN  ondansetron Injectable 4 milliGRAM(s) IV Push every 6 hours PRN  pantoprazole  Injectable 40 milliGRAM(s) IV Push daily  tiotropium 18 MICROgram(s) Capsule 1 Capsule(s) Inhalation daily    Medications: [PRN]  enalaprilat Injectable 2.5 milliGRAM(s) IV Push every 6 hours  enoxaparin Injectable 40 milliGRAM(s) SubCutaneous daily  HYDROmorphone  Injectable 0.5 milliGRAM(s) IV Push every 10 minutes PRN  HYDROmorphone PCA (1 mG/mL) 30 milliLiter(s) PCA Continuous PCA Continuous  influenza   Vaccine 0.5 milliLiter(s) IntraMuscular once  lactated ringers. 1000 milliLiter(s) IV Continuous <Continuous>  levETIRAcetam  IVPB 250 milliGRAM(s) IV Intermittent every 12 hours  meropenem  IVPB 2000 milliGRAM(s) IV Intermittent every 12 hours  naloxone Injectable 0.1 milliGRAM(s) IV Push every 3 minutes PRN  ondansetron Injectable 4 milliGRAM(s) IV Push every 8 hours PRN  ondansetron Injectable 4 milliGRAM(s) IV Push every 6 hours PRN  pantoprazole  Injectable 40 milliGRAM(s) IV Push daily  tiotropium 18 MICROgram(s) Capsule 1 Capsule(s) Inhalation daily      DVT PROPHYLAXIS: enoxaparin Injectable 40 milliGRAM(s) SubCutaneous daily    GI PROPHYLAXIS: pantoprazole  Injectable 40 milliGRAM(s) IV Push daily    ANTICOAGULATION:   ANTIBIOTICS:  meropenem  IVPB 2000 milliGRAM(s)        Labs:                        11.4   5.16  )-----------( 208      ( 02 Nov 2021 20:00 )             33.8     11-02    138  |  99  |  10  ----------------------------<  72  3.6   |  21  |  0.5<L>    Ca    8.6      02 Nov 2021 20:00  Phos  3.5     11-02  Mg     1.8     11-03    TPro  6.0  /  Alb  4.0  /  TBili  0.2  /  DBili  x   /  AST  28  /  ALT  36  /  AlkPhos  146<H>  11-02    PT/INR - ( 02 Nov 2021 20:00 )   PT: 11.20 sec;   INR: 0.97 ratio         PTT - ( 02 Nov 2021 20:00 )  PTT:40.4 sec        Imaging:  No post-op imaging studies    Assessment:  56yF s/p exploratory laparotomy, enterolysis, removal of foreign body    Plan:  - NPO, IVF  - Monitor vitals  - Monitor post-op labs and replete as necessary  - Monitor for bowel function  - Continue Pain Medications if necessary  - Continue Antibiotics   - Monitor urine output and trial of void once Arauz removed  - monitor emeterio output   - monitor NGT output   - DVT and GI Prophylaxis  - Monitor wound and dressing for changes, redress as needed.    Date/Time: 11-03-21 @ 22:57
Pre-Op Note    Patient: RAMÍREZ RODARTE  MRN: [id number]  56y Female  Location: [facility] T3-3B 012 B  21 @ 06:35    Admit Diagnosis: ABDOMINAL PAIN ENTERITIS SMALL BOWEL OBSTRUCTION NAUSEA AND VOMITING        Procedure: ex-lap possible bowel resection  Consent in Chart: [  ] Yes [ x ] No  Diet:   Fluids: lactated ringers. 1000 milliLiter(s) (100 mL/Hr) IV Continuous <Continuous>    EK Lead ECG:   Ventricular Rate 76 BPM    Atrial Rate 76 BPM    P-R Interval 152 ms    QRS Duration 72 ms    Q-T Interval 412 ms    QTC Calculation(Bazett) 463 ms    P Axis 71 degrees    R Axis 36 degrees    T Axis 53 degrees    Diagnosis Line Normal sinus rhythm  Normal ECG    Confirmed by LAURA NAGY MD ([id number]) on 2021 12:37:22 PM (21 @ 10:46)    CXR:  Xray Chest 1 View- PORTABLE-Urgent:   EXAM:  XR CHEST PORTABLE URGENT 1V            PROCEDURE DATE:  2021            INTERPRETATION:  Clinical History / Reason for exam: Diffuse abdominal pain    Comparison : Chest radiograph 2021.    Technique/Positioning: Single AP chest radiograph.    Findings:    Support devices: None.    Cardiac/mediastinum/hilum: Unremarkable.    Lung parenchyma/Pleura: No focal consolidation, effusion or pneumothorax.    Skeleton/soft tissues: Right upper quadrant cholecystectomy clips.    Impression:    No radiographic evidence of acute cardiopulmonary disease.        --- End of Report ---              SANJIV MONTENEGRO MD; Attending Radiologist  This document has been electronically signed. 2021  1:00PM (21 @ 12:37)      Vitals:  T(F): 98 (21 @ 02:00), Max: 98 (21 @ 02:00)  HR: 100 (21 @ 02:00) (87 - 101)  BP: 147/75 (21 @ 02:00) (135/67 - 170/71)  RR: 18 (21 @ 02:00) (18 - 20)  SpO2: 96% (21 @ 02:00) (96% - 100%)    Pre-OP Labs:  CAPILLARY BLOOD GLUCOSE                              12.8   5.77  )-----------( 245      ( 2021 10:10 )             37.5     11-    143  |  106  |  9<L>  ----------------------------<  107<H>  4.4   |  27  |  0.7    Ca    9.5      2021 10:10    TPro  6.8  /  Alb  4.3  /  TBili  0.2  /  DBili  x   /  AST  18  /  ALT  24  /  AlkPhos  140<H>  11      Urinalysis Basic - ( 2021 12:56 )    Color: Yellow / Appearance: Clear / S.010 / pH: x  Gluc: x / Ketone: Negative  / Bili: Negative / Urobili: <2 mg/dL   Blood: x / Protein: Negative / Nitrite: Negative   Leuk Esterase: Negative / RBC: 1 /HPF / WBC 1 /HPF   Sq Epi: x / Non Sq Epi: 3 /HPF / Bacteria: Negative        Type & Screen:     Pregnancy Test: ***     COVID: COVID-19 PCR: [place] (2021 21:24)
Registered Dietitian Follow-Up     Patient Profile Reviewed                           Yes [x]   No []     Nutrition History Previously Obtained        Yes [x]  No []       Pertinent Subjective Information: Pt reports suboptimal PO, eating <50% of meals d/t pain in stomach, constipation, unable to pass gas and nausea. Pt however, does report drinking 2 Ensure daily, but will only drink if it is chocolate flavor. Discussed other nutritional supplements.    --Pt reports receiving senna 2-days ago, but hasn't had a BM yet -- prev bowel regimen now d/c??     Pertinent Medical Interventions:  --57 y/o F s/p exploratory laparotomy, enterolysis, removal of foreign body  -Pt c/o increased abdominal pain, ordered KUB   -Monitor for return of bowel function     Diet order: Soft & Bite sized (low fiber, DASH/TLC) + ensure enlive TID.     Anthropometrics:  Height (cm): 157.5 (11-05-21 @ 10:10)  Wt: 63.6kg   BMI: 25.6  IBW: 50kg     MEDICATIONS  (STANDING):  atorvastatin 80 milliGRAM(s) Oral at bedtime  clonazePAM  Tablet 2 milliGRAM(s) Oral four times a day  enoxaparin Injectable 40 milliGRAM(s) SubCutaneous daily  levETIRAcetam 250 milliGRAM(s) Oral two times a day  methocarbamol 500 milliGRAM(s) Oral three times a day  NIFEdipine XL 60 milliGRAM(s) Oral two times a day  pantoprazole  Injectable 40 milliGRAM(s) IV Push daily  tiotropium 18 MICROgram(s) Capsule 1 Capsule(s) Inhalation daily    MEDICATIONS  (PRN):  ondansetron Injectable 4 milliGRAM(s) IV Push every 8 hours PRN Nausea and/or Vomiting  oxyCODONE    IR 10 milliGRAM(s) Oral every 4 hours PRN Severe Pain (7 - 10)    Pertinent Labs: 11-10 @ 22:14: Na 141, BUN 9<L>, Cr 0.8, <H>, K+ 3.6, Phos 5.2<H>, Mg 1.7<L>, Alk Phos --, ALT/SGPT --, AST/SGOT --, HbA1c -    Physical Findings:  - Appearance: AAOX4; No edema   - GI function: abd pain, nausea, constipation, no BM since before admission   - Tubes: n/a  - Oral/Mouth cavity: no chewing/swallowing difficulty reported  - Skin: surgical incision      Nutrition Requirements: per initial RD assessment 11/5  Weight Used: 63.6kg     Estimated Energy Needs    Continue [x]  Adjust []  1413-1531kcal/day (MSJ 1.2-1.3 AF)      Estimated Protein Needs    Continue [x]  Adjust []  63-76 g/day ( 1.0-1.2 g/kg of dosing weight     Estimated Fluid Needs        Continue [x]  Adjust []  1mL/kcal or LIP      Nutrient Intake: <50% of meals + 2 ensure enlive daily     [] Previous Nutrition Diagnosis: Inadequate protein energy needs            [x] Ongoing          [] Resolved     Nutrition Intervention: meals and snacks,medical food supplements, nutrition r/t medical management      Nutrition Monitoring:diet order,energy intake,body composition,NFPF     Recommendation:  1. Add bowel regimen   2. Cont w/ nausea medication   3. Add Ensure Pudding BID (chocolate)  4. Adust ensure Enlive BID (chocolate)   5. Obtain daily wts   6. encourage PO intake   7. will collect Calorie Count 11/13     [phone]  RD remains available: Milena Álvarez, RDN [phone]
received a call from pharmacy stating that in order for the meropenem to be approved patient needs to have ID consult in place. ID consult was placed as per their request
PACU ANESTHESIA ADMISSION NOTE      Procedure:   Post op diagnosis:      ____  Intubated  TV:______       Rate: ______      FiO2: ______    _x___  Patent Airway    __x__  Full return of protective reflexes    _x___  Full recovery from anesthesia / back to baseline     Vitals:   T:   97        R:  12                BP: 126/66                 Sat: 100                  P: 80      Mental Status:  ___x_ Awake   __x___ Alert   _____ Drowsy   _____ Sedated    Nausea/Vomiting:  __x__ NO  ______Yes,   See Post - Op Orders          Pain Scale (0-10):  _____    Treatment: __x__ None    ____ See Post - Op/PCA Orders    Post - Operative Fluids:   ____ Oral   _x___ See Post - Op Orders    Plan: Discharge:   ____Home       __x___Floor     _____Critical Care    _____  Other:_________________    Comments:

## 2021-11-12 NOTE — DISCHARGE NOTE PROVIDER - NSDCFUADDAPPT_GEN_ALL_CORE_FT
Pain management clinic # [phone] F/u as soon as possible Pain management clinic # [phone] F/u as soon as possible  Follow up with your Primary care physician in 1 week

## 2021-11-12 NOTE — CHART NOTE - NSCHARTNOTESELECT_GEN_ALL_CORE
Transfer Note
Event Note
Event Note
Nutrition/Event Note
Surgery/Transfer Note
post op check/Event Note
pre-op/Event Note

## 2021-11-12 NOTE — DISCHARGE NOTE PROVIDER - HOSPITAL COURSE
Pt 55y/o F with PMHx of HTN, HLD, anxiety, seizure, migraines, GERD, and a SHx appendectomy x2, cholecystectomy, hysterectomy unilateral oophorectomy, hiatal hernia presents to the ED with a chronic intermittent periumbilical abdominal pain that gradually worsened in the past 2 weeks. CT scan in the ED showed a dilated loops of small bowel around a foreign body that was present since April. Pt was admitted for removal of foreign body and SBO, NGT was placed, and pt was started on IVF and made NPO. Pt was then taken to surgery on hospital day 3 where the foreign body was removed, after extensive enterolysis and bowel was primarily repair. Pt post operative course was complicated by post operative ileus that resolved  within a few days, NGT was removed, pt had an obstructive series that showed no obstruction. Pt has a history of chronic pain and was being manage by pain management. PT is VSS, passing flatus, and having a BM.

## 2021-11-12 NOTE — PROGRESS NOTE ADULT - SUBJECTIVE AND OBJECTIVE BOX
GENERAL SURGERY PROGRESS NOTE    Patient: RAMÍREZ RODARTE , 56y ([birth year])Female   MRN: [id number]  Location: [place] [address] [address]  Visit: 11-01-21 Inpatient  Date: 11-12-21 @ 01:17    Hospital Day #: 12  Post-Op Day #: 9    Procedure/Dx/Injuries: s/p ex-lap; entrolysis, removal of foreign body    Events of past 24 hours: No acute events overnight. patient has had a small bowel movement and passing gas on 11/11. Patient had an obstructive series done that showed no evidence of obstruction. Patient still on soft bite sized diet but had ensure pudding and Ensure enlive added per nutrition recs.     PAST MEDICAL & SURGICAL HISTORY:  Back pain  henriated discs    Seizures  last one 6 yrs ago    Migraines    Hypercholesteremia    Seasonal allergic rhinitis, unspecified trigger    GERD (gastroesophageal reflux disease)    Hiatal hernia    Anxiety    HTN (hypertension)    Osteoarthritis    Sciatica    Tobacco use disorder    History of surgery  4 rght knee sx  1 left knee sx  cholecystectomy  rotator cuff right  abdominal sx-- adhesions  c section  ovarian cystectomy  breast cystectomies  colonoscopy   endoscopies  parital hysterectomy    S/p partial hysterectomy with remaining cervical stump    H/O rotator cuff surgery  right    History of appendectomy    S/P cholecystectomy        Vitals:   T(F): 97.2 (11-11-21 @ 21:33), Max: 98.7 (11-11-21 @ 12:06)  HR: 80 (11-11-21 @ 21:33)  BP: 100/52 (11-11-21 @ 21:33)  RR: 18 (11-11-21 @ 21:33)  SpO2: 92% (11-11-21 @ 08:45)          Fluids:     I & O's:    11-10-21 @ 07:01  -  11-11-21 @ 07:00  --------------------------------------------------------  IN:  Total IN: 0 mL    OUT:    Bulb (mL): 2.5 mL  Total OUT: 2.5 mL    Total NET: -2.5 mL        PHYSICAL EXAM:  General: NAD, AAOx3, calm and cooperative  HEENT: NCAT, ABDI, EOMI, Trachea ML, Neck supple  Cardiac: RRR S1, S2, no Murmurs, rubs or gallops  Respiratory: CTAB, normal respiratory effort, breath sounds equal BL, no wheeze, rhonchi or crackles  Abdomen: Soft, moderately tender, nondistended. Midline incision C/D/I. Abdominal binder in place.   Musculoskeletal: Strength 5/5 BL UE/LE, ROM intact, compartments soft  Vascular: Pulses 2+ throughout, extremities well perfused  Skin: Warm/dry, normal color, no jaundice  Incision/wound: healing well, dressings in place, clean, dry and intact    MEDICATIONS  (STANDING):  acetaminophen     Tablet .. 650 milliGRAM(s) Oral every 6 hours  artificial  tears Solution 1 Drop(s) Both EYES three times a day  atorvastatin 80 milliGRAM(s) Oral at bedtime  clonazePAM  Tablet 2 milliGRAM(s) Oral four times a day  enoxaparin Injectable 40 milliGRAM(s) SubCutaneous daily  influenza   Vaccine 0.5 milliLiter(s) IntraMuscular once  levETIRAcetam 250 milliGRAM(s) Oral two times a day  methocarbamol 500 milliGRAM(s) Oral three times a day  NIFEdipine XL 60 milliGRAM(s) Oral two times a day  pantoprazole  Injectable 40 milliGRAM(s) IV Push daily  tiotropium 18 MICROgram(s) Capsule 1 Capsule(s) Inhalation daily    MEDICATIONS  (PRN):  naloxone Injectable 0.1 milliGRAM(s) IV Push every 3 minutes PRN For ANY of the following changes in patient status:  A. RR LESS THAN 10 breaths per minute, B. Oxygen saturation LESS THAN 90%, C. Sedation score of 6  ondansetron Injectable 4 milliGRAM(s) IV Push every 6 hours PRN Nausea  ondansetron Injectable 4 milliGRAM(s) IV Push every 8 hours PRN Nausea and/or Vomiting  oxyCODONE    IR 10 milliGRAM(s) Oral every 4 hours PRN Severe Pain (7 - 10)  oxyCODONE    IR 5 milliGRAM(s) Oral every 4 hours PRN Moderate Pain (4 - 6)      DVT PROPHYLAXIS: enoxaparin Injectable 40 milliGRAM(s) SubCutaneous daily    GI PROPHYLAXIS: pantoprazole  Injectable 40 milliGRAM(s) IV Push daily    ANTICOAGULATION:   ANTIBIOTICS:            LAB/STUDIES:  Labs:  CAPILLARY BLOOD GLUCOSE                              8.5    8.09  )-----------( 423      ( 11 Nov 2021 23:05 )             25.9       Auto Neutrophil %: 62.5 % (11-11-21 @ 23:05)  Auto Immature Granulocyte %: 2.6 % (11-11-21 @ 23:05)    11-11    140  |  105  |  12  ----------------------------<  101<H>  4.1   |  21  |  0.7      Calcium, Total Serum: 8.7 mg/dL (11-11-21 @ 23:05)          ACCESS/ DEVICES:  [ x] Peripheral IV

## 2021-11-12 NOTE — DISCHARGE NOTE PROVIDER - NSDCCPCAREPLAN_GEN_ALL_CORE_FT
PRINCIPAL DISCHARGE DIAGNOSIS  Diagnosis: Abdominal pain  Assessment and Plan of Treatment:       SECONDARY DISCHARGE DIAGNOSES  Diagnosis: Enteritis  Assessment and Plan of Treatment:     Diagnosis: Small bowel obstruction  Assessment and Plan of Treatment:     Diagnosis: Nausea & vomiting  Assessment and Plan of Treatment:

## 2021-11-12 NOTE — DISCHARGE NOTE PROVIDER - NSDCFUADDINST_GEN_ALL_CORE_FT
SURGERY DISCHARGE INSTRUCTIONS    FOLLOW-UP - with Dr. Martinez in 1-2 weeks. Call the office to make an appointment or if you have any questions/concerns.    DIET - regular.     ACTIVITY- No heavy lifting for 4-6 wks over 10-20 lbs. Walking is encouraged. No running or swimming. No driving while taking pain medication.    WOUNDCARE - Some drainage from your incisions or drain sites is normal. If you have drainage from an open incision or drain site- cover loosely with sterile gauze and tape and change daily. May shower 24 hours after surgery but no submerging wound under water for 2 weeks (tub bathing). Pat area dry when wet, keep clean and dry. Do not apply powders or lotion to wound area.     PAIN MEDS - Follow-up in the pain clinic as soon as possible. Call to make your appointment [phone]. Take prescription pain meds as instructed but only if needed as they can be addicting. Take over the counter extra strength tylenol 500mg and/or ibprofen 400mg with food every 6 hours for pain instead of prescription pain meds if you do not need stronger pain control. Do not take tylenol in addition to your prescription pain med if your prescription pain med already has tylenol in it. No more than 4g of tylenol in 24hrs or 1g in 4 hrs. No mixing alcohol with prescription pain meds. No driving or operating machinery while taking prescription pain meds. Drink plenty of water and increase your fiber intake (unless your diet restricts fiber) while taking prescription pain meds as these can cause constipation and abdominal straining. If you do not have a bowel movement in 3 days take an over the counter stool softener of your choice daily. If you still do not have a bowel movement the following 2 days call your primary care physician.        OTHER MEDS - If you have any questions about your other regular home medications please call your primary care physician or the physician who prescribed those medications to you.     If you develop fever, dizziness, chest pain, trouble breathing, nausea, vomiting, increasing abdominal pain, inability to pass bowel movements, redness/pain/discharge from incisions. Please call the office or go to the emergency room immediately.     SURGERY DISCHARGE INSTRUCTIONS    FOLLOW-UP - with Dr. Martinez in 1-2 weeks, Pain management clinic, and primary care physician. Call the office to make an appointment or if you have any questions/concerns.    DIET - regular.     ACTIVITY- No heavy lifting for 4-6 wks over 10-20 lbs. Walking is encouraged. No running or swimming. No driving while taking pain medication.    WOUNDCARE - Some drainage from your incisions or drain sites is normal. If you have drainage from an open incision or drain site- cover loosely with sterile gauze and tape and change daily. May shower 24 hours after surgery but no submerging wound under water for 2 weeks (tub bathing). Pat area dry when wet, keep clean and dry. Do not apply powders or lotion to wound area.     PAIN MEDS - Follow-up in the pain clinic as soon as possible. Call to make your appointment [phone]. Take prescription pain meds as instructed but only if needed as they can be addicting. Take over the counter extra strength tylenol 500mg and/or ibprofen 400mg with food every 6 hours for pain instead of prescription pain meds if you do not need stronger pain control. Do not take tylenol in addition to your prescription pain med if your prescription pain med already has tylenol in it. No more than 4g of tylenol in 24hrs or 1g in 4 hrs. No mixing alcohol with prescription pain meds. No driving or operating machinery while taking prescription pain meds. Drink plenty of water and increase your fiber intake (unless your diet restricts fiber) while taking prescription pain meds as these can cause constipation and abdominal straining. If you do not have a bowel movement in 3 days take an over the counter stool softener of your choice daily. If you still do not have a bowel movement the following 2 days call your primary care physician.        OTHER MEDS - If you have any questions about your other regular home medications please call your primary care physician or the physician who prescribed those medications to you.     If you develop fever, dizziness, chest pain, trouble breathing, nausea, vomiting, increasing abdominal pain, inability to pass bowel movements, redness/pain/discharge from incisions. Please call the office or go to the emergency room immediately.

## 2021-11-12 NOTE — PROGRESS NOTE ADULT - ASSESSMENT
ASSESSMENT:  57 y/o F s/p exploratory laparotomy, enterolysis, removal of foreign body. Patient is tolerating diet and had small bowel movement and passing gas. Patient is ambulating and spending most of her day out of bed.     PLAN:  - Monitor YARELI drain output   - Pain control   - Monitor vitals   - Monitor labs   - encourage ambulation   - monitor bowel function   - DVT/ gI ppx     Lines/Tubes: PIV, Saw drain       GREEN TEAM SPECTRA: [id number]

## 2021-11-12 NOTE — DISCHARGE NOTE PROVIDER - NSDCCPTREATMENT_GEN_ALL_CORE_FT
PRINCIPAL PROCEDURE  Procedure: Exploratory laparotomy  Findings and Treatment:       SECONDARY PROCEDURE  Procedure: Exploratory laparotomy  Findings and Treatment:     Procedure: Enterolysis  Findings and Treatment:

## 2021-11-13 VITALS — SYSTOLIC BLOOD PRESSURE: 104 MMHG | HEART RATE: 73 BPM | TEMPERATURE: 97 F | DIASTOLIC BLOOD PRESSURE: 59 MMHG

## 2021-11-13 RX ORDER — SENNA PLUS 8.6 MG/1
2 TABLET ORAL
Qty: 28 | Refills: 0
Start: 2021-11-13 | End: 2021-11-26

## 2021-11-13 RX ORDER — POLYETHYLENE GLYCOL 3350 17 G/17G
17 POWDER, FOR SOLUTION ORAL
Qty: 714 | Refills: 0
Start: 2021-11-13 | End: 2021-11-26

## 2021-11-13 RX ORDER — ACETAMINOPHEN 500 MG
2 TABLET ORAL
Qty: 112 | Refills: 0
Start: 2021-11-13 | End: 2021-11-26

## 2021-11-13 RX ADMIN — LEVETIRACETAM 250 MILLIGRAM(S): 250 TABLET, FILM COATED ORAL at 05:48

## 2021-11-13 RX ADMIN — Medication 1 DROP(S): at 05:49

## 2021-11-13 RX ADMIN — Medication 20 MILLIEQUIVALENT(S): at 02:35

## 2021-11-13 RX ADMIN — TIOTROPIUM BROMIDE 1 CAPSULE(S): 18 CAPSULE ORAL; RESPIRATORY (INHALATION) at 08:18

## 2021-11-13 RX ADMIN — Medication 650 MILLIGRAM(S): at 08:40

## 2021-11-13 RX ADMIN — Medication 650 MILLIGRAM(S): at 03:10

## 2021-11-13 RX ADMIN — Medication 50 GRAM(S): at 02:36

## 2021-11-13 RX ADMIN — Medication 60 MILLIGRAM(S): at 05:48

## 2021-11-13 RX ADMIN — POLYETHYLENE GLYCOL 3350 17 GRAM(S): 17 POWDER, FOR SOLUTION ORAL at 05:50

## 2021-11-13 RX ADMIN — Medication 2 MILLIGRAM(S): at 02:35

## 2021-11-13 RX ADMIN — Medication 2 MILLIGRAM(S): at 05:49

## 2021-11-13 RX ADMIN — Medication 650 MILLIGRAM(S): at 02:35

## 2021-11-13 RX ADMIN — METHOCARBAMOL 500 MILLIGRAM(S): 500 TABLET, FILM COATED ORAL at 05:48

## 2021-11-13 RX ADMIN — Medication 50 GRAM(S): at 04:08

## 2021-11-13 NOTE — PROGRESS NOTE ADULT - ASSESSMENT
ASSESSMENT:  55 y/o F PMHX HTN, LD, GERD, esophageal erosions, gastritis, seizure disorder, anxiety presenting with abdominal pain s/p exploratory laparotomy, enterolysis, removal of foreign body.    PLAN:  - Monitor YARELI drain output   - Pain control   - Monitor vitals   - Monitor labs   - encourage ambulation   - monitor bowel function   - DVT/GI prophylaxis    Lines/Tubes: PIV, Saw drain     GREEN TEAM SPECTRA: [id number]     ASSESSMENT:  57 y/o F PMHX HTN, LD, GERD, esophageal erosions, gastritis, seizure disorder, anxiety presenting with abdominal pain s/p exploratory laparotomy, enterolysis, removal of foreign body. Saw drain was removed    PLAN:  - Pain control   - Monitor vitals   - Monitor labs   - encourage ambulation   - monitor bowel function   - DVT/GI prophylaxis    Lines/Tubes: PIV,    GREEN TEAM SPECTRA: [id number]

## 2021-11-13 NOTE — PROGRESS NOTE ADULT - PROVIDER SPECIALTY LIST ADULT
Surgery
Hospitalist
Internal Medicine
Internal Medicine
Surgery

## 2021-11-13 NOTE — PROGRESS NOTE ADULT - REASON FOR ADMISSION
Abdominal Pain

## 2021-11-13 NOTE — PROGRESS NOTE ADULT - SUBJECTIVE AND OBJECTIVE BOX
GENERAL SURGERY PROGRESS NOTE    Patient: RAMÍREZ RODARTE , 56y ([birth year])Female   MRN: [id number]  Location: [place] [address] [address]  Visit: 11-01-21 Inpatient  Date: 11-13-21 @ 05:00    Hospital Day #: 13  Post-Op Day #: 10    Procedure/Dx/Injuries: 56F s/p ex-lap; entrolysis, removal of foreign body    Events of past 24 hours: Patient was able to have a small bowel movement (not confirmed by nurse) and is passing gas. Patient expresses some nausea but no vomiting. Patient's potassium and magnesium was replete    PAST MEDICAL & SURGICAL HISTORY:  Back pain  henriated discs    Seizures  last one 6 yrs ago    Migraines    Hypercholesteremia    Seasonal allergic rhinitis, unspecified trigger    GERD (gastroesophageal reflux disease)    Hiatal hernia    Anxiety    HTN (hypertension)    Osteoarthritis    Sciatica    Tobacco use disorder    History of surgery  4 rght knee sx  1 left knee sx  cholecystectomy  rotator cuff right  abdominal sx-- adhesions  c section  ovarian cystectomy  breast cystectomies  colonoscopy   endoscopies  parital hysterectomy    S/p partial hysterectomy with remaining cervical stump    H/O rotator cuff surgery  right    History of appendectomy    S/P cholecystectomy        Vitals:   T(F): 97.9 (11-12-21 @ 19:57), Max: 99.4 (11-12-21 @ 05:04)  HR: 89 (11-12-21 @ 19:57)  BP: 116/59 (11-12-21 @ 19:57)  RR: 18 (11-12-21 @ 19:57)  SpO2: 94% (11-12-21 @ 07:55)          Fluids:     I & O's:    11-11-21 @ 07:01  -  11-12-21 @ 07:00  --------------------------------------------------------  IN:  Total IN: 0 mL    OUT:    Bulb (mL): 7.5 mL  Total OUT: 7.5 mL    Total NET: -7.5 mL    Bowel Movement: : [x] YES [] NO  Flatus: : [x] YES [] NO    PHYSICAL EXAM:  General: NAD  HEENT: EOMI  Cardiac: RRR  Respiratory: normal respiratory effort, breath sounds equal BL, no wheeze, rhonchi or crackles  Abdomen: Soft, non-distended, non-tender, no rebound, no guarding  -Saw drain: serosanguinous   Musculoskeletal: compartments soft  Neuro: Sensation grossly intact and equal throughout, no focal deficits  Vascular: Pulses 2+ throughout, extremities well perfused  Skin: Warm/dry, normal color, no jaundice    MEDICATIONS  (STANDING):  acetaminophen     Tablet .. 650 milliGRAM(s) Oral every 6 hours  artificial  tears Solution 1 Drop(s) Both EYES three times a day  atorvastatin 80 milliGRAM(s) Oral at bedtime  clonazePAM  Tablet 2 milliGRAM(s) Oral four times a day  enoxaparin Injectable 40 milliGRAM(s) SubCutaneous daily  influenza   Vaccine 0.5 milliLiter(s) IntraMuscular once  levETIRAcetam 250 milliGRAM(s) Oral two times a day  methocarbamol 500 milliGRAM(s) Oral three times a day  NIFEdipine XL 60 milliGRAM(s) Oral two times a day  pantoprazole  Injectable 40 milliGRAM(s) IV Push daily  polyethylene glycol 3350 17 Gram(s) Oral two times a day  senna 2 Tablet(s) Oral at bedtime  tiotropium 18 MICROgram(s) Capsule 1 Capsule(s) Inhalation daily    MEDICATIONS  (PRN):  naloxone Injectable 0.1 milliGRAM(s) IV Push every 3 minutes PRN For ANY of the following changes in patient status:  A. RR LESS THAN 10 breaths per minute, B. Oxygen saturation LESS THAN 90%, C. Sedation score of 6  ondansetron Injectable 4 milliGRAM(s) IV Push every 6 hours PRN Nausea  ondansetron Injectable 4 milliGRAM(s) IV Push every 8 hours PRN Nausea and/or Vomiting  oxyCODONE    IR 5 milliGRAM(s) Oral every 4 hours PRN Moderate Pain (4 - 6)  oxyCODONE    IR 10 milliGRAM(s) Oral every 4 hours PRN Severe Pain (7 - 10)      DVT PROPHYLAXIS: enoxaparin Injectable 40 milliGRAM(s) SubCutaneous daily    GI PROPHYLAXIS: pantoprazole  Injectable 40 milliGRAM(s) IV Push daily    CAPILLARY BLOOD GLUCOSE                        7.8    7.22  )-----------( 385      ( 12 Nov 2021 20:00 )             23.3       Auto Neutrophil %: 63.6 % (11-12-21 @ 20:00)  Auto Immature Granulocyte %: 1.8 % (11-12-21 @ 20:00)    11-12    142  |  107  |  11  ----------------------------<  120<H>  3.8   |  21  |  0.6<L>      Calcium, Total Serum: 8.4 mg/dL (11-12-21 @ 20:00)    ACCESS/ DEVICES:  [x] Peripheral IV  [x] Saw drain   GENERAL SURGERY PROGRESS NOTE    Patient: RAMÍREZ RODARTE , 56y ([birth year])Female   MRN: [id number]  Location: [address]  Visit: 11-01-21 Inpatient  Date: 11-13-21 @ 05:00    Hospital Day #: 13  Post-Op Day #: 10    Procedure/Dx/Injuries: 56F s/p ex-lap; entrolysis, removal of foreign body    Events of past 24 hours: Patient was able to have a small bowel movement (not confirmed by nurse) and is passing gas. Patient expresses some nausea but no vomiting. Patient's potassium and magnesium was replete. Saw drain was removed    PAST MEDICAL & SURGICAL HISTORY:  Back pain  henriated discs    Seizures  last one 6 yrs ago    Migraines    Hypercholesteremia    Seasonal allergic rhinitis, unspecified trigger    GERD (gastroesophageal reflux disease)    Hiatal hernia    Anxiety    HTN (hypertension)    Osteoarthritis    Sciatica    Tobacco use disorder    History of surgery  4 rght knee sx  1 left knee sx  cholecystectomy  rotator cuff right  abdominal sx-- adhesions  c section  ovarian cystectomy  breast cystectomies  colonoscopy   endoscopies  parital hysterectomy    S/p partial hysterectomy with remaining cervical stump    H/O rotator cuff surgery  right    History of appendectomy    S/P cholecystectomy        Vitals:   T(F): 97.9 (11-12-21 @ 19:57), Max: 99.4 (11-12-21 @ 05:04)  HR: 89 (11-12-21 @ 19:57)  BP: 116/59 (11-12-21 @ 19:57)  RR: 18 (11-12-21 @ 19:57)  SpO2: 94% (11-12-21 @ 07:55)          Fluids:     I & O's:    11-11-21 @ 07:01  -  11-12-21 @ 07:00  --------------------------------------------------------  IN:  Total IN: 0 mL    OUT:    Bulb (mL): 7.5 mL  Total OUT: 7.5 mL    Total NET: -7.5 mL    Bowel Movement: : [x] YES [] NO  Flatus: : [x] YES [] NO    PHYSICAL EXAM:  General: NAD  HEENT: EOMI  Cardiac: RRR  Respiratory: normal respiratory effort, breath sounds equal BL, no wheeze, rhonchi or crackles  Abdomen: Soft, non-distended, non-tender, no rebound, no guarding  Musculoskeletal: compartments soft  Neuro: Sensation grossly intact and equal throughout, no focal deficits  Vascular: Pulses 2+ throughout, extremities well perfused  Skin: Warm/dry, normal color, no jaundice    MEDICATIONS  (STANDING):  acetaminophen     Tablet .. 650 milliGRAM(s) Oral every 6 hours  artificial  tears Solution 1 Drop(s) Both EYES three times a day  atorvastatin 80 milliGRAM(s) Oral at bedtime  clonazePAM  Tablet 2 milliGRAM(s) Oral four times a day  enoxaparin Injectable 40 milliGRAM(s) SubCutaneous daily  influenza   Vaccine 0.5 milliLiter(s) IntraMuscular once  levETIRAcetam 250 milliGRAM(s) Oral two times a day  methocarbamol 500 milliGRAM(s) Oral three times a day  NIFEdipine XL 60 milliGRAM(s) Oral two times a day  pantoprazole  Injectable 40 milliGRAM(s) IV Push daily  polyethylene glycol 3350 17 Gram(s) Oral two times a day  senna 2 Tablet(s) Oral at bedtime  tiotropium 18 MICROgram(s) Capsule 1 Capsule(s) Inhalation daily    MEDICATIONS  (PRN):  naloxone Injectable 0.1 milliGRAM(s) IV Push every 3 minutes PRN For ANY of the following changes in patient status:  A. RR LESS THAN 10 breaths per minute, B. Oxygen saturation LESS THAN 90%, C. Sedation score of 6  ondansetron Injectable 4 milliGRAM(s) IV Push every 6 hours PRN Nausea  ondansetron Injectable 4 milliGRAM(s) IV Push every 8 hours PRN Nausea and/or Vomiting  oxyCODONE    IR 5 milliGRAM(s) Oral every 4 hours PRN Moderate Pain (4 - 6)  oxyCODONE    IR 10 milliGRAM(s) Oral every 4 hours PRN Severe Pain (7 - 10)      DVT PROPHYLAXIS: enoxaparin Injectable 40 milliGRAM(s) SubCutaneous daily    GI PROPHYLAXIS: pantoprazole  Injectable 40 milliGRAM(s) IV Push daily    CAPILLARY BLOOD GLUCOSE                        7.8    7.22  )-----------( 385      ( 12 Nov 2021 20:00 )             23.3       Auto Neutrophil %: 63.6 % (11-12-21 @ 20:00)  Auto Immature Granulocyte %: 1.8 % (11-12-21 @ 20:00)    11-12    142  |  107  |  11  ----------------------------<  120<H>  3.8   |  21  |  0.6<L>      Calcium, Total Serum: 8.4 mg/dL (11-12-21 @ 20:00)    ACCESS/ DEVICES:  [x] Peripheral IV

## 2021-11-23 DIAGNOSIS — Z90.710 ACQUIRED ABSENCE OF BOTH CERVIX AND UTERUS: ICD-10-CM

## 2021-11-23 DIAGNOSIS — R10.33 PERIUMBILICAL PAIN: ICD-10-CM

## 2021-11-23 DIAGNOSIS — K66.0 PERITONEAL ADHESIONS (POSTPROCEDURAL) (POSTINFECTION): ICD-10-CM

## 2021-11-23 DIAGNOSIS — Z88.0 ALLERGY STATUS TO PENICILLIN: ICD-10-CM

## 2021-11-23 DIAGNOSIS — K52.9 NONINFECTIVE GASTROENTERITIS AND COLITIS, UNSPECIFIED: ICD-10-CM

## 2021-11-23 DIAGNOSIS — K91.89 OTHER POSTPROCEDURAL COMPLICATIONS AND DISORDERS OF DIGESTIVE SYSTEM: ICD-10-CM
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
## 2021-11-23 DIAGNOSIS — Z90.721 ACQUIRED ABSENCE OF OVARIES, UNILATERAL: ICD-10-CM

## 2021-11-23 DIAGNOSIS — F41.9 ANXIETY DISORDER, UNSPECIFIED: ICD-10-CM

## 2021-11-23 DIAGNOSIS — J44.9 CHRONIC OBSTRUCTIVE PULMONARY DISEASE, UNSPECIFIED: ICD-10-CM

## 2021-11-23 DIAGNOSIS — G40.909 EPILEPSY, UNSPECIFIED, NOT INTRACTABLE, WITHOUT STATUS EPILEPTICUS: ICD-10-CM

## 2021-11-23 DIAGNOSIS — K63.89 OTHER SPECIFIED DISEASES OF INTESTINE: ICD-10-CM

## 2021-11-23 DIAGNOSIS — K44.9 DIAPHRAGMATIC HERNIA WITHOUT OBSTRUCTION OR GANGRENE: ICD-10-CM

## 2021-11-23 DIAGNOSIS — M19.90 UNSPECIFIED OSTEOARTHRITIS, UNSPECIFIED SITE: ICD-10-CM

## 2021-11-23 DIAGNOSIS — T18.3XXA FOREIGN BODY IN SMALL INTESTINE, INITIAL ENCOUNTER: ICD-10-CM

## 2021-11-23 DIAGNOSIS — I10 ESSENTIAL (PRIMARY) HYPERTENSION: ICD-10-CM

## 2021-11-23 DIAGNOSIS — G89.29 OTHER CHRONIC PAIN: ICD-10-CM

## 2021-11-23 DIAGNOSIS — F17.200 NICOTINE DEPENDENCE, UNSPECIFIED, UNCOMPLICATED: ICD-10-CM

## 2021-11-23 DIAGNOSIS — I95.9 HYPOTENSION, UNSPECIFIED: ICD-10-CM

## 2021-11-23 DIAGNOSIS — K56.699 OTHER INTESTINAL OBSTRUCTION UNSPECIFIED AS TO PARTIAL VERSUS COMPLETE OBSTRUCTION: ICD-10-CM

## 2021-11-23 DIAGNOSIS — K56.7 ILEUS, UNSPECIFIED: ICD-10-CM
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
## 2021-11-23 DIAGNOSIS — Z90.49 ACQUIRED ABSENCE OF OTHER SPECIFIED PARTS OF DIGESTIVE TRACT: ICD-10-CM

## 2021-11-23 DIAGNOSIS — K21.9 GASTRO-ESOPHAGEAL REFLUX DISEASE WITHOUT ESOPHAGITIS: ICD-10-CM

## 2022-02-07 PROBLEM — M19.90 UNSPECIFIED OSTEOARTHRITIS, UNSPECIFIED SITE: Chronic | Status: ACTIVE | Noted: 2021-11-02

## 2022-02-07 PROBLEM — F17.200 NICOTINE DEPENDENCE, UNSPECIFIED, UNCOMPLICATED: Chronic | Status: ACTIVE | Noted: 2021-11-02

## 2022-02-07 PROBLEM — M54.30 SCIATICA, UNSPECIFIED SIDE: Chronic | Status: ACTIVE | Noted: 2021-11-02

## 2022-02-10 ENCOUNTER — OUTPATIENT (OUTPATIENT)
Dept: OUTPATIENT SERVICES | Facility: HOSPITAL | Age: 57
LOS: 1 days | Discharge: HOME | End: 2022-02-10
Payer: MEDICARE

## 2022-02-10 DIAGNOSIS — Z98.890 OTHER SPECIFIED POSTPROCEDURAL STATES: Chronic | ICD-10-CM

## 2022-02-10 DIAGNOSIS — Z90.711 ACQUIRED ABSENCE OF UTERUS WITH REMAINING CERVICAL STUMP: Chronic | ICD-10-CM

## 2022-02-10 DIAGNOSIS — K46.9 UNSPECIFIED ABDOMINAL HERNIA WITHOUT OBSTRUCTION OR GANGRENE: ICD-10-CM

## 2022-02-10 DIAGNOSIS — Z90.49 ACQUIRED ABSENCE OF OTHER SPECIFIED PARTS OF DIGESTIVE TRACT: Chronic | ICD-10-CM

## 2022-02-10 PROCEDURE — 74178 CT ABD&PLV WO CNTR FLWD CNTR: CPT | Mod: 26,MH

## 2022-05-15 ENCOUNTER — EMERGENCY (EMERGENCY)
Facility: HOSPITAL | Age: 57
LOS: 0 days | Discharge: HOME | End: 2022-05-15
Attending: EMERGENCY MEDICINE | Admitting: EMERGENCY MEDICINE
Payer: MEDICARE

## 2022-05-15 VITALS — TEMPERATURE: 98 F | SYSTOLIC BLOOD PRESSURE: 165 MMHG | HEART RATE: 80 BPM | DIASTOLIC BLOOD PRESSURE: 93 MMHG

## 2022-05-15 VITALS
DIASTOLIC BLOOD PRESSURE: 70 MMHG | SYSTOLIC BLOOD PRESSURE: 178 MMHG | OXYGEN SATURATION: 100 % | HEIGHT: 62 IN | RESPIRATION RATE: 18 BRPM | HEART RATE: 98 BPM | TEMPERATURE: 98 F

## 2022-05-15 DIAGNOSIS — E78.00 PURE HYPERCHOLESTEROLEMIA, UNSPECIFIED: ICD-10-CM

## 2022-05-15 DIAGNOSIS — Z90.89 ACQUIRED ABSENCE OF OTHER ORGANS: ICD-10-CM

## 2022-05-15 DIAGNOSIS — Z90.710 ACQUIRED ABSENCE OF BOTH CERVIX AND UTERUS: ICD-10-CM

## 2022-05-15 DIAGNOSIS — I10 ESSENTIAL (PRIMARY) HYPERTENSION: ICD-10-CM

## 2022-05-15 DIAGNOSIS — K21.9 GASTRO-ESOPHAGEAL REFLUX DISEASE WITHOUT ESOPHAGITIS: ICD-10-CM

## 2022-05-15 DIAGNOSIS — Z88.8 ALLERGY STATUS TO OTHER DRUGS, MEDICAMENTS AND BIOLOGICAL SUBSTANCES STATUS: ICD-10-CM

## 2022-05-15 DIAGNOSIS — F41.9 ANXIETY DISORDER, UNSPECIFIED: ICD-10-CM

## 2022-05-15 DIAGNOSIS — Z98.890 OTHER SPECIFIED POSTPROCEDURAL STATES: Chronic | ICD-10-CM

## 2022-05-15 DIAGNOSIS — K44.9 DIAPHRAGMATIC HERNIA WITHOUT OBSTRUCTION OR GANGRENE: ICD-10-CM

## 2022-05-15 DIAGNOSIS — Z90.711 ACQUIRED ABSENCE OF UTERUS WITH REMAINING CERVICAL STUMP: Chronic | ICD-10-CM

## 2022-05-15 DIAGNOSIS — Z88.6 ALLERGY STATUS TO ANALGESIC AGENT: ICD-10-CM

## 2022-05-15 DIAGNOSIS — K83.8 OTHER SPECIFIED DISEASES OF BILIARY TRACT: ICD-10-CM

## 2022-05-15 DIAGNOSIS — Z20.822 CONTACT WITH AND (SUSPECTED) EXPOSURE TO COVID-19: ICD-10-CM

## 2022-05-15 DIAGNOSIS — K59.00 CONSTIPATION, UNSPECIFIED: ICD-10-CM

## 2022-05-15 DIAGNOSIS — R56.9 UNSPECIFIED CONVULSIONS: ICD-10-CM

## 2022-05-15 DIAGNOSIS — E78.5 HYPERLIPIDEMIA, UNSPECIFIED: ICD-10-CM

## 2022-05-15 DIAGNOSIS — Z90.49 ACQUIRED ABSENCE OF OTHER SPECIFIED PARTS OF DIGESTIVE TRACT: ICD-10-CM

## 2022-05-15 DIAGNOSIS — G89.29 OTHER CHRONIC PAIN: ICD-10-CM

## 2022-05-15 DIAGNOSIS — G43.909 MIGRAINE, UNSPECIFIED, NOT INTRACTABLE, WITHOUT STATUS MIGRAINOSUS: ICD-10-CM

## 2022-05-15 DIAGNOSIS — Z90.49 ACQUIRED ABSENCE OF OTHER SPECIFIED PARTS OF DIGESTIVE TRACT: Chronic | ICD-10-CM

## 2022-05-15 DIAGNOSIS — Z88.0 ALLERGY STATUS TO PENICILLIN: ICD-10-CM

## 2022-05-15 DIAGNOSIS — K52.9 NONINFECTIVE GASTROENTERITIS AND COLITIS, UNSPECIFIED: ICD-10-CM

## 2022-05-15 DIAGNOSIS — Z88.1 ALLERGY STATUS TO OTHER ANTIBIOTIC AGENTS STATUS: ICD-10-CM

## 2022-05-15 DIAGNOSIS — R10.9 UNSPECIFIED ABDOMINAL PAIN: ICD-10-CM

## 2022-05-15 DIAGNOSIS — M19.90 UNSPECIFIED OSTEOARTHRITIS, UNSPECIFIED SITE: ICD-10-CM

## 2022-05-15 DIAGNOSIS — F17.210 NICOTINE DEPENDENCE, CIGARETTES, UNCOMPLICATED: ICD-10-CM

## 2022-05-15 LAB
ALBUMIN SERPL ELPH-MCNC: 4.9 G/DL — SIGNIFICANT CHANGE UP (ref 3.5–5.2)
ALP SERPL-CCNC: 105 U/L — SIGNIFICANT CHANGE UP (ref 30–115)
ALT FLD-CCNC: 29 U/L — SIGNIFICANT CHANGE UP (ref 0–41)
ANION GAP SERPL CALC-SCNC: 14 MMOL/L — SIGNIFICANT CHANGE UP (ref 7–14)
APPEARANCE UR: CLEAR — SIGNIFICANT CHANGE UP
AST SERPL-CCNC: 30 U/L — SIGNIFICANT CHANGE UP (ref 0–41)
BASOPHILS # BLD AUTO: 0.04 K/UL — SIGNIFICANT CHANGE UP (ref 0–0.2)
BASOPHILS NFR BLD AUTO: 0.7 % — SIGNIFICANT CHANGE UP (ref 0–1)
BILIRUB SERPL-MCNC: 0.2 MG/DL — SIGNIFICANT CHANGE UP (ref 0.2–1.2)
BILIRUB UR-MCNC: NEGATIVE — SIGNIFICANT CHANGE UP
BUN SERPL-MCNC: 15 MG/DL — SIGNIFICANT CHANGE UP (ref 10–20)
CALCIUM SERPL-MCNC: 9.7 MG/DL — SIGNIFICANT CHANGE UP (ref 8.5–10.1)
CHLORIDE SERPL-SCNC: 101 MMOL/L — SIGNIFICANT CHANGE UP (ref 98–110)
CO2 SERPL-SCNC: 24 MMOL/L — SIGNIFICANT CHANGE UP (ref 17–32)
COLOR SPEC: SIGNIFICANT CHANGE UP
CREAT SERPL-MCNC: 0.9 MG/DL — SIGNIFICANT CHANGE UP (ref 0.7–1.5)
DIFF PNL FLD: NEGATIVE — SIGNIFICANT CHANGE UP
EGFR: 75 ML/MIN/1.73M2 — SIGNIFICANT CHANGE UP
EOSINOPHIL # BLD AUTO: 0.03 K/UL — SIGNIFICANT CHANGE UP (ref 0–0.7)
EOSINOPHIL NFR BLD AUTO: 0.5 % — SIGNIFICANT CHANGE UP (ref 0–8)
GLUCOSE SERPL-MCNC: 112 MG/DL — HIGH (ref 70–99)
GLUCOSE UR QL: NEGATIVE — SIGNIFICANT CHANGE UP
HCT VFR BLD CALC: 35.7 % — LOW (ref 37–47)
HGB BLD-MCNC: 12.3 G/DL — SIGNIFICANT CHANGE UP (ref 12–16)
IMM GRANULOCYTES NFR BLD AUTO: 0.2 % — SIGNIFICANT CHANGE UP (ref 0.1–0.3)
KETONES UR-MCNC: NEGATIVE — SIGNIFICANT CHANGE UP
LACTATE SERPL-SCNC: 0.7 MMOL/L — SIGNIFICANT CHANGE UP (ref 0.7–2)
LEUKOCYTE ESTERASE UR-ACNC: NEGATIVE — SIGNIFICANT CHANGE UP
LIDOCAIN IGE QN: 17 U/L — SIGNIFICANT CHANGE UP (ref 7–60)
LYMPHOCYTES # BLD AUTO: 1.33 K/UL — SIGNIFICANT CHANGE UP (ref 1.2–3.4)
LYMPHOCYTES # BLD AUTO: 23.6 % — SIGNIFICANT CHANGE UP (ref 20.5–51.1)
MCHC RBC-ENTMCNC: 31.5 PG — HIGH (ref 27–31)
MCHC RBC-ENTMCNC: 34.5 G/DL — SIGNIFICANT CHANGE UP (ref 32–37)
MCV RBC AUTO: 91.3 FL — SIGNIFICANT CHANGE UP (ref 81–99)
MONOCYTES # BLD AUTO: 0.47 K/UL — SIGNIFICANT CHANGE UP (ref 0.1–0.6)
MONOCYTES NFR BLD AUTO: 8.3 % — SIGNIFICANT CHANGE UP (ref 1.7–9.3)
NEUTROPHILS # BLD AUTO: 3.75 K/UL — SIGNIFICANT CHANGE UP (ref 1.4–6.5)
NEUTROPHILS NFR BLD AUTO: 66.7 % — SIGNIFICANT CHANGE UP (ref 42.2–75.2)
NITRITE UR-MCNC: NEGATIVE — SIGNIFICANT CHANGE UP
NRBC # BLD: 0 /100 WBCS — SIGNIFICANT CHANGE UP (ref 0–0)
PH UR: 6.5 — SIGNIFICANT CHANGE UP (ref 5–8)
PLATELET # BLD AUTO: 260 K/UL — SIGNIFICANT CHANGE UP (ref 130–400)
POTASSIUM SERPL-MCNC: 3.9 MMOL/L — SIGNIFICANT CHANGE UP (ref 3.5–5)
POTASSIUM SERPL-SCNC: 3.9 MMOL/L — SIGNIFICANT CHANGE UP (ref 3.5–5)
PROT SERPL-MCNC: 7.6 G/DL — SIGNIFICANT CHANGE UP (ref 6–8)
PROT UR-MCNC: NEGATIVE — SIGNIFICANT CHANGE UP
RBC # BLD: 3.91 M/UL — LOW (ref 4.2–5.4)
RBC # FLD: 14.3 % — SIGNIFICANT CHANGE UP (ref 11.5–14.5)
SARS-COV-2 RNA SPEC QL NAA+PROBE: SIGNIFICANT CHANGE UP
SODIUM SERPL-SCNC: 139 MMOL/L — SIGNIFICANT CHANGE UP (ref 135–146)
SP GR SPEC: 1.01 — SIGNIFICANT CHANGE UP (ref 1.01–1.03)
UROBILINOGEN FLD QL: SIGNIFICANT CHANGE UP
WBC # BLD: 5.63 K/UL — SIGNIFICANT CHANGE UP (ref 4.8–10.8)
WBC # FLD AUTO: 5.63 K/UL — SIGNIFICANT CHANGE UP (ref 4.8–10.8)

## 2022-05-15 PROCEDURE — 74177 CT ABD & PELVIS W/CONTRAST: CPT | Mod: 26,MA

## 2022-05-15 PROCEDURE — 99285 EMERGENCY DEPT VISIT HI MDM: CPT

## 2022-05-15 RX ORDER — SODIUM CHLORIDE 9 MG/ML
1000 INJECTION INTRAMUSCULAR; INTRAVENOUS; SUBCUTANEOUS ONCE
Refills: 0 | Status: COMPLETED | OUTPATIENT
Start: 2022-05-15 | End: 2022-05-15

## 2022-05-15 RX ORDER — METRONIDAZOLE 500 MG
1 TABLET ORAL
Qty: 30 | Refills: 0
Start: 2022-05-15 | End: 2022-05-24

## 2022-05-15 RX ORDER — IOHEXOL 300 MG/ML
30 INJECTION, SOLUTION INTRAVENOUS ONCE
Refills: 0 | Status: COMPLETED | OUTPATIENT
Start: 2022-05-15 | End: 2022-05-15

## 2022-05-15 RX ORDER — MORPHINE SULFATE 50 MG/1
4 CAPSULE, EXTENDED RELEASE ORAL ONCE
Refills: 0 | Status: DISCONTINUED | OUTPATIENT
Start: 2022-05-15 | End: 2022-05-15

## 2022-05-15 RX ORDER — ONDANSETRON 8 MG/1
4 TABLET, FILM COATED ORAL ONCE
Refills: 0 | Status: COMPLETED | OUTPATIENT
Start: 2022-05-15 | End: 2022-05-15

## 2022-05-15 RX ADMIN — SODIUM CHLORIDE 1000 MILLILITER(S): 9 INJECTION INTRAMUSCULAR; INTRAVENOUS; SUBCUTANEOUS at 09:07

## 2022-05-15 RX ADMIN — MORPHINE SULFATE 4 MILLIGRAM(S): 50 CAPSULE, EXTENDED RELEASE ORAL at 09:08

## 2022-05-15 RX ADMIN — IOHEXOL 30 MILLILITER(S): 300 INJECTION, SOLUTION INTRAVENOUS at 09:07

## 2022-05-15 RX ADMIN — MORPHINE SULFATE 4 MILLIGRAM(S): 50 CAPSULE, EXTENDED RELEASE ORAL at 09:33

## 2022-05-15 RX ADMIN — ONDANSETRON 4 MILLIGRAM(S): 8 TABLET, FILM COATED ORAL at 09:07

## 2022-05-15 RX ADMIN — SODIUM CHLORIDE 1000 MILLILITER(S): 9 INJECTION INTRAMUSCULAR; INTRAVENOUS; SUBCUTANEOUS at 10:07

## 2022-05-15 NOTE — ED PROVIDER NOTE - PROGRESS NOTE DETAILS
MQ: Will obtain CT abd/pelvis with oral and IV contrast, will obtain labs, urine, and give pain control, IV fluids, and reassess MQ: CT shows no obstruction, shows colitis, discussed results, will need f/u with surgery and GI, will d/c

## 2022-05-15 NOTE — ED PROVIDER NOTE - PATIENT PORTAL LINK FT
You can access the FollowMyHealth Patient Portal offered by [facility] by registering at the following website: [email or web address]. By joining [facility]’s FollowMyHealth portal, you will also be able to view your health information using other applications (apps) compatible with our system.

## 2022-05-15 NOTE — ED PROVIDER NOTE - NS ED ROS FT
Review of Systems:  •	CONSTITUTIONAL - No fever, No diaphoresis, No weight change  •	SKIN - No rash  •	HEMATOLOGIC - No abnormal bleeding or bruising  •	EYES - No eye pain, No blurred vision  •	ENT - No change in hearing, No sore throat, No neck pain, No rhinorrhea, No ear pain  •	RESPIRATORY - No shortness of breath, No cough  •	CARDIAC -No chest pain, No palpitations  •	GI - + abdominal pain, No nausea, No vomiting, No diarrhea, + constipation, No bright red blood per rectum or melena. No flank pain  •                 - No dysuria, frequency, hematuria.   •	ENDO - No polydypsia, No polyuria, No heat/cold intolerance  •	MUSCULOSKELETAL - No joint paint, No swelling, No back pain  •	NEUROLOGIC - No numbness, No focal weakness, No headache, No dizziness  All other systems negative, unless specified in HPI

## 2022-05-15 NOTE — ED PROVIDER NOTE - NSICDXPASTMEDICALHX_GEN_ALL_CORE_FT
PAST MEDICAL HISTORY:  Anxiety     Back pain henriated discs    GERD (gastroesophageal reflux disease)     Hiatal hernia     HTN (hypertension)     Hypercholesteremia     Migraines     Osteoarthritis     Sciatica     Seasonal allergic rhinitis, unspecified trigger     Seizures last one 6 yrs ago    Tobacco use disorder

## 2022-05-15 NOTE — ED PROVIDER NOTE - ATTENDING CONTRIBUTION TO CARE
56-year-old female with extensive surgical history as above presents for evaluation of abdominal pain and constipation for past 3 days here labs grossly unremarkable CT shows minimal wall thickening of the right transverse colon suggestive of colitis.  Patient given antibiotics for this.  Additionally patient found to have increasing biliary ductal dilation with normal LFTs patient was advised that this and to speak to gastroenterologist as an outpatient for further evaluation.

## 2022-05-15 NOTE — ED PROVIDER NOTE - OBJECTIVE STATEMENT
Patient is a 57 yo female with PMHx of GERD, HTN, HLD, seizure, s/p appendectomy, s/p cholecystectomy, s/p hysterectomy, s/p exlap for foreign body removal c/o abdominal pain and constipation for the past couple of days. Patient has had chronic, b/l, stabbing, intermittent, moderate abdominal pain since last November 2021, when she had a foreign body removed from abdomen with extensive surgery, but pain has gotten severe over the past couple of days. Takes muscle relaxant and medical marijuana with some relief of pain. Also, has had no BM since 3 days ago, but can pass some flatus. Denies fever, chills, chest pain, SOB, cough, n/v, dysuria. Saw surgeon Dr. Martinez 2 to 3 weeks ago and was normal.

## 2022-05-15 NOTE — ED PROVIDER NOTE - PROVIDER TOKENS
FREE:[LAST:[margarita],PHONE:[(   )    -],FAX:[(   )    -],FOLLOWUP:[4-6 Days]],PROVIDER:[TOKEN:[[id number]],FOLLOWUP:[4-6 Days]]

## 2022-05-15 NOTE — ED PROVIDER NOTE - PHYSICAL EXAMINATION
CONSTITUTIONAL: in no acute distress.   SKIN: warm, dry  HEAD: Normocephalic; atraumatic.  EYES: no conjunctival injection. PERRL.   ENT: No nasal discharge; airway clear.  NECK: Supple; non tender.  CARD: S1, S2 normal; no murmurs, gallops, or rubs. Regular rate and rhythm.   RESP: No wheezes, rales or rhonchi.  ABD: soft, healed large vertical surgical scar, tender diffusely, no rebound tenderness or guarding  EXT: Normal ROM.  No clubbing, cyanosis or edema.   LYMPH: No acute cervical adenopathy.  NEURO: Alert, oriented, grossly unremarkable  PSYCH: Cooperative, appropriate.

## 2022-05-15 NOTE — ED PROVIDER NOTE - NSFOLLOWUPINSTRUCTIONS_ED_ALL_ED_FT
Colitis       Colitis is a condition in which the colon is inflamed. It can cause diarrhea, blood in the stool, and abdominal pain. Colitis can last a short time (be acute), or it may last a long time (become chronic).      What are the causes?    This condition may be caused by:  •Infections from viruses or bacteria.      •A reaction to medicine.      •Certain autoimmune diseases, such as Crohn's disease or ulcerative colitis.      •Radiation treatment.      •Decreased blood flow to the bowel (ischemia).        What are the signs or symptoms?    Symptoms of this condition include:  •Diarrhea, blood in the stool, or black, tarry stool.      •Pain in the joints or abdominal pain.      •Fever or fatigue.      •Vomiting.      •Weight loss.      •Bloating.      •Having fewer bowel movements than usual.      •A strong and sudden urge to have a bowel movement.      •Feeling like the bowel is not empty after a bowel movement.        How is this diagnosed?    This condition may be diagnosed based on a stool test and a blood test. You may also have other tests, such as:  •X-rays.      •CT scan.      •Colonoscopy.      •Endoscopy.      •Biopsy.        How is this treated?    Treatment for this condition depends on the cause. This condition may be treated with:  •Steps to rest the bowel, such as not eating or drinking for a period of time.      •Fluids that are given through an IV.      •Medicine for pain and diarrhea.      •Antibiotic medicines.      •Cortisone medicines.      •Surgery.        Follow these instructions at home:      Eating and drinking      •Follow instructions from your health care provider about eating or drinking restrictions.      •Drink enough fluid to keep your urine pale yellow.      •Work with a dietitian to determine whether certain foods cause your condition to flare up.      •Avoid foods or drinks that cause flare-ups.      •Eat a well-balanced diet.      General instructions     •If you were prescribed an antibiotic medicine, take it as told by your health care provider. Do not stop taking the antibiotic even if you start to feel better.      •Take over-the-counter and prescription medicines only as told by your health care provider.      •Keep all follow-up visits. This is important.        Contact a health care provider if:    •Your symptoms do not go away.      •You develop new symptoms.        Get help right away if:    •You have a fever that does not go away with treatment.      •You develop chills.      •You have extreme weakness, fainting, or dehydration.      •You vomit repeatedly.      •You develop severe pain in your abdomen.      •You pass bloody or tarry stool.        Summary    •Colitis is a condition in which the colon is inflamed. Colitis can last a short time (be acute), or it may last a long time (become chronic).      •Treatment for this condition depends on the cause and may include resting the bowel, taking medicines, or having surgery.      •If you were prescribed an antibiotic medicine, take it as told by your health care provider. Do not stop taking the antibiotic even if you start to feel better.      •Get help right away if you develop severe pain in your abdomen.      •Keep all follow-up visits. This is important.      This information is not intended to replace advice given to you by your health care provider. Make sure you discuss any questions you have with your health care provider.    Please follow up with surgeon and GI doctor in 1 week.

## 2022-05-15 NOTE — ED PROVIDER NOTE - CARE PROVIDER_API CALL
margarita,   Phone: (   )    -  Fax: (   )    -  Follow Up Time: 4-6 Days    Javier Avila)  Gastroenterology; Internal Medicine  [address] [place] [address]  Phone: [phone]  Fax: [phone]  Follow Up Time: 4-6 Days

## 2022-05-16 LAB
CULTURE RESULTS: NO GROWTH — SIGNIFICANT CHANGE UP
SPECIMEN SOURCE: SIGNIFICANT CHANGE UP

## 2022-07-20 ENCOUNTER — APPOINTMENT (OUTPATIENT)
Dept: CARDIOLOGY | Facility: CLINIC | Age: 57
End: 2022-07-20

## 2022-07-20 VITALS
BODY MASS INDEX: 24.31 KG/M2 | HEIGHT: 62 IN | WEIGHT: 132.1 LBS | HEART RATE: 98 BPM | DIASTOLIC BLOOD PRESSURE: 64 MMHG | SYSTOLIC BLOOD PRESSURE: 148 MMHG

## 2022-07-20 DIAGNOSIS — R00.2 PALPITATIONS: ICD-10-CM

## 2022-07-20 DIAGNOSIS — Z13.6 ENCOUNTER FOR SCREENING FOR CARDIOVASCULAR DISORDERS: ICD-10-CM

## 2022-07-20 PROCEDURE — 93000 ELECTROCARDIOGRAM COMPLETE: CPT

## 2022-07-20 PROCEDURE — 99214 OFFICE O/P EST MOD 30 MIN: CPT | Mod: 25

## 2022-07-20 RX ORDER — TIZANIDINE 4 MG/1
4 TABLET ORAL
Refills: 0 | Status: ACTIVE | COMMUNITY

## 2022-07-20 RX ORDER — CLONAZEPAM 2 MG/1
2 TABLET ORAL
Refills: 0 | Status: ACTIVE | COMMUNITY

## 2022-07-20 NOTE — ASSESSMENT
[FreeTextEntry1] : 56 F with uncontrolled hypertension, nonobstructive CAD and palpitations.\par \par \par Start Metoprolol succinate 50 mg daily\par Continue Nifedipine 60 mg BID\par Continue aspirin 81 mg

## 2022-07-20 NOTE — HISTORY OF PRESENT ILLNESS
[FreeTextEntry1] : 57 yo F with uncontrolled HTN, nonobstructive CAD, current smoker presents for follow up.  Chronic arthritic pain goes to pain management.  Renal duplex consistent with bilateral renal artery stenosis.  CTA showed no evidence of stenosis.  EGD showed gastritis and a hiatal hernia.  Occasional palpitations.\par \par \par EKG (7/20/2022):  NSR, no ST-T changes\par CCTA (2019):  pLAD <50%, CACS 16\par Adenosine NST (4/18/2021):  No ischemia\par Cath (1/17/2017):  Diffuse MLI, LVEF 45%\par \par 7/2017\par Chol  360\par LDL  245\par HDL  74\par Trig  206\par

## 2022-07-20 NOTE — REVIEW OF SYSTEMS
[Negative] : Heme/Lymph [Abdominal Pain] : abdominal pain [Nausea] : nausea [Diarrhea] : diarrhea [Joint Pain] : joint pain [FreeTextEntry5] : See HPI [FreeTextEntry6] : See HPI

## 2022-09-22 NOTE — ED ADULT TRIAGE NOTE - AS HEIGHT TYPE
Chronic eczematous otitis externa of left ear  -     levocetirizine (XYZAL) 5 MG tablet; Take 1 tablet (5 mg total) by mouth every evening.  -     betamethasone valerate 0.1% (VALISONE) 0.1 % Lotn; Apply topically 2 (two) times daily. for 14 days    Hearing loss, unspecified hearing loss type, unspecified laterality  -     Ambulatory referral/consult to Audiology; Future   stated

## 2022-09-23 ENCOUNTER — INPATIENT (INPATIENT)
Facility: HOSPITAL | Age: 57
LOS: 4 days | Discharge: HOME | End: 2022-09-28
Attending: SURGERY | Admitting: SURGERY

## 2022-09-23 ENCOUNTER — APPOINTMENT (OUTPATIENT)
Dept: GASTROENTEROLOGY | Facility: CLINIC | Age: 57
End: 2022-09-23

## 2022-09-23 VITALS
HEART RATE: 81 BPM | HEIGHT: 62 IN | WEIGHT: 125 LBS | SYSTOLIC BLOOD PRESSURE: 152 MMHG | BODY MASS INDEX: 23 KG/M2 | OXYGEN SATURATION: 95 % | DIASTOLIC BLOOD PRESSURE: 83 MMHG

## 2022-09-23 VITALS
RESPIRATION RATE: 20 BRPM | HEART RATE: 96 BPM | HEIGHT: 62 IN | SYSTOLIC BLOOD PRESSURE: 139 MMHG | DIASTOLIC BLOOD PRESSURE: 88 MMHG | OXYGEN SATURATION: 98 % | TEMPERATURE: 97 F

## 2022-09-23 DIAGNOSIS — Z87.09 PERSONAL HISTORY OF OTHER DISEASES OF THE RESPIRATORY SYSTEM: ICD-10-CM

## 2022-09-23 DIAGNOSIS — R51.9 HEADACHE, UNSPECIFIED: ICD-10-CM

## 2022-09-23 DIAGNOSIS — K80.20 CALCULUS OF GALLBLADDER W/OUT CHOLECYSTITIS W/OUT OBSTRUCTION: ICD-10-CM

## 2022-09-23 DIAGNOSIS — Z98.890 OTHER SPECIFIED POSTPROCEDURAL STATES: Chronic | ICD-10-CM

## 2022-09-23 DIAGNOSIS — Z86.39 PERSONAL HISTORY OF OTHER ENDOCRINE, NUTRITIONAL AND METABOLIC DISEASE: ICD-10-CM

## 2022-09-23 DIAGNOSIS — Z86.59 PERSONAL HISTORY OF OTHER MENTAL AND BEHAVIORAL DISORDERS: ICD-10-CM

## 2022-09-23 DIAGNOSIS — R11.10 VOMITING, UNSPECIFIED: ICD-10-CM

## 2022-09-23 DIAGNOSIS — R10.30 LOWER ABDOMINAL PAIN, UNSPECIFIED: ICD-10-CM

## 2022-09-23 DIAGNOSIS — R11.0 NAUSEA: ICD-10-CM

## 2022-09-23 DIAGNOSIS — Z78.9 OTHER SPECIFIED HEALTH STATUS: ICD-10-CM

## 2022-09-23 DIAGNOSIS — Z86.010 PERSONAL HISTORY OF COLONIC POLYPS: ICD-10-CM

## 2022-09-23 DIAGNOSIS — Z90.49 ACQUIRED ABSENCE OF OTHER SPECIFIED PARTS OF DIGESTIVE TRACT: Chronic | ICD-10-CM

## 2022-09-23 DIAGNOSIS — G43.909 MIGRAINE, UNSPECIFIED, NOT INTRACTABLE, W/OUT STATUS MIGRAINOSUS: ICD-10-CM

## 2022-09-23 DIAGNOSIS — G89.29 HEADACHE, UNSPECIFIED: ICD-10-CM

## 2022-09-23 DIAGNOSIS — K57.90 DIVERTICULOSIS OF INTESTINE, PART UNSPECIFIED, W/OUT PERFORATION OR ABSCESS W/OUT BLEEDING: ICD-10-CM

## 2022-09-23 DIAGNOSIS — Z90.711 ACQUIRED ABSENCE OF UTERUS WITH REMAINING CERVICAL STUMP: Chronic | ICD-10-CM

## 2022-09-23 DIAGNOSIS — R56.9 UNSPECIFIED CONVULSIONS: ICD-10-CM

## 2022-09-23 DIAGNOSIS — R10.10 UPPER ABDOMINAL PAIN, UNSPECIFIED: ICD-10-CM

## 2022-09-23 DIAGNOSIS — Z83.79 FAMILY HISTORY OF OTHER DISEASES OF THE DIGESTIVE SYSTEM: ICD-10-CM

## 2022-09-23 DIAGNOSIS — R63.0 ANOREXIA: ICD-10-CM

## 2022-09-23 DIAGNOSIS — Z87.19 PERSONAL HISTORY OF OTHER DISEASES OF THE DIGESTIVE SYSTEM: ICD-10-CM

## 2022-09-23 DIAGNOSIS — N60.19 DIFFUSE CYSTIC MASTOPATHY OF UNSPECIFIED BREAST: ICD-10-CM

## 2022-09-23 DIAGNOSIS — Z86.79 PERSONAL HISTORY OF OTHER DISEASES OF THE CIRCULATORY SYSTEM: ICD-10-CM

## 2022-09-23 DIAGNOSIS — Z87.39 PERSONAL HISTORY OF OTHER DISEASES OF THE MUSCULOSKELETAL SYSTEM AND CONNECTIVE TISSUE: ICD-10-CM

## 2022-09-23 LAB
ALBUMIN SERPL ELPH-MCNC: 4.6 G/DL — SIGNIFICANT CHANGE UP (ref 3.5–5.2)
ALP SERPL-CCNC: 121 U/L — HIGH (ref 30–115)
ALT FLD-CCNC: 49 U/L — HIGH (ref 0–41)
ANION GAP SERPL CALC-SCNC: 11 MMOL/L — SIGNIFICANT CHANGE UP (ref 7–14)
AST SERPL-CCNC: 41 U/L — SIGNIFICANT CHANGE UP (ref 0–41)
BASE EXCESS BLDV CALC-SCNC: 3.9 MMOL/L — HIGH (ref -2–3)
BASOPHILS # BLD AUTO: 0.03 K/UL — SIGNIFICANT CHANGE UP (ref 0–0.2)
BASOPHILS NFR BLD AUTO: 0.4 % — SIGNIFICANT CHANGE UP (ref 0–1)
BILIRUB SERPL-MCNC: 0.3 MG/DL — SIGNIFICANT CHANGE UP (ref 0.2–1.2)
BUN SERPL-MCNC: 14 MG/DL — SIGNIFICANT CHANGE UP (ref 10–20)
CA-I SERPL-SCNC: 1.2 MMOL/L — SIGNIFICANT CHANGE UP (ref 1.15–1.33)
CALCIUM SERPL-MCNC: 10.1 MG/DL — SIGNIFICANT CHANGE UP (ref 8.4–10.4)
CHLORIDE SERPL-SCNC: 100 MMOL/L — SIGNIFICANT CHANGE UP (ref 98–110)
CO2 SERPL-SCNC: 27 MMOL/L — SIGNIFICANT CHANGE UP (ref 17–32)
CREAT SERPL-MCNC: 0.7 MG/DL — SIGNIFICANT CHANGE UP (ref 0.7–1.5)
EGFR: 100 ML/MIN/1.73M2 — SIGNIFICANT CHANGE UP
EOSINOPHIL # BLD AUTO: 0.02 K/UL — SIGNIFICANT CHANGE UP (ref 0–0.7)
EOSINOPHIL NFR BLD AUTO: 0.3 % — SIGNIFICANT CHANGE UP (ref 0–8)
GAS PNL BLDV: 138 MMOL/L — SIGNIFICANT CHANGE UP (ref 136–145)
GAS PNL BLDV: SIGNIFICANT CHANGE UP
GLUCOSE SERPL-MCNC: 115 MG/DL — HIGH (ref 70–99)
HCO3 BLDV-SCNC: 29 MMOL/L — SIGNIFICANT CHANGE UP (ref 22–29)
HCT VFR BLD CALC: 39.6 % — SIGNIFICANT CHANGE UP (ref 37–47)
HCT VFR BLDA CALC: 47 % — SIGNIFICANT CHANGE UP (ref 39–51)
HGB BLD CALC-MCNC: 15.7 G/DL — SIGNIFICANT CHANGE UP (ref 12.6–17.4)
HGB BLD-MCNC: 13.7 G/DL — SIGNIFICANT CHANGE UP (ref 12–16)
IMM GRANULOCYTES NFR BLD AUTO: 0.3 % — SIGNIFICANT CHANGE UP (ref 0.1–0.3)
LACTATE BLDV-MCNC: 1.7 MMOL/L — SIGNIFICANT CHANGE UP (ref 0.5–2)
LACTATE SERPL-SCNC: 1.8 MMOL/L — SIGNIFICANT CHANGE UP (ref 0.7–2)
LIDOCAIN IGE QN: 13 U/L — SIGNIFICANT CHANGE UP (ref 7–60)
LYMPHOCYTES # BLD AUTO: 1.47 K/UL — SIGNIFICANT CHANGE UP (ref 1.2–3.4)
LYMPHOCYTES # BLD AUTO: 19.5 % — LOW (ref 20.5–51.1)
MCHC RBC-ENTMCNC: 31.9 PG — HIGH (ref 27–31)
MCHC RBC-ENTMCNC: 34.6 G/DL — SIGNIFICANT CHANGE UP (ref 32–37)
MCV RBC AUTO: 92.1 FL — SIGNIFICANT CHANGE UP (ref 81–99)
MONOCYTES # BLD AUTO: 0.61 K/UL — HIGH (ref 0.1–0.6)
MONOCYTES NFR BLD AUTO: 8.1 % — SIGNIFICANT CHANGE UP (ref 1.7–9.3)
NEUTROPHILS # BLD AUTO: 5.4 K/UL — SIGNIFICANT CHANGE UP (ref 1.4–6.5)
NEUTROPHILS NFR BLD AUTO: 71.4 % — SIGNIFICANT CHANGE UP (ref 42.2–75.2)
NRBC # BLD: 0 /100 WBCS — SIGNIFICANT CHANGE UP (ref 0–0)
PCO2 BLDV: 45 MMHG — HIGH (ref 39–42)
PH BLDV: 7.42 — SIGNIFICANT CHANGE UP (ref 7.32–7.43)
PLATELET # BLD AUTO: 268 K/UL — SIGNIFICANT CHANGE UP (ref 130–400)
PO2 BLDV: 38 MMHG — SIGNIFICANT CHANGE UP
POTASSIUM BLDV-SCNC: 4.6 MMOL/L — SIGNIFICANT CHANGE UP (ref 3.5–5.1)
POTASSIUM SERPL-MCNC: 5.4 MMOL/L — HIGH (ref 3.5–5)
POTASSIUM SERPL-SCNC: 5.4 MMOL/L — HIGH (ref 3.5–5)
PROT SERPL-MCNC: 7.3 G/DL — SIGNIFICANT CHANGE UP (ref 6–8)
RBC # BLD: 4.3 M/UL — SIGNIFICANT CHANGE UP (ref 4.2–5.4)
RBC # FLD: 14 % — SIGNIFICANT CHANGE UP (ref 11.5–14.5)
SAO2 % BLDV: 69.4 % — SIGNIFICANT CHANGE UP
SARS-COV-2 RNA SPEC QL NAA+PROBE: SIGNIFICANT CHANGE UP
SODIUM SERPL-SCNC: 138 MMOL/L — SIGNIFICANT CHANGE UP (ref 135–146)
TROPONIN T SERPL-MCNC: <0.01 NG/ML — SIGNIFICANT CHANGE UP
WBC # BLD: 7.55 K/UL — SIGNIFICANT CHANGE UP (ref 4.8–10.8)
WBC # FLD AUTO: 7.55 K/UL — SIGNIFICANT CHANGE UP (ref 4.8–10.8)

## 2022-09-23 PROCEDURE — 74177 CT ABD & PELVIS W/CONTRAST: CPT | Mod: 26,MA

## 2022-09-23 PROCEDURE — 99285 EMERGENCY DEPT VISIT HI MDM: CPT

## 2022-09-23 PROCEDURE — 99205 OFFICE O/P NEW HI 60 MIN: CPT

## 2022-09-23 PROCEDURE — 93010 ELECTROCARDIOGRAM REPORT: CPT

## 2022-09-23 RX ORDER — DIATRIZOATE MEGLUMINE 180 MG/ML
30 INJECTION, SOLUTION INTRAVESICAL ONCE
Refills: 0 | Status: COMPLETED | OUTPATIENT
Start: 2022-09-23 | End: 2022-09-23

## 2022-09-23 RX ORDER — MORPHINE SULFATE 50 MG/1
4 CAPSULE, EXTENDED RELEASE ORAL ONCE
Refills: 0 | Status: DISCONTINUED | OUTPATIENT
Start: 2022-09-23 | End: 2022-09-23

## 2022-09-23 RX ORDER — MORPHINE SULFATE 50 MG/1
6 CAPSULE, EXTENDED RELEASE ORAL ONCE
Refills: 0 | Status: DISCONTINUED | OUTPATIENT
Start: 2022-09-23 | End: 2022-09-23

## 2022-09-23 RX ORDER — SODIUM CHLORIDE 9 MG/ML
1000 INJECTION, SOLUTION INTRAVENOUS ONCE
Refills: 0 | Status: COMPLETED | OUTPATIENT
Start: 2022-09-23 | End: 2022-09-23

## 2022-09-23 RX ORDER — MORPHINE SULFATE 50 MG/1
2 CAPSULE, EXTENDED RELEASE ORAL ONCE
Refills: 0 | Status: DISCONTINUED | OUTPATIENT
Start: 2022-09-23 | End: 2022-09-23

## 2022-09-23 RX ORDER — ONDANSETRON 8 MG/1
4 TABLET, FILM COATED ORAL ONCE
Refills: 0 | Status: COMPLETED | OUTPATIENT
Start: 2022-09-23 | End: 2022-09-23

## 2022-09-23 RX ORDER — SODIUM CHLORIDE 9 MG/ML
1000 INJECTION, SOLUTION INTRAVENOUS
Refills: 0 | Status: DISCONTINUED | OUTPATIENT
Start: 2022-09-23 | End: 2022-09-27

## 2022-09-23 RX ADMIN — MORPHINE SULFATE 2 MILLIGRAM(S): 50 CAPSULE, EXTENDED RELEASE ORAL at 16:27

## 2022-09-23 RX ADMIN — SODIUM CHLORIDE 1000 MILLILITER(S): 9 INJECTION, SOLUTION INTRAVENOUS at 15:00

## 2022-09-23 RX ADMIN — ONDANSETRON 4 MILLIGRAM(S): 8 TABLET, FILM COATED ORAL at 20:19

## 2022-09-23 RX ADMIN — SODIUM CHLORIDE 1000 MILLILITER(S): 9 INJECTION, SOLUTION INTRAVENOUS at 12:28

## 2022-09-23 RX ADMIN — MORPHINE SULFATE 6 MILLIGRAM(S): 50 CAPSULE, EXTENDED RELEASE ORAL at 20:19

## 2022-09-23 RX ADMIN — SODIUM CHLORIDE 125 MILLILITER(S): 9 INJECTION, SOLUTION INTRAVENOUS at 20:19

## 2022-09-23 RX ADMIN — MORPHINE SULFATE 2 MILLIGRAM(S): 50 CAPSULE, EXTENDED RELEASE ORAL at 16:57

## 2022-09-23 RX ADMIN — MORPHINE SULFATE 6 MILLIGRAM(S): 50 CAPSULE, EXTENDED RELEASE ORAL at 20:49

## 2022-09-23 RX ADMIN — ONDANSETRON 4 MILLIGRAM(S): 8 TABLET, FILM COATED ORAL at 15:00

## 2022-09-23 RX ADMIN — DIATRIZOATE MEGLUMINE 30 MILLILITER(S): 180 INJECTION, SOLUTION INTRAVESICAL at 12:28

## 2022-09-23 RX ADMIN — MORPHINE SULFATE 4 MILLIGRAM(S): 50 CAPSULE, EXTENDED RELEASE ORAL at 12:27

## 2022-09-23 RX ADMIN — MORPHINE SULFATE 4 MILLIGRAM(S): 50 CAPSULE, EXTENDED RELEASE ORAL at 15:00

## 2022-09-23 NOTE — REVIEW OF SYSTEMS
[Recent Weight Loss (___ Lbs)] : recent [unfilled] ~Ulb weight loss [As Noted in HPI] : as noted in HPI [Abdominal Pain] : abdominal pain [Vomiting] : vomiting [Constipation] : constipation [Heartburn] : heartburn [Bleeding] : bleeding [Bloating (gassiness)] : bloating [Negative] : Psychiatric [Diarrhea] : no diarrhea [Melena (black stool)] : no melena [Fecal Incontinence (soiling)] : no fecal incontinence

## 2022-09-23 NOTE — ED ADULT TRIAGE NOTE - CHIEF COMPLAINT QUOTE
c/o abdominal pain x years, +abdominal distention, +vomiting and rectal bleeding x last night, Hx SBO

## 2022-09-23 NOTE — ED PROVIDER NOTE - ATTENDING CONTRIBUTION TO CARE
57-year-old woman, multiple abdominal surgeries complicated by SBO, following with GI, complains of severe abdominal pain associated with several episodes of vomiting overnight.  Went to her GI this morning, and was sent to the ED for evaluation.  On exam she is very uncomfortable 2/2 pain, pale, lungs clear, CV S1-S2, abdomen with multiple healed incisions, distention to the right of the midline, with tenderness but no peritoneal signs.  Concern for recurrent SBO, will check labs, imaging, fluids, pain control, reassess.

## 2022-09-23 NOTE — ED PROVIDER NOTE - PHYSICAL EXAMINATION
CONSTITUTIONAL:  in moderate acute distress.   SKIN: warm, dry  HEAD: Normocephalic; atraumatic.  EYES: PERRL, EOMI, normal sclera and conjunctiva   ENT: No nasal discharge; airway clear.  NECK: Supple; non tender.  CARD:  Regular rate and rhythm.   RESP: NO inc WOB   ABD: tender diffusely  EXT: Normal ROM.    LYMPH: No acute cervical adenopathy.  NEURO: Alert, oriented, grossly unremarkable  PSYCH: Cooperative, appropriate.

## 2022-09-23 NOTE — ED PROVIDER NOTE - OBJECTIVE STATEMENT
57F significant PSH of ex-lap 11/2021 for SBO 2/2 to foreign body, appendectomy x2 (16y ago and again 12y ago), cholecystectomy, hysterectomy, PMH of HTN, HLD, anxiety, seizures presenting with a few month history of generalized abdominal pain which has been worsening. Patient acutely presenting to the ED secondary to N/V x4-5 episodes night prior to presentation and was recommended to come for evaluation by her GI doctor. Patient has been seeing GI as an outpatient and they have been treating her for IBS, last C-scope two weeks prior to eval where 7 biopsies were taken, unknown results. No F/chills. Last BM 9/22 AM and last gas 9/22 AM.

## 2022-09-23 NOTE — ED ADULT NURSE NOTE - OBJECTIVE STATEMENT
pt with a hx of multiple abdominal surgeries presents to the ed c/o worsening abdominal pain, nausea, vomiting and blood in stool. Pt was noted to have RLQ tenderness on palpation. No active vomiting noted.

## 2022-09-23 NOTE — PHYSICAL EXAM
[Alert] : alert [Normal Voice/Communication] : normal voice/communication [No Acute Distress] : no acute distress [Well Nourished] : well nourished [Normal] : heart rate was normal and rhythm regular, normal S1 and S2, no murmurs [No Edema] : no edema [Bowel Sounds] : normal bowel sounds [de-identified] : Patient holding her abdomen in obvious discomfort [de-identified] : Generalized tenderness, healed laporotomy scar

## 2022-09-23 NOTE — ED PROVIDER NOTE - CLINICAL SUMMARY MEDICAL DECISION MAKING FREE TEXT BOX
Labs okay.  CT with SBO.  Surgery consulted, will admit for hydration, pain control, close observation.  All results were discussed with patient and she understands and is agreeable to plan.

## 2022-09-23 NOTE — HISTORY OF PRESENT ILLNESS
[FreeTextEntry1] : Done recently need report  [de-identified] : 05/20202:No bowel obstruction. Minimal wall thickening suggested within the right sided transverse colon

## 2022-09-23 NOTE — ED ADULT NURSE REASSESSMENT NOTE - NS ED NURSE REASSESS COMMENT FT1
pt alert and oriented x 4, pt has hypoactive bowel sounds in LUQ, LLQ, and RLQ. +bowel sounds noted in RLQ. Pt has left nare NGT to wall suction. 450ml of green bile noted.

## 2022-09-24 LAB
ALBUMIN SERPL ELPH-MCNC: 4 G/DL — SIGNIFICANT CHANGE UP (ref 3.5–5.2)
ALP SERPL-CCNC: 110 U/L — SIGNIFICANT CHANGE UP (ref 30–115)
ALT FLD-CCNC: 34 U/L — SIGNIFICANT CHANGE UP (ref 0–41)
ANION GAP SERPL CALC-SCNC: 11 MMOL/L — SIGNIFICANT CHANGE UP (ref 7–14)
ANION GAP SERPL CALC-SCNC: 9 MMOL/L — SIGNIFICANT CHANGE UP (ref 7–14)
AST SERPL-CCNC: 25 U/L — SIGNIFICANT CHANGE UP (ref 0–41)
BASOPHILS # BLD AUTO: 0.02 K/UL — SIGNIFICANT CHANGE UP (ref 0–0.2)
BASOPHILS NFR BLD AUTO: 0.3 % — SIGNIFICANT CHANGE UP (ref 0–1)
BILIRUB SERPL-MCNC: 0.3 MG/DL — SIGNIFICANT CHANGE UP (ref 0.2–1.2)
BUN SERPL-MCNC: 10 MG/DL — SIGNIFICANT CHANGE UP (ref 10–20)
BUN SERPL-MCNC: 13 MG/DL — SIGNIFICANT CHANGE UP (ref 10–20)
CALCIUM SERPL-MCNC: 8.7 MG/DL — SIGNIFICANT CHANGE UP (ref 8.4–10.5)
CALCIUM SERPL-MCNC: 9 MG/DL — SIGNIFICANT CHANGE UP (ref 8.4–10.5)
CHLORIDE SERPL-SCNC: 100 MMOL/L — SIGNIFICANT CHANGE UP (ref 98–110)
CHLORIDE SERPL-SCNC: 100 MMOL/L — SIGNIFICANT CHANGE UP (ref 98–110)
CO2 SERPL-SCNC: 24 MMOL/L — SIGNIFICANT CHANGE UP (ref 17–32)
CO2 SERPL-SCNC: 29 MMOL/L — SIGNIFICANT CHANGE UP (ref 17–32)
CREAT SERPL-MCNC: 0.6 MG/DL — LOW (ref 0.7–1.5)
CREAT SERPL-MCNC: 0.6 MG/DL — LOW (ref 0.7–1.5)
EGFR: 104 ML/MIN/1.73M2 — SIGNIFICANT CHANGE UP
EGFR: 104 ML/MIN/1.73M2 — SIGNIFICANT CHANGE UP
EOSINOPHIL # BLD AUTO: 0.05 K/UL — SIGNIFICANT CHANGE UP (ref 0–0.7)
EOSINOPHIL NFR BLD AUTO: 0.7 % — SIGNIFICANT CHANGE UP (ref 0–8)
GLUCOSE SERPL-MCNC: 83 MG/DL — SIGNIFICANT CHANGE UP (ref 70–99)
GLUCOSE SERPL-MCNC: 97 MG/DL — SIGNIFICANT CHANGE UP (ref 70–99)
HCT VFR BLD CALC: 33.5 % — LOW (ref 37–47)
HGB BLD-MCNC: 11.6 G/DL — LOW (ref 12–16)
IMM GRANULOCYTES NFR BLD AUTO: 0.4 % — HIGH (ref 0.1–0.3)
LACTATE SERPL-SCNC: 0.9 MMOL/L — SIGNIFICANT CHANGE UP (ref 0.7–2)
LYMPHOCYTES # BLD AUTO: 1.73 K/UL — SIGNIFICANT CHANGE UP (ref 1.2–3.4)
LYMPHOCYTES # BLD AUTO: 24.3 % — SIGNIFICANT CHANGE UP (ref 20.5–51.1)
MAGNESIUM SERPL-MCNC: 1.4 MG/DL — LOW (ref 1.8–2.4)
MAGNESIUM SERPL-MCNC: 1.5 MG/DL — LOW (ref 1.8–2.4)
MCHC RBC-ENTMCNC: 32 PG — HIGH (ref 27–31)
MCHC RBC-ENTMCNC: 34.6 G/DL — SIGNIFICANT CHANGE UP (ref 32–37)
MCV RBC AUTO: 92.3 FL — SIGNIFICANT CHANGE UP (ref 81–99)
MONOCYTES # BLD AUTO: 0.62 K/UL — HIGH (ref 0.1–0.6)
MONOCYTES NFR BLD AUTO: 8.7 % — SIGNIFICANT CHANGE UP (ref 1.7–9.3)
NEUTROPHILS # BLD AUTO: 4.66 K/UL — SIGNIFICANT CHANGE UP (ref 1.4–6.5)
NEUTROPHILS NFR BLD AUTO: 65.6 % — SIGNIFICANT CHANGE UP (ref 42.2–75.2)
NRBC # BLD: 0 /100 WBCS — SIGNIFICANT CHANGE UP (ref 0–0)
PHOSPHATE SERPL-MCNC: 3.5 MG/DL — SIGNIFICANT CHANGE UP (ref 2.1–4.9)
PHOSPHATE SERPL-MCNC: 4.1 MG/DL — SIGNIFICANT CHANGE UP (ref 2.1–4.9)
PLATELET # BLD AUTO: 193 K/UL — SIGNIFICANT CHANGE UP (ref 130–400)
POTASSIUM SERPL-MCNC: 4.1 MMOL/L — SIGNIFICANT CHANGE UP (ref 3.5–5)
POTASSIUM SERPL-MCNC: 4.2 MMOL/L — SIGNIFICANT CHANGE UP (ref 3.5–5)
POTASSIUM SERPL-SCNC: 4.1 MMOL/L — SIGNIFICANT CHANGE UP (ref 3.5–5)
POTASSIUM SERPL-SCNC: 4.2 MMOL/L — SIGNIFICANT CHANGE UP (ref 3.5–5)
PROT SERPL-MCNC: 6.1 G/DL — SIGNIFICANT CHANGE UP (ref 6–8)
RBC # BLD: 3.63 M/UL — LOW (ref 4.2–5.4)
RBC # FLD: 13.3 % — SIGNIFICANT CHANGE UP (ref 11.5–14.5)
SODIUM SERPL-SCNC: 135 MMOL/L — SIGNIFICANT CHANGE UP (ref 135–146)
SODIUM SERPL-SCNC: 138 MMOL/L — SIGNIFICANT CHANGE UP (ref 135–146)
WBC # BLD: 7.11 K/UL — SIGNIFICANT CHANGE UP (ref 4.8–10.8)
WBC # FLD AUTO: 7.11 K/UL — SIGNIFICANT CHANGE UP (ref 4.8–10.8)

## 2022-09-24 PROCEDURE — 74021 RADEX ABDOMEN 3+ VIEWS: CPT | Mod: 26

## 2022-09-24 PROCEDURE — 99222 1ST HOSP IP/OBS MODERATE 55: CPT

## 2022-09-24 RX ORDER — METOPROLOL TARTRATE 50 MG
5 TABLET ORAL EVERY 6 HOURS
Refills: 0 | Status: DISCONTINUED | OUTPATIENT
Start: 2022-09-24 | End: 2022-09-26

## 2022-09-24 RX ORDER — LEVETIRACETAM 250 MG/1
250 TABLET, FILM COATED ORAL EVERY 12 HOURS
Refills: 0 | Status: DISCONTINUED | OUTPATIENT
Start: 2022-09-24 | End: 2022-09-28

## 2022-09-24 RX ORDER — HYDROMORPHONE HYDROCHLORIDE 2 MG/ML
0.25 INJECTION INTRAMUSCULAR; INTRAVENOUS; SUBCUTANEOUS ONCE
Refills: 0 | Status: DISCONTINUED | OUTPATIENT
Start: 2022-09-24 | End: 2022-09-24

## 2022-09-24 RX ORDER — MIRABEGRON 50 MG/1
1 TABLET, EXTENDED RELEASE ORAL
Qty: 0 | Refills: 0 | DISCHARGE

## 2022-09-24 RX ORDER — MAGNESIUM SULFATE 500 MG/ML
2 VIAL (ML) INJECTION ONCE
Refills: 0 | Status: COMPLETED | OUTPATIENT
Start: 2022-09-24 | End: 2022-09-24

## 2022-09-24 RX ORDER — ACETAMINOPHEN 500 MG
1000 TABLET ORAL ONCE
Refills: 0 | Status: COMPLETED | OUTPATIENT
Start: 2022-09-24 | End: 2022-09-24

## 2022-09-24 RX ORDER — TIOTROPIUM BROMIDE 18 UG/1
1 CAPSULE ORAL; RESPIRATORY (INHALATION) DAILY
Refills: 0 | Status: DISCONTINUED | OUTPATIENT
Start: 2022-09-24 | End: 2022-09-28

## 2022-09-24 RX ORDER — ENOXAPARIN SODIUM 100 MG/ML
40 INJECTION SUBCUTANEOUS EVERY 24 HOURS
Refills: 0 | Status: DISCONTINUED | OUTPATIENT
Start: 2022-09-24 | End: 2022-09-28

## 2022-09-24 RX ORDER — ONDANSETRON 8 MG/1
4 TABLET, FILM COATED ORAL ONCE
Refills: 0 | Status: COMPLETED | OUTPATIENT
Start: 2022-09-24 | End: 2022-09-24

## 2022-09-24 RX ORDER — INFLUENZA VIRUS VACCINE 15; 15; 15; 15 UG/.5ML; UG/.5ML; UG/.5ML; UG/.5ML
0.5 SUSPENSION INTRAMUSCULAR ONCE
Refills: 0 | Status: DISCONTINUED | OUTPATIENT
Start: 2022-09-24 | End: 2022-09-28

## 2022-09-24 RX ORDER — PANTOPRAZOLE SODIUM 20 MG/1
40 TABLET, DELAYED RELEASE ORAL EVERY 12 HOURS
Refills: 0 | Status: DISCONTINUED | OUTPATIENT
Start: 2022-09-24 | End: 2022-09-28

## 2022-09-24 RX ORDER — UBROGEPANT 100 MG/1
1 TABLET ORAL
Qty: 0 | Refills: 0 | DISCHARGE

## 2022-09-24 RX ORDER — ACETAMINOPHEN 500 MG
650 TABLET ORAL ONCE
Refills: 0 | Status: COMPLETED | OUTPATIENT
Start: 2022-09-24 | End: 2022-09-24

## 2022-09-24 RX ORDER — ACETAMINOPHEN 500 MG
750 TABLET ORAL ONCE
Refills: 0 | Status: DISCONTINUED | OUTPATIENT
Start: 2022-09-24 | End: 2022-09-25

## 2022-09-24 RX ORDER — ACETAMINOPHEN 500 MG
1000 TABLET ORAL EVERY 8 HOURS
Refills: 0 | Status: COMPLETED | OUTPATIENT
Start: 2022-09-24 | End: 2022-09-24

## 2022-09-24 RX ADMIN — Medication 5 MILLIGRAM(S): at 17:29

## 2022-09-24 RX ADMIN — LEVETIRACETAM 400 MILLIGRAM(S): 250 TABLET, FILM COATED ORAL at 05:26

## 2022-09-24 RX ADMIN — PANTOPRAZOLE SODIUM 40 MILLIGRAM(S): 20 TABLET, DELAYED RELEASE ORAL at 17:29

## 2022-09-24 RX ADMIN — SODIUM CHLORIDE 125 MILLILITER(S): 9 INJECTION, SOLUTION INTRAVENOUS at 08:46

## 2022-09-24 RX ADMIN — LEVETIRACETAM 400 MILLIGRAM(S): 250 TABLET, FILM COATED ORAL at 18:54

## 2022-09-24 RX ADMIN — Medication 260 MILLIGRAM(S): at 02:17

## 2022-09-24 RX ADMIN — ENOXAPARIN SODIUM 40 MILLIGRAM(S): 100 INJECTION SUBCUTANEOUS at 17:29

## 2022-09-24 RX ADMIN — Medication 400 MILLIGRAM(S): at 18:07

## 2022-09-24 RX ADMIN — Medication 5 MILLIGRAM(S): at 05:33

## 2022-09-24 RX ADMIN — ONDANSETRON 4 MILLIGRAM(S): 8 TABLET, FILM COATED ORAL at 20:02

## 2022-09-24 RX ADMIN — Medication 1000 MILLIGRAM(S): at 06:56

## 2022-09-24 RX ADMIN — PANTOPRAZOLE SODIUM 40 MILLIGRAM(S): 20 TABLET, DELAYED RELEASE ORAL at 05:33

## 2022-09-24 RX ADMIN — Medication 400 MILLIGRAM(S): at 06:30

## 2022-09-24 RX ADMIN — HYDROMORPHONE HYDROCHLORIDE 0.25 MILLIGRAM(S): 2 INJECTION INTRAMUSCULAR; INTRAVENOUS; SUBCUTANEOUS at 23:50

## 2022-09-24 RX ADMIN — Medication 650 MILLIGRAM(S): at 03:23

## 2022-09-24 RX ADMIN — Medication 5 MILLIGRAM(S): at 11:59

## 2022-09-24 NOTE — PATIENT PROFILE ADULT - FALL HARM RISK - HARM RISK INTERVENTIONS

## 2022-09-24 NOTE — H&P ADULT - NSHPLABSRESULTS_GEN_ALL_CORE
Labs:  CAPILLARY BLOOD GLUCOSE                      13.7   7.55  )-----------( 268      ( 23 Sep 2022 12:07 )             39.6       Auto Neutrophil %: 71.4 % (09-23-22 @ 12:07)  Auto Immature Granulocyte %: 0.3 % (09-23-22 @ 12:07)    09-23    138  |  100  |  14  ----------------------------<  115<H>  5.4<H>   |  27  |  0.7    Calcium, Total Serum: 10.1 mg/dL (09-23-22 @ 12:07)    LFTs:      7.3  | 0.3  | 41       ------------------[121     ( 23 Sep 2022 12:07 )  4.6  | x    | 49          Lipase:13     Amylase:x         Blood Gas Venous - Lactate: 1.70 mmol/L (09-23-22 @ 13:22)  Lactate, Blood: 1.8 mmol/L (09-23-22 @ 12:07)    Coags:  CARDIAC MARKERS ( 23 Sep 2022 12:07 )  x     / <0.01 ng/mL / x     / x     / x        RADIOLOGY  < from: CT Abdomen and Pelvis w/ Oral Cont and w/ IV Cont (09.23.22 @ 16:53) >      IMPRESSION:  1.  Small bowel obstruction with a transition point in the lower mid   abdomen; diffuse mesenteric edema and trace interloop ascites.    2.  Wall thickening and edema of several collapsed small bowel loops in   the right lower quadrant, which may be reactive or secondary to   infectious, inflammatory or ischemic enteritis.    < end of copied text >

## 2022-09-24 NOTE — CONSULT NOTE ADULT - ATTENDING COMMENTS
Case reviewed and discussed with GI Fellow. Agree with HPI/ PMH/ Soc History/ ROS/ PE. Labs/ imaging. Agree with Assessment and Plan

## 2022-09-24 NOTE — CONSULT NOTE ADULT - ASSESSMENT
57F significant PSH of ex-lap 11/2021 for SBO 2/2 to foreign body, appendectomy x2 (16y ago and again 12y ago), cholecystectomy, hysterectomy, PMH of HTN, HLD, anxiety, seizures presenting with a few month history of generalized abdominal pain which has been worsening. Patient acutely presenting to the ED secondary to N/V x4-5 episodes night prior to presentation.     # SBO:  # H/O SBO with multiple abdominal surgeries:  - pt is hemodynamically stable  - labs reviewed  - CT A/P with PO and IV cont: 1.  Small bowel obstruction with a transition point in the lower mid abdomen; diffuse mesenteric edema and trace interloop ascites.  - NG in place to suction  - under surgical team care    recommendations  - NG tube to suction  - NPO with IVF  - monitor Is and Os  - correct electrolytes  - avoid opioids  - will need outpatient GI follow up for Colonoscopy

## 2022-09-24 NOTE — H&P ADULT - NSHPPHYSICALEXAM_GEN_ALL_CORE
PHYSICAL EXAM:  GENERAL: NAD, well-appearing  CHEST/LUNG: Clear to auscultation bilaterally  HEART: Regular rate and rhythm  ABDOMEN: Soft, tender worst in the RLQ, however tender in all quadrants, Nondistended; No rebound or guarding  EXTREMITIES:  No clubbing, cyanosis, or edema

## 2022-09-24 NOTE — H&P ADULT - ASSESSMENT
Assessment  57F significant PSH of ex-lap 11/2021 for SBO 2/2 to foreign body, appendectomy x2 (16y ago and again 12y ago), cholecystectomy, hysterectomy, PMH of HTN, HLD, anxiety, seizures presenting with a few month history of generalized abdominal pain which has been worsening. Patient acutely presenting to the ED secondary to N/V x4-5 episodes night prior to presentation, no N/V, last BM day prior to evaluation. On exam patient with generalized abdominal pain without signs of peritonitis, on imaging showing SBO with significant mesenteric edema.     Plan  - NGT inserted with 450cc on insertion (bilious/feculent in appearance)  - given previous history showing frozen abdomen will trial nonoperative management  - NPO, IVF  - trend labs, repeat set in AM  - GI consult (Dr. Leal)  - d/w Dr. Martinez Assessment  57F significant PSH of ex-lap 11/2021 for SBO 2/2 to foreign body, appendectomy x2 (16y ago and again 12y ago), cholecystectomy, hysterectomy, PMH of HTN, HLD, anxiety, seizures presenting with a few month history of generalized abdominal pain which has been worsening. Patient acutely presenting to the ED secondary to N/V x4-5 episodes night prior to presentation, no N/V, last BM day prior to evaluation. On exam patient with generalized abdominal pain without signs of peritonitis, on imaging showing SBO with significant mesenteric edema.     Plan  - NGT inserted with 450cc on insertion (bilious/feculent in appearance).  - given previous history showing frozen abdomen will trial nonoperative management  - NPO, IVF  - trend labs, repeat set in AM  - GI consult (Dr. Leal)  - d/w Dr. Martinez

## 2022-09-25 LAB
ANION GAP SERPL CALC-SCNC: 16 MMOL/L — HIGH (ref 7–14)
ANION GAP SERPL CALC-SCNC: 17 MMOL/L — HIGH (ref 7–14)
APTT BLD: 35.4 SEC — SIGNIFICANT CHANGE UP (ref 27–39.2)
BASOPHILS # BLD AUTO: 0.02 K/UL — SIGNIFICANT CHANGE UP (ref 0–0.2)
BASOPHILS # BLD AUTO: 0.03 K/UL — SIGNIFICANT CHANGE UP (ref 0–0.2)
BASOPHILS NFR BLD AUTO: 0.3 % — SIGNIFICANT CHANGE UP (ref 0–1)
BASOPHILS NFR BLD AUTO: 0.5 % — SIGNIFICANT CHANGE UP (ref 0–1)
BUN SERPL-MCNC: 8 MG/DL — LOW (ref 10–20)
BUN SERPL-MCNC: 8 MG/DL — LOW (ref 10–20)
CALCIUM SERPL-MCNC: 8.9 MG/DL — SIGNIFICANT CHANGE UP (ref 8.4–10.5)
CALCIUM SERPL-MCNC: 9 MG/DL — SIGNIFICANT CHANGE UP (ref 8.4–10.5)
CHLORIDE SERPL-SCNC: 104 MMOL/L — SIGNIFICANT CHANGE UP (ref 98–110)
CHLORIDE SERPL-SCNC: 97 MMOL/L — LOW (ref 98–110)
CK MB CFR SERPL CALC: 1.6 NG/ML — SIGNIFICANT CHANGE UP (ref 0.6–6.3)
CK SERPL-CCNC: 83 U/L — SIGNIFICANT CHANGE UP (ref 0–225)
CO2 SERPL-SCNC: 23 MMOL/L — SIGNIFICANT CHANGE UP (ref 17–32)
CO2 SERPL-SCNC: 26 MMOL/L — SIGNIFICANT CHANGE UP (ref 17–32)
CREAT SERPL-MCNC: 0.5 MG/DL — LOW (ref 0.7–1.5)
CREAT SERPL-MCNC: 0.6 MG/DL — LOW (ref 0.7–1.5)
EGFR: 104 ML/MIN/1.73M2 — SIGNIFICANT CHANGE UP
EGFR: 109 ML/MIN/1.73M2 — SIGNIFICANT CHANGE UP
EOSINOPHIL # BLD AUTO: 0.04 K/UL — SIGNIFICANT CHANGE UP (ref 0–0.7)
EOSINOPHIL # BLD AUTO: 0.04 K/UL — SIGNIFICANT CHANGE UP (ref 0–0.7)
EOSINOPHIL NFR BLD AUTO: 0.6 % — SIGNIFICANT CHANGE UP (ref 0–8)
EOSINOPHIL NFR BLD AUTO: 0.6 % — SIGNIFICANT CHANGE UP (ref 0–8)
GLUCOSE SERPL-MCNC: 67 MG/DL — LOW (ref 70–99)
GLUCOSE SERPL-MCNC: 87 MG/DL — SIGNIFICANT CHANGE UP (ref 70–99)
HCT VFR BLD CALC: 31 % — LOW (ref 37–47)
HCT VFR BLD CALC: 33.2 % — LOW (ref 37–47)
HGB BLD-MCNC: 11.4 G/DL — LOW (ref 12–16)
HGB BLD-MCNC: 12 G/DL — SIGNIFICANT CHANGE UP (ref 12–16)
IMM GRANULOCYTES NFR BLD AUTO: 0.3 % — SIGNIFICANT CHANGE UP (ref 0.1–0.3)
IMM GRANULOCYTES NFR BLD AUTO: 0.3 % — SIGNIFICANT CHANGE UP (ref 0.1–0.3)
INR BLD: 0.96 RATIO — SIGNIFICANT CHANGE UP (ref 0.65–1.3)
LYMPHOCYTES # BLD AUTO: 0.97 K/UL — LOW (ref 1.2–3.4)
LYMPHOCYTES # BLD AUTO: 1.56 K/UL — SIGNIFICANT CHANGE UP (ref 1.2–3.4)
LYMPHOCYTES # BLD AUTO: 15.5 % — LOW (ref 20.5–51.1)
LYMPHOCYTES # BLD AUTO: 23.7 % — SIGNIFICANT CHANGE UP (ref 20.5–51.1)
MAGNESIUM SERPL-MCNC: 1.4 MG/DL — LOW (ref 1.8–2.4)
MAGNESIUM SERPL-MCNC: 2 MG/DL — SIGNIFICANT CHANGE UP (ref 1.8–2.4)
MCHC RBC-ENTMCNC: 31.8 PG — HIGH (ref 27–31)
MCHC RBC-ENTMCNC: 32.6 PG — HIGH (ref 27–31)
MCHC RBC-ENTMCNC: 36.1 G/DL — SIGNIFICANT CHANGE UP (ref 32–37)
MCHC RBC-ENTMCNC: 36.8 G/DL — SIGNIFICANT CHANGE UP (ref 32–37)
MCV RBC AUTO: 88.1 FL — SIGNIFICANT CHANGE UP (ref 81–99)
MCV RBC AUTO: 88.6 FL — SIGNIFICANT CHANGE UP (ref 81–99)
MONOCYTES # BLD AUTO: 0.47 K/UL — SIGNIFICANT CHANGE UP (ref 0.1–0.6)
MONOCYTES # BLD AUTO: 0.64 K/UL — HIGH (ref 0.1–0.6)
MONOCYTES NFR BLD AUTO: 7.5 % — SIGNIFICANT CHANGE UP (ref 1.7–9.3)
MONOCYTES NFR BLD AUTO: 9.7 % — HIGH (ref 1.7–9.3)
NEUTROPHILS # BLD AUTO: 4.29 K/UL — SIGNIFICANT CHANGE UP (ref 1.4–6.5)
NEUTROPHILS # BLD AUTO: 4.71 K/UL — SIGNIFICANT CHANGE UP (ref 1.4–6.5)
NEUTROPHILS NFR BLD AUTO: 65.4 % — SIGNIFICANT CHANGE UP (ref 42.2–75.2)
NEUTROPHILS NFR BLD AUTO: 75.6 % — HIGH (ref 42.2–75.2)
NRBC # BLD: 0 /100 WBCS — SIGNIFICANT CHANGE UP (ref 0–0)
NRBC # BLD: 0 /100 WBCS — SIGNIFICANT CHANGE UP (ref 0–0)
PHOSPHATE SERPL-MCNC: 3.3 MG/DL — SIGNIFICANT CHANGE UP (ref 2.1–4.9)
PHOSPHATE SERPL-MCNC: 3.5 MG/DL — SIGNIFICANT CHANGE UP (ref 2.1–4.9)
PLATELET # BLD AUTO: 209 K/UL — SIGNIFICANT CHANGE UP (ref 130–400)
PLATELET # BLD AUTO: 212 K/UL — SIGNIFICANT CHANGE UP (ref 130–400)
POTASSIUM SERPL-MCNC: 3.3 MMOL/L — LOW (ref 3.5–5)
POTASSIUM SERPL-MCNC: 3.6 MMOL/L — SIGNIFICANT CHANGE UP (ref 3.5–5)
POTASSIUM SERPL-SCNC: 3.3 MMOL/L — LOW (ref 3.5–5)
POTASSIUM SERPL-SCNC: 3.6 MMOL/L — SIGNIFICANT CHANGE UP (ref 3.5–5)
PROTHROM AB SERPL-ACNC: 11 SEC — SIGNIFICANT CHANGE UP (ref 9.95–12.87)
RBC # BLD: 3.5 M/UL — LOW (ref 4.2–5.4)
RBC # BLD: 3.77 M/UL — LOW (ref 4.2–5.4)
RBC # FLD: 12.9 % — SIGNIFICANT CHANGE UP (ref 11.5–14.5)
RBC # FLD: 13 % — SIGNIFICANT CHANGE UP (ref 11.5–14.5)
SODIUM SERPL-SCNC: 137 MMOL/L — SIGNIFICANT CHANGE UP (ref 135–146)
SODIUM SERPL-SCNC: 146 MMOL/L — SIGNIFICANT CHANGE UP (ref 135–146)
TROPONIN T SERPL-MCNC: <0.01 NG/ML — SIGNIFICANT CHANGE UP
WBC # BLD: 6.24 K/UL — SIGNIFICANT CHANGE UP (ref 4.8–10.8)
WBC # BLD: 6.57 K/UL — SIGNIFICANT CHANGE UP (ref 4.8–10.8)
WBC # FLD AUTO: 6.24 K/UL — SIGNIFICANT CHANGE UP (ref 4.8–10.8)
WBC # FLD AUTO: 6.57 K/UL — SIGNIFICANT CHANGE UP (ref 4.8–10.8)

## 2022-09-25 PROCEDURE — 93010 ELECTROCARDIOGRAM REPORT: CPT

## 2022-09-25 PROCEDURE — 71045 X-RAY EXAM CHEST 1 VIEW: CPT | Mod: 26

## 2022-09-25 PROCEDURE — 99232 SBSQ HOSP IP/OBS MODERATE 35: CPT | Mod: 24,25

## 2022-09-25 RX ORDER — HYDROMORPHONE HYDROCHLORIDE 2 MG/ML
0.5 INJECTION INTRAMUSCULAR; INTRAVENOUS; SUBCUTANEOUS ONCE
Refills: 0 | Status: DISCONTINUED | OUTPATIENT
Start: 2022-09-25 | End: 2022-09-25

## 2022-09-25 RX ORDER — BENZOCAINE 10 %
1 GEL (GRAM) MUCOUS MEMBRANE EVERY 6 HOURS
Refills: 0 | Status: DISCONTINUED | OUTPATIENT
Start: 2022-09-25 | End: 2022-09-28

## 2022-09-25 RX ORDER — DIPHENHYDRAMINE HCL 50 MG
50 CAPSULE ORAL ONCE
Refills: 0 | Status: COMPLETED | OUTPATIENT
Start: 2022-09-25 | End: 2022-09-26

## 2022-09-25 RX ORDER — ACETAMINOPHEN 500 MG
750 TABLET ORAL ONCE
Refills: 0 | Status: COMPLETED | OUTPATIENT
Start: 2022-09-25 | End: 2022-09-25

## 2022-09-25 RX ORDER — ACETAMINOPHEN 500 MG
750 TABLET ORAL ONCE
Refills: 0 | Status: DISCONTINUED | OUTPATIENT
Start: 2022-09-25 | End: 2022-09-25

## 2022-09-25 RX ADMIN — LEVETIRACETAM 400 MILLIGRAM(S): 250 TABLET, FILM COATED ORAL at 06:47

## 2022-09-25 RX ADMIN — HYDROMORPHONE HYDROCHLORIDE 0.5 MILLIGRAM(S): 2 INJECTION INTRAMUSCULAR; INTRAVENOUS; SUBCUTANEOUS at 13:09

## 2022-09-25 RX ADMIN — Medication 5 MILLIGRAM(S): at 23:19

## 2022-09-25 RX ADMIN — PANTOPRAZOLE SODIUM 40 MILLIGRAM(S): 20 TABLET, DELAYED RELEASE ORAL at 06:48

## 2022-09-25 RX ADMIN — ENOXAPARIN SODIUM 40 MILLIGRAM(S): 100 INJECTION SUBCUTANEOUS at 18:07

## 2022-09-25 RX ADMIN — PANTOPRAZOLE SODIUM 40 MILLIGRAM(S): 20 TABLET, DELAYED RELEASE ORAL at 18:06

## 2022-09-25 RX ADMIN — Medication 300 MILLIGRAM(S): at 20:17

## 2022-09-25 RX ADMIN — SODIUM CHLORIDE 125 MILLILITER(S): 9 INJECTION, SOLUTION INTRAVENOUS at 18:06

## 2022-09-25 RX ADMIN — Medication 5 MILLIGRAM(S): at 00:03

## 2022-09-25 RX ADMIN — Medication 5 MILLIGRAM(S): at 18:07

## 2022-09-25 RX ADMIN — Medication 2 MILLIGRAM(S): at 23:40

## 2022-09-25 RX ADMIN — Medication 25 GRAM(S): at 00:43

## 2022-09-25 RX ADMIN — Medication 5 MILLIGRAM(S): at 07:35

## 2022-09-25 RX ADMIN — Medication 1.25 MILLIGRAM(S): at 18:06

## 2022-09-25 RX ADMIN — HYDROMORPHONE HYDROCHLORIDE 0.5 MILLIGRAM(S): 2 INJECTION INTRAMUSCULAR; INTRAVENOUS; SUBCUTANEOUS at 14:00

## 2022-09-25 RX ADMIN — Medication 5 MILLIGRAM(S): at 13:07

## 2022-09-25 RX ADMIN — LEVETIRACETAM 400 MILLIGRAM(S): 250 TABLET, FILM COATED ORAL at 18:11

## 2022-09-25 NOTE — CHART NOTE - NSCHARTNOTEFT_GEN_A_CORE
Called by nurse. Patient stated she has left-sided chest pain that is sharp, intermittent and does not radiate.  - Assessed patient at bedside. Patient states the pain began today a few hours after NGT was pushed forward to 55cm.   - Patient denies heartburn symptoms, sore throat or any other GERD related symptoms  - On exam, patient has chest wall tenderness to the left 3rd intercostal space in anterior mid-clavicular line. There is no subcutaneous emphysema or erythema.    - Patient also c/o eye dryness and redness that she attributes to the NG tube and states that she felt this the last time she was hospitalized and had an NG tube.    PLAN:  - STAT EKG noted normal sinus rhythm, no acute ECG changes  - STAT labs show no abnormality, cardiac enzymes are negative   - CXR shows no pneumothorax and confirms NGT placement   - Hurricaine spray ordered  - Patient receiving IV Tylenol for pain (pt states she is allergic to Toradol)  - Patient states the chest pain has since resolved  - Artificial tears ordered for eye dryness and redness

## 2022-09-26 LAB
ANION GAP SERPL CALC-SCNC: 17 MMOL/L — HIGH (ref 7–14)
BUN SERPL-MCNC: 7 MG/DL — LOW (ref 10–20)
CALCIUM SERPL-MCNC: 9.2 MG/DL — SIGNIFICANT CHANGE UP (ref 8.4–10.5)
CHLORIDE SERPL-SCNC: 97 MMOL/L — LOW (ref 98–110)
CO2 SERPL-SCNC: 24 MMOL/L — SIGNIFICANT CHANGE UP (ref 17–32)
CREAT SERPL-MCNC: 0.6 MG/DL — LOW (ref 0.7–1.5)
EGFR: 104 ML/MIN/1.73M2 — SIGNIFICANT CHANGE UP
GLUCOSE SERPL-MCNC: 68 MG/DL — LOW (ref 70–99)
POTASSIUM SERPL-MCNC: 4.2 MMOL/L — SIGNIFICANT CHANGE UP (ref 3.5–5)
POTASSIUM SERPL-SCNC: 4.2 MMOL/L — SIGNIFICANT CHANGE UP (ref 3.5–5)
SODIUM SERPL-SCNC: 138 MMOL/L — SIGNIFICANT CHANGE UP (ref 135–146)

## 2022-09-26 PROCEDURE — 99232 SBSQ HOSP IP/OBS MODERATE 35: CPT

## 2022-09-26 RX ORDER — NIFEDIPINE 30 MG
60 TABLET, EXTENDED RELEASE 24 HR ORAL EVERY 12 HOURS
Refills: 0 | Status: DISCONTINUED | OUTPATIENT
Start: 2022-09-26 | End: 2022-09-28

## 2022-09-26 RX ORDER — POTASSIUM CHLORIDE 20 MEQ
20 PACKET (EA) ORAL
Refills: 0 | Status: COMPLETED | OUTPATIENT
Start: 2022-09-26 | End: 2022-09-26

## 2022-09-26 RX ORDER — METOPROLOL TARTRATE 50 MG
50 TABLET ORAL DAILY
Refills: 0 | Status: DISCONTINUED | OUTPATIENT
Start: 2022-09-26 | End: 2022-09-28

## 2022-09-26 RX ORDER — MAGNESIUM SULFATE 500 MG/ML
2 VIAL (ML) INJECTION ONCE
Refills: 0 | Status: COMPLETED | OUTPATIENT
Start: 2022-09-26 | End: 2022-09-26

## 2022-09-26 RX ORDER — DIPHENHYDRAMINE HCL 50 MG
25 CAPSULE ORAL EVERY 6 HOURS
Refills: 0 | Status: DISCONTINUED | OUTPATIENT
Start: 2022-09-26 | End: 2022-09-28

## 2022-09-26 RX ADMIN — LEVETIRACETAM 400 MILLIGRAM(S): 250 TABLET, FILM COATED ORAL at 18:05

## 2022-09-26 RX ADMIN — ENOXAPARIN SODIUM 40 MILLIGRAM(S): 100 INJECTION SUBCUTANEOUS at 18:04

## 2022-09-26 RX ADMIN — Medication 60 MILLIGRAM(S): at 17:13

## 2022-09-26 RX ADMIN — Medication 5 MILLIGRAM(S): at 12:18

## 2022-09-26 RX ADMIN — LEVETIRACETAM 400 MILLIGRAM(S): 250 TABLET, FILM COATED ORAL at 06:51

## 2022-09-26 RX ADMIN — Medication 5 MILLIGRAM(S): at 05:57

## 2022-09-26 RX ADMIN — Medication 50 MILLIEQUIVALENT(S): at 05:57

## 2022-09-26 RX ADMIN — Medication 2.5 MILLIGRAM(S): at 05:36

## 2022-09-26 RX ADMIN — PANTOPRAZOLE SODIUM 40 MILLIGRAM(S): 20 TABLET, DELAYED RELEASE ORAL at 18:04

## 2022-09-26 RX ADMIN — PANTOPRAZOLE SODIUM 40 MILLIGRAM(S): 20 TABLET, DELAYED RELEASE ORAL at 05:56

## 2022-09-26 RX ADMIN — Medication 12.5 GRAM(S): at 01:38

## 2022-09-26 RX ADMIN — Medication 2.5 MILLIGRAM(S): at 00:45

## 2022-09-26 RX ADMIN — Medication 2 MILLIGRAM(S): at 06:04

## 2022-09-26 RX ADMIN — Medication 50 MILLIEQUIVALENT(S): at 12:28

## 2022-09-26 RX ADMIN — Medication 25 MILLIGRAM(S): at 16:23

## 2022-09-26 RX ADMIN — Medication 25 MILLIGRAM(S): at 22:07

## 2022-09-26 RX ADMIN — Medication 50 MILLIGRAM(S): at 17:13

## 2022-09-26 RX ADMIN — Medication 2.5 MILLIGRAM(S): at 14:23

## 2022-09-26 RX ADMIN — Medication 50 MILLIGRAM(S): at 08:57

## 2022-09-26 RX ADMIN — Medication 50 MILLIEQUIVALENT(S): at 01:38

## 2022-09-26 NOTE — PROVIDER CONTACT NOTE (OTHER) - RECOMMENDATIONS
states he will come to pt bedside
NGT might be out of place, I will leave off of suction for now. can you please stop by to reassess pt and evaluate NGT placement

## 2022-09-26 NOTE — PROVIDER CONTACT NOTE (OTHER) - REASON
pt's bp is 189/81  pulse 95  - iv bp meds need to be pushed by dr - also pt's eyes are red and hurting
NG tube
pt's  bp is 182/81  pulse 87 - enalapril to be pushed by

## 2022-09-27 LAB
ANION GAP SERPL CALC-SCNC: 13 MMOL/L — SIGNIFICANT CHANGE UP (ref 7–14)
ANION GAP SERPL CALC-SCNC: 17 MMOL/L — HIGH (ref 7–14)
BASOPHILS # BLD AUTO: 0.03 K/UL — SIGNIFICANT CHANGE UP (ref 0–0.2)
BASOPHILS # BLD AUTO: 0.04 K/UL — SIGNIFICANT CHANGE UP (ref 0–0.2)
BASOPHILS NFR BLD AUTO: 0.5 % — SIGNIFICANT CHANGE UP (ref 0–1)
BASOPHILS NFR BLD AUTO: 0.7 % — SIGNIFICANT CHANGE UP (ref 0–1)
BUN SERPL-MCNC: 7 MG/DL — LOW (ref 10–20)
BUN SERPL-MCNC: 8 MG/DL — LOW (ref 10–20)
CALCIUM SERPL-MCNC: 8.8 MG/DL — SIGNIFICANT CHANGE UP (ref 8.4–10.5)
CALCIUM SERPL-MCNC: 9.3 MG/DL — SIGNIFICANT CHANGE UP (ref 8.4–10.5)
CHLORIDE SERPL-SCNC: 101 MMOL/L — SIGNIFICANT CHANGE UP (ref 98–110)
CHLORIDE SERPL-SCNC: 102 MMOL/L — SIGNIFICANT CHANGE UP (ref 98–110)
CO2 SERPL-SCNC: 22 MMOL/L — SIGNIFICANT CHANGE UP (ref 17–32)
CO2 SERPL-SCNC: 25 MMOL/L — SIGNIFICANT CHANGE UP (ref 17–32)
CREAT SERPL-MCNC: 0.7 MG/DL — SIGNIFICANT CHANGE UP (ref 0.7–1.5)
CREAT SERPL-MCNC: 0.8 MG/DL — SIGNIFICANT CHANGE UP (ref 0.7–1.5)
EGFR: 100 ML/MIN/1.73M2 — SIGNIFICANT CHANGE UP
EGFR: 85 ML/MIN/1.73M2 — SIGNIFICANT CHANGE UP
EOSINOPHIL # BLD AUTO: 0.09 K/UL — SIGNIFICANT CHANGE UP (ref 0–0.7)
EOSINOPHIL # BLD AUTO: 0.1 K/UL — SIGNIFICANT CHANGE UP (ref 0–0.7)
EOSINOPHIL NFR BLD AUTO: 1.6 % — SIGNIFICANT CHANGE UP (ref 0–8)
EOSINOPHIL NFR BLD AUTO: 1.7 % — SIGNIFICANT CHANGE UP (ref 0–8)
GLUCOSE SERPL-MCNC: 74 MG/DL — SIGNIFICANT CHANGE UP (ref 70–99)
GLUCOSE SERPL-MCNC: 99 MG/DL — SIGNIFICANT CHANGE UP (ref 70–99)
HCT VFR BLD CALC: 31.8 % — LOW (ref 37–47)
HCT VFR BLD CALC: 32.6 % — LOW (ref 37–47)
HGB BLD-MCNC: 11.3 G/DL — LOW (ref 12–16)
HGB BLD-MCNC: 11.8 G/DL — LOW (ref 12–16)
IMM GRANULOCYTES NFR BLD AUTO: 0.2 % — SIGNIFICANT CHANGE UP (ref 0.1–0.3)
IMM GRANULOCYTES NFR BLD AUTO: 0.5 % — HIGH (ref 0.1–0.3)
LYMPHOCYTES # BLD AUTO: 1.7 K/UL — SIGNIFICANT CHANGE UP (ref 1.2–3.4)
LYMPHOCYTES # BLD AUTO: 2.15 K/UL — SIGNIFICANT CHANGE UP (ref 1.2–3.4)
LYMPHOCYTES # BLD AUTO: 29.8 % — SIGNIFICANT CHANGE UP (ref 20.5–51.1)
LYMPHOCYTES # BLD AUTO: 36.1 % — SIGNIFICANT CHANGE UP (ref 20.5–51.1)
MAGNESIUM SERPL-MCNC: 1.5 MG/DL — LOW (ref 1.8–2.4)
MAGNESIUM SERPL-MCNC: 1.7 MG/DL — LOW (ref 1.8–2.4)
MCHC RBC-ENTMCNC: 32.3 PG — HIGH (ref 27–31)
MCHC RBC-ENTMCNC: 32.3 PG — HIGH (ref 27–31)
MCHC RBC-ENTMCNC: 35.5 G/DL — SIGNIFICANT CHANGE UP (ref 32–37)
MCHC RBC-ENTMCNC: 36.2 G/DL — SIGNIFICANT CHANGE UP (ref 32–37)
MCV RBC AUTO: 89.3 FL — SIGNIFICANT CHANGE UP (ref 81–99)
MCV RBC AUTO: 90.9 FL — SIGNIFICANT CHANGE UP (ref 81–99)
MONOCYTES # BLD AUTO: 0.57 K/UL — SIGNIFICANT CHANGE UP (ref 0.1–0.6)
MONOCYTES # BLD AUTO: 0.71 K/UL — HIGH (ref 0.1–0.6)
MONOCYTES NFR BLD AUTO: 10 % — HIGH (ref 1.7–9.3)
MONOCYTES NFR BLD AUTO: 11.9 % — HIGH (ref 1.7–9.3)
NEUTROPHILS # BLD AUTO: 2.94 K/UL — SIGNIFICANT CHANGE UP (ref 1.4–6.5)
NEUTROPHILS # BLD AUTO: 3.29 K/UL — SIGNIFICANT CHANGE UP (ref 1.4–6.5)
NEUTROPHILS NFR BLD AUTO: 49.3 % — SIGNIFICANT CHANGE UP (ref 42.2–75.2)
NEUTROPHILS NFR BLD AUTO: 57.7 % — SIGNIFICANT CHANGE UP (ref 42.2–75.2)
NRBC # BLD: 0 /100 WBCS — SIGNIFICANT CHANGE UP (ref 0–0)
NRBC # BLD: 0 /100 WBCS — SIGNIFICANT CHANGE UP (ref 0–0)
PHOSPHATE SERPL-MCNC: 2.9 MG/DL — SIGNIFICANT CHANGE UP (ref 2.1–4.9)
PHOSPHATE SERPL-MCNC: 3 MG/DL — SIGNIFICANT CHANGE UP (ref 2.1–4.9)
PLATELET # BLD AUTO: 242 K/UL — SIGNIFICANT CHANGE UP (ref 130–400)
PLATELET # BLD AUTO: 249 K/UL — SIGNIFICANT CHANGE UP (ref 130–400)
POTASSIUM SERPL-MCNC: 3.7 MMOL/L — SIGNIFICANT CHANGE UP (ref 3.5–5)
POTASSIUM SERPL-MCNC: 4 MMOL/L — SIGNIFICANT CHANGE UP (ref 3.5–5)
POTASSIUM SERPL-SCNC: 3.7 MMOL/L — SIGNIFICANT CHANGE UP (ref 3.5–5)
POTASSIUM SERPL-SCNC: 4 MMOL/L — SIGNIFICANT CHANGE UP (ref 3.5–5)
RBC # BLD: 3.5 M/UL — LOW (ref 4.2–5.4)
RBC # BLD: 3.65 M/UL — LOW (ref 4.2–5.4)
RBC # FLD: 12.4 % — SIGNIFICANT CHANGE UP (ref 11.5–14.5)
RBC # FLD: 13.1 % — SIGNIFICANT CHANGE UP (ref 11.5–14.5)
SODIUM SERPL-SCNC: 140 MMOL/L — SIGNIFICANT CHANGE UP (ref 135–146)
SODIUM SERPL-SCNC: 140 MMOL/L — SIGNIFICANT CHANGE UP (ref 135–146)
WBC # BLD: 5.7 K/UL — SIGNIFICANT CHANGE UP (ref 4.8–10.8)
WBC # BLD: 5.96 K/UL — SIGNIFICANT CHANGE UP (ref 4.8–10.8)
WBC # FLD AUTO: 5.7 K/UL — SIGNIFICANT CHANGE UP (ref 4.8–10.8)
WBC # FLD AUTO: 5.96 K/UL — SIGNIFICANT CHANGE UP (ref 4.8–10.8)

## 2022-09-27 PROCEDURE — 99232 SBSQ HOSP IP/OBS MODERATE 35: CPT

## 2022-09-27 RX ORDER — MAGNESIUM SULFATE 500 MG/ML
2 VIAL (ML) INJECTION ONCE
Refills: 0 | Status: COMPLETED | OUTPATIENT
Start: 2022-09-27 | End: 2022-09-27

## 2022-09-27 RX ORDER — SODIUM CHLORIDE 9 MG/ML
1000 INJECTION, SOLUTION INTRAVENOUS
Refills: 0 | Status: DISCONTINUED | OUTPATIENT
Start: 2022-09-27 | End: 2022-09-27

## 2022-09-27 RX ADMIN — Medication 25 GRAM(S): at 23:17

## 2022-09-27 RX ADMIN — Medication 25 MILLIGRAM(S): at 05:54

## 2022-09-27 RX ADMIN — Medication 1 SPRAY(S): at 05:55

## 2022-09-27 RX ADMIN — PANTOPRAZOLE SODIUM 40 MILLIGRAM(S): 20 TABLET, DELAYED RELEASE ORAL at 17:20

## 2022-09-27 RX ADMIN — Medication 1 SPRAY(S): at 11:06

## 2022-09-27 RX ADMIN — Medication 60 MILLIGRAM(S): at 05:54

## 2022-09-27 RX ADMIN — PANTOPRAZOLE SODIUM 40 MILLIGRAM(S): 20 TABLET, DELAYED RELEASE ORAL at 05:55

## 2022-09-27 RX ADMIN — LEVETIRACETAM 400 MILLIGRAM(S): 250 TABLET, FILM COATED ORAL at 05:55

## 2022-09-27 RX ADMIN — Medication 25 MILLIGRAM(S): at 13:54

## 2022-09-27 RX ADMIN — Medication 60 MILLIGRAM(S): at 17:19

## 2022-09-27 RX ADMIN — Medication 50 MILLIGRAM(S): at 05:54

## 2022-09-27 RX ADMIN — Medication 25 GRAM(S): at 06:06

## 2022-09-27 RX ADMIN — Medication 25 MILLIGRAM(S): at 21:28

## 2022-09-27 RX ADMIN — LEVETIRACETAM 400 MILLIGRAM(S): 250 TABLET, FILM COATED ORAL at 17:19

## 2022-09-27 RX ADMIN — ENOXAPARIN SODIUM 40 MILLIGRAM(S): 100 INJECTION SUBCUTANEOUS at 17:20

## 2022-09-27 RX ADMIN — Medication 1 SPRAY(S): at 17:19

## 2022-09-28 ENCOUNTER — TRANSCRIPTION ENCOUNTER (OUTPATIENT)
Age: 57
End: 2022-09-28

## 2022-09-28 PROCEDURE — 99238 HOSP IP/OBS DSCHRG MGMT 30/<: CPT | Mod: GC

## 2022-09-28 RX ORDER — POTASSIUM CHLORIDE 20 MEQ
20 PACKET (EA) ORAL ONCE
Refills: 0 | Status: COMPLETED | OUTPATIENT
Start: 2022-09-28 | End: 2022-09-28

## 2022-09-28 RX ORDER — METOPROLOL TARTRATE 50 MG
0 TABLET ORAL
Qty: 0 | Refills: 0 | DISCHARGE

## 2022-09-28 RX ADMIN — LEVETIRACETAM 400 MILLIGRAM(S): 250 TABLET, FILM COATED ORAL at 05:09

## 2022-09-28 RX ADMIN — Medication 60 MILLIGRAM(S): at 05:09

## 2022-09-28 RX ADMIN — Medication 50 MILLIGRAM(S): at 05:09

## 2022-09-28 RX ADMIN — PANTOPRAZOLE SODIUM 40 MILLIGRAM(S): 20 TABLET, DELAYED RELEASE ORAL at 05:09

## 2022-09-28 RX ADMIN — Medication 50 MILLIEQUIVALENT(S): at 06:19

## 2022-09-28 NOTE — DISCHARGE NOTE NURSING/CASE MANAGEMENT/SOCIAL WORK - NSDCPEFALRISK_GEN_ALL_CORE
For information on Fall & Injury Prevention, visit: [email or web address] OR  [email or web address] OR  https://www.cdc.gov/steadi/patient.html

## 2022-09-28 NOTE — DISCHARGE NOTE PROVIDER - HOSPITAL COURSE
57F significant PSH of ex-lap 11/2021 for SBO 2/2 to foreign body, appendectomy x2 (16y ago and again 12y ago), cholecystectomy, hysterectomy, PMH of HTN, HLD, anxiety, seizures presenting with a few month history of generalized abdominal pain which has been worsening. Patient acutely presenting to the ED secondary to N/V x4-5 episodes night prior to presentation and was recommended to come for evaluation by her GI doctor. Patient has been seeing GI as an outpatient and they have been treating her for IBS, last C-scope two weeks prior to eval where 7 biopsies were taken, unknown results. No F/chills. Last BM 9/22 AM and last gas 9/22 AM.    CT A/P with PO and IV contrast was performed and showed: 1.  Small bowel obstruction with a transition point in the lower mid abdomen; diffuse mesenteric edema and trace interloop ascites. NG tube was inserted and returned 450ml of bilious liquid.     GI was consulted. They recommended outpatient follow up for colonoscopy.    KUB was performed and showed contrast in colon. Patient was started on a diet. She is currently tolerating a diet w/o nausea/vomiting. Patient endorses passing flatus. She is ambulating and voiding. Her pain is controlled. Patient is medically clear for discharge home.      57F significant PSH of ex-lap 11/2021 for SBO 2/2 to foreign body, appendectomy x2 (16y ago and again 12y ago), cholecystectomy, hysterectomy, PMH of HTN, HLD, anxiety, seizures presenting with a few month history of generalized abdominal pain which has been worsening. Patient acutely presenting to the ED secondary to N/V x4-5 episodes night prior to presentation and was recommended to come for evaluation by her GI doctor. Patient has been seeing GI as an outpatient and they have been treating her for IBS, last C-scope two weeks prior to eval where 7 biopsies were taken, unknown results. No F/chills. Last BM 9/22 AM and last gas 9/22 AM.    CT A/P with PO and IV contrast was performed and showed: 1.  Small bowel obstruction with a transition point in the lower mid abdomen; diffuse mesenteric edema and trace interloop ascites. NG tube was inserted and returned 450ml of bilious liquid.     GI was consulted. They recommended outpatient follow up for colonoscopy.    KUB was performed and showed contrast in colon. Patient was started on a diet.     Hospital Day 2, patient stated she had nonradiating, intermittent sharp left-sided chest pain.  Called by nurse. Patient stated she has left-sided chest pain that is sharp, intermittent and does not radiate that started after NG tube was advanced. Patient denied heartburn symptoms, sore throat or any other GERD related symptoms. There is no subcutaneous emphysema or erythema. Patient also complaining of eye dryness and redness that she attributes to the NG tube and states that she felt this the last time she was hospitalized and had an NG tube. EKG, labs, and CXR all negative. Patient stated chest pain resolved. Artificial tears ordered.     She is currently tolerating a diet w/o nausea/vomiting. Patient endorses passing flatus. She is ambulating and voiding. Her pain is controlled. Patient is medically clear for discharge home.

## 2022-09-28 NOTE — DISCHARGE NOTE PROVIDER - NSDCFUADDAPPT_GEN_ALL_CORE_FT
Please follow up with Dr. Martinez in 2 weeks. Please call [phone] to confirm your appointment.     Please follow up with your GI doctor to schedule a colonoscopy outpatient.

## 2022-09-28 NOTE — PROGRESS NOTE ADULT - SUBJECTIVE AND OBJECTIVE BOX
GENERAL SURGERY PROGRESS NOTE     GÓMEZRONELRAMÍREZ  [address] [place] [address] day :3d     Surgical Attending: Dr. Champion  Overnight events: No acute events. Patient denies gas or BM this morning. NGT is still to suction with about 700cc output in 24 hours. Afebrile. Ambulating minimally. Pain controlled.    T(F): 98.2 (22 @ 05:00), Max: 99.8 (22 @ 13:43)  HR: 67 (22 @ 05:29) (60 - 83)  BP: 170/77 (22 @ 05:29) (170/77 - 189/78)  RR: 18 (22 @ 05:00) (18 - 18)  SpO2: 96% (22 @ 05:00) (95% - 98%)      22 @ 07:01  -  22 @ 07:00  --------------------------------------------------------  IN:    IV PiggyBack: 100 mL    Lactated Ringers: 2750 mL  Total IN: 2850 mL    OUT:    Nasogastric/Oral tube (mL): 1400 mL    Voided (mL): 250 mL  Total OUT: 1650 mL    Total NET: 1200 mL        DIET/FLUIDS: lactated ringers. 1000 milliLiter(s) (125 mL/Hr) IV Continuous <Continuous>  potassium chloride  20 mEq/100 mL IVPB 20 milliEquivalent(s) IV Intermittent every 2 hours    N22 @ 07:01  -  22 @ 07:00  --------------------------------------------------------  OUT: 1400 mL                  GI proph:  pantoprazole  Injectable 40 milliGRAM(s) IV Push every 12 hours    AC/ proph: enoxaparin Injectable 40 milliGRAM(s) SubCutaneous every 24 hours    ABx:     PHYSICAL EXAM:  GENERAL: NAD, well-appearing  CHEST/LUNG: Clear to auscultation bilaterally  HEART: Regular rate and rhythm  ABDOMEN: Soft, Nontender, Nondistended; NGT to suction  EXTREMITIES:  No clubbing, cyanosis, or edema      LABS  Labs:  CAPILLARY BLOOD GLUCOSE                            11.4   6.57  )-----------( 212      ( 25 Sep 2022 22:27 )             31.0       Auto Neutrophil %: 65.4 % (22 @ 22:27)  Auto Immature Granulocyte %: 0.3 % (22 @ 22:27)  Auto Neutrophil %: 75.6 % (22 @ 10:28)  Auto Immature Granulocyte %: 0.3 % (22 @ 10:28)        137  |  97<L>  |  8<L>  ----------------------------<  67<L>  3.3<L>   |  23  |  0.6<L>      Calcium, Total Serum: 9.0 mg/dL (22 @ 22:27)      LFTs:     Lactate, Blood: 0.9 mmol/L (22 @ 05:56)  Blood Gas Venous - Lactate: 1.70 mmol/L (22 @ 13:22)  Lactate, Blood: 1.8 mmol/L (22 @ 12:07)      Coags:     11.00  ----< 0.96    ( 25 Sep 2022 10:28 )     35.4        CARDIAC MARKERS ( 25 Sep 2022 10:28 )  x     / <0.01 ng/mL / 83 U/L / x     / 1.6 ng/mL      RADIOLOGY & ADDITIONAL TESTS:    
GENERAL SURGERY PROGRESS NOTE    Patient: RAMÍREZ RODARTE , 57y ([birth year])Female   MRN: [id number]  Location: [address]  Visit: 09-23-22 Inpatient  Date: 09-25-22 @ 05:47    Hospital Day #: 2    Events of past 24 hours:  Patient seen and examined at the bedside.  States pain controlled w/ meds.  Denies nausea/vomiting.  Denies flatus and BM.  Endorses voiding.  Endorses ambulating.    Notified by nurse that NG tube may be out of position. Tube was assessed and noted to be at 22cm at nose. Suction could be heard coming from around patient's pharynx. Tube was advanced and CXR was ordered. 55cm at nose.    PAST MEDICAL & SURGICAL HISTORY:  Back pain  henriated discs    Seizures  last one 6 yrs ago    Migraines    Hypercholesteremia    Seasonal allergic rhinitis, unspecified trigger    GERD (gastroesophageal reflux disease)    Hiatal hernia    Anxiety    HTN (hypertension)    Osteoarthritis    Sciatica    Tobacco use disorder    History of surgery  4 rght knee sx  1 left knee sx  cholecystectomy  rotator cuff right  abdominal sx-- adhesions  c section  ovarian cystectomy  breast cystectomies  colonoscopy   endoscopies  partial hysterectomy    S/p partial hysterectomy with remaining cervical stump    H/O rotator cuff surgery  right    History of appendectomy    S/P cholecystectomy      Vitals:   T(F): 98.2 (09-25-22 @ 05:04), Max: 98.4 (09-24-22 @ 05:54)  HR: 73 (09-25-22 @ 05:04)  BP: 186/84 (09-25-22 @ 05:04)  RR: 18 (09-25-22 @ 05:04)  SpO2: 98% (09-25-22 @ 05:04)      Diet, NPO      Fluids:     I & O's:    09-23-22 @ 07:01  -  09-24-22 @ 07:00  --------------------------------------------------------  IN:  Total IN: 0 mL    OUT:    Nasogastric/Oral tube (mL): 700 mL  Total OUT: 700 mL    Total NET: -700 mL    PHYSICAL EXAM:  General: NAD, calm and cooperative.  Cardiac: RRR.  Respiratory: On RA. Speaks in complete sentences. No accessory muscles of respiration in use.  Abdomen: Soft, non-distended, non-tender, no rebound, no guarding. NG tube in place at 55cm at nose.  Neuro: Moves all extremities.  Vascular: Pulses 2+ throughout, extremities well perfused.  Skin: Warm/dry, normal color, no jaundice.      MEDICATIONS  (STANDING):  acetaminophen   IVPB .. 750 milliGRAM(s) IV Intermittent once  enoxaparin Injectable 40 milliGRAM(s) SubCutaneous every 24 hours  influenza   Vaccine 0.5 milliLiter(s) IntraMuscular once  lactated ringers. 1000 milliLiter(s) (125 mL/Hr) IV Continuous <Continuous>  levETIRAcetam  IVPB 250 milliGRAM(s) IV Intermittent every 12 hours  metoprolol tartrate Injectable 5 milliGRAM(s) IV Push every 6 hours  pantoprazole  Injectable 40 milliGRAM(s) IV Push every 12 hours  tiotropium 18 MICROgram(s) Capsule 1 Capsule(s) Inhalation daily    MEDICATIONS  (PRN):  LORazepam   Injectable 2 milliGRAM(s) IV Push every 6 hours PRN Anxiety      DVT PROPHYLAXIS: enoxaparin Injectable 40 milliGRAM(s) SubCutaneous every 24 hours    GI PROPHYLAXIS: pantoprazole  Injectable 40 milliGRAM(s) IV Push every 12 hours    ANTICOAGULATION:   ANTIBIOTICS:        LAB/STUDIES:  Labs:  CAPILLARY BLOOD GLUCOSE                          11.6   7.11  )-----------( 193      ( 24 Sep 2022 20:22 )             33.5       Auto Neutrophil %: 65.6 % (09-24-22 @ 20:22)  Auto Immature Granulocyte %: 0.4 % (09-24-22 @ 20:22)    09-24    135  |  100  |  10  ----------------------------<  83  4.1   |  24  |  0.6<L>      Calcium, Total Serum: 8.7 mg/dL (09-24-22 @ 20:22)      LFTs:             6.1  | 0.3  | 25       ------------------[110     ( 24 Sep 2022 05:56 )  4.0  | x    | 34          Lipase:x      Amylase:x         Lactate, Blood: 0.9 mmol/L (09-24-22 @ 05:56)  Blood Gas Venous - Lactate: 1.70 mmol/L (09-23-22 @ 13:22)  Lactate, Blood: 1.8 mmol/L (09-23-22 @ 12:07)      Coags:    CARDIAC MARKERS ( 23 Sep 2022 12:07 )  x     / <0.01 ng/mL / x     / x     / x          IMAGING:  CXR 9/25 AM pending - f/u results  
GENERAL SURGERY PROGRESS NOTE    Patient: RAMÍREZ RODARTE , 57y ([birth year])Female   MRN: [id number]  Location: [address]  Visit: 09-23-22 Inpatient  Date: 09-27-22 @ 09:46    Hospital Day #: 4    Events of past 24 hours:  Patient seen and examined at the bedside.  Patient reports feeling better.  States pain controlled.  Denies nausea/vomiting.  Endorses flatus but denies BM.  Endorses voiding.  Endorses ambulating.      PAST MEDICAL & SURGICAL HISTORY:  Back pain  henriated discs    Seizures  last one 6 yrs ago    Migraines    Hypercholesteremia    Seasonal allergic rhinitis, unspecified trigger    GERD (gastroesophageal reflux disease)    Hiatal hernia    Anxiety    HTN (hypertension)    Osteoarthritis    Sciatica    Tobacco use disorder    History of surgery  4 rght knee sx  1 left knee sx  cholecystectomy  rotator cuff right  abdominal sx-- adhesions  c section  ovarian cystectomy  breast cystectomies  colonoscopy   endoscopies  parital hysterectomy      S/p partial hysterectomy with remaining cervical stump    H/O rotator cuff surgery  right    History of appendectomy    S/P cholecystectomy      Vitals:   T(F): 97.8 (09-27-22 @ 05:39), Max: 99 (09-26-22 @ 13:22)  HR: 65 (09-27-22 @ 05:39)  BP: 179/79 (09-27-22 @ 05:39)  RR: 18 (09-27-22 @ 05:39)  SpO2: 97% (09-27-22 @ 05:39)      Diet, Clear Liquid:   DASH/TLC Sodium & Cholesterol Restricted      Fluids: dextrose 5% + sodium chloride 0.45%.: Solution, 1000 milliLiter(s) infuse at 75 mL/Hr  Provider's Contact #: [phone]      I & O's:    09-26-22 @ 07:01  -  09-27-22 @ 07:00  --------------------------------------------------------  IN:    Lactated Ringers: 1375 mL  Total IN: 1375 mL    OUT:    Nasogastric/Oral tube (mL): 150 mL    Voided (mL): 1650 mL  Total OUT: 1800 mL    Total NET: -425 mL    PHYSICAL EXAM:  General: NAD, calm and cooperative.  Cardiac: RRR.  Respiratory: On RA. Speaks in complete sentences. No accessory muscles of respiration in use.  Abdomen: Soft, non-distended, moderate tenderness in epigastric region, no rebound, no guarding.   Skin: Warm/dry, normal color, no jaundice.      MEDICATIONS  (STANDING):  benzocaine 20% Spray 1 Spray(s) Topical every 6 hours  dextrose 5% + sodium chloride 0.45%. 1000 milliLiter(s) (75 mL/Hr) IV Continuous <Continuous>  enoxaparin Injectable 40 milliGRAM(s) SubCutaneous every 24 hours  influenza   Vaccine 0.5 milliLiter(s) IntraMuscular once  levETIRAcetam  IVPB 250 milliGRAM(s) IV Intermittent every 12 hours  metoprolol succinate ER 50 milliGRAM(s) Oral daily  NIFEdipine XL 60 milliGRAM(s) Oral every 12 hours  pantoprazole  Injectable 40 milliGRAM(s) IV Push every 12 hours  tiotropium 18 MICROgram(s) Capsule 1 Capsule(s) Inhalation daily    MEDICATIONS  (PRN):  artificial  tears Solution 1 Drop(s) Both EYES four times a day PRN Dry Eyes  diphenhydrAMINE 25 milliGRAM(s) Oral every 6 hours PRN Rash and/or Itching  LORazepam   Injectable 2 milliGRAM(s) IV Push every 6 hours PRN Anxiety      DVT PROPHYLAXIS: enoxaparin Injectable 40 milliGRAM(s) SubCutaneous every 24 hours    GI PROPHYLAXIS: pantoprazole  Injectable 40 milliGRAM(s) IV Push every 12 hours    ANTICOAGULATION:   ANTIBIOTICS:        LAB/STUDIES:  Labs:  CAPILLARY BLOOD GLUCOSE                          11.8   5.96  )-----------( 249      ( 26 Sep 2022 23:31 )             32.6       Auto Neutrophil %: 49.3 % (09-26-22 @ 23:31)  Auto Immature Granulocyte %: 0.5 % (09-26-22 @ 23:31)    09-26    140  |  101  |  7<L>  ----------------------------<  74  4.0   |  22  |  0.7      Calcium, Total Serum: 9.3 mg/dL (09-26-22 @ 23:31)      LFTs:       Coags:     11.00  ----< 0.96    ( 25 Sep 2022 10:28 )     35.4        CARDIAC MARKERS ( 25 Sep 2022 10:28 )  x     / <0.01 ng/mL / 83 U/L / x     / 1.6 ng/mL      IMAGING:  No new imaging.    ACCESS/ DEVICES:  [ ] Peripheral IV  
GENERAL SURGERY PROGRESS NOTE    Patient: RAMÍREZ RODARTE , 57y ([birth year])Female   MRN: [id number]  Location: [address]  Visit: 09-23-22 Inpatient  Date: 09-28-22 @ 05:00    Hospital Day #: 5  Post-Op Day #:    Procedure/Dx/Injuries: SBO    Events of past 24 hours:  NAEON  Patient hemodynamically stable overnight  Mag repleted overnight        PAST MEDICAL & SURGICAL HISTORY:  Back pain  henriated discs      Seizures  last one 6 yrs ago      Migraines      Hypercholesteremia      Seasonal allergic rhinitis, unspecified trigger      GERD (gastroesophageal reflux disease)      Hiatal hernia      Anxiety      HTN (hypertension)      Osteoarthritis      Sciatica      Tobacco use disorder      History of surgery  4 rght knee sx  1 left knee sx  cholecystectomy  rotator cuff right  abdominal sx-- adhesions  c section  ovarian cystectomy  breast cystectomies  colonoscopy   endoscopies  parital hysterectomy      S/p partial hysterectomy with remaining cervical stump      H/O rotator cuff surgery  right      History of appendectomy      S/P cholecystectomy          Vitals:   T(F): 99.2 (09-28-22 @ 00:43), Max: 99.2 (09-28-22 @ 00:43)  HR: 65 (09-28-22 @ 00:43)  BP: 134/63 (09-28-22 @ 00:43)  RR: 18 (09-28-22 @ 00:43)  SpO2: 99% (09-28-22 @ 00:43)      Diet, Regular:   Fiber/Residue Restricted      Fluids:     I & O's:    09-26-22 @ 07:01  -  09-27-22 @ 07:00  --------------------------------------------------------  IN:    Lactated Ringers: 1375 mL  Total IN: 1375 mL    OUT:    Nasogastric/Oral tube (mL): 150 mL    Voided (mL): 1650 mL  Total OUT: 1800 mL    Total NET: -425 mL      PHYSICAL EXAM:  General: NAD, calm and cooperative.  Cardiac: RRR.  Respiratory: On RA. Speaks in complete sentences. No accessory muscles of respiration in use.  Abdomen: Soft, non-distended, moderate tenderness in epigastric region, no rebound, no guarding.   Skin: Warm/dry, normal color, no jaundice.    MEDICATIONS  (STANDING):  benzocaine 20% Spray 1 Spray(s) Topical every 6 hours  enoxaparin Injectable 40 milliGRAM(s) SubCutaneous every 24 hours  influenza   Vaccine 0.5 milliLiter(s) IntraMuscular once  levETIRAcetam  IVPB 250 milliGRAM(s) IV Intermittent every 12 hours  metoprolol succinate ER 50 milliGRAM(s) Oral daily  NIFEdipine XL 60 milliGRAM(s) Oral every 12 hours  pantoprazole  Injectable 40 milliGRAM(s) IV Push every 12 hours  tiotropium 18 MICROgram(s) Capsule 1 Capsule(s) Inhalation daily    MEDICATIONS  (PRN):  artificial  tears Solution 1 Drop(s) Both EYES four times a day PRN Dry Eyes  diphenhydrAMINE 25 milliGRAM(s) Oral every 6 hours PRN Rash and/or Itching  LORazepam   Injectable 2 milliGRAM(s) IV Push every 6 hours PRN Anxiety      DVT PROPHYLAXIS: enoxaparin Injectable 40 milliGRAM(s) SubCutaneous every 24 hours    GI PROPHYLAXIS: pantoprazole  Injectable 40 milliGRAM(s) IV Push every 12 hours    ANTICOAGULATION:   ANTIBIOTICS:            LAB/STUDIES:  Labs:  CAPILLARY BLOOD GLUCOSE                              11.3   5.70  )-----------( 242      ( 27 Sep 2022 20:47 )             31.8       Auto Neutrophil %: 57.7 % (09-27-22 @ 20:47)  Auto Immature Granulocyte %: 0.2 % (09-27-22 @ 20:47)    09-27    140  |  102  |  8<L>  ----------------------------<  99  3.7   |  25  |  0.8      Calcium, Total Serum: 8.8 mg/dL (09-27-22 @ 20:47)

## 2022-09-28 NOTE — DISCHARGE NOTE NURSING/CASE MANAGEMENT/SOCIAL WORK - NSDCFUADDAPPT_GEN_ALL_CORE_FT
Please follow up with Dr. Martinez in 2 weeks. Please call [phone] to confirm your appointment.     Please follow up with your GI doctor to schedule a colonoscopy outpatient.

## 2022-09-28 NOTE — DISCHARGE NOTE PROVIDER - NSDCMRMEDTOKEN_GEN_ALL_CORE_FT
atorvastatin 80 mg oral tablet: 1 tab(s) orally once a day  diclofenac sodium 75 mg oral delayed release tablet: 1 tab(s) orally 2 times a day  Fioricet oral tablet: 1 tab(s) orally 3 times a day, As Needed  Incruse Ellipta 62.5 mcg/inh inhalation powder:   KlonoPIN 2 mg oral tablet: 1 tab(s) orally 4 times a day  levETIRAcetam 250 mg oral tablet: 1 tab(s) orally 2 times a day  Linzess 290 mcg oral capsule: 1 cap(s) orally once a day  METOPROLOL SUCCINATE ER 50MG ER TAB: tab(s) orally once a day  NIFEdipine 60 mg oral tablet, extended release: 1 tab(s) orally 2 times a day  polyethylene glycol 3350 oral powder for reconstitution: 17 gram(s) orally 3 times a day   Protonix 40 mg oral delayed release tablet: 1 tab(s) orally 2 times a day  senna oral tablet: 2 tab(s) orally once a day (at bedtime)  tiZANidine 4 mg oral tablet: 1 tab(s) orally once a day  traMADol 50 mg oral tablet: 1 tab(s) orally every 8 hours, As Needed - Severe Pain (7 - 10) - 10) MDD:2 full tabs

## 2022-09-28 NOTE — DISCHARGE NOTE PROVIDER - CARE PROVIDER_API CALL
Haritha Martinez (MD)  Surgery; Surgical Critical Care  [address] [place] [id number]  Phone: [phone]  Fax: [phone]  Follow Up Time: 2 weeks

## 2022-09-28 NOTE — PROGRESS NOTE ADULT - ASSESSMENT
A/P:  57yF significant PSH of ex-lap 11/2021 for SBO 2/2 to foreign body, appendectomy x2 (16y ago and again 12y ago), cholecystectomy, hysterectomy, PMH of HTN, HLD, anxiety, seizures presenting with a few month history of generalized abdominal pain which has been worsening. Patient acutely presenting to the ED secondary to N/V x4-5 episodes night prior to presentation, no N/V, last BM day prior to evaluation. On exam patient with generalized abdominal pain without signs of peritonitis, on imaging showing SBO with significant mesenteric edema    PLAN:   Pain control adequate, continue home seizure medications        levETIRAcetam  IVPB 250 milliGRAM(s) every 12 hours       LORazepam   Injectable 2 milliGRAM(s) every 6 hours PRN  Continue incentive spirometry  Passing flatus. No BM.   wait return of BF  cont GI ppx  maintenance IVF  UOP adequate, monitor 'lytes, replete prn  DVT ppx enoxaparin 40mg q24hrs    Trauma Surgery [id number]  
57yF significant PSH of ex-lap 11/2021 for SBO 2/2 to foreign body, appendectomy x2 (16y ago and again 12y ago), cholecystectomy, hysterectomy, PMH of HTN, HLD, anxiety, seizures presenting with a few month history of generalized abdominal pain which has been worsening. Patient acutely presenting to the ED secondary to N/V x4-5 episodes night prior to presentation, no N/V, last BM day prior to evaluation. On exam patient with generalized abdominal pain without signs of peritonitis, on imaging showing SBO with significant mesenteric edema    PLAN:   Pain control adequate, continue home seizure medications        levETIRAcetam  IVPB 250 milliGRAM(s) every 12 hours       LORazepam   Injectable 2 milliGRAM(s) every 6 hours PRN  Continue incentive spirometry  Passing flatus. No BM.   Await return of BF  cont GI ppx  maintenance IVF  UOP adequate, monitor 'lytes, replete prn  DVT ppx enoxaparin 40mg q24hrs    Trauma Surgery [id number]
A/P:  RAMÍREZ RODARTE is a 57yF significant PSH of ex-lap 11/2021 for SBO 2/2 to foreign body, appendectomy x2 (16y ago and again 12y ago), cholecystectomy, hysterectomy, PMH of HTN, HLD, anxiety, seizures presenting with a few month history of generalized abdominal pain which has been worsening. Patient acutely presenting to the ED secondary to N/V x4-5 episodes night prior to presentation, no N/V, last BM day prior to evaluation. On exam patient with generalized abdominal pain without signs of peritonitis, on imaging showing SBO with significant mesenteric edema      PLAN:   Pain control adequate, continue home seizure medications        levETIRAcetam  IVPB 250 milliGRAM(s) every 12 hours       LORazepam   Injectable 2 milliGRAM(s) every 6 hours PRN  Continue incentive spirometry    Continue NPO with NGT, no Gas or BM since admission, KUB with contrast in colon will possible remove NGT today  wait return of BF  cont GI ppx  maintenance IVF  UOP adequate, monitor 'lytes, replete prn  DVT ppx enoxaparin Injectable 40 milliGRAM(s) SubCutaneous every 24 hours    Trauma [id number]  
Assessment  57F significant PSH of ex-lap 11/2021 for SBO 2/2 to foreign body, appendectomy x2 (16y ago and again 12y ago), cholecystectomy, hysterectomy, PMH of HTN, HLD, anxiety, seizures presenting with a few month history of generalized abdominal pain which has been worsening. Patient acutely presenting to the ED secondary to N/V x4-5 episodes night prior to presentation, no N/V, last BM day prior to evaluation. On exam patient with generalized abdominal pain without signs of peritonitis, on imaging showing SBO with significant mesenteric edema.     Plan  - NGT inserted with 450cc on insertion (bilious/feculent in appearance)  - given previous history showing frozen abdomen will trial nonoperative management  - NPO, IVF  - trend vitals, UO, and labs  - GI consult   - f/u CXR to confirm NG tube position - at 55cm at [facility]    Trauma Surgery [id number]

## 2022-09-28 NOTE — DISCHARGE NOTE NURSING/CASE MANAGEMENT/SOCIAL WORK - PATIENT PORTAL LINK FT
You can access the FollowMyHealth Patient Portal offered by [facility] by registering at the following website: [email or web address]. By joining [employer]’s FollowMyHealth portal, you will also be able to view your health information using other applications (apps) compatible with our system.

## 2022-09-28 NOTE — DISCHARGE NOTE PROVIDER - NSDCCPCAREPLAN_GEN_ALL_CORE_FT
PRINCIPAL DISCHARGE DIAGNOSIS  Diagnosis: Small bowel obstruction  Assessment and Plan of Treatment: s/p NG tube decompression  Follow Up with Dr. Martinez in 2 weeks. Please call office for confirmation of your appointment.  Diet: Regular Diet  Pain: You can take over the counter medications such as Tylenol, and Ibuprofen for pain control. Please adhere to the instructions on the back of the bottle. If it was discussed that you would be receiving prescription pain medication upon discharge, this prescription will be sent to your pharmacy.  If you develop fevers, chills, worsening pain, increased drainage from the wound, foul smelling drainage from the wound, nausea that won't subside, vomiting, or any other symptoms of concern, please call MD for further advice, evaluation, and/or treatment.  Activity: Ambulate and get out of bed as tolerated, and with assistance if feeling weak.

## 2022-09-28 NOTE — PROGRESS NOTE ADULT - ATTENDING COMMENTS
passing gas. tolerating diet. abdomen soft, not distended. d/c
ACS Attending  Note Attestation    Patient is examined and evaluated at the bedside with the residents/PAs. Treatment plan discussed with the team, nurses, and consulting physicians and consulting teams. Medications, radiological studies and all other relevant studies reviewed.     RAMÍREZ RODARTE Patient is a 57y old  Female who presents with a chief complaint of SBO    Diagnosis: SBO due to adhesions     Plan:	  - supportive care  - GI/DVT prophylaxis  - pain management  - incentive spirometer    - follow up consults  - repeat studies as needed  - replace electrolytes  - case management evaluation     Zoë Champion MD, FACS  Trauma/ACS/Surgical Critical Care Attending
patient says she is [assoign gas, abdomen hurts less. abdomen soft, not distended. mildly tender. start clears.
tolerating clears, feels better. vitals stable, abdomen soft. diet as tolerated.

## 2022-09-29 VITALS
DIASTOLIC BLOOD PRESSURE: 60 MMHG | TEMPERATURE: 98 F | HEART RATE: 68 BPM | OXYGEN SATURATION: 96 % | RESPIRATION RATE: 18 BRPM | SYSTOLIC BLOOD PRESSURE: 137 MMHG

## 2022-10-03 ENCOUNTER — APPOINTMENT (OUTPATIENT)
Dept: GASTROENTEROLOGY | Facility: CLINIC | Age: 57
End: 2022-10-03

## 2022-10-03 VITALS
DIASTOLIC BLOOD PRESSURE: 71 MMHG | WEIGHT: 135.6 LBS | HEIGHT: 62 IN | SYSTOLIC BLOOD PRESSURE: 124 MMHG | OXYGEN SATURATION: 98 % | BODY MASS INDEX: 24.95 KG/M2 | HEART RATE: 55 BPM

## 2022-10-03 DIAGNOSIS — R11.0 NAUSEA: ICD-10-CM

## 2022-10-03 DIAGNOSIS — Z87.19 PERSONAL HISTORY OF OTHER DISEASES OF THE DIGESTIVE SYSTEM: ICD-10-CM

## 2022-10-03 PROCEDURE — 99214 OFFICE O/P EST MOD 30 MIN: CPT

## 2022-10-03 RX ORDER — METOCLOPRAMIDE 5 MG/1
5 TABLET ORAL 3 TIMES DAILY
Refills: 0 | Status: DISCONTINUED | COMMUNITY
End: 2022-10-03

## 2022-10-03 NOTE — HISTORY OF PRESENT ILLNESS
[FreeTextEntry1] : 08/17/2022: Polyps removed without significant abnormalities, 1 cm sessile polyp removed, moderate diverticulosis, 3rd degree hemorrrhoids [de-identified] : 09/22: small bowel obstruction

## 2022-10-03 NOTE — PHYSICAL EXAM
[Normal] : heart rate was normal and rhythm regular, normal S1 and S2, no murmurs [None] : no edema [Bowel Sounds] : normal bowel sounds [No Masses] : no abdominal mass palpated [Abdomen Soft] : soft [] : no hepatosplenomegaly [Ascites: ___] : no ascites [Rebound Tenderness] : no rebound tenderness [de-identified] : mild tenderness below umbilicus, no rebound or rigidity, soft

## 2022-10-03 NOTE — REVIEW OF SYSTEMS
[As Noted in HPI] : as noted in HPI [Abdominal Pain] : abdominal pain [Negative] : Heme/Lymph [Vomiting] : no vomiting [Constipation] : no constipation [Diarrhea] : no diarrhea [Heartburn] : no heartburn [Melena (black stool)] : no melena [Bleeding] : no bleeding [Fecal Incontinence (soiling)] : no fecal incontinence [Bloating (gassiness)] : no bloating

## 2022-10-03 NOTE — ASSESSMENT
[FreeTextEntry1] : H/O small bowel obstruction 9/23/22, ? due to adhesions, having BMs now with Linzess\par \par CT Scan of Abdomen reviewed\par Colonoscopy from 8/17/22 reviewed: good prep and no need to repeat at this time. Repeat in 5 years\par Patient told that she needs to F/U with the Surgeon in 2 weeks\par RTC in 6-8 weeks\par Continue Linzess: she was given some samples and coupons since she was unable to fill the RX due to high cost.\par CBC and CMP from hospitalization reviewed\par \par GERD with H/O Esophagitis\par \par Continue Pantoprazole and Pepcid\par Patient was told to stop the Reglan wince it may cause another obstruction.\par GERD diet\par

## 2022-10-07 DIAGNOSIS — Z88.8 ALLERGY STATUS TO OTHER DRUGS, MEDICAMENTS AND BIOLOGICAL SUBSTANCES STATUS: ICD-10-CM

## 2022-10-07 DIAGNOSIS — Z90.710 ACQUIRED ABSENCE OF BOTH CERVIX AND UTERUS: ICD-10-CM

## 2022-10-07 DIAGNOSIS — K56.609 UNSPECIFIED INTESTINAL OBSTRUCTION, UNSPECIFIED AS TO PARTIAL VERSUS COMPLETE OBSTRUCTION: ICD-10-CM

## 2022-10-07 DIAGNOSIS — Z87.891 PERSONAL HISTORY OF NICOTINE DEPENDENCE: ICD-10-CM

## 2022-10-07 DIAGNOSIS — E83.42 HYPOMAGNESEMIA: ICD-10-CM

## 2022-10-07 DIAGNOSIS — E78.00 PURE HYPERCHOLESTEROLEMIA, UNSPECIFIED: ICD-10-CM

## 2022-10-07 DIAGNOSIS — I10 ESSENTIAL (PRIMARY) HYPERTENSION: ICD-10-CM

## 2022-10-07 DIAGNOSIS — Z88.1 ALLERGY STATUS TO OTHER ANTIBIOTIC AGENTS STATUS: ICD-10-CM

## 2022-10-07 DIAGNOSIS — Z88.0 ALLERGY STATUS TO PENICILLIN: ICD-10-CM

## 2022-10-07 DIAGNOSIS — Z90.49 ACQUIRED ABSENCE OF OTHER SPECIFIED PARTS OF DIGESTIVE TRACT: ICD-10-CM

## 2022-10-19 ENCOUNTER — APPOINTMENT (OUTPATIENT)
Dept: SURGERY | Facility: CLINIC | Age: 57
End: 2022-10-19

## 2022-10-19 VITALS
BODY MASS INDEX: 24.11 KG/M2 | DIASTOLIC BLOOD PRESSURE: 70 MMHG | HEART RATE: 81 BPM | SYSTOLIC BLOOD PRESSURE: 142 MMHG | WEIGHT: 131 LBS | HEIGHT: 62 IN | OXYGEN SATURATION: 95 % | TEMPERATURE: 97.5 F

## 2022-10-19 PROCEDURE — 99212 OFFICE O/P EST SF 10 MIN: CPT

## 2022-11-02 NOTE — HISTORY OF PRESENT ILLNESS
[de-identified] : 56 y/o female was admitted to [facility] on 09/23/2022 for SBO.\par SBO resolved and patient was discharged on 09/28/2022 [de-identified] : Patient is doing well.\par Tolerates po diet and has regular bowel movements.\par

## 2022-11-02 NOTE — PLAN
[FreeTextEntry1] : Patient has appointment with GI Dr Leal for colonoscopy.\par Advised to follow with Dr Martinez after colonoscopy.\par On regular diet

## 2022-11-02 NOTE — REASON FOR VISIT
PAST MEDICAL HISTORY:  CHF (congestive heart failure) Mild    Chronic renal failure     End Stage Renal Disease on Dialysis since 2009 - Tues, Thurs, Sat    Hepatitis C     HTN (hypertension)     TB (tuberculosis) Latent     [Follow-Up: _____] : a [unfilled] follow-up visit

## 2022-11-02 NOTE — PHYSICAL EXAM
[JVD] : no jugular venous distention  [Normal Thyroid] : the thyroid was normal [Normal Breath Sounds] : Normal breath sounds [Normal Heart Sounds] : normal heart sounds [Normal Rate and Rhythm] : normal rate and rhythm [Abdominal Masses] : No abdominal masses [Abdomen Tenderness] : ~T ~M No abdominal tenderness [Tender] : was nontender [Enlarged] : not enlarged [No Rash or Lesion] : No rash or lesion [Alert] : alert [Oriented to Person] : oriented to person [Oriented to Place] : oriented to place [Oriented to Time] : oriented to time [Calm] : calm [de-identified] : comfortable [de-identified] : LINO [de-identified] : supple [de-identified] : soft, nontender [de-identified] : wnl

## 2022-11-15 ENCOUNTER — NON-APPOINTMENT (OUTPATIENT)
Age: 57
End: 2022-11-15

## 2022-11-16 ENCOUNTER — FORM ENCOUNTER (OUTPATIENT)
Age: 57
End: 2022-11-16

## 2022-12-23 ENCOUNTER — APPOINTMENT (OUTPATIENT)
Dept: GASTROENTEROLOGY | Facility: CLINIC | Age: 57
End: 2022-12-23

## 2022-12-23 VITALS
HEIGHT: 62 IN | BODY MASS INDEX: 24.95 KG/M2 | HEART RATE: 70 BPM | WEIGHT: 135.6 LBS | DIASTOLIC BLOOD PRESSURE: 73 MMHG | OXYGEN SATURATION: 94 % | SYSTOLIC BLOOD PRESSURE: 106 MMHG

## 2022-12-23 DIAGNOSIS — R14.0 ABDOMINAL DISTENSION (GASEOUS): ICD-10-CM

## 2022-12-23 DIAGNOSIS — K92.1 MELENA: ICD-10-CM

## 2022-12-23 PROCEDURE — 99214 OFFICE O/P EST MOD 30 MIN: CPT

## 2022-12-23 RX ORDER — OMEPRAZOLE 40 MG/1
40 CAPSULE, DELAYED RELEASE ORAL
Refills: 0 | Status: DISCONTINUED | COMMUNITY
End: 2022-12-23

## 2022-12-23 RX ORDER — PANTOPRAZOLE 40 MG/1
40 TABLET, DELAYED RELEASE ORAL
Qty: 30 | Refills: 4 | Status: DISCONTINUED | COMMUNITY
Start: 1900-01-01 | End: 2022-12-23

## 2022-12-23 NOTE — REVIEW OF SYSTEMS
[As Noted in HPI] : as noted in HPI [Abdominal Pain] : abdominal pain [Constipation] : constipation [Heartburn] : heartburn [Bleeding] : bleeding [Bloating (gassiness)] : bloating [Negative] : Heme/Lymph [Vomiting] : no vomiting [Diarrhea] : no diarrhea [Melena (black stool)] : no melena [Fecal Incontinence (soiling)] : no fecal incontinence

## 2022-12-23 NOTE — PHYSICAL EXAM
[Alert] : alert [Normal Voice/Communication] : normal voice/communication [No Acute Distress] : no acute distress [Sclera] : the sclera and conjunctiva were normal [Normal] : heart rate was normal and rhythm regular, normal S1 and S2, no murmurs [None] : no edema [Bowel Sounds] : normal bowel sounds [No Masses] : no abdominal mass palpated [Abdomen Soft] : soft [] : no hepatosplenomegaly [Abnormal Walk] : normal gait [Normal Color / Pigmentation] : normal skin color and pigmentation [Oriented To Time, Place, And Person] : oriented to person, place, and time [Ascites: ___] : no ascites [Rebound Tenderness] : no rebound tenderness [de-identified] : Generalized tenderness, moreso in the right lower abdomien without rebound or rigidity

## 2022-12-23 NOTE — ASSESSMENT
[FreeTextEntry1] : 57 yr old female with H/O SBO 9/22 (due to adhesions?), H/O Acid reflux here for F/U. She C/O constant abdominal pain RLQ extending across lower abdomen and has had some rectal bleeding intermittently.\par \par \par Abdominal pain, R/O SBO\par \par - Flat plate and upright films of abdomen ordered\par - CBC, CMP, INR\par - F/U after  abdominal Xrays done\par \par Rectal bleeding\par \par - Colonoscopy done 8/17/22 reviewed and showed Grade 3 Hemorrhoids without any other abnormalities consistent with a GI bleed so are most likely due to hemorrhoids\par - CBC from 9/27/22 reviewed\par - Repeat CBC to check for anemia\par - Will discuss case with Surgeon\par \par GERD\par \par - Patient told to take the Pantoprazole 20 mg bid, stop the Omeprazole and take Famotidine 40 mg at hs prn\par - GERD diet and GERD measures strongly recommended\par - Smoking cessation also advised\par \par Constipation\par \par - Continue Linzess 145 mcg daily\par - High fiber diet and increased water intake\par \par \par \par \par

## 2023-01-05 ENCOUNTER — OUTPATIENT (OUTPATIENT)
Dept: OUTPATIENT SERVICES | Facility: HOSPITAL | Age: 58
LOS: 1 days | Discharge: HOME | End: 2023-01-05
Payer: MEDICARE

## 2023-01-05 ENCOUNTER — RESULT REVIEW (OUTPATIENT)
Age: 58
End: 2023-01-05

## 2023-01-05 DIAGNOSIS — R10.9 UNSPECIFIED ABDOMINAL PAIN: ICD-10-CM

## 2023-01-05 DIAGNOSIS — Z98.890 OTHER SPECIFIED POSTPROCEDURAL STATES: Chronic | ICD-10-CM

## 2023-01-05 DIAGNOSIS — Z90.49 ACQUIRED ABSENCE OF OTHER SPECIFIED PARTS OF DIGESTIVE TRACT: Chronic | ICD-10-CM

## 2023-01-05 DIAGNOSIS — Z90.711 ACQUIRED ABSENCE OF UTERUS WITH REMAINING CERVICAL STUMP: Chronic | ICD-10-CM

## 2023-01-05 PROCEDURE — 76000 FLUOROSCOPY <1 HR PHYS/QHP: CPT | Mod: 26

## 2023-01-13 ENCOUNTER — APPOINTMENT (OUTPATIENT)
Dept: GASTROENTEROLOGY | Facility: CLINIC | Age: 58
End: 2023-01-13
Payer: MEDICARE

## 2023-01-13 VITALS — SYSTOLIC BLOOD PRESSURE: 170 MMHG | HEART RATE: 75 BPM | DIASTOLIC BLOOD PRESSURE: 83 MMHG | OXYGEN SATURATION: 97 %

## 2023-01-13 VITALS — HEIGHT: 62 IN | BODY MASS INDEX: 24.25 KG/M2 | WEIGHT: 131.8 LBS

## 2023-01-13 DIAGNOSIS — K62.5 HEMORRHAGE OF ANUS AND RECTUM: ICD-10-CM

## 2023-01-13 PROCEDURE — 99214 OFFICE O/P EST MOD 30 MIN: CPT

## 2023-01-13 RX ORDER — FAMOTIDINE 40 MG/1
40 TABLET, FILM COATED ORAL
Qty: 30 | Refills: 4 | Status: DISCONTINUED | COMMUNITY
Start: 2022-10-03 | End: 2023-01-13

## 2023-01-13 NOTE — PHYSICAL EXAM
[Alert] : alert [Sclera] : the sclera and conjunctiva were normal [Hearing Threshold Finger Rub Not [place]] : hearing was normal [Normal] : the appearance was normal, no neck mass [de-identified] : no distension Bs +

## 2023-01-13 NOTE — HISTORY OF PRESENT ILLNESS
[FreeTextEntry1] :  Past History:\par 57 yr old female with H/O SBO 9/22 (due to adhesions?), H/O Acid reflux here for F/U. She C/O constant abdominal pain RLQ extending across lower abdomen and has had some rectal bleeding intermittently which started mid October. but has occurred in the past and prior to the colonoscopy in August of 2022. She said it is mixed in with stool and on the toilet tissue, occurring at least once a week. She also C/O constipation; she has been taking Linzess and Fibercon daily. \par Today she is still complaining of diffuse abdominal pain she says her last bowel movement was 3 days ago.  She is also been noticing blood in her stools.  Occasional vomiting. [de-identified] : XRAY ABD - 1/5/23

## 2023-01-13 NOTE — ASSESSMENT
[FreeTextEntry1] : Problem 1 small bowel obstruction we will repeat CAT scan to look for any small bowel lesion producing small bowel obstruction.\par Problem 2 constipation we will start patient on lactulose 15 mL twice a day.\par Problem 3 rectal bleeding patient had a colonoscopy in 8/2022 for the same reason.  Will speak with surgeons and consider repeat colonoscopy if they so desire.\par To defer EGD and [facility]\par Follow-up in 2 to 3 weeks.

## 2023-01-13 NOTE — REASON FOR VISIT
[Follow-up] : a follow-up of an existing diagnosis [FreeTextEntry1] : Linzess not working - Lab/Xray results

## 2023-01-18 ENCOUNTER — RESULT REVIEW (OUTPATIENT)
Age: 58
End: 2023-01-18

## 2023-02-01 ENCOUNTER — APPOINTMENT (OUTPATIENT)
Dept: CARDIOLOGY | Facility: CLINIC | Age: 58
End: 2023-02-01
Payer: MEDICARE

## 2023-02-01 VITALS
DIASTOLIC BLOOD PRESSURE: 66 MMHG | WEIGHT: 132 LBS | HEART RATE: 57 BPM | BODY MASS INDEX: 24.14 KG/M2 | SYSTOLIC BLOOD PRESSURE: 118 MMHG

## 2023-02-01 PROCEDURE — 99214 OFFICE O/P EST MOD 30 MIN: CPT | Mod: 25

## 2023-02-01 PROCEDURE — 93000 ELECTROCARDIOGRAM COMPLETE: CPT

## 2023-02-01 RX ORDER — LINACLOTIDE 145 UG/1
145 CAPSULE, GELATIN COATED ORAL
Refills: 0 | Status: DISCONTINUED | COMMUNITY
End: 2023-02-01

## 2023-02-01 NOTE — HISTORY OF PRESENT ILLNESS
[FreeTextEntry1] : 57 F with nonobstructive CAD, HTN, current smoker, chronic arthritic pain.  Going to pain management.  Renal duplex consistent with bilateral renal artery stenosis.  CTA showed no evidence of stenosis.  EGD showed gastritis and a hiatal hernia.\par \par +constipation, abdominal pain, rectal bleeding, vomiting\par Planned CT to r/o SBO and possible colonoscopy\par No CP, SOB or palpitations\par \par \par EKG (2/1/2023):  SB, no ST-T changes\par TTE (9/16/2019):  EF 55-65%, Mild-mod AI\par CCTA (2019):  pLAD <50%, CACS 16\par Adenosine NST (4/18/2021):  No ischemia\par Cath (1/17/2017):  Diffuse MLI\par \par 7/2017\par Chol  360\par LDL  245\par HDL  74\par Trig  206\par

## 2023-02-01 NOTE — REVIEW OF SYSTEMS
[Negative] : Heme/Lymph [FreeTextEntry5] : See HPI [FreeTextEntry6] : See HPI [FreeTextEntry7] : See HPI [FreeTextEntry9] : +arthritis

## 2023-02-01 NOTE — ASSESSMENT
[FreeTextEntry1] : 57 F with nonobstructive CAD, HTN, current smoker, chronic arthritic pain.  Possible SBO and colonoscopy.\par \par \par Class II risk, 6% risk (30-day risk of death, MI, or cardiac arrest)\par Moderate-risk for perioperative MACE\par Continue Metoprolol succinate 50 mg daily\par Continue Nifedipine 60 mg BID\par Continue aspirin 81 mg\par \par No further cardiac workup is indicated at this time.  There are no current cardiac contraindications that prohibit proceeding with the scheduled surgery/procedure.  This consult serves only as a perioperative cardiac risk stratification and evaluation to predict 30-day cardiac complications risk and mortality.  The decision to proceed with the surgery/procedure is made by the performing physician and the patient.\par

## 2023-02-02 ENCOUNTER — OUTPATIENT (OUTPATIENT)
Dept: OUTPATIENT SERVICES | Facility: HOSPITAL | Age: 58
LOS: 1 days | Discharge: HOME | End: 2023-02-02
Payer: MEDICARE

## 2023-02-02 DIAGNOSIS — R10.9 UNSPECIFIED ABDOMINAL PAIN: ICD-10-CM

## 2023-02-02 DIAGNOSIS — Z98.890 OTHER SPECIFIED POSTPROCEDURAL STATES: Chronic | ICD-10-CM

## 2023-02-02 DIAGNOSIS — Z90.711 ACQUIRED ABSENCE OF UTERUS WITH REMAINING CERVICAL STUMP: Chronic | ICD-10-CM

## 2023-02-02 DIAGNOSIS — Z90.49 ACQUIRED ABSENCE OF OTHER SPECIFIED PARTS OF DIGESTIVE TRACT: Chronic | ICD-10-CM

## 2023-02-02 PROCEDURE — 74177 CT ABD & PELVIS W/CONTRAST: CPT | Mod: 26

## 2023-02-08 ENCOUNTER — APPOINTMENT (OUTPATIENT)
Dept: SURGERY | Facility: CLINIC | Age: 58
End: 2023-02-08
Payer: MEDICARE

## 2023-02-08 VITALS
OXYGEN SATURATION: 95 % | WEIGHT: 132 LBS | TEMPERATURE: 97 F | HEART RATE: 64 BPM | HEIGHT: 62 IN | DIASTOLIC BLOOD PRESSURE: 78 MMHG | SYSTOLIC BLOOD PRESSURE: 122 MMHG | BODY MASS INDEX: 24.29 KG/M2

## 2023-02-08 PROCEDURE — 99212 OFFICE O/P EST SF 10 MIN: CPT

## 2023-02-08 NOTE — ED ADULT NURSE NOTE - NSSEPSISNEWALTERMENTAL_ED_A_ED
Department of Palliative Medicine  Ambulatory Note  Provider: JUANITA Finnegan CNP      Location of service: Horizon Specialty [facility]  Consult Note                                                                              7/20/2020  Artie Mcgill MD    Patient Identification:  Mei Crane:   89 y.o.  female  2/14/1927     Date of Admission:7/19/2020    CC: palpitations    History of Present Illness: Mei Crane is a 89 lynn old pt with a history of Afib, embolic stroke, CKD III, hypothyroidism, HLD and HTN.     Pt was seen in hospital consultation on  6/1/20 by Dr. Christiansen for elevated troponin (0.17, 0.17, 0.14). Pt had presented to [facility] from [facility] for bilateral leg weakness , dizziness and a fall.  CR 1.4 ( baseline 1.3) EKG showed ST, left ventricular hypertrophy with nonspecific ST abnormality. Pt denied chest pain, shortness of breath, palpitations, orthopnea, PND or edema. Pt felt dizzy prior to fall. Pt was given IV heparin for elevated troponin. Pt UA showed 3+ bacteria and pt was given IV fluids and CR was coming down. An ECHO ws ordered and showed hyperdynamic LV, moderate to severely dilated LA, mitral calcification, moderate mitral stenosis. She had paroxysmal af. She was started on apixaban. No ischemic work up was indicated at that time.     Pt presented to ER on 7/19/20 with complaints of palpitations and generalized weakness for 4-5 days. Pt says it feels like fluttering or anxiety. Pt also having  A headache. IN ER troponin 0.058, , K 2.5, CR 1.33, CXR negative for acute, EKG SR. Pt was given po potassium in ER. She has been treated with IVF. Serum sodium has improved some today.    I have been asked to see for elevated troponin. She denies any chest pain or pressure. She denies shortness of breath. She thinks her symptoms were related to anxiety.    Trop has remained essentially flat (0.058, 0.059, 0.063, 0.059, 0.048).         ECHO 6/1/20    · Left ventricular systolic function is hyperdynamic (EF > 70%).  · Left ventricular diastolic dysfunction is noted (grade I a w/high LAP)  consistent with impaired relaxation.  · Normal right ventricular cavity size and systolic function noted.  · Left atrial cavity size is moderate-to-severely dilated.  · There is severe nodular calcification of the posterior mitral annulus. There is moderate calcification of the anterior mitral annulus. There is calcification of several of the mitral valve chordae.  · There is at least moderate mitral stenosis (heart rate of 89).  · The mitral valve mean gradient is 6 mmHg. The mitral valve peak gradient is 15 mmHg.  · Mild tricuspid valve regurgitation is present.  · Calculated right ventricular systolic pressure from tricuspid regurgitation is 43 mmHg.  · There is no evidence of pericardial effusion.      Patient Hx Of Height, Weight, and Vitals         Past Medical History:  Past Medical History:   Diagnosis Date   • Calculus of gallbladder without cholecystitis 8/7/2017   • CKD (chronic kidney disease), stage III (CMS/HCC) 4/20/2016   • DDD (degenerative disc disease), lumbar 2/19/2019   • Disease of thyroid gland    • Hyperlipidemia    • Low back pain    • Peripheral neuropathy        Past Surgical History:  Past Surgical History:   Procedure Laterality Date   • CATARACT EXTRACTION Bilateral    • EPIDURAL BLOCK     • HYSTERECTOMY      age 34       Allergies:  Allergies   Allergen Reactions   • Barbiturates Hives   • Codeine Hives       Home Meds:  Facility-Administered Medications Prior to Admission   Medication Dose Route Frequency Provider Last Rate Last Dose   • cyanocobalamin injection 1,000 mcg  1,000 mcg Intramuscular Q28 Days Jae Bray MD   1,000 mcg at 03/10/20 1014     Medications Prior to Admission   Medication Sig Dispense Refill Last Dose   • acetaminophen (TYLENOL) 325 MG tablet Take 2 tablets by mouth Every 4 (Four) Hours As Needed for Mild Pain .   7/19/2020 at Unknown time   • amLODIPine (NORVASC) 10 MG tablet Take 1 tablet by mouth Daily. 30 tablet 5 7/19/2020 at 0900   •  1980    Carcinoma, lung, left ([address] [address] 7/6/2018    Sjogren's disease ([address]     st [address], COLON, DIAGNOSTIC      HYSTERECTOMY ([facility])  03/08/2013    hysterectomy abdominal laparoscopic robotic assisted, cystoscopy    KYPHOSIS SURGERY N/A 09/25/2020    T7 KYPHOPLASTY WITH 2 C-ARMS (Rylee Poor) performed by Lola Jon MD at [address]     had pericardial window    NERVE BLOCK  12/05/2022    Epigastric nerve block    NERVE SURGERY  12/5/2022    CELIAC PLEXUS NEUROLYSIS performed by Dasia Cason MD at [address] [facility] Lola Jon MD at [address] [facility] N/A 07/15/2019    PERICARDIAL WINDOW , TAI performed by Becki Huber MD at [address]  02/27/2012    turbinoplasty, uvulectomy    THORACENTESIS  2018    UPPER GASTROINTESTINAL ENDOSCOPY      UPPER GASTROINTESTINAL ENDOSCOPY N/A 12/5/2022    ENDOSCOPIC ULTRASOUND performed by Dasia Cason MD at [facility] atorvastatin (LIPITOR) 80 MG tablet Take 1 tablet by mouth Every Night. 30 tablet 2 7/19/2020 at Unknown time   • DULoxetine (CYMBALTA) 20 MG capsule Take 1 capsule by mouth Daily. 30 capsule 5 7/19/2020 at 2100   • ELIQUIS 5 MG tablet tablet TAKE ONE TABLET BY MOUTH TWICE DAILY 28 tablet 0 7/19/2020 at 0900   • ferrous gluconate 324 (37.5 Fe) MG tablet tablet Take 1 tablet by mouth Daily With Breakfast. 30 tablet 6 7/19/2020 at 0900   • hydroCHLOROthiazide (MICROZIDE) 12.5 MG capsule Take 1 capsule by mouth Daily. 30 capsule 2 7/19/2020 at 0900   • levothyroxine (Synthroid) 50 MCG tablet Take 1 tablet by mouth Daily. 90 tablet 1 7/19/2020 at 0700   • metoprolol tartrate (LOPRESSOR) 25 MG tablet Take 1 tablet by mouth Every 8 (Eight) Hours. (Patient taking differently: Take 25 mg by mouth 2 (Two) Times a Day.)   7/19/2020 at 0900   • potassium chloride (K-DUR,KLOR-CON) 10 MEQ CR tablet Take 1 tablet by mouth Daily. 30 tablet 5 Past Month at Unknown time   • prednisoLONE acetate (PRED FORTE) 1 % ophthalmic suspension Administer 1 drop into the left eye Daily.  3 Patient Taking Differently at 0900   • cyclobenzaprine (FLEXERIL) 5 MG tablet Take 1 tablet by mouth 3 (Three) Times a Day As Needed for Muscle Spasms. 15 tablet 0 More than a month at Unknown time   • pantoprazole (PROTONIX) 40 MG EC tablet Take 1 tablet by mouth Daily. 90 tablet 3 More than a month at Unknown time       Current Meds  Scheduled Meds:  amLODIPine 10 mg Oral Daily   apixaban 5 mg Oral BID   atorvastatin 80 mg Oral Nightly   DULoxetine 20 mg Oral Daily   levothyroxine 50 mcg Oral Daily   metoprolol tartrate 25 mg Oral BID   pantoprazole 40 mg Oral Daily   potassium chloride 10 mEq Oral Daily   prednisoLONE acetate 1 drop Left Eye Daily   sodium chloride 10 mL Intravenous Q12H     Continuous Infusions:  sodium chloride 100 mL/hr Last Rate: 100 mL/hr (07/20/20 0037)     PRN Meds:.•  acetaminophen **OR** acetaminophen **OR** acetaminophen  •   "bisacodyl  •  calcium carbonate  •  nitroglycerin  •  potassium chloride **OR** potassium chloride **OR** potassium chloride  •  sodium chloride  •  sodium chloride    Social History:   Social History     Socioeconomic History   • Marital status:      Spouse name: Not on file   • Number of children: Not on file   • Years of education: Not on file   • Highest education level: Not on file   Tobacco Use   • Smoking status: Never Smoker   • Smokeless tobacco: Never Used   Substance and Sexual Activity   • Alcohol use: Yes     Comment: occasionally   • Drug use: No   • Sexual activity: Defer       Family History:  Family History   Problem Relation Age of Onset   • Aneurysm Mother         AAA   • Deep vein thrombosis Father        REVIEW OF SYSTEMS:   CONSTITUTIONAL: No weight loss, fever, chills, weakness or fatigue.   HEENT: Eyes: No visual loss, blurred vision, double vision or yellow sclerae. Ears, Nose, Throat: No hearing loss, sneezing, congestion, runny nose or sore throat.   SKIN: No rash or itching.     RESPIRATORY: No shortness of breath, hemoptysis, cough or sputum.   GASTROINTESTINAL: No anorexia, nausea, vomiting or diarrhea. No abdominal pain, bright red blood per rectum or melena.  GENITOURINARY: No burning on urination, hematuria or increased frequency.  NEUROLOGICAL: No headache, dizziness, syncope, paralysis, ataxia, numbness or tingling in the extremities. No change in bowel or bladder control.   MUSCULOSKELETAL: No muscle, back pain, joint pain or stiffness.   HEMATOLOGIC: No anemia, bleeding or bruising.   LYMPHATICS: No enlarged nodes. No history of splenectomy.   PSYCHIATRIC: No history of depression, anxiety, hallucinations.   ENDOCRINOLOGIC: No reports of sweating, cold or heat intolerance. No polyuria or polydipsia.     Physical Exam    /76 (BP Location: Right arm, Patient Position: Lying)   Pulse 81   Temp 97.7 °F (36.5 °C) (Tympanic)   Resp 16   Ht 162.6 cm (64.02\")   Wt 70.8 " [place] Symptom Assessment Score   [place] transcription errors may be present. kg (156 lb 1.6 oz)   SpO2 96%   BMI 26.78 kg/m²     General Appearance Well developed, cooperative and well nourished and no acute distress   Head Normocephalic, without abnormality, atraumatic   Ears Ears appear intact with no abnormalities noted   Throat No oral lesions, no thrush, oral mucosa moist   Neck No adenopathy, supple, trachea midline, no thyromegaly, no carotid bruit, no JVD   Back No skin lesions, erythema or scars, no tenderness to percussion or palpation,range of motion is normal   Lungs Clear to auscultation,respirations regular, even and unlabored   Heart Regular rhythm and normal rate, normal S1 and S2, 3/6 systolic murmur, no gallop, no rub, no click   Chest wall No abnormalities observed   Abdomen Normal bowel sounds, no masses, no hepatomegaly,    Extremities Moves all extremities well, no edema, no cyanosis, no redness   Pulses Pulses palpable and equal bilaterally. Normal radial, carotid, femoral, dorsalis pedis and posterior tibial pulses bilaterally. Normal abdominal aorta   Skin No bleeding, bruising or rash   Psychiatric Alert and oriented x 3, normal mood and affect    Results from last 7 days   Lab Units 07/20/20 0252 07/19/20 2008   SODIUM mmol/L 128* 123*   POTASSIUM mmol/L 2.9* 2.5*   CHLORIDE mmol/L 92* 87*   CO2 mmol/L 23.6 24.1   BUN mg/dL 13 14   CREATININE mg/dL 1.23* 1.33*   CALCIUM mg/dL 8.8 9.1   BILIRUBIN mg/dL  --  0.5   ALK PHOS U/L  --  85   ALT (SGPT) U/L  --  14   AST (SGOT) U/L  --  23   GLUCOSE mg/dL 117* 118*     Results from last 7 days   Lab Units 07/20/20  0541 07/20/20 0252 07/19/20  2339   TROPONIN T ng/mL 0.059* 0.063* 0.059*     )  Results from last 7 days   Lab Units 07/20/20 0252 07/19/20 2008   WBC 10*3/mm3 11.00* 9.57   HEMOGLOBIN g/dL 12.6 12.8   HEMATOCRIT % 36.3 37.9   PLATELETS 10*3/mm3 303 325             Assessment and Plan  Patient Active Problem List   Diagnosis   • HTN (hypertension), benign   • Acquired hypothyroidism   • Hyperlipidemia      • Gastroesophageal reflux disease without esophagitis   • Glaucoma   • Macular degeneration   • Anemia   • Osteopenia   • CKD (chronic kidney disease), stage III (CMS/HCC)   • B12 deficiency   • Calculus of gallbladder without cholecystitis   • DDD (degenerative disc disease), lumbar   • Trochanteric bursitis of right hip   • Bilateral leg weakness   • Elevated troponin   • Weakness   • Dizziness   • Hallucinations, visual   • CVA (cerebrovascular accident) (CMS/HCC)   • Embolic stroke (CMS/[facility])   • Hypokalemia   • Hyponatremia   • History of embolic stroke   • Chest pain   • Atrial fibrillation (CMS/[facility])                               Assessment and Plan:  1. Elevated troponin - remains flat and does not trend in typical ACS fashion. She had an elevated troponin last month when admitted. She had echo with normal LV function and no regional wall motion abnormalities at that time. She continues to deny anginal symptoms and I do not think minimal trop leak is consistent with ischemic event.     2. Palpitations - very likely could be having paroxysms of atrial fibrillation. She could take extra doses of beta blocker at home if this should recur.     3. Paroxysmal atrial fibrillation - diagnosed June 2020. Continue beta blocker and apixaban. Increase metoprolol given #2, plenty of BP and can decrease amlodipine if needed. Will follow.     4. Moderate mitral valve stenosis     5. Electrolyte imbalance - hyponatremia, hypokalemia. HCTZ stopped. K replaced. Na improving.     6. Chronic kidney disease     Increase beta blocker. Otherwise no additional cardiac work up indicated. Will follow.     Matilde Clark, APRN  07/20/20  13:33     No

## 2023-02-10 ENCOUNTER — APPOINTMENT (OUTPATIENT)
Dept: GASTROENTEROLOGY | Facility: CLINIC | Age: 58
End: 2023-02-10
Payer: MEDICARE

## 2023-02-10 VITALS — WEIGHT: 132.6 LBS | BODY MASS INDEX: 24.25 KG/M2

## 2023-02-10 VITALS
OXYGEN SATURATION: 97 % | SYSTOLIC BLOOD PRESSURE: 136 MMHG | HEART RATE: 57 BPM | DIASTOLIC BLOOD PRESSURE: 78 MMHG | HEIGHT: 62 IN

## 2023-02-10 DIAGNOSIS — R93.5 ABNORMAL FINDINGS ON DIAGNOSTIC IMAGING OF OTHER ABDOMINAL REGIONS, INCLUDING RETROPERITONEUM: ICD-10-CM

## 2023-02-10 PROCEDURE — 99214 OFFICE O/P EST MOD 30 MIN: CPT

## 2023-02-10 NOTE — HISTORY OF PRESENT ILLNESS
[de-identified] : 2/2/23 [de-identified] : XRAY Abd 1/5/23 [de-identified] : Patient still complaining of constipation with minimal response to the addition of lactulose.  Recent CAT scan report reviewed shows narrowing in the right colon.

## 2023-03-05 ENCOUNTER — LABORATORY RESULT (OUTPATIENT)
Age: 58
End: 2023-03-05

## 2023-03-08 ENCOUNTER — TRANSCRIPTION ENCOUNTER (OUTPATIENT)
Age: 58
End: 2023-03-08

## 2023-03-08 ENCOUNTER — RESULT REVIEW (OUTPATIENT)
Age: 58
End: 2023-03-08

## 2023-03-08 ENCOUNTER — OUTPATIENT (OUTPATIENT)
Dept: INPATIENT UNIT | Facility: HOSPITAL | Age: 58
LOS: 1 days | Discharge: HOME | End: 2023-03-08
Payer: MEDICARE

## 2023-03-08 VITALS
RESPIRATION RATE: 18 BRPM | HEART RATE: 72 BPM | SYSTOLIC BLOOD PRESSURE: 163 MMHG | DIASTOLIC BLOOD PRESSURE: 88 MMHG | OXYGEN SATURATION: 97 %

## 2023-03-08 VITALS
WEIGHT: 130.95 LBS | SYSTOLIC BLOOD PRESSURE: 143 MMHG | DIASTOLIC BLOOD PRESSURE: 84 MMHG | RESPIRATION RATE: 18 BRPM | HEIGHT: 62 IN | HEART RATE: 81 BPM | TEMPERATURE: 97 F

## 2023-03-08 DIAGNOSIS — Z90.49 ACQUIRED ABSENCE OF OTHER SPECIFIED PARTS OF DIGESTIVE TRACT: Chronic | ICD-10-CM

## 2023-03-08 DIAGNOSIS — Z90.711 ACQUIRED ABSENCE OF UTERUS WITH REMAINING CERVICAL STUMP: Chronic | ICD-10-CM

## 2023-03-08 DIAGNOSIS — Z98.890 OTHER SPECIFIED POSTPROCEDURAL STATES: Chronic | ICD-10-CM

## 2023-03-08 DIAGNOSIS — R10.9 UNSPECIFIED ABDOMINAL PAIN: ICD-10-CM

## 2023-03-08 DIAGNOSIS — K92.1 MELENA: ICD-10-CM

## 2023-03-08 PROCEDURE — 88305 TISSUE EXAM BY PATHOLOGIST: CPT

## 2023-03-08 PROCEDURE — 88305 TISSUE EXAM BY PATHOLOGIST: CPT | Mod: 26

## 2023-03-08 PROCEDURE — 45380 COLONOSCOPY AND BIOPSY: CPT

## 2023-03-08 RX ORDER — DICLOFENAC SODIUM 75 MG/1
1 TABLET, DELAYED RELEASE ORAL
Qty: 0 | Refills: 0 | DISCHARGE

## 2023-03-08 NOTE — H&P PST ADULT - HISTORY OF PRESENT ILLNESS
58 yo with HTN is here for colonoscopy for abnormal CT  hx of polyps 2017    < from: CT Abdomen and Pelvis w/ Oral Cont and w/ IV Cont (02.02.23 @ 19:43) >  PERITONEUM/MESENTERY/BOWEL: There is a focal 4 cm area of right colonic   narrowing with mild wall thickening seen on images 32-38 of series 2.   Additionally, there is wall thickening of the inferior cecum. These areas   are incompletely evaluated on CT and further evaluation with colonoscopy   is recommended. The appendix is unremarkable. There is a mild colonic   stool load. There is no free air or obstruction..    < end of copied text >

## 2023-03-08 NOTE — ASU PATIENT PROFILE, ADULT - NS PRO PT RIGHT SUPPORT PERSON
Pt c/o RUQ pain which radiates through to her back which started before she ate breakfast this AM. Pt states that she has been tolerating PO food/fluids and that it does not make the pain worse. Pt reports that she had n/v/d x2days ago, but none today.  Pt Yes

## 2023-03-08 NOTE — ASU PREOP CHECKLIST - NSWEIGHTCALCTOOLDRUG_GEN_A_CORE
[facility]  Discharge Summary   Name:  Violet Solano  Medical Record Number: [id number]   Admit Date: 2020  Discharge Date: 2020   YOB: 2021  Discharge Comment   39wk admitted with Transient Tachypnea of the [place], weaned to RA and stable off oxygen support at  discharge. At discharge feeding adequate amounts of Amber Bio Combiotik and working on breastfeeding. Needs  follow up brain US at 1 month of age for suspected right grade 1-2 bleed.    Birth Weight: 3140 26-50%tile (gms)  Birth Head Circ: 35.51-75%tile (cm) Birth Length: 50. 51-75%tile (cm)   Birth Gestation:  39wk 2d  DOL:  5 8  4   Disposition: Discharged   Discharge Weight: 2965  (gms)  Discharge Head Circ: 36  (cm)  Discharge Length: 49.5 (cm)   Discharge Pos-Mens Age: 39wk 6d  Discharge Followup   Followup Name Comment Appointment  Dr. Wooten in 2-3 days  Dr. Harris at 4 months of age  Discharge Respiratory   Respiratory Support Start Date Stop Date Dur(d)Comment  Room Air 2020 3  Discharge Fluids   Breast Milk-Term + formula parents choice  [place] Screening   Date Comment    2020 Done pending  Hearing Screen   Date Type Results Comment  2020 Done ABR Passed  Immunizations   Date Type Comment  2020 Done Hepatitis B At [facility]  Active Diagnoses   Diagnosis Start Date Comment   Intraventricular Hemorrhage 2020  grade II  Nutritional Support 2020  Parental Support 2020  R/O Sepsis <=28D 2020  Strabismus -unspec 2020  Term Infant 2020  Resolved  Diagnoses   Diagnosis Start Date Comment   At risk for Hyperbilirubinemia2020  Tachycardia -  2020  Transient Tachypnea of 2020     [place]  Maternal History   Mom's Age: 36  Race:  White  Blood Type:  A Pos    P:  1   RPR/Serology:  Non-Reactive  HIV: Negative  Rubella: Immune  GBS:  Negative  HBsAg:  Negative   EDC - OB: 2020  Prenatal Care: Yes   Mom's First Name:  Hector  Mom's Last Name:   Russ   Complications during Pregnancy, Labor or Delivery: Yes  Name Comment  Factor 12 defeciency  No treatment required  Maternal Steroids: No  Pregnancy Comment   Previous demise at 22 week due to trisomy 18. IVF, nucal traslucency and INPT normal.   Delivery   YOB: 2021  Time of Birth: 12:14  Fluid at Delivery: Clear   Live Births:  Single  Birth Order:  Single  Presentation:  Unknown   Delivering OB: Anesthesia:  Epidural   [facility]irth [facility]  Delivery Type:   Section   ROM Prior to Delivery: No  Reason for  Attending:  Procedures/Medications at Delivery: Unknown   APGAR:  1 min:  7  5  min:  8  Discharge Physical Exam   Temperature Heart Rate Resp Rate BP - Sys BP - Mcclain BP - Mean O2 Sats   36.5 116 54 73 32 47 97   General:  9:25. Parents at bedside.   Head/Neck:  Anterior fontanelle soft and flat.  Suture lines  opposed.  Dysconjugate gaze   Chest:  Chest symmetrical. Equal breath sounds bilaterally.  Clear lungs   Heart:  Regular rate and rhythm; no murmur heard; brachial  and  femoral pulses 2+ and equal bilaterally; CFT 2  sec   Abdomen:  Abdomen soft and flat with bowel sounds present.     Genitalia:  Normal term external genitalia.     Extremities  Symmetrical movements   Neurologic:  Alert and responsive. Good muscle tone. Physiologic reflexes intact.     Skin:  Pink, warm, dry, and intact. Nevus simplex on bilateral eye lids and forehead.   Nutritional Support   Diagnosis Start Date End Date  Nutritional Support 2020   History   Euglycemic, vTPN at 80ml/kg/d on admission.  Na 134, K 5.9. Euglycemic. IVFs discontinued . At discharge  taking adequate amounts of MBM or formula, Amber Bio Combiotik. Lost weight on day prior to discharge but taking  adequate amounts and just 6%below birth weight at 4 days of age.      Plan   Lactation support.   Parents have a personal formula they would like to provide if mother breast milk is not avaliable.   Follow  weight gain and PO intake.  Term Infant   Diagnosis Start Date End Date  Term Infant 2020   History   39 2/7 AGA  At risk for Hyperbilirubinemia   Diagnosis Start Date End Date  At risk for Hyperbilirubinemia 2020   History   Maternal blood type A+, baby's not done.  TB 3.2  TB up slightly to 5.2 but under photo level.  Transient Tachypnea of    Diagnosis Start Date End Date  Transient Tachypnea of [place] 2020   History   Infant with mod resp distress after delivery. CXR appears streaky and consistant with TTN. Transported on HFNC.    still tachypneic but in 21% O2. 4L   in 21% 3L, no longer tachypneic.   in RA. At discharge stable off  oxygen support.  Tachycardia -    Diagnosis Start Date End Date  Tachycardia -  2020   History   Infant with HR in the 200's after delivery. EKG completed at [facility] showing Sinus tach with occasional PVCs. 10ml/kg/d  NS bolus provided. HR in the 160 upon admission.  normal HR. No PVCs heard this am. At discharge HR in  110s-140s.   R/O Sepsis <=28D   Diagnosis Start Date End Date  R/O Sepsis <=28D 2020   History   ROM at delivery, GBS negative. EOS calculator recommends abx with clinical illness. Amp  and  Gent started at [facility] and  discharge when bcx neg x 48hrs.   IVH   Diagnosis Start Date End Date  Intraventricular Hemorrhage grade II 2020  Neuroimaging   Date Type Grade-L Grade-R   2020 Cranial Ultrasound No Bleed 2   History   Suspected grade 1-2 IVH on right.      Plan   Repeat CUS at 1 month of age as outpatient.   Parental Support   Diagnosis Start Date End Date  Parental Support 2020   History   Parents  father signed consents on admission. Mother updated  by Dr Orosco. Parents roomed in prior to  discharge, no issues.  Strabismus -unspec   Diagnosis Start Date End Date  Strabismus -unspec 2020   History   dysconjugate gaze on exam, eyes roll up  frequently. HUS with grade 1-2 IVH. 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, benign, secondary to immature supranuclear control of vertical gaze muscles   Plan   ophthalmology f/u in 4 mos  Respiratory Support   Respiratory Support Start Date Stop Date Dur(d)                                       Comment   High Flow Nasal Cannula 2020 2020 3  delivering CPAP  Room Air 2020 3  Procedures   Start Date Stop Date Dur(d)Clinician Comment   Car Seat Test (60min) 20202020 1 XXX MD CHANDLER passed  CCHD Screen 20202020 1 CHANDLER ARTEAGA MD passed  Cultures  Active   Type Date Results Organism   Blood 2020 No Growth   Comment:  At [facility]  Intake/Output  Actual Intake   Fluid Type [place]/oz Dex % Prot g/kg Prot g/100mL Amount Comment  Breast Milk-Term 352 + formula parents choice  Actual Fluid Calculations   Total mL/kg Total poncho/kg Ent mL/kg IVF mL/kg IV Gluc mg/kg/min Total Prot g/kg Total Fat g/kg  119 0 119 0 0 0 0  Planned Intake Prot Prot feeds/  Fluid Type [place]/oz Dex % g/kg g/100mL Amt mL/feed day mL/hr mL/kg/day Comment     Breast Milk-Term 20 360 45 8 121 or formula of  parents  choice  Planned Fluid Calculations   Total Total Ent IVF IV Gluc Total Prot Total Fat Total Na Total K Total [place] Ca Total [place] Phos  mL/kg poncho/kg mL/kg mL/kg mg/kg/min g/kg g/kg mEq/kg mEq/kg mg/kg mg/kg  121 83 121 1.34 4.74 2.52 100.8  Output   Urine Amount:163 mL 2.3 mL/kg/hr Calculation:24 hrs  Total Output:   163 mL 2.3 mL/kg/hr 55.0 mL/kg/day Calculation:24 hrs  Stools: 2  Medications   Inactive Start Date Start Time Stop Date Dur(d) Comment   Vitamin K 2020 Once 2020 1 At [facility]  Erythromycin 2020 Once 2020 1 At [facility]  Ampicillin 2020 2020 3  Gentamicin 2020 2020 3  Time spent preparing and implementing Discharge: > 30 min  ___________________________________________  Ailyn Auguste MD    used

## 2023-03-08 NOTE — ASU DISCHARGE PLAN (ADULT/PEDIATRIC) - CARE PROVIDER_API CALL
Luis Leal)  Gastroenterology; Internal Medicine  [address] [place] [address]  Phone: [phone]  Fax: [phone]  Follow Up Time: 2 weeks

## 2023-03-08 NOTE — ASU DISCHARGE PLAN (ADULT/PEDIATRIC) - NS MD DC FALL RISK RISK
For information on Fall & Injury Prevention, visit: [email or web address] OR  [email or web address] OR  https://www.cdc.gov/steadi/patient.html

## 2023-03-08 NOTE — ASU PATIENT PROFILE, ADULT - FALL HARM RISK - UNIVERSAL INTERVENTIONS
Bed in lowest position, wheels locked, appropriate side rails in place/Call bell, personal items and telephone in reach/Instruct patient to call for assistance before getting out of bed or chair/Non-slip footwear when patient is out of bed/[place] to call system/Physically safe environment - no spills, clutter or unnecessary equipment/Purposeful Proactive Rounding/Room/bathroom lighting operational, light cord in reach

## 2023-03-09 LAB — SURGICAL PATHOLOGY STUDY: SIGNIFICANT CHANGE UP

## 2023-03-13 DIAGNOSIS — K21.9 GASTRO-ESOPHAGEAL REFLUX DISEASE WITHOUT ESOPHAGITIS: ICD-10-CM

## 2023-03-13 DIAGNOSIS — K64.8 OTHER HEMORRHOIDS: ICD-10-CM

## 2023-03-13 DIAGNOSIS — I10 ESSENTIAL (PRIMARY) HYPERTENSION: ICD-10-CM

## 2023-03-13 DIAGNOSIS — Z90.711 ACQUIRED ABSENCE OF UTERUS WITH REMAINING CERVICAL STUMP: ICD-10-CM

## 2023-03-13 DIAGNOSIS — F17.200 NICOTINE DEPENDENCE, UNSPECIFIED, UNCOMPLICATED: ICD-10-CM

## 2023-03-13 DIAGNOSIS — K64.4 RESIDUAL HEMORRHOIDAL SKIN TAGS: ICD-10-CM

## 2023-03-13 DIAGNOSIS — F41.9 ANXIETY DISORDER, UNSPECIFIED: ICD-10-CM

## 2023-03-13 DIAGNOSIS — Z88.1 ALLERGY STATUS TO OTHER ANTIBIOTIC AGENTS STATUS: ICD-10-CM

## 2023-03-13 DIAGNOSIS — M19.90 UNSPECIFIED OSTEOARTHRITIS, UNSPECIFIED SITE: ICD-10-CM

## 2023-03-13 DIAGNOSIS — R93.3 ABNORMAL FINDINGS ON DIAGNOSTIC IMAGING OF OTHER PARTS OF DIGESTIVE TRACT: ICD-10-CM

## 2023-03-13 DIAGNOSIS — Z88.0 ALLERGY STATUS TO PENICILLIN: ICD-10-CM

## 2023-03-13 DIAGNOSIS — E78.00 PURE HYPERCHOLESTEROLEMIA, UNSPECIFIED: ICD-10-CM

## 2023-03-13 DIAGNOSIS — G43.909 MIGRAINE, UNSPECIFIED, NOT INTRACTABLE, WITHOUT STATUS MIGRAINOSUS: ICD-10-CM

## 2023-03-13 DIAGNOSIS — K63.5 POLYP OF COLON: ICD-10-CM

## 2023-03-13 DIAGNOSIS — R56.9 UNSPECIFIED CONVULSIONS: ICD-10-CM

## 2023-03-13 DIAGNOSIS — Z88.6 ALLERGY STATUS TO ANALGESIC AGENT: ICD-10-CM

## 2023-04-06 ENCOUNTER — APPOINTMENT (OUTPATIENT)
Dept: OBGYN | Facility: CLINIC | Age: 58
End: 2023-04-06
Payer: MEDICARE

## 2023-04-06 VITALS — HEIGHT: 62 IN | WEIGHT: 132 LBS | BODY MASS INDEX: 24.29 KG/M2

## 2023-04-06 DIAGNOSIS — Z01.411 ENCOUNTER FOR GYNECOLOGICAL EXAMINATION (GENERAL) (ROUTINE) WITH ABNORMAL FINDINGS: ICD-10-CM

## 2023-04-06 DIAGNOSIS — R10.2 PELVIC AND PERINEAL PAIN: ICD-10-CM

## 2023-04-06 PROCEDURE — 99396 PREV VISIT EST AGE 40-64: CPT

## 2023-04-06 PROCEDURE — G0101: CPT

## 2023-04-07 ENCOUNTER — APPOINTMENT (OUTPATIENT)
Dept: GASTROENTEROLOGY | Facility: CLINIC | Age: 58
End: 2023-04-07
Payer: MEDICARE

## 2023-04-07 VITALS
HEIGHT: 62 IN | BODY MASS INDEX: 24.29 KG/M2 | SYSTOLIC BLOOD PRESSURE: 138 MMHG | DIASTOLIC BLOOD PRESSURE: 80 MMHG | HEART RATE: 65 BPM | OXYGEN SATURATION: 95 % | WEIGHT: 132 LBS

## 2023-04-07 DIAGNOSIS — R14.0 ABDOMINAL DISTENSION (GASEOUS): ICD-10-CM

## 2023-04-07 PROCEDURE — 99214 OFFICE O/P EST MOD 30 MIN: CPT

## 2023-04-07 RX ORDER — POLYETHYLENE GLYCOL 3350 AND ELECTROLYTES WITH LEMON FLAVOR 236; 22.74; 6.74; 5.86; 2.97 G/4L; G/4L; G/4L; G/4L; G/4L
236 POWDER, FOR SOLUTION ORAL
Qty: 1 | Refills: 0 | Status: DISCONTINUED | COMMUNITY
Start: 2023-02-10 | End: 2023-04-07

## 2023-04-07 NOTE — HISTORY OF PRESENT ILLNESS
[de-identified] : 5/22 EGD by Dr Guerrero gross picture was suggestive of Arellano's with esophagitis but pathology did not show any evidence of Arellano's esophagus.  Biopsy of the duodenum was normal for celiac sprue [FreeTextEntry1] : 3/8/23\par No narrowing or stricture on R colon noted

## 2023-04-07 NOTE — ASSESSMENT
[FreeTextEntry1] : Previous colonoscopy report reviewed.  Results as indicated in history.  Pathology report reviewed shows a hyperplastic polyp repeat colonoscopy in 5 years.\par Problem 1 constipation patient taking Linzess to 290 mcg twice a day.  Low-dose.\par GERD patient taking pantoprazole 20 mg twice a day.\par Bloating Will order gastric emptying study.  To consider EGD and Bravo test in the future.  Follow-up in 4 to 6 months

## 2023-04-11 ENCOUNTER — RESULT REVIEW (OUTPATIENT)
Age: 58
End: 2023-04-11

## 2023-04-11 LAB — CYTOLOGY CVX/VAG DOC THIN PREP: NORMAL

## 2023-04-12 ENCOUNTER — APPOINTMENT (OUTPATIENT)
Dept: SURGERY | Facility: CLINIC | Age: 58
End: 2023-04-12

## 2023-04-17 ENCOUNTER — APPOINTMENT (OUTPATIENT)
Dept: OBGYN | Facility: CLINIC | Age: 58
End: 2023-04-17
Payer: MEDICARE

## 2023-04-17 PROCEDURE — 76830 TRANSVAGINAL US NON-OB: CPT

## 2023-04-20 ENCOUNTER — OUTPATIENT (OUTPATIENT)
Dept: OUTPATIENT SERVICES | Facility: HOSPITAL | Age: 58
LOS: 1 days | End: 2023-04-20
Payer: MEDICARE

## 2023-04-20 DIAGNOSIS — Z98.890 OTHER SPECIFIED POSTPROCEDURAL STATES: Chronic | ICD-10-CM

## 2023-04-20 DIAGNOSIS — R14.0 ABDOMINAL DISTENSION (GASEOUS): ICD-10-CM

## 2023-04-20 DIAGNOSIS — Z90.711 ACQUIRED ABSENCE OF UTERUS WITH REMAINING CERVICAL STUMP: Chronic | ICD-10-CM

## 2023-04-20 PROCEDURE — 78264 GASTRIC EMPTYING IMG STUDY: CPT | Mod: 26

## 2023-04-20 PROCEDURE — 78264 GASTRIC EMPTYING IMG STUDY: CPT

## 2023-04-21 DIAGNOSIS — R14.0 ABDOMINAL DISTENSION (GASEOUS): ICD-10-CM

## 2023-05-01 ENCOUNTER — NON-APPOINTMENT (OUTPATIENT)
Age: 58
End: 2023-05-01

## 2023-05-03 ENCOUNTER — APPOINTMENT (OUTPATIENT)
Dept: CARDIOLOGY | Facility: CLINIC | Age: 58
End: 2023-05-03
Payer: MEDICARE

## 2023-05-03 VITALS
BODY MASS INDEX: 23.78 KG/M2 | HEART RATE: 65 BPM | DIASTOLIC BLOOD PRESSURE: 80 MMHG | WEIGHT: 130 LBS | SYSTOLIC BLOOD PRESSURE: 132 MMHG

## 2023-05-03 PROCEDURE — 93000 ELECTROCARDIOGRAM COMPLETE: CPT

## 2023-05-03 PROCEDURE — 99214 OFFICE O/P EST MOD 30 MIN: CPT | Mod: 25

## 2023-05-03 NOTE — PHYSICAL EXAM
[No Acute Distress] : no acute distress [Well Developed] : well developed [Normal Conjunctiva] : normal conjunctiva [No Carotid Bruit] : no carotid bruit [Normal S1, S2] : normal S1, S2 [No Murmur] : no murmur [No Rub] : no rub [Clear Lung Fields] : clear lung fields [Good Air Entry] : good air entry [No Respiratory Distress] : no respiratory distress  [Soft] : abdomen soft [Non Tender] : non-tender [Normal Gait] : normal gait [No Edema] : no edema [No Cyanosis] : no cyanosis [No Clubbing] : no clubbing [No Rash] : no rash [No Skin Lesions] : no skin lesions [Moves all extremities] : moves all extremities [No Focal Deficits] : no focal deficits [Alert and Oriented] : alert and oriented

## 2023-05-03 NOTE — HISTORY OF PRESENT ILLNESS
[FreeTextEntry1] : 57 F with nonobstructive CAD, HTN, COPD, current smoker, chronic arthritic pain, chronic abdominal pain.\par \par Denies any CP, SOB, palpitations, orthopnea/PND, dizziness or syncope\par +constipation, abdominal pain, rectal bleeding, vomiting\par \par \par TTE (9/2019):  EF >55%, Mild-mod AI\par CCTA (2019):  pLAD <50%, CACS 16\par Adenosine NST (4/18/2021):  No ischemia\par LHC (1/2017):  Diffuse minimal luminal irrgularities\par

## 2023-05-05 ENCOUNTER — APPOINTMENT (OUTPATIENT)
Dept: GASTROENTEROLOGY | Facility: CLINIC | Age: 58
End: 2023-05-05
Payer: MEDICARE

## 2023-05-05 VITALS
BODY MASS INDEX: 23.7 KG/M2 | DIASTOLIC BLOOD PRESSURE: 86 MMHG | SYSTOLIC BLOOD PRESSURE: 118 MMHG | OXYGEN SATURATION: 98 % | WEIGHT: 128.8 LBS | HEART RATE: 77 BPM | HEIGHT: 62 IN

## 2023-05-05 PROCEDURE — 99213 OFFICE O/P EST LOW 20 MIN: CPT

## 2023-05-05 NOTE — ASSESSMENT
[FreeTextEntry1] : Problem 1 constipation patient advised to take MiraLAX every day 1 scoop in a glass of water.  To use lactulose 15 mL if has no BM for 3-4 days.\par Refer to Dr. Barrow.  Follow-up in 4 months.

## 2023-05-09 ENCOUNTER — INPATIENT (INPATIENT)
Facility: HOSPITAL | Age: 58
LOS: 1 days | Discharge: HOME | DRG: 387 | End: 2023-05-11
Attending: INTERNAL MEDICINE | Admitting: INTERNAL MEDICINE
Payer: MEDICARE

## 2023-05-09 VITALS
OXYGEN SATURATION: 99 % | HEIGHT: 62 IN | WEIGHT: 128.09 LBS | DIASTOLIC BLOOD PRESSURE: 67 MMHG | HEART RATE: 74 BPM | TEMPERATURE: 98 F | RESPIRATION RATE: 18 BRPM | SYSTOLIC BLOOD PRESSURE: 163 MMHG

## 2023-05-09 DIAGNOSIS — Z90.711 ACQUIRED ABSENCE OF UTERUS WITH REMAINING CERVICAL STUMP: Chronic | ICD-10-CM

## 2023-05-09 DIAGNOSIS — Z90.49 ACQUIRED ABSENCE OF OTHER SPECIFIED PARTS OF DIGESTIVE TRACT: Chronic | ICD-10-CM

## 2023-05-09 DIAGNOSIS — Z98.890 OTHER SPECIFIED POSTPROCEDURAL STATES: Chronic | ICD-10-CM

## 2023-05-09 DIAGNOSIS — R10.9 UNSPECIFIED ABDOMINAL PAIN: ICD-10-CM

## 2023-05-09 LAB
ALBUMIN SERPL ELPH-MCNC: 5 G/DL — SIGNIFICANT CHANGE UP (ref 3.5–5.2)
ALP SERPL-CCNC: 130 U/L — HIGH (ref 30–115)
ALT FLD-CCNC: 25 U/L — SIGNIFICANT CHANGE UP (ref 0–41)
ANION GAP SERPL CALC-SCNC: 13 MMOL/L — SIGNIFICANT CHANGE UP (ref 7–14)
APPEARANCE UR: CLEAR — SIGNIFICANT CHANGE UP
AST SERPL-CCNC: 28 U/L — SIGNIFICANT CHANGE UP (ref 0–41)
BASOPHILS # BLD AUTO: 0.04 K/UL — SIGNIFICANT CHANGE UP (ref 0–0.2)
BASOPHILS NFR BLD AUTO: 0.6 % — SIGNIFICANT CHANGE UP (ref 0–1)
BILIRUB SERPL-MCNC: 0.2 MG/DL — SIGNIFICANT CHANGE UP (ref 0.2–1.2)
BILIRUB UR-MCNC: NEGATIVE — SIGNIFICANT CHANGE UP
BLD GP AB SCN SERPL QL: SIGNIFICANT CHANGE UP
BUN SERPL-MCNC: 9 MG/DL — LOW (ref 10–20)
CALCIUM SERPL-MCNC: 9.9 MG/DL — SIGNIFICANT CHANGE UP (ref 8.4–10.5)
CHLORIDE SERPL-SCNC: 105 MMOL/L — SIGNIFICANT CHANGE UP (ref 98–110)
CO2 SERPL-SCNC: 25 MMOL/L — SIGNIFICANT CHANGE UP (ref 17–32)
COLOR SPEC: YELLOW — SIGNIFICANT CHANGE UP
CREAT SERPL-MCNC: 0.7 MG/DL — SIGNIFICANT CHANGE UP (ref 0.7–1.5)
DIFF PNL FLD: NEGATIVE — SIGNIFICANT CHANGE UP
EGFR: 100 ML/MIN/1.73M2 — SIGNIFICANT CHANGE UP
EOSINOPHIL # BLD AUTO: 0.03 K/UL — SIGNIFICANT CHANGE UP (ref 0–0.7)
EOSINOPHIL NFR BLD AUTO: 0.4 % — SIGNIFICANT CHANGE UP (ref 0–8)
GLUCOSE SERPL-MCNC: 79 MG/DL — SIGNIFICANT CHANGE UP (ref 70–99)
GLUCOSE UR QL: NEGATIVE — SIGNIFICANT CHANGE UP
HCG SERPL QL: NEGATIVE — SIGNIFICANT CHANGE UP
HCT VFR BLD CALC: 42.4 % — SIGNIFICANT CHANGE UP (ref 37–47)
HGB BLD-MCNC: 14.3 G/DL — SIGNIFICANT CHANGE UP (ref 12–16)
IMM GRANULOCYTES NFR BLD AUTO: 0.3 % — SIGNIFICANT CHANGE UP (ref 0.1–0.3)
KETONES UR-MCNC: NEGATIVE — SIGNIFICANT CHANGE UP
LACTATE SERPL-SCNC: 1.5 MMOL/L — SIGNIFICANT CHANGE UP (ref 0.7–2)
LEUKOCYTE ESTERASE UR-ACNC: NEGATIVE — SIGNIFICANT CHANGE UP
LIDOCAIN IGE QN: 32 U/L — SIGNIFICANT CHANGE UP (ref 7–60)
LYMPHOCYTES # BLD AUTO: 1.94 K/UL — SIGNIFICANT CHANGE UP (ref 1.2–3.4)
LYMPHOCYTES # BLD AUTO: 27.5 % — SIGNIFICANT CHANGE UP (ref 20.5–51.1)
MCHC RBC-ENTMCNC: 31.3 PG — HIGH (ref 27–31)
MCHC RBC-ENTMCNC: 33.7 G/DL — SIGNIFICANT CHANGE UP (ref 32–37)
MCV RBC AUTO: 92.8 FL — SIGNIFICANT CHANGE UP (ref 81–99)
MONOCYTES # BLD AUTO: 0.55 K/UL — SIGNIFICANT CHANGE UP (ref 0.1–0.6)
MONOCYTES NFR BLD AUTO: 7.8 % — SIGNIFICANT CHANGE UP (ref 1.7–9.3)
NEUTROPHILS # BLD AUTO: 4.48 K/UL — SIGNIFICANT CHANGE UP (ref 1.4–6.5)
NEUTROPHILS NFR BLD AUTO: 63.4 % — SIGNIFICANT CHANGE UP (ref 42.2–75.2)
NITRITE UR-MCNC: NEGATIVE — SIGNIFICANT CHANGE UP
NRBC # BLD: 0 /100 WBCS — SIGNIFICANT CHANGE UP (ref 0–0)
PH UR: 7 — SIGNIFICANT CHANGE UP (ref 5–8)
PLATELET # BLD AUTO: 255 K/UL — SIGNIFICANT CHANGE UP (ref 130–400)
PMV BLD: 10.4 FL — SIGNIFICANT CHANGE UP (ref 7.4–10.4)
POTASSIUM SERPL-MCNC: 4.2 MMOL/L — SIGNIFICANT CHANGE UP (ref 3.5–5)
POTASSIUM SERPL-SCNC: 4.2 MMOL/L — SIGNIFICANT CHANGE UP (ref 3.5–5)
PROT SERPL-MCNC: 7.9 G/DL — SIGNIFICANT CHANGE UP (ref 6–8)
PROT UR-MCNC: NEGATIVE — SIGNIFICANT CHANGE UP
RBC # BLD: 4.57 M/UL — SIGNIFICANT CHANGE UP (ref 4.2–5.4)
RBC # FLD: 12.8 % — SIGNIFICANT CHANGE UP (ref 11.5–14.5)
SODIUM SERPL-SCNC: 143 MMOL/L — SIGNIFICANT CHANGE UP (ref 135–146)
SP GR SPEC: 1.01 — SIGNIFICANT CHANGE UP (ref 1.01–1.03)
UROBILINOGEN FLD QL: SIGNIFICANT CHANGE UP
WBC # BLD: 7.06 K/UL — SIGNIFICANT CHANGE UP (ref 4.8–10.8)
WBC # FLD AUTO: 7.06 K/UL — SIGNIFICANT CHANGE UP (ref 4.8–10.8)

## 2023-05-09 PROCEDURE — 83631 LACTOFERRIN FECAL (QUANT): CPT

## 2023-05-09 PROCEDURE — 87507 IADNA-DNA/RNA PROBE TQ 12-25: CPT

## 2023-05-09 PROCEDURE — 36415 COLL VENOUS BLD VENIPUNCTURE: CPT

## 2023-05-09 PROCEDURE — 85610 PROTHROMBIN TIME: CPT

## 2023-05-09 PROCEDURE — 74177 CT ABD & PELVIS W/CONTRAST: CPT | Mod: 26,MA

## 2023-05-09 PROCEDURE — 85025 COMPLETE CBC W/AUTO DIFF WBC: CPT

## 2023-05-09 PROCEDURE — 80053 COMPREHEN METABOLIC PANEL: CPT

## 2023-05-09 PROCEDURE — 87493 C DIFF AMPLIFIED PROBE: CPT

## 2023-05-09 PROCEDURE — 83735 ASSAY OF MAGNESIUM: CPT

## 2023-05-09 PROCEDURE — 85730 THROMBOPLASTIN TIME PARTIAL: CPT

## 2023-05-09 PROCEDURE — 99285 EMERGENCY DEPT VISIT HI MDM: CPT

## 2023-05-09 PROCEDURE — 86140 C-REACTIVE PROTEIN: CPT

## 2023-05-09 PROCEDURE — 85652 RBC SED RATE AUTOMATED: CPT

## 2023-05-09 RX ORDER — METRONIDAZOLE 500 MG
500 TABLET ORAL ONCE
Refills: 0 | Status: COMPLETED | OUTPATIENT
Start: 2023-05-09 | End: 2023-05-09

## 2023-05-09 RX ORDER — DIATRIZOATE MEGLUMINE 180 MG/ML
30 INJECTION, SOLUTION INTRAVESICAL ONCE
Refills: 0 | Status: COMPLETED | OUTPATIENT
Start: 2023-05-09 | End: 2023-05-09

## 2023-05-09 RX ORDER — LEVETIRACETAM 250 MG/1
250 TABLET, FILM COATED ORAL
Refills: 0 | Status: DISCONTINUED | OUTPATIENT
Start: 2023-05-09 | End: 2023-05-11

## 2023-05-09 RX ORDER — PANTOPRAZOLE SODIUM 20 MG/1
40 TABLET, DELAYED RELEASE ORAL
Refills: 0 | Status: DISCONTINUED | OUTPATIENT
Start: 2023-05-09 | End: 2023-05-11

## 2023-05-09 RX ORDER — CLONAZEPAM 1 MG
2 TABLET ORAL THREE TIMES A DAY
Refills: 0 | Status: DISCONTINUED | OUTPATIENT
Start: 2023-05-09 | End: 2023-05-11

## 2023-05-09 RX ORDER — TIZANIDINE 4 MG/1
1 TABLET ORAL
Qty: 0 | Refills: 0 | DISCHARGE

## 2023-05-09 RX ORDER — MORPHINE SULFATE 50 MG/1
2 CAPSULE, EXTENDED RELEASE ORAL ONCE
Refills: 0 | Status: DISCONTINUED | OUTPATIENT
Start: 2023-05-09 | End: 2023-05-09

## 2023-05-09 RX ORDER — ATORVASTATIN CALCIUM 80 MG/1
80 TABLET, FILM COATED ORAL AT BEDTIME
Refills: 0 | Status: DISCONTINUED | OUTPATIENT
Start: 2023-05-09 | End: 2023-05-11

## 2023-05-09 RX ORDER — HYDROXYZINE HCL 10 MG
0 TABLET ORAL
Qty: 0 | Refills: 0 | DISCHARGE

## 2023-05-09 RX ORDER — HYDROMORPHONE HYDROCHLORIDE 2 MG/ML
0.2 INJECTION INTRAMUSCULAR; INTRAVENOUS; SUBCUTANEOUS ONCE
Refills: 0 | Status: DISCONTINUED | OUTPATIENT
Start: 2023-05-09 | End: 2023-05-09

## 2023-05-09 RX ORDER — ONDANSETRON 8 MG/1
4 TABLET, FILM COATED ORAL ONCE
Refills: 0 | Status: COMPLETED | OUTPATIENT
Start: 2023-05-09 | End: 2023-05-09

## 2023-05-09 RX ORDER — CLONAZEPAM 1 MG
1 TABLET ORAL
Qty: 0 | Refills: 0 | DISCHARGE

## 2023-05-09 RX ORDER — METOPROLOL TARTRATE 50 MG
50 TABLET ORAL DAILY
Refills: 0 | Status: DISCONTINUED | OUTPATIENT
Start: 2023-05-09 | End: 2023-05-10

## 2023-05-09 RX ORDER — ACETAMINOPHEN 500 MG
650 TABLET ORAL EVERY 6 HOURS
Refills: 0 | Status: DISCONTINUED | OUTPATIENT
Start: 2023-05-09 | End: 2023-05-11

## 2023-05-09 RX ORDER — METRONIDAZOLE 500 MG
500 TABLET ORAL EVERY 8 HOURS
Refills: 0 | Status: DISCONTINUED | OUTPATIENT
Start: 2023-05-09 | End: 2023-05-11

## 2023-05-09 RX ORDER — SODIUM CHLORIDE 9 MG/ML
1000 INJECTION, SOLUTION INTRAVENOUS ONCE
Refills: 0 | Status: COMPLETED | OUTPATIENT
Start: 2023-05-09 | End: 2023-05-09

## 2023-05-09 RX ORDER — FAMOTIDINE 10 MG/ML
20 INJECTION INTRAVENOUS
Refills: 0 | Status: DISCONTINUED | OUTPATIENT
Start: 2023-05-09 | End: 2023-05-11

## 2023-05-09 RX ORDER — ONDANSETRON 8 MG/1
4 TABLET, FILM COATED ORAL EVERY 8 HOURS
Refills: 0 | Status: DISCONTINUED | OUTPATIENT
Start: 2023-05-09 | End: 2023-05-11

## 2023-05-09 RX ORDER — TIOTROPIUM BROMIDE 18 UG/1
2 CAPSULE ORAL; RESPIRATORY (INHALATION) DAILY
Refills: 0 | Status: DISCONTINUED | OUTPATIENT
Start: 2023-05-09 | End: 2023-05-11

## 2023-05-09 RX ORDER — CEFTRIAXONE 500 MG/1
1000 INJECTION, POWDER, FOR SOLUTION INTRAMUSCULAR; INTRAVENOUS EVERY 24 HOURS
Refills: 0 | Status: DISCONTINUED | OUTPATIENT
Start: 2023-05-09 | End: 2023-05-11

## 2023-05-09 RX ORDER — HYDROXYZINE HCL 10 MG
25 TABLET ORAL AT BEDTIME
Refills: 0 | Status: DISCONTINUED | OUTPATIENT
Start: 2023-05-09 | End: 2023-05-11

## 2023-05-09 RX ORDER — FAMOTIDINE 10 MG/ML
40 INJECTION INTRAVENOUS
Refills: 0 | Status: DISCONTINUED | OUTPATIENT
Start: 2023-05-09 | End: 2023-05-09

## 2023-05-09 RX ORDER — NIFEDIPINE 30 MG
60 TABLET, EXTENDED RELEASE 24 HR ORAL
Refills: 0 | Status: DISCONTINUED | OUTPATIENT
Start: 2023-05-09 | End: 2023-05-10

## 2023-05-09 RX ORDER — SODIUM CHLORIDE 9 MG/ML
1000 INJECTION, SOLUTION INTRAVENOUS
Refills: 0 | Status: DISCONTINUED | OUTPATIENT
Start: 2023-05-09 | End: 2023-05-11

## 2023-05-09 RX ORDER — LANOLIN ALCOHOL/MO/W.PET/CERES
3 CREAM (GRAM) TOPICAL AT BEDTIME
Refills: 0 | Status: DISCONTINUED | OUTPATIENT
Start: 2023-05-09 | End: 2023-05-11

## 2023-05-09 RX ADMIN — Medication 25 MILLIGRAM(S): at 22:22

## 2023-05-09 RX ADMIN — Medication 60 MILLIGRAM(S): at 22:22

## 2023-05-09 RX ADMIN — Medication 2 MILLIGRAM(S): at 22:22

## 2023-05-09 RX ADMIN — ATORVASTATIN CALCIUM 80 MILLIGRAM(S): 80 TABLET, FILM COATED ORAL at 22:22

## 2023-05-09 RX ADMIN — SODIUM CHLORIDE 50 MILLILITER(S): 9 INJECTION, SOLUTION INTRAVENOUS at 22:28

## 2023-05-09 RX ADMIN — Medication 10 MILLIGRAM(S): at 22:22

## 2023-05-09 RX ADMIN — Medication 500 MILLIGRAM(S): at 22:29

## 2023-05-09 RX ADMIN — CEFTRIAXONE 100 MILLIGRAM(S): 500 INJECTION, POWDER, FOR SOLUTION INTRAMUSCULAR; INTRAVENOUS at 22:29

## 2023-05-09 RX ADMIN — HYDROMORPHONE HYDROCHLORIDE 0.2 MILLIGRAM(S): 2 INJECTION INTRAMUSCULAR; INTRAVENOUS; SUBCUTANEOUS at 12:01

## 2023-05-09 RX ADMIN — SODIUM CHLORIDE 1000 MILLILITER(S): 9 INJECTION, SOLUTION INTRAVENOUS at 12:01

## 2023-05-09 RX ADMIN — ONDANSETRON 4 MILLIGRAM(S): 8 TABLET, FILM COATED ORAL at 12:01

## 2023-05-09 RX ADMIN — LEVETIRACETAM 250 MILLIGRAM(S): 250 TABLET, FILM COATED ORAL at 22:21

## 2023-05-09 RX ADMIN — Medication 100 MILLIGRAM(S): at 18:36

## 2023-05-09 RX ADMIN — MORPHINE SULFATE 2 MILLIGRAM(S): 50 CAPSULE, EXTENDED RELEASE ORAL at 18:36

## 2023-05-09 RX ADMIN — DIATRIZOATE MEGLUMINE 30 MILLILITER(S): 180 INJECTION, SOLUTION INTRAVESICAL at 12:12

## 2023-05-09 RX ADMIN — ONDANSETRON 4 MILLIGRAM(S): 8 TABLET, FILM COATED ORAL at 18:36

## 2023-05-09 NOTE — PATIENT PROFILE ADULT - FALL HARM RISK - HARM RISK INTERVENTIONS

## 2023-05-09 NOTE — H&P ADULT - ASSESSMENT
58 y/o F with PMH of HTN, HLD, Anxiety, seizure d/o, migraine, GERD, multiple prior abdominal surgeries presenting with       #acute colitis  - Multiple prior surgeries  - no fever or leukocytosis  - CTAP -> Apparent mural thickening of colon, most pronounced at rectosigmoid, appearance may be due to level of distention or represent colitis. Correlate clinically.  - s/p flagyl in ED  - c/w rocephine/flagyl    # HTN  - continue with nifedipine    # HLD  - continue with atorvastatin    # Anxiety  - continue with clonazepam    # COPD:   - Duonebs PRN  - On Incruse at home - Tiotropium while admitted    # Seizure disorder  - last seizure 15 years ago  - continue with keppra    # GERD  - PPI    #MISC  DVT PPX: Lovenox  GI PPX: PPI  Diet:   CODE   56 y/o F with PMH of HTN, non obstructive CAD, HLD, Anxiety, seizure d/o, migraine, GERD, multiple prior abdominal surgeries presenting with bloody diarrhea and abdominal pain      #acute colitis  #bloody diarrhea  - Multiple prior surgeries  - no fever or leukocytosis  - abdominal pain and bloody diarrhea  - GABRIELLE in ED -> slight blood  - CTAP -> Apparent mural thickening of colon, most pronounced at rectosigmoid, appearance may be due to level of distention or represent colitis. Correlate clinically.  - s/p flagyl in ED  - c/w rocephine/flagyl  - GI PCR and C Diff  - keep active type and screen  - GI eval (follows with Dr Leal)    # HTN  - continue with nifedipine    #non obstructive CAD  - follows with Dr Moreno  - c/w metoprolol    #HLD  - continue with atorvastatin    #Anxiety  - continue with clonazepam    #COPD  - Duonebs PRN  - On Incruse at home - Tiotropium while admitted    #Seizure disorder  - last seizure 15 years ago  - continue with keppra    #GERD  - PPI and pepcid    #MISC  DVT PPX not indicated  GI PPX: Protonix  DIET clear liquids and advance as tolerated  CODE FULL

## 2023-05-09 NOTE — H&P ADULT - NSHPPHYSICALEXAM_GEN_ALL_CORE
GENERAL: NAD, lying in bed comfortably  HEAD:  Atraumatic, Normocephalic  EYES: EOMI, PERRLA, conjunctiva and sclera clear  ENT: Moist mucous membranes  NECK: Supple, No JVD  CHEST/LUNG: Clear to auscultation bilaterally; No rales, rhonchi, wheezing, or rubs. Unlabored respirations  HEART: Regular rate and rhythm; No murmurs, rubs, or gallops  ABDOMEN: BSx4; Soft, nontender, nondistended  EXTREMITIES:  2+ Peripheral Pulses, brisk capillary refill. No clubbing, cyanosis, or edema  NERVOUS SYSTEM:  A&Ox3, no focal deficits   SKIN: No rashes or lesions GENERAL: NAD, lying in bed comfortably  HEAD:  Atraumatic, Normocephalic  EYES: EOMI, PERRLA, conjunctiva and sclera clear  ENT: Moist mucous membranes  NECK: Supple, No JVD  CHEST/LUNG: Clear to auscultation bilaterally; No rales, rhonchi, wheezing, or rubs. Unlabored respirations  HEART: Regular rate and rhythm; No murmurs, rubs, or gallops  ABDOMEN: BSx4; Soft, nondistended, mildly tender to palpation  EXTREMITIES:  2+ Peripheral Pulses, brisk capillary refill. No clubbing, cyanosis, or edema  NERVOUS SYSTEM:  A&Ox3, no focal deficits

## 2023-05-09 NOTE — ED PROVIDER NOTE - PROGRESS NOTE DETAILS
Note authored by Dr. Soriano: Labs reviewed.  CT scan done.  Pending report Authored by Dr. Soriano: s/o to dr obrien - f/u CT, reassess and dispo

## 2023-05-09 NOTE — ED PROVIDER NOTE - CARE PLAN
1 Principal Discharge DX:	Colitis  Secondary Diagnosis:	Abdominal pain  Secondary Diagnosis:	Diarrhea

## 2023-05-09 NOTE — ED PROVIDER NOTE - ATTENDING CONTRIBUTION TO CARE
57-year-old female history of seizures, migraines, anxiety, status post cholecystectomy and appendectomy, history of bowel obstruction with resection, chronic constipation followed by GI, now presents with abdominal pain and nausea and vomiting.  Patient does have diarrhea x3 days.    vss, nontoxic, well appearing, pink conj, anicteric, MMM, neck supple, CTAB, RRR, equal radial pulses bilat, abd soft/diffuse tenderness noted in the right lower and left lower quadrants without peritoneal signs midline vertical scar/nd, no cva tend. no calves tend, no edema, no fnd. no rashes.

## 2023-05-09 NOTE — H&P ADULT - NSHPLABSRESULTS_GEN_ALL_CORE
LABS:                          14.3   7.06  )-----------( 255      ( 09 May 2023 12:30 )             42.4     05    143  |  105  |  9<L>  ----------------------------<  79  4.2   |  25  |  0.7    Ca    9.9      09 May 2023 12:30    TPro  7.9  /  Alb  5.0  /  TBili  0.2  /  DBili  x   /  AST  28  /  ALT  25  /  AlkPhos  130<H>  05-09    LIVER FUNCTIONS - ( 09 May 2023 12:30 )  Alb: 5.0 g/dL / Pro: 7.9 g/dL / ALK PHOS: 130 U/L / ALT: 25 U/L / AST: 28 U/L / GGT: x             Urinalysis Basic - ( 09 May 2023 15:00 )    Color: Yellow / Appearance: Clear / S.014 / pH: x  Gluc: x / Ketone: Negative  / Bili: Negative / Urobili: <2 mg/dL   Blood: x / Protein: Negative / Nitrite: Negative   Leuk Esterase: Negative / RBC: x / WBC x   Sq Epi: x / Non Sq Epi: x / Bacteria: x

## 2023-05-09 NOTE — ED PROVIDER NOTE - PHYSICAL EXAMINATION
CONSTITUTIONAL: Well-developed; well-nourished; in no acute distress.   SKIN: warm, dry; no rashes.  HEAD: Normocephalic; atraumatic.  EYES: PERRLA, EOMI, no conjunctival erythema.  ENT: No nasal discharge; airway clear. MMM.  NECK: Supple; non tender.  CARD: S1, S2 normal; no murmurs, gallops, or rubs. Regular rate and rhythm.   RESP: No wheezes, rales, or rhonchi. Lungs CTAB.  ABD: soft, nd, (+) mid-abdominal/generalized abd ttp. (+) surgical scar noted from xiphoid process down to pubic area.  EXT: Normal ROM.  No clubbing, cyanosis or edema. No calf ttp.  NEURO: Alert, oriented, grossly unremarkable. No focal neurological deficits.  PSYCH: Cooperative, appropriate.

## 2023-05-09 NOTE — H&P ADULT - HISTORY OF PRESENT ILLNESS
58 y/o F with a PMH of HTN, COPD, HLD, anxiety, seizure d/o, migraine, GERD, and multiple abdominal surgeries including appendectomy/resection of appendiceal stump, cholecystectomy, exploratory laparotomy with lysis of adhesions,  hysterectomy unilateral oophorectomy, Hiatal hernia present to the ED with       VITALS:   T(C): 36.5 (05-09-23 @ 12:51), Max: 36.6 (05-09-23 @ 10:48)  HR: 74 (05-09-23 @ 12:51) (74 - 74)  BP: 150/67 (05-09-23 @ 12:51) (150/67 - 163/67)  RR: 16 (05-09-23 @ 12:51) (16 - 18)  SpO2: 96% (05-09-23 @ 12:51) (96% - 99%)    in ED, vitals were significant for HTN (163/37). labs were WNLs. CTAP was significant for sigmoiditis. patient received 1L LR bolus/flagyl and admitted to medicine for further treatment 58 y/o F with a PMH of HTN, non obstructive CAD, COPD, HLD, anxiety, seizure d/o, migraine, GERD, and multiple abdominal surgeries including appendectomy/resection of appendiceal stump, cholecystectomy, exploratory laparotomy with lysis of adhesions,  hysterectomy unilateral oophorectomy, Hiatal hernia present to the ED with bloody bowel movements and abdominal pain. history goes back to around 1 week ago when patient started complaining of severe abdominal pain, diffuse, dull in nature, constant, 8/10 in severity, non radiating. few days later patient started complaining of bloody diarrhea, started at 1-2 episode per day and progressively worsened until it became associated with oral intake and around 5 times per day. she saw her GI (Dr Leal) on Friday 05/05 who recommended ED visit. patient waited to see if she improves and when she didnt she presetned to the ED. she reported nausea and an episode of non bloody vomiting on 05/07 but denied fever, chills, chest pain, SOB, cough or dysuria      VITALS:   T(C): 36.5 (05-09-23 @ 12:51), Max: 36.6 (05-09-23 @ 10:48)  HR: 74 (05-09-23 @ 12:51) (74 - 74)  BP: 150/67 (05-09-23 @ 12:51) (150/67 - 163/67)  RR: 16 (05-09-23 @ 12:51) (16 - 18)  SpO2: 96% (05-09-23 @ 12:51) (96% - 99%)    in ED, vitals were significant for HTN (163/37). labs were WNLs. CTAP was significant for sigmoiditis. patient received 1L LR bolus/flagyl and admitted to medicine for further treatment

## 2023-05-09 NOTE — ED PROVIDER NOTE - CLINICAL SUMMARY MEDICAL DECISION MAKING FREE TEXT BOX
Received pt from Dr Soriano awaiting CT read; pt with h/o multiple prior abdominal surgeries here with lower abdominal pain and diarrhea, requiring parenteral pain meds. Labs ok, CT with possible colitis vs underdistension but given persistence of sx and multiple drug allergies will admit for IV abx and GI eval.

## 2023-05-09 NOTE — ED PROVIDER NOTE - CONSIDERATION OF ADMISSION OBSERVATION
Pt with persistent need for parenteral pain meds, colitis on CT, allergies to multiple meds Consideration of Admission/Observation

## 2023-05-09 NOTE — ED PROVIDER NOTE - OBJECTIVE STATEMENT
57 year old female with pmh of seizures, hld, migraines, gerd, hiatal hernia, anxiety, htn, oa, s/p cholecystectomy, hx of appendectomy, bowel obstruction w/ resection presenting to the ED for abdominal pain a/w nausea, nbnb vomiting, and diarrhea for the past 3 days. Notes blood mixed with her stool. No fevers/chills. Recent colonoscopy in march 2023 showing polyps, internal & external hemorrhoids. GI doctor is Dr. Leal.

## 2023-05-09 NOTE — H&P ADULT - ATTENDING COMMENTS
58 y/o F with a PMH of HTN, non obstructive CAD, COPD, HLD, anxiety, seizure d/o, migraine, GERD, and multiple abdominal surgeries including appendectomy/resection of appendiceal stump, cholecystectomy, exploratory laparotomy with lysis of adhesions,  hysterectomy unilateral oophorectomy, Hiatal hernia present to the ED with bloody bowel movements and abdominal pain. History goes back to around 1 week ago when patient started complaining of severe abdominal pain, diffuse, dull in nature, constant, 8/10 in severity, non radiating. few days later patient started complaining of bloody diarrhea, started at 1-2 episode per day and progressively worsened until it became associated with oral intake and around 5 times per day. She saw her GI (Dr Leal) on Friday 05/05 who recommended ED visit.    GENERAL: NAD, lying in bed comfortably  HEAD:  Atraumatic, Normocephalic  EYES: EOMI, PERRLA, conjunctiva and sclera clear  ENT: Moist mucous membranes  NECK: Supple, No JVD  CHEST/LUNG: Clear to auscultation bilaterally; No rales, rhonchi, wheezing, or rubs. Unlabored respirations  HEART: Regular rate and rhythm; No murmurs, rubs, or gallops  ABDOMEN: BSx4; Soft, nondistended, mildly tender to palpation  EXTREMITIES:  2+ Peripheral Pulses, brisk capillary refill. No clubbing, cyanosis, or edema  NERVOUS SYSTEM:  A&Ox3, no focal deficits      # Suspected acute colitis  #Hematochezia   - Multiple prior  abdominal surgeries  - abdominal pain and bloody diarrhea  - GABRIELLE in ED -> slight blood  - CTAP -> Apparent mural thickening of colon, most pronounced at rectosigmoid, appearance may be due to level of distention or represent colitis.   - c/w Rocephin Flagyl   - GI PCR and C Diff  - GI eval (follows with Dr Leal)    # HTN  - continue with nifedipine    #non obstructive CAD  - follows with Dr Moreno  - c/w metoprolol    #HLD  - continue with atorvastatin    #Anxiety  - continue with clonazepam    #COPD  - Duonebs PRN  - On Incruse at home - Tiotropium while admitted    #Seizure disorder  - last seizure 15 years ago  - continue with keppra    #GERD  - PPI and pepcid    DVT PPX- holding due to GIB       Admit to Medicine       Total time spent to complete patient's bedside assessment, review medical chart, discuss medical plan of care with covering medical team was more than 55 minutes  with >50% of time spent face to face with patient, discussion with patient/family and/or coordination of care      Марина Rodriguez DO 56 y/o F with a PMH of HTN, non obstructive CAD, COPD, HLD, anxiety, seizure d/o, migraine, GERD, and multiple abdominal surgeries including appendectomy/resection of appendiceal stump, cholecystectomy, exploratory laparotomy with lysis of adhesions,  hysterectomy unilateral oophorectomy, Hiatal hernia present to the ED with bloody bowel movements and abdominal pain. History goes back to around 1 week ago when patient started complaining of severe abdominal pain, diffuse, dull in nature, constant, 8/10 in severity, non radiating. few days later patient started complaining of bloody diarrhea, started at 1-2 episode per day and progressively worsened until it became associated with oral intake and around 5 times per day. She saw her GI (Dr Leal) on Friday 05/05 who recommended ED visit.    GENERAL: NAD, lying in bed comfortably  HEAD:  Atraumatic, Normocephalic  EYES: EOMI, PERRLA, conjunctiva and sclera clear  ENT: Moist mucous membranes  NECK: Supple, No JVD  CHEST/LUNG: Clear to auscultation bilaterally; No rales, rhonchi, wheezing, or rubs. Unlabored respirations  HEART: Regular rate and rhythm; No murmurs, rubs, or gallops  ABDOMEN: BSx4; Soft, midline scar, ttp in b/l lower quadrants   EXTREMITIES:  2+ Peripheral Pulses, brisk capillary refill. No clubbing, cyanosis, or edema  NERVOUS SYSTEM:  A&Ox3, no focal deficits      # Suspected acute colitis  #Hematochezia   - Multiple prior  abdominal surgeries  - abdominal pain and bloody diarrhea  - GABRIELLE in ED -> slight blood  - CTAP -> Apparent mural thickening of colon, most pronounced at rectosigmoid, appearance may be due to level of distention or represent colitis.   - c/w Rocephin Flagyl   - GI PCR and C Diff  - GI eval (follows with Dr Leal)    # HTN  - continue with nifedipine    #non obstructive CAD  - follows with Dr Moreno  - c/w metoprolol    #HLD  - continue with atorvastatin    #Anxiety  - continue with clonazepam    #COPD  - Duonebs PRN  - On Incruse at home - Tiotropium while admitted    #Seizure disorder  - last seizure 15 years ago  - continue with keppra    #GERD  - PPI and pepcid    #Smoking Cessation   - counseled patient on cessation, offered nicotine patch    DVT PPX- holding due to GIB       Admit to Medicine       Total time spent to complete patient's bedside assessment, review medical chart, discuss medical plan of care with covering medical team was more than 55 minutes  with >50% of time spent face to face with patient, discussion with patient/family and/or coordination of care      Марина Rodriguez DO

## 2023-05-10 LAB
ALBUMIN SERPL ELPH-MCNC: 4.3 G/DL — SIGNIFICANT CHANGE UP (ref 3.5–5.2)
ALP SERPL-CCNC: 114 U/L — SIGNIFICANT CHANGE UP (ref 30–115)
ALT FLD-CCNC: 22 U/L — SIGNIFICANT CHANGE UP (ref 0–41)
ANION GAP SERPL CALC-SCNC: 10 MMOL/L — SIGNIFICANT CHANGE UP (ref 7–14)
APTT BLD: 37.1 SEC — SIGNIFICANT CHANGE UP (ref 27–39.2)
AST SERPL-CCNC: 25 U/L — SIGNIFICANT CHANGE UP (ref 0–41)
BASOPHILS # BLD AUTO: 0.03 K/UL — SIGNIFICANT CHANGE UP (ref 0–0.2)
BASOPHILS NFR BLD AUTO: 0.5 % — SIGNIFICANT CHANGE UP (ref 0–1)
BILIRUB SERPL-MCNC: 0.2 MG/DL — SIGNIFICANT CHANGE UP (ref 0.2–1.2)
BUN SERPL-MCNC: 7 MG/DL — LOW (ref 10–20)
C DIFF BY PCR RESULT: SIGNIFICANT CHANGE UP
CALCIUM SERPL-MCNC: 9.7 MG/DL — SIGNIFICANT CHANGE UP (ref 8.4–10.4)
CHLORIDE SERPL-SCNC: 105 MMOL/L — SIGNIFICANT CHANGE UP (ref 98–110)
CO2 SERPL-SCNC: 28 MMOL/L — SIGNIFICANT CHANGE UP (ref 17–32)
CREAT SERPL-MCNC: 0.8 MG/DL — SIGNIFICANT CHANGE UP (ref 0.7–1.5)
CRP SERPL-MCNC: 10.2 MG/L — HIGH
EGFR: 85 ML/MIN/1.73M2 — SIGNIFICANT CHANGE UP
EOSINOPHIL # BLD AUTO: 0.06 K/UL — SIGNIFICANT CHANGE UP (ref 0–0.7)
EOSINOPHIL NFR BLD AUTO: 1.1 % — SIGNIFICANT CHANGE UP (ref 0–8)
ERYTHROCYTE [SEDIMENTATION RATE] IN BLOOD: 21 MM/HR — HIGH (ref 0–20)
GI PCR PANEL: SIGNIFICANT CHANGE UP
GLUCOSE SERPL-MCNC: 88 MG/DL — SIGNIFICANT CHANGE UP (ref 70–99)
HCT VFR BLD CALC: 38.1 % — SIGNIFICANT CHANGE UP (ref 37–47)
HGB BLD-MCNC: 12.8 G/DL — SIGNIFICANT CHANGE UP (ref 12–16)
IMM GRANULOCYTES NFR BLD AUTO: 0.2 % — SIGNIFICANT CHANGE UP (ref 0.1–0.3)
INR BLD: 0.98 RATIO — SIGNIFICANT CHANGE UP (ref 0.65–1.3)
LYMPHOCYTES # BLD AUTO: 2.49 K/UL — SIGNIFICANT CHANGE UP (ref 1.2–3.4)
LYMPHOCYTES # BLD AUTO: 43.9 % — SIGNIFICANT CHANGE UP (ref 20.5–51.1)
MCHC RBC-ENTMCNC: 32.2 PG — HIGH (ref 27–31)
MCHC RBC-ENTMCNC: 33.6 G/DL — SIGNIFICANT CHANGE UP (ref 32–37)
MCV RBC AUTO: 95.7 FL — SIGNIFICANT CHANGE UP (ref 81–99)
MONOCYTES # BLD AUTO: 0.53 K/UL — SIGNIFICANT CHANGE UP (ref 0.1–0.6)
MONOCYTES NFR BLD AUTO: 9.3 % — SIGNIFICANT CHANGE UP (ref 1.7–9.3)
NEUTROPHILS # BLD AUTO: 2.55 K/UL — SIGNIFICANT CHANGE UP (ref 1.4–6.5)
NEUTROPHILS NFR BLD AUTO: 45 % — SIGNIFICANT CHANGE UP (ref 42.2–75.2)
NRBC # BLD: 0 /100 WBCS — SIGNIFICANT CHANGE UP (ref 0–0)
PLATELET # BLD AUTO: 223 K/UL — SIGNIFICANT CHANGE UP (ref 130–400)
PMV BLD: 10.6 FL — HIGH (ref 7.4–10.4)
POTASSIUM SERPL-MCNC: 4.6 MMOL/L — SIGNIFICANT CHANGE UP (ref 3.5–5)
POTASSIUM SERPL-SCNC: 4.6 MMOL/L — SIGNIFICANT CHANGE UP (ref 3.5–5)
PROT SERPL-MCNC: 6.9 G/DL — SIGNIFICANT CHANGE UP (ref 6–8)
PROTHROM AB SERPL-ACNC: 11.2 SEC — SIGNIFICANT CHANGE UP (ref 9.95–12.87)
RBC # BLD: 3.98 M/UL — LOW (ref 4.2–5.4)
RBC # FLD: 12.8 % — SIGNIFICANT CHANGE UP (ref 11.5–14.5)
SODIUM SERPL-SCNC: 143 MMOL/L — SIGNIFICANT CHANGE UP (ref 135–146)
WBC # BLD: 5.67 K/UL — SIGNIFICANT CHANGE UP (ref 4.8–10.8)
WBC # FLD AUTO: 5.67 K/UL — SIGNIFICANT CHANGE UP (ref 4.8–10.8)

## 2023-05-10 PROCEDURE — 99223 1ST HOSP IP/OBS HIGH 75: CPT

## 2023-05-10 PROCEDURE — 99232 SBSQ HOSP IP/OBS MODERATE 35: CPT

## 2023-05-10 PROCEDURE — 99222 1ST HOSP IP/OBS MODERATE 55: CPT

## 2023-05-10 RX ORDER — NICOTINE POLACRILEX 2 MG
1 GUM BUCCAL DAILY
Refills: 0 | Status: DISCONTINUED | OUTPATIENT
Start: 2023-05-10 | End: 2023-05-11

## 2023-05-10 RX ORDER — HYDROMORPHONE HYDROCHLORIDE 2 MG/ML
0.2 INJECTION INTRAMUSCULAR; INTRAVENOUS; SUBCUTANEOUS ONCE
Refills: 0 | Status: DISCONTINUED | OUTPATIENT
Start: 2023-05-10 | End: 2023-05-10

## 2023-05-10 RX ORDER — MORPHINE SULFATE 50 MG/1
4 CAPSULE, EXTENDED RELEASE ORAL EVERY 4 HOURS
Refills: 0 | Status: DISCONTINUED | OUTPATIENT
Start: 2023-05-10 | End: 2023-05-11

## 2023-05-10 RX ADMIN — Medication 10 MILLIGRAM(S): at 14:48

## 2023-05-10 RX ADMIN — HYDROMORPHONE HYDROCHLORIDE 0.2 MILLIGRAM(S): 2 INJECTION INTRAMUSCULAR; INTRAVENOUS; SUBCUTANEOUS at 01:22

## 2023-05-10 RX ADMIN — MORPHINE SULFATE 4 MILLIGRAM(S): 50 CAPSULE, EXTENDED RELEASE ORAL at 11:12

## 2023-05-10 RX ADMIN — Medication 25 MILLIGRAM(S): at 21:11

## 2023-05-10 RX ADMIN — LEVETIRACETAM 250 MILLIGRAM(S): 250 TABLET, FILM COATED ORAL at 05:53

## 2023-05-10 RX ADMIN — MORPHINE SULFATE 4 MILLIGRAM(S): 50 CAPSULE, EXTENDED RELEASE ORAL at 23:32

## 2023-05-10 RX ADMIN — Medication 30 MILLILITER(S): at 04:05

## 2023-05-10 RX ADMIN — Medication 10 MILLIGRAM(S): at 05:53

## 2023-05-10 RX ADMIN — MORPHINE SULFATE 4 MILLIGRAM(S): 50 CAPSULE, EXTENDED RELEASE ORAL at 19:55

## 2023-05-10 RX ADMIN — Medication 2 MILLIGRAM(S): at 05:54

## 2023-05-10 RX ADMIN — MORPHINE SULFATE 4 MILLIGRAM(S): 50 CAPSULE, EXTENDED RELEASE ORAL at 15:30

## 2023-05-10 RX ADMIN — MORPHINE SULFATE 4 MILLIGRAM(S): 50 CAPSULE, EXTENDED RELEASE ORAL at 19:35

## 2023-05-10 RX ADMIN — Medication 10 MILLIGRAM(S): at 21:12

## 2023-05-10 RX ADMIN — ATORVASTATIN CALCIUM 80 MILLIGRAM(S): 80 TABLET, FILM COATED ORAL at 21:11

## 2023-05-10 RX ADMIN — Medication 1 PATCH: at 14:47

## 2023-05-10 RX ADMIN — Medication 500 MILLIGRAM(S): at 21:11

## 2023-05-10 RX ADMIN — FAMOTIDINE 20 MILLIGRAM(S): 10 INJECTION INTRAVENOUS at 17:29

## 2023-05-10 RX ADMIN — FAMOTIDINE 20 MILLIGRAM(S): 10 INJECTION INTRAVENOUS at 05:54

## 2023-05-10 RX ADMIN — LEVETIRACETAM 250 MILLIGRAM(S): 250 TABLET, FILM COATED ORAL at 17:29

## 2023-05-10 RX ADMIN — MORPHINE SULFATE 4 MILLIGRAM(S): 50 CAPSULE, EXTENDED RELEASE ORAL at 23:45

## 2023-05-10 RX ADMIN — Medication 500 MILLIGRAM(S): at 05:53

## 2023-05-10 RX ADMIN — Medication 2 MILLIGRAM(S): at 21:14

## 2023-05-10 RX ADMIN — ONDANSETRON 4 MILLIGRAM(S): 8 TABLET, FILM COATED ORAL at 19:38

## 2023-05-10 RX ADMIN — Medication 2 MILLIGRAM(S): at 14:47

## 2023-05-10 RX ADMIN — Medication 60 MILLIGRAM(S): at 05:53

## 2023-05-10 RX ADMIN — PANTOPRAZOLE SODIUM 40 MILLIGRAM(S): 20 TABLET, DELAYED RELEASE ORAL at 05:54

## 2023-05-10 RX ADMIN — SODIUM CHLORIDE 50 MILLILITER(S): 9 INJECTION, SOLUTION INTRAVENOUS at 17:34

## 2023-05-10 RX ADMIN — HYDROMORPHONE HYDROCHLORIDE 0.2 MILLIGRAM(S): 2 INJECTION INTRAMUSCULAR; INTRAVENOUS; SUBCUTANEOUS at 01:35

## 2023-05-10 RX ADMIN — Medication 50 MILLIGRAM(S): at 05:54

## 2023-05-10 RX ADMIN — Medication 500 MILLIGRAM(S): at 14:47

## 2023-05-10 RX ADMIN — CEFTRIAXONE 100 MILLIGRAM(S): 500 INJECTION, POWDER, FOR SOLUTION INTRAMUSCULAR; INTRAVENOUS at 23:30

## 2023-05-10 RX ADMIN — Medication 1 PATCH: at 19:59

## 2023-05-10 NOTE — CONSULT NOTE ADULT - ASSESSMENT
57y F significant PSH of ex-lap 11/2021 for SBO 2/2 to foreign body, SBO again in 9/22 treated conservatively, appendectomy x2 (16y ago and again 12y ago), cholecystectomy, hysterectomy, PMH of HTN, HLD, anxiety, seizures, presenting to the ED on 5/9/23 for abdominal pain associated with diarrhea and hematochezia. Pt poor historian. Noted to have seen Dr Leal on 5/5 for constipation for which he prescribed miralax and lactulose (pt already on Linzess). Pt later started having episodes of diarrhea associated with BRBPR. Colonoscopy done as OP in March, 23 showing internal and external hemorrhoids otherwise unremarkable (was done for concern of colon narrowing on CT scan).        # Diarrhea  - normal white count, afebrile   - CT AP with oral and IVC: Apparent mural thickening of colon, most pronounced at rectosigmoid, appearance may be due to level of distention or represent colitis.  - Colonoscopy (March, 2023):  internal and external hemorrhoids otherwise unremarkable, hyperplastic polyp  -     Recs    # Hematochezia likely 2/2 to hemorrhoidal bleed  - Hemodynamically stable  - Hgb stable 14 on admission   - Colonoscopy (March, 2023):  internal and external hemorrhoids otherwise unremarkable, hyperplastic polyp  - GABRIELLE reported to be +ve in ED, negative on repeat today       Recs 57y F significant PSH of ex-lap 11/2021 for SBO 2/2 to foreign body, SBO again in 9/22 treated conservatively, appendectomy x2 (16y ago and again 12y ago), cholecystectomy, hysterectomy, PMH of HTN, HLD, anxiety, seizures, presenting to the ED on 5/9/23 for abdominal pain associated with diarrhea and hematochezia. Pt poor historian. Noted to have seen Dr Leal on 5/5 for constipation for which he prescribed miralax and lactulose (pt already on Linzess). Pt later started having episodes of diarrhea associated with BRBPR. Colonoscopy done as OP in March, 23 showing internal and external hemorrhoids otherwise unremarkable (was done for concern of colon narrowing on CT scan).        # Diarrhea, r/o infectious vs overflow diarrhea   - normal white count, afebrile   - CT AP with oral and IVC: Apparent mural thickening of colon, most pronounced at rectosigmoid, appearance may be due to level of distention or represent colitis.  - Colonoscopy (March, 2023): internal and external hemorrhoids otherwise unremarkable, hyperplastic polyp  -     Recs  - GI PCR, Cdiff     # Hematochezia likely 2/2 to hemorrhoidal bleed  - Hemodynamically stable  - Hgb stable 14 on admission   - Colonoscopy (March, 2023):  internal and external hemorrhoids otherwise unremarkable, hyperplastic polyp  - GABRIELLE reported to be +ve in ED, negative on repeat today  - not on any AC        Recs  - Monitor Hgb   - keep active T&S  57y F significant PSH of ex-lap 11/2021 for SBO 2/2 to foreign body, SBO again in 9/22 treated conservatively, appendectomy x2 (16y ago and again 12y ago), cholecystectomy, hysterectomy, PMH of HTN, HLD, anxiety, seizures, presenting to the ED on 5/9/23 for abdominal pain associated with diarrhea and hematochezia. Pt poor historian. Noted to have seen Dr Leal on 5/5 for constipation for which he prescribed miralax and lactulose (pt already on Linzess). Pt later started having episodes of diarrhea associated with BRBPR. Colonoscopy done as OP in March, 23 showing internal and external hemorrhoids otherwise unremarkable (was done for concern of colon narrowing on CT scan).        # Diarrhea, r/o infectious process vs laxative overuse   - normal white count, afebrile   - CT AP with oral and IVC: Apparent mural thickening of colon, most pronounced at rectosigmoid, appearance may be due to level of distention or represent colitis.  - Colonoscopy (March, 2023): internal and external hemorrhoids otherwise unremarkable, hyperplastic polyp  - ESR 21/ CRP 10  - C diff negative    Recs  - GI PCR  - conservative treatment  - Advance diet from clear liquid to regular      # Hematochezia likely 2/2 to hemorrhoidal bleed  - Hemodynamically stable  - Hgb stable 14 on admission (baseline ~12)   - Colonoscopy (March, 2023):  internal and external hemorrhoids otherwise unremarkable, hyperplastic polyp  - GABRIELLE -ve   - not on any AC      Recs  - Monitor Hgb   - keep active T&S  - can advance diet   57y F significant PSH of ex-lap 11/2021 for SBO 2/2 to foreign body, SBO again in 9/22 treated conservatively, appendectomy x2 (16y ago and again 12y ago), cholecystectomy, hysterectomy, PMH of HTN, HLD, anxiety, seizures, presenting to the ED on 5/9/23 for abdominal pain associated with diarrhea and hematochezia. Pt poor historian. Noted to have seen Dr Leal on 5/5 for constipation for which he prescribed miralax and lactulose (pt already on Linzess). Pt later started having episodes of diarrhea associated with BRBPR. Colonoscopy done as OP in March, 23 showing internal and external hemorrhoids otherwise unremarkable (was done for concern of colon narrowing on CT scan).        # Diarrhea, r/o infectious process vs laxative overuse   - normal white count, afebrile   - CT AP with oral and IVC: Apparent mural thickening of colon, most pronounced at rectosigmoid, appearance may be due to level of distention or represent colitis.  - Colonoscopy (March, 2023): internal and external hemorrhoids otherwise unremarkable, hyperplastic polyp  - ESR 21/ CRP 10  - C diff negative    Recs  - GI PCR  - conservative treatment for now , maintain adequate hydration and euvolemia, correct electrolytes   - Advance diet from clear liquid to regular      # Hematochezia likely 2/2 to hemorrhoidal bleed  - Hemodynamically stable  - Hgb stable 14 on admission (baseline ~12)   - Colonoscopy (March, 2023):  internal and external hemorrhoids otherwise unremarkable, hyperplastic polyp  - GABRIELLE -ve   - not on any AC      Recs  - Monitor Hgb   - keep active T&S  - can advance diet

## 2023-05-10 NOTE — PROGRESS NOTE ADULT - ASSESSMENT
#acute colitis possibly 2/2 UC  #bloody diarrhea   - Multiple prior surgeries  - no fever or leukocytosis  - abdominal pain and bloody diarrhea  - GABRIELLE in ED -> slight blood  - CTAP -> Apparent mural thickening of colon, most pronounced at rectosigmoid, appearance may be due to level of distention or represent colitis. Correlate clinically.  - s/p flagyl in ED  - c/w rocephine/flagyl  - GI PCR and C Diff  - keep active type and screen  - GI eval (follows with Dr Leal)  - fu ESR/CRP calprotectin, lactoferrin     # HTN  - continue with metoprolol    #non obstructive CAD  - follows with Dr Moreno  - c/w metoprolol    #HLD  - continue with atorvastatin    #Anxiety  - continue with clonazepam    #COPD  - Duonebs PRN  - On Incruse at home - Tiotropium while admitted    #Seizure disorder  - last seizure 15 years ago  - continue with keppra    #GERD  - PPI and pepcid    #MISC  DVT PPX not indicated  GI PPX: Protonix  DIET clear liquids and advance as tolerated  CODE FULL

## 2023-05-10 NOTE — PROGRESS NOTE ADULT - SUBJECTIVE AND OBJECTIVE BOX
SUBJECTIVE:    Patient is a 57y old Female who presents with a chief complaint of   Currently admitted to medicine with the primary diagnosis of Colitis       Today is hospital day 1d.     PAST MEDICAL & SURGICAL HISTORY  Back pain  henriated discs    Seizures  last one 6 yrs ago    Migraines    Hypercholesteremia    Seasonal allergic rhinitis, unspecified trigger    GERD (gastroesophageal reflux disease)    Hiatal hernia    Anxiety    HTN (hypertension)    Osteoarthritis    Sciatica    Tobacco use disorder    History of surgery  4 rght knee sx  1 left knee sx  cholecystectomy  rotator cuff right  abdominal sx-- adhesions  c section  ovarian cystectomy  breast cystectomies  colonoscopy   endoscopies  parital hysterectomy    S/p partial hysterectomy with remaining cervical stump    H/O rotator cuff surgery  right    History of appendectomy    S/P cholecystectomy      ALLERGIES:  Toradol (Rash; Urticaria; Anaphylaxis (Mild to Mod))  Cipro (Hives)  penicillin (Hives)  Neurontin (Unknown)  Toradol (Hives)    MEDICATIONS:  STANDING MEDICATIONS  atorvastatin 80 milliGRAM(s) Oral at bedtime  busPIRone 10 milliGRAM(s) Oral three times a day  cefTRIAXone   IVPB 1000 milliGRAM(s) IV Intermittent every 24 hours  clonazePAM  Tablet 2 milliGRAM(s) Oral three times a day  famotidine    Tablet 20 milliGRAM(s) Oral two times a day  hydrOXYzine hydrochloride 25 milliGRAM(s) Oral at bedtime  lactated ringers. 1000 milliLiter(s) IV Continuous <Continuous>  levETIRAcetam 250 milliGRAM(s) Oral two times a day  metroNIDAZOLE    Tablet 500 milliGRAM(s) Oral every 8 hours  nicotine -  14 mG/24Hr(s) Patch 1 Patch Transdermal daily  pantoprazole    Tablet 40 milliGRAM(s) Oral before breakfast  tiotropium 2.5 MICROgram(s) Inhaler 2 Puff(s) Inhalation daily    PRN MEDICATIONS  acetaminophen     Tablet .. 650 milliGRAM(s) Oral every 6 hours PRN  aluminum hydroxide/magnesium hydroxide/simethicone Suspension 30 milliLiter(s) Oral every 4 hours PRN  melatonin 3 milliGRAM(s) Oral at bedtime PRN  morphine  - Injectable 4 milliGRAM(s) IV Push every 4 hours PRN  ondansetron Injectable 4 milliGRAM(s) IV Push every 8 hours PRN    VITALS:   T(F): 97.9  HR: 56  BP: 139/61  RR: 18  SpO2: 95%    LABS:                        12.8   5.67  )-----------( 223      ( 10 May 2023 07:17 )             38.1     05-10    143  |  105  |  7<L>  ----------------------------<  88  4.6   |  28  |  0.8    Ca    9.7      10 May 2023 07:17    TPro  6.9  /  Alb  4.3  /  TBili  0.2  /  DBili  x   /  AST  25  /  ALT  22  /  AlkPhos  114  10    PT/INR - ( 10 May 2023 07:17 )   PT: 11.20 sec;   INR: 0.98 ratio         PTT - ( 10 May 2023 07:17 )  PTT:37.1 sec  Urinalysis Basic - ( 09 May 2023 15:00 )    Color: Yellow / Appearance: Clear / S.014 / pH: x  Gluc: x / Ketone: Negative  / Bili: Negative / Urobili: <2 mg/dL   Blood: x / Protein: Negative / Nitrite: Negative   Leuk Esterase: Negative / RBC: x / WBC x   Sq Epi: x / Non Sq Epi: x / Bacteria: x        Sedimentation Rate, Erythrocyte: 21 mm/Hr *H* (05-10-23 @ 10:45)          RADIOLOGY:    PHYSICAL EXAM:  GEN: No acute distress  LUNGS: Clear to auscultation bilaterally   HEART: S1/S2 present. RRR.   ABD/ GI: Soft, non-tender, non-distended. Bowel sounds present  EXT: NC/NC/NE/2+PP/WARREN  NEURO: [id number]

## 2023-05-10 NOTE — CONSULT NOTE ADULT - SUBJECTIVE AND OBJECTIVE BOX
Gastroenterology Consultation:    Patient is a 57y old  Female who presents with a chief complaint of       Admitted on: 05-09-23      HPI:  56 y/o F with a PMH of HTN, non obstructive CAD, COPD, HLD, anxiety, seizure d/o, migraine, GERD, and multiple abdominal surgeries including appendectomy/resection of appendiceal stump, cholecystectomy, exploratory laparotomy with lysis of adhesions,  hysterectomy unilateral oophorectomy, Hiatal hernia present to the ED with bloody bowel movements and abdominal pain. history goes back to around 1 week ago when patient started complaining of severe abdominal pain, diffuse, dull in nature, constant, 8/10 in severity, non radiating. few days later patient started complaining of bloody diarrhea, started at 1-2 episode per day and progressively worsened until it became associated with oral intake and around 5 times per day. she saw her GI (Dr Leal) on Friday 05/05 who recommended ED visit. patient waited to see if she improves and when she didnt she presetned to the ED. she reported nausea and an episode of non bloody vomiting on 05/07 but denied fever, chills, chest pain, SOB, cough or dysuria      GI Hx:   Pt follows with Dr Leal as OP   57y F significant PSH of ex-lap 11/2021 for SBO 2/2 to foreign body, SBO again in 9/22 treated conservatively, appendectomy x2 (16y ago and again 12y ago), cholecystectomy, hysterectomy, PMH of HTN, HLD, anxiety, seizures, presenting to the ED on 5/9/23 for abdominal pain associated with diarrhea and hematochezia. Pt poor historian. Noted to have seen Dr Leal on 5/5 for constipation for which he prescribed miralax and lactulose (pt already on Linzess). Pt later started having episodes of diarrhea associated with BRBPR. Colonoscopy done as OP in March, 23 showing internal and external hemorrhoids otherwise unremarkable (was done for concern of colon narrowing on CT scan).          VITALS:   T(C): 36.5 (05-09-23 @ 12:51), Max: 36.6 (05-09-23 @ 10:48)  HR: 74 (05-09-23 @ 12:51) (74 - 74)  BP: 150/67 (05-09-23 @ 12:51) (150/67 - 163/67)  RR: 16 (05-09-23 @ 12:51) (16 - 18)  SpO2: 96% (05-09-23 @ 12:51) (96% - 99%)    in ED, vitals were significant for HTN (163/37). labs were WNLs. CTAP was significant for sigmoiditis. patient received 1L LR bolus/flagyl and admitted to medicine for further treatment (09 May 2023 20:34)        Prior EGD:    Prior Colonoscopy:      PAST MEDICAL & SURGICAL HISTORY:  Back pain  henriated discs      Seizures  last one 6 yrs ago      Migraines      Hypercholesteremia      Seasonal allergic rhinitis, unspecified trigger      GERD (gastroesophageal reflux disease)      Hiatal hernia      Anxiety      HTN (hypertension)      Osteoarthritis      Sciatica      Tobacco use disorder      History of surgery  4 rght knee sx  1 left knee sx  cholecystectomy  rotator cuff right  abdominal sx-- adhesions  c section  ovarian cystectomy  breast cystectomies  colonoscopy   endoscopies  parital hysterectomy      S/p partial hysterectomy with remaining cervical stump      H/O rotator cuff surgery  right      History of appendectomy      S/P cholecystectomy            FAMILY HISTORY:  Coronary artery disease (Father)  dad cad @78        Social History:  Tobacco:  Alcohol:  Drugs:    Home Medications:  atorvastatin 80 mg oral tablet: 1 tab(s) orally once a day (09 May 2023 21:33)  busPIRone 10 mg oral tablet: orally 3 times a day (09 May 2023 21:33)  Fioricet oral tablet: 1 tab(s) orally 3 times a day, As Needed (09 May 2023 21:33)  hydrOXYzine hydrochloride 25 mg oral tablet: 1 tab(s) orally once a day (09 May 2023 21:32)  Incruse Ellipta 62.5 mcg/inh inhalation powder:  (09 May 2023 21:33)  KlonoPIN 2 mg oral tablet: 1 tab(s) orally 3 times a day (09 May 2023 21:32)  levETIRAcetam 250 mg oral tablet: 1 tab(s) orally 2 times a day (09 May 2023 21:33)  Linzess 290 mcg oral capsule: 1 cap(s) orally once a day (09 May 2023 21:33)  meloxicam 15 mg oral tablet: 1 tab(s) orally once a day (09 May 2023 21:33)  METOPROLOL SUCCINATE ER 50MG ER TAB: tab(s) orally once a day (09 May 2023 21:33)  Myrbetriq 50 mg oral tablet, extended release: 1 tab(s) orally once a day (09 May 2023 21:32)  NIFEdipine 60 mg oral tablet, extended release: 1 tab(s) orally 2 times a day (09 May 2023 21:33)  ondansetron 4 mg oral tablet: 1 tab(s) orally 4 times a day as needed for  nausea (09 May 2023 21:32)  Pepcid 40 mg oral tablet: orally 2 times a day (09 May 2023 21:33)  Protonix 40 mg oral delayed release tablet: 1 tab(s) orally 2 times a day (09 May 2023 21:33)        MEDICATIONS  (STANDING):  atorvastatin 80 milliGRAM(s) Oral at bedtime  busPIRone 10 milliGRAM(s) Oral three times a day  cefTRIAXone   IVPB 1000 milliGRAM(s) IV Intermittent every 24 hours  clonazePAM  Tablet 2 milliGRAM(s) Oral three times a day  famotidine    Tablet 20 milliGRAM(s) Oral two times a day  hydrOXYzine hydrochloride 25 milliGRAM(s) Oral at bedtime  lactated ringers. 1000 milliLiter(s) (50 mL/Hr) IV Continuous <Continuous>  levETIRAcetam 250 milliGRAM(s) Oral two times a day  metroNIDAZOLE    Tablet 500 milliGRAM(s) Oral every 8 hours  nicotine -  14 mG/24Hr(s) Patch 1 Patch Transdermal daily  pantoprazole    Tablet 40 milliGRAM(s) Oral before breakfast  tiotropium 2.5 MICROgram(s) Inhaler 2 Puff(s) Inhalation daily    MEDICATIONS  (PRN):  acetaminophen     Tablet .. 650 milliGRAM(s) Oral every 6 hours PRN Temp greater or equal to 38C (100.4F), Mild Pain (1 - 3)  aluminum hydroxide/magnesium hydroxide/simethicone Suspension 30 milliLiter(s) Oral every 4 hours PRN Dyspepsia  melatonin 3 milliGRAM(s) Oral at bedtime PRN Insomnia  ondansetron Injectable 4 milliGRAM(s) IV Push every 8 hours PRN Nausea and/or Vomiting      Allergies  Toradol (Rash; Urticaria; Anaphylaxis (Mild to Mod))  Cipro (Hives)  penicillin (Hives)  Neurontin (Unknown)  Toradol (Hives)      Review of Systems:   Constitutional:  No Fever, No Chills  ENT/Mouth:  No Hearing Changes,  No Difficulty Swallowing  Eyes:  No Eye Pain, No Vision Changes  Cardiovascular:  No Chest Pain, No Palpitations  Respiratory:  No Cough, No Dyspnea  Gastrointestinal:  As described in HPI  Musculoskeletal:  No Joint Swelling, No Back Pain  Skin:  No Skin Lesions, No Jaundice  Neuro:  No Syncope, No Dizziness  Heme/Lymph:  No Bruising, No Bleeding.          Physical Examination:  T(C): 36.3 (05-10-23 @ 07:55), Max: 36.6 (05-09-23 @ 10:48)  HR: 55 (05-10-23 @ 07:55) (49 - 74)  BP: 104/55 (05-10-23 @ 07:55) (104/55 - 169/70)  RR: 18 (05-10-23 @ 07:55) (16 - 18)  SpO2: 92% (05-10-23 @ 07:55) (92% - 99%)  Height (cm): 157.5 (05-09-23 @ 10:48)  Weight (kg): 58.1 (05-09-23 @ 10:48)    05-09-23 @ 07:01  -  05-10-23 @ 07:00  --------------------------------------------------------  IN: 0 mL / OUT: 400 mL / NET: -400 mL          GENERAL: AAOx3, no acute distress.  HEAD:  Atraumatic, Normocephalic  EYES: conjunctiva and sclera clear  NECK: Supple, no JVD or thyromegaly  CHEST/LUNG: Clear to auscultation bilaterally; No wheeze, rhonchi, or rales  HEART: Regular rate and rhythm; normal S1, S2, No murmurs.  ABDOMEN: Soft, nontender, nondistended; Bowel sounds present  NEUROLOGY: No asterixis or tremor.   SKIN: Intact, no jaundice        Data:                        12.8   5.67  )-----------( 223      ( 10 May 2023 07:17 )             38.1     Hgb Trend:  12.8  05-10-23 @ 07:17  14.3  05-09-23 @ 12:30        05-09    143  |  105  |  9<L>  ----------------------------<  79  4.2   |  25  |  0.7    Ca    9.9      09 May 2023 12:30    TPro  7.9  /  Alb  5.0  /  TBili  0.2  /  DBili  x   /  AST  28  /  ALT  25  /  AlkPhos  130<H>  05-09    Liver panel trend:  TBili 0.2   /   AST 28   /   ALT 25   /   AlkP 130   /   Tptn 7.9   /   Alb 5.0    /   DBili --      05-09      PT/INR - ( 10 May 2023 07:17 )   PT: 11.20 sec;   INR: 0.98 ratio         PTT - ( 10 May 2023 07:17 )  PTT:37.1 sec        Radiology:  CT Abdomen and Pelvis w/ Oral Cont and w/ IV Cont:   ACC: [id number] EXAM:  CT ABDOMEN AND PELVIS OC IC   ORDERED BY: DIXON CASTRO     PROCEDURE DATE:  05/09/2023          INTERPRETATION:  CLINICAL STATEMENT: Abdominal pain, history of bowel   obstruction    TECHNIQUE: Contiguous axial CT imageswere obtained from the lower chest   to the pubic symphysis following administration of intravenous contrast.    Oral contrast was administered.  Reformatted images in the coronal and   sagittal planes were acquired.    COMPARISON CT: CT abdomen pelvis 2/2/2023    FINDINGS:    LOWER CHEST: Bibasilar subsegmental atelectasis.    HEPATOBILIARY: Post cholecystectomy.    SPLEEN: Unremarkable.    PANCREAS: Unremarkable.    ADRENAL GLANDS: Unremarkable.    KIDNEYS: Symmetrically enhancing. No hydronephrosis.    ABDOMINOPELVIC NODES: No lymphadenopathy.    PELVIC ORGANS: Underdistended urinary bladder limiting evaluation. Post   hysterectomy.    PERITONEUM/MESENTERY/BOWEL: Apparent mural thickening of colon, most   pronounced at rectosigmoid, appearance may be due to level of distention   or represent colitis. No evidence of bowel obstruction free air or fluid   collection.    BONES/SOFT TISSUES: Degenerative changes. Redemonstrated post surgical   subcutaneous changes of the anterior abdominal wall.    OTHER: Vascular calcifications. Circumaortic left renal vein.      IMPRESSION:    Apparent mural thickening of colon, most pronounced at rectosigmoid,   appearance may be due to level of distention or represent colitis.   Correlate clinically.    --- End of Report ---          RAMONE MATTHEWS MD; Resident Radiologist  This document has been electronically signed.  DAVID CHIU MD; Attending Radiologist  This document has been electronically signed. May  9 2023  5:33PM (05-09-23 @ 15:34)       Gastroenterology Consultation:    Patient is a 57y old  Female who presents with a chief complaint of       Admitted on: 05-09-23      HPI:  56 y/o F with a PMH of HTN, non obstructive CAD, COPD, HLD, anxiety, seizure d/o, migraine, GERD, and multiple abdominal surgeries including appendectomy/resection of appendiceal stump, cholecystectomy, exploratory laparotomy with lysis of adhesions,  hysterectomy unilateral oophorectomy, Hiatal hernia present to the ED with bloody bowel movements and abdominal pain. history goes back to around 1 week ago when patient started complaining of severe abdominal pain, diffuse, dull in nature, constant, 8/10 in severity, non radiating. few days later patient started complaining of bloody diarrhea, started at 1-2 episode per day and progressively worsened until it became associated with oral intake and around 5 times per day. she saw her GI (Dr Leal) on Friday 05/05 who recommended ED visit. patient waited to see if she improves and when she didnt she presented to the ED. She reported nausea and an episode of non bloody vomiting on 05/07 but denied fever, chills, chest pain, SOB, cough or dysuria      GI Hx:   Pt follows with Dr Leal as OP   57y F significant PSH of ex-lap 11/2021 for SBO 2/2 to foreign body, SBO again in 9/22 treated conservatively, appendectomy x2 (16y ago and again 12y ago), cholecystectomy, hysterectomy, PMH of HTN, HLD, anxiety, seizures, presenting to the ED on 5/9/23 for abdominal pain associated with diarrhea and hematochezia. Pt poor historian. Noted to have seen Dr Leal on 5/5 for constipation for which he prescribed miralax and lactulose (pt already on Linzess). Pt later started having episodes of diarrhea associated with BRBPR. Colonoscopy done as OP in March, 23 showing internal and external hemorrhoids otherwise unremarkable (was done for concern of colon narrowing on CT scan).          VITALS:   T(C): 36.5 (05-09-23 @ 12:51), Max: 36.6 (05-09-23 @ 10:48)  HR: 74 (05-09-23 @ 12:51) (74 - 74)  BP: 150/67 (05-09-23 @ 12:51) (150/67 - 163/67)  RR: 16 (05-09-23 @ 12:51) (16 - 18)  SpO2: 96% (05-09-23 @ 12:51) (96% - 99%)    in ED, vitals were significant for HTN (163/37). labs were WNLs. CTAP was significant for sigmoiditis. patient received 1L LR bolus/flagyl and admitted to medicine for further treatment (09 May 2023 20:34)        Prior EGD:    Prior Colonoscopy:      PAST MEDICAL & SURGICAL HISTORY:  Back pain  henriated discs      Seizures  last one 6 yrs ago      Migraines      Hypercholesteremia      Seasonal allergic rhinitis, unspecified trigger      GERD (gastroesophageal reflux disease)      Hiatal hernia      Anxiety      HTN (hypertension)      Osteoarthritis      Sciatica      Tobacco use disorder      History of surgery  4 rght knee sx  1 left knee sx  cholecystectomy  rotator cuff right  abdominal sx-- adhesions  c section  ovarian cystectomy  breast cystectomies  colonoscopy   endoscopies  parital hysterectomy      S/p partial hysterectomy with remaining cervical stump      H/O rotator cuff surgery  right      History of appendectomy      S/P cholecystectomy            FAMILY HISTORY:  Coronary artery disease (Father)  dad cad @78        Social History:  Tobacco:  Alcohol:  Drugs:    Home Medications:  atorvastatin 80 mg oral tablet: 1 tab(s) orally once a day (09 May 2023 21:33)  busPIRone 10 mg oral tablet: orally 3 times a day (09 May 2023 21:33)  Fioricet oral tablet: 1 tab(s) orally 3 times a day, As Needed (09 May 2023 21:33)  hydrOXYzine hydrochloride 25 mg oral tablet: 1 tab(s) orally once a day (09 May 2023 21:32)  Incruse Ellipta 62.5 mcg/inh inhalation powder:  (09 May 2023 21:33)  KlonoPIN 2 mg oral tablet: 1 tab(s) orally 3 times a day (09 May 2023 21:32)  levETIRAcetam 250 mg oral tablet: 1 tab(s) orally 2 times a day (09 May 2023 21:33)  Linzess 290 mcg oral capsule: 1 cap(s) orally once a day (09 May 2023 21:33)  meloxicam 15 mg oral tablet: 1 tab(s) orally once a day (09 May 2023 21:33)  METOPROLOL SUCCINATE ER 50MG ER TAB: tab(s) orally once a day (09 May 2023 21:33)  Myrbetriq 50 mg oral tablet, extended release: 1 tab(s) orally once a day (09 May 2023 21:32)  NIFEdipine 60 mg oral tablet, extended release: 1 tab(s) orally 2 times a day (09 May 2023 21:33)  ondansetron 4 mg oral tablet: 1 tab(s) orally 4 times a day as needed for  nausea (09 May 2023 21:32)  Pepcid 40 mg oral tablet: orally 2 times a day (09 May 2023 21:33)  Protonix 40 mg oral delayed release tablet: 1 tab(s) orally 2 times a day (09 May 2023 21:33)        MEDICATIONS  (STANDING):  atorvastatin 80 milliGRAM(s) Oral at bedtime  busPIRone 10 milliGRAM(s) Oral three times a day  cefTRIAXone   IVPB 1000 milliGRAM(s) IV Intermittent every 24 hours  clonazePAM  Tablet 2 milliGRAM(s) Oral three times a day  famotidine    Tablet 20 milliGRAM(s) Oral two times a day  hydrOXYzine hydrochloride 25 milliGRAM(s) Oral at bedtime  lactated ringers. 1000 milliLiter(s) (50 mL/Hr) IV Continuous <Continuous>  levETIRAcetam 250 milliGRAM(s) Oral two times a day  metroNIDAZOLE    Tablet 500 milliGRAM(s) Oral every 8 hours  nicotine -  14 mG/24Hr(s) Patch 1 Patch Transdermal daily  pantoprazole    Tablet 40 milliGRAM(s) Oral before breakfast  tiotropium 2.5 MICROgram(s) Inhaler 2 Puff(s) Inhalation daily    MEDICATIONS  (PRN):  acetaminophen     Tablet .. 650 milliGRAM(s) Oral every 6 hours PRN Temp greater or equal to 38C (100.4F), Mild Pain (1 - 3)  aluminum hydroxide/magnesium hydroxide/simethicone Suspension 30 milliLiter(s) Oral every 4 hours PRN Dyspepsia  melatonin 3 milliGRAM(s) Oral at bedtime PRN Insomnia  ondansetron Injectable 4 milliGRAM(s) IV Push every 8 hours PRN Nausea and/or Vomiting      Allergies  Toradol (Rash; Urticaria; Anaphylaxis (Mild to Mod))  Cipro (Hives)  penicillin (Hives)  Neurontin (Unknown)  Toradol (Hives)      Review of Systems:   Constitutional:  No Fever, No Chills  ENT/Mouth:  No Hearing Changes,  No Difficulty Swallowing  Eyes:  No Eye Pain, No Vision Changes  Cardiovascular:  No Chest Pain, No Palpitations  Respiratory:  No Cough, No Dyspnea  Gastrointestinal:  As described in HPI  Musculoskeletal:  No Joint Swelling, No Back Pain  Skin:  No Skin Lesions, No Jaundice  Neuro:  No Syncope, No Dizziness  Heme/Lymph:  No Bruising, No Bleeding.          Physical Examination:  T(C): 36.3 (05-10-23 @ 07:55), Max: 36.6 (05-09-23 @ 10:48)  HR: 55 (05-10-23 @ 07:55) (49 - 74)  BP: 104/55 (05-10-23 @ 07:55) (104/55 - 169/70)  RR: 18 (05-10-23 @ 07:55) (16 - 18)  SpO2: 92% (05-10-23 @ 07:55) (92% - 99%)  Height (cm): 157.5 (05-09-23 @ 10:48)  Weight (kg): 58.1 (05-09-23 @ 10:48)    05-09-23 @ 07:01  -  05-10-23 @ 07:00  --------------------------------------------------------  IN: 0 mL / OUT: 400 mL / NET: -400 mL          GENERAL: AAOx3, no acute distress.  HEAD:  Atraumatic, Normocephalic  EYES: conjunctiva and sclera clear  NECK: Supple, no JVD or thyromegaly  CHEST/LUNG: Clear to auscultation bilaterally; No wheeze, rhonchi, or rales  HEART: Regular rate and rhythm; normal S1, S2, No murmurs.  ABDOMEN: Soft, nontender, nondistended; Bowel sounds present  NEUROLOGY: No asterixis or tremor.   SKIN: Intact, no jaundice        Data:                        12.8   5.67  )-----------( 223      ( 10 May 2023 07:17 )             38.1     Hgb Trend:  12.8  05-10-23 @ 07:17  14.3  05-09-23 @ 12:30        05-09    143  |  105  |  9<L>  ----------------------------<  79  4.2   |  25  |  0.7    Ca    9.9      09 May 2023 12:30    TPro  7.9  /  Alb  5.0  /  TBili  0.2  /  DBili  x   /  AST  28  /  ALT  25  /  AlkPhos  130<H>  05-09    Liver panel trend:  TBili 0.2   /   AST 28   /   ALT 25   /   AlkP 130   /   Tptn 7.9   /   Alb 5.0    /   DBili --      05-09      PT/INR - ( 10 May 2023 07:17 )   PT: 11.20 sec;   INR: 0.98 ratio         PTT - ( 10 May 2023 07:17 )  PTT:37.1 sec        Radiology:  CT Abdomen and Pelvis w/ Oral Cont and w/ IV Cont:   ACC: [id number] EXAM:  CT ABDOMEN AND PELVIS OC IC   ORDERED BY: DIXON CASTRO     PROCEDURE DATE:  05/09/2023          INTERPRETATION:  CLINICAL STATEMENT: Abdominal pain, history of bowel   obstruction    TECHNIQUE: Contiguous axial CT imageswere obtained from the lower chest   to the pubic symphysis following administration of intravenous contrast.    Oral contrast was administered.  Reformatted images in the coronal and   sagittal planes were acquired.    COMPARISON CT: CT abdomen pelvis 2/2/2023    FINDINGS:    LOWER CHEST: Bibasilar subsegmental atelectasis.    HEPATOBILIARY: Post cholecystectomy.    SPLEEN: Unremarkable.    PANCREAS: Unremarkable.    ADRENAL GLANDS: Unremarkable.    KIDNEYS: Symmetrically enhancing. No hydronephrosis.    ABDOMINOPELVIC NODES: No lymphadenopathy.    PELVIC ORGANS: Underdistended urinary bladder limiting evaluation. Post   hysterectomy.    PERITONEUM/MESENTERY/BOWEL: Apparent mural thickening of colon, most   pronounced at rectosigmoid, appearance may be due to level of distention   or represent colitis. No evidence of bowel obstruction free air or fluid   collection.    BONES/SOFT TISSUES: Degenerative changes. Redemonstrated post surgical   subcutaneous changes of the anterior abdominal wall.    OTHER: Vascular calcifications. Circumaortic left renal vein.      IMPRESSION:    Apparent mural thickening of colon, most pronounced at rectosigmoid,   appearance may be due to level of distention or represent colitis.   Correlate clinically.    --- End of Report ---          RAMONE MATTHEWS MD; Resident Radiologist  This document has been electronically signed.  DAVID CHIU MD; Attending Radiologist  This document has been electronically signed. May  9 2023  5:33PM (05-09-23 @ 15:34)       Gastroenterology Consultation:    Patient is a 57y old  Female who presents with a chief complaint of diarrhea and hematochezia.       Admitted on: 05-09-23      HPI:  56 y/o F with a PMH of HTN, non obstructive CAD, COPD, HLD, anxiety, seizure d/o, migraine, GERD, and multiple abdominal surgeries including appendectomy/resection of appendiceal stump, cholecystectomy, exploratory laparotomy with lysis of adhesions,  hysterectomy unilateral oophorectomy, Hiatal hernia present to the ED with bloody bowel movements and abdominal pain. history goes back to around 1 week ago when patient started complaining of severe abdominal pain, diffuse, dull in nature, constant, 8/10 in severity, non radiating. few days later patient started complaining of bloody diarrhea, started at 1-2 episode per day and progressively worsened until it became associated with oral intake and around 5 times per day. she saw her GI (Dr Leal) on Friday 05/05 who recommended ED visit. patient waited to see if she improves and when she didnt she presented to the ED. She reported nausea and an episode of non bloody vomiting on 05/07 but denied fever, chills, chest pain, SOB, cough or dysuria      GI Hx:   Pt follows with Dr Leal as OP   57y F significant PSH of ex-lap 11/2021 for SBO 2/2 to foreign body, SBO again in 9/22 treated conservatively, appendectomy x2 (16y ago and again 12y ago), cholecystectomy, hysterectomy, PMH of HTN, HLD, anxiety, seizures, presenting to the ED on 5/9/23 for abdominal pain associated with diarrhea and hematochezia. Pt poor historian. Noted to have seen Dr Leal on 5/5 for constipation for which he prescribed miralax and lactulose (pt already on Linzess). Pt later started having episodes of diarrhea associated with BRBPR. Colonoscopy done as OP in March, 23 showing internal and external hemorrhoids otherwise unremarkable (was done for concern of colon narrowing on CT scan).          VITALS:   T(C): 36.5 (05-09-23 @ 12:51), Max: 36.6 (05-09-23 @ 10:48)  HR: 74 (05-09-23 @ 12:51) (74 - 74)  BP: 150/67 (05-09-23 @ 12:51) (150/67 - 163/67)  RR: 16 (05-09-23 @ 12:51) (16 - 18)  SpO2: 96% (05-09-23 @ 12:51) (96% - 99%)    in ED, vitals were significant for HTN (163/37). labs were WNLs. CTAP was significant for sigmoiditis. patient received 1L LR bolus/flagyl and admitted to medicine for further treatment (09 May 2023 20:34)        Prior EGD: 5/22: gastritis (not in system)     Prior Colonoscopy (3/8/23): internal and external hemorrhoids, hyperplastic polyp in transverse colon,  otherwise unremarkable       PAST MEDICAL & SURGICAL HISTORY:  Back pain  henriated discs      Seizures  last one 6 yrs ago      Migraines      Hypercholesteremia      Seasonal allergic rhinitis, unspecified trigger      GERD (gastroesophageal reflux disease)      Hiatal hernia      Anxiety      HTN (hypertension)      Osteoarthritis      Sciatica      Tobacco use disorder      History of surgery  4 rght knee sx  1 left knee sx  cholecystectomy  rotator cuff right  abdominal sx-- adhesions  c section  ovarian cystectomy  breast cystectomies  colonoscopy   endoscopies  parital hysterectomy      S/p partial hysterectomy with remaining cervical stump      H/O rotator cuff surgery  right      History of appendectomy      S/P cholecystectomy            FAMILY HISTORY:  Coronary artery disease (Father)  dad cad @78        Social History:  Tobacco:  Alcohol:  Drugs:    Home Medications:  atorvastatin 80 mg oral tablet: 1 tab(s) orally once a day (09 May 2023 21:33)  busPIRone 10 mg oral tablet: orally 3 times a day (09 May 2023 21:33)  Fioricet oral tablet: 1 tab(s) orally 3 times a day, As Needed (09 May 2023 21:33)  hydrOXYzine hydrochloride 25 mg oral tablet: 1 tab(s) orally once a day (09 May 2023 21:32)  Incruse Ellipta 62.5 mcg/inh inhalation powder:  (09 May 2023 21:33)  KlonoPIN 2 mg oral tablet: 1 tab(s) orally 3 times a day (09 May 2023 21:32)  levETIRAcetam 250 mg oral tablet: 1 tab(s) orally 2 times a day (09 May 2023 21:33)  Linzess 290 mcg oral capsule: 1 cap(s) orally once a day (09 May 2023 21:33)  meloxicam 15 mg oral tablet: 1 tab(s) orally once a day (09 May 2023 21:33)  METOPROLOL SUCCINATE ER 50MG ER TAB: tab(s) orally once a day (09 May 2023 21:33)  Myrbetriq 50 mg oral tablet, extended release: 1 tab(s) orally once a day (09 May 2023 21:32)  NIFEdipine 60 mg oral tablet, extended release: 1 tab(s) orally 2 times a day (09 May 2023 21:33)  ondansetron 4 mg oral tablet: 1 tab(s) orally 4 times a day as needed for  nausea (09 May 2023 21:32)  Pepcid 40 mg oral tablet: orally 2 times a day (09 May 2023 21:33)  Protonix 40 mg oral delayed release tablet: 1 tab(s) orally 2 times a day (09 May 2023 21:33)        MEDICATIONS  (STANDING):  atorvastatin 80 milliGRAM(s) Oral at bedtime  busPIRone 10 milliGRAM(s) Oral three times a day  cefTRIAXone   IVPB 1000 milliGRAM(s) IV Intermittent every 24 hours  clonazePAM  Tablet 2 milliGRAM(s) Oral three times a day  famotidine    Tablet 20 milliGRAM(s) Oral two times a day  hydrOXYzine hydrochloride 25 milliGRAM(s) Oral at bedtime  lactated ringers. 1000 milliLiter(s) (50 mL/Hr) IV Continuous <Continuous>  levETIRAcetam 250 milliGRAM(s) Oral two times a day  metroNIDAZOLE    Tablet 500 milliGRAM(s) Oral every 8 hours  nicotine -  14 mG/24Hr(s) Patch 1 Patch Transdermal daily  pantoprazole    Tablet 40 milliGRAM(s) Oral before breakfast  tiotropium 2.5 MICROgram(s) Inhaler 2 Puff(s) Inhalation daily    MEDICATIONS  (PRN):  acetaminophen     Tablet .. 650 milliGRAM(s) Oral every 6 hours PRN Temp greater or equal to 38C (100.4F), Mild Pain (1 - 3)  aluminum hydroxide/magnesium hydroxide/simethicone Suspension 30 milliLiter(s) Oral every 4 hours PRN Dyspepsia  melatonin 3 milliGRAM(s) Oral at bedtime PRN Insomnia  ondansetron Injectable 4 milliGRAM(s) IV Push every 8 hours PRN Nausea and/or Vomiting      Allergies  Toradol (Rash; Urticaria; Anaphylaxis (Mild to Mod))  Cipro (Hives)  penicillin (Hives)  Neurontin (Unknown)  Toradol (Hives)      Review of Systems:   Constitutional:  No Fever, No Chills  ENT/Mouth:  No Hearing Changes,  No Difficulty Swallowing  Eyes:  No Eye Pain, No Vision Changes  Cardiovascular:  No Chest Pain, No Palpitations  Respiratory:  No Cough, No Dyspnea  Gastrointestinal:  As described in HPI  Musculoskeletal:  No Joint Swelling, No Back Pain  Skin:  No Skin Lesions, No Jaundice  Neuro:  No Syncope, No Dizziness  Heme/Lymph:  No Bruising, No Bleeding.          Physical Examination:  T(C): 36.3 (05-10-23 @ 07:55), Max: 36.6 (05-09-23 @ 10:48)  HR: 55 (05-10-23 @ 07:55) (49 - 74)  BP: 104/55 (05-10-23 @ 07:55) (104/55 - 169/70)  RR: 18 (05-10-23 @ 07:55) (16 - 18)  SpO2: 92% (05-10-23 @ 07:55) (92% - 99%)  Height (cm): 157.5 (05-09-23 @ 10:48)  Weight (kg): 58.1 (05-09-23 @ 10:48)    05-09-23 @ 07:01  -  05-10-23 @ 07:00  --------------------------------------------------------  IN: 0 mL / OUT: 400 mL / NET: -400 mL          GENERAL: AAOx3, no acute distress.  HEAD:  Atraumatic, Normocephalic  EYES: conjunctiva and sclera clear  NECK: Supple, no JVD or thyromegaly  CHEST/LUNG: Clear to auscultation bilaterally; No wheeze, rhonchi, or rales  HEART: Regular rate and rhythm; normal S1, S2, No murmurs.  ABDOMEN: Soft, nontender, nondistended; Bowel sounds present  NEUROLOGY: No asterixis or tremor.   SKIN: Intact, no jaundice        Data:                        12.8   5.67  )-----------( 223      ( 10 May 2023 07:17 )             38.1     Hgb Trend:  12.8  05-10-23 @ 07:17  14.3  05-09-23 @ 12:30        05-09    143  |  105  |  9<L>  ----------------------------<  79  4.2   |  25  |  0.7    Ca    9.9      09 May 2023 12:30    TPro  7.9  /  Alb  5.0  /  TBili  0.2  /  DBili  x   /  AST  28  /  ALT  25  /  AlkPhos  130<H>  05-09    Liver panel trend:  TBili 0.2   /   AST 28   /   ALT 25   /   AlkP 130   /   Tptn 7.9   /   Alb 5.0    /   DBili --      05-09      PT/INR - ( 10 May 2023 07:17 )   PT: 11.20 sec;   INR: 0.98 ratio         PTT - ( 10 May 2023 07:17 )  PTT:37.1 sec        Radiology:  CT Abdomen and Pelvis w/ Oral Cont and w/ IV Cont:   ACC: [id number] EXAM:  CT ABDOMEN AND PELVIS OC IC   ORDERED BY: DIXON CASTRO     PROCEDURE DATE:  05/09/2023          INTERPRETATION:  CLINICAL STATEMENT: Abdominal pain, history of bowel   obstruction    TECHNIQUE: Contiguous axial CT imageswere obtained from the lower chest   to the pubic symphysis following administration of intravenous contrast.    Oral contrast was administered.  Reformatted images in the coronal and   sagittal planes were acquired.    COMPARISON CT: CT abdomen pelvis 2/2/2023    FINDINGS:    LOWER CHEST: Bibasilar subsegmental atelectasis.    HEPATOBILIARY: Post cholecystectomy.    SPLEEN: Unremarkable.    PANCREAS: Unremarkable.    ADRENAL GLANDS: Unremarkable.    KIDNEYS: Symmetrically enhancing. No hydronephrosis.    ABDOMINOPELVIC NODES: No lymphadenopathy.    PELVIC ORGANS: Underdistended urinary bladder limiting evaluation. Post   hysterectomy.    PERITONEUM/MESENTERY/BOWEL: Apparent mural thickening of colon, most   pronounced at rectosigmoid, appearance may be due to level of distention   or represent colitis. No evidence of bowel obstruction free air or fluid   collection.    BONES/SOFT TISSUES: Degenerative changes. Redemonstrated post surgical   subcutaneous changes of the anterior abdominal wall.    OTHER: Vascular calcifications. Circumaortic left renal vein.      IMPRESSION:    Apparent mural thickening of colon, most pronounced at rectosigmoid,   appearance may be due to level of distention or represent colitis.   Correlate clinically.    --- End of Report ---          RAMONE MATTHEWS MD; Resident Radiologist  This document has been electronically signed.  DAVID CHIU MD; Attending Radiologist  This document has been electronically signed. May  9 2023  5:33PM (05-09-23 @ 15:34)       Gastroenterology Consultation:    Patient is a 57y old  Female who presents with a chief complaint of diarrhea and hematochezia.       Admitted on: 05-09-23      HPI:  56 y/o F with a PMH of HTN, non obstructive CAD, COPD, HLD, anxiety, seizure d/o, migraine, GERD, and multiple abdominal surgeries including appendectomy/resection of appendiceal stump, cholecystectomy, exploratory laparotomy with lysis of adhesions,  hysterectomy unilateral oophorectomy, Hiatal hernia present to the ED with bloody bowel movements and abdominal pain. history goes back to around 1 week ago when patient started complaining of severe abdominal pain, diffuse, dull in nature, constant, 8/10 in severity, non radiating. few days later patient started complaining of bloody diarrhea, started at 1-2 episode per day and progressively worsened until it became associated with oral intake and around 5 times per day. she saw her GI (Dr Leal) on Friday 05/05 who recommended ED visit. patient waited to see if she improves and when she didnt she presented to the ED. She reported nausea and an episode of non bloody vomiting on 05/07 but denied fever, chills, chest pain, SOB, cough or dysuria      GI Hx:   Pt follows with Dr Leal as OP   57y F significant PSH of ex-lap 11/2021 for SBO 2/2 to foreign body, SBO again in 9/22 treated conservatively, appendectomy x2 (16y ago and again 12y ago), cholecystectomy, hysterectomy, PMH of HTN, HLD, anxiety, seizures, presenting to the ED on 5/9/23 for abdominal pain associated with diarrhea and hematochezia. Pt poor historian. Noted to have seen Dr Leal on 5/5 for constipation for which he prescribed miralax and lactulose (pt already on Linzess). Pt later started having episodes of diarrhea associated with BRBPR. Colonoscopy done as OP in March, 23 showing internal and external hemorrhoids otherwise unremarkable (was done for concern of colon narrowing on CT scan).          VITALS:   T(C): 36.5 (05-09-23 @ 12:51), Max: 36.6 (05-09-23 @ 10:48)  HR: 74 (05-09-23 @ 12:51) (74 - 74)  BP: 150/67 (05-09-23 @ 12:51) (150/67 - 163/67)  RR: 16 (05-09-23 @ 12:51) (16 - 18)  SpO2: 96% (05-09-23 @ 12:51) (96% - 99%)    in ED, vitals were significant for HTN (163/37). labs were WNLs. CTAP was significant for sigmoiditis. patient received 1L LR bolus/flagyl and admitted to medicine for further treatment (09 May 2023 20:34)        Prior EGD: 5/22: gastritis (not in system)     Prior Colonoscopy (3/8/23): internal and external hemorrhoids, hyperplastic polyp in transverse colon,  otherwise unremarkable       PAST MEDICAL & SURGICAL HISTORY:  Back pain  henriated discs      Seizures  last one 6 yrs ago      Migraines      Hypercholesteremia      Seasonal allergic rhinitis, unspecified trigger      GERD (gastroesophageal reflux disease)      Hiatal hernia      Anxiety      HTN (hypertension)      Osteoarthritis      Sciatica      Tobacco use disorder      History of surgery  4 rght knee sx  1 left knee sx  cholecystectomy  rotator cuff right  abdominal sx-- adhesions  c section  ovarian cystectomy  breast cystectomies  colonoscopy   endoscopies  parital hysterectomy      S/p partial hysterectomy with remaining cervical stump      H/O rotator cuff surgery  right      History of appendectomy      S/P cholecystectomy            FAMILY HISTORY:  Coronary artery disease (Father)  dad cad @78          Home Medications:  atorvastatin 80 mg oral tablet: 1 tab(s) orally once a day (09 May 2023 21:33)  busPIRone 10 mg oral tablet: orally 3 times a day (09 May 2023 21:33)  Fioricet oral tablet: 1 tab(s) orally 3 times a day, As Needed (09 May 2023 21:33)  hydrOXYzine hydrochloride 25 mg oral tablet: 1 tab(s) orally once a day (09 May 2023 21:32)  Incruse Ellipta 62.5 mcg/inh inhalation powder:  (09 May 2023 21:33)  KlonoPIN 2 mg oral tablet: 1 tab(s) orally 3 times a day (09 May 2023 21:32)  levETIRAcetam 250 mg oral tablet: 1 tab(s) orally 2 times a day (09 May 2023 21:33)  Linzess 290 mcg oral capsule: 1 cap(s) orally once a day (09 May 2023 21:33)  meloxicam 15 mg oral tablet: 1 tab(s) orally once a day (09 May 2023 21:33)  METOPROLOL SUCCINATE ER 50MG ER TAB: tab(s) orally once a day (09 May 2023 21:33)  Myrbetriq 50 mg oral tablet, extended release: 1 tab(s) orally once a day (09 May 2023 21:32)  NIFEdipine 60 mg oral tablet, extended release: 1 tab(s) orally 2 times a day (09 May 2023 21:33)  ondansetron 4 mg oral tablet: 1 tab(s) orally 4 times a day as needed for  nausea (09 May 2023 21:32)  Pepcid 40 mg oral tablet: orally 2 times a day (09 May 2023 21:33)  Protonix 40 mg oral delayed release tablet: 1 tab(s) orally 2 times a day (09 May 2023 21:33)        MEDICATIONS  (STANDING):  atorvastatin 80 milliGRAM(s) Oral at bedtime  busPIRone 10 milliGRAM(s) Oral three times a day  cefTRIAXone   IVPB 1000 milliGRAM(s) IV Intermittent every 24 hours  clonazePAM  Tablet 2 milliGRAM(s) Oral three times a day  famotidine    Tablet 20 milliGRAM(s) Oral two times a day  hydrOXYzine hydrochloride 25 milliGRAM(s) Oral at bedtime  lactated ringers. 1000 milliLiter(s) (50 mL/Hr) IV Continuous <Continuous>  levETIRAcetam 250 milliGRAM(s) Oral two times a day  metroNIDAZOLE    Tablet 500 milliGRAM(s) Oral every 8 hours  nicotine -  14 mG/24Hr(s) Patch 1 Patch Transdermal daily  pantoprazole    Tablet 40 milliGRAM(s) Oral before breakfast  tiotropium 2.5 MICROgram(s) Inhaler 2 Puff(s) Inhalation daily    MEDICATIONS  (PRN):  acetaminophen     Tablet .. 650 milliGRAM(s) Oral every 6 hours PRN Temp greater or equal to 38C (100.4F), Mild Pain (1 - 3)  aluminum hydroxide/magnesium hydroxide/simethicone Suspension 30 milliLiter(s) Oral every 4 hours PRN Dyspepsia  melatonin 3 milliGRAM(s) Oral at bedtime PRN Insomnia  ondansetron Injectable 4 milliGRAM(s) IV Push every 8 hours PRN Nausea and/or Vomiting      Allergies  Toradol (Rash; Urticaria; Anaphylaxis (Mild to Mod))  Cipro (Hives)  penicillin (Hives)  Neurontin (Unknown)  Toradol (Hives)      Review of Systems:   Constitutional:  No Fever, No Chills  ENT/Mouth:  No Hearing Changes,  No Difficulty Swallowing  Eyes:  No Eye Pain, No Vision Changes  Cardiovascular:  No Chest Pain, No Palpitations  Respiratory:  No Cough, No Dyspnea  Gastrointestinal:  As described in HPI  Musculoskeletal:  No Joint Swelling, No Back Pain  Skin:  No Skin Lesions, No Jaundice  Neuro:  No Syncope, No Dizziness  Heme/Lymph:  No Bruising, No Bleeding.          Physical Examination:  T(C): 36.3 (05-10-23 @ 07:55), Max: 36.6 (05-09-23 @ 10:48)  HR: 55 (05-10-23 @ 07:55) (49 - 74)  BP: 104/55 (05-10-23 @ 07:55) (104/55 - 169/70)  RR: 18 (05-10-23 @ 07:55) (16 - 18)  SpO2: 92% (05-10-23 @ 07:55) (92% - 99%)  Height (cm): 157.5 (05-09-23 @ 10:48)  Weight (kg): 58.1 (05-09-23 @ 10:48)    05-09-23 @ 07:01  -  05-10-23 @ 07:00  --------------------------------------------------------  IN: 0 mL / OUT: 400 mL / NET: -400 mL          GENERAL: AAOx3, no acute distress.  HEAD:  Atraumatic, Normocephalic  EYES: conjunctiva and sclera clear  NECK: Supple, no JVD or thyromegaly  CHEST/LUNG: Clear to auscultation bilaterally; No wheeze, rhonchi, or rales  HEART: Regular rate and rhythm; normal S1, S2, No murmurs.  ABDOMEN: surgical scar, Soft, tenderness around surgical incision, nondistended; Bowel sounds present  NEUROLOGY: No asterixis or tremor.   SKIN: Intact, no jaundice        Data:                        12.8   5.67  )-----------( 223      ( 10 May 2023 07:17 )             38.1     Hgb Trend:  12.8  05-10-23 @ 07:17  14.3  05-09-23 @ 12:30        05-09    143  |  105  |  9<L>  ----------------------------<  79  4.2   |  25  |  0.7    Ca    9.9      09 May 2023 12:30    TPro  7.9  /  Alb  5.0  /  TBili  0.2  /  DBili  x   /  AST  28  /  ALT  25  /  AlkPhos  130<H>  05-09    Liver panel trend:  TBili 0.2   /   AST 28   /   ALT 25   /   AlkP 130   /   Tptn 7.9   /   Alb 5.0    /   DBili --      05-09      PT/INR - ( 10 May 2023 07:17 )   PT: 11.20 sec;   INR: 0.98 ratio         PTT - ( 10 May 2023 07:17 )  PTT:37.1 sec        Radiology:  CT Abdomen and Pelvis w/ Oral Cont and w/ IV Cont:   ACC: [id number] EXAM:  CT ABDOMEN AND PELVIS OC IC   ORDERED BY: DIXON CASTRO     PROCEDURE DATE:  05/09/2023          INTERPRETATION:  CLINICAL STATEMENT: Abdominal pain, history of bowel   obstruction    TECHNIQUE: Contiguous axial CT imageswere obtained from the lower chest   to the pubic symphysis following administration of intravenous contrast.    Oral contrast was administered.  Reformatted images in the coronal and   sagittal planes were acquired.    COMPARISON CT: CT abdomen pelvis 2/2/2023    FINDINGS:    LOWER CHEST: Bibasilar subsegmental atelectasis.    HEPATOBILIARY: Post cholecystectomy.    SPLEEN: Unremarkable.    PANCREAS: Unremarkable.    ADRENAL GLANDS: Unremarkable.    KIDNEYS: Symmetrically enhancing. No hydronephrosis.    ABDOMINOPELVIC NODES: No lymphadenopathy.    PELVIC ORGANS: Underdistended urinary bladder limiting evaluation. Post   hysterectomy.    PERITONEUM/MESENTERY/BOWEL: Apparent mural thickening of colon, most   pronounced at rectosigmoid, appearance may be due to level of distention   or represent colitis. No evidence of bowel obstruction free air or fluid   collection.    BONES/SOFT TISSUES: Degenerative changes. Redemonstrated post surgical   subcutaneous changes of the anterior abdominal wall.    OTHER: Vascular calcifications. Circumaortic left renal vein.      IMPRESSION:    Apparent mural thickening of colon, most pronounced at rectosigmoid,   appearance may be due to level of distention or represent colitis.   Correlate clinically.    --- End of Report ---          RAMONE MATTHEWS MD; Resident Radiologist  This document has been electronically signed.  DAVID CHIU MD; Attending Radiologist  This document has been electronically signed. May  9 2023  5:33PM (05-09-23 @ 15:34)       Gastroenterology Consultation:    Patient is a 57y old  Female who presents with a chief complaint of diarrhea and hematochezia.       Admitted on: 05-09-23      HPI:  56 y/o F with a PMH of HTN, non obstructive CAD, COPD, HLD, anxiety, seizure d/o, migraine, GERD, and multiple abdominal surgeries including appendectomy/resection of appendiceal stump, cholecystectomy, exploratory laparotomy with lysis of adhesions,  hysterectomy unilateral oophorectomy, Hiatal hernia present to the ED with bloody bowel movements and abdominal pain,  history goes back to around 1 week ago when patient started complaining of severe abdominal pain, diffuse, dull in nature, constant, 8/10 in severity, non radiating,  few days later patient started complaining of  diarrhea, started at 1-2 episode per day and progressively worsened until it became associated with oral intake and around 5 times per day. Patient mentioned that after having  diarrhea, she started seeing some fresh blood when wiping,  saw her GI (Dr Leal) on Friday 05/05 who recommended ED visit. Patient waited to see if she improves and when she didnt she presented to the ED. She reported nausea and an episode of non bloody vomiting on 05/07 but denied fever, chills, chest pain, SOB, cough or dysuria      GI Hx:   Pt follows with Dr Leal as OP   57y F significant PSH of ex-lap 11/2021 for SBO 2/2 to foreign body, SBO again in 9/22 treated conservatively, appendectomy x2 (16y ago and again 12y ago), cholecystectomy, hysterectomy, PMH of HTN, HLD, anxiety, seizures, presenting to the ED on 5/9/23 for abdominal pain associated with diarrhea and hematochezia. Pt poor historian. Noted to have seen Dr Leal on 5/5 for constipation for which he prescribed Miralax and lactulose (pt already on Linzess). Pt later started having episodes of diarrhea associated with BRBPR when wiping. Colonoscopy done as OP in March, 23 showing internal and external hemorrhoids otherwise unremarkable (was done for concern of colon narrowing on CT scan).          VITALS:   T(C): 36.5 (05-09-23 @ 12:51), Max: 36.6 (05-09-23 @ 10:48)  HR: 74 (05-09-23 @ 12:51) (74 - 74)  BP: 150/67 (05-09-23 @ 12:51) (150/67 - 163/67)  RR: 16 (05-09-23 @ 12:51) (16 - 18)  SpO2: 96% (05-09-23 @ 12:51) (96% - 99%)    in ED, vitals were significant for HTN (163/37). labs were WNLs. CTAP was significant for sigmoiditis. patient received 1L LR bolus/flagyl and admitted to medicine for further treatment (09 May 2023 20:34)        Prior EGD: 5/22: gastritis (not in system)     Prior Colonoscopy (3/8/23): internal and external hemorrhoids, hyperplastic polyp in transverse colon,  otherwise unremarkable       PAST MEDICAL & SURGICAL HISTORY:  Back pain  henriated discs      Seizures  last one 6 yrs ago      Migraines      Hypercholesteremia      Seasonal allergic rhinitis, unspecified trigger      GERD (gastroesophageal reflux disease)      Hiatal hernia      Anxiety      HTN (hypertension)      Osteoarthritis      Sciatica      Tobacco use disorder      History of surgery  4 rght knee sx  1 left knee sx  cholecystectomy  rotator cuff right  abdominal sx-- adhesions  c section  ovarian cystectomy  breast cystectomies  colonoscopy   endoscopies  parital hysterectomy      S/p partial hysterectomy with remaining cervical stump      H/O rotator cuff surgery  right      History of appendectomy      S/P cholecystectomy            FAMILY HISTORY:  Coronary artery disease (Father)  dad cad @78          Home Medications:  atorvastatin 80 mg oral tablet: 1 tab(s) orally once a day (09 May 2023 21:33)  busPIRone 10 mg oral tablet: orally 3 times a day (09 May 2023 21:33)  Fioricet oral tablet: 1 tab(s) orally 3 times a day, As Needed (09 May 2023 21:33)  hydrOXYzine hydrochloride 25 mg oral tablet: 1 tab(s) orally once a day (09 May 2023 21:32)  Incruse Ellipta 62.5 mcg/inh inhalation powder:  (09 May 2023 21:33)  KlonoPIN 2 mg oral tablet: 1 tab(s) orally 3 times a day (09 May 2023 21:32)  levETIRAcetam 250 mg oral tablet: 1 tab(s) orally 2 times a day (09 May 2023 21:33)  Linzess 290 mcg oral capsule: 1 cap(s) orally once a day (09 May 2023 21:33)  meloxicam 15 mg oral tablet: 1 tab(s) orally once a day (09 May 2023 21:33)  METOPROLOL SUCCINATE ER 50MG ER TAB: tab(s) orally once a day (09 May 2023 21:33)  Myrbetriq 50 mg oral tablet, extended release: 1 tab(s) orally once a day (09 May 2023 21:32)  NIFEdipine 60 mg oral tablet, extended release: 1 tab(s) orally 2 times a day (09 May 2023 21:33)  ondansetron 4 mg oral tablet: 1 tab(s) orally 4 times a day as needed for  nausea (09 May 2023 21:32)  Pepcid 40 mg oral tablet: orally 2 times a day (09 May 2023 21:33)  Protonix 40 mg oral delayed release tablet: 1 tab(s) orally 2 times a day (09 May 2023 21:33)        MEDICATIONS  (STANDING):  atorvastatin 80 milliGRAM(s) Oral at bedtime  busPIRone 10 milliGRAM(s) Oral three times a day  cefTRIAXone   IVPB 1000 milliGRAM(s) IV Intermittent every 24 hours  clonazePAM  Tablet 2 milliGRAM(s) Oral three times a day  famotidine    Tablet 20 milliGRAM(s) Oral two times a day  hydrOXYzine hydrochloride 25 milliGRAM(s) Oral at bedtime  lactated ringers. 1000 milliLiter(s) (50 mL/Hr) IV Continuous <Continuous>  levETIRAcetam 250 milliGRAM(s) Oral two times a day  metroNIDAZOLE    Tablet 500 milliGRAM(s) Oral every 8 hours  nicotine -  14 mG/24Hr(s) Patch 1 Patch Transdermal daily  pantoprazole    Tablet 40 milliGRAM(s) Oral before breakfast  tiotropium 2.5 MICROgram(s) Inhaler 2 Puff(s) Inhalation daily    MEDICATIONS  (PRN):  acetaminophen     Tablet .. 650 milliGRAM(s) Oral every 6 hours PRN Temp greater or equal to 38C (100.4F), Mild Pain (1 - 3)  aluminum hydroxide/magnesium hydroxide/simethicone Suspension 30 milliLiter(s) Oral every 4 hours PRN Dyspepsia  melatonin 3 milliGRAM(s) Oral at bedtime PRN Insomnia  ondansetron Injectable 4 milliGRAM(s) IV Push every 8 hours PRN Nausea and/or Vomiting      Allergies  Toradol (Rash; Urticaria; Anaphylaxis (Mild to Mod))  Cipro (Hives)  penicillin (Hives)  Neurontin (Unknown)  Toradol (Hives)      Review of Systems:   Constitutional:  No Fever, No Chills  ENT/Mouth:  No Hearing Changes,  No Difficulty Swallowing  Eyes:  No Eye Pain, No Vision Changes  Cardiovascular:  No Chest Pain, No Palpitations  Respiratory:  No Cough, No Dyspnea  Gastrointestinal:  As described in HPI  Musculoskeletal:  No Joint Swelling, No Back Pain  Skin:  No Skin Lesions, No Jaundice  Neuro:  No Syncope, No Dizziness  Heme/Lymph:  No Bruising, No Bleeding.          Physical Examination:  T(C): 36.3 (05-10-23 @ 07:55), Max: 36.6 (05-09-23 @ 10:48)  HR: 55 (05-10-23 @ 07:55) (49 - 74)  BP: 104/55 (05-10-23 @ 07:55) (104/55 - 169/70)  RR: 18 (05-10-23 @ 07:55) (16 - 18)  SpO2: 92% (05-10-23 @ 07:55) (92% - 99%)  Height (cm): 157.5 (05-09-23 @ 10:48)  Weight (kg): 58.1 (05-09-23 @ 10:48)    05-09-23 @ 07:01  -  05-10-23 @ 07:00  --------------------------------------------------------  IN: 0 mL / OUT: 400 mL / NET: -400 mL          GENERAL: AAOx3, no acute distress.  HEAD:  Atraumatic, Normocephalic  EYES: conjunctiva and sclera clear  NECK: Supple, no JVD or thyromegaly  CHEST/LUNG: Clear to auscultation bilaterally; No wheeze, rhonchi, or rales  HEART: Regular rate and rhythm; normal S1, S2, No murmurs.  ABDOMEN: surgical scar, Soft, tenderness around surgical incision, nondistended; Bowel sounds present  NEUROLOGY: No asterixis or tremor.   SKIN: Intact, no jaundice        Data:                        12.8   5.67  )-----------( 223      ( 10 May 2023 07:17 )             38.1     Hgb Trend:  12.8  05-10-23 @ 07:17  14.3  05-09-23 @ 12:30        05-09    143  |  105  |  9<L>  ----------------------------<  79  4.2   |  25  |  0.7    Ca    9.9      09 May 2023 12:30    TPro  7.9  /  Alb  5.0  /  TBili  0.2  /  DBili  x   /  AST  28  /  ALT  25  /  AlkPhos  130<H>  05-09    Liver panel trend:  TBili 0.2   /   AST 28   /   ALT 25   /   AlkP 130   /   Tptn 7.9   /   Alb 5.0    /   DBili --      05-09      PT/INR - ( 10 May 2023 07:17 )   PT: 11.20 sec;   INR: 0.98 ratio         PTT - ( 10 May 2023 07:17 )  PTT:37.1 sec        Radiology:  CT Abdomen and Pelvis w/ Oral Cont and w/ IV Cont:   ACC: [id number] EXAM:  CT ABDOMEN AND PELVIS OC IC   ORDERED BY: DIXON CASTRO     PROCEDURE DATE:  05/09/2023          INTERPRETATION:  CLINICAL STATEMENT: Abdominal pain, history of bowel   obstruction    TECHNIQUE: Contiguous axial CT imageswere obtained from the lower chest   to the pubic symphysis following administration of intravenous contrast.    Oral contrast was administered.  Reformatted images in the coronal and   sagittal planes were acquired.    COMPARISON CT: CT abdomen pelvis 2/2/2023    FINDINGS:    LOWER CHEST: Bibasilar subsegmental atelectasis.    HEPATOBILIARY: Post cholecystectomy.    SPLEEN: Unremarkable.    PANCREAS: Unremarkable.    ADRENAL GLANDS: Unremarkable.    KIDNEYS: Symmetrically enhancing. No hydronephrosis.    ABDOMINOPELVIC NODES: No lymphadenopathy.    PELVIC ORGANS: Underdistended urinary bladder limiting evaluation. Post   hysterectomy.    PERITONEUM/MESENTERY/BOWEL: Apparent mural thickening of colon, most   pronounced at rectosigmoid, appearance may be due to level of distention   or represent colitis. No evidence of bowel obstruction free air or fluid   collection.    BONES/SOFT TISSUES: Degenerative changes. Redemonstrated post surgical   subcutaneous changes of the anterior abdominal wall.    OTHER: Vascular calcifications. Circumaortic left renal vein.      IMPRESSION:    Apparent mural thickening of colon, most pronounced at rectosigmoid,   appearance may be due to level of distention or represent colitis.   Correlate clinically.    --- End of Report ---          RAMONE MATTHEWS MD; Resident Radiologist  This document has been electronically signed.  DAVID CHIU MD; Attending Radiologist  This document has been electronically signed. May  9 2023  5:33PM (05-09-23 @ 15:34)

## 2023-05-10 NOTE — PROGRESS NOTE ADULT - SUBJECTIVE AND OBJECTIVE BOX
Patient is a 57y old  Female who presents with a chief complaint of     INTERVAL HPI/OVERNIGHT EVENTS:  None    FAMILY HISTORY:  Coronary artery disease (Father)  dad cad @78      T(C): 36.3 (05-10-23 @ 07:55), Max: 36.6 (05-09-23 @ 10:48)  HR: 55 (05-10-23 @ 07:55) (49 - 74)  BP: 104/55 (05-10-23 @ 07:55) (104/55 - 169/70)  RR: 18 (05-10-23 @ 07:55) (16 - 18)  SpO2: 92% (05-10-23 @ 07:55) (92% - 99%)  Wt(kg): --Vital Signs Last 24 Hrs  T(C): 36.3 (10 May 2023 07:55), Max: 36.6 (09 May 2023 10:48)  T(F): 97.3 (10 May 2023 07:55), Max: 97.8 (09 May 2023 10:48)  HR: 55 (10 May 2023 07:55) (49 - 74)  BP: 104/55 (10 May 2023 07:55) (104/55 - 169/70)  BP(mean): --  RR: 18 (10 May 2023 07:55) (16 - 18)  SpO2: 92% (10 May 2023 07:55) (92% - 99%)    Parameters below as of 10 May 2023 07:55  Patient On (Oxygen Delivery Method): room air        PHYSICAL EXAM:  GENERAL: NAD, well-groomed, well-developed  HEAD:  Atraumatic, Normocephalic  EYES: EOMI, PERRLA, conjunctiva and sclera clear  ENMT: No tonsillar erythema, exudates, or enlargement; Moist mucous membranes, Good dentition, No lesions  NECK: Supple, No JVD, Normal thyroid  NERVOUS SYSTEM:  Alert & Oriented X3, Good concentration; Motor Strength 5/5 B/L upper and lower extremities; DTRs 2+ intact and symmetric  CHEST/LUNG: Clear to percussion bilaterally; No rales, rhonchi, wheezing, or rubs  HEART: Regular rate and rhythm; No murmurs, rubs, or gallops  ABDOMEN: Tender to palpation in all 4 quadrants  EXTREMITIES:  2+ Peripheral Pulses, No clubbing, cyanosis, or edema  LYMPH: No lymphadenopathy noted  SKIN: No rashes or lesions      acetaminophen     Tablet .. 650 milliGRAM(s) Oral every 6 hours PRN  aluminum hydroxide/magnesium hydroxide/simethicone Suspension 30 milliLiter(s) Oral every 4 hours PRN  atorvastatin 80 milliGRAM(s) Oral at bedtime  busPIRone 10 milliGRAM(s) Oral three times a day  cefTRIAXone   IVPB 1000 milliGRAM(s) IV Intermittent every 24 hours  clonazePAM  Tablet 2 milliGRAM(s) Oral three times a day  famotidine    Tablet 20 milliGRAM(s) Oral two times a day  hydrOXYzine hydrochloride 25 milliGRAM(s) Oral at bedtime  lactated ringers. 1000 milliLiter(s) IV Continuous <Continuous>  levETIRAcetam 250 milliGRAM(s) Oral two times a day  melatonin 3 milliGRAM(s) Oral at bedtime PRN  metroNIDAZOLE    Tablet 500 milliGRAM(s) Oral every 8 hours  nicotine -  14 mG/24Hr(s) Patch 1 Patch Transdermal daily  ondansetron Injectable 4 milliGRAM(s) IV Push every 8 hours PRN  pantoprazole    Tablet 40 milliGRAM(s) Oral before breakfast  tiotropium 2.5 MICROgram(s) Inhaler 2 Puff(s) Inhalation daily

## 2023-05-10 NOTE — PROGRESS NOTE ADULT - ASSESSMENT
58 y/o F with a PMH of HTN, non obstructive CAD, COPD, HLD, anxiety, seizure d/o, migraine, GERD, and multiple abdominal surgeries including appendectomy/resection of appendiceal stump, cholecystectomy, exploratory laparotomy with lysis of adhesions,  hysterectomy unilateral oophorectomy, Hiatal hernia present to the ED with bloody bowel movements and abdominal pain. History goes back to around 1 week ago when patient started complaining of severe abdominal pain, diffuse, dull in nature, constant, 8/10 in severity, non radiating. few days later patient started complaining of bloody diarrhea, started at 1-2 episode per day and progressively worsened until it became associated with oral intake and around 5 times per day. She saw her GI (Dr Leal) on Friday 05/05 who recommended ED visit.      # Suspected acute colitis in rectosigmoid area  #Hematochezia   - Multiple prior  abdominal surgeries  - abdominal pain and bloody diarrhea  -  CTAP -> Apparent mural thickening of colon, most pronounced at rectosigmoid, appearance may be due to level of distention or represent colitis.   - c/w Rocephin Flagyl   - GI PCR pending and C Diff was negative  - GI eval (follows with Dr Leal)-- Colonoscopy (March, 2023):  internal and external hemorrhoids otherwise unremarkable, hyperplastic polyp  - GABRIELLE -ve   - not on any AC        # HTN-- BP low-- hold BP meds  -     #non obstructive CAD  - follows with Dr Moreno  -  metoprolol on hold    #HLD  - continue with atorvastatin    #Anxiety  - continue with clonazepam    #COPD  - Duonebs PRN  - On Incruse at home - Tiotropium while admitted    #Seizure disorder  - last seizure 15 years ago  - continue with keppra    #GERD  - PPI and pepcid    #Smoking Cessation   - counseled patient on cessation, offered nicotine patch    DVT PPX- holding due to GIB     Dc planning AM

## 2023-05-11 ENCOUNTER — TRANSCRIPTION ENCOUNTER (OUTPATIENT)
Age: 58
End: 2023-05-11

## 2023-05-11 VITALS — SYSTOLIC BLOOD PRESSURE: 105 MMHG | DIASTOLIC BLOOD PRESSURE: 60 MMHG

## 2023-05-11 LAB
ALBUMIN SERPL ELPH-MCNC: 4.2 G/DL — SIGNIFICANT CHANGE UP (ref 3.5–5.2)
ALP SERPL-CCNC: 108 U/L — SIGNIFICANT CHANGE UP (ref 30–115)
ALT FLD-CCNC: 16 U/L — SIGNIFICANT CHANGE UP (ref 0–41)
ANION GAP SERPL CALC-SCNC: 10 MMOL/L — SIGNIFICANT CHANGE UP (ref 7–14)
AST SERPL-CCNC: 23 U/L — SIGNIFICANT CHANGE UP (ref 0–41)
BASOPHILS # BLD AUTO: 0.04 K/UL — SIGNIFICANT CHANGE UP (ref 0–0.2)
BASOPHILS NFR BLD AUTO: 0.8 % — SIGNIFICANT CHANGE UP (ref 0–1)
BILIRUB SERPL-MCNC: <0.2 MG/DL — SIGNIFICANT CHANGE UP (ref 0.2–1.2)
BUN SERPL-MCNC: 8 MG/DL — LOW (ref 10–20)
CALCIUM SERPL-MCNC: 9.3 MG/DL — SIGNIFICANT CHANGE UP (ref 8.4–10.5)
CHLORIDE SERPL-SCNC: 107 MMOL/L — SIGNIFICANT CHANGE UP (ref 98–110)
CO2 SERPL-SCNC: 29 MMOL/L — SIGNIFICANT CHANGE UP (ref 17–32)
CREAT SERPL-MCNC: 0.8 MG/DL — SIGNIFICANT CHANGE UP (ref 0.7–1.5)
CULTURE RESULTS: SIGNIFICANT CHANGE UP
EGFR: 85 ML/MIN/1.73M2 — SIGNIFICANT CHANGE UP
EOSINOPHIL # BLD AUTO: 0.05 K/UL — SIGNIFICANT CHANGE UP (ref 0–0.7)
EOSINOPHIL NFR BLD AUTO: 0.9 % — SIGNIFICANT CHANGE UP (ref 0–8)
GLUCOSE SERPL-MCNC: 75 MG/DL — SIGNIFICANT CHANGE UP (ref 70–99)
HCT VFR BLD CALC: 37.9 % — SIGNIFICANT CHANGE UP (ref 37–47)
HGB BLD-MCNC: 12.9 G/DL — SIGNIFICANT CHANGE UP (ref 12–16)
IMM GRANULOCYTES NFR BLD AUTO: 0.2 % — SIGNIFICANT CHANGE UP (ref 0.1–0.3)
LYMPHOCYTES # BLD AUTO: 1.97 K/UL — SIGNIFICANT CHANGE UP (ref 1.2–3.4)
LYMPHOCYTES # BLD AUTO: 37 % — SIGNIFICANT CHANGE UP (ref 20.5–51.1)
MAGNESIUM SERPL-MCNC: 1.9 MG/DL — SIGNIFICANT CHANGE UP (ref 1.8–2.4)
MCHC RBC-ENTMCNC: 32.2 PG — HIGH (ref 27–31)
MCHC RBC-ENTMCNC: 34 G/DL — SIGNIFICANT CHANGE UP (ref 32–37)
MCV RBC AUTO: 94.5 FL — SIGNIFICANT CHANGE UP (ref 81–99)
MONOCYTES # BLD AUTO: 0.53 K/UL — SIGNIFICANT CHANGE UP (ref 0.1–0.6)
MONOCYTES NFR BLD AUTO: 9.9 % — HIGH (ref 1.7–9.3)
NEUTROPHILS # BLD AUTO: 2.73 K/UL — SIGNIFICANT CHANGE UP (ref 1.4–6.5)
NEUTROPHILS NFR BLD AUTO: 51.2 % — SIGNIFICANT CHANGE UP (ref 42.2–75.2)
NRBC # BLD: 0 /100 WBCS — SIGNIFICANT CHANGE UP (ref 0–0)
PLATELET # BLD AUTO: 221 K/UL — SIGNIFICANT CHANGE UP (ref 130–400)
PMV BLD: 10.2 FL — SIGNIFICANT CHANGE UP (ref 7.4–10.4)
POTASSIUM SERPL-MCNC: 4.4 MMOL/L — SIGNIFICANT CHANGE UP (ref 3.5–5)
POTASSIUM SERPL-SCNC: 4.4 MMOL/L — SIGNIFICANT CHANGE UP (ref 3.5–5)
PROT SERPL-MCNC: 6.6 G/DL — SIGNIFICANT CHANGE UP (ref 6–8)
RBC # BLD: 4.01 M/UL — LOW (ref 4.2–5.4)
RBC # FLD: 12.5 % — SIGNIFICANT CHANGE UP (ref 11.5–14.5)
SODIUM SERPL-SCNC: 146 MMOL/L — SIGNIFICANT CHANGE UP (ref 135–146)
SPECIMEN SOURCE: SIGNIFICANT CHANGE UP
WBC # BLD: 5.33 K/UL — SIGNIFICANT CHANGE UP (ref 4.8–10.8)
WBC # FLD AUTO: 5.33 K/UL — SIGNIFICANT CHANGE UP (ref 4.8–10.8)

## 2023-05-11 PROCEDURE — 99239 HOSP IP/OBS DSCHRG MGMT >30: CPT

## 2023-05-11 RX ORDER — NALOXONE HYDROCHLORIDE 4 MG/.1ML
4 SPRAY NASAL
Qty: 2 | Refills: 0
Start: 2023-05-11

## 2023-05-11 RX ORDER — MELOXICAM 15 MG/1
1 TABLET ORAL
Qty: 0 | Refills: 0 | DISCHARGE

## 2023-05-11 RX ORDER — METRONIDAZOLE 500 MG
1 TABLET ORAL
Qty: 15 | Refills: 0
Start: 2023-05-11 | End: 2023-05-15

## 2023-05-11 RX ORDER — OXYCODONE HYDROCHLORIDE 5 MG/1
10 TABLET ORAL ONCE
Refills: 0 | Status: DISCONTINUED | OUTPATIENT
Start: 2023-05-11 | End: 2023-05-11

## 2023-05-11 RX ORDER — NIFEDIPINE 30 MG
1 TABLET, EXTENDED RELEASE 24 HR ORAL
Qty: 0 | Refills: 0 | DISCHARGE

## 2023-05-11 RX ORDER — OXYCODONE HYDROCHLORIDE 5 MG/1
1 TABLET ORAL
Qty: 6 | Refills: 0
Start: 2023-05-11

## 2023-05-11 RX ADMIN — OXYCODONE HYDROCHLORIDE 10 MILLIGRAM(S): 5 TABLET ORAL at 12:35

## 2023-05-11 RX ADMIN — Medication 30 MILLILITER(S): at 02:34

## 2023-05-11 RX ADMIN — Medication 500 MILLIGRAM(S): at 13:06

## 2023-05-11 RX ADMIN — Medication 500 MILLIGRAM(S): at 05:46

## 2023-05-11 RX ADMIN — Medication 2 MILLIGRAM(S): at 05:43

## 2023-05-11 RX ADMIN — MORPHINE SULFATE 4 MILLIGRAM(S): 50 CAPSULE, EXTENDED RELEASE ORAL at 08:07

## 2023-05-11 RX ADMIN — Medication 1 PATCH: at 13:30

## 2023-05-11 RX ADMIN — LEVETIRACETAM 250 MILLIGRAM(S): 250 TABLET, FILM COATED ORAL at 05:46

## 2023-05-11 RX ADMIN — Medication 10 MILLIGRAM(S): at 13:05

## 2023-05-11 RX ADMIN — MORPHINE SULFATE 4 MILLIGRAM(S): 50 CAPSULE, EXTENDED RELEASE ORAL at 08:35

## 2023-05-11 RX ADMIN — Medication 1 PATCH: at 07:58

## 2023-05-11 RX ADMIN — ONDANSETRON 4 MILLIGRAM(S): 8 TABLET, FILM COATED ORAL at 03:37

## 2023-05-11 RX ADMIN — PANTOPRAZOLE SODIUM 40 MILLIGRAM(S): 20 TABLET, DELAYED RELEASE ORAL at 05:46

## 2023-05-11 RX ADMIN — MORPHINE SULFATE 4 MILLIGRAM(S): 50 CAPSULE, EXTENDED RELEASE ORAL at 03:37

## 2023-05-11 RX ADMIN — FAMOTIDINE 20 MILLIGRAM(S): 10 INJECTION INTRAVENOUS at 05:45

## 2023-05-11 RX ADMIN — Medication 2 MILLIGRAM(S): at 13:06

## 2023-05-11 RX ADMIN — Medication 1 PATCH: at 11:35

## 2023-05-11 RX ADMIN — Medication 10 MILLIGRAM(S): at 05:46

## 2023-05-11 RX ADMIN — MORPHINE SULFATE 4 MILLIGRAM(S): 50 CAPSULE, EXTENDED RELEASE ORAL at 03:55

## 2023-05-11 NOTE — DISCHARGE NOTE PROVIDER - NSDCMRMEDTOKEN_GEN_ALL_CORE_FT
atorvastatin 80 mg oral tablet: 1 tab(s) orally once a day  busPIRone 10 mg oral tablet: orally 3 times a day  Fioricet oral tablet: 1 tab(s) orally 3 times a day, As Needed  hydrOXYzine hydrochloride 25 mg oral tablet: 1 tab(s) orally once a day  Incruse Ellipta 62.5 mcg/inh inhalation powder:   KlonoPIN 2 mg oral tablet: 1 tab(s) orally 3 times a day  levETIRAcetam 250 mg oral tablet: 1 tab(s) orally 2 times a day  Linzess 290 mcg oral capsule: 1 cap(s) orally once a day  meloxicam 15 mg oral tablet: 1 tab(s) orally once a day  METOPROLOL SUCCINATE ER 50MG ER TAB: tab(s) orally once a day  metroNIDAZOLE 500 mg oral tablet: 1 tab(s) orally every 8 hours  Myrbetriq 50 mg oral tablet, extended release: 1 tab(s) orally once a day  NIFEdipine 60 mg oral tablet, extended release: 1 tab(s) orally 2 times a day  ondansetron 4 mg oral tablet: 1 tab(s) orally 4 times a day as needed for  nausea  Pepcid 40 mg oral tablet: orally 2 times a day  polyethylene glycol 3350 oral powder for reconstitution: 17 gram(s) orally 3 times a day   Protonix 40 mg oral delayed release tablet: 1 tab(s) orally 2 times a day

## 2023-05-11 NOTE — DISCHARGE NOTE NURSING/CASE MANAGEMENT/SOCIAL WORK - PATIENT PORTAL LINK FT
You can access the FollowMyHealth Patient Portal offered by [facility] by registering at the following website: [email or web address]. By joining [employer]’s FollowMyHealth portal, you will also be able to view your health information using other applications (apps) compatible with our system.

## 2023-05-11 NOTE — DISCHARGE NOTE PROVIDER - HOSPITAL COURSE
56 y/o F with a PMH of HTN, non obstructive CAD, COPD, HLD, anxiety, seizure d/o, migraine, GERD, and multiple abdominal surgeries including appendectomy/resection of appendiceal stump, cholecystectomy, exploratory laparotomy with lysis of adhesions,  hysterectomy unilateral oophorectomy, Hiatal hernia present to the ED with bloody bowel movements and abdominal pain. history goes back to around 1 week ago when patient started complaining of severe abdominal pain, diffuse, dull in nature, constant, 8/10 in severity, non radiating. few days later patient started complaining of bloody diarrhea, started at 1-2 episode per day and progressively worsened until it became associated with oral intake and around 5 times per day. she saw her GI (Dr Leal) on Friday 05/05 who recommended ED visit. patient waited to see if she improves and when she didnt she presetned to the ED. she reported nausea and an episode of non bloody vomiting on 05/07 but denied fever, chills, chest pain, SOB, cough or dysuria      VITALS:   T(C): 36.5 (05-09-23 @ 12:51), Max: 36.6 (05-09-23 @ 10:48)  HR: 74 (05-09-23 @ 12:51) (74 - 74)  BP: 150/67 (05-09-23 @ 12:51) (150/67 - 163/67)  RR: 16 (05-09-23 @ 12:51) (16 - 18)  SpO2: 96% (05-09-23 @ 12:51) (96% - 99%)    in ED, vitals were significant for HTN (163/37). labs were WNLs. CTAP was significant for sigmoiditis. patient received 1L LR bolus/flagyl and admitted to medicine for further treatment    Patient was started on antibiotics and improved.     Patient was evaluated and treated for the following conditions:     #acute colitis possibly 2/2 UC  #bloody diarrhea   - Multiple prior surgeries  - no fever or leukocytosis  - abdominal pain and bloody diarrhea  - GABRIELLE in ED -> slight blood  - CTAP -> Apparent mural thickening of colon, most pronounced at rectosigmoid, appearance may be due to level of distention or represent colitis. Correlate clinically.  - s/p flagyl in ED  - c/w cipro and flagyl OP  - keep active type and screen  - GI eval (follows with Dr Leal)    # HTN  - continue with metoprolol    #non obstructive CAD  - follows with Dr Moreno  - c/w metoprolol    #HLD  - continue with atorvastatin    #Anxiety  - continue with clonazepam    #COPD  - Duonebs PRN  - On Incruse at home - Tiotropium while admitted    #Seizure disorder  - last seizure 15 years ago  - continue with keppra    #GERD  - PPI and pepcid    #MISC  DVT PPX not indicated  GI PPX: Protonix  DIET clear liquids and advance as tolerated  CODE FULL

## 2023-05-11 NOTE — PROGRESS NOTE ADULT - ASSESSMENT
58 y/o F with a PMH of HTN, non obstructive CAD, COPD, HLD, anxiety, seizure d/o, migraine, GERD, and multiple abdominal surgeries including appendectomy/resection of appendiceal stump, cholecystectomy, exploratory laparotomy with lysis of adhesions,  hysterectomy unilateral oophorectomy, Hiatal hernia present to the ED with bloody bowel movements and abdominal pain. History goes back to around 1 week ago when patient started complaining of severe abdominal pain, diffuse, dull in nature, constant, 8/10 in severity, non radiating. few days later patient started complaining of bloody diarrhea, started at 1-2 episode per day and progressively worsened until it became associated with oral intake and around 5 times per day. She saw her GI (Dr Leal) on Friday 05/05 who recommended ED visit.      # Suspected acute colitis in rectosigmoid area  #Hematochezia   - Multiple prior  abdominal surgeries  - abdominal pain and bloody diarrhea  -  CTAP -> Apparent mural thickening of colon, most pronounced at rectosigmoid, appearance may be due to level of distention or represent colitis.   - c/w Rocephin Flagyl   - GI PCR pending and C Diff was negative  - GI eval (follows with Dr Leal)-- Colonoscopy (March, 2023):  internal and external hemorrhoids otherwise unremarkable, hyperplastic polyp  - GABRIELLE -ve   - not on any AC        # HTN-- BP low-- hold BP meds   she took too much morphine --hence BP is low  -     #non obstructive CAD  - follows with Dr Moreno  -  metoprolol on hold    #HLD  - continue with atorvastatin    #Anxiety  - continue with clonazepam    #COPD  - Duonebs PRN  - On Incruse at home - Tiotropium while admitted    #Seizure disorder  - last seizure 15 years ago  - continue with keppra    #GERD  - PPI and pepcid    #Smoking Cessation   - counseled patient on cessation, offered nicotine patch    Dc plan today--spent more than 30mins

## 2023-05-11 NOTE — DISCHARGE NOTE PROVIDER - CARE PROVIDER_API CALL
Luis Leal)  Gastroenterology; Internal Medicine  [address] [place] [address]  Phone: [phone]  Fax: [phone]  Follow Up Time: 2 weeks

## 2023-05-11 NOTE — DISCHARGE NOTE NURSING/CASE MANAGEMENT/SOCIAL WORK - NSDCPEFALRISK_GEN_ALL_CORE
For information on Fall & Injury Prevention, visit: [email or web address] OR  [email or web address] OR  https://www.cdc.gov/steadi/patient.html

## 2023-05-11 NOTE — PROGRESS NOTE ADULT - SUBJECTIVE AND OBJECTIVE BOX
SUBJECTIVE:    Patient is a 57y old Female who presents with a chief complaint of   Currently admitted to medicine with the primary diagnosis of Colitis       Today is hospital day 2d.     PAST MEDICAL & SURGICAL HISTORY  Back pain  henriated discs    Seizures  last one 6 yrs ago    Migraines    Hypercholesteremia    Seasonal allergic rhinitis, unspecified trigger    GERD (gastroesophageal reflux disease)    Hiatal hernia    Anxiety    HTN (hypertension)    Osteoarthritis    Sciatica    Tobacco use disorder    History of surgery  4 rght knee sx  1 left knee sx  cholecystectomy  rotator cuff right  abdominal sx-- adhesions  c section  ovarian cystectomy  breast cystectomies  colonoscopy   endoscopies  parital hysterectomy    S/p partial hysterectomy with remaining cervical stump    H/O rotator cuff surgery  right    History of appendectomy    S/P cholecystectomy      ALLERGIES:  Toradol (Rash; Urticaria; Anaphylaxis (Mild to Mod))  Cipro (Hives)  penicillin (Hives)  Neurontin (Unknown)  Toradol (Hives)    MEDICATIONS:  STANDING MEDICATIONS  atorvastatin 80 milliGRAM(s) Oral at bedtime  busPIRone 10 milliGRAM(s) Oral three times a day  cefTRIAXone   IVPB 1000 milliGRAM(s) IV Intermittent every 24 hours  clonazePAM  Tablet 2 milliGRAM(s) Oral three times a day  famotidine    Tablet 20 milliGRAM(s) Oral two times a day  hydrOXYzine hydrochloride 25 milliGRAM(s) Oral at bedtime  lactated ringers. 1000 milliLiter(s) IV Continuous <Continuous>  levETIRAcetam 250 milliGRAM(s) Oral two times a day  metroNIDAZOLE    Tablet 500 milliGRAM(s) Oral every 8 hours  nicotine -  14 mG/24Hr(s) Patch 1 Patch Transdermal daily  pantoprazole    Tablet 40 milliGRAM(s) Oral before breakfast  tiotropium 2.5 MICROgram(s) Inhaler 2 Puff(s) Inhalation daily    PRN MEDICATIONS  acetaminophen     Tablet .. 650 milliGRAM(s) Oral every 6 hours PRN  aluminum hydroxide/magnesium hydroxide/simethicone Suspension 30 milliLiter(s) Oral every 4 hours PRN  melatonin 3 milliGRAM(s) Oral at bedtime PRN  ondansetron Injectable 4 milliGRAM(s) IV Push every 8 hours PRN    VITALS:   T(F): 97.6  HR: 60  BP: 105/60  RR: 18  SpO2: 91%    LABS:                        12.9   5.33  )-----------( 221      ( 11 May 2023 06:18 )             37.9         146  |  107  |  8<L>  ----------------------------<  75  4.4   |  29  |  0.8    Ca    9.3      11 May 2023 06:18  Mg     1.9         TPro  6.6  /  Alb  4.2  /  TBili  <0.2  /  DBili  x   /  AST  23  /  ALT  16  /  AlkPhos  108      PT/INR - ( 10 May 2023 07:17 )   PT: 11.20 sec;   INR: 0.98 ratio         PTT - ( 10 May 2023 07:17 )  PTT:37.1 sec  Urinalysis Basic - ( 09 May 2023 15:00 )    Color: Yellow / Appearance: Clear / S.014 / pH: x  Gluc: x / Ketone: Negative  / Bili: Negative / Urobili: <2 mg/dL   Blood: x / Protein: Negative / Nitrite: Negative   Leuk Esterase: Negative / RBC: x / WBC x   Sq Epi: x / Non Sq Epi: x / Bacteria: x            Culture - Urine (collected 09 May 2023 15:00)  Source: Clean Catch Clean Catch (Midstream)  Final Report (11 May 2023 00:13):    <10,000 CFU/mL Normal Urogenital Agustina          RADIOLOGY:    PHYSICAL EXAM:  GEN: No acute distress  LUNGS: Clear to auscultation bilaterally   HEART: S1/S2 present. RRR.   ABD/ GI: Soft, non-tender, non-distended. Bowel sounds present  EXT: NC/NC/NE/2+PP/WARREN  NEURO: AAOX3

## 2023-05-11 NOTE — DISCHARGE NOTE PROVIDER - NSDCCPCAREPLAN_GEN_ALL_CORE_FT
PRINCIPAL DISCHARGE DIAGNOSIS  Diagnosis: Colitis  Assessment and Plan of Treatment: You had rectosigmoid colitis. You were treated with antibiotics and improved. Continue antibiotics for another 5 days.      SECONDARY DISCHARGE DIAGNOSES  Diagnosis: Abdominal pain  Assessment and Plan of Treatment:     Diagnosis: Diarrhea  Assessment and Plan of Treatment:

## 2023-05-11 NOTE — DISCHARGE NOTE PROVIDER - NSDCFUSCHEDAPPT_GEN_ALL_CORE_FT
Nelson Barrow  [facility]  [facility] [address]  Scheduled Appointment: 05/24/2023

## 2023-05-19 LAB — LACTOFERRIN STL-MCNC: <1 CD:794062635 — SIGNIFICANT CHANGE UP (ref 0–7.24)

## 2023-05-24 ENCOUNTER — APPOINTMENT (OUTPATIENT)
Dept: SURGERY | Facility: CLINIC | Age: 58
End: 2023-05-24
Payer: MEDICARE

## 2023-05-24 VITALS
DIASTOLIC BLOOD PRESSURE: 62 MMHG | WEIGHT: 128 LBS | HEART RATE: 71 BPM | HEIGHT: 62 IN | SYSTOLIC BLOOD PRESSURE: 90 MMHG | OXYGEN SATURATION: 98 % | TEMPERATURE: 97.3 F | BODY MASS INDEX: 23.55 KG/M2

## 2023-05-24 DIAGNOSIS — Z88.0 ALLERGY STATUS TO PENICILLIN: ICD-10-CM

## 2023-05-24 DIAGNOSIS — I25.10 ATHEROSCLEROTIC HEART DISEASE OF NATIVE CORONARY ARTERY WITHOUT ANGINA PECTORIS: ICD-10-CM

## 2023-05-24 DIAGNOSIS — Z88.1 ALLERGY STATUS TO OTHER ANTIBIOTIC AGENTS STATUS: ICD-10-CM

## 2023-05-24 DIAGNOSIS — G40.909 EPILEPSY, UNSPECIFIED, NOT INTRACTABLE, WITHOUT STATUS EPILEPTICUS: ICD-10-CM

## 2023-05-24 DIAGNOSIS — F41.9 ANXIETY DISORDER, UNSPECIFIED: ICD-10-CM

## 2023-05-24 DIAGNOSIS — I10 ESSENTIAL (PRIMARY) HYPERTENSION: ICD-10-CM

## 2023-05-24 DIAGNOSIS — J44.9 CHRONIC OBSTRUCTIVE PULMONARY DISEASE, UNSPECIFIED: ICD-10-CM

## 2023-05-24 DIAGNOSIS — Z88.8 ALLERGY STATUS TO OTHER DRUGS, MEDICAMENTS AND BIOLOGICAL SUBSTANCES: ICD-10-CM

## 2023-05-24 DIAGNOSIS — K51.311 ULCERATIVE (CHRONIC) RECTOSIGMOIDITIS WITH RECTAL BLEEDING: ICD-10-CM

## 2023-05-24 DIAGNOSIS — R12 HEARTBURN: ICD-10-CM

## 2023-05-24 DIAGNOSIS — F17.210 NICOTINE DEPENDENCE, CIGARETTES, UNCOMPLICATED: ICD-10-CM

## 2023-05-24 DIAGNOSIS — E78.00 PURE HYPERCHOLESTEROLEMIA, UNSPECIFIED: ICD-10-CM

## 2023-05-24 DIAGNOSIS — M19.90 UNSPECIFIED OSTEOARTHRITIS, UNSPECIFIED SITE: ICD-10-CM

## 2023-05-24 DIAGNOSIS — Z88.6 ALLERGY STATUS TO ANALGESIC AGENT: ICD-10-CM

## 2023-05-24 DIAGNOSIS — K21.9 GASTRO-ESOPHAGEAL REFLUX DISEASE WITHOUT ESOPHAGITIS: ICD-10-CM

## 2023-05-24 PROCEDURE — 99214 OFFICE O/P EST MOD 30 MIN: CPT

## 2023-06-07 ENCOUNTER — INPATIENT (INPATIENT)
Facility: HOSPITAL | Age: 58
LOS: 5 days | Discharge: HOME | DRG: 392 | End: 2023-06-13
Attending: HOSPITALIST | Admitting: HOSPITALIST
Payer: MEDICARE

## 2023-06-07 VITALS
OXYGEN SATURATION: 97 % | HEART RATE: 83 BPM | TEMPERATURE: 98 F | DIASTOLIC BLOOD PRESSURE: 66 MMHG | HEIGHT: 62 IN | SYSTOLIC BLOOD PRESSURE: 144 MMHG | WEIGHT: 125 LBS | RESPIRATION RATE: 18 BRPM

## 2023-06-07 DIAGNOSIS — Z90.49 ACQUIRED ABSENCE OF OTHER SPECIFIED PARTS OF DIGESTIVE TRACT: Chronic | ICD-10-CM

## 2023-06-07 DIAGNOSIS — Z98.890 OTHER SPECIFIED POSTPROCEDURAL STATES: Chronic | ICD-10-CM

## 2023-06-07 DIAGNOSIS — K52.9 NONINFECTIVE GASTROENTERITIS AND COLITIS, UNSPECIFIED: ICD-10-CM

## 2023-06-07 DIAGNOSIS — Z90.711 ACQUIRED ABSENCE OF UTERUS WITH REMAINING CERVICAL STUMP: Chronic | ICD-10-CM

## 2023-06-07 LAB
ALBUMIN SERPL ELPH-MCNC: 4.6 G/DL — SIGNIFICANT CHANGE UP (ref 3.5–5.2)
ALP SERPL-CCNC: 114 U/L — SIGNIFICANT CHANGE UP (ref 30–115)
ALT FLD-CCNC: 34 U/L — SIGNIFICANT CHANGE UP (ref 0–41)
ANION GAP SERPL CALC-SCNC: 13 MMOL/L — SIGNIFICANT CHANGE UP (ref 7–14)
AST SERPL-CCNC: 26 U/L — SIGNIFICANT CHANGE UP (ref 0–41)
BASOPHILS # BLD AUTO: 0.03 K/UL — SIGNIFICANT CHANGE UP (ref 0–0.2)
BASOPHILS NFR BLD AUTO: 0.5 % — SIGNIFICANT CHANGE UP (ref 0–1)
BILIRUB SERPL-MCNC: <0.2 MG/DL — SIGNIFICANT CHANGE UP (ref 0.2–1.2)
BUN SERPL-MCNC: 12 MG/DL — SIGNIFICANT CHANGE UP (ref 10–20)
CALCIUM SERPL-MCNC: 9.8 MG/DL — SIGNIFICANT CHANGE UP (ref 8.4–10.5)
CHLORIDE SERPL-SCNC: 106 MMOL/L — SIGNIFICANT CHANGE UP (ref 98–110)
CO2 SERPL-SCNC: 23 MMOL/L — SIGNIFICANT CHANGE UP (ref 17–32)
CREAT SERPL-MCNC: 0.7 MG/DL — SIGNIFICANT CHANGE UP (ref 0.7–1.5)
EGFR: 100 ML/MIN/1.73M2 — SIGNIFICANT CHANGE UP
EOSINOPHIL # BLD AUTO: 0.03 K/UL — SIGNIFICANT CHANGE UP (ref 0–0.7)
EOSINOPHIL NFR BLD AUTO: 0.5 % — SIGNIFICANT CHANGE UP (ref 0–8)
GLUCOSE SERPL-MCNC: 108 MG/DL — HIGH (ref 70–99)
HCG SERPL QL: NEGATIVE — SIGNIFICANT CHANGE UP
HCT VFR BLD CALC: 38.5 % — SIGNIFICANT CHANGE UP (ref 37–47)
HGB BLD-MCNC: 13.6 G/DL — SIGNIFICANT CHANGE UP (ref 12–16)
IMM GRANULOCYTES NFR BLD AUTO: 0.2 % — SIGNIFICANT CHANGE UP (ref 0.1–0.3)
LACTATE SERPL-SCNC: 1.1 MMOL/L — SIGNIFICANT CHANGE UP (ref 0.7–2)
LIDOCAIN IGE QN: 22 U/L — SIGNIFICANT CHANGE UP (ref 7–60)
LYMPHOCYTES # BLD AUTO: 1.97 K/UL — SIGNIFICANT CHANGE UP (ref 1.2–3.4)
LYMPHOCYTES # BLD AUTO: 33.1 % — SIGNIFICANT CHANGE UP (ref 20.5–51.1)
MCHC RBC-ENTMCNC: 31.9 PG — HIGH (ref 27–31)
MCHC RBC-ENTMCNC: 35.3 G/DL — SIGNIFICANT CHANGE UP (ref 32–37)
MCV RBC AUTO: 90.4 FL — SIGNIFICANT CHANGE UP (ref 81–99)
MONOCYTES # BLD AUTO: 0.39 K/UL — SIGNIFICANT CHANGE UP (ref 0.1–0.6)
MONOCYTES NFR BLD AUTO: 6.5 % — SIGNIFICANT CHANGE UP (ref 1.7–9.3)
NEUTROPHILS # BLD AUTO: 3.53 K/UL — SIGNIFICANT CHANGE UP (ref 1.4–6.5)
NEUTROPHILS NFR BLD AUTO: 59.2 % — SIGNIFICANT CHANGE UP (ref 42.2–75.2)
NRBC # BLD: 0 /100 WBCS — SIGNIFICANT CHANGE UP (ref 0–0)
PLATELET # BLD AUTO: 216 K/UL — SIGNIFICANT CHANGE UP (ref 130–400)
PMV BLD: 9.8 FL — SIGNIFICANT CHANGE UP (ref 7.4–10.4)
POTASSIUM SERPL-MCNC: 3.5 MMOL/L — SIGNIFICANT CHANGE UP (ref 3.5–5)
POTASSIUM SERPL-SCNC: 3.5 MMOL/L — SIGNIFICANT CHANGE UP (ref 3.5–5)
PROT SERPL-MCNC: 7.2 G/DL — SIGNIFICANT CHANGE UP (ref 6–8)
RBC # BLD: 4.26 M/UL — SIGNIFICANT CHANGE UP (ref 4.2–5.4)
RBC # FLD: 12.9 % — SIGNIFICANT CHANGE UP (ref 11.5–14.5)
SODIUM SERPL-SCNC: 142 MMOL/L — SIGNIFICANT CHANGE UP (ref 135–146)
WBC # BLD: 5.96 K/UL — SIGNIFICANT CHANGE UP (ref 4.8–10.8)
WBC # FLD AUTO: 5.96 K/UL — SIGNIFICANT CHANGE UP (ref 4.8–10.8)

## 2023-06-07 PROCEDURE — 83735 ASSAY OF MAGNESIUM: CPT

## 2023-06-07 PROCEDURE — 74177 CT ABD & PELVIS W/CONTRAST: CPT | Mod: 26,MA

## 2023-06-07 PROCEDURE — 99285 EMERGENCY DEPT VISIT HI MDM: CPT

## 2023-06-07 PROCEDURE — 99223 1ST HOSP IP/OBS HIGH 75: CPT

## 2023-06-07 PROCEDURE — 74018 RADEX ABDOMEN 1 VIEW: CPT

## 2023-06-07 PROCEDURE — 87507 IADNA-DNA/RNA PROBE TQ 12-25: CPT

## 2023-06-07 PROCEDURE — 93005 ELECTROCARDIOGRAM TRACING: CPT

## 2023-06-07 PROCEDURE — 85025 COMPLETE CBC W/AUTO DIFF WBC: CPT

## 2023-06-07 PROCEDURE — 80053 COMPREHEN METABOLIC PANEL: CPT

## 2023-06-07 PROCEDURE — C9113: CPT

## 2023-06-07 PROCEDURE — 85027 COMPLETE CBC AUTOMATED: CPT

## 2023-06-07 PROCEDURE — 36415 COLL VENOUS BLD VENIPUNCTURE: CPT

## 2023-06-07 RX ORDER — SODIUM CHLORIDE 9 MG/ML
1000 INJECTION INTRAMUSCULAR; INTRAVENOUS; SUBCUTANEOUS ONCE
Refills: 0 | Status: COMPLETED | OUTPATIENT
Start: 2023-06-07 | End: 2023-06-07

## 2023-06-07 RX ORDER — METOPROLOL TARTRATE 50 MG
50 TABLET ORAL DAILY
Refills: 0 | Status: DISCONTINUED | OUTPATIENT
Start: 2023-06-07 | End: 2023-06-08

## 2023-06-07 RX ORDER — LANOLIN ALCOHOL/MO/W.PET/CERES
3 CREAM (GRAM) TOPICAL AT BEDTIME
Refills: 0 | Status: DISCONTINUED | OUTPATIENT
Start: 2023-06-07 | End: 2023-06-13

## 2023-06-07 RX ORDER — PANTOPRAZOLE SODIUM 20 MG/1
40 TABLET, DELAYED RELEASE ORAL DAILY
Refills: 0 | Status: DISCONTINUED | OUTPATIENT
Start: 2023-06-07 | End: 2023-06-12

## 2023-06-07 RX ORDER — METRONIDAZOLE 500 MG
500 TABLET ORAL ONCE
Refills: 0 | Status: COMPLETED | OUTPATIENT
Start: 2023-06-07 | End: 2023-06-07

## 2023-06-07 RX ORDER — SODIUM CHLORIDE 9 MG/ML
1000 INJECTION INTRAMUSCULAR; INTRAVENOUS; SUBCUTANEOUS
Refills: 0 | Status: DISCONTINUED | OUTPATIENT
Start: 2023-06-07 | End: 2023-06-11

## 2023-06-07 RX ORDER — HYDROMORPHONE HYDROCHLORIDE 2 MG/ML
0.5 INJECTION INTRAMUSCULAR; INTRAVENOUS; SUBCUTANEOUS ONCE
Refills: 0 | Status: DISCONTINUED | OUTPATIENT
Start: 2023-06-07 | End: 2023-06-07

## 2023-06-07 RX ORDER — LEVETIRACETAM 250 MG/1
250 TABLET, FILM COATED ORAL
Refills: 0 | Status: DISCONTINUED | OUTPATIENT
Start: 2023-06-07 | End: 2023-06-13

## 2023-06-07 RX ORDER — ATORVASTATIN CALCIUM 80 MG/1
80 TABLET, FILM COATED ORAL AT BEDTIME
Refills: 0 | Status: DISCONTINUED | OUTPATIENT
Start: 2023-06-07 | End: 2023-06-13

## 2023-06-07 RX ORDER — ONDANSETRON 8 MG/1
4 TABLET, FILM COATED ORAL ONCE
Refills: 0 | Status: COMPLETED | OUTPATIENT
Start: 2023-06-07 | End: 2023-06-07

## 2023-06-07 RX ORDER — POLYETHYLENE GLYCOL 3350 17 G/17G
17 POWDER, FOR SOLUTION ORAL DAILY
Refills: 0 | Status: DISCONTINUED | OUTPATIENT
Start: 2023-06-07 | End: 2023-06-13

## 2023-06-07 RX ORDER — ENOXAPARIN SODIUM 100 MG/ML
40 INJECTION SUBCUTANEOUS EVERY 24 HOURS
Refills: 0 | Status: DISCONTINUED | OUTPATIENT
Start: 2023-06-07 | End: 2023-06-13

## 2023-06-07 RX ORDER — CEFEPIME 1 G/1
2000 INJECTION, POWDER, FOR SOLUTION INTRAMUSCULAR; INTRAVENOUS ONCE
Refills: 0 | Status: COMPLETED | OUTPATIENT
Start: 2023-06-07 | End: 2023-06-07

## 2023-06-07 RX ORDER — CLONAZEPAM 1 MG
2 TABLET ORAL THREE TIMES A DAY
Refills: 0 | Status: DISCONTINUED | OUTPATIENT
Start: 2023-06-07 | End: 2023-06-13

## 2023-06-07 RX ORDER — ACETAMINOPHEN 500 MG
650 TABLET ORAL EVERY 6 HOURS
Refills: 0 | Status: DISCONTINUED | OUTPATIENT
Start: 2023-06-07 | End: 2023-06-13

## 2023-06-07 RX ORDER — DIATRIZOATE MEGLUMINE 180 MG/ML
30 INJECTION, SOLUTION INTRAVESICAL ONCE
Refills: 0 | Status: COMPLETED | OUTPATIENT
Start: 2023-06-07 | End: 2023-06-07

## 2023-06-07 RX ORDER — ONDANSETRON 8 MG/1
4 TABLET, FILM COATED ORAL EVERY 8 HOURS
Refills: 0 | Status: DISCONTINUED | OUTPATIENT
Start: 2023-06-07 | End: 2023-06-13

## 2023-06-07 RX ORDER — OXYCODONE HYDROCHLORIDE 5 MG/1
5 TABLET ORAL
Refills: 0 | Status: DISCONTINUED | OUTPATIENT
Start: 2023-06-07 | End: 2023-06-12

## 2023-06-07 RX ORDER — HYDROXYZINE HCL 10 MG
25 TABLET ORAL THREE TIMES A DAY
Refills: 0 | Status: DISCONTINUED | OUTPATIENT
Start: 2023-06-07 | End: 2023-06-13

## 2023-06-07 RX ADMIN — CEFEPIME 100 MILLIGRAM(S): 1 INJECTION, POWDER, FOR SOLUTION INTRAMUSCULAR; INTRAVENOUS at 20:22

## 2023-06-07 RX ADMIN — SODIUM CHLORIDE 75 MILLILITER(S): 9 INJECTION INTRAMUSCULAR; INTRAVENOUS; SUBCUTANEOUS at 22:46

## 2023-06-07 RX ADMIN — HYDROMORPHONE HYDROCHLORIDE 0.5 MILLIGRAM(S): 2 INJECTION INTRAMUSCULAR; INTRAVENOUS; SUBCUTANEOUS at 15:48

## 2023-06-07 RX ADMIN — Medication 100 MILLIGRAM(S): at 21:08

## 2023-06-07 RX ADMIN — DIATRIZOATE MEGLUMINE 30 MILLILITER(S): 180 INJECTION, SOLUTION INTRAVESICAL at 15:48

## 2023-06-07 RX ADMIN — ONDANSETRON 4 MILLIGRAM(S): 8 TABLET, FILM COATED ORAL at 23:25

## 2023-06-07 RX ADMIN — HYDROMORPHONE HYDROCHLORIDE 0.5 MILLIGRAM(S): 2 INJECTION INTRAMUSCULAR; INTRAVENOUS; SUBCUTANEOUS at 23:52

## 2023-06-07 RX ADMIN — SODIUM CHLORIDE 1000 MILLILITER(S): 9 INJECTION INTRAMUSCULAR; INTRAVENOUS; SUBCUTANEOUS at 18:15

## 2023-06-07 RX ADMIN — ONDANSETRON 4 MILLIGRAM(S): 8 TABLET, FILM COATED ORAL at 18:14

## 2023-06-07 RX ADMIN — HYDROMORPHONE HYDROCHLORIDE 0.5 MILLIGRAM(S): 2 INJECTION INTRAMUSCULAR; INTRAVENOUS; SUBCUTANEOUS at 18:15

## 2023-06-07 NOTE — PATIENT PROFILE ADULT - PACKS PER DAY
"  Physical Therapy Daily Treatment Note     Name: Elliott Ayala  Clinic Number: [id number]    Therapy Diagnosis:   Encounter Diagnoses   Name Primary?    Gait abnormality     Achilles tendinosis      Physician: Sharan Rice MD    Visit Date: 8/19/2020  Physician Orders: PT Eval and Treat   Medical Diagnosis from Referral: Achilles tendinosis  Evaluation Date: 7/1/2020  Authorization Period Expiration: 12/31/2020  Plan of Care Expiration: 9/25/2020  Visit # / Visits authorized: 12/ 20 (UPDATED POC 3/12, 11th visit total) [POC: 8/4-9/25, 2 x 6]    Time In: 0811  Time Out: 0859  Total Billable Time: 48 minutes    Precautions: Standard and Fall    Subjective     Pt reports:"It's swelling", pt reports L knee pain/popping  He was compliant with home exercise program, sporadic.  Response to previous treatment: soreness  Functional change: no change    Pain: 3/10  Location: left foot      Objective       Elliott received therapeutic exercises to develop strength, ROM and flexibility for 38 minutes including:    Long sitting HSS 3 x 30 sec NP  Long sitting GSS 3 x 30 sec, slant board LLE     Alphabet x 1  Seated heel slides 3 x 10   Ankle DF/IN/EV with BTB 3 x 10  Wobble board A/P, M/L, cw/ccw x 30 each    Upright bike 5 min level 3, seat 7 NP    TA x 10 minutes to improve balance, proprioception, weight bearing:  //bars:  SLS w/R toes on floor 3 x 10 sec  HR 2 x 10  +Weight shift R/L on airex x 1 minute    Elliott received the following manual therapy techniques: STM to the lower left gastrocnemius and achilles regions for 5 minutes.    NOT PERFORMED:   Towel scrunch x 30 NP  [place]  x 2 minutes NP  Inversion/eversion 3 x 10 NP  CW, CCW x 30 each NP  Seated HR x 30 NP  Seated TR x 30 NP  Ankle pumps 3 x 10 NP    Home Exercises Provided and Patient Education Provided     Education provided:   - cont HEP, instructed to wear shoe outside short distances around house, pt reports he does this already    Written Home Exercises " Provided: Patient instructed to cont prior HEP.  Exercises were reviewed and Elliott was able to demonstrate them prior to the end of the session.  Elliott demonstrated good  understanding of the education provided.     See EMR under Patient Instructions for exercises provided prior visit.    Assessment     Decreased wb and toe off LLE, instructed on correct gait pattern, pt able to correct after VC.    Pt apprehensive to wear shoe out in community.  Decreased TP along achilles and medial ankle region.    Elliott is progressing well towards his goals.   Pt prognosis is Good.     Pt will continue to benefit from skilled outpatient physical therapy to address the deficits listed in the problem list box on initial evaluation, provide pt/family education and to maximize pt's level of independence in the home and community environment.     Pt's spiritual, cultural and educational needs considered and pt agreeable to plan of care and goals.    Anticipated barriers to physical therapy: none    Previous Short Term Goals Status:             Short Term Goals (STG) # weeks Goal Review Date Reviewed Date Met   1. The patient will begin a written HEP 1 met 7/7/2020 7/7/2020   2. Increase ankle dorsiflexion to 5 degrees 3 progressing 8/19/2020          3. Decrease soft tissue tenderness to mild 3 progressing 8/19/2020                Long Term Goal Status:   continue per initial plan of care.  Long Term Goals (LTG) # weeks Goal Review Date Reviewed Date Met   1. The patient will be independent with his HEP for maintenance 6 progressing 8/19/2020       2. Increase ankle dorsiflexion to 8 degrees 6 progressing 8/19/2020          3. Decrease FOTO score to 39 % 6 progressing 8/19/2020          4. Patient will be able to ascend/descend 20 stairs/steps without c/o pain 6 progressing 8/19/2020                    Plan     Continue with physical therapy as per plan of care      Mei Nielsen, PTA     0.5

## 2023-06-07 NOTE — H&P ADULT - NSHPLABSRESULTS_GEN_ALL_CORE
13.6   5.96  )-----------( 216      ( 07 Jun 2023 15:25 )             38.5       06-07    142  |  106  |  12  ----------------------------<  108<H>  3.5   |  23  |  0.7    Ca    9.8      07 Jun 2023 15:25    TPro  7.2  /  Alb  4.6  /  TBili  <0.2  /  DBili  x   /  AST  26  /  ALT  34  /  AlkPhos  114  06-07                      Lactate Trend  06-07 @ 16:09 Lactate:1.1             CAPILLARY BLOOD GLUCOSE            Culture Results:   <10,000 CFU/mL Normal Urogenital Agustina (05-09 @ 15:00)

## 2023-06-07 NOTE — ED PROVIDER NOTE - OBJECTIVE STATEMENT
57y F pmh HTN, CAD, COPD, HLD, Seizure disorder, migraines, GERD, SBO s/p Exlap presents for eval of abd pain. Pt has moderate aching lower abd pain with associated nb vomiting and dark stool for the past couple days. No inciting or relieving factors. Denies fever, ha, cp, sob, weakness, numbness, dysuria, hematuria

## 2023-06-07 NOTE — H&P ADULT - ATTENDING SUPERVISION STATEMENT
Resident Oxybutynin Pregnancy And Lactation Text: This medication is Pregnancy Category B and is considered safe during pregnancy. It is unknown if it is excreted in breast milk.

## 2023-06-07 NOTE — PATIENT PROFILE ADULT - FALL HARM RISK - HARM RISK INTERVENTIONS

## 2023-06-07 NOTE — ED PROVIDER NOTE - ATTENDING APP SHARED VISIT CONTRIBUTION OF CARE
I personally evaluated the patient. I reviewed the Resident’s or Physician Assistant’s note (as assigned above), and agree with the findings and plan except as documented in my note.    57-year-old female with history of HTN, CAD, COPD PD, seizure, HLD, GERD, migraines, SBO status post operative management presents with abdominal pain.  Patient also reports nausea and vomiting and dark stools for the past several days.  VSS, non toxic appearing, NAD, Head NCAT, MMM, neck supple, normal ROM, normal s1s2, lungs ctab, abd s/nd, tender to palpation in the periumbilical area, no guarding or rebound, extremities wnl, AAO x 3, GCS 15, neuro grossly normal. No acute skin lesions. Plan is labs, imaging, meds and reassess.

## 2023-06-07 NOTE — H&P ADULT - ATTENDING COMMENTS
Progress Notes by Kyra Francis EPS at 9/16/2019  8:00 AM     Author: Kyra Francis EPS Service: -- Author Type: Exercise Phys Spec    Filed: 9/16/2019  9:04 AM Encounter Date: 9/16/2019 Status: Attested    : Kyra Francis EPS (Exercise Phys Spec) Cosigner: Chaz Cadena DO at 9/16/2019 12:43 PM    Attestation signed by Chaz Cadena DO at 9/16/2019 12:43 PM    agree                Jamie Charles has participated in 25 sessions of Phase II Cardiac Rehab.    Progress Report:   Cardiac Rehab Treatment Progress Report 9/4/2019 9/6/2019 9/9/2019 9/13/2019 9/16/2019   Weight 222 lbs 11 oz 223 lbs 2 oz 222 lbs 8 oz 223 lbs 223 lbs 10 oz   Pre Exercise  HR 76 72 76 76 70   Pre Exercise /60 112/70 110/70 100/62 102/64   Pre Blood Sugar (mg/dl) - - - - -   Treadmill Peak -113 113-119 106-116 109-117 100-113   Treadmill Peak Blood Pressure 134/70 138/64 128/68 132/70 126/66   Nustep Peak Heart Rate 105-109 106-112 111-116 123 122   Nustep Peak Blood Pressure - - 142/68 - -   Heart Rate 84 83 84 80 83   Post Exercise /60 104/60 94/60 90/58 92/58   Post Blood Sugar (mg/dl) - - - - -   ECG SR/ST TWI lead III. PJC's and PVC's rare SR/ST TWI lead III. PJC's and PVC's rare SR/ST TWI lead III SR/ST, TWI lead III SR/ST. TWI Lead III   Total Exercise Minutes 53 48 48 48 48         Current Status:  Currently exercising without complaints or symptoms.  Pt continues doing interval training on treadmill and tolerating well.    If Physician recommends change in treatment plan, please place orders.        __________________________________________________      _____________  Signature                                                                                                  Date       
58 YO F w/ a PMH of HTN, non-obstructive CAD, COPD (not on home O2), HLD, anxiety, seizure d/o, migraine, GERD, and hx of SBO who presents to the hospital w/ a c/o ABD pain for the past x 2-3 days. Described as located in the lower ABD, non-radiating, "achy", and waxes/wanes. + N/V (non-bloody). + dark stools. Denies any fevers/chills, hematemesis, rashes, flank pain, dysuria, LE swelling, rashes, or CP. ROS negative except as stated above.     In the ED, CT-AP w/ IV/PO contrast showed colitis. Pt started on IVFs (NS), anti-emetics, IV ABXs (Cefep/Flagyl), and pain meds in the ED.     FMHx:   -No family Hx of early cardiac death, CAD, asthma, or genetic disorders identified    Physical exam shows pt laying in bed in NAD. VSS, afebrile, not hypoxic on RA. A&Ox3. Neuro exam intact. CTA B/L with no W/C/R. RRR, no M/G/R. ABD is soft with TTP in the lower region, normoactive BSs. LEs with no pitting edema. No rashes. Labs and radiology as above.     N/V/D + ABD pain due to colitis, hx of SBO; No sepsis present on admission. IVFs. IV ABXs (Cefep/Flagyl). GI PCR. C.diff (recent hospitalization/ABX use). Serial ABD exams. PRN pain meds. Anti-emetics PRN.     Hx of HTN, non-obstructive CAD, COPD (not on home O2), HLD, anxiety, seizure d/o, migraine, GERD, and hx of SBO. Restart home meds, except as stated above. DVT PPX. Inform PCP of pt's admission to hospital. My note supersedes the residents note.     Date seen by [email or web address]

## 2023-06-07 NOTE — ED PROVIDER NOTE - CLINICAL SUMMARY MEDICAL DECISION MAKING FREE TEXT BOX
Patient presented for evaluation of abdominal pain.  Patient was found to have colitis and antibiotics started.  Patient to be admitted for further management.

## 2023-06-07 NOTE — H&P ADULT - ASSESSMENT
56 y/o F with a PMH of HTN, non obstructive CAD, COPD, HLD, anxiety, seizure d/o, migraine, GERD, and multiple abdominal surgeries including appendectomy/resection of appendiceal stump, cholecystectomy, exploratory laparotomy with lysis of adhesions,  hysterectomy unilateral oophorectomy, Hiatal hernia present to the ED for eval of abd pain.     Labs unremarkable including lactate  CT A/p with oral/iv con - showed signs of colitis - unclear if no obstruction   In the ED given Cefepime/Flagyl - 1L NS - Dilaudid  Colonoscopy (March, 2023): internal and external hemorrhoids otherwise unremarkable, hyperplastic polyp      Active Issues    Chronic Issues     #DVT - lovenox sq     #GI - pantoprazole     #Diet - DASH    #Activity - IAT     #Code - Full code     #Disposition - IP  56 y/o F with a PMH of HTN, non obstructive CAD, COPD, HLD, anxiety, seizure d/o, migraine, GERD, and multiple abdominal surgeries including appendectomy/resection of appendiceal stump, cholecystectomy, exploratory laparotomy with lysis of adhesions,  hysterectomy unilateral oophorectomy, Hiatal hernia present to the ED for eval of lower abd pain.     Labs unremarkable including lactate  CT A/p with oral/iv con - showed signs of colitis - unclear if no obstruction   In the ED given Cefepime/Flagyl - 1L NS - Dilaudid  Colonoscopy (March, 2023): internal and external hemorrhoids otherwise unremarkable, hyperplastic polyp    Active Issues    #Acute colitis without signs of SBO   #Dark stools likely LGIB from hemorrhoids  #Hx of abdominal/GYN surgeries with RLQ anastomosis 2/2 SIBO   - Monitor for signs of fever/WBC elevation   - Monitor off IV abx  - NPO for now   - IVF   - Nausea control   - IV protonix daily   - Consult GI     Chronic Issues     #Non-obstructive CAD   #COPD not on home O2   #Anxiety   #HIatal Hernia  #GERD  - c/w home meds      #DVT - lovenox sq     #GI - pantoprazole     #Diet - DASH    #Activity - IAT     #Code - Full code     #Disposition - IP  56 y/o F with a PMH of HTN, non obstructive CAD, COPD, HLD, anxiety, seizure d/o, migraine, GERD, and multiple abdominal surgeries including appendectomy/resection of appendiceal stump, cholecystectomy, exploratory laparotomy with lysis of adhesions,  hysterectomy unilateral oophorectomy, Hiatal hernia present to the ED for eval of lower abd pain.     Labs unremarkable including lactate  CT A/p with oral/iv con - showed signs of colitis - unclear if no obstruction   In the ED given Cefepime/Flagyl - 1L NS - Dilaudid  Colonoscopy (March, 2023): internal and external hemorrhoids otherwise unremarkable, hyperplastic polyp    Active Issues    #Acute colitis without signs of SBO   #Dark stools likely LGIB from hemorrhoids  #Hx of abdominal/GYN surgeries   #Hx of SIBO 2/2 obstructive intraabdominal adhesions s/p anastomotic surgery  - Monitor for signs of fever/WBC elevation - serial abdominal exams   - Monitor off IV abx  - NPO for now   - IVF for now   - Nausea control   - IV protonix daily   - Consult GI   - GI PCR  - Consider C diff testing if I PCR negative     Chronic Issues     #Non-obstructive CAD   #COPD not on home O2   #Overactive Bladder - on Myrbetriq   #Anxiety - on Klonipin   #Hiatal Hernia  #GERD  - c/w home meds    #DVT - lovenox sq     #GI - pantoprazole     #Diet - NPO     #Activity - IAT     #Code - Full code     #Disposition - IP  56 y/o F with a PMH of HTN, non obstructive CAD, COPD, HLD, anxiety, seizure d/o, migraine, GERD, and multiple abdominal surgeries including appendectomy/resection of appendiceal stump, cholecystectomy, exploratory laparotomy with lysis of adhesions,  hysterectomy unilateral oophorectomy, Hiatal hernia present to the ED for eval of lower abd pain.     Labs unremarkable including lactate  CT A/p with oral/iv con - showed signs of colitis - unclear if no obstruction   In the ED given Cefepime/Flagyl - 1L NS - Dilaudid  Colonoscopy (March, 2023): internal and external hemorrhoids otherwise unremarkable, hyperplastic polyp    Active Issues    #Acute colitis without signs of SBO   #Dark stools likely LGIB from hemorrhoids  #Hx of appendectomy - cholecystectomy - unilateral oophorectomy/hysterectomy   #Hx of SIBO 2/2 obstructive intraabdominal adhesions s/p anastomotic surgery  - Monitor for signs of fever/WBC elevation - serial abdominal exams   - Monitor off IV abx  - NPO for now   - IVF for now   - Nausea control   - IV protonix daily   - Consult GI   - GI PCR  - Consider C diff testing if GI PCR negative     Chronic Issues     #Non-obstructive CAD   #COPD not on home O2   #Overactive Bladder - on Myrbetriq   #Anxiety - on Klonipin   #Hiatal Hernia  #GERD  - c/w home meds    #DVT - lovenox sq     #GI - pantoprazole     #Diet - NPO     #Activity - IAT     #Code - Full code     #Disposition - IP

## 2023-06-07 NOTE — H&P ADULT - HISTORY OF PRESENT ILLNESS
58 y/o F with a PMH of HTN, non obstructive CAD, COPD, HLD, anxiety, seizure d/o, migraine, GERD, and multiple abdominal surgeries including appendectomy/resection of appendiceal stump, cholecystectomy, exploratory laparotomy with lysis of adhesions,  hysterectomy unilateral oophorectomy, Hiatal hernia present to the ED for eval of abd pain.   Pt has moderate aching lower abd pain with associated nb vomiting and dark stool for the past couple days    ROS   Positive   Negative fever, chills, nausea, vomiting, chest pain, abdominal pain, sob, cough, constipation, diarrhea, incontinence    ED Vital Signs Last 24 Hrs  T(C): 35.8 (07 Jun 2023 16:17), Max: 36.7 (07 Jun 2023 13:46)  T(F): 96.5 (07 Jun 2023 16:17), Max: 98 (07 Jun 2023 13:46)  HR: 57 (07 Jun 2023 16:17) (57 - 83)  BP: 126/73 (07 Jun 2023 16:17) (126/73 - 144/66)  RR: 18 (07 Jun 2023 16:17) (18 - 18)  SpO2: 97% (07 Jun 2023 16:17) (97% - 97%)  O2 Parameters below as of 07 Jun 2023 13:46  Patient On (Oxygen Delivery Method): room air    Labs unremarkable including lactate  CT A/p with oral/iv con - showed signs of colitis - unclear if no obstruction   In the ED given Cefepime/Flagyl - 1L NS - Dilaudid  Colonoscopy (March, 2023): internal and external hemorrhoids otherwise unremarkable, hyperplastic polyp         56 y/o F with a PMH of HTN, non obstructive CAD, COPD, HLD, anxiety, seizure d/o, migraine, GERD, and multiple abdominal surgeries including appendectomy/resection of appendiceal stump, cholecystectomy, exploratory laparotomy with lysis of adhesions,  hysterectomy unilateral oophorectomy, Hiatal hernia present to the ED for eval of lower abdominal pain.  Pt has moderate, intermittent aching lower abd pain with associated nausea and non-bloody vomiting.  She is also complaining of dark stool for the past couple days.   She is compliant with her medications and medical f/ups    ROS   Positive as above   Negative fever, chills, nausea, vomiting, chest pain, abdominal pain, sob, cough, constipation, diarrhea, incontinence    ED Vital Signs Last 24 Hrs  T(C): 35.8 (07 Jun 2023 16:17), Max: 36.7 (07 Jun 2023 13:46)  T(F): 96.5 (07 Jun 2023 16:17), Max: 98 (07 Jun 2023 13:46)  HR: 57 (07 Jun 2023 16:17) (57 - 83)  BP: 126/73 (07 Jun 2023 16:17) (126/73 - 144/66)  RR: 18 (07 Jun 2023 16:17) (18 - 18)  SpO2: 97% (07 Jun 2023 16:17) (97% - 97%)  O2 Parameters below as of 07 Jun 2023 13:46  Patient On (Oxygen Delivery Method): room air    Labs unremarkable including lactate  CT A/p with oral/iv con - showed signs of colitis - unclear if no obstruction   In the ED given Cefepime/Flagyl - 1L NS - Dilaudid  Colonoscopy (March, 2023): internal and external hemorrhoids otherwise unremarkable, hyperplastic polyp

## 2023-06-07 NOTE — ED PROVIDER NOTE - PHYSICAL EXAMINATION
CONST: NAD  CARD: S1 S2; No jvd  RESP: Equal BS B/L, No wheezes, rhonchi or rales. No distress  GI: Periumbilical tenderness. L CVA tenderness. Soft, non-distended. normal BS  MS: Normal ROM in all extremities. pulses 2 +. no calf tenderness or swelling  SKIN: Warm, dry, no acute rashes. Good turgor  NEURO: A&Ox3, No focal deficits. Strength 5/5 with no sensory deficits.

## 2023-06-07 NOTE — H&P ADULT - NSHPPHYSICALEXAM_GEN_ALL_CORE
LOS:     VITALS:   T(C): 35.8 (06-07-23 @ 16:17), Max: 36.7 (06-07-23 @ 13:46)  HR: 57 (06-07-23 @ 16:17) (57 - 83)  BP: 126/73 (06-07-23 @ 16:17) (126/73 - 144/66)  RR: 18 (06-07-23 @ 16:17) (18 - 18)  SpO2: 97% (06-07-23 @ 16:17) (97% - 97%)    GENERAL: NAD, lying in bed comfortably  HEAD:  Atraumatic, Normocephalic  EYES: EOMI, PERRLA, conjunctiva and sclera clear  ENT: Moist mucous membranes  NECK: Supple, No JVD  CHEST/LUNG: Clear to auscultation bilaterally; No rales, rhonchi, wheezing, or rubs. Unlabored respirations  HEART: Regular rate and rhythm; No murmurs, rubs, or gallops  ABDOMEN: BSx4; Soft, nontender, nondistended  EXTREMITIES:  2+ Peripheral Pulses, brisk capillary refill. No clubbing, cyanosis, or edema  NERVOUS SYSTEM:  A&Ox3, no focal deficits   SKIN: No rashes or lesions

## 2023-06-08 LAB
ALBUMIN SERPL ELPH-MCNC: 3.8 G/DL — SIGNIFICANT CHANGE UP (ref 3.5–5.2)
ALP SERPL-CCNC: 94 U/L — SIGNIFICANT CHANGE UP (ref 30–115)
ALT FLD-CCNC: 25 U/L — SIGNIFICANT CHANGE UP (ref 0–41)
ANION GAP SERPL CALC-SCNC: 9 MMOL/L — SIGNIFICANT CHANGE UP (ref 7–14)
AST SERPL-CCNC: 23 U/L — SIGNIFICANT CHANGE UP (ref 0–41)
BASOPHILS # BLD AUTO: 0.01 K/UL — SIGNIFICANT CHANGE UP (ref 0–0.2)
BASOPHILS NFR BLD AUTO: 0.2 % — SIGNIFICANT CHANGE UP (ref 0–1)
BILIRUB SERPL-MCNC: <0.2 MG/DL — SIGNIFICANT CHANGE UP (ref 0.2–1.2)
BUN SERPL-MCNC: 10 MG/DL — SIGNIFICANT CHANGE UP (ref 10–20)
CALCIUM SERPL-MCNC: 8.7 MG/DL — SIGNIFICANT CHANGE UP (ref 8.4–10.5)
CHLORIDE SERPL-SCNC: 110 MMOL/L — SIGNIFICANT CHANGE UP (ref 98–110)
CO2 SERPL-SCNC: 26 MMOL/L — SIGNIFICANT CHANGE UP (ref 17–32)
CREAT SERPL-MCNC: 0.7 MG/DL — SIGNIFICANT CHANGE UP (ref 0.7–1.5)
EGFR: 100 ML/MIN/1.73M2 — SIGNIFICANT CHANGE UP
EOSINOPHIL # BLD AUTO: 0.05 K/UL — SIGNIFICANT CHANGE UP (ref 0–0.7)
EOSINOPHIL NFR BLD AUTO: 1.1 % — SIGNIFICANT CHANGE UP (ref 0–8)
GLUCOSE SERPL-MCNC: 90 MG/DL — SIGNIFICANT CHANGE UP (ref 70–99)
HCT VFR BLD CALC: 34.5 % — LOW (ref 37–47)
HGB BLD-MCNC: 11.9 G/DL — LOW (ref 12–16)
IMM GRANULOCYTES NFR BLD AUTO: 0.2 % — SIGNIFICANT CHANGE UP (ref 0.1–0.3)
LYMPHOCYTES # BLD AUTO: 1.85 K/UL — SIGNIFICANT CHANGE UP (ref 1.2–3.4)
LYMPHOCYTES # BLD AUTO: 41.9 % — SIGNIFICANT CHANGE UP (ref 20.5–51.1)
MCHC RBC-ENTMCNC: 31.9 PG — HIGH (ref 27–31)
MCHC RBC-ENTMCNC: 34.5 G/DL — SIGNIFICANT CHANGE UP (ref 32–37)
MCV RBC AUTO: 92.5 FL — SIGNIFICANT CHANGE UP (ref 81–99)
MONOCYTES # BLD AUTO: 0.4 K/UL — SIGNIFICANT CHANGE UP (ref 0.1–0.6)
MONOCYTES NFR BLD AUTO: 9 % — SIGNIFICANT CHANGE UP (ref 1.7–9.3)
NEUTROPHILS # BLD AUTO: 2.1 K/UL — SIGNIFICANT CHANGE UP (ref 1.4–6.5)
NEUTROPHILS NFR BLD AUTO: 47.6 % — SIGNIFICANT CHANGE UP (ref 42.2–75.2)
NRBC # BLD: 0 /100 WBCS — SIGNIFICANT CHANGE UP (ref 0–0)
PLATELET # BLD AUTO: 174 K/UL — SIGNIFICANT CHANGE UP (ref 130–400)
PMV BLD: 10.6 FL — HIGH (ref 7.4–10.4)
POTASSIUM SERPL-MCNC: 3.9 MMOL/L — SIGNIFICANT CHANGE UP (ref 3.5–5)
POTASSIUM SERPL-SCNC: 3.9 MMOL/L — SIGNIFICANT CHANGE UP (ref 3.5–5)
PROT SERPL-MCNC: 5.9 G/DL — LOW (ref 6–8)
RBC # BLD: 3.73 M/UL — LOW (ref 4.2–5.4)
RBC # FLD: 13.2 % — SIGNIFICANT CHANGE UP (ref 11.5–14.5)
SODIUM SERPL-SCNC: 145 MMOL/L — SIGNIFICANT CHANGE UP (ref 135–146)
WBC # BLD: 4.42 K/UL — LOW (ref 4.8–10.8)
WBC # FLD AUTO: 4.42 K/UL — LOW (ref 4.8–10.8)

## 2023-06-08 PROCEDURE — 99233 SBSQ HOSP IP/OBS HIGH 50: CPT

## 2023-06-08 PROCEDURE — 99223 1ST HOSP IP/OBS HIGH 75: CPT

## 2023-06-08 PROCEDURE — 74018 RADEX ABDOMEN 1 VIEW: CPT | Mod: 26

## 2023-06-08 PROCEDURE — 93010 ELECTROCARDIOGRAM REPORT: CPT

## 2023-06-08 RX ORDER — SIMETHICONE 80 MG/1
80 TABLET, CHEWABLE ORAL DAILY
Refills: 0 | Status: DISCONTINUED | OUTPATIENT
Start: 2023-06-08 | End: 2023-06-13

## 2023-06-08 RX ORDER — SENNA PLUS 8.6 MG/1
2 TABLET ORAL AT BEDTIME
Refills: 0 | Status: DISCONTINUED | OUTPATIENT
Start: 2023-06-08 | End: 2023-06-13

## 2023-06-08 RX ORDER — HYDROMORPHONE HYDROCHLORIDE 2 MG/ML
0.5 INJECTION INTRAMUSCULAR; INTRAVENOUS; SUBCUTANEOUS EVERY 6 HOURS
Refills: 0 | Status: DISCONTINUED | OUTPATIENT
Start: 2023-06-08 | End: 2023-06-11

## 2023-06-08 RX ORDER — SACCHAROMYCES BOULARDII 250 MG
250 POWDER IN PACKET (EA) ORAL
Refills: 0 | Status: DISCONTINUED | OUTPATIENT
Start: 2023-06-08 | End: 2023-06-13

## 2023-06-08 RX ADMIN — Medication 250 MILLIGRAM(S): at 17:45

## 2023-06-08 RX ADMIN — OXYCODONE HYDROCHLORIDE 5 MILLIGRAM(S): 5 TABLET ORAL at 10:51

## 2023-06-08 RX ADMIN — Medication 30 MILLILITER(S): at 02:07

## 2023-06-08 RX ADMIN — LEVETIRACETAM 250 MILLIGRAM(S): 250 TABLET, FILM COATED ORAL at 05:46

## 2023-06-08 RX ADMIN — HYDROMORPHONE HYDROCHLORIDE 0.5 MILLIGRAM(S): 2 INJECTION INTRAMUSCULAR; INTRAVENOUS; SUBCUTANEOUS at 20:55

## 2023-06-08 RX ADMIN — HYDROMORPHONE HYDROCHLORIDE 0.5 MILLIGRAM(S): 2 INJECTION INTRAMUSCULAR; INTRAVENOUS; SUBCUTANEOUS at 07:00

## 2023-06-08 RX ADMIN — HYDROMORPHONE HYDROCHLORIDE 0.5 MILLIGRAM(S): 2 INJECTION INTRAMUSCULAR; INTRAVENOUS; SUBCUTANEOUS at 00:41

## 2023-06-08 RX ADMIN — Medication 10 MILLIGRAM(S): at 17:45

## 2023-06-08 RX ADMIN — ONDANSETRON 4 MILLIGRAM(S): 8 TABLET, FILM COATED ORAL at 20:48

## 2023-06-08 RX ADMIN — Medication 10 MILLIGRAM(S): at 14:33

## 2023-06-08 RX ADMIN — Medication 10 MILLIGRAM(S): at 12:14

## 2023-06-08 RX ADMIN — PANTOPRAZOLE SODIUM 40 MILLIGRAM(S): 20 TABLET, DELAYED RELEASE ORAL at 12:14

## 2023-06-08 RX ADMIN — Medication 10 MILLIGRAM(S): at 05:46

## 2023-06-08 RX ADMIN — Medication 10 MILLIGRAM(S): at 22:08

## 2023-06-08 RX ADMIN — SENNA PLUS 2 TABLET(S): 8.6 TABLET ORAL at 22:07

## 2023-06-08 RX ADMIN — LEVETIRACETAM 250 MILLIGRAM(S): 250 TABLET, FILM COATED ORAL at 17:45

## 2023-06-08 RX ADMIN — Medication 2 MILLIGRAM(S): at 22:07

## 2023-06-08 RX ADMIN — Medication 2 MILLIGRAM(S): at 14:33

## 2023-06-08 RX ADMIN — HYDROMORPHONE HYDROCHLORIDE 0.5 MILLIGRAM(S): 2 INJECTION INTRAMUSCULAR; INTRAVENOUS; SUBCUTANEOUS at 06:26

## 2023-06-08 RX ADMIN — OXYCODONE HYDROCHLORIDE 5 MILLIGRAM(S): 5 TABLET ORAL at 02:02

## 2023-06-08 RX ADMIN — SODIUM CHLORIDE 75 MILLILITER(S): 9 INJECTION INTRAMUSCULAR; INTRAVENOUS; SUBCUTANEOUS at 20:48

## 2023-06-08 RX ADMIN — Medication 2 MILLIGRAM(S): at 05:45

## 2023-06-08 RX ADMIN — ATORVASTATIN CALCIUM 80 MILLIGRAM(S): 80 TABLET, FILM COATED ORAL at 22:07

## 2023-06-08 RX ADMIN — Medication 50 MILLIGRAM(S): at 05:47

## 2023-06-08 RX ADMIN — HYDROMORPHONE HYDROCHLORIDE 0.5 MILLIGRAM(S): 2 INJECTION INTRAMUSCULAR; INTRAVENOUS; SUBCUTANEOUS at 14:26

## 2023-06-08 RX ADMIN — OXYCODONE HYDROCHLORIDE 5 MILLIGRAM(S): 5 TABLET ORAL at 03:09

## 2023-06-08 RX ADMIN — HYDROMORPHONE HYDROCHLORIDE 0.5 MILLIGRAM(S): 2 INJECTION INTRAMUSCULAR; INTRAVENOUS; SUBCUTANEOUS at 21:25

## 2023-06-08 RX ADMIN — ENOXAPARIN SODIUM 40 MILLIGRAM(S): 100 INJECTION SUBCUTANEOUS at 05:46

## 2023-06-08 NOTE — PROGRESS NOTE ADULT - SUBJECTIVE AND OBJECTIVE BOX
RAMÍREZ RODARTE 57y Female  MRN#: [id number]   CODE STATUS:________    Hospital Day: 1d    Pt is currently admitted with the primary diagnosis of     SUBJECTIVE  Hospital course     Overnight events:     Subjective complaints:     Present Today:   - Gooden:  No [  ], Yes [   ] : Indication:     - Type of IV Access:       .. CVC/Piccline:  No [  ], Yes [   ] : Indication:       .. Midline: No [  ], Yes [   ] : Indication:                                             ----------------------------------------------------------  OBJECTIVE  PAST MEDICAL & SURGICAL HISTORY  Back pain  henriated discs    Seizures  last one 6 yrs ago    Migraines    Hypercholesteremia    Seasonal allergic rhinitis, unspecified trigger    GERD (gastroesophageal reflux disease)    Hiatal hernia    Anxiety    HTN (hypertension)    Osteoarthritis    Sciatica    Tobacco use disorder    History of surgery  4 rght knee sx  1 left knee sx  cholecystectomy  rotator cuff right  abdominal sx-- adhesions  c section  ovarian cystectomy  breast cystectomies  colonoscopy   endoscopies  parital hysterectomy    S/p partial hysterectomy with remaining cervical stump    H/O rotator cuff surgery  right    History of appendectomy    S/P cholecystectomy                                              -----------------------------------------------------------  ALLERGIES:  Neurontin (Unknown)  Toradol (Rash; Urticaria; Anaphylaxis (Mild to Mod))  Cipro (Hives)  penicillin (Hives)  Toradol (Hives)                                            ------------------------------------------------------------    HOME MEDICATIONS  Home Medications:  atorvastatin 80 mg oral tablet: 1 tab(s) orally once a day (07 Jun 2023 23:23)  busPIRone 10 mg oral tablet: orally 3 times a day (07 Jun 2023 23:23)  Fioricet oral tablet: 1 tab(s) orally 3 times a day, As Needed (07 Jun 2023 23:23)  hydrOXYzine hydrochloride 25 mg oral tablet: 1 tab(s) orally once a day (07 Jun 2023 23:23)  Incruse Ellipta 62.5 mcg/inh inhalation powder:  (07 Jun 2023 23:23)  KlonoPIN 2 mg oral tablet: 1 tab(s) orally 3 times a day (07 Jun 2023 23:23)  levETIRAcetam 250 mg oral tablet: 1 tab(s) orally 2 times a day (07 Jun 2023 23:23)  Linzess 290 mcg oral capsule: 1 cap(s) orally once a day (07 Jun 2023 23:23)  METOPROLOL SUCCINATE ER 50MG ER TAB: tab(s) orally once a day (07 Jun 2023 23:23)  Myrbetriq 50 mg oral tablet, extended release: 1 tab(s) orally once a day (07 Jun 2023 23:23)  ondansetron 4 mg oral tablet: 1 tab(s) orally 4 times a day as needed for  nausea (07 Jun 2023 23:23)  Pepcid 40 mg oral tablet: orally 2 times a day (07 Jun 2023 23:23)  Protonix 40 mg oral delayed release tablet: 1 tab(s) orally 2 times a day (07 Jun 2023 23:23)                           MEDICATIONS:  STANDING MEDICATIONS  atorvastatin 80 milliGRAM(s) Oral at bedtime  busPIRone 10 milliGRAM(s) Oral three times a day  clonazePAM  Tablet 2 milliGRAM(s) Oral three times a day  dicyclomine 10 milliGRAM(s) Oral three times a day before meals  enoxaparin Injectable 40 milliGRAM(s) SubCutaneous every 24 hours  levETIRAcetam 250 milliGRAM(s) Oral two times a day  metoprolol succinate ER 50 milliGRAM(s) Oral daily  pantoprazole  Injectable 40 milliGRAM(s) IV Push daily  polyethylene glycol 3350 17 Gram(s) Oral daily  senna 2 Tablet(s) Oral at bedtime  sodium chloride 0.9%. 1000 milliLiter(s) IV Continuous <Continuous>    PRN MEDICATIONS  acetaminophen     Tablet .. 650 milliGRAM(s) Oral every 6 hours PRN  aluminum hydroxide/magnesium hydroxide/simethicone Suspension 30 milliLiter(s) Oral every 4 hours PRN  HYDROmorphone  Injectable 0.5 milliGRAM(s) IV Push every 6 hours PRN  hydrOXYzine hydrochloride 25 milliGRAM(s) Oral three times a day PRN  melatonin 3 milliGRAM(s) Oral at bedtime PRN  ondansetron Injectable 4 milliGRAM(s) IV Push every 8 hours PRN  oxyCODONE    IR 5 milliGRAM(s) Oral two times a day PRN                                            ------------------------------------------------------------                                               --------------------------------------------------------------  LABS:                        11.9   4.42  )-----------( 174      ( 08 Jun 2023 08:40 )             34.5     06-07    142  |  106  |  12  ----------------------------<  108<H>  3.5   |  23  |  0.7    Ca    9.8      07 Jun 2023 15:25    TPro  7.2  /  Alb  4.6  /  TBili  <0.2  /  DBili  x   /  AST  26  /  ALT  34  /  AlkPhos  114  06-07          Lactate, Blood: 1.1 mmol/L (06-07-23 @ 16:09)                                                        -------------------------------------------------------------  RADIOLOGY:                                            --------------------------------------------------------------  VITAL SIGNS: Last 24 Hours  T(C): 36 (08 Jun 2023 04:45), Max: 36.7 (07 Jun 2023 13:46)  T(F): 96.8 (08 Jun 2023 04:45), Max: 98 (07 Jun 2023 13:46)  HR: 54 (08 Jun 2023 04:45) (54 - 83)  BP: 120/58 (08 Jun 2023 04:45) (120/58 - 149/88)  BP(mean): --  RR: 18 (08 Jun 2023 04:45) (18 - 18)  SpO2: 97% (07 Jun 2023 16:17) (97% - 97%)    PHYSICAL EXAM:  General: Awake, oriented and resting comfortably, no acute distress  Head: normocephalic, atraumatic  ENT:  moist mucous membranes  Cardiac: regular rate and rhythm, normal S1 and S2, no murmurs, rubs or gallops  Respiratory: clear to auscultation bilaterally, no wheezes, crackles or rhonchi, unlabored respirations  Abdomen: normoactive bowel sounds x4, non tender, nondistended  Ext:  No edema,   Neuro: A&O x3, no focal neurological deficits                                                --------------------------------------------------------------    ASSESSMENT & PLAN    Past medical history and hospital course             # Handoff      RAMÍREZ RODARTE 57y Female  MRN#: [id number]   CODE STATUS:________    Hospital Day: 1d    Pt is currently admitted with the primary diagnosis of abdominal pain     SUBJECTIVE  Overnight events: none     Subjective complaints: pt with severe abdominal pain     Present Today:   - Gooden:  No [  x], Yes [   ] : Indication:     - Type of IV Access:       .. CVC/Piccline:  No [  x], Yes [   ] : Indication:       .. Midline: No [ x ], Yes [   ] : Indication:                                             ----------------------------------------------------------  OBJECTIVE  PAST MEDICAL & SURGICAL HISTORY  Back pain  henriated discs    Seizures  last one 6 yrs ago    Migraines    Hypercholesteremia    Seasonal allergic rhinitis, unspecified trigger    GERD (gastroesophageal reflux disease)    Hiatal hernia    Anxiety    HTN (hypertension)    Osteoarthritis    Sciatica    Tobacco use disorder    History of surgery  4 rght knee sx  1 left knee sx  cholecystectomy  rotator cuff right  abdominal sx-- adhesions  c section  ovarian cystectomy  breast cystectomies  colonoscopy   endoscopies  parital hysterectomy    S/p partial hysterectomy with remaining cervical stump    H/O rotator cuff surgery  right    History of appendectomy    S/P cholecystectomy                                              -----------------------------------------------------------  ALLERGIES:  Neurontin (Unknown)  Toradol (Rash; Urticaria; Anaphylaxis (Mild to Mod))  Cipro (Hives)  penicillin (Hives)  Toradol (Hives)                                            ------------------------------------------------------------    HOME MEDICATIONS  Home Medications:  atorvastatin 80 mg oral tablet: 1 tab(s) orally once a day (07 Jun 2023 23:23)  busPIRone 10 mg oral tablet: orally 3 times a day (07 Jun 2023 23:23)  Fioricet oral tablet: 1 tab(s) orally 3 times a day, As Needed (07 Jun 2023 23:23)  hydrOXYzine hydrochloride 25 mg oral tablet: 1 tab(s) orally once a day (07 Jun 2023 23:23)  Incruse Ellipta 62.5 mcg/inh inhalation powder:  (07 Jun 2023 23:23)  KlonoPIN 2 mg oral tablet: 1 tab(s) orally 3 times a day (07 Jun 2023 23:23)  levETIRAcetam 250 mg oral tablet: 1 tab(s) orally 2 times a day (07 Jun 2023 23:23)  Linzess 290 mcg oral capsule: 1 cap(s) orally once a day (07 Jun 2023 23:23)  METOPROLOL SUCCINATE ER 50MG ER TAB: tab(s) orally once a day (07 Jun 2023 23:23)  Myrbetriq 50 mg oral tablet, extended release: 1 tab(s) orally once a day (07 Jun 2023 23:23)  ondansetron 4 mg oral tablet: 1 tab(s) orally 4 times a day as needed for  nausea (07 Jun 2023 23:23)  Pepcid 40 mg oral tablet: orally 2 times a day (07 Jun 2023 23:23)  Protonix 40 mg oral delayed release tablet: 1 tab(s) orally 2 times a day (07 Jun 2023 23:23)                           MEDICATIONS:  STANDING MEDICATIONS  atorvastatin 80 milliGRAM(s) Oral at bedtime  busPIRone 10 milliGRAM(s) Oral three times a day  clonazePAM  Tablet 2 milliGRAM(s) Oral three times a day  dicyclomine 10 milliGRAM(s) Oral three times a day before meals  enoxaparin Injectable 40 milliGRAM(s) SubCutaneous every 24 hours  levETIRAcetam 250 milliGRAM(s) Oral two times a day  metoprolol succinate ER 50 milliGRAM(s) Oral daily  pantoprazole  Injectable 40 milliGRAM(s) IV Push daily  polyethylene glycol 3350 17 Gram(s) Oral daily  senna 2 Tablet(s) Oral at bedtime  sodium chloride 0.9%. 1000 milliLiter(s) IV Continuous <Continuous>    PRN MEDICATIONS  acetaminophen     Tablet .. 650 milliGRAM(s) Oral every 6 hours PRN  aluminum hydroxide/magnesium hydroxide/simethicone Suspension 30 milliLiter(s) Oral every 4 hours PRN  HYDROmorphone  Injectable 0.5 milliGRAM(s) IV Push every 6 hours PRN  hydrOXYzine hydrochloride 25 milliGRAM(s) Oral three times a day PRN  melatonin 3 milliGRAM(s) Oral at bedtime PRN  ondansetron Injectable 4 milliGRAM(s) IV Push every 8 hours PRN  oxyCODONE    IR 5 milliGRAM(s) Oral two times a day PRN                                            ------------------------------------------------------------                                               --------------------------------------------------------------  LABS:                        11.9   4.42  )-----------( 174      ( 08 Jun 2023 08:40 )             34.5     06-07    142  |  106  |  12  ----------------------------<  108<H>  3.5   |  23  |  0.7    Ca    9.8      07 Jun 2023 15:25    TPro  7.2  /  Alb  4.6  /  TBili  <0.2  /  DBili  x   /  AST  26  /  ALT  34  /  AlkPhos  114  06-07          Lactate, Blood: 1.1 mmol/L (06-07-23 @ 16:09)                                                        -------------------------------------------------------------  RADIOLOGY:  KUB Nonspecific, nonobstructive bowel gas pattern. Contrast in the colon.   Excreted contrast in the bladder. Surgical clips overlying the abdomen.                                            --------------------------------------------------------------  VITAL SIGNS: Last 24 Hours  T(C): 36 (08 Jun 2023 04:45), Max: 36.7 (07 Jun 2023 13:46)  T(F): 96.8 (08 Jun 2023 04:45), Max: 98 (07 Jun 2023 13:46)  HR: 54 (08 Jun 2023 04:45) (54 - 83)  BP: 120/58 (08 Jun 2023 04:45) (120/58 - 149/88)  BP(mean): --  RR: 18 (08 Jun 2023 04:45) (18 - 18)  SpO2: 97% (07 Jun 2023 16:17) (97% - 97%)    PHYSICAL EXAM:  General: Awake, oriented and in visible discomfort.   Head: normocephalic, atraumatic  ENT:  moist mucous membranes  Cardiac: regular rate and rhythm, normal S1 and S2, no murmurs, rubs or gallops  Respiratory: clear to auscultation bilaterally, no wheezes, crackles or rhonchi, unlabored respirations  Abdomen: hyperactive bowel sounds x4, tender in b/l lower quadrants   Ext:  No edema,   Neuro: A&O x3, no focal neurological deficits                                                --------------------------------------------------------------    ASSESSMENT & PLAN    Past medical history and hospital course     58 y/o F with a PMH of HTN, non obstructive CAD, COPD, HLD, anxiety, seizure d/o, migraine, GERD, and multiple abdominal surgeries including appendectomy/resection of appendiceal stump, cholecystectomy, exploratory laparotomy with lysis of adhesions,  hysterectomy unilateral oophorectomy, Hiatal hernia present to the ED for eval of lower abd pain.     Labs unremarkable including lactate  CT A/p with oral/iv con - showed signs of colitis - unclear if no obstruction   In the ED given Cefepime/Flagyl - 1L NS - Dilaudid  Colonoscopy (March, 2023): internal and external hemorrhoids otherwise unremarkable, hyperplastic polyp    Active Issues    #Acute colitis without signs of SBO   #Dark stools likely LGIB from hemorrhoids  #Hx of appendectomy - cholecystectomy - unilateral oophorectomy/hysterectomy   #Hx of SIBO 2/2 obstructive intraabdominal adhesions s/p anastomotic surgery  - Monitor for signs of fever/WBC elevation - serial abdominal exams   - Monitor off IV abx  - NPO for now   - IVF for now   - Nausea control   - IV protonix daily   -bentyl q8   -senna, miralax   -KUB nonobstructive   - Consult GI   - GI PCR  - Consider C diff testing if GI PCR negative     Chronic Issues     #Non-obstructive CAD   #COPD not on home O2   #Overactive Bladder - on Myrbetriq   #Anxiety - on Klonipin   #Hiatal Hernia  #GERD  - c/w home meds    #DVT - lovenox sq     #GI - pantoprazole     #Diet - NPO     #Activity - IAT     #Code - Full code     #Disposition - IP           # Handoff

## 2023-06-08 NOTE — PROGRESS NOTE ADULT - ATTENDING COMMENTS
56 y/o F with a PMH of HTN, non obstructive CAD, COPD, HLD, anxiety, seizure d/o, migraine, GERD, and multiple abdominal surgeries including appendectomy/resection of appendiceal stump, cholecystectomy, exploratory laparotomy with lysis of adhesions,  hysterectomy unilateral oophorectomy, Hiatal hernia present to the ED for eval of lower abd pain.   CT A/p with oral/iv con - showed signs of colitis - unclear if no obstruction   Colonoscopy (March, 2023): internal and external hemorrhoids otherwise unremarkable, hyperplastic polyp    A/P   #Acute colitis without signs of SBO / suspected LGIB from hemorrhoids/ Hx of appendectomy - cholecystectomy - unilateral oophorectomy/hysterectomy/ Abdominal pain due to adhesions / Hx of SIBO 2/2 obstructive intraabdominal adhesions s/p anastomotic surgery  - pt has diarrhea now, will check GI PCR and C.dif monitor and replete electrolytes   - NPO for now, GI evaluating is pending  - IV Protonix daily , start bentyl q8, probiotics and Simethicone   - KUB nonobstructive   - pain meds PRN    #Non-obstructive CAD   - on statin and BB     #COPD not on home O2   - stable on RA now, nebs PRN     #Overactive Bladder   - on Myrbetriq     #Anxiety   - on Klonopin    #GERD  - c/w home meds    #DVT - Lovenox sq     #Progress Note Handoff  Pending (specify): GI consult, NPO for now, start bentyl, probiotics, Simethicone, check stool for GI and C.dif   Family discussion: I spoke with pt and her

## 2023-06-09 ENCOUNTER — TRANSCRIPTION ENCOUNTER (OUTPATIENT)
Age: 58
End: 2023-06-09

## 2023-06-09 LAB
ALBUMIN SERPL ELPH-MCNC: 3.8 G/DL — SIGNIFICANT CHANGE UP (ref 3.5–5.2)
ALP SERPL-CCNC: 95 U/L — SIGNIFICANT CHANGE UP (ref 30–115)
ALT FLD-CCNC: 24 U/L — SIGNIFICANT CHANGE UP (ref 0–41)
ANION GAP SERPL CALC-SCNC: 13 MMOL/L — SIGNIFICANT CHANGE UP (ref 7–14)
AST SERPL-CCNC: 22 U/L — SIGNIFICANT CHANGE UP (ref 0–41)
BILIRUB SERPL-MCNC: 0.2 MG/DL — SIGNIFICANT CHANGE UP (ref 0.2–1.2)
BUN SERPL-MCNC: 11 MG/DL — SIGNIFICANT CHANGE UP (ref 10–20)
CALCIUM SERPL-MCNC: 8.9 MG/DL — SIGNIFICANT CHANGE UP (ref 8.4–10.5)
CHLORIDE SERPL-SCNC: 108 MMOL/L — SIGNIFICANT CHANGE UP (ref 98–110)
CO2 SERPL-SCNC: 20 MMOL/L — SIGNIFICANT CHANGE UP (ref 17–32)
CREAT SERPL-MCNC: 0.6 MG/DL — LOW (ref 0.7–1.5)
EGFR: 104 ML/MIN/1.73M2 — SIGNIFICANT CHANGE UP
GLUCOSE SERPL-MCNC: 61 MG/DL — LOW (ref 70–99)
HCT VFR BLD CALC: 33.1 % — LOW (ref 37–47)
HGB BLD-MCNC: 11.3 G/DL — LOW (ref 12–16)
MAGNESIUM SERPL-MCNC: 1.7 MG/DL — LOW (ref 1.8–2.4)
MCHC RBC-ENTMCNC: 31.7 PG — HIGH (ref 27–31)
MCHC RBC-ENTMCNC: 34.1 G/DL — SIGNIFICANT CHANGE UP (ref 32–37)
MCV RBC AUTO: 92.7 FL — SIGNIFICANT CHANGE UP (ref 81–99)
NRBC # BLD: 0 /100 WBCS — SIGNIFICANT CHANGE UP (ref 0–0)
PLATELET # BLD AUTO: 174 K/UL — SIGNIFICANT CHANGE UP (ref 130–400)
PMV BLD: 10.6 FL — HIGH (ref 7.4–10.4)
POTASSIUM SERPL-MCNC: 4.1 MMOL/L — SIGNIFICANT CHANGE UP (ref 3.5–5)
POTASSIUM SERPL-SCNC: 4.1 MMOL/L — SIGNIFICANT CHANGE UP (ref 3.5–5)
PROT SERPL-MCNC: 5.9 G/DL — LOW (ref 6–8)
RBC # BLD: 3.57 M/UL — LOW (ref 4.2–5.4)
RBC # FLD: 12.9 % — SIGNIFICANT CHANGE UP (ref 11.5–14.5)
SODIUM SERPL-SCNC: 141 MMOL/L — SIGNIFICANT CHANGE UP (ref 135–146)
WBC # BLD: 3.77 K/UL — LOW (ref 4.8–10.8)
WBC # FLD AUTO: 3.77 K/UL — LOW (ref 4.8–10.8)

## 2023-06-09 PROCEDURE — 74018 RADEX ABDOMEN 1 VIEW: CPT | Mod: 26

## 2023-06-09 PROCEDURE — 99233 SBSQ HOSP IP/OBS HIGH 50: CPT

## 2023-06-09 RX ORDER — MAGNESIUM SULFATE 500 MG/ML
2 VIAL (ML) INJECTION ONCE
Refills: 0 | Status: COMPLETED | OUTPATIENT
Start: 2023-06-09 | End: 2023-06-09

## 2023-06-09 RX ADMIN — ENOXAPARIN SODIUM 40 MILLIGRAM(S): 100 INJECTION SUBCUTANEOUS at 06:14

## 2023-06-09 RX ADMIN — Medication 2 MILLIGRAM(S): at 13:38

## 2023-06-09 RX ADMIN — HYDROMORPHONE HYDROCHLORIDE 0.5 MILLIGRAM(S): 2 INJECTION INTRAMUSCULAR; INTRAVENOUS; SUBCUTANEOUS at 17:36

## 2023-06-09 RX ADMIN — LEVETIRACETAM 250 MILLIGRAM(S): 250 TABLET, FILM COATED ORAL at 17:46

## 2023-06-09 RX ADMIN — ATORVASTATIN CALCIUM 80 MILLIGRAM(S): 80 TABLET, FILM COATED ORAL at 21:14

## 2023-06-09 RX ADMIN — POLYETHYLENE GLYCOL 3350 17 GRAM(S): 17 POWDER, FOR SOLUTION ORAL at 11:49

## 2023-06-09 RX ADMIN — LEVETIRACETAM 250 MILLIGRAM(S): 250 TABLET, FILM COATED ORAL at 06:13

## 2023-06-09 RX ADMIN — HYDROMORPHONE HYDROCHLORIDE 0.5 MILLIGRAM(S): 2 INJECTION INTRAMUSCULAR; INTRAVENOUS; SUBCUTANEOUS at 16:55

## 2023-06-09 RX ADMIN — Medication 2 MILLIGRAM(S): at 21:17

## 2023-06-09 RX ADMIN — PANTOPRAZOLE SODIUM 40 MILLIGRAM(S): 20 TABLET, DELAYED RELEASE ORAL at 11:49

## 2023-06-09 RX ADMIN — Medication 250 MILLIGRAM(S): at 17:46

## 2023-06-09 RX ADMIN — Medication 250 MILLIGRAM(S): at 06:13

## 2023-06-09 RX ADMIN — HYDROMORPHONE HYDROCHLORIDE 0.5 MILLIGRAM(S): 2 INJECTION INTRAMUSCULAR; INTRAVENOUS; SUBCUTANEOUS at 10:52

## 2023-06-09 RX ADMIN — HYDROMORPHONE HYDROCHLORIDE 0.5 MILLIGRAM(S): 2 INJECTION INTRAMUSCULAR; INTRAVENOUS; SUBCUTANEOUS at 23:00

## 2023-06-09 RX ADMIN — Medication 2 MILLIGRAM(S): at 06:13

## 2023-06-09 RX ADMIN — Medication 10 MILLIGRAM(S): at 06:12

## 2023-06-09 RX ADMIN — Medication 10 MILLIGRAM(S): at 13:38

## 2023-06-09 RX ADMIN — Medication 10 MILLIGRAM(S): at 16:28

## 2023-06-09 RX ADMIN — Medication 10 MILLIGRAM(S): at 06:13

## 2023-06-09 RX ADMIN — SENNA PLUS 2 TABLET(S): 8.6 TABLET ORAL at 21:14

## 2023-06-09 RX ADMIN — HYDROMORPHONE HYDROCHLORIDE 0.5 MILLIGRAM(S): 2 INJECTION INTRAMUSCULAR; INTRAVENOUS; SUBCUTANEOUS at 13:35

## 2023-06-09 RX ADMIN — Medication 10 MILLIGRAM(S): at 10:54

## 2023-06-09 RX ADMIN — Medication 10 MILLIGRAM(S): at 21:14

## 2023-06-09 RX ADMIN — HYDROMORPHONE HYDROCHLORIDE 0.5 MILLIGRAM(S): 2 INJECTION INTRAMUSCULAR; INTRAVENOUS; SUBCUTANEOUS at 03:01

## 2023-06-09 RX ADMIN — OXYCODONE HYDROCHLORIDE 5 MILLIGRAM(S): 5 TABLET ORAL at 20:18

## 2023-06-09 RX ADMIN — SIMETHICONE 80 MILLIGRAM(S): 80 TABLET, CHEWABLE ORAL at 11:49

## 2023-06-09 RX ADMIN — Medication 25 GRAM(S): at 13:45

## 2023-06-09 RX ADMIN — HYDROMORPHONE HYDROCHLORIDE 0.5 MILLIGRAM(S): 2 INJECTION INTRAMUSCULAR; INTRAVENOUS; SUBCUTANEOUS at 03:25

## 2023-06-09 NOTE — DISCHARGE NOTE PROVIDER - NSDCFUSCHEDAPPT_GEN_ALL_CORE_FT
Luis Leal  [facility]  GASTRO Doc Off [id number]  Scheduled Appointment: 06/23/2023

## 2023-06-09 NOTE — DISCHARGE NOTE PROVIDER - NSDCMRMEDTOKEN_GEN_ALL_CORE_FT
atorvastatin 80 mg oral tablet: 1 tab(s) orally once a day  busPIRone 10 mg oral tablet: orally 3 times a day  Fioricet oral tablet: 1 tab(s) orally 3 times a day, As Needed  hydrOXYzine hydrochloride 25 mg oral tablet: 1 tab(s) orally once a day  Incruse Ellipta 62.5 mcg/inh inhalation powder:   KlonoPIN 2 mg oral tablet: 1 tab(s) orally 3 times a day  levETIRAcetam 250 mg oral tablet: 1 tab(s) orally 2 times a day  Linzess 290 mcg oral capsule: 1 cap(s) orally once a day  METOPROLOL SUCCINATE ER 50MG ER TAB: tab(s) orally once a day  metroNIDAZOLE 500 mg oral tablet: 1 tab(s) orally every 8 hours  Myrbetriq 50 mg oral tablet, extended release: 1 tab(s) orally once a day  Narcan 4 mg/0.1 mL nasal spray: 4 milligram(s) intranasally once a day as needed for overdose  ondansetron 4 mg oral tablet: 1 tab(s) orally 4 times a day as needed for  nausea  oxyCODONE 5 mg oral tablet: 1 tab(s) orally 2 times a day as needed for  moderate pain MDD: 2 pills a day  Pepcid 40 mg oral tablet: orally 2 times a day  polyethylene glycol 3350 oral powder for reconstitution: 17 gram(s) orally 3 times a day   Protonix 40 mg oral delayed release tablet: 1 tab(s) orally 2 times a day   atorvastatin 80 mg oral tablet: 1 tab(s) orally once a day  busPIRone 10 mg oral tablet: orally 3 times a day  Fioricet oral tablet: 1 tab(s) orally 3 times a day, As Needed  hydrOXYzine hydrochloride 25 mg oral tablet: 1 tab(s) orally once a day  Incruse Ellipta 62.5 mcg/inh inhalation powder:   KlonoPIN 2 mg oral tablet: 1 tab(s) orally 3 times a day  levETIRAcetam 250 mg oral tablet: 1 tab(s) orally 2 times a day  Linzess 290 mcg oral capsule: 1 cap(s) orally once a day  METOPROLOL SUCCINATE ER 50MG ER TAB: tab(s) orally once a day  Myrbetriq 50 mg oral tablet, extended release: 1 tab(s) orally once a day  Narcan 4 mg/0.1 mL nasal spray: 4 milligram(s) intranasally once a day as needed for overdose  ondansetron 4 mg oral tablet: 1 tab(s) orally 4 times a day as needed for  nausea  oxyCODONE 5 mg oral tablet: 1 tab(s) orally 2 times a day as needed for  moderate pain MDD: 2 pills a day  Pepcid 40 mg oral tablet: orally 2 times a day  polyethylene glycol 3350 oral powder for reconstitution: 17 gram(s) orally 3 times a day

## 2023-06-09 NOTE — PROGRESS NOTE ADULT - SUBJECTIVE AND OBJECTIVE BOX
56 y/o F with a PMH of HTN, non obstructive CAD, COPD, HLD, anxiety, seizure d/o, migraine, GERD, and multiple abdominal surgeries including appendectomy/resection of appendiceal stump, cholecystectomy, exploratory laparotomy with lysis of adhesions,  hysterectomy unilateral oophorectomy, Hiatal hernia present to the ED for eval of lower abd pain.   CT A/p with oral/iv con - showed signs of colitis - unclear if no obstruction   Colonoscopy (March, 2023): internal and external hemorrhoids otherwise unremarkable, hyperplastic polyp. Abdominal pain is likely due to adhesions.   Today pt is still c/o lower abdominal pain, denies diarrhea, passing gas, asking for pain medications.     PAST MEDICAL & SURGICAL HISTORY:  Back pain  henriated discs  Seizures  last one 6 yrs ago  Migraines  Hypercholesteremia  Seasonal allergic rhinitis, unspecified trigger  GERD (gastroesophageal reflux disease)  Hiatal hernia  Anxiety  HTN (hypertension)  Osteoarthritis  Sciatica  Tobacco use disorder  History of surgery  4 rght knee sx  1 left knee sx  cholecystectomy  rotator cuff right  abdominal sx-- adhesions  c section  ovarian cystectomy  breast cystectomies  colonoscopy   endoscopies  parital hysterectomy      S/p partial hysterectomy with remaining cervical stump      H/O rotator cuff surgery  right      History of appendectomy      S/P cholecystectomy        OBJECTIVE    VITAL SIGNS: Last 24 Hours  T(C): 36.9 (09 Jun 2023 14:19), Max: 37 (08 Jun 2023 19:54)  T(F): 98.4 (09 Jun 2023 14:19), Max: 98.6 (08 Jun 2023 19:54)  HR: 57 (09 Jun 2023 14:19) (50 - 63)  BP: 153/67 (09 Jun 2023 14:19) (133/63 - 153/67)  RR: 18 (09 Jun 2023 14:19) (18 - 18)  SpO2: 98% (08 Jun 2023 20:35) (98% - 98%)    Parameters below as of 08 Jun 2023 20:35  Patient On (Oxygen Delivery Method): room air        PHYSICAL EXAM:  General: In NAD.  HEENT: Normocephalic, nontraumatic. PERRL.  LUNGS: Clear to auscultation b/l. No wheezes, rales, or rhonchi.  HEART: RRR. No murmurs, rubs, or gallops.  ABDOMEN: Soft, nondistended, multiple surgical scars noted, diffuse abdominal pain on palpation and percussion, no rebound, voluntary guarding, BS present   EXT: Pulses palpable x 4. No lower extremity edema.  NEURO: AAOx3. No focal deficits.   SKIN: Warm, dry.      ALLERGIES:  Neurontin (Unknown)  Toradol (Rash; Urticaria; Anaphylaxis (Mild to Mod))  Cipro (Hives)  penicillin (Hives)  Toradol (Hives)        HOME MEDICATIONS  Home Medications:  atorvastatin 80 mg oral tablet: 1 tab(s) orally once a day (07 Jun 2023 23:23)  busPIRone 10 mg oral tablet: orally 3 times a day (07 Jun 2023 23:23)  Fioricet oral tablet: 1 tab(s) orally 3 times a day, As Needed (07 Jun 2023 23:23)  hydrOXYzine hydrochloride 25 mg oral tablet: 1 tab(s) orally once a day (07 Jun 2023 23:23)  Incruse Ellipta 62.5 mcg/inh inhalation powder:  (07 Jun 2023 23:23)  KlonoPIN 2 mg oral tablet: 1 tab(s) orally 3 times a day (07 Jun 2023 23:23)  levETIRAcetam 250 mg oral tablet: 1 tab(s) orally 2 times a day (07 Jun 2023 23:23)  Linzess 290 mcg oral capsule: 1 cap(s) orally once a day (07 Jun 2023 23:23)  METOPROLOL SUCCINATE ER 50MG ER TAB: tab(s) orally once a day (07 Jun 2023 23:23)  Myrbetriq 50 mg oral tablet, extended release: 1 tab(s) orally once a day (07 Jun 2023 23:23)  ondansetron 4 mg oral tablet: 1 tab(s) orally 4 times a day as needed for  nausea (07 Jun 2023 23:23)  Pepcid 40 mg oral tablet: orally 2 times a day (07 Jun 2023 23:23)  Protonix 40 mg oral delayed release tablet: 1 tab(s) orally 2 times a day (07 Jun 2023 23:23)      LABS:                        11.9   4.42  )-----------( 174      ( 08 Jun 2023 08:40 )             34.5     06-08    145  |  110  |  10  ----------------------------<  90  3.9   |  26  |  0.7    Ca    8.7      08 Jun 2023 08:40    TPro  5.9<L>  /  Alb  3.8  /  TBili  <0.2  /  DBili  x   /  AST  23  /  ALT  25  /  AlkPhos  94  06-08      RADIOLOGY:    < from: Xray Kidney Ureter Bladder (06.08.23 @ 09:45) >  impression:  Nonspecific, nonobstructive bowel gas pattern. Contrast in the colon.   Excreted contrast in the bladder. Surgical clips overlying the abdomen.      < from: CT Abdomen and Pelvis w/ Oral Cont and w/ IV Cont (06.07.23 @ 17:21) >  IMPRESSION:    Compared with the scan of 5/9/2023, there has been a mild increase in the   degree of rectosigmoid mural thickening, compatible with colitis.    The small bowel anastomosis is noted in the right lower quadrant. The   oral contrast material has not quite reached the cecum at this time   however there is no evidence of small bowel distention or obstruction. If   symptoms persist or worsen, a follow-up CT scan may be obtained.    MEDICATIONS  (STANDING):  atorvastatin 80 milliGRAM(s) Oral at bedtime  busPIRone 10 milliGRAM(s) Oral three times a day  clonazePAM  Tablet 2 milliGRAM(s) Oral three times a day  dicyclomine 10 milliGRAM(s) Oral three times a day before meals  enoxaparin Injectable 40 milliGRAM(s) SubCutaneous every 24 hours  levETIRAcetam 250 milliGRAM(s) Oral two times a day  pantoprazole  Injectable 40 milliGRAM(s) IV Push daily  polyethylene glycol 3350 17 Gram(s) Oral daily  saccharomyces boulardii 250 milliGRAM(s) Oral two times a day  senna 2 Tablet(s) Oral at bedtime  simethicone 80 milliGRAM(s) Chew daily  sodium chloride 0.9%. 1000 milliLiter(s) (75 mL/Hr) IV Continuous <Continuous>

## 2023-06-09 NOTE — PROGRESS NOTE ADULT - ASSESSMENT
56 y/o F with a PMH of HTN, non obstructive CAD, COPD, HLD, anxiety, seizure d/o, migraine, GERD, and multiple abdominal surgeries including appendectomy/resection of appendiceal stump, cholecystectomy, exploratory laparotomy with lysis of adhesions,  hysterectomy unilateral oophorectomy, Hiatal hernia present to the ED for eval of lower abd pain.   CT A/p with oral/iv con - showed signs of colitis - unclear if no obstruction   Colonoscopy (March, 2023): internal and external hemorrhoids otherwise unremarkable, hyperplastic polyp    A/P   #Acute colitis without signs of SBO / suspected LGIB from hemorrhoids/ Hx of appendectomy - cholecystectomy - unilateral oophorectomy/hysterectomy/ Abdominal pain due to adhesions / Hx of SIBO 2/2 obstructive intraabdominal adhesions s/p anastomotic surgery  - pt has diarrhea now, will check GI PCR and C.dif monitor and replete electrolytes   - NPO for now, GI evaluating is pending  - IV Protonix daily , start bentyl q8, probiotics and Simethicone   - KUB nonobstructive   - pain meds PRN    #Non-obstructive CAD   - on statin and BB     #COPD not on home O2   - stable on RA now, nebs PRN     #Overactive Bladder   - on Myrbetriq     #Anxiety   - on Klonopin    #GERD  - c/w home meds    #DVT - Lovenox sq     #Progress Note Handoff  Pending (specify): GI consult, NPO for now, start bentyl, probiotics, Simethicone, check stool for GI and C.dif   Family discussion: I spoke with pt and her  .   56 y/o F with a PMH of HTN, non obstructive CAD, COPD, HLD, anxiety, seizure d/o, migraine, GERD, and multiple abdominal surgeries including appendectomy/resection of appendiceal stump, cholecystectomy, exploratory laparotomy with lysis of adhesions,  hysterectomy unilateral oophorectomy, Hiatal hernia present to the ED for eval of lower abd pain.   CT A/p with oral/iv con - showed signs of colitis - unclear if no obstruction   Colonoscopy (March, 2023): internal and external hemorrhoids otherwise unremarkable, hyperplastic polyp    #Acute colitis without signs of SBO / suspected LGIB from hemorrhoids/ Hx of appendectomy - cholecystectomy - unilateral oophorectomy/hysterectomy/ Abdominal pain due to adhesions / Hx of SIBO 2/2 obstructive intraabdominal adhesions s/p anastomotic surgery  - pt has diarrhea now, will check GI PCR and C.dif monitor and replete electrolytes   - IV Protonix daily , start bentyl q8, probiotics and Simethicone   - KUB nonobstructive   - pain meds PRN  - GI consult appreciated: advance diet as tolerated, supportive measures, monitor off abx    #Non-obstructive CAD   - on statin and BB    #COPD not on home O2   - stable on RA now, nebs PRN     #Overactive Bladder   - on Myrbetriq     #Anxiety   - on Klonopin    #Hx of seizures  - c/w home Keppra    #GERD  - Protnoix     #DVT - Lovenox sq     #Progress Note Handoff  Pending (specify): advance diet

## 2023-06-09 NOTE — DISCHARGE NOTE PROVIDER - CARE PROVIDER_API CALL
Raghavendra Umana  [address] [address]  Phone: [phone]  Fax: [phone]  Follow Up Time: 1 week

## 2023-06-09 NOTE — DISCHARGE NOTE PROVIDER - NSDCCPCAREPLAN_GEN_ALL_CORE_FT
PRINCIPAL DISCHARGE DIAGNOSIS  Diagnosis: Colitis  Assessment and Plan of Treatment: *Continue to advance your diet as tolerated. Start with more easily digested foods with small portions     PRINCIPAL DISCHARGE DIAGNOSIS  Diagnosis: Colitis  Assessment and Plan of Treatment: You presented to us with abdominal pain. Blood work and imaging studies were obtained. Your abdominal pain was likely from multiple scars from prior surgeries and inflamation of large bowel. You were managed with IV fluids and supportive care, there was no evidence of bacterial infection. Your symptoms(Abdominal pain/nausea/diarrhea) improved over the course of hospitalization and you tolerated diet well.  Please continue to use the medication as prescribed and follow-up with primary care physician as outpatient. Return to ED if symptoms worsen or return.

## 2023-06-09 NOTE — PROGRESS NOTE ADULT - SUBJECTIVE AND OBJECTIVE BOX
RAMÍREZ RODARTE 57y Female  MRN#: [id number]  Hospital Day: 2d    Pt is currently admitted with the primary diagnosis of    SUBJECTIVE  Overnight events:  Subjective complaints:    Present Today:   - Gooden:  No [  ], Yes [   ] : Indication:     - Type of IV Access:       .. CVC/Piccline:  No [  ], Yes [   ] : Indication:       .. Midline: No [  ], Yes [   ] : Indication:                                             ----------------------------------------------------------  OBJECTIVE    VITAL SIGNS: Last 24 Hours  T(C): 36.7 (09 Jun 2023 04:53), Max: 37 (08 Jun 2023 19:54)  T(F): 98.1 (09 Jun 2023 04:53), Max: 98.6 (08 Jun 2023 19:54)  HR: 63 (09 Jun 2023 04:53) (46 - 63)  BP: 144/72 (09 Jun 2023 04:53) (113/54 - 144/72)  BP(mean): --  RR: 18 (09 Jun 2023 04:53) (18 - 18)  SpO2: 98% (08 Jun 2023 20:35) (95% - 98%)        PHYSICAL EXAM:  General: well-appearing in NAD.  HEENT: Normocephalic, nontraumatic. PERRL.  LUNGS: Clear to auscultation b/l. No wheezes, rales, or rhonchi.  HEART: RRR. No murmurs, rubs, or gallops.  ABDOMEN: Soft, nontender, nondistended. + bowel sounds.  EXT: Pulses palpable x 4. No lower extremity edema.  NEURO: AAOx4.  SKIN: Warm, dry.    PAST MEDICAL & SURGICAL HISTORY  Back pain  henriated discs    Seizures  last one 6 yrs ago    Migraines    Hypercholesteremia    Seasonal allergic rhinitis, unspecified trigger    GERD (gastroesophageal reflux disease)    Hiatal hernia    Anxiety    HTN (hypertension)    Osteoarthritis    Sciatica    Tobacco use disorder    History of surgery  4 rght knee sx  1 left knee sx  cholecystectomy  rotator cuff right  abdominal sx-- adhesions  c section  ovarian cystectomy  breast cystectomies  colonoscopy   endoscopies  parital hysterectomy    S/p partial hysterectomy with remaining cervical stump    H/O rotator cuff surgery  right    History of appendectomy    S/P cholecystectomy                                              -----------------------------------------------------------  ALLERGIES:  Neurontin (Unknown)  Toradol (Rash; Urticaria; Anaphylaxis (Mild to Mod))  Cipro (Hives)  penicillin (Hives)  Toradol (Hives)                                            ------------------------------------------------------------    HOME MEDICATIONS  Home Medications:  atorvastatin 80 mg oral tablet: 1 tab(s) orally once a day (07 Jun 2023 23:23)  busPIRone 10 mg oral tablet: orally 3 times a day (07 Jun 2023 23:23)  Fioricet oral tablet: 1 tab(s) orally 3 times a day, As Needed (07 Jun 2023 23:23)  hydrOXYzine hydrochloride 25 mg oral tablet: 1 tab(s) orally once a day (07 Jun 2023 23:23)  Incruse Ellipta 62.5 mcg/inh inhalation powder:  (07 Jun 2023 23:23)  KlonoPIN 2 mg oral tablet: 1 tab(s) orally 3 times a day (07 Jun 2023 23:23)  levETIRAcetam 250 mg oral tablet: 1 tab(s) orally 2 times a day (07 Jun 2023 23:23)  Linzess 290 mcg oral capsule: 1 cap(s) orally once a day (07 Jun 2023 23:23)  METOPROLOL SUCCINATE ER 50MG ER TAB: tab(s) orally once a day (07 Jun 2023 23:23)  Myrbetriq 50 mg oral tablet, extended release: 1 tab(s) orally once a day (07 Jun 2023 23:23)  ondansetron 4 mg oral tablet: 1 tab(s) orally 4 times a day as needed for  nausea (07 Jun 2023 23:23)  Pepcid 40 mg oral tablet: orally 2 times a day (07 Jun 2023 23:23)  Protonix 40 mg oral delayed release tablet: 1 tab(s) orally 2 times a day (07 Jun 2023 23:23)                           MEDICATIONS:  STANDING MEDICATIONS  atorvastatin 80 milliGRAM(s) Oral at bedtime  busPIRone 10 milliGRAM(s) Oral three times a day  clonazePAM  Tablet 2 milliGRAM(s) Oral three times a day  dicyclomine 10 milliGRAM(s) Oral three times a day before meals  enoxaparin Injectable 40 milliGRAM(s) SubCutaneous every 24 hours  levETIRAcetam 250 milliGRAM(s) Oral two times a day  pantoprazole  Injectable 40 milliGRAM(s) IV Push daily  polyethylene glycol 3350 17 Gram(s) Oral daily  saccharomyces boulardii 250 milliGRAM(s) Oral two times a day  senna 2 Tablet(s) Oral at bedtime  simethicone 80 milliGRAM(s) Chew daily  sodium chloride 0.9%. 1000 milliLiter(s) IV Continuous <Continuous>    PRN MEDICATIONS  acetaminophen     Tablet .. 650 milliGRAM(s) Oral every 6 hours PRN  aluminum hydroxide/magnesium hydroxide/simethicone Suspension 30 milliLiter(s) Oral every 4 hours PRN  HYDROmorphone  Injectable 0.5 milliGRAM(s) IV Push every 6 hours PRN  hydrOXYzine hydrochloride 25 milliGRAM(s) Oral three times a day PRN  melatonin 3 milliGRAM(s) Oral at bedtime PRN  ondansetron Injectable 4 milliGRAM(s) IV Push every 8 hours PRN  oxyCODONE    IR 5 milliGRAM(s) Oral two times a day PRN                                            ------------------------------------------------------------                                           --------------------------------------------------------------  LABS:                        11.9   4.42  )-----------( 174      ( 08 Jun 2023 08:40 )             34.5     06-08    145  |  110  |  10  ----------------------------<  90  3.9   |  26  |  0.7    Ca    8.7      08 Jun 2023 08:40    TPro  5.9<L>  /  Alb  3.8  /  TBili  <0.2  /  DBili  x   /  AST  23  /  ALT  25  /  AlkPhos  94  06-08                                                              -------------------------------------------------------------  RADIOLOGY:                                            --------------------------------------------------------------               RAMÍREZ RODARTE 57y Female  MRN#: [id number]  Hospital Day: 2d    Pt is currently admitted with the primary diagnosis of abdominal pain    SUBJECTIVE  Overnight events: None  Subjective complaints: Continuing lower abdominal pain. Intermittent nausea and vomiting. Some diarrhea yesterday.    Present Today:   - Gooden:  No [ x ], Yes [   ] : Indication:     - Type of IV Access:       .. CVC/Piccline:  No [ x ], Yes [   ] : Indication:       .. Midline: No [ x ], Yes [   ] : Indication:                                             ----------------------------------------------------------  OBJECTIVE    VITAL SIGNS: Last 24 Hours  T(C): 36.7 (09 Jun 2023 04:53), Max: 37 (08 Jun 2023 19:54)  T(F): 98.1 (09 Jun 2023 04:53), Max: 98.6 (08 Jun 2023 19:54)  HR: 63 (09 Jun 2023 04:53) (46 - 63)  BP: 144/72 (09 Jun 2023 04:53) (113/54 - 144/72)  BP(mean): --  RR: 18 (09 Jun 2023 04:53) (18 - 18)  SpO2: 98% (08 Jun 2023 20:35) (95% - 98%)    PHYSICAL EXAM:  General: In NAD.  HEENT: Normocephalic, nontraumatic. PERRL.  LUNGS: Clear to auscultation b/l. No wheezes, rales, or rhonchi.  HEART: RRR. No murmurs, rubs, or gallops.  ABDOMEN: Soft, nondistended. TTP. Normoactive bowel sounds. Midline surgical scar, healed, clean.  EXT: Pulses palpable x 4. No lower extremity edema.  NEURO: AAOx3. No focal deficits.   SKIN: Warm, dry.    PAST MEDICAL & SURGICAL HISTORY  Back pain henriated discs  Seizures last one 6 yrs ago  Migraines  Hypercholesteremia  Seasonal allergic rhinitis, unspecified trigger  GERD (gastroesophageal reflux disease)  Hiatal hernia  Anxiety  HTN (hypertension)  Osteoarthritis  Sciatica  Tobacco use disorder    History of surgery  4 rght knee sx  1 left knee sx  cholecystectomy  rotator cuff right  abdominal sx-- adhesions  c section  ovarian cystectomy  breast cystectomies  colonoscopy   endoscopies  parital hysterectomy  S/p partial hysterectomy with remaining cervical stump  H/O rotator cuff surgery right  History of appendectomy  S/P cholecystectomy                                              -----------------------------------------------------------  ALLERGIES:  Neurontin (Unknown)  Toradol (Rash; Urticaria; Anaphylaxis (Mild to Mod))  Cipro (Hives)  penicillin (Hives)  Toradol (Hives)                                            ------------------------------------------------------------    HOME MEDICATIONS  Home Medications:  atorvastatin 80 mg oral tablet: 1 tab(s) orally once a day (07 Jun 2023 23:23)  busPIRone 10 mg oral tablet: orally 3 times a day (07 Jun 2023 23:23)  Fioricet oral tablet: 1 tab(s) orally 3 times a day, As Needed (07 Jun 2023 23:23)  hydrOXYzine hydrochloride 25 mg oral tablet: 1 tab(s) orally once a day (07 Jun 2023 23:23)  Incruse Ellipta 62.5 mcg/inh inhalation powder:  (07 Jun 2023 23:23)  KlonoPIN 2 mg oral tablet: 1 tab(s) orally 3 times a day (07 Jun 2023 23:23)  levETIRAcetam 250 mg oral tablet: 1 tab(s) orally 2 times a day (07 Jun 2023 23:23)  Linzess 290 mcg oral capsule: 1 cap(s) orally once a day (07 Jun 2023 23:23)  METOPROLOL SUCCINATE ER 50MG ER TAB: tab(s) orally once a day (07 Jun 2023 23:23)  Myrbetriq 50 mg oral tablet, extended release: 1 tab(s) orally once a day (07 Jun 2023 23:23)  ondansetron 4 mg oral tablet: 1 tab(s) orally 4 times a day as needed for  nausea (07 Jun 2023 23:23)  Pepcid 40 mg oral tablet: orally 2 times a day (07 Jun 2023 23:23)  Protonix 40 mg oral delayed release tablet: 1 tab(s) orally 2 times a day (07 Jun 2023 23:23)                           MEDICATIONS:  STANDING MEDICATIONS  atorvastatin 80 milliGRAM(s) Oral at bedtime  busPIRone 10 milliGRAM(s) Oral three times a day  clonazePAM  Tablet 2 milliGRAM(s) Oral three times a day  dicyclomine 10 milliGRAM(s) Oral three times a day before meals  enoxaparin Injectable 40 milliGRAM(s) SubCutaneous every 24 hours  levETIRAcetam 250 milliGRAM(s) Oral two times a day  pantoprazole  Injectable 40 milliGRAM(s) IV Push daily  polyethylene glycol 3350 17 Gram(s) Oral daily  saccharomyces boulardii 250 milliGRAM(s) Oral two times a day  senna 2 Tablet(s) Oral at bedtime  simethicone 80 milliGRAM(s) Chew daily  sodium chloride 0.9%. 1000 milliLiter(s) IV Continuous <Continuous>    PRN MEDICATIONS  acetaminophen     Tablet .. 650 milliGRAM(s) Oral every 6 hours PRN  aluminum hydroxide/magnesium hydroxide/simethicone Suspension 30 milliLiter(s) Oral every 4 hours PRN  HYDROmorphone  Injectable 0.5 milliGRAM(s) IV Push every 6 hours PRN  hydrOXYzine hydrochloride 25 milliGRAM(s) Oral three times a day PRN  melatonin 3 milliGRAM(s) Oral at bedtime PRN  ondansetron Injectable 4 milliGRAM(s) IV Push every 8 hours PRN  oxyCODONE    IR 5 milliGRAM(s) Oral two times a day PRN                                            ------------------------------------------------------------    LABS:                        11.9   4.42  )-----------( 174      ( 08 Jun 2023 08:40 )             34.5     06-08    145  |  110  |  10  ----------------------------<  90  3.9   |  26  |  0.7    Ca    8.7      08 Jun 2023 08:40    TPro  5.9<L>  /  Alb  3.8  /  TBili  <0.2  /  DBili  x   /  AST  23  /  ALT  25  /  AlkPhos  94  06-08    CAPILLARY BLOOD GLUCOSE                                            -------------------------------------------------------------  RADIOLOGY:    < from: Xray Kidney Ureter Bladder (06.08.23 @ 09:45) >  impression:  Nonspecific, nonobstructive bowel gas pattern. Contrast in the colon.   Excreted contrast in the bladder. Surgical clips overlying the abdomen.      < from: CT Abdomen and Pelvis w/ Oral Cont and w/ IV Cont (06.07.23 @ 17:21) >  IMPRESSION:    Compared with the scan of 5/9/2023, there has been a mild increase in the   degree of rectosigmoid mural thickening, compatible with colitis.    The small bowel anastomosis is noted in the right lower quadrant. The   oral contrast material has not quite reached the cecum at this time   however there is no evidence of small bowel distention or obstruction. If   symptoms persist or worsen, a follow-up CT scan may be obtained.                                            --------------------------------------------------------------

## 2023-06-09 NOTE — DISCHARGE NOTE PROVIDER - HOSPITAL COURSE
56 y/o F with a PMH of HTN, non obstructive CAD, COPD, HLD, anxiety, seizure d/o, migraine, GERD, and multiple abdominal surgeries including appendectomy/resection of appendiceal stump, cholecystectomy, exploratory laparotomy with lysis of adhesions,  hysterectomy unilateral oophorectomy, Hiatal hernia present to the ED for eval of lower abdominal pain. Pt has moderate, intermittent aching lower abd pain with associated nausea and non-bloody vomiting. She is also complaining of dark stool for the past couple days. She is compliant with her medications and medical f/ups. Negative fever, chills, nausea, vomiting, chest pain, abdominal pain, sob, cough, constipation, diarrhea, incontinence    Labs unremarkable including lactate  CT A/p with oral/iv con - showed signs of colitis - unclear if no obstruction   In the ED given Cefepime/Flagyl - 1L NS - Dilaudid  Prior gastric emptying study: mild gastric slowing with 11% retention at 4 hrs   Colonoscopy (March, 2023): internal and external hemorrhoids otherwise unremarkable, hyperplastic polyp  Patient evaluated by GI, pain is chronic in nature, recommended supportive care and advancement of diet as tolerated.    #Acute colitis without signs of SBO / suspected LGIB from hemorrhoids/ Hx of appendectomy - cholecystectomy - unilateral oophorectomy/hysterectomy/ Abdominal pain due to adhesions / Hx of SIBO 2/2 obstructive intraabdominal adhesions s/p anastomotic surgery  - pt had intermittent diarrhea, resolved.   - IV Protonix daily , start bentyl q8, probiotics and Simethicone   - KUB nonobstructive   - pain meds PRN  - GI consult appreciated: advance diet as tolerated, supportive measures, monitor off abx    #Non-obstructive CAD   - on statin and BB    #COPD not on home O2   - stable on RA now, nebs PRN     #Overactive Bladder   - on Myrbetriq     #Anxiety   - on Klonopin    #Hx of seizures  - c/w home Keppra    #GERD  - Protonix    56 y/o F with a PMH of HTN, non obstructive CAD, COPD, HLD, anxiety, seizure d/o, migraine, GERD, and multiple abdominal surgeries including appendectomy/resection of appendiceal stump, cholecystectomy, exploratory laparotomy with lysis of adhesions,  hysterectomy unilateral oophorectomy, Hiatal hernia present to the ED for eval of lower abd pain. CT A/p with oral/iv con - showed signs of colitis - unclear if no obstruction. Colonoscopy (March, 2023): internal and external hemorrhoids otherwise unremarkable, hyperplastic polyp    For Acute colitis without signs of SBO / suspected LGIB from hemorrhoids/ Hx of appendectomy - cholecystectomy - unilateral oophorectomy/hysterectomy/ Abdominal pain due to adhesions / Hx of SBO 2/2 obstructive intraabdominal adhesions s/p anastomotic surgery. Abdominal pain likely 2/2 adhesions. GI pcr -ve. Supportive management for colitis, s/p bentyl q8, probiotics and Simethicone while here. Patient tolerating diet and is planned for discharge.

## 2023-06-09 NOTE — PROGRESS NOTE ADULT - ASSESSMENT
56 y/o F with a PMH of HTN, non obstructive CAD, COPD, HLD, anxiety, seizure d/o, migraine, GERD, and multiple abdominal surgeries including appendectomy/resection of appendiceal stump, cholecystectomy, exploratory laparotomy with lysis of adhesions,  hysterectomy unilateral oophorectomy, Hiatal hernia present to the ED for eval of lower abd pain.   CT A/p with oral/iv con - showed signs of colitis - unclear if no obstruction   Colonoscopy (March, 2023): internal and external hemorrhoids otherwise unremarkable, hyperplastic polyp    A/P   #Acute colitis without signs of SBO / suspected LGIB from hemorrhoids/ Hx of appendectomy - cholecystectomy - unilateral oophorectomy/hysterectomy/ Abdominal pain due to adhesions / Hx of SIBO 2/2 obstructive intraabdominal adhesions s/p anastomotic surgery  - diarrhea resolved today,  hold off with checking GI PCR and C.dif monitor and replete electrolytes   - started on clear liquid diet  - IV Protonix daily ,  bentyl q8, probiotics and Simethicone   - repeat  KUB , call surgery ( abdominal pain is likely due to adhesions)    - pain meds PRN    #Non-obstructive CAD   - on statin and BB     #COPD not on home O2   - stable on RA now, nebs PRN     #Overactive Bladder   - on Myrbetriq     #Anxiety   - on Klonopin    #GERD  - c/w home meds    #DVT - Lovenox sq     #Progress Note Handoff  Pending (specify): get KUB, consult surgery, in case of diarrhea send stool studies, pain control

## 2023-06-10 LAB
ALBUMIN SERPL ELPH-MCNC: 3.9 G/DL — SIGNIFICANT CHANGE UP (ref 3.5–5.2)
ALP SERPL-CCNC: 94 U/L — SIGNIFICANT CHANGE UP (ref 30–115)
ALT FLD-CCNC: 23 U/L — SIGNIFICANT CHANGE UP (ref 0–41)
ANION GAP SERPL CALC-SCNC: 11 MMOL/L — SIGNIFICANT CHANGE UP (ref 7–14)
AST SERPL-CCNC: 25 U/L — SIGNIFICANT CHANGE UP (ref 0–41)
BILIRUB SERPL-MCNC: 0.3 MG/DL — SIGNIFICANT CHANGE UP (ref 0.2–1.2)
BUN SERPL-MCNC: 5 MG/DL — LOW (ref 10–20)
CALCIUM SERPL-MCNC: 8.7 MG/DL — SIGNIFICANT CHANGE UP (ref 8.4–10.5)
CHLORIDE SERPL-SCNC: 108 MMOL/L — SIGNIFICANT CHANGE UP (ref 98–110)
CO2 SERPL-SCNC: 24 MMOL/L — SIGNIFICANT CHANGE UP (ref 17–32)
CREAT SERPL-MCNC: 0.6 MG/DL — LOW (ref 0.7–1.5)
EGFR: 104 ML/MIN/1.73M2 — SIGNIFICANT CHANGE UP
GI PCR PANEL: SIGNIFICANT CHANGE UP
GLUCOSE SERPL-MCNC: 81 MG/DL — SIGNIFICANT CHANGE UP (ref 70–99)
HCT VFR BLD CALC: 33.8 % — LOW (ref 37–47)
HGB BLD-MCNC: 11.9 G/DL — LOW (ref 12–16)
MAGNESIUM SERPL-MCNC: 2.1 MG/DL — SIGNIFICANT CHANGE UP (ref 1.8–2.4)
MCHC RBC-ENTMCNC: 31.4 PG — HIGH (ref 27–31)
MCHC RBC-ENTMCNC: 35.2 G/DL — SIGNIFICANT CHANGE UP (ref 32–37)
MCV RBC AUTO: 89.2 FL — SIGNIFICANT CHANGE UP (ref 81–99)
NRBC # BLD: 0 /100 WBCS — SIGNIFICANT CHANGE UP (ref 0–0)
PLATELET # BLD AUTO: 187 K/UL — SIGNIFICANT CHANGE UP (ref 130–400)
PMV BLD: 10.4 FL — SIGNIFICANT CHANGE UP (ref 7.4–10.4)
POTASSIUM SERPL-MCNC: 3.5 MMOL/L — SIGNIFICANT CHANGE UP (ref 3.5–5)
POTASSIUM SERPL-SCNC: 3.5 MMOL/L — SIGNIFICANT CHANGE UP (ref 3.5–5)
PROT SERPL-MCNC: 6 G/DL — SIGNIFICANT CHANGE UP (ref 6–8)
RBC # BLD: 3.79 M/UL — LOW (ref 4.2–5.4)
RBC # FLD: 12.6 % — SIGNIFICANT CHANGE UP (ref 11.5–14.5)
SODIUM SERPL-SCNC: 143 MMOL/L — SIGNIFICANT CHANGE UP (ref 135–146)
WBC # BLD: 4.6 K/UL — LOW (ref 4.8–10.8)
WBC # FLD AUTO: 4.6 K/UL — LOW (ref 4.8–10.8)

## 2023-06-10 PROCEDURE — 99233 SBSQ HOSP IP/OBS HIGH 50: CPT

## 2023-06-10 RX ADMIN — LEVETIRACETAM 250 MILLIGRAM(S): 250 TABLET, FILM COATED ORAL at 17:04

## 2023-06-10 RX ADMIN — HYDROMORPHONE HYDROCHLORIDE 0.5 MILLIGRAM(S): 2 INJECTION INTRAMUSCULAR; INTRAVENOUS; SUBCUTANEOUS at 22:30

## 2023-06-10 RX ADMIN — ATORVASTATIN CALCIUM 80 MILLIGRAM(S): 80 TABLET, FILM COATED ORAL at 21:05

## 2023-06-10 RX ADMIN — Medication 3 MILLIGRAM(S): at 21:07

## 2023-06-10 RX ADMIN — Medication 250 MILLIGRAM(S): at 05:05

## 2023-06-10 RX ADMIN — Medication 2 MILLIGRAM(S): at 05:05

## 2023-06-10 RX ADMIN — HYDROMORPHONE HYDROCHLORIDE 0.5 MILLIGRAM(S): 2 INJECTION INTRAMUSCULAR; INTRAVENOUS; SUBCUTANEOUS at 05:05

## 2023-06-10 RX ADMIN — ONDANSETRON 4 MILLIGRAM(S): 8 TABLET, FILM COATED ORAL at 10:36

## 2023-06-10 RX ADMIN — Medication 2 MILLIGRAM(S): at 14:31

## 2023-06-10 RX ADMIN — Medication 10 MILLIGRAM(S): at 10:37

## 2023-06-10 RX ADMIN — OXYCODONE HYDROCHLORIDE 5 MILLIGRAM(S): 5 TABLET ORAL at 08:45

## 2023-06-10 RX ADMIN — Medication 25 MILLIGRAM(S): at 17:04

## 2023-06-10 RX ADMIN — Medication 10 MILLIGRAM(S): at 05:05

## 2023-06-10 RX ADMIN — Medication 250 MILLIGRAM(S): at 17:05

## 2023-06-10 RX ADMIN — Medication 10 MILLIGRAM(S): at 08:43

## 2023-06-10 RX ADMIN — POLYETHYLENE GLYCOL 3350 17 GRAM(S): 17 POWDER, FOR SOLUTION ORAL at 11:44

## 2023-06-10 RX ADMIN — Medication 10 MILLIGRAM(S): at 17:04

## 2023-06-10 RX ADMIN — OXYCODONE HYDROCHLORIDE 5 MILLIGRAM(S): 5 TABLET ORAL at 09:45

## 2023-06-10 RX ADMIN — Medication 2 MILLIGRAM(S): at 21:06

## 2023-06-10 RX ADMIN — SENNA PLUS 2 TABLET(S): 8.6 TABLET ORAL at 21:05

## 2023-06-10 RX ADMIN — HYDROMORPHONE HYDROCHLORIDE 0.5 MILLIGRAM(S): 2 INJECTION INTRAMUSCULAR; INTRAVENOUS; SUBCUTANEOUS at 13:00

## 2023-06-10 RX ADMIN — LEVETIRACETAM 250 MILLIGRAM(S): 250 TABLET, FILM COATED ORAL at 05:05

## 2023-06-10 RX ADMIN — ENOXAPARIN SODIUM 40 MILLIGRAM(S): 100 INJECTION SUBCUTANEOUS at 05:53

## 2023-06-10 RX ADMIN — HYDROMORPHONE HYDROCHLORIDE 0.5 MILLIGRAM(S): 2 INJECTION INTRAMUSCULAR; INTRAVENOUS; SUBCUTANEOUS at 21:07

## 2023-06-10 RX ADMIN — SIMETHICONE 80 MILLIGRAM(S): 80 TABLET, CHEWABLE ORAL at 11:43

## 2023-06-10 RX ADMIN — Medication 10 MILLIGRAM(S): at 21:05

## 2023-06-10 RX ADMIN — HYDROMORPHONE HYDROCHLORIDE 0.5 MILLIGRAM(S): 2 INJECTION INTRAMUSCULAR; INTRAVENOUS; SUBCUTANEOUS at 12:28

## 2023-06-10 RX ADMIN — Medication 10 MILLIGRAM(S): at 14:32

## 2023-06-10 RX ADMIN — PANTOPRAZOLE SODIUM 40 MILLIGRAM(S): 20 TABLET, DELAYED RELEASE ORAL at 11:44

## 2023-06-10 RX ADMIN — SODIUM CHLORIDE 75 MILLILITER(S): 9 INJECTION INTRAMUSCULAR; INTRAVENOUS; SUBCUTANEOUS at 16:11

## 2023-06-10 NOTE — PROGRESS NOTE ADULT - ASSESSMENT
56 y/o F with a PMH of HTN, non obstructive CAD, COPD, HLD, anxiety, seizure d/o, migraine, GERD, and multiple abdominal surgeries including appendectomy/resection of appendiceal stump, cholecystectomy, exploratory laparotomy with lysis of adhesions,  hysterectomy unilateral oophorectomy, Hiatal hernia present to the ED for eval of lower abd pain.   CT A/p with oral/iv con - showed signs of colitis - unclear if no obstruction   Colonoscopy (March, 2023): internal and external hemorrhoids otherwise unremarkable, hyperplastic polyp    A/P   #Acute colitis without signs of SBO / suspected LGIB from hemorrhoids/ Hx of appendectomy - cholecystectomy - unilateral oophorectomy/hysterectomy/ Abdominal pain due to adhesions / Hx of SBO 2/2 obstructive intraabdominal adhesions s/p anastomotic surgery  - diarrhea resolved  - pt was actively vomiting today  - started on clear liquid diet, will advance as tolerated if vomiting stops   - IV Protonix daily ,  bentyl q8, probiotics and Simethicone   - repeat  KUB is negative for obstruction ( abdominal pain is likely due to adhesions)    - pain meds, will consult pain management    # Nausea/ vomiting  - on Ondansetron   - will d/c injectable opioids, likely side effect      #Non-obstructive CAD   - on statin and BB     #COPD not on home O2   - stable on RA now, nebs PRN     #Overactive Bladder   - on Myrbetriq     #Anxiety   - on Klonopin    #GERD  - c/w home meds    #DVT - Lovenox sq     #Progress Note Handoff  Pending (specify): d/c injectable opioids, consult pain management, advance diet as tolerated.

## 2023-06-10 NOTE — PROGRESS NOTE ADULT - SUBJECTIVE AND OBJECTIVE BOX
58 y/o F with a PMH of HTN, non obstructive CAD, COPD, HLD, anxiety, seizure d/o, migraine, GERD, and multiple abdominal surgeries including appendectomy/resection of appendiceal stump, cholecystectomy, exploratory laparotomy with lysis of adhesions,  hysterectomy unilateral oophorectomy, Hiatal hernia present to the ED for eval of lower abd pain.   CT A/p with oral/iv con - showed signs of colitis - unclear if no obstruction   Colonoscopy (March, 2023): internal and external hemorrhoids otherwise unremarkable, hyperplastic polyp. Abdominal pain is likely due to adhesions.   Today pt is c/o nausea, was vomiting this morning, has  lower abdominal pain, denies diarrhea, passing gas.     PAST MEDICAL & SURGICAL HISTORY:  Back pain  henriated discs  Seizures  last one 6 yrs ago  Migraines  Hypercholesteremia  Seasonal allergic rhinitis, unspecified trigger  GERD (gastroesophageal reflux disease)  Hiatal hernia  Anxiety  HTN (hypertension)  Osteoarthritis  Sciatica  Tobacco use disorder  History of surgery  4 rght knee sx  1 left knee sx  cholecystectomy  rotator cuff right  abdominal sx-- adhesions  c section  ovarian cystectomy  breast cystectomies  colonoscopy   endoscopies  parital hysterectomy      S/p partial hysterectomy with remaining cervical stump      H/O rotator cuff surgery  right      History of appendectomy      S/P cholecystectomy        OBJECTIVE    VITAL SIGNS: Last 24 Hours  T(C): 36.6 (10 Tra 2023 05:39), Max: 37.1 (09 Jun 2023 22:00)  T(F): 97.9 (10 Tra 2023 05:39), Max: 98.7 (09 Jun 2023 22:00)  HR: 62 (10 Tra 2023 05:39) (57 - 67)  BP: 172/60 (10 Tra 2023 05:39) (141/63 - 172/60)  BP(mean): 90 (09 Jun 2023 22:00) (90 - 90)  RR: 18 (10 Tra 2023 05:39) (18 - 18)  SpO2: 98% (09 Jun 2023 22:00) (98% - 98%)    Parameters below as of 09 Jun 2023 22:00  Patient On (Oxygen Delivery Method): room air        PHYSICAL EXAM:  General: In NAD.  HEENT: Normocephalic, nontraumatic. PERRL.  LUNGS: Clear to auscultation b/l. No wheezes, rales, or rhonchi.  HEART: RRR. No murmurs, rubs, or gallops.  ABDOMEN: Soft, nondistended, multiple surgical scars noted, diffuse abdominal pain on palpation and percussion, no rebound, voluntary guarding, BS present   EXT: Pulses palpable x 4. No lower extremity edema.  NEURO: AAOx3. No focal deficits.   SKIN: Warm, dry.      ALLERGIES:  Neurontin (Unknown)  Toradol (Rash; Urticaria; Anaphylaxis (Mild to Mod))  Cipro (Hives)  penicillin (Hives)  Toradol (Hives)        HOME MEDICATIONS  Home Medications:  atorvastatin 80 mg oral tablet: 1 tab(s) orally once a day (07 Jun 2023 23:23)  busPIRone 10 mg oral tablet: orally 3 times a day (07 Jun 2023 23:23)  Fioricet oral tablet: 1 tab(s) orally 3 times a day, As Needed (07 Jun 2023 23:23)  hydrOXYzine hydrochloride 25 mg oral tablet: 1 tab(s) orally once a day (07 Jun 2023 23:23)  Incruse Ellipta 62.5 mcg/inh inhalation powder:  (07 Jun 2023 23:23)  KlonoPIN 2 mg oral tablet: 1 tab(s) orally 3 times a day (07 Jun 2023 23:23)  levETIRAcetam 250 mg oral tablet: 1 tab(s) orally 2 times a day (07 Jun 2023 23:23)  Linzess 290 mcg oral capsule: 1 cap(s) orally once a day (07 Jun 2023 23:23)  METOPROLOL SUCCINATE ER 50MG ER TAB: tab(s) orally once a day (07 Jun 2023 23:23)  Myrbetriq 50 mg oral tablet, extended release: 1 tab(s) orally once a day (07 Jun 2023 23:23)  ondansetron 4 mg oral tablet: 1 tab(s) orally 4 times a day as needed for  nausea (07 Jun 2023 23:23)  Pepcid 40 mg oral tablet: orally 2 times a day (07 Jun 2023 23:23)  Protonix 40 mg oral delayed release tablet: 1 tab(s) orally 2 times a day (07 Jun 2023 23:23)      LABS:                          11.9   4.60  )-----------( 187      ( 10 Tra 2023 08:15 )             33.8   06-10    143  |  108  |  5<L>  ----------------------------<  81  3.5   |  24  |  0.6<L>    Ca    8.7      10 Tra 2023 08:15  Mg     2.1     06-10    TPro  6.0  /  Alb  3.9  /  TBili  0.3  /  DBili  x   /  AST  25  /  ALT  23  /  AlkPhos  94  06-10        RADIOLOGY:    < from: Xray Kidney Ureter Bladder (06.08.23 @ 09:45) >  impression:  Nonspecific, nonobstructive bowel gas pattern. Contrast in the colon.   Excreted contrast in the bladder. Surgical clips overlying the abdomen.      < from: CT Abdomen and Pelvis w/ Oral Cont and w/ IV Cont (06.07.23 @ 17:21) >  IMPRESSION:    Compared with the scan of 5/9/2023, there has been a mild increase in the   degree of rectosigmoid mural thickening, compatible with colitis.    The small bowel anastomosis is noted in the right lower quadrant. The   oral contrast material has not quite reached the cecum at this time   however there is no evidence of small bowel distention or obstruction. If   symptoms persist or worsen, a follow-up CT scan may be obtained.    < from: Xray Kidney Ureter Bladder (06.09.23 @ 17:40) >  Impression:        A nonobstructive unremarkable bowel gas pattern is present    .Contrast material within nondistended colon is present.   Postcholecystectomy. Additional surgical clips are present      MEDICATIONS  (STANDING):  atorvastatin 80 milliGRAM(s) Oral at bedtime  busPIRone 10 milliGRAM(s) Oral three times a day  clonazePAM  Tablet 2 milliGRAM(s) Oral three times a day  dicyclomine 10 milliGRAM(s) Oral three times a day before meals  enoxaparin Injectable 40 milliGRAM(s) SubCutaneous every 24 hours  levETIRAcetam 250 milliGRAM(s) Oral two times a day  pantoprazole  Injectable 40 milliGRAM(s) IV Push daily  polyethylene glycol 3350 17 Gram(s) Oral daily  saccharomyces boulardii 250 milliGRAM(s) Oral two times a day  senna 2 Tablet(s) Oral at bedtime  simethicone 80 milliGRAM(s) Chew daily  sodium chloride 0.9%. 1000 milliLiter(s) (75 mL/Hr) IV Continuous <Continuous>    MEDICATIONS  (PRN):  acetaminophen     Tablet .. 650 milliGRAM(s) Oral every 6 hours PRN Temp greater or equal to 38C (100.4F), Mild Pain (1 - 3)  aluminum hydroxide/magnesium hydroxide/simethicone Suspension 30 milliLiter(s) Oral every 4 hours PRN Dyspepsia  HYDROmorphone  Injectable 0.5 milliGRAM(s) IV Push every 6 hours PRN Severe Pain (7 - 10)  hydrOXYzine hydrochloride 25 milliGRAM(s) Oral three times a day PRN Nausea  melatonin 3 milliGRAM(s) Oral at bedtime PRN Insomnia  ondansetron Injectable 4 milliGRAM(s) IV Push every 8 hours PRN Nausea and/or Vomiting  oxyCODONE    IR 5 milliGRAM(s) Oral two times a day PRN for moderate pain

## 2023-06-11 LAB
ALBUMIN SERPL ELPH-MCNC: 4.1 G/DL — SIGNIFICANT CHANGE UP (ref 3.5–5.2)
ALP SERPL-CCNC: 99 U/L — SIGNIFICANT CHANGE UP (ref 30–115)
ALT FLD-CCNC: 33 U/L — SIGNIFICANT CHANGE UP (ref 0–41)
ANION GAP SERPL CALC-SCNC: 13 MMOL/L — SIGNIFICANT CHANGE UP (ref 7–14)
AST SERPL-CCNC: 39 U/L — SIGNIFICANT CHANGE UP (ref 0–41)
BILIRUB SERPL-MCNC: 0.4 MG/DL — SIGNIFICANT CHANGE UP (ref 0.2–1.2)
BUN SERPL-MCNC: 4 MG/DL — LOW (ref 10–20)
CALCIUM SERPL-MCNC: 9.2 MG/DL — SIGNIFICANT CHANGE UP (ref 8.4–10.5)
CHLORIDE SERPL-SCNC: 110 MMOL/L — SIGNIFICANT CHANGE UP (ref 98–110)
CO2 SERPL-SCNC: 23 MMOL/L — SIGNIFICANT CHANGE UP (ref 17–32)
CREAT SERPL-MCNC: 0.7 MG/DL — SIGNIFICANT CHANGE UP (ref 0.7–1.5)
EGFR: 100 ML/MIN/1.73M2 — SIGNIFICANT CHANGE UP
GLUCOSE SERPL-MCNC: 89 MG/DL — SIGNIFICANT CHANGE UP (ref 70–99)
HCT VFR BLD CALC: 34.5 % — LOW (ref 37–47)
HGB BLD-MCNC: 12.1 G/DL — SIGNIFICANT CHANGE UP (ref 12–16)
MAGNESIUM SERPL-MCNC: 1.5 MG/DL — LOW (ref 1.8–2.4)
MCHC RBC-ENTMCNC: 31.8 PG — HIGH (ref 27–31)
MCHC RBC-ENTMCNC: 35.1 G/DL — SIGNIFICANT CHANGE UP (ref 32–37)
MCV RBC AUTO: 90.8 FL — SIGNIFICANT CHANGE UP (ref 81–99)
NRBC # BLD: 0 /100 WBCS — SIGNIFICANT CHANGE UP (ref 0–0)
PLATELET # BLD AUTO: 181 K/UL — SIGNIFICANT CHANGE UP (ref 130–400)
PMV BLD: 10.1 FL — SIGNIFICANT CHANGE UP (ref 7.4–10.4)
POTASSIUM SERPL-MCNC: 4 MMOL/L — SIGNIFICANT CHANGE UP (ref 3.5–5)
POTASSIUM SERPL-SCNC: 4 MMOL/L — SIGNIFICANT CHANGE UP (ref 3.5–5)
PROT SERPL-MCNC: 6.4 G/DL — SIGNIFICANT CHANGE UP (ref 6–8)
RBC # BLD: 3.8 M/UL — LOW (ref 4.2–5.4)
RBC # FLD: 12.9 % — SIGNIFICANT CHANGE UP (ref 11.5–14.5)
SODIUM SERPL-SCNC: 146 MMOL/L — SIGNIFICANT CHANGE UP (ref 135–146)
WBC # BLD: 3.87 K/UL — LOW (ref 4.8–10.8)
WBC # FLD AUTO: 3.87 K/UL — LOW (ref 4.8–10.8)

## 2023-06-11 PROCEDURE — 99232 SBSQ HOSP IP/OBS MODERATE 35: CPT

## 2023-06-11 RX ORDER — MAGNESIUM SULFATE 500 MG/ML
2 VIAL (ML) INJECTION
Refills: 0 | Status: COMPLETED | OUTPATIENT
Start: 2023-06-11 | End: 2023-06-11

## 2023-06-11 RX ADMIN — ATORVASTATIN CALCIUM 80 MILLIGRAM(S): 80 TABLET, FILM COATED ORAL at 21:32

## 2023-06-11 RX ADMIN — Medication 10 MILLIGRAM(S): at 15:06

## 2023-06-11 RX ADMIN — Medication 10 MILLIGRAM(S): at 06:30

## 2023-06-11 RX ADMIN — OXYCODONE HYDROCHLORIDE 5 MILLIGRAM(S): 5 TABLET ORAL at 22:31

## 2023-06-11 RX ADMIN — LEVETIRACETAM 250 MILLIGRAM(S): 250 TABLET, FILM COATED ORAL at 17:03

## 2023-06-11 RX ADMIN — Medication 10 MILLIGRAM(S): at 06:31

## 2023-06-11 RX ADMIN — HYDROMORPHONE HYDROCHLORIDE 0.5 MILLIGRAM(S): 2 INJECTION INTRAMUSCULAR; INTRAVENOUS; SUBCUTANEOUS at 03:15

## 2023-06-11 RX ADMIN — Medication 2 MILLIGRAM(S): at 21:31

## 2023-06-11 RX ADMIN — Medication 2 MILLIGRAM(S): at 15:06

## 2023-06-11 RX ADMIN — Medication 10 MILLIGRAM(S): at 21:32

## 2023-06-11 RX ADMIN — Medication 25 GRAM(S): at 12:15

## 2023-06-11 RX ADMIN — Medication 25 GRAM(S): at 10:14

## 2023-06-11 RX ADMIN — LEVETIRACETAM 250 MILLIGRAM(S): 250 TABLET, FILM COATED ORAL at 06:30

## 2023-06-11 RX ADMIN — SIMETHICONE 80 MILLIGRAM(S): 80 TABLET, CHEWABLE ORAL at 11:28

## 2023-06-11 RX ADMIN — Medication 250 MILLIGRAM(S): at 06:31

## 2023-06-11 RX ADMIN — ENOXAPARIN SODIUM 40 MILLIGRAM(S): 100 INJECTION SUBCUTANEOUS at 06:31

## 2023-06-11 RX ADMIN — OXYCODONE HYDROCHLORIDE 5 MILLIGRAM(S): 5 TABLET ORAL at 21:31

## 2023-06-11 RX ADMIN — Medication 10 MILLIGRAM(S): at 11:28

## 2023-06-11 RX ADMIN — HYDROMORPHONE HYDROCHLORIDE 0.5 MILLIGRAM(S): 2 INJECTION INTRAMUSCULAR; INTRAVENOUS; SUBCUTANEOUS at 03:50

## 2023-06-11 RX ADMIN — PANTOPRAZOLE SODIUM 40 MILLIGRAM(S): 20 TABLET, DELAYED RELEASE ORAL at 11:28

## 2023-06-11 RX ADMIN — Medication 10 MILLIGRAM(S): at 17:02

## 2023-06-11 RX ADMIN — OXYCODONE HYDROCHLORIDE 5 MILLIGRAM(S): 5 TABLET ORAL at 09:28

## 2023-06-11 RX ADMIN — Medication 2 MILLIGRAM(S): at 06:31

## 2023-06-11 RX ADMIN — OXYCODONE HYDROCHLORIDE 5 MILLIGRAM(S): 5 TABLET ORAL at 10:17

## 2023-06-11 RX ADMIN — Medication 250 MILLIGRAM(S): at 17:02

## 2023-06-11 NOTE — PROGRESS NOTE ADULT - ASSESSMENT
56 y/o F with a PMH of HTN, non obstructive CAD, COPD, HLD, anxiety, seizure d/o, migraine, GERD, and multiple abdominal surgeries including appendectomy/resection of appendiceal stump, cholecystectomy, exploratory laparotomy with lysis of adhesions,  hysterectomy unilateral oophorectomy, Hiatal hernia present to the ED for eval of lower abd pain.   CT A/p with oral/iv con - showed signs of colitis - unclear if no obstruction   Colonoscopy (March, 2023): internal and external hemorrhoids otherwise unremarkable, hyperplastic polyp    A/P   #Acute colitis without signs of SBO / suspected LGIB from hemorrhoids/ Hx of appendectomy - cholecystectomy - unilateral oophorectomy/hysterectomy/ Abdominal pain due to adhesions / Hx of SBO 2/2 obstructive intraabdominal adhesions s/p anastomotic surgery  - clinically improving   - d/c IV opioids   - advance diet as tolerated   - IV Protonix daily ,  bentyl q8, probiotics and Simethicone   - abdominal pain is likely due to adhesions    # Nausea/ vomiting  - on Ondansetron   -  d/c injectable opioids, likely side effect      #Non-obstructive CAD   - on statin and BB     #COPD not on home O2   - stable on RA now, nebs PRN     #Overactive Bladder   - on Myrbetriq     #Anxiety   - on Klonopin    #GERD  - c/w home meds    #DVT - Lovenox sq     #Progress Note Handoff  Pending (specify): d/c injectable opioids,  advance diet as tolerated, anticipate discharge in 24 hours

## 2023-06-11 NOTE — PROGRESS NOTE ADULT - SUBJECTIVE AND OBJECTIVE BOX
56 y/o F with a PMH of HTN, non obstructive CAD, COPD, HLD, anxiety, seizure d/o, migraine, GERD, and multiple abdominal surgeries including appendectomy/resection of appendiceal stump, cholecystectomy, exploratory laparotomy with lysis of adhesions,  hysterectomy unilateral oophorectomy, Hiatal hernia present to the ED for eval of lower abd pain.   CT A/p with oral/iv con - showed signs of colitis - unclear if no obstruction   Colonoscopy (March, 2023): internal and external hemorrhoids otherwise unremarkable, hyperplastic polyp. Abdominal pain is likely due to adhesions.   Today pt feels better, has loose stool ( light brown), c/o abdominal discomfort.     PAST MEDICAL & SURGICAL HISTORY:  Back pain  henriated discs  Seizures  last one 6 yrs ago  Migraines  Hypercholesteremia  Seasonal allergic rhinitis, unspecified trigger  GERD (gastroesophageal reflux disease)  Hiatal hernia  Anxiety  HTN (hypertension)  Osteoarthritis  Sciatica  Tobacco use disorder  History of surgery  4 rght knee sx  1 left knee sx  cholecystectomy  rotator cuff right  abdominal sx-- adhesions  c section  ovarian cystectomy  breast cystectomies  colonoscopy   endoscopies  parital hysterectomy      S/p partial hysterectomy with remaining cervical stump      H/O rotator cuff surgery  right      History of appendectomy      S/P cholecystectomy        OBJECTIVE    VITAL SIGNS: Last 24 Hours  T(C): 35.6 (11 Jun 2023 06:12), Max: 36.7 (10 Tra 2023 20:08)  T(F): 96.1 (11 Jun 2023 06:12), Max: 98 (10 Tra 2023 20:08)  HR: 49 (11 Jun 2023 06:12) (48 - 55)  BP: 150/68 (11 Jun 2023 06:12) (141/65 - 153/66)  BP(mean): --  RR: 18 (11 Jun 2023 06:12) (18 - 18)  SpO2: 96% (11 Jun 2023 06:12) (96% - 96%)        PHYSICAL EXAM:  General: In NAD.  HEENT: Normocephalic, nontraumatic. PERRL.  LUNGS: Clear to auscultation b/l. No wheezes, rales, or rhonchi.  HEART: RRR. No murmurs, rubs, or gallops.  ABDOMEN: Soft, nondistended, multiple surgical scars noted, diffuse abdominal pain on palpation and percussion, no rebound, voluntary guarding, BS present   EXT: Pulses palpable x 4. No lower extremity edema.  NEURO: AAOx3. No focal deficits.   SKIN: Warm, dry.      ALLERGIES:  Neurontin (Unknown)  Toradol (Rash; Urticaria; Anaphylaxis (Mild to Mod))  Cipro (Hives)  penicillin (Hives)  Toradol (Hives)        HOME MEDICATIONS  Home Medications:  atorvastatin 80 mg oral tablet: 1 tab(s) orally once a day (07 Jun 2023 23:23)  busPIRone 10 mg oral tablet: orally 3 times a day (07 Jun 2023 23:23)  Fioricet oral tablet: 1 tab(s) orally 3 times a day, As Needed (07 Jun 2023 23:23)  hydrOXYzine hydrochloride 25 mg oral tablet: 1 tab(s) orally once a day (07 Jun 2023 23:23)  Incruse Ellipta 62.5 mcg/inh inhalation powder:  (07 Jun 2023 23:23)  KlonoPIN 2 mg oral tablet: 1 tab(s) orally 3 times a day (07 Jun 2023 23:23)  levETIRAcetam 250 mg oral tablet: 1 tab(s) orally 2 times a day (07 Jun 2023 23:23)  Linzess 290 mcg oral capsule: 1 cap(s) orally once a day (07 Jun 2023 23:23)  METOPROLOL SUCCINATE ER 50MG ER TAB: tab(s) orally once a day (07 Jun 2023 23:23)  Myrbetriq 50 mg oral tablet, extended release: 1 tab(s) orally once a day (07 Jun 2023 23:23)  ondansetron 4 mg oral tablet: 1 tab(s) orally 4 times a day as needed for  nausea (07 Jun 2023 23:23)  Pepcid 40 mg oral tablet: orally 2 times a day (07 Jun 2023 23:23)  Protonix 40 mg oral delayed release tablet: 1 tab(s) orally 2 times a day (07 Jun 2023 23:23)      LABS:                          12.1   3.87  )-----------( 181      ( 11 Jun 2023 07:16 )             34.5   06-11    146  |  110  |  4<L>  ----------------------------<  89  4.0   |  23  |  0.7    Ca    9.2      11 Jun 2023 07:16  Mg     1.5     06-11    TPro  6.4  /  Alb  4.1  /  TBili  0.4  /  DBili  x   /  AST  39  /  ALT  33  /  AlkPhos  99  06-11        RADIOLOGY:    < from: Xray Kidney Ureter Bladder (06.08.23 @ 09:45) >  impression:  Nonspecific, nonobstructive bowel gas pattern. Contrast in the colon.   Excreted contrast in the bladder. Surgical clips overlying the abdomen.      < from: CT Abdomen and Pelvis w/ Oral Cont and w/ IV Cont (06.07.23 @ 17:21) >  IMPRESSION:    Compared with the scan of 5/9/2023, there has been a mild increase in the   degree of rectosigmoid mural thickening, compatible with colitis.    The small bowel anastomosis is noted in the right lower quadrant. The   oral contrast material has not quite reached the cecum at this time   however there is no evidence of small bowel distention or obstruction. If   symptoms persist or worsen, a follow-up CT scan may be obtained.    < from: Xray Kidney Ureter Bladder (06.09.23 @ 17:40) >  Impression:        A nonobstructive unremarkable bowel gas pattern is present    .Contrast material within nondistended colon is present.   Postcholecystectomy. Additional surgical clips are present      MEDICATIONS  (STANDING):  atorvastatin 80 milliGRAM(s) Oral at bedtime  busPIRone 10 milliGRAM(s) Oral three times a day  clonazePAM  Tablet 2 milliGRAM(s) Oral three times a day  dicyclomine 10 milliGRAM(s) Oral three times a day before meals  enoxaparin Injectable 40 milliGRAM(s) SubCutaneous every 24 hours  levETIRAcetam 250 milliGRAM(s) Oral two times a day  magnesium sulfate  IVPB 2 Gram(s) IV Intermittent every 2 hours  pantoprazole  Injectable 40 milliGRAM(s) IV Push daily  polyethylene glycol 3350 17 Gram(s) Oral daily  saccharomyces boulardii 250 milliGRAM(s) Oral two times a day  senna 2 Tablet(s) Oral at bedtime  simethicone 80 milliGRAM(s) Chew daily    MEDICATIONS  (PRN):  acetaminophen     Tablet .. 650 milliGRAM(s) Oral every 6 hours PRN Temp greater or equal to 38C (100.4F), Mild Pain (1 - 3)  aluminum hydroxide/magnesium hydroxide/simethicone Suspension 30 milliLiter(s) Oral every 4 hours PRN Dyspepsia  hydrOXYzine hydrochloride 25 milliGRAM(s) Oral three times a day PRN Nausea  melatonin 3 milliGRAM(s) Oral at bedtime PRN Insomnia  ondansetron Injectable 4 milliGRAM(s) IV Push every 8 hours PRN Nausea and/or Vomiting  oxyCODONE    IR 5 milliGRAM(s) Oral two times a day PRN for moderate pain

## 2023-06-11 NOTE — PROGRESS NOTE ADULT - SUBJECTIVE AND OBJECTIVE BOX
RAMÍREZ RODARTE 57y Female  MRN#: [id number]   CODE STATUS:________    Hospital Day: 4d    Pt is currently admitted with the primary diagnosis of abdominal pain     SUBJECTIVE  Overnight events: none    Subjective complaints: pt with cont abdominal pain     Present Today:   - Gooden:  No [ x ], Yes [   ] : Indication:     - Type of IV Access:       .. CVC/Piccline:  No [x  ], Yes [   ] : Indication:       .. Midline: No [ x ], Yes [   ] : Indication:                                             ----------------------------------------------------------  OBJECTIVE  PAST MEDICAL & SURGICAL HISTORY  Back pain  henriated discs    Seizures  last one 6 yrs ago    Migraines    Hypercholesteremia    Seasonal allergic rhinitis, unspecified trigger    GERD (gastroesophageal reflux disease)    Hiatal hernia    Anxiety    HTN (hypertension)    Osteoarthritis    Sciatica    Tobacco use disorder    History of surgery  4 rght knee sx  1 left knee sx  cholecystectomy  rotator cuff right  abdominal sx-- adhesions  c section  ovarian cystectomy  breast cystectomies  colonoscopy   endoscopies  parital hysterectomy    S/p partial hysterectomy with remaining cervical stump    H/O rotator cuff surgery  right    History of appendectomy    S/P cholecystectomy                                              -----------------------------------------------------------  ALLERGIES:  Neurontin (Unknown)  Toradol (Rash; Urticaria; Anaphylaxis (Mild to Mod))  Cipro (Hives)  penicillin (Hives)  Toradol (Hives)                                            ------------------------------------------------------------    HOME MEDICATIONS  Home Medications:  atorvastatin 80 mg oral tablet: 1 tab(s) orally once a day (07 Jun 2023 23:23)  busPIRone 10 mg oral tablet: orally 3 times a day (07 Jun 2023 23:23)  Fioricet oral tablet: 1 tab(s) orally 3 times a day, As Needed (07 Jun 2023 23:23)  hydrOXYzine hydrochloride 25 mg oral tablet: 1 tab(s) orally once a day (07 Jun 2023 23:23)  Incruse Ellipta 62.5 mcg/inh inhalation powder:  (07 Jun 2023 23:23)  KlonoPIN 2 mg oral tablet: 1 tab(s) orally 3 times a day (07 Jun 2023 23:23)  levETIRAcetam 250 mg oral tablet: 1 tab(s) orally 2 times a day (07 Jun 2023 23:23)  Linzess 290 mcg oral capsule: 1 cap(s) orally once a day (07 Jun 2023 23:23)  METOPROLOL SUCCINATE ER 50MG ER TAB: tab(s) orally once a day (07 Jun 2023 23:23)  Myrbetriq 50 mg oral tablet, extended release: 1 tab(s) orally once a day (07 Jun 2023 23:23)  ondansetron 4 mg oral tablet: 1 tab(s) orally 4 times a day as needed for  nausea (07 Jun 2023 23:23)  Pepcid 40 mg oral tablet: orally 2 times a day (07 Jun 2023 23:23)  Protonix 40 mg oral delayed release tablet: 1 tab(s) orally 2 times a day (07 Jun 2023 23:23)                           MEDICATIONS:  STANDING MEDICATIONS  atorvastatin 80 milliGRAM(s) Oral at bedtime  busPIRone 10 milliGRAM(s) Oral three times a day  clonazePAM  Tablet 2 milliGRAM(s) Oral three times a day  dicyclomine 10 milliGRAM(s) Oral three times a day before meals  enoxaparin Injectable 40 milliGRAM(s) SubCutaneous every 24 hours  levETIRAcetam 250 milliGRAM(s) Oral two times a day  magnesium sulfate  IVPB 2 Gram(s) IV Intermittent every 2 hours  pantoprazole  Injectable 40 milliGRAM(s) IV Push daily  polyethylene glycol 3350 17 Gram(s) Oral daily  saccharomyces boulardii 250 milliGRAM(s) Oral two times a day  senna 2 Tablet(s) Oral at bedtime  simethicone 80 milliGRAM(s) Chew daily    PRN MEDICATIONS  acetaminophen     Tablet .. 650 milliGRAM(s) Oral every 6 hours PRN  aluminum hydroxide/magnesium hydroxide/simethicone Suspension 30 milliLiter(s) Oral every 4 hours PRN  hydrOXYzine hydrochloride 25 milliGRAM(s) Oral three times a day PRN  melatonin 3 milliGRAM(s) Oral at bedtime PRN  ondansetron Injectable 4 milliGRAM(s) IV Push every 8 hours PRN  oxyCODONE    IR 5 milliGRAM(s) Oral two times a day PRN                                            ------------------------------------------------------------                                               --------------------------------------------------------------  LABS:                        12.1   3.87  )-----------( 181      ( 11 Jun 2023 07:16 )             34.5     06-11    146  |  110  |  4<L>  ----------------------------<  89  4.0   |  23  |  0.7    Ca    9.2      11 Jun 2023 07:16  Mg     1.5     06-11    TPro  6.4  /  Alb  4.1  /  TBili  0.4  /  DBili  x   /  AST  39  /  ALT  33  /  AlkPhos  99  06-11                                                                -------------------------------------------------------------  RADIOLOGY:                                            --------------------------------------------------------------  VITAL SIGNS: Last 24 Hours  T(C): 35.6 (11 Jun 2023 06:12), Max: 36.7 (10 Tra 2023 20:08)  T(F): 96.1 (11 Jun 2023 06:12), Max: 98 (10 Tra 2023 20:08)  HR: 49 (11 Jun 2023 06:12) (48 - 55)  BP: 150/68 (11 Jun 2023 06:12) (141/65 - 153/66)  BP(mean): --  RR: 18 (11 Jun 2023 06:12) (18 - 18)  SpO2: 96% (11 Jun 2023 06:12) (96% - 96%)    PHYSICAL EXAM:  General: Awake, oriented and resting comfortably, no acute distress  Head: normocephalic, atraumatic  ENT:  moist mucous membranes  Cardiac: regular rate and rhythm, normal S1 and S2, no murmurs, rubs or gallops  Respiratory: clear to auscultation bilaterally, no wheezes, crackles or rhonchi, unlabored respirations  Abdomen: some abd tenderness   Ext:  No edema,   Neuro: A&O x3, no focal neurological deficits                                                --------------------------------------------------------------    ASSESSMENT & PLAN    Past medical history and hospital course       58 y/o F with a PMH of HTN, non obstructive CAD, COPD, HLD, anxiety, seizure d/o, migraine, GERD, and multiple abdominal surgeries including appendectomy/resection of appendiceal stump, cholecystectomy, exploratory laparotomy with lysis of adhesions,  hysterectomy unilateral oophorectomy, Hiatal hernia present to the ED for eval of lower abd pain.   CT A/p with oral/iv con - showed signs of colitis - unclear if no obstruction   Colonoscopy (March, 2023): internal and external hemorrhoids otherwise unremarkable, hyperplastic polyp    #Acute colitis without signs of SBO / suspected LGIB from hemorrhoids/ Hx of appendectomy - cholecystectomy - unilateral oophorectomy/hysterectomy/ Abdominal pain due to adhesions / Hx of SIBO 2/2 obstructive intraabdominal adhesions s/p anastomotic surgery  - pt has diarrhea now, will check GI PCR and C.dif monitor and replete electrolytes   - IV Protonix daily , start bentyl q8, probiotics and Simethicone   - KUB nonobstructive   - pain meds PRN  - GI consult appreciated: advance diet as tolerated, supportive measures, monitor off abx    #Non-obstructive CAD   - on statin and BB    #COPD not on home O2   - stable on RA now, nebs PRN     #Overactive Bladder   - on Myrbetriq     #Anxiety   - on Klonopin    #Hx of seizures  - c/w home Keppra    #GERD  - Protnoix     #DVT - Lovenox sq         # Handoff anticipate dc, dc opiods

## 2023-06-12 DIAGNOSIS — M54.50 LOW BACK PAIN, UNSPECIFIED: ICD-10-CM

## 2023-06-12 PROCEDURE — 99223 1ST HOSP IP/OBS HIGH 75: CPT

## 2023-06-12 PROCEDURE — 99232 SBSQ HOSP IP/OBS MODERATE 35: CPT

## 2023-06-12 RX ORDER — TRAMADOL HYDROCHLORIDE 50 MG/1
50 TABLET ORAL EVERY 6 HOURS
Refills: 0 | Status: DISCONTINUED | OUTPATIENT
Start: 2023-06-12 | End: 2023-06-13

## 2023-06-12 RX ORDER — MAGNESIUM SULFATE 500 MG/ML
2 VIAL (ML) INJECTION
Refills: 0 | Status: COMPLETED | OUTPATIENT
Start: 2023-06-12 | End: 2023-06-12

## 2023-06-12 RX ORDER — PANTOPRAZOLE SODIUM 20 MG/1
40 TABLET, DELAYED RELEASE ORAL
Refills: 0 | Status: DISCONTINUED | OUTPATIENT
Start: 2023-06-13 | End: 2023-06-13

## 2023-06-12 RX ORDER — LOPERAMIDE HCL 2 MG
2 TABLET ORAL ONCE
Refills: 0 | Status: COMPLETED | OUTPATIENT
Start: 2023-06-12 | End: 2023-06-12

## 2023-06-12 RX ORDER — CYCLOBENZAPRINE HYDROCHLORIDE 10 MG/1
5 TABLET, FILM COATED ORAL EVERY 8 HOURS
Refills: 0 | Status: DISCONTINUED | OUTPATIENT
Start: 2023-06-12 | End: 2023-06-13

## 2023-06-12 RX ORDER — PANTOPRAZOLE SODIUM 20 MG/1
1 TABLET, DELAYED RELEASE ORAL
Qty: 0 | Refills: 0 | DISCHARGE

## 2023-06-12 RX ADMIN — Medication 250 MILLIGRAM(S): at 05:21

## 2023-06-12 RX ADMIN — Medication 10 MILLIGRAM(S): at 21:43

## 2023-06-12 RX ADMIN — Medication 2 MILLIGRAM(S): at 13:39

## 2023-06-12 RX ADMIN — Medication 10 MILLIGRAM(S): at 15:58

## 2023-06-12 RX ADMIN — SIMETHICONE 80 MILLIGRAM(S): 80 TABLET, CHEWABLE ORAL at 11:41

## 2023-06-12 RX ADMIN — LEVETIRACETAM 250 MILLIGRAM(S): 250 TABLET, FILM COATED ORAL at 17:55

## 2023-06-12 RX ADMIN — ENOXAPARIN SODIUM 40 MILLIGRAM(S): 100 INJECTION SUBCUTANEOUS at 05:21

## 2023-06-12 RX ADMIN — Medication 10 MILLIGRAM(S): at 11:41

## 2023-06-12 RX ADMIN — ATORVASTATIN CALCIUM 80 MILLIGRAM(S): 80 TABLET, FILM COATED ORAL at 21:42

## 2023-06-12 RX ADMIN — Medication 10 MILLIGRAM(S): at 13:37

## 2023-06-12 RX ADMIN — Medication 2 MILLIGRAM(S): at 08:46

## 2023-06-12 RX ADMIN — Medication 25 MILLIGRAM(S): at 21:00

## 2023-06-12 RX ADMIN — Medication 10 MILLIGRAM(S): at 05:21

## 2023-06-12 RX ADMIN — LEVETIRACETAM 250 MILLIGRAM(S): 250 TABLET, FILM COATED ORAL at 05:21

## 2023-06-12 RX ADMIN — Medication 25 GRAM(S): at 13:39

## 2023-06-12 RX ADMIN — Medication 250 MILLIGRAM(S): at 17:55

## 2023-06-12 RX ADMIN — Medication 3 MILLIGRAM(S): at 21:44

## 2023-06-12 RX ADMIN — CYCLOBENZAPRINE HYDROCHLORIDE 5 MILLIGRAM(S): 10 TABLET, FILM COATED ORAL at 21:43

## 2023-06-12 RX ADMIN — Medication 25 GRAM(S): at 15:57

## 2023-06-12 RX ADMIN — Medication 2 MILLIGRAM(S): at 21:42

## 2023-06-12 RX ADMIN — Medication 2 MILLIGRAM(S): at 05:21

## 2023-06-12 NOTE — CONSULT NOTE ADULT - ASSESSMENT
Patient is a 58 y/o woman with history of HTN, CAD, COPD, HL, anxiety, seizure disorder, migraines, GERD, and chronic low back pain who was admitted on 6/7/2023 with abdominal pain, diarrhea, and vomiting with CT findings consistent with colitis.
56 y/o F with a PMH of HTN, non obstructive CAD, COPD, HLD, anxiety, seizure d/o, migraine, GERD, and multiple abdominal surgeries including appendectomy/resection of appendiceal stump, cholecystectomy, exploratory laparotomy with lysis of adhesions,  hysterectomy unilateral oophorectomy, Hiatal hernia present to the ED for eval of lower abdominal pain.    # Abdominal pain: chronic in nature DDx neuromuscular in sitting of multiple surgeries vs functional: R/O other etiologies  - exam is benign  - symptoms are chronic for years  - labs reviewed: grossly unremarkable  - CT scan reviewed: finding in regards rectosigmoid thickening is chronic in nature  - GI PCR, C. diff are negative  - colonoscopy 3/2023: Findings: Protruding lesions A single sessile 3 mm polyp was found in the transverse colon. A single-piece polypectomy was performed using a cold forceps. The polyp was completely removed. Small internal and external hemorrhoids were noted.  - prior gastric emptying study: mild gastric slowing with 11% retention at 4 hrs     recommendations:  - advance diet as tolerated  - supportive measures  - monitor off ABx  - recommend pain management eval     recall as needed

## 2023-06-12 NOTE — CONSULT NOTE ADULT - SUBJECTIVE AND OBJECTIVE BOX
Gastroenterology Consultation:    Patient is a 57y old  Female who presents with a chief complaint of       Admitted on: 06-07-23      HPI:  58 y/o F with a PMH of HTN, non obstructive CAD, COPD, HLD, anxiety, seizure d/o, migraine, GERD, and multiple abdominal surgeries including appendectomy/resection of appendiceal stump, cholecystectomy, exploratory laparotomy with lysis of adhesions,  hysterectomy unilateral oophorectomy, Hiatal hernia present to the ED for eval of lower abdominal pain.  Pt has moderate, intermittent aching lower abd pain with associated nausea and non-bloody vomiting. Negative fever, chills, nausea, vomiting, chest pain, abdominal pain, sob, cough, constipation, diarrhea, incontinence    Labs unremarkable including lactate  CT A/p with oral/iv con - showed signs of colitis - unclear if no obstruction   In the ED given Cefepime/Flagyl - 1L NS - Dilaudid  Colonoscopy (March, 2023): internal and external hemorrhoids otherwise unremarkable, hyperplastic polyp    GI is called for evaluation.              Prior EGD: none on chart         Prior Colonoscopy:  < from: Colonoscopy (03.08.23 @ 15:30) >    Findings:    Protruding lesions A single sessile 3 mm polyp was found in the transverse  colon.A single-piece polypectomy was performed using a cold forceps. The polyp  was completely removed.    Small internal and external hemorrhoids were noted.    Additional findings The colon was otherwise unremarkable. No mass or stricture  seen in the right colon.    < end of copied text >      PAST MEDICAL & SURGICAL HISTORY:  Back pain  henriated discs      Seizures  last one 6 yrs ago      Migraines      Hypercholesteremia      Seasonal allergic rhinitis, unspecified trigger      GERD (gastroesophageal reflux disease)      Hiatal hernia      Anxiety      HTN (hypertension)      Osteoarthritis      Sciatica      Tobacco use disorder      History of surgery  4 rght knee sx  1 left knee sx  cholecystectomy  rotator cuff right  abdominal sx-- adhesions  c section  ovarian cystectomy  breast cystectomies  colonoscopy   endoscopies  parital hysterectomy      S/p partial hysterectomy with remaining cervical stump      H/O rotator cuff surgery  right      History of appendectomy      S/P cholecystectomy            FAMILY HISTORY:  Coronary artery disease (Father)  dad cad @78        Social History:  Tobacco: denies  Alcohol: denies  Drugs: denies     Home Medications:  atorvastatin 80 mg oral tablet: 1 tab(s) orally once a day (07 Jun 2023 23:23)  busPIRone 10 mg oral tablet: orally 3 times a day (07 Jun 2023 23:23)  Fioricet oral tablet: 1 tab(s) orally 3 times a day, As Needed (07 Jun 2023 23:23)  hydrOXYzine hydrochloride 25 mg oral tablet: 1 tab(s) orally once a day (07 Jun 2023 23:23)  Incruse Ellipta 62.5 mcg/inh inhalation powder:  (07 Jun 2023 23:23)  KlonoPIN 2 mg oral tablet: 1 tab(s) orally 3 times a day (07 Jun 2023 23:23)  levETIRAcetam 250 mg oral tablet: 1 tab(s) orally 2 times a day (07 Jun 2023 23:23)  Linzess 290 mcg oral capsule: 1 cap(s) orally once a day (07 Jun 2023 23:23)  METOPROLOL SUCCINATE ER 50MG ER TAB: tab(s) orally once a day (07 Jun 2023 23:23)  Myrbetriq 50 mg oral tablet, extended release: 1 tab(s) orally once a day (07 Jun 2023 23:23)  ondansetron 4 mg oral tablet: 1 tab(s) orally 4 times a day as needed for  nausea (07 Jun 2023 23:23)  Pepcid 40 mg oral tablet: orally 2 times a day (07 Jun 2023 23:23)  Protonix 40 mg oral delayed release tablet: 1 tab(s) orally 2 times a day (07 Jun 2023 23:23)        MEDICATIONS  (STANDING):  atorvastatin 80 milliGRAM(s) Oral at bedtime  busPIRone 10 milliGRAM(s) Oral three times a day  clonazePAM  Tablet 2 milliGRAM(s) Oral three times a day  dicyclomine 10 milliGRAM(s) Oral three times a day before meals  enoxaparin Injectable 40 milliGRAM(s) SubCutaneous every 24 hours  levETIRAcetam 250 milliGRAM(s) Oral two times a day  pantoprazole  Injectable 40 milliGRAM(s) IV Push daily  polyethylene glycol 3350 17 Gram(s) Oral daily  senna 2 Tablet(s) Oral at bedtime  sodium chloride 0.9%. 1000 milliLiter(s) (75 mL/Hr) IV Continuous <Continuous>    MEDICATIONS  (PRN):  acetaminophen     Tablet .. 650 milliGRAM(s) Oral every 6 hours PRN Temp greater or equal to 38C (100.4F), Mild Pain (1 - 3)  aluminum hydroxide/magnesium hydroxide/simethicone Suspension 30 milliLiter(s) Oral every 4 hours PRN Dyspepsia  HYDROmorphone  Injectable 0.5 milliGRAM(s) IV Push every 6 hours PRN Severe Pain (7 - 10)  hydrOXYzine hydrochloride 25 milliGRAM(s) Oral three times a day PRN Nausea  melatonin 3 milliGRAM(s) Oral at bedtime PRN Insomnia  ondansetron Injectable 4 milliGRAM(s) IV Push every 8 hours PRN Nausea and/or Vomiting  oxyCODONE    IR 5 milliGRAM(s) Oral two times a day PRN for moderate pain      Allergies  Neurontin (Unknown)  Toradol (Rash; Urticaria; Anaphylaxis (Mild to Mod))  Cipro (Hives)  penicillin (Hives)  Toradol (Hives)      Review of Systems:   Constitutional:  No Fever, No Chills  ENT/Mouth:  No Hearing Changes,  No Difficulty Swallowing  Eyes:  No Eye Pain, No Vision Changes  Cardiovascular:  No Chest Pain, No Palpitations  Respiratory:  No Cough, No Dyspnea  Gastrointestinal:  As described in HPI  Musculoskeletal:  No Joint Swelling, No Back Pain  Skin:  No Skin Lesions, No Jaundice  Neuro:  No Syncope, No Dizziness  Heme/Lymph:  No Bruising, No Bleeding.          Physical Examination:  T(C): 36.4 (06-08-23 @ 12:01), Max: 36.4 (06-08-23 @ 12:01)  HR: 46 (06-08-23 @ 12:01) (46 - 72)  BP: 113/54 (06-08-23 @ 12:01) (113/54 - 149/88)  RR: 18 (06-08-23 @ 12:01) (18 - 18)  SpO2: 95% (06-08-23 @ 12:01) (95% - 97%)    GENERAL: AAOx3, no acute distress.  HEAD:  Atraumatic, Normocephalic  EYES: conjunctiva and sclera clear  NECK: Supple, no JVD or thyromegaly  CHEST/LUNG: Clear to auscultation bilaterally; No wheeze, rhonchi, or rales  HEART: Regular rate and rhythm; normal S1, S2, No murmurs.  ABDOMEN: Soft, nontender, nondistended; Bowel sounds present, multiple scars noted   NEUROLOGY: No asterixis or tremor.   SKIN: Intact, no jaundice    Data:                        11.9   4.42  )-----------( 174      ( 08 Jun 2023 08:40 )             34.5     Hgb Trend:  11.9  06-08-23 @ 08:40  13.6  06-07-23 @ 15:25      06-08    145  |  110  |  10  ----------------------------<  90  3.9   |  26  |  0.7    Ca    8.7      08 Jun 2023 08:40    TPro  5.9<L>  /  Alb  3.8  /  TBili  <0.2  /  DBili  x   /  AST  23  /  ALT  25  /  AlkPhos  94  06-08    Liver panel trend:  TBili <0.2   /   AST 23   /   ALT 25   /   AlkP 94   /   Tptn 5.9   /   Alb 3.8    /   DBili --      06-08  TBili <0.2   /   AST 26   /   ALT 34   /   AlkP 114   /   Tptn 7.2   /   Alb 4.6    /   DBili --      06-07              Radiology:  CT Abdomen and Pelvis w/ Oral Cont and w/ IV Cont:   ACC: [id number] EXAM:  CT ABDOMEN AND PELVIS OC IC   ORDERED BY: LEYLA ALMONTE     PROCEDURE DATE:  06/07/2023          INTERPRETATION:  CLINICAL STATEMENT: Abdominal pain, small bowel   obstruction    TECHNIQUE: Contiguous axial CT images were obtained from the lower chest   to the pubic symphysis with intravenous Omnipaque 350.  Oral contrast was   administered.  Reformatted images in the coronal and sagittal planes were   acquired.    COMPARISON CT: CT abdomen/pelvis 5/9/2023    FINDINGS:    LOWER CHEST: Mild bibasilar subsegmental atelectasis.    HEPATOBILIARY: Prominent CBD, status post cholecystectomy.    SPLEEN: Unremarkable.    PANCREAS: Unremarkable.    ADRENAL GLANDS: Unremarkable.    KIDNEYS: No hydronephrosis.    ABDOMINOPELVICNODES: No lymphadenopathy.    PELVIC ORGANS: Prior hysterectomy.    PERITONEUM/MESENTERY/BOWEL: Compared with the scan of 5/9/2023, there has   been a mild increase in the degree of rectosigmoid mural thickening,   compatible with colitis.    The small bowel anastomosis is noted in the right lower quadrant. The   oral contrast material has not quite reached the cecum at this time   however there is no evidence of small bowel distention or obstruction.     No pneumoperitoneum or ascites.    BONES/SOFT TISSUES: No acute osseous abnormality. Degenerative changes of   the spine.    OTHER: Atherosclerosis of the abdominal aorta and its branches.   Circumaortic left renal vein.      IMPRESSION:    Compared with the scan of 5/9/2023, there has been a mild increase in the   degree of rectosigmoid mural thickening, compatible with colitis.    The small bowel anastomosis is noted in the right lower quadrant. The   oral contrast material has not quite reached the cecum at this time   however there is no evidence of small bowel distention or obstruction. If   symptoms persist or worsen, a follow-up CT scan may be obtained.    --- End of Report ---          EDE MARSHALL MD; Resident Radiologist  This document has been electronically signed.  HARRY HERNANDEZ MD; Attending Interventional Radiologist  This document has been electronically signed. Jun 7 2023  7:11PM (06-07-23 @ 17:21)      
Pain Medicine Consult Note    History of Present Illness  Patient is a 56 y/o woman with history of HTN, CAD, COPD, HL, anxiety, seizure disorder, migraines, GERD, and chronic low back pain who was admitted on 6/7/2023 with abdominal pain, diarrhea, and vomiting with CT findings consistent with colitis. The patient notes a longstanding history of chronic low back pain and chronic bilateral knee pain. She was treated with high dose chronic opioid therapy for 15 years but more recently was transitioned to tramadol 50mg which she states that she has been taking for the past 3-4 years (no record of receiving tramadol in [facility]). She also uses medical marijuana regularly to treat her pain. Currently, the patient endorses having pain in the periumbilical area that is exacerbated postprandially. She denies having any nausea or vomiting at this time but is having high volume liquid diarrhea. No blood in the stool.     Current Inpatient Medication Regimen:  acetaminophen     Tablet .. 650 milliGRAM(s) Oral every 6 hours PRN  aluminum hydroxide/magnesium hydroxide/simethicone Suspension 30 milliLiter(s) Oral every 4 hours PRN  atorvastatin 80 milliGRAM(s) Oral at bedtime  busPIRone 10 milliGRAM(s) Oral three times a day  clonazePAM  Tablet 2 milliGRAM(s) Oral three times a day  dicyclomine 10 milliGRAM(s) Oral three times a day before meals  enoxaparin Injectable 40 milliGRAM(s) SubCutaneous every 24 hours  hydrOXYzine hydrochloride 25 milliGRAM(s) Oral three times a day PRN  levETIRAcetam 250 milliGRAM(s) Oral two times a day  melatonin 3 milliGRAM(s) Oral at bedtime PRN  ondansetron Injectable 4 milliGRAM(s) IV Push every 8 hours PRN  polyethylene glycol 3350 17 Gram(s) Oral daily  saccharomyces boulardii 250 milliGRAM(s) Oral two times a day  senna 2 Tablet(s) Oral at bedtime  simethicone 80 milliGRAM(s) Chew daily      Home Analgesic Regimen:  tramadol 50mg prn  medical marijuana    Allergies:  Neurontin (Unknown)  Toradol (Rash; Urticaria; Anaphylaxis (Mild to Mod))  Cipro (Hives)  penicillin (Hives)  Toradol (Hives)      Past Medical History:  Seizures  Migraines  Hypercholesteremia  GERD (gastroesophageal reflux disease)  Hiatal hernia  Anxiety  HTN (hypertension)  COPD    Past Surgical History:  Appendectomy  Cholecystectomy  Exploratory laparotomy, lysis of adhesions  Hysterectomy  Bilateral carpal tunnel release  Bilateral rotator cuff repair    Family History:  Breast CA (mother)  Prostate CA (father)    Social History:  Tobacco - 1/2 PPD  EtOH - occasional  Drugs - denies      Review of Systems:  General: no fevers or chills  Eyes: no diplopia or blurred vision  ENT: no rhinorrhea  CV: no chest pain  Resp: no cough or dyspnea  GI: +abdominal pain, diarrhea  : no urinary incontinence or dysuria  Neuro: no focal weakness or numbness in extremities    Physical Exam:  T(C): 36.7 (06-12-23 @ 14:44), Max: 36.7 (06-12-23 @ 14:44)  HR: 58 (06-12-23 @ 06:50) (44 - 58)  BP: 141/78 (06-12-23 @ 14:44) (141/78 - 172/72)  RR: 18 (06-12-23 @ 14:44) (17 - 18)  SpO2: 96% (06-12-23 @ 06:50) (93% - 98%)  Gen: NAD  Eyes: no glasses or scleral icterus  Head: Normocephalic / Atraumatic  CV: no JVD  Lungs: nonlabored breathing  Abdomen: mild periumbilical TTP  : no arauz catheter in place  Neuro: AOx3, Cranial nerves intact  Extremities: full ROM in upper/lower extremities  Psych: normal affect      Labs:  CBC  3.87 > 12.1 / 34.5 < 181      Imaging Studies:  CT Abdomen/Pelvis (6/7/2023)  FINDINGS:    LOWER CHEST: Mild bibasilar subsegmental atelectasis.    HEPATOBILIARY: Prominent CBD, status post cholecystectomy.    SPLEEN: Unremarkable.    PANCREAS: Unremarkable.    ADRENAL GLANDS: Unremarkable.    KIDNEYS: No hydronephrosis.    ABDOMINOPELVIC NODES: No lymphadenopathy.    PELVIC ORGANS: Prior hysterectomy.    PERITONEUM/MESENTERY/BOWEL: Compared with the scan of 5/9/2023, there has   been a mild increase in the degree of rectosigmoid mural thickening,   compatible with colitis.    The small bowel anastomosis is noted in the right lower quadrant. The   oral contrast material has not quite reached the cecum at this time   however there is no evidence of small bowel distention or obstruction.     No pneumoperitoneum or ascites.    BONES/SOFT TISSUES: No acute osseous abnormality. Degenerative changes of   the spine.    OTHER: Atherosclerosis of the abdominal aorta and its branches.   Circumaortic left renal vein.      IMPRESSION:    Compared with the scan of 5/9/2023, there has been a mild increase in the   degree of rectosigmoid mural thickening, compatible with colitis.    The small bowel anastomosis is noted in the right lower quadrant. The   oral contrast material has not quite reached the cecum at this time   however there is no evidence of small bowel distention or obstruction. If   symptoms persist or worsen, a follow-up CT scan may be obtained.      Opioid Risk Assessment Tool                                                                         Female       Male  Family History  Alcohol                                                              1                3  Illegal drugs                                                       2                3  Rx drugs                                                            4                4    Personal History   Alcohol                                                              3                3  Illegal drugs                                                       4                4  Rx drugs                                                            5                5    Age between 16—45 years                                1                1  History of preadolescent sexual abuse               3                0    Psychological disease  ADD, OCD, bipolar, schizophrenia                      2                2  Depression                                                       1                1    Total Score                                                      0              __    0 - 3 = low risk for future opioid abuse  4 - 7 = moderate risk for future opioid abuse  8+ = high risk for future opioid abuse

## 2023-06-12 NOTE — PROGRESS NOTE ADULT - SUBJECTIVE AND OBJECTIVE BOX
58 y/o F with a PMH of HTN, non obstructive CAD, COPD, HLD, anxiety, seizure d/o, migraine, GERD, and multiple abdominal surgeries including appendectomy/resection of appendiceal stump, cholecystectomy, exploratory laparotomy with lysis of adhesions,  hysterectomy unilateral oophorectomy, Hiatal hernia present to the ED for eval of lower abd pain.   CT A/p with oral/iv con - showed signs of colitis - unclear if no obstruction   Colonoscopy (March, 2023): internal and external hemorrhoids otherwise unremarkable, hyperplastic polyp. Abdominal pain is likely due to adhesions.   Today pt feels better, c/o loose stool ( brown, no blood, no mucus) and abdominal discomfort.     PAST MEDICAL & SURGICAL HISTORY:  Back pain  henriated discs  Seizures  last one 6 yrs ago  Migraines  Hypercholesteremia  Seasonal allergic rhinitis, unspecified trigger  GERD (gastroesophageal reflux disease)  Hiatal hernia  Anxiety  HTN (hypertension)  Osteoarthritis  Sciatica  Tobacco use disorder  History of surgery  4 rght knee sx  1 left knee sx  cholecystectomy  rotator cuff right  abdominal sx-- adhesions  c section  ovarian cystectomy  breast cystectomies  colonoscopy   endoscopies  parital hysterectomy      S/p partial hysterectomy with remaining cervical stump      H/O rotator cuff surgery  right      History of appendectomy      S/P cholecystectomy        OBJECTIVE    VITAL SIGNS: Last 24 Hours  T(C): 35.3 (12 Jun 2023 04:44), Max: 36.1 (11 Jun 2023 15:07)  T(F): 95.6 (12 Jun 2023 04:44), Max: 97 (11 Jun 2023 15:07)  HR: 58 (12 Jun 2023 06:50) (44 - 58)  BP: 143/67 (12 Jun 2023 04:44) (125/92 - 172/72)  BP(mean): --  RR: 17 (12 Jun 2023 06:50) (17 - 18)  SpO2: 96% (12 Jun 2023 06:50) (93% - 98%)    Parameters below as of 12 Jun 2023 06:50  Patient On (Oxygen Delivery Method): room air        PHYSICAL EXAM:  General: In NAD.  HEENT: Normocephalic, nontraumatic. PERRL.  LUNGS: Clear to auscultation b/l. No wheezes, rales, or rhonchi.  HEART: RRR. No murmurs, rubs, or gallops.  ABDOMEN: Soft, nondistended, multiple surgical scars noted, diffuse abdominal pain on palpation and percussion, no rebound, voluntary guarding, BS present   EXT: Pulses palpable x 4. No lower extremity edema.  NEURO: AAOx3. No focal deficits.   SKIN: Warm, dry.      ALLERGIES:  Neurontin (Unknown)  Toradol (Rash; Urticaria; Anaphylaxis (Mild to Mod))  Cipro (Hives)  penicillin (Hives)  Toradol (Hives)        HOME MEDICATIONS  Home Medications:  atorvastatin 80 mg oral tablet: 1 tab(s) orally once a day (07 Jun 2023 23:23)  busPIRone 10 mg oral tablet: orally 3 times a day (07 Jun 2023 23:23)  Fioricet oral tablet: 1 tab(s) orally 3 times a day, As Needed (07 Jun 2023 23:23)  hydrOXYzine hydrochloride 25 mg oral tablet: 1 tab(s) orally once a day (07 Jun 2023 23:23)  Incruse Ellipta 62.5 mcg/inh inhalation powder:  (07 Jun 2023 23:23)  KlonoPIN 2 mg oral tablet: 1 tab(s) orally 3 times a day (07 Jun 2023 23:23)  levETIRAcetam 250 mg oral tablet: 1 tab(s) orally 2 times a day (07 Jun 2023 23:23)  Linzess 290 mcg oral capsule: 1 cap(s) orally once a day (07 Jun 2023 23:23)  METOPROLOL SUCCINATE ER 50MG ER TAB: tab(s) orally once a day (07 Jun 2023 23:23)  Myrbetriq 50 mg oral tablet, extended release: 1 tab(s) orally once a day (07 Jun 2023 23:23)  ondansetron 4 mg oral tablet: 1 tab(s) orally 4 times a day as needed for  nausea (07 Jun 2023 23:23)  Pepcid 40 mg oral tablet: orally 2 times a day (07 Jun 2023 23:23)  Protonix 40 mg oral delayed release tablet: 1 tab(s) orally 2 times a day (07 Jun 2023 23:23)      LABS:                          12.1   3.87  )-----------( 181      ( 11 Jun 2023 07:16 )             34.5   06-11    146  |  110  |  4<L>  ----------------------------<  89  4.0   |  23  |  0.7    Ca    9.2      11 Jun 2023 07:16  Mg     1.5     06-11    TPro  6.4  /  Alb  4.1  /  TBili  0.4  /  DBili  x   /  AST  39  /  ALT  33  /  AlkPhos  99  06-11    RADIOLOGY:    < from: Xray Kidney Ureter Bladder (06.08.23 @ 09:45) >  impression:  Nonspecific, nonobstructive bowel gas pattern. Contrast in the colon.   Excreted contrast in the bladder. Surgical clips overlying the abdomen.      < from: CT Abdomen and Pelvis w/ Oral Cont and w/ IV Cont (06.07.23 @ 17:21) >  IMPRESSION:    Compared with the scan of 5/9/2023, there has been a mild increase in the   degree of rectosigmoid mural thickening, compatible with colitis.    The small bowel anastomosis is noted in the right lower quadrant. The   oral contrast material has not quite reached the cecum at this time   however there is no evidence of small bowel distention or obstruction. If   symptoms persist or worsen, a follow-up CT scan may be obtained.    < from: Xray Kidney Ureter Bladder (06.09.23 @ 17:40) >  Impression:        A nonobstructive unremarkable bowel gas pattern is present    .Contrast material within nondistended colon is present.   Postcholecystectomy. Additional surgical clips are present      MEDICATIONS  (STANDING):  atorvastatin 80 milliGRAM(s) Oral at bedtime  busPIRone 10 milliGRAM(s) Oral three times a day  clonazePAM  Tablet 2 milliGRAM(s) Oral three times a day  dicyclomine 10 milliGRAM(s) Oral three times a day before meals  enoxaparin Injectable 40 milliGRAM(s) SubCutaneous every 24 hours  levETIRAcetam 250 milliGRAM(s) Oral two times a day  pantoprazole  Injectable 40 milliGRAM(s) IV Push daily  polyethylene glycol 3350 17 Gram(s) Oral daily  saccharomyces boulardii 250 milliGRAM(s) Oral two times a day  senna 2 Tablet(s) Oral at bedtime  simethicone 80 milliGRAM(s) Chew daily    MEDICATIONS  (PRN):  acetaminophen     Tablet .. 650 milliGRAM(s) Oral every 6 hours PRN Temp greater or equal to 38C (100.4F), Mild Pain (1 - 3)  aluminum hydroxide/magnesium hydroxide/simethicone Suspension 30 milliLiter(s) Oral every 4 hours PRN Dyspepsia  hydrOXYzine hydrochloride 25 milliGRAM(s) Oral three times a day PRN Nausea  melatonin 3 milliGRAM(s) Oral at bedtime PRN Insomnia  ondansetron Injectable 4 milliGRAM(s) IV Push every 8 hours PRN Nausea and/or Vomiting

## 2023-06-12 NOTE — PROGRESS NOTE ADULT - ASSESSMENT
56 y/o F with a PMH of HTN, non obstructive CAD, COPD, HLD, anxiety, seizure d/o, migraine, GERD, and multiple abdominal surgeries including appendectomy/resection of appendiceal stump, cholecystectomy, exploratory laparotomy with lysis of adhesions,  hysterectomy unilateral oophorectomy, Hiatal hernia present to the ED for eval of lower abd pain.   CT A/p with oral/iv con - showed signs of colitis - unclear if no obstruction   Colonoscopy (March, 2023): internal and external hemorrhoids otherwise unremarkable, hyperplastic polyp    A/P   #Acute colitis without signs of SBO / suspected LGIB from hemorrhoids/ Hx of appendectomy - cholecystectomy - unilateral oophorectomy/hysterectomy/ Abdominal pain due to adhesions / Hx of SBO 2/2 obstructive intraabdominal adhesions s/p anastomotic surgery  - clinically improving   - d/c po  opioids   - may change  Protonix to po 40 mg  daily ,  bentyl q8, probiotics and Simethicone   - GI PCR is negative, will give Imodium   - abdominal pain is likely due to adhesions    # Nausea/ vomiting  - on Ondansetron   -  d/c po opioids, likely side effect      #Non-obstructive CAD   - on statin and BB     #COPD not on home O2   - stable on RA now, nebs PRN     #Overactive Bladder   - on Myrbetriq     #Anxiety   - on Klonopin    #GERD  - c/w home meds    #DVT - Lovenox sq     #Progress Note Handoff  Pending (specify): change PPIs to po, give Imodium, d/c po opioids,  anticipate discharge in 24 hours or this afternoon

## 2023-06-12 NOTE — CONSULT NOTE ADULT - PROBLEM SELECTOR RECOMMENDATION 9
+History of chronic opioid use per report, but no recent prescriptions in [facility]. Low risk of opioid abuse per ORT.  1) Start tramadol 50mg Q6h prn; do not discharge with any opioids  2) Start cyclobenzaprine 5mg Q8h standing  3) Use caution with coadministration of opioid and benzodiazepines 2/2 risk of respiratory depression  4) Would avoid miralax, senna as patient has watery diarrhea

## 2023-06-13 ENCOUNTER — TRANSCRIPTION ENCOUNTER (OUTPATIENT)
Age: 58
End: 2023-06-13

## 2023-06-13 VITALS
SYSTOLIC BLOOD PRESSURE: 145 MMHG | HEART RATE: 52 BPM | DIASTOLIC BLOOD PRESSURE: 65 MMHG | TEMPERATURE: 99 F | RESPIRATION RATE: 18 BRPM

## 2023-06-13 LAB
ALBUMIN SERPL ELPH-MCNC: 3.9 G/DL — SIGNIFICANT CHANGE UP (ref 3.5–5.2)
ALP SERPL-CCNC: 106 U/L — SIGNIFICANT CHANGE UP (ref 30–115)
ALT FLD-CCNC: 46 U/L — HIGH (ref 0–41)
ANION GAP SERPL CALC-SCNC: 11 MMOL/L — SIGNIFICANT CHANGE UP (ref 7–14)
AST SERPL-CCNC: 45 U/L — HIGH (ref 0–41)
BASOPHILS # BLD AUTO: 0.03 K/UL — SIGNIFICANT CHANGE UP (ref 0–0.2)
BASOPHILS NFR BLD AUTO: 0.6 % — SIGNIFICANT CHANGE UP (ref 0–1)
BILIRUB SERPL-MCNC: 0.3 MG/DL — SIGNIFICANT CHANGE UP (ref 0.2–1.2)
BUN SERPL-MCNC: 4 MG/DL — LOW (ref 10–20)
CALCIUM SERPL-MCNC: 9.1 MG/DL — SIGNIFICANT CHANGE UP (ref 8.4–10.5)
CHLORIDE SERPL-SCNC: 107 MMOL/L — SIGNIFICANT CHANGE UP (ref 98–110)
CO2 SERPL-SCNC: 25 MMOL/L — SIGNIFICANT CHANGE UP (ref 17–32)
CREAT SERPL-MCNC: 0.7 MG/DL — SIGNIFICANT CHANGE UP (ref 0.7–1.5)
EGFR: 100 ML/MIN/1.73M2 — SIGNIFICANT CHANGE UP
EOSINOPHIL # BLD AUTO: 0.07 K/UL — SIGNIFICANT CHANGE UP (ref 0–0.7)
EOSINOPHIL NFR BLD AUTO: 1.5 % — SIGNIFICANT CHANGE UP (ref 0–8)
GLUCOSE SERPL-MCNC: 94 MG/DL — SIGNIFICANT CHANGE UP (ref 70–99)
HCT VFR BLD CALC: 34.1 % — LOW (ref 37–47)
HGB BLD-MCNC: 11.9 G/DL — LOW (ref 12–16)
IMM GRANULOCYTES NFR BLD AUTO: 0 % — LOW (ref 0.1–0.3)
LYMPHOCYTES # BLD AUTO: 1.55 K/UL — SIGNIFICANT CHANGE UP (ref 1.2–3.4)
LYMPHOCYTES # BLD AUTO: 32.2 % — SIGNIFICANT CHANGE UP (ref 20.5–51.1)
MAGNESIUM SERPL-MCNC: 1.7 MG/DL — LOW (ref 1.8–2.4)
MCHC RBC-ENTMCNC: 31.5 PG — HIGH (ref 27–31)
MCHC RBC-ENTMCNC: 34.9 G/DL — SIGNIFICANT CHANGE UP (ref 32–37)
MCV RBC AUTO: 90.2 FL — SIGNIFICANT CHANGE UP (ref 81–99)
MONOCYTES # BLD AUTO: 0.46 K/UL — SIGNIFICANT CHANGE UP (ref 0.1–0.6)
MONOCYTES NFR BLD AUTO: 9.5 % — HIGH (ref 1.7–9.3)
NEUTROPHILS # BLD AUTO: 2.71 K/UL — SIGNIFICANT CHANGE UP (ref 1.4–6.5)
NEUTROPHILS NFR BLD AUTO: 56.2 % — SIGNIFICANT CHANGE UP (ref 42.2–75.2)
NRBC # BLD: 0 /100 WBCS — SIGNIFICANT CHANGE UP (ref 0–0)
PLATELET # BLD AUTO: 199 K/UL — SIGNIFICANT CHANGE UP (ref 130–400)
PMV BLD: 10.6 FL — HIGH (ref 7.4–10.4)
POTASSIUM SERPL-MCNC: 3.4 MMOL/L — LOW (ref 3.5–5)
POTASSIUM SERPL-SCNC: 3.4 MMOL/L — LOW (ref 3.5–5)
PROT SERPL-MCNC: 6.2 G/DL — SIGNIFICANT CHANGE UP (ref 6–8)
RBC # BLD: 3.78 M/UL — LOW (ref 4.2–5.4)
RBC # FLD: 13.1 % — SIGNIFICANT CHANGE UP (ref 11.5–14.5)
SODIUM SERPL-SCNC: 143 MMOL/L — SIGNIFICANT CHANGE UP (ref 135–146)
WBC # BLD: 4.82 K/UL — SIGNIFICANT CHANGE UP (ref 4.8–10.8)
WBC # FLD AUTO: 4.82 K/UL — SIGNIFICANT CHANGE UP (ref 4.8–10.8)

## 2023-06-13 PROCEDURE — 99232 SBSQ HOSP IP/OBS MODERATE 35: CPT

## 2023-06-13 RX ORDER — CYCLOBENZAPRINE HYDROCHLORIDE 10 MG/1
1 TABLET, FILM COATED ORAL
Qty: 90 | Refills: 0
Start: 2023-06-13 | End: 2023-07-12

## 2023-06-13 RX ORDER — SACCHAROMYCES BOULARDII 250 MG
1 POWDER IN PACKET (EA) ORAL
Qty: 60 | Refills: 0
Start: 2023-06-13 | End: 2023-07-12

## 2023-06-13 RX ORDER — TRAMADOL HYDROCHLORIDE 50 MG/1
1 TABLET ORAL
Qty: 20 | Refills: 0
Start: 2023-06-13 | End: 2023-06-17

## 2023-06-13 RX ORDER — PANTOPRAZOLE SODIUM 20 MG/1
1 TABLET, DELAYED RELEASE ORAL
Qty: 14 | Refills: 0
Start: 2023-06-13 | End: 2023-06-26

## 2023-06-13 RX ORDER — POTASSIUM CHLORIDE 20 MEQ
40 PACKET (EA) ORAL ONCE
Refills: 0 | Status: COMPLETED | OUTPATIENT
Start: 2023-06-13 | End: 2023-06-13

## 2023-06-13 RX ORDER — POTASSIUM CHLORIDE 20 MEQ
40 PACKET (EA) ORAL ONCE
Refills: 0 | Status: DISCONTINUED | OUTPATIENT
Start: 2023-06-13 | End: 2023-06-13

## 2023-06-13 RX ORDER — MAGNESIUM SULFATE 500 MG/ML
2 VIAL (ML) INJECTION ONCE
Refills: 0 | Status: DISCONTINUED | OUTPATIENT
Start: 2023-06-13 | End: 2023-06-13

## 2023-06-13 RX ORDER — MAGNESIUM SULFATE 500 MG/ML
2 VIAL (ML) INJECTION ONCE
Refills: 0 | Status: COMPLETED | OUTPATIENT
Start: 2023-06-13 | End: 2023-06-13

## 2023-06-13 RX ORDER — SIMETHICONE 80 MG/1
1 TABLET, CHEWABLE ORAL
Qty: 30 | Refills: 0
Start: 2023-06-13 | End: 2023-07-12

## 2023-06-13 RX ADMIN — Medication 25 GRAM(S): at 13:16

## 2023-06-13 RX ADMIN — Medication 10 MILLIGRAM(S): at 06:08

## 2023-06-13 RX ADMIN — Medication 2 MILLIGRAM(S): at 13:16

## 2023-06-13 RX ADMIN — Medication 250 MILLIGRAM(S): at 05:54

## 2023-06-13 RX ADMIN — TRAMADOL HYDROCHLORIDE 50 MILLIGRAM(S): 50 TABLET ORAL at 09:15

## 2023-06-13 RX ADMIN — Medication 40 MILLIEQUIVALENT(S): at 13:18

## 2023-06-13 RX ADMIN — TRAMADOL HYDROCHLORIDE 50 MILLIGRAM(S): 50 TABLET ORAL at 00:30

## 2023-06-13 RX ADMIN — ENOXAPARIN SODIUM 40 MILLIGRAM(S): 100 INJECTION SUBCUTANEOUS at 05:53

## 2023-06-13 RX ADMIN — TRAMADOL HYDROCHLORIDE 50 MILLIGRAM(S): 50 TABLET ORAL at 08:45

## 2023-06-13 RX ADMIN — CYCLOBENZAPRINE HYDROCHLORIDE 5 MILLIGRAM(S): 10 TABLET, FILM COATED ORAL at 13:17

## 2023-06-13 RX ADMIN — Medication 10 MILLIGRAM(S): at 13:17

## 2023-06-13 RX ADMIN — CYCLOBENZAPRINE HYDROCHLORIDE 5 MILLIGRAM(S): 10 TABLET, FILM COATED ORAL at 05:56

## 2023-06-13 RX ADMIN — PANTOPRAZOLE SODIUM 40 MILLIGRAM(S): 20 TABLET, DELAYED RELEASE ORAL at 06:08

## 2023-06-13 RX ADMIN — Medication 25 MILLIGRAM(S): at 05:58

## 2023-06-13 RX ADMIN — Medication 10 MILLIGRAM(S): at 05:55

## 2023-06-13 RX ADMIN — Medication 2 MILLIGRAM(S): at 05:58

## 2023-06-13 RX ADMIN — TRAMADOL HYDROCHLORIDE 50 MILLIGRAM(S): 50 TABLET ORAL at 01:25

## 2023-06-13 RX ADMIN — LEVETIRACETAM 250 MILLIGRAM(S): 250 TABLET, FILM COATED ORAL at 05:53

## 2023-06-13 RX ADMIN — SIMETHICONE 80 MILLIGRAM(S): 80 TABLET, CHEWABLE ORAL at 13:17

## 2023-06-13 NOTE — PROGRESS NOTE ADULT - SUBJECTIVE AND OBJECTIVE BOX
RAMÍREZ RODARTE 57y Female  MRN#: [id number]   CODE STATUS:________    Hospital Day: 6d    Pt is currently admitted with the primary diagnosis of Colitis    SUBJECTIVE  Hospital Course  58 y/o F with a PMH of HTN, non obstructive CAD, COPD, HLD, anxiety, seizure d/o, migraine, GERD, and multiple abdominal surgeries including appendectomy/resection of appendiceal stump, cholecystectomy, exploratory laparotomy with lysis of adhesions,  hysterectomy unilateral oophorectomy, Hiatal hernia present to the ED for eval of lower abd pain.     6/13 - GI pcr -ve, continues to have diarrhea and nausea, will send cdiff    Overnight events   None significant    Subjective complaints   3-4 episodes of loose stools overnight and vomiting x 2 as per Patient, feels okay this am  Present Today:   - Gooden:  No [  ], Yes [   ] : Indication:     - Type of IV Access:       .. CVC/Piccline:  No [  ], Yes [   ] : Indication:       .. Midline: No [  ], Yes [   ] : Indication:                                             ----------------------------------------------------------  OBJECTIVE  PAST MEDICAL & SURGICAL HISTORY  Back pain  henriated discs    Seizures  last one 6 yrs ago    Migraines    Hypercholesteremia    Seasonal allergic rhinitis, unspecified trigger    GERD (gastroesophageal reflux disease)    Hiatal hernia    Anxiety    HTN (hypertension)    Osteoarthritis    Sciatica    Tobacco use disorder    History of surgery  4 rght knee sx  1 left knee sx  cholecystectomy  rotator cuff right  abdominal sx-- adhesions  c section  ovarian cystectomy  breast cystectomies  colonoscopy   endoscopies  parital hysterectomy    S/p partial hysterectomy with remaining cervical stump    H/O rotator cuff surgery  right    History of appendectomy    S/P cholecystectomy                                              -----------------------------------------------------------  ALLERGIES:  Neurontin (Unknown)  Toradol (Rash; Urticaria; Anaphylaxis (Mild to Mod))  Cipro (Hives)  penicillin (Hives)  Toradol (Hives)                                            ------------------------------------------------------------    HOME MEDICATIONS  Home Medications:  atorvastatin 80 mg oral tablet: 1 tab(s) orally once a day (07 Jun 2023 23:23)  busPIRone 10 mg oral tablet: orally 3 times a day (07 Jun 2023 23:23)  Fioricet oral tablet: 1 tab(s) orally 3 times a day, As Needed (07 Jun 2023 23:23)  hydrOXYzine hydrochloride 25 mg oral tablet: 1 tab(s) orally once a day (07 Jun 2023 23:23)  Incruse Ellipta 62.5 mcg/inh inhalation powder:  (07 Jun 2023 23:23)  KlonoPIN 2 mg oral tablet: 1 tab(s) orally 3 times a day (07 Jun 2023 23:23)  levETIRAcetam 250 mg oral tablet: 1 tab(s) orally 2 times a day (07 Jun 2023 23:23)  Linzess 290 mcg oral capsule: 1 cap(s) orally once a day (07 Jun 2023 23:23)  METOPROLOL SUCCINATE ER 50MG ER TAB: tab(s) orally once a day (07 Jun 2023 23:23)  Myrbetriq 50 mg oral tablet, extended release: 1 tab(s) orally once a day (07 Jun 2023 23:23)  ondansetron 4 mg oral tablet: 1 tab(s) orally 4 times a day as needed for  nausea (07 Jun 2023 23:23)  Pepcid 40 mg oral tablet: orally 2 times a day (07 Jun 2023 23:23)                           MEDICATIONS:  STANDING MEDICATIONS  atorvastatin 80 milliGRAM(s) Oral at bedtime  busPIRone 10 milliGRAM(s) Oral three times a day  clonazePAM  Tablet 2 milliGRAM(s) Oral three times a day  cyclobenzaprine 5 milliGRAM(s) Oral every 8 hours  dicyclomine 10 milliGRAM(s) Oral three times a day before meals  enoxaparin Injectable 40 milliGRAM(s) SubCutaneous every 24 hours  levETIRAcetam 250 milliGRAM(s) Oral two times a day  magnesium sulfate  IVPB 2 Gram(s) IV Intermittent once  pantoprazole    Tablet 40 milliGRAM(s) Oral before breakfast  polyethylene glycol 3350 17 Gram(s) Oral daily  potassium chloride    Tablet ER 40 milliEquivalent(s) Oral once  saccharomyces boulardii 250 milliGRAM(s) Oral two times a day  senna 2 Tablet(s) Oral at bedtime  simethicone 80 milliGRAM(s) Chew daily    PRN MEDICATIONS  acetaminophen     Tablet .. 650 milliGRAM(s) Oral every 6 hours PRN  aluminum hydroxide/magnesium hydroxide/simethicone Suspension 30 milliLiter(s) Oral every 4 hours PRN  hydrOXYzine hydrochloride 25 milliGRAM(s) Oral three times a day PRN  melatonin 3 milliGRAM(s) Oral at bedtime PRN  ondansetron Injectable 4 milliGRAM(s) IV Push every 8 hours PRN  traMADol 50 milliGRAM(s) Oral every 6 hours PRN                                            ------------------------------------------------------------  VITAL SIGNS: Last 24 Hours  T(C): 36.6 (13 Jun 2023 04:52), Max: 36.7 (12 Jun 2023 14:44)  T(F): 97.9 (13 Jun 2023 04:52), Max: 98 (12 Jun 2023 14:44)  HR: 64 (13 Jun 2023 04:52) (59 - 64)  BP: 143/78 (13 Jun 2023 04:52) (141/78 - 185/81)  BP(mean): --  RR: 18 (13 Jun 2023 04:52) (18 - 18)  SpO2: 91% (13 Jun 2023 04:52) (91% - 91%)                                             --------------------------------------------------------------  LABS:                        11.9   4.82  )-----------( 199      ( 13 Jun 2023 07:28 )             34.1     06-13    143  |  107  |  4<L>  ----------------------------<  94  3.4<L>   |  25  |  0.7    Ca    9.1      13 Jun 2023 07:28  Mg     1.7     06-13    TPro  6.2  /  Alb  3.9  /  TBili  0.3  /  DBili  x   /  AST  45<H>  /  ALT  46<H>  /  AlkPhos  106  06-13                                                              -------------------------------------------------------------  RADIOLOGY:                                            --------------------------------------------------------------    PHYSICAL EXAM:  General: Awake, oriented and resting comfortably, no acute distress  Head: normocephalic, atraumatic  ENT:  moist mucous membranes  Cardiac: regular rate and rhythm, normal S1 and S2  Respiratory: clear to auscultation bilaterally  Abdomen: soft, diffuse tenderness  Ext:  No edema,   Neuro: A&O x3, no focal neurological deficits

## 2023-06-13 NOTE — PROGRESS NOTE ADULT - ASSESSMENT
58 y/o F with a PMH of HTN, non obstructive CAD, COPD, HLD, anxiety, seizure d/o, migraine, GERD, and multiple abdominal surgeries including appendectomy/resection of appendiceal stump, cholecystectomy, exploratory laparotomy with lysis of adhesions,  hysterectomy unilateral oophorectomy, Hiatal hernia present to the ED for eval of lower abd pain.   CT A/p with oral/iv con - showed signs of colitis - unclear if no obstruction   Colonoscopy (March, 2023): internal and external hemorrhoids otherwise unremarkable, hyperplastic polyp    #Acute colitis without signs of SBO / suspected LGIB from hemorrhoids/ Hx of appendectomy - cholecystectomy - unilateral oophorectomy/hysterectomy/ Abdominal pain due to adhesions / Hx of SIBO 2/2 obstructive intraabdominal adhesions s/p anastomotic surgery vs component of IBS-D  - IV Protonix daily , C/W bentyl q8, probiotics and Simethicone   - KUB nonobstructive   - pain meds PRN  - GI consult appreciated: advance diet as tolerated, supportive measures, monitor off abx  - s/p pain management eval, suggested starting tramadol prn while here   - pt has diarrhea now, GI PCR -ve, cdif pending    #Non-obstructive CAD   - on statin and BB    #COPD not on home O2   - stable on RA now, nebs PRN     #Overactive Bladder   - on Myrbetriq     #Anxiety   - on Klonopin    #Hx of seizures  - c/w home Keppra    #GERD  - Protnoix     #DVT - Lovenox sq         Pending - cdif, d/c planing

## 2023-06-13 NOTE — DISCHARGE NOTE NURSING/CASE MANAGEMENT/SOCIAL WORK - PATIENT PORTAL LINK FT
You can access the FollowMyHealth Patient Portal offered by [facility] by registering at the following website: [email or web address]. By joining [facility]’s FollowMyHealth portal, you will also be able to view your health information using other applications (apps) compatible with our system.

## 2023-06-13 NOTE — DISCHARGE NOTE NURSING/CASE MANAGEMENT/SOCIAL WORK - NSDCPEFALRISK_GEN_ALL_CORE
For information on Fall & Injury Prevention, visit: [email or web address] OR  [email or web address] OR  https://www.cdc.gov/steadi/patient.html

## 2023-06-13 NOTE — PROGRESS NOTE ADULT - PROVIDER SPECIALTY LIST ADULT
Hospitalist
Internal Medicine
Hospitalist
Hospitalist

## 2023-06-13 NOTE — PROGRESS NOTE ADULT - SUBJECTIVE AND OBJECTIVE BOX
56 y/o F with a PMH of HTN, non obstructive CAD, COPD, HLD, anxiety, seizure d/o, migraine, GERD, and multiple abdominal surgeries including appendectomy/resection of appendiceal stump, cholecystectomy, exploratory laparotomy with lysis of adhesions,  hysterectomy unilateral oophorectomy, Hiatal hernia present to the ED for eval of lower abd pain.   CT A/p with oral/iv con - showed signs of colitis - unclear if no obstruction   Colonoscopy (March, 2023): internal and external hemorrhoids otherwise unremarkable, hyperplastic polyp. Abdominal pain is likely due to adhesions.   Today pt c/o nausea and diarrhea with some abdominal discomfort.     PAST MEDICAL & SURGICAL HISTORY:  Back pain  henriated discs  Seizures  last one 6 yrs ago  Migraines  Hypercholesteremia  Seasonal allergic rhinitis, unspecified trigger  GERD (gastroesophageal reflux disease)  Hiatal hernia  Anxiety  HTN (hypertension)  Osteoarthritis  Sciatica  Tobacco use disorder  History of surgery  4 rght knee sx  1 left knee sx  cholecystectomy  rotator cuff right  abdominal sx-- adhesions  c section  ovarian cystectomy  breast cystectomies  colonoscopy   endoscopies  parital hysterectomy      S/p partial hysterectomy with remaining cervical stump      H/O rotator cuff surgery  right      History of appendectomy      S/P cholecystectomy        OBJECTIVE    VITAL SIGNS: Last 24 Hours  T(C): 37 (13 Jun 2023 12:40), Max: 37 (13 Jun 2023 12:40)  T(F): 98.6 (13 Jun 2023 12:40), Max: 98.6 (13 Jun 2023 12:40)  HR: 52 (13 Jun 2023 12:40) (52 - 64)  BP: 145/65 (13 Jun 2023 12:40) (141/78 - 185/81)  BP(mean): --  RR: 18 (13 Jun 2023 12:40) (18 - 18)  SpO2: 91% (13 Jun 2023 04:52) (91% - 91%)        PHYSICAL EXAM:  General: In NAD.  HEENT: Normocephalic, nontraumatic. PERRL.  LUNGS: Clear to auscultation b/l. No wheezes, rales, or rhonchi.  HEART: RRR. No murmurs, rubs, or gallops.  ABDOMEN: Soft, nondistended, multiple surgical scars noted, diffuse abdominal pain on palpation and percussion, no rebound, voluntary guarding, BS present   EXT: Pulses palpable x 4. No lower extremity edema.  NEURO: AAOx3. No focal deficits.   SKIN: Warm, dry.      ALLERGIES:  Neurontin (Unknown)  Toradol (Rash; Urticaria; Anaphylaxis (Mild to Mod))  Cipro (Hives)  penicillin (Hives)  Toradol (Hives)        HOME MEDICATIONS  Home Medications:  atorvastatin 80 mg oral tablet: 1 tab(s) orally once a day (07 Jun 2023 23:23)  busPIRone 10 mg oral tablet: orally 3 times a day (07 Jun 2023 23:23)  Fioricet oral tablet: 1 tab(s) orally 3 times a day, As Needed (07 Jun 2023 23:23)  hydrOXYzine hydrochloride 25 mg oral tablet: 1 tab(s) orally once a day (07 Jun 2023 23:23)  Incruse Ellipta 62.5 mcg/inh inhalation powder:  (07 Jun 2023 23:23)  KlonoPIN 2 mg oral tablet: 1 tab(s) orally 3 times a day (07 Jun 2023 23:23)  levETIRAcetam 250 mg oral tablet: 1 tab(s) orally 2 times a day (07 Jun 2023 23:23)  Linzess 290 mcg oral capsule: 1 cap(s) orally once a day (07 Jun 2023 23:23)  METOPROLOL SUCCINATE ER 50MG ER TAB: tab(s) orally once a day (07 Jun 2023 23:23)  Myrbetriq 50 mg oral tablet, extended release: 1 tab(s) orally once a day (07 Jun 2023 23:23)  ondansetron 4 mg oral tablet: 1 tab(s) orally 4 times a day as needed for  nausea (07 Jun 2023 23:23)  Pepcid 40 mg oral tablet: orally 2 times a day (07 Jun 2023 23:23)  Protonix 40 mg oral delayed release tablet: 1 tab(s) orally 2 times a day (07 Jun 2023 23:23)      LABS:                          11.9   4.82  )-----------( 199      ( 13 Jun 2023 07:28 )             34.1   06-13    143  |  107  |  4<L>  ----------------------------<  94  3.4<L>   |  25  |  0.7    Ca    9.1      13 Jun 2023 07:28  Mg     1.7     06-13    TPro  6.2  /  Alb  3.9  /  TBili  0.3  /  DBili  x   /  AST  45<H>  /  ALT  46<H>  /  AlkPhos  106  06-13      RADIOLOGY:    < from: Xray Kidney Ureter Bladder (06.08.23 @ 09:45) >  impression:  Nonspecific, nonobstructive bowel gas pattern. Contrast in the colon.   Excreted contrast in the bladder. Surgical clips overlying the abdomen.      < from: CT Abdomen and Pelvis w/ Oral Cont and w/ IV Cont (06.07.23 @ 17:21) >  IMPRESSION:    Compared with the scan of 5/9/2023, there has been a mild increase in the   degree of rectosigmoid mural thickening, compatible with colitis.    The small bowel anastomosis is noted in the right lower quadrant. The   oral contrast material has not quite reached the cecum at this time   however there is no evidence of small bowel distention or obstruction. If   symptoms persist or worsen, a follow-up CT scan may be obtained.    < from: Xray Kidney Ureter Bladder (06.09.23 @ 17:40) >  Impression:        A nonobstructive unremarkable bowel gas pattern is present    .Contrast material within nondistended colon is present.   Postcholecystectomy. Additional surgical clips are present      MEDICATIONS  (STANDING):  atorvastatin 80 milliGRAM(s) Oral at bedtime  busPIRone 10 milliGRAM(s) Oral three times a day  clonazePAM  Tablet 2 milliGRAM(s) Oral three times a day  cyclobenzaprine 5 milliGRAM(s) Oral every 8 hours  dicyclomine 10 milliGRAM(s) Oral three times a day before meals  enoxaparin Injectable 40 milliGRAM(s) SubCutaneous every 24 hours  levETIRAcetam 250 milliGRAM(s) Oral two times a day  magnesium sulfate  IVPB 2 Gram(s) IV Intermittent once  magnesium sulfate  IVPB 2 Gram(s) IV Intermittent once  pantoprazole    Tablet 40 milliGRAM(s) Oral before breakfast  polyethylene glycol 3350 17 Gram(s) Oral daily  potassium chloride    Tablet ER 40 milliEquivalent(s) Oral once  potassium chloride    Tablet ER 40 milliEquivalent(s) Oral once  saccharomyces boulardii 250 milliGRAM(s) Oral two times a day  senna 2 Tablet(s) Oral at bedtime  simethicone 80 milliGRAM(s) Chew daily    MEDICATIONS  (PRN):  acetaminophen     Tablet .. 650 milliGRAM(s) Oral every 6 hours PRN Temp greater or equal to 38C (100.4F), Mild Pain (1 - 3)  aluminum hydroxide/magnesium hydroxide/simethicone Suspension 30 milliLiter(s) Oral every 4 hours PRN Dyspepsia  hydrOXYzine hydrochloride 25 milliGRAM(s) Oral three times a day PRN Nausea  melatonin 3 milliGRAM(s) Oral at bedtime PRN Insomnia  ondansetron Injectable 4 milliGRAM(s) IV Push every 8 hours PRN Nausea and/or Vomiting  traMADol 50 milliGRAM(s) Oral every 6 hours PRN Severe Pain (7 - 10)

## 2023-06-13 NOTE — PROGRESS NOTE ADULT - ASSESSMENT
58 y/o F with a PMH of HTN, non obstructive CAD, COPD, HLD, anxiety, seizure d/o, migraine, GERD, and multiple abdominal surgeries including appendectomy/resection of appendiceal stump, cholecystectomy, exploratory laparotomy with lysis of adhesions,  hysterectomy unilateral oophorectomy, Hiatal hernia present to the ED for eval of lower abd pain.   CT A/p with oral/iv con - showed signs of colitis - unclear if no obstruction   Colonoscopy (March, 2023): internal and external hemorrhoids otherwise unremarkable, hyperplastic polyp    A/P   #Acute colitis without signs of SBO / suspected LGIB from hemorrhoids/ Hx of appendectomy - cholecystectomy - unilateral oophorectomy/hysterectomy/ Abdominal pain due to adhesions / Hx of SBO 2/2 obstructive intraabdominal adhesions s/p anastomotic surgery  - slowly improving but still nauseous with diarrhea  - off  opioids   - Protonix to po 40 mg  daily ,  bentyl q8, probiotics and Simethicone   - GI PCR is negative, check stool for C.dif   - abdominal pain is likely due to adhesions    # Nausea/ vomiting  - on Ondansetron   - avoid  opioids, likely side effect      #Non-obstructive CAD   - on statin and BB     #COPD not on home O2   - stable on RA now, nebs PRN     #Overactive Bladder   - on Myrbetriq     #Anxiety   - on Klonopin    #GERD  - c/w home meds    #DVT - Lovenox sq       #Progress Note Handoff  Pending (specify): avoid opioids, give Zofran before meals, check stool for C.dif, low fiber diet, replete electrolytes, if diarrhea resolves and pt can tolerate diet anticipate discharge in 24 hours   Family discussion: I spoke with pt, he agreed with a plan of care  Disposition: Home__x _/SNF___/Other________/Unknown at this time________

## 2023-06-17 DIAGNOSIS — Z79.899 OTHER LONG TERM (CURRENT) DRUG THERAPY: ICD-10-CM

## 2023-06-17 DIAGNOSIS — K52.9 NONINFECTIVE GASTROENTERITIS AND COLITIS, UNSPECIFIED: ICD-10-CM

## 2023-06-17 DIAGNOSIS — K66.0 PERITONEAL ADHESIONS (POSTPROCEDURAL) (POSTINFECTION): ICD-10-CM

## 2023-06-17 DIAGNOSIS — E78.5 HYPERLIPIDEMIA, UNSPECIFIED: ICD-10-CM

## 2023-06-17 DIAGNOSIS — K92.2 GASTROINTESTINAL HEMORRHAGE, UNSPECIFIED: ICD-10-CM

## 2023-06-17 DIAGNOSIS — G89.29 OTHER CHRONIC PAIN: ICD-10-CM

## 2023-06-17 DIAGNOSIS — F41.9 ANXIETY DISORDER, UNSPECIFIED: ICD-10-CM

## 2023-06-17 DIAGNOSIS — K21.9 GASTRO-ESOPHAGEAL REFLUX DISEASE WITHOUT ESOPHAGITIS: ICD-10-CM

## 2023-06-17 DIAGNOSIS — I25.10 ATHEROSCLEROTIC HEART DISEASE OF NATIVE CORONARY ARTERY WITHOUT ANGINA PECTORIS: ICD-10-CM

## 2023-06-17 DIAGNOSIS — G40.909 EPILEPSY, UNSPECIFIED, NOT INTRACTABLE, WITHOUT STATUS EPILEPTICUS: ICD-10-CM

## 2023-06-17 DIAGNOSIS — K64.9 UNSPECIFIED HEMORRHOIDS: ICD-10-CM

## 2023-06-17 DIAGNOSIS — Z90.49 ACQUIRED ABSENCE OF OTHER SPECIFIED PARTS OF DIGESTIVE TRACT: ICD-10-CM

## 2023-06-17 DIAGNOSIS — Z88.0 ALLERGY STATUS TO PENICILLIN: ICD-10-CM

## 2023-06-17 DIAGNOSIS — I10 ESSENTIAL (PRIMARY) HYPERTENSION: ICD-10-CM

## 2023-06-17 DIAGNOSIS — Z88.5 ALLERGY STATUS TO NARCOTIC AGENT: ICD-10-CM

## 2023-06-17 DIAGNOSIS — M54.50 LOW BACK PAIN, UNSPECIFIED: ICD-10-CM

## 2023-06-17 DIAGNOSIS — J44.9 CHRONIC OBSTRUCTIVE PULMONARY DISEASE, UNSPECIFIED: ICD-10-CM

## 2023-06-20 PROBLEM — R12 HEARTBURN: Status: ACTIVE | Noted: 2022-09-23

## 2023-06-20 NOTE — ASSESSMENT
[FreeTextEntry1] : Ms. RAMÍREZ RODARTE is a 57 year  year old woman. She presented to the office for the first time on 05/24/2023 , her primary is MERCEDEZ HAN .\par  \par had 2 appendectomy \par cholecystectomy  ~ 20 year ago  ~ lap ( [facility] ) \par and and exlap when she was 15\par 15 year ago the 2nd appendectomy \par 5 years ago - DR avila - exlap for MAKAYLA\par \par admission to [facility] for sbo - in feb \par constipation\par h/o blood per rectum red + black\par smoke  1/2 pack a day \par no pain medications - on a regular \par on medical marijuana\par recently admitted and discharge with colitis \par now has same pain \par has been having less bowel movement.  \par last diarrhaa bm on saturday \par no obvious food allaergy   - except grapes ( purple ) , seaosanl, pCN , toradol \par son's deaath 10 years ago \par \par impressoni : sbo likely adhesive\par \par at present no issues. \par \par We explained in great detail the pathophysiology of the disease process. We jay diagrams and discussed the workup for diagnosis and management.\par The various options were explained to the patient. The Risk , benefit and alternatives were discussed. We discussed recovery and possible complications.\par The Post operative care was explained to the patient. She was counselled on diet , exercise and wound care.\par We discussed the pathology and surgery with her.\par \par will not offer her any surgical intervention \par \par counselled her extensively \par \par We discussed for over 45   min . \par We discussed the importance of close follow up. \par We informed that she needs to follow up in as needed\par We also informed that she can call us if anything changes or has any questions.\par \par \par \par

## 2023-06-20 NOTE — PHYSICAL EXAM
[Purpura] : no purpura  [Alert] : alert [Calm] : calm [de-identified] : normal  [de-identified] : normal  [de-identified] : normal \par healed scars\par no hernia\par no tenderness [de-identified] : normal

## 2023-06-20 NOTE — HISTORY OF PRESENT ILLNESS
[de-identified] : Ms. RAMÍREZ RODARTE is a 57 year  year old woman. She presented to the office for the first time on 05/24/2023 , her primary is MERCEDEZ HAN .\par  \par had 2 appendectomy \par cholecystectomy  ~ 20 year ago  ~ lap ( [place] ) \par and and exlap when she was 15\par 15 year ago the 2nd appendectomy \par 5 years ago - DR avila - exlap for MAKAYLA\par \par admission to [facility] for sbo - in feb \par constipation\par h/o blood per rectum red + black\par smoke  1/2 pack a day \par no pain medications - on a regular \par on medical marijuana\par recently admitted and discharge with colitis \par now has same pain \par has been having less bowel movement.  \par last diarrhaa bm on saturday \par no obvious food allaergy   - except grapes ( purple ) , seaosanl, pCN , toradol \par son's deaath 10 years ago \par

## 2023-06-23 ENCOUNTER — APPOINTMENT (OUTPATIENT)
Dept: GASTROENTEROLOGY | Facility: CLINIC | Age: 58
End: 2023-06-23
Payer: MEDICARE

## 2023-06-23 VITALS
OXYGEN SATURATION: 98 % | WEIGHT: 122.6 LBS | HEART RATE: 75 BPM | SYSTOLIC BLOOD PRESSURE: 102 MMHG | DIASTOLIC BLOOD PRESSURE: 73 MMHG | BODY MASS INDEX: 22.56 KG/M2 | HEIGHT: 62 IN

## 2023-06-23 DIAGNOSIS — R13.10 DYSPHAGIA, UNSPECIFIED: ICD-10-CM

## 2023-06-23 DIAGNOSIS — R11.10 VOMITING, UNSPECIFIED: ICD-10-CM

## 2023-06-23 DIAGNOSIS — F17.200 NICOTINE DEPENDENCE, UNSPECIFIED, UNCOMPLICATED: ICD-10-CM

## 2023-06-23 DIAGNOSIS — Z78.9 OTHER SPECIFIED HEALTH STATUS: ICD-10-CM

## 2023-06-23 PROCEDURE — 99214 OFFICE O/P EST MOD 30 MIN: CPT

## 2023-06-28 ENCOUNTER — NON-APPOINTMENT (OUTPATIENT)
Age: 58
End: 2023-06-28

## 2023-07-05 ENCOUNTER — OUTPATIENT (OUTPATIENT)
Dept: INPATIENT UNIT | Facility: HOSPITAL | Age: 58
LOS: 1 days | Discharge: HOME | End: 2023-07-05
Payer: MEDICARE

## 2023-07-05 ENCOUNTER — TRANSCRIPTION ENCOUNTER (OUTPATIENT)
Age: 58
End: 2023-07-05

## 2023-07-05 ENCOUNTER — RESULT REVIEW (OUTPATIENT)
Age: 58
End: 2023-07-05

## 2023-07-05 VITALS
SYSTOLIC BLOOD PRESSURE: 157 MMHG | TEMPERATURE: 98 F | WEIGHT: 125 LBS | HEART RATE: 82 BPM | DIASTOLIC BLOOD PRESSURE: 67 MMHG | RESPIRATION RATE: 18 BRPM | HEIGHT: 62 IN

## 2023-07-05 VITALS
DIASTOLIC BLOOD PRESSURE: 97 MMHG | OXYGEN SATURATION: 97 % | RESPIRATION RATE: 17 BRPM | SYSTOLIC BLOOD PRESSURE: 130 MMHG | HEART RATE: 71 BPM

## 2023-07-05 DIAGNOSIS — Z98.890 OTHER SPECIFIED POSTPROCEDURAL STATES: Chronic | ICD-10-CM

## 2023-07-05 DIAGNOSIS — R10.9 UNSPECIFIED ABDOMINAL PAIN: ICD-10-CM

## 2023-07-05 DIAGNOSIS — K21.9 GASTRO-ESOPHAGEAL REFLUX DISEASE WITHOUT ESOPHAGITIS: ICD-10-CM

## 2023-07-05 DIAGNOSIS — Z90.711 ACQUIRED ABSENCE OF UTERUS WITH REMAINING CERVICAL STUMP: Chronic | ICD-10-CM

## 2023-07-05 DIAGNOSIS — Z90.49 ACQUIRED ABSENCE OF OTHER SPECIFIED PARTS OF DIGESTIVE TRACT: Chronic | ICD-10-CM

## 2023-07-05 PROCEDURE — 43239 EGD BIOPSY SINGLE/MULTIPLE: CPT

## 2023-07-05 PROCEDURE — 88312 SPECIAL STAINS GROUP 1: CPT

## 2023-07-05 PROCEDURE — 88305 TISSUE EXAM BY PATHOLOGIST: CPT

## 2023-07-05 PROCEDURE — 88312 SPECIAL STAINS GROUP 1: CPT | Mod: 26

## 2023-07-05 PROCEDURE — 88305 TISSUE EXAM BY PATHOLOGIST: CPT | Mod: 26

## 2023-07-05 RX ORDER — CLONAZEPAM 1 MG
1 TABLET ORAL
Refills: 0 | DISCHARGE

## 2023-07-05 RX ORDER — METOPROLOL TARTRATE 50 MG
0 TABLET ORAL
Qty: 0 | Refills: 0 | DISCHARGE

## 2023-07-05 RX ORDER — ONDANSETRON 8 MG/1
1 TABLET, FILM COATED ORAL
Refills: 0 | DISCHARGE

## 2023-07-05 RX ORDER — ATORVASTATIN CALCIUM 80 MG/1
1 TABLET, FILM COATED ORAL
Qty: 0 | Refills: 0 | DISCHARGE

## 2023-07-05 RX ORDER — FAMOTIDINE 10 MG/ML
0 INJECTION INTRAVENOUS
Qty: 0 | Refills: 0 | DISCHARGE

## 2023-07-05 NOTE — REVIEW OF SYSTEMS
[Recent Weight Loss (___ Lbs)] : recent [unfilled] ~Ulb weight loss [As Noted in HPI] : as noted in HPI [Abdominal Pain] : abdominal pain [Vomiting] : vomiting [Constipation] : constipation [Heartburn] : heartburn [Bloating (gassiness)] : bloating [Negative] : Respiratory [Diarrhea] : no diarrhea [Melena (black stool)] : no melena [Bleeding] : no bleeding [Fecal Incontinence (soiling)] : no fecal incontinence

## 2023-07-05 NOTE — HISTORY OF PRESENT ILLNESS
[FreeTextEntry1] : 57 yr old female with multiple abdominal surgeries in the past with SBOs , chronic pain and constipation was readmitted 6/7/23 for abdominal pain again. CT Scan of abdomen showed a mild increase in rectosigmoid thickening compatible with colitis without a small bowel obstruction. KUB showed nonspecific, nonobstructive gas pattern. CBC, CMP, and lipase levels were normal. She still C/O lower abdominal pain 7/10 in intensity, constipation, alternating with diarrhea. Her last BM was 2 days ago. She still has intermittent vomiting, last noted last night. She lost 6 lbs in the past month. She C/O dysphagia after lunch or dinner almost daily. She denied melena or blood in the stool. She never had an EGD. She saw Surgeon Dr. Barrow last month and he did not recommend surgery.

## 2023-07-05 NOTE — ASSESSMENT
[FreeTextEntry1] : 57 yr old female with multiple abdominal surgeries in the past with SBOs , chronic pain and constipation was readmitted 6/7/23 for abdominal pain again. CT Scan of abdomen showed a mild increase in rectosigmoid thickening compatible with colitis without a small bowel obstruction. KUB showed nonspecific, nonobstructive gas pattern. CBC, CMP, and lipase levels were normal. She still C/O lower abdominal pain 7/10 in intensity, constipation, alternating with diarrhea. Her last BM was 2 days ago. She still has intermittent vomiting, last noted last night. She lost 6 lbs in the past month. She C/O dysphagia after lunch or dinner almost daily. She denied melena or blood in the stool. She never had an EGD. She saw Surgeon Dr. Barrow last month and he did not recommend surgery.\par \par \par Abdominal pain/Vomiting/Dysphagia/Acid Reflux\par \par - Will schedule an EGD to further evaluate; all risks and benefits of procedure discussed with patient\par - CT Scan of Abdomen, KUB, CBC, CMP, and lipase level reviewed\par - F/U after procedure is done\par - PPI bid and Famotidine q hs\par \par Constipation\par \par - Continue Linzess q day\par

## 2023-07-05 NOTE — ASU PATIENT PROFILE, ADULT - NSICDXPASTMEDICALHX_GEN_ALL_CORE_FT
This [age]-year-old boy is followed for a history of abnormal muscle tone.     INTERIM HISTORY: At the time of Lenny's most recent visit on 10/17/19, slow but encouraging progress was reported in all facets of development. Having met all his stated goals, PT at [facility] ended in 2018. His parents remain concerned about his inability to jump or ride his bicycle with training wheels. He has also been noted to walk on his tiptoes and has difficulty pulling up his pants.    Lenny was evaluated by Dr. Obed Garcia in ENT Clinic at [facility] on 11/15/19 in order to find a cause for his delayed speech. ENT examination revealed no structural abnormalities of the ear, nose, or throat. Visual reinforcement audiometry was reported to be consistent with normal hearing within the range of 2000 to 8000 Hz. Lenny is currently receiving speech therapy once a week through [employer]. Lenny is also followed by Dr. Radha Umana for a history of strabismus (exotropia) for which neither glasses nor patching are currently recommended.     Previous neurodiagnostic evaluation has included cranial MRI at [facility] on 18 that was interpreted as showing mild prominence of subarachnoid spaces along the frontal and anterior temporal convexities. This findings was thought to represent mild benign enlargement of subarachnoid spaces. No evidence of subdural fluid collection or hematoma. MRI of the brain was otherwise unremarkable.     Lenny was seen in [facility] on 19 to evaluate a possible treatable or heritable cause for his developmental delays. Findings derived from this evaluation have thus far been inconclusive.     Although he continues to grow and gain weight, mother remains concerned about what she describes as a \"picky appetite. General health has been good.     HISTORY OF PRESENT ILLNESS:  Lenny's story begins in the  period with a maternal history of gestational diabetes for which insulin was required. Pregnancy  proceded to term. Ultrasonography near the end of the pregnancy was reported to show decreased fetal movement. Labor was spontaneous in onset. Delivery proceded from a vertex presentation with no instrumentation and no clinical signs of fetal distress. Respirations were spontaneous in onset. Birth weight was 7 lb 2 oz. No special care was required in the  period.  course was uneventful with no reported hypoglycemia. Lenny nursed well and required no supplementation. Although he did experience mild hyperbilirubinemia with a peak bilirubin level of 15.3 mg/dL on day five, he did not require phototherapy. He was discharged with mother on day two of life and has continued fed well and grow.     Due to concerns regarding mildly delayed motor development, Lenny was referred to [facility] for physical therapy evaluation. Findings noted by the therapist included a tendency to hold his hands in a fisted position, sometimes with his thumbs tucked into his palms. After a number of sessions of PT, Lenny's parents discontinued formal therapy as they are willing and able to carry out the recommended exercises at home on their own.     PAST MEDICAL HISTORY: Lenny has had no hospitalizations, surgical procedures, seizures, or ongoing medical concerns.    DEVELOPMENTAL HISTORY: At six months he began to roll over. At 13 months he began to pull himself to a standing position. At 14 months, he began to crawl. At 16 months, Lenny was beginning to climb stairs while holding the rail. When see in 2019 at 18 months of age, he was able to stand with assistance but was not yet walking independently. He began to walk in 2019 at 20 months of age.    MEDICATIONS:    Current Outpatient Medications   Medication Sig Dispense Refill   • hydroCORTisone (CORTIZONE) 2.5 % ointment TOPICALLY 2 (TWO) TIMES DAILY AS NEEDED (ECZEMA).     • pediatric multivitamin with iron (POLY-VI-SOL WITH IRON) 10 MG/ML Solution Take 1 mL by  mouth daily.       No current facility-administered medications for this visit.       ALLERGIES:  Patient has no known allergies.    FAMILY HISTORY:  Lenny's family history is negative for neurodevelopmental disorders or neuromuscular disease. 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 With the exception of a low mean red cell volume, individual who carry one copy of the gene for Hgb E and one copy for hemoglobin A are asymptomatic.     SOCIAL HISTORY: Lenny lives with his parents in [place]. Father is a restaurant owner, and mother is a  for an aerospace company.     REVIEW OF SYSTEMS: Except as noted above, review of systems was negative for symptoms related to constitutional, behavioral, HEENT, cardiac, pulmonary, GI, , endocrine, musculoskeletal, neurological, and dermatologic systems.     PHYSICAL EXAM:  Tall, well nourished, well developed boy with a height of 3' 8\" (1.118 m)(>99th %tile), weight 19.8 kg (95th %tile), and head circumference 53.3 cm (20.98\")(99th %tile). Skin showed no rash or neurocutaneous stigmata. Head was atraumatic with no dysmorphic craniofacial features. Anterior fontanelle was closed. Funduscopic exam showed normal optic disks and retinal pigmentation. Tympanic membranes were translucent and mobile. Oropharynx was clear. Neck was supple with no thyromegaly. Chest was symmetric and clear to auscultation. Cardiac exam showed normal rate and rhythm and no murmur or gallop. Abdomen was soft with no mass or organomegaly. Spine showed no palpable defect or deformity. Extremities were symmetric with no cyanosis, edema or restriction of active range of motion.     NEUROLOGICAL EXAM: Lenny was alert and smiling responsively with little spontaneous speech. Station and gait were normal based and steady with occasional tip-toe stance. Visual fields were grossly full to confrontation. Pupils were 3 mm, equal, round and reactive to light and accommodation. Ocular motility  was conjugate and full. Facial motility was symmetric. Hearing was grossly intact. Palate elevated symmetrically. Tongue rested in the midline with no fasciculations or atrophy. Motor exam showed mildly increased flexor tone in the upper extremities but otherwise normal muscle bulk, strength and tone. Sensory exam was grossly intact to light touch. Muscle stretch reflexes were 2+ and symmetric. Coordination testing showed no tremor, spasticity, or dystonia.     IMPRESSION:  1) Full term product of a pregnancy complicated by a maternal history of insulin requiring gestational diabetes but no history of hypoglycemia or  seizures.  2) Mildly delayed gross and fine motor development.  3) Mildly delayed expressive language development.     PLAN:  1) Continue speech therapy through [facility] in [place].  2) Recommend evaluation for special education service through [employer].   3) Encourage participation in physical activities that provide reasonable challenge as well as opportunities for success.  4) Return in one year.  5) Letter to Dr. Canales.   PAST MEDICAL HISTORY:  Anxiety     Back pain henriated discs    GERD (gastroesophageal reflux disease)     Hiatal hernia     HTN (hypertension)     Hypercholesteremia     Migraines     Osteoarthritis     Sciatica     Seasonal allergic rhinitis, unspecified trigger     Seizures last one 6 yrs ago    Tobacco use disorder

## 2023-07-05 NOTE — ASU DISCHARGE PLAN (ADULT/PEDIATRIC) - CARE PROVIDER_API CALL
Luis Leal  Gastroenterology  [address] [place] [id number]  Phone: [phone]  Fax: [phone]  Follow Up Time:

## 2023-07-06 LAB — SURGICAL PATHOLOGY STUDY: SIGNIFICANT CHANGE UP

## 2023-07-09 DIAGNOSIS — K29.80 DUODENITIS WITHOUT BLEEDING: ICD-10-CM

## 2023-07-09 DIAGNOSIS — M19.90 UNSPECIFIED OSTEOARTHRITIS, UNSPECIFIED SITE: ICD-10-CM

## 2023-07-09 DIAGNOSIS — Z87.891 PERSONAL HISTORY OF NICOTINE DEPENDENCE: ICD-10-CM

## 2023-07-09 DIAGNOSIS — E78.00 PURE HYPERCHOLESTEROLEMIA, UNSPECIFIED: ICD-10-CM

## 2023-07-09 DIAGNOSIS — R13.10 DYSPHAGIA, UNSPECIFIED: ICD-10-CM

## 2023-07-09 DIAGNOSIS — I10 ESSENTIAL (PRIMARY) HYPERTENSION: ICD-10-CM

## 2023-07-09 DIAGNOSIS — Z88.0 ALLERGY STATUS TO PENICILLIN: ICD-10-CM

## 2023-07-09 DIAGNOSIS — M54.30 SCIATICA, UNSPECIFIED SIDE: ICD-10-CM

## 2023-07-09 DIAGNOSIS — K21.00 GASTRO-ESOPHAGEAL REFLUX DISEASE WITH ESOPHAGITIS, WITHOUT BLEEDING: ICD-10-CM

## 2023-07-09 DIAGNOSIS — R10.9 UNSPECIFIED ABDOMINAL PAIN: ICD-10-CM

## 2023-07-09 DIAGNOSIS — K44.9 DIAPHRAGMATIC HERNIA WITHOUT OBSTRUCTION OR GANGRENE: ICD-10-CM

## 2023-07-21 ENCOUNTER — APPOINTMENT (OUTPATIENT)
Dept: GASTROENTEROLOGY | Facility: CLINIC | Age: 58
End: 2023-07-21
Payer: MEDICARE

## 2023-07-21 VITALS — BODY MASS INDEX: 22.52 KG/M2 | HEIGHT: 62 IN | WEIGHT: 122.4 LBS

## 2023-07-21 DIAGNOSIS — R19.8 OTHER SPECIFIED SYMPTOMS AND SIGNS INVOLVING THE DIGESTIVE SYSTEM AND ABDOMEN: ICD-10-CM

## 2023-07-21 PROCEDURE — 99214 OFFICE O/P EST MOD 30 MIN: CPT

## 2023-07-21 NOTE — PHYSICAL EXAM
[Normal] : normal bowel sounds, non-tender, no masses, soft, no no hepato-splenomegaly [de-identified] : no discharge from umblicus

## 2023-07-21 NOTE — HISTORY OF PRESENT ILLNESS
[FreeTextEntry1] : 57-year-old female patient s/p SBO probably due to adhesions being followed up for multiple GI complaints.  She complains of diffuse abdominal pain.  And constipation.  She is undergone an EGD and a colonoscopy as well as a CT scan and a gastric emptying study the gastric emptying study showed evidence of mild gastroparesis\par Today she is minimally better the pain is much better on a scale of 1-10 it is about a 6 she is on Protonix 40 mg once a day before breakfast.  She also takes Dulcolax 2 tablets a day and Linzess on demand for her constipation which seems to be under control.\par Patient also complaining of discharge from her umbilicus for the last few days.  With 2 distinct masses the patient okay to receive the next patient after that I will give her to tell do not have more than 5 minutes is [de-identified] : 7/5/23

## 2023-07-21 NOTE — ASSESSMENT
[FreeTextEntry1] : EGD report reviewed small hiatus hernia gastritis.  Pathology report reviewed no evidence of celiac disease no H. pylori seen patient advised small meals along with fluids to continue Protonix 40 mg once a day.  To add prokinetic drugs if her symptoms get worse.\par Constipation patient taking Dulcolax 2 tablets a day and Linzess on demand to continue the same regimen.  Patient to be referred to Dr. Barrow for umbilical discharge\par Follow-up in 3 months

## 2023-08-02 ENCOUNTER — APPOINTMENT (OUTPATIENT)
Dept: SURGERY | Facility: CLINIC | Age: 58
End: 2023-08-02
Payer: MEDICARE

## 2023-08-02 VITALS
TEMPERATURE: 97 F | WEIGHT: 123 LBS | HEIGHT: 62 IN | DIASTOLIC BLOOD PRESSURE: 82 MMHG | HEART RATE: 74 BPM | SYSTOLIC BLOOD PRESSURE: 124 MMHG | BODY MASS INDEX: 22.63 KG/M2 | OXYGEN SATURATION: 95 %

## 2023-08-02 PROCEDURE — 99406 BEHAV CHNG SMOKING 3-10 MIN: CPT

## 2023-08-02 PROCEDURE — 99213 OFFICE O/P EST LOW 20 MIN: CPT | Mod: 25

## 2023-08-02 NOTE — PHYSICAL EXAM
[Purpura] : no purpura  [Alert] : alert [Calm] : calm [de-identified] : normal  [de-identified] : normal  [de-identified] : normal \par  healed scars\par  no hernia\par  no tenderness [de-identified] : normal

## 2023-08-02 NOTE — HISTORY OF PRESENT ILLNESS
[de-identified] : Ms. RAMÍREZ RODARTE is a 57 year  year old woman. She presented to the office for the first time on 05/24/2023 , her primary is MERCEDEZ HAN .   had 2 appendectomy  cholecystectomy ~ 20 year ago  ~ lap ( [facility]ke )  and and exlap when she was 15 15 year ago the 2nd appendectomy  5 years ago - DR avila - [facility] for MAKAYLA  admission to [facility] for sbo - in feb  constipation h/o blood per rectum red + black smoke  1/2 pack a day  no pain medications - on a regular  on medical marijuana recently admitted and discharge with colitis  now has same pain  has been having less bowel movement.   last diarrhaa bm on saturday  no obvious food allaergy   - except grapes ( purple ) , seaosanl, pCN , toradol  son's deaath 10 years ago  8/2/2023  had admission - worse coltis  seen gumaste doing okay  somedays constipation - some days gastro colic reflex

## 2023-08-02 NOTE — ASSESSMENT
[FreeTextEntry1] : Ms. RAMÍREZ RODARTE is a 57 year  year old woman. She presented to the office for the first time on 05/24/2023 , her primary is MERCEDEZ ERICANORMA .   had 2 appendectomy  cholecystectomy ~ 20 year ago  ~ lap ( [facility] )  and and exlap when she was 15 15 year ago the 2nd appendectomy  5 years ago - DR avila - [place]ina for MAKAYLA  admission to [facility] for sbo - in feb  constipation h/o blood per rectum red + black smoke  1/2 pack a day  no pain medications - on a regular  on medical marijuana recently admitted and discharge with colitis  now has same pain  has been having less bowel movement.   last diarrhaa bm on saturday  no obvious food allaergy   - except grapes ( purple ) , seaosanl, pCN , toradol  son's deaath 10 years ago  8/2/2023  had admission - worse coltis  seen gumaste doing okay  somedays constipation - some days gastro colic reflex  impression : sbo likely adhesive  at present no issues.   We explained in great detail the pathophysiology of the disease process. We jay diagrams and discussed the workup for diagnosis and management. The various options were explained to the patient. The Risk , benefit and alternatives were discussed. We discussed recovery and possible complications. The Post operative care was explained to the patient. She was counselled on diet , exercise and wound care. We discussed the pathology and surgery with her.  will not offer her any surgical intervention   counselled her extensively   We discussed for over 45   min .  We discussed the importance of close follow up.  We informed that she needs to follow up in as needed We also informed that she can call us if anything changes or has any questions.

## 2023-08-14 RX ORDER — LINACLOTIDE 290 UG/1
290 CAPSULE, GELATIN COATED ORAL
Qty: 60 | Refills: 5 | Status: ACTIVE | COMMUNITY
Start: 1900-01-01 | End: 1900-01-01

## 2023-09-12 NOTE — DISCHARGE NOTE PROVIDER - DISCHARGE DIET
Regular Diet - No restrictions Acitretin Pregnancy And Lactation Text: This medication is Pregnancy Category X and should not be given to women who are pregnant or may become pregnant in the future. This medication is excreted in breast milk.

## 2023-10-20 ENCOUNTER — APPOINTMENT (OUTPATIENT)
Dept: GASTROENTEROLOGY | Facility: CLINIC | Age: 58
End: 2023-10-20
Payer: MEDICARE

## 2023-10-20 VITALS
HEIGHT: 62 IN | BODY MASS INDEX: 23.77 KG/M2 | HEART RATE: 77 BPM | DIASTOLIC BLOOD PRESSURE: 81 MMHG | OXYGEN SATURATION: 98 % | WEIGHT: 129.2 LBS | SYSTOLIC BLOOD PRESSURE: 129 MMHG

## 2023-10-20 DIAGNOSIS — K59.00 CONSTIPATION, UNSPECIFIED: ICD-10-CM

## 2023-10-20 DIAGNOSIS — R19.5 OTHER FECAL ABNORMALITIES: ICD-10-CM

## 2023-10-20 PROCEDURE — 99214 OFFICE O/P EST MOD 30 MIN: CPT

## 2023-10-20 RX ORDER — PANTOPRAZOLE 20 MG/1
20 TABLET, DELAYED RELEASE ORAL TWICE DAILY
Qty: 60 | Refills: 5 | Status: DISCONTINUED | COMMUNITY
Start: 2022-12-23 | End: 2023-10-20

## 2023-10-20 RX ORDER — NIFEDIPINE 60 MG/1
60 TABLET, EXTENDED RELEASE ORAL
Qty: 180 | Refills: 0 | Status: DISCONTINUED | COMMUNITY
End: 2023-10-20

## 2023-10-20 RX ORDER — DICYCLOMINE HYDROCHLORIDE 10 MG/1
10 CAPSULE ORAL 3 TIMES DAILY
Qty: 90 | Refills: 2 | Status: ACTIVE | COMMUNITY
Start: 2023-08-14 | End: 1900-01-01

## 2023-10-20 RX ORDER — SIMETHICONE 80 MG/1
80 TABLET, CHEWABLE ORAL
Qty: 120 | Refills: 5 | Status: ACTIVE | COMMUNITY
Start: 2023-07-21 | End: 1900-01-01

## 2023-10-20 RX ORDER — LACTULOSE 10 G/15ML
10 SOLUTION ORAL TWICE DAILY
Qty: 1 | Refills: 0 | Status: DISCONTINUED | COMMUNITY
Start: 2023-01-13 | End: 2023-10-20

## 2023-11-08 ENCOUNTER — APPOINTMENT (OUTPATIENT)
Dept: CARDIOLOGY | Facility: CLINIC | Age: 58
End: 2023-11-08
Payer: MEDICARE

## 2023-11-08 VITALS
BODY MASS INDEX: 24.33 KG/M2 | DIASTOLIC BLOOD PRESSURE: 80 MMHG | WEIGHT: 133 LBS | HEART RATE: 73 BPM | SYSTOLIC BLOOD PRESSURE: 150 MMHG

## 2023-11-08 PROCEDURE — 99214 OFFICE O/P EST MOD 30 MIN: CPT | Mod: 25

## 2023-11-08 PROCEDURE — 93000 ELECTROCARDIOGRAM COMPLETE: CPT

## 2023-11-08 RX ORDER — CYCLOBENZAPRINE HYDROCHLORIDE 10 MG/1
10 TABLET, FILM COATED ORAL 3 TIMES DAILY
Refills: 0 | Status: ACTIVE | COMMUNITY

## 2023-11-17 LAB
ALBUMIN SERPL ELPH-MCNC: 4.8 G/DL
ALBUMIN SERPL ELPH-MCNC: 4.8 G/DL
ALP BLD-CCNC: 100 U/L
ALP BLD-CCNC: 105 U/L
ALT SERPL-CCNC: 15 U/L
ALT SERPL-CCNC: 17 U/L
ANION GAP SERPL CALC-SCNC: 12 MMOL/L
ANION GAP SERPL CALC-SCNC: 12 MMOL/L
APPEARANCE: CLEAR
AST SERPL-CCNC: 19 U/L
AST SERPL-CCNC: 20 U/L
BACTERIA UR CULT: NORMAL
BACTERIA: NEGATIVE /HPF
BASOPHILS # BLD AUTO: 0.03 K/UL
BASOPHILS NFR BLD AUTO: 0.5 %
BILIRUB SERPL-MCNC: <0.2 MG/DL
BILIRUB SERPL-MCNC: <0.2 MG/DL
BILIRUBIN URINE: NEGATIVE
BLOOD URINE: NEGATIVE
BUN SERPL-MCNC: 11 MG/DL
BUN SERPL-MCNC: 13 MG/DL
CALCIUM SERPL-MCNC: 10.7 MG/DL
CALCIUM SERPL-MCNC: 9.9 MG/DL
CAST: 0 /LPF
CHLORIDE SERPL-SCNC: 102 MMOL/L
CHLORIDE SERPL-SCNC: 103 MMOL/L
CO2 SERPL-SCNC: 25 MMOL/L
CO2 SERPL-SCNC: 29 MMOL/L
COLOR: YELLOW
CREAT SERPL-MCNC: 0.8 MG/DL
CREAT SERPL-MCNC: 0.9 MG/DL
EGFR: 74 ML/MIN/1.73M2
EGFR: 85 ML/MIN/1.73M2
EOSINOPHIL # BLD AUTO: 0.04 K/UL
EOSINOPHIL NFR BLD AUTO: 0.7 %
EPITHELIAL CELLS: 3 /HPF
GLUCOSE QUALITATIVE U: NEGATIVE MG/DL
GLUCOSE SERPL-MCNC: 104 MG/DL
GLUCOSE SERPL-MCNC: 106 MG/DL
HCT VFR BLD CALC: 39.1 %
HGB BLD-MCNC: 13.7 G/DL
IMM GRANULOCYTES NFR BLD AUTO: 0.3 %
INR PPP: 0.9 RATIO
KETONES URINE: NEGATIVE MG/DL
LEUKOCYTE ESTERASE URINE: NEGATIVE
LYMPHOCYTES # BLD AUTO: 1.51 K/UL
LYMPHOCYTES NFR BLD AUTO: 26.1 %
MAN DIFF?: NORMAL
MCHC RBC-ENTMCNC: 32.5 PG
MCHC RBC-ENTMCNC: 35 G/DL
MCV RBC AUTO: 92.7 FL
MICROSCOPIC-UA: NORMAL
MONOCYTES # BLD AUTO: 0.44 K/UL
MONOCYTES NFR BLD AUTO: 7.6 %
NEUTROPHILS # BLD AUTO: 3.74 K/UL
NEUTROPHILS NFR BLD AUTO: 64.8 %
NITRITE URINE: NEGATIVE
PH URINE: 6
PLATELET # BLD AUTO: 223 K/UL
POTASSIUM SERPL-SCNC: 4.3 MMOL/L
POTASSIUM SERPL-SCNC: 4.4 MMOL/L
PROT SERPL-MCNC: 7.5 G/DL
PROT SERPL-MCNC: 7.5 G/DL
PROTEIN URINE: NEGATIVE MG/DL
PT BLD: 10.2 SEC
RBC # BLD: 4.22 M/UL
RBC # FLD: 13.7 %
RED BLOOD CELLS URINE: 2 /HPF
SODIUM SERPL-SCNC: 140 MMOL/L
SODIUM SERPL-SCNC: 143 MMOL/L
SPECIFIC GRAVITY URINE: 1.02
UROBILINOGEN URINE: 0.2 MG/DL
WBC # FLD AUTO: 5.78 K/UL
WHITE BLOOD CELLS URINE: 0 /HPF

## 2023-12-06 ENCOUNTER — RESULT REVIEW (OUTPATIENT)
Age: 58
End: 2023-12-06

## 2023-12-06 ENCOUNTER — OUTPATIENT (OUTPATIENT)
Dept: OUTPATIENT SERVICES | Facility: HOSPITAL | Age: 58
LOS: 1 days | End: 2023-12-06
Payer: MEDICARE

## 2023-12-06 DIAGNOSIS — Z98.890 OTHER SPECIFIED POSTPROCEDURAL STATES: Chronic | ICD-10-CM

## 2023-12-06 DIAGNOSIS — Z90.49 ACQUIRED ABSENCE OF OTHER SPECIFIED PARTS OF DIGESTIVE TRACT: Chronic | ICD-10-CM

## 2023-12-06 DIAGNOSIS — Z12.31 ENCOUNTER FOR SCREENING MAMMOGRAM FOR MALIGNANT NEOPLASM OF BREAST: ICD-10-CM

## 2023-12-06 DIAGNOSIS — Z90.711 ACQUIRED ABSENCE OF UTERUS WITH REMAINING CERVICAL STUMP: Chronic | ICD-10-CM

## 2023-12-06 PROCEDURE — 77067 SCR MAMMO BI INCL CAD: CPT | Mod: 26

## 2023-12-06 PROCEDURE — 77063 BREAST TOMOSYNTHESIS BI: CPT | Mod: 26

## 2023-12-06 PROCEDURE — 77067 SCR MAMMO BI INCL CAD: CPT

## 2023-12-06 PROCEDURE — 77063 BREAST TOMOSYNTHESIS BI: CPT

## 2023-12-07 DIAGNOSIS — Z12.31 ENCOUNTER FOR SCREENING MAMMOGRAM FOR MALIGNANT NEOPLASM OF BREAST: ICD-10-CM

## 2023-12-22 ENCOUNTER — APPOINTMENT (OUTPATIENT)
Dept: GASTROENTEROLOGY | Facility: CLINIC | Age: 58
End: 2023-12-22
Payer: MEDICARE

## 2023-12-22 VITALS
DIASTOLIC BLOOD PRESSURE: 78 MMHG | HEART RATE: 59 BPM | SYSTOLIC BLOOD PRESSURE: 150 MMHG | HEIGHT: 62 IN | WEIGHT: 136.6 LBS | BODY MASS INDEX: 25.14 KG/M2

## 2023-12-22 DIAGNOSIS — R19.7 DIARRHEA, UNSPECIFIED: ICD-10-CM

## 2023-12-22 DIAGNOSIS — K21.9 GASTRO-ESOPHAGEAL REFLUX DISEASE W/OUT ESOPHAGITIS: ICD-10-CM

## 2023-12-22 DIAGNOSIS — K31.84 GASTROPARESIS: ICD-10-CM

## 2023-12-22 DIAGNOSIS — F17.200 NICOTINE DEPENDENCE, UNSPECIFIED, UNCOMPLICATED: ICD-10-CM

## 2023-12-22 DIAGNOSIS — Z78.9 OTHER SPECIFIED HEALTH STATUS: ICD-10-CM

## 2023-12-22 DIAGNOSIS — K59.00 CONSTIPATION, UNSPECIFIED: ICD-10-CM

## 2023-12-22 PROCEDURE — 99213 OFFICE O/P EST LOW 20 MIN: CPT

## 2023-12-22 RX ORDER — TRAMADOL HYDROCHLORIDE 100 MG/1
100 TABLET, EXTENDED RELEASE ORAL DAILY
Refills: 0 | Status: ACTIVE | COMMUNITY

## 2023-12-22 RX ORDER — FAMOTIDINE 40 MG/1
40 TABLET, FILM COATED ORAL
Qty: 30 | Refills: 5 | Status: ACTIVE | COMMUNITY
Start: 2022-12-23 | End: 1900-01-01

## 2023-12-22 RX ORDER — OMEPRAZOLE 40 MG/1
40 CAPSULE, DELAYED RELEASE ORAL
Qty: 30 | Refills: 5 | Status: ACTIVE | COMMUNITY
Start: 2023-10-20 | End: 1900-01-01

## 2023-12-22 RX ORDER — TRAMADOL HYDROCHLORIDE 25 MG/1
TABLET, COATED ORAL
Refills: 0 | Status: DISCONTINUED | COMMUNITY
End: 2023-12-22

## 2023-12-22 NOTE — HISTORY OF PRESENT ILLNESS
[FreeTextEntry1] : 57-year-old female patient s/p SBO probably due to adhesions being followed up for multiple GI complaints. She complains of diffuse abdominal pain. And constipation. She is undergone an EGD and a colonoscopy as well as a CT scan and a gastric emptying study the gastric emptying study showed evidence of mild gastroparesis. She is here today due to increased abdominal pain in her RLQ noted 2 weeks ago; she has had intermitent RLQ pain since then but not as severe. She has been taking dulcolax q day and sometimes takes the Linzess prn for constipation and has noted severe  diarrhea lasting all day with incontinence of stool at times., last noted about 2 days ago. She vomits occasionally when she has increased constipation; she takes Zofran prn. She has been taking Omeprazole OD and famotidine in the afternoon but she still gets reflux about 4 times a week. She also C/O occasional dysphagia, last noted last night. She denied fevers, blood in the stool, melena, or weight loss.

## 2023-12-22 NOTE — ASSESSMENT
[FreeTextEntry1] : 57-year-old female patient s/p SBO probably due to adhesions being followed up for multiple GI complaints. She complains of diffuse abdominal pain. And constipation. She is undergone an EGD and a colonoscopy as well as a CT scan and a gastric emptying study the gastric emptying study showed evidence of mild gastroparesis. She is here today due to increased abdominal pain in her RLQ noted 2 weeks ago; she has had intermitent RLQ pain since then but not as severe. She has been taking dulcolax q day and sometimes takes the Linzess prn for constipation and has noted severe  diarrhea lasting all day with incontinence of stool at times., last noted about 2 days ago. She vomits occasionally when she has increased constipation; she takes Zofran prn. She has been taking Omeprazole OD and famotidine in the afternoon but she still gets reflux about 4 times a week. She also C/O occasional dysphagia, last noted last night. She denied fevers, blood in the stool, melena, or weight loss.  Chronic Constipation with diarrhea from Linzess Abdominal pain associated with increased constipation -She was given  samples of Linzess 72 mcg Lot # [id number] 9/2024, Lot # [id number] exp 12/2023  Acid reflux - Continue Omeprazole 40 mg q am and Famotidine q afternoon - GERD diet and no late night eating recommended  Gastroparesis, mild - Continue Zofran prn  F/U with Dr. Marin on 2/2/24

## 2023-12-22 NOTE — PHYSICAL EXAM
[Alert] : alert [Normal Voice/Communication] : normal voice/communication [No Acute Distress] : no acute distress [Well Developed] : well developed [Well Nourished] : well nourished [Sclera] : the sclera and conjunctiva were normal [Hearing Threshold Finger Rub Not [place]] : hearing was normal [Normal Lips/Gums] : the lips and gums were normal [Oropharynx] : the oropharynx was normal [Normal Appearance] : the appearance of the neck was normal [No Respiratory Distress] : no respiratory distress [No Acc Muscle Use] : no accessory muscle use [Respiration, Rhythm And Depth] : normal respiratory rhythm and effort [Heart Rate And Rhythm] : heart rate was normal and rhythm regular [Normal S1, S2] : normal S1 and S2 [Murmurs] : no murmurs [None] : no edema [Bowel Sounds] : normal bowel sounds [No Masses] : no abdominal mass palpated [Abdomen Soft] : soft [] : no hepatosplenomegaly [RUQ] : RUQ [RLQ] : RLQ [Abnormal Walk] : normal gait [Normal Color / Pigmentation] : normal skin color and pigmentation [Oriented To Time, Place, And Person] : oriented to person, place, and time

## 2024-01-16 ENCOUNTER — APPOINTMENT (OUTPATIENT)
Dept: NEUROSURGERY | Facility: CLINIC | Age: 59
End: 2024-01-16
Payer: OTHER MISCELLANEOUS

## 2024-01-16 VITALS — HEIGHT: 62 IN | WEIGHT: 136 LBS | BODY MASS INDEX: 25.03 KG/M2

## 2024-01-16 PROCEDURE — 99203 OFFICE O/P NEW LOW 30 MIN: CPT

## 2024-01-17 NOTE — HISTORY OF PRESENT ILLNESS
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  She reports of neck pain radiating down upper extremities and to the head, causing migraines. Reports of tingling on both arms, though weakness in her right upper extremity.  She has attended physical therapy 2 times a week last year at Dr. Luis Mcmullen's office, and has received numerous injection from Dr. Mcmullen and Dr. Bourgeois (2021) to include TPI, epidural injection nerve block branch block with minimal relief Symptom is alleviated with TENS, heat, and massage.   For her back she reports of lower back pain radiating down the right posterior lateral leg.  Reports of tingling, numbness, and muscle cramps.  She also attended physical therapy in November/December 2023 which alleviated her symptoms.  Ms. Branch reports she had a successful spinal cord stimulator trial in the 1990s with Dr. Barajas and had a permanent implantation though had it removed because she felt lead migrated, and it was not working.  Symptom is aggravated when bending and stretching; and is alleviated when she was doing traction.  She has no recent films.  On physical examination:  Constitutional: Well appearing,  HEENT: Normocephalic Atraumatic Psychiatric: Alert and oriented to all spheres, normal mood Pulmonary: no respiratory distress Abdomen: non-distended Vascular/Extremities: no edema, no cyanosis, no clubbing   Neurologic:  CN II-XII grossly intact ROM: minimal in cervical and lumbar spine due to pain  Palpation: pain to palpation in cervical spine, pain to palpation in lumbar spine Strength: Full strength in all major muscle groups, no atrophy, RLE 4+/ 5 (limited effort due to pain) Sensation: Full sensation to light touch in all extremities Reflexes:   2+ patellar  2+ biceps   No Macedo's  No clonus  Signs: SLR negative  Gait: non-antalgic

## 2024-01-17 NOTE — PLAN
[FreeTextEntry1] : Ms. Branch is a 59 y/o F, had work-related injury on 11/15/88, injuring neck and back presents with chronic cervical and lumbar radiculopathy. I am ordering an MRI cervical and lumbar spine w/o contrast to assess disc herniation or stenosis. She will follow up with Dr. Bingham after imaging are completed.    Case discussed with Dr. Bingham

## 2024-02-02 ENCOUNTER — APPOINTMENT (OUTPATIENT)
Dept: GASTROENTEROLOGY | Facility: CLINIC | Age: 59
End: 2024-02-02
Payer: MEDICARE

## 2024-02-02 DIAGNOSIS — R10.9 UNSPECIFIED ABDOMINAL PAIN: ICD-10-CM

## 2024-02-02 DIAGNOSIS — K21.9 GASTRO-ESOPHAGEAL REFLUX DISEASE W/OUT ESOPHAGITIS: ICD-10-CM

## 2024-02-02 DIAGNOSIS — R79.89 OTHER SPECIFIED ABNORMAL FINDINGS OF BLOOD CHEMISTRY: ICD-10-CM

## 2024-02-02 PROCEDURE — 99213 OFFICE O/P EST LOW 20 MIN: CPT

## 2024-02-02 NOTE — ASSESSMENT
[FreeTextEntry1] : 57yo female h/o SBO probably due to adhesions in setting of multiple abdominal surgeries including cholecystectomy, appendectomy, hysterectomy presents for follow up of abdominal pain and altered bowel pattern previously seen by Dr. Leal. Symptoms appear overall improved.  #Altered bowel pattern/constipation #Abdominal pain #GERD #Gastroparesis, mild -Update labs including cbc, cmp -Continue omeprazole 40mg daily taken 20-30 minutes before breakfast -Famotidine 20mg qhs/prn -Antireflux measures discussed including elevating hob and to avoid lying down for 2-3 hours after meals  -Discussed avoidance of dietary triggers including spicy foods, coffee, etoh, citrus, tomatoes, chocolate, mints -Continue NSAID avoidance -Linzess 72mcg when needed, dulcolax prn -Continue dietary modifications, avoiding dairy -Repeat colonoscopy in 2027, sooner if new symptoms develop  Follow up 2 months, sooner if needed Pt agreeable with plan, advised to contact us if questions/concerns

## 2024-02-02 NOTE — HISTORY OF PRESENT ILLNESS
[FreeTextEntry4] : ASIM CRAWLEY [FreeTextEntry1] : 59yo female h/o SBO probably due to adhesions in setting of multiple abdominal surgeries including cholecystectomy, appendectomy, hysterectomy presents for follow up of abdominal pain and altered bowel pattern previously seen by Dr. Leal.  Pt reports longstanding intermittent diffuse abdominal pain and constipation. She has had extensive testing including EGD and a colonoscopy as well as a CT scan and a gastric emptying study that showed evidence of mild gastroparesis. Pt states she has been feeling better overall, states she has adjusted diet avoiding dairy having more fish/protein with improvement. Takes linzess and dulcolax when needed, still with some alternating constipation and loose stool though improved. Reports occasional nausea and vomiting mostly when constipated. Reports heartburn has been better controlled with omeprazole in mornings and famotidine taken when needed. No new complaints today. Appetite ok. Denies recent weight loss.   EGD 7/2023 revealing gastric erythema, hiatal hernia, no evidence of h pylori or celiac disease Colonoscopy 3/2023 revealing 3mm transverse colon polyp, int/ext hemorrhoids (path showed hyperplastic polyp)  Labs reviewed.

## 2024-02-02 NOTE — REVIEW OF SYSTEMS
[Negative] : Neurological [As Noted in HPI] : as noted in HPI [Constipation] : constipation [Heartburn] : heartburn [Abdominal Pain] : no abdominal pain [Bleeding] : no bleeding [Bloating (gassiness)] : no bloating

## 2024-02-29 RX ORDER — METOPROLOL SUCCINATE 50 MG/1
50 TABLET, EXTENDED RELEASE ORAL DAILY
Qty: 90 | Refills: 3 | Status: ACTIVE | COMMUNITY
Start: 2022-07-20 | End: 1900-01-01

## 2024-03-15 NOTE — ED ADULT TRIAGE NOTE - WEIGHT IN LBS
[facility]    Phone Message    May a detailed message be left on voicemail: yes     Reason for Call: Appointment Intake    Referring Provider Name:     Derick Carballo MD in [facility]     Diagnosis and/or Symptoms: Atherosclerosis of native arteries of extremities with intermittent claudication...     Action Taken: Message routed to:  [facility]    Travel Screening: Not Applicable                                                                    128

## 2024-04-03 ENCOUNTER — APPOINTMENT (OUTPATIENT)
Dept: ORTHOPEDIC SURGERY | Facility: CLINIC | Age: 59
End: 2024-04-03
Payer: OTHER MISCELLANEOUS

## 2024-04-03 DIAGNOSIS — M17.0 BILATERAL PRIMARY OSTEOARTHRITIS OF KNEE: ICD-10-CM

## 2024-04-03 DIAGNOSIS — M77.8 OTHER ENTHESOPATHIES, NOT ELSEWHERE CLASSIFIED: ICD-10-CM

## 2024-04-03 DIAGNOSIS — S93.409S SPRAIN OF UNSPECIFIED LIGAMENT OF UNSPECIFIED ANKLE, SEQUELA: ICD-10-CM

## 2024-04-03 DIAGNOSIS — M54.16 RADICULOPATHY, LUMBAR REGION: ICD-10-CM

## 2024-04-03 DIAGNOSIS — M54.12 RADICULOPATHY, CERVICAL REGION: ICD-10-CM

## 2024-04-03 DIAGNOSIS — M50.20 OTHER CERVICAL DISC DISPLACEMENT, UNSPECIFIED CERVICAL REGION: ICD-10-CM

## 2024-04-03 DIAGNOSIS — M25.371 OTHER INSTABILITY, RIGHT ANKLE: ICD-10-CM

## 2024-04-03 DIAGNOSIS — S93.491S SPRAIN OF OTHER LIGAMENT OF RIGHT ANKLE, SEQUELA: ICD-10-CM

## 2024-04-03 PROCEDURE — L1902: CPT | Mod: RT

## 2024-04-03 PROCEDURE — 99213 OFFICE O/P EST LOW 20 MIN: CPT

## 2024-04-08 PROBLEM — S93.491S SPRAIN OF ANTERIOR TALOFIBULAR LIGAMENT OF RIGHT ANKLE, SEQUELA: Status: ACTIVE | Noted: 2024-04-08

## 2024-04-08 PROBLEM — S93.409S SPRAIN OF ANKLE, SEQUELA: Status: ACTIVE | Noted: 2024-04-08

## 2024-04-08 PROBLEM — M25.371 INSTABILITY OF RIGHT ANKLE JOINT: Status: ACTIVE | Noted: 2024-04-08

## 2024-04-08 NOTE — IMAGING
[de-identified] : Pleasant easy to examine some distress limited motion neck with spasm limited motion of the back with spasm negative straight leg bilaterally no focal neurologic deficits impingement is noted both shoulders with good motion  Both knees have some swelling and crepitus with mild limits in flexion right ankle  mild lateral swelling  mild positive anterior drawer achilles intact

## 2024-04-08 NOTE — ASSESSMENT
[FreeTextEntry1] : Reports more pain in the back follow-up with neurosurgery she had indicated no interest for any surgical intervention last time she was here she had several MRIs lumbar spine showed mild disc disease at L4-5 provided the brace for the right ankle Please put a request in to get a new hinged brace for the right knee arthritis she remains totally disabled unable to work I will see her in a few months Continue pain management supervision

## 2024-04-12 ENCOUNTER — INPATIENT (INPATIENT)
Facility: HOSPITAL | Age: 59
LOS: 4 days | Discharge: HOME | DRG: 92 | End: 2024-04-17
Attending: INTERNAL MEDICINE | Admitting: INTERNAL MEDICINE
Payer: MEDICARE

## 2024-04-12 VITALS
SYSTOLIC BLOOD PRESSURE: 188 MMHG | HEART RATE: 95 BPM | DIASTOLIC BLOOD PRESSURE: 84 MMHG | TEMPERATURE: 98 F | OXYGEN SATURATION: 94 % | HEIGHT: 62 IN | RESPIRATION RATE: 18 BRPM | WEIGHT: 143.08 LBS

## 2024-04-12 DIAGNOSIS — R41.82 ALTERED MENTAL STATUS, UNSPECIFIED: ICD-10-CM

## 2024-04-12 DIAGNOSIS — Z98.890 OTHER SPECIFIED POSTPROCEDURAL STATES: Chronic | ICD-10-CM

## 2024-04-12 DIAGNOSIS — Z90.49 ACQUIRED ABSENCE OF OTHER SPECIFIED PARTS OF DIGESTIVE TRACT: Chronic | ICD-10-CM

## 2024-04-12 DIAGNOSIS — Z90.711 ACQUIRED ABSENCE OF UTERUS WITH REMAINING CERVICAL STUMP: Chronic | ICD-10-CM

## 2024-04-12 LAB
ALBUMIN SERPL ELPH-MCNC: 4 G/DL — SIGNIFICANT CHANGE UP (ref 3.5–5.2)
ALP SERPL-CCNC: 122 U/L — HIGH (ref 30–115)
ALT FLD-CCNC: 15 U/L — SIGNIFICANT CHANGE UP (ref 0–41)
AMMONIA BLD-MCNC: 36 UMOL/L — SIGNIFICANT CHANGE UP (ref 11–55)
ANION GAP SERPL CALC-SCNC: 11 MMOL/L — SIGNIFICANT CHANGE UP (ref 7–14)
APAP SERPL-MCNC: 16 UG/ML — SIGNIFICANT CHANGE UP (ref 10–30)
APPEARANCE UR: CLEAR — SIGNIFICANT CHANGE UP
APTT BLD: SIGNIFICANT CHANGE UP SEC (ref 27–39.2)
AST SERPL-CCNC: 28 U/L — SIGNIFICANT CHANGE UP (ref 0–41)
BASE EXCESS BLDV CALC-SCNC: 4.8 MMOL/L — HIGH (ref -2–3)
BASOPHILS # BLD AUTO: 0.02 K/UL — SIGNIFICANT CHANGE UP (ref 0–0.2)
BASOPHILS NFR BLD AUTO: 0.5 % — SIGNIFICANT CHANGE UP (ref 0–1)
BILIRUB SERPL-MCNC: <0.2 MG/DL — SIGNIFICANT CHANGE UP (ref 0.2–1.2)
BILIRUB UR-MCNC: NEGATIVE — SIGNIFICANT CHANGE UP
BUN SERPL-MCNC: 13 MG/DL — SIGNIFICANT CHANGE UP (ref 10–20)
CA-I SERPL-SCNC: 1.19 MMOL/L — SIGNIFICANT CHANGE UP (ref 1.15–1.33)
CALCIUM SERPL-MCNC: 9.1 MG/DL — SIGNIFICANT CHANGE UP (ref 8.4–10.4)
CHLORIDE SERPL-SCNC: 102 MMOL/L — SIGNIFICANT CHANGE UP (ref 98–110)
CO2 SERPL-SCNC: 25 MMOL/L — SIGNIFICANT CHANGE UP (ref 17–32)
COLOR SPEC: YELLOW — SIGNIFICANT CHANGE UP
CREAT SERPL-MCNC: 0.9 MG/DL — SIGNIFICANT CHANGE UP (ref 0.7–1.5)
DIFF PNL FLD: NEGATIVE — SIGNIFICANT CHANGE UP
EGFR: 74 ML/MIN/1.73M2 — SIGNIFICANT CHANGE UP
EOSINOPHIL # BLD AUTO: 0.08 K/UL — SIGNIFICANT CHANGE UP (ref 0–0.7)
EOSINOPHIL NFR BLD AUTO: 1.9 % — SIGNIFICANT CHANGE UP (ref 0–8)
ETHANOL SERPL-MCNC: <10 MG/DL — SIGNIFICANT CHANGE UP
GAS PNL BLDV: 133 MMOL/L — LOW (ref 136–145)
GAS PNL BLDV: SIGNIFICANT CHANGE UP
GAS PNL BLDV: SIGNIFICANT CHANGE UP
GLUCOSE SERPL-MCNC: 94 MG/DL — SIGNIFICANT CHANGE UP (ref 70–99)
GLUCOSE UR QL: NEGATIVE MG/DL — SIGNIFICANT CHANGE UP
HCO3 BLDV-SCNC: 31 MMOL/L — HIGH (ref 22–29)
HCT VFR BLD CALC: 32.1 % — LOW (ref 37–47)
HCT VFR BLDA CALC: 37 % — SIGNIFICANT CHANGE UP (ref 34.5–46.5)
HGB BLD CALC-MCNC: 12.3 G/DL — SIGNIFICANT CHANGE UP (ref 11.7–16.1)
HGB BLD-MCNC: 11.5 G/DL — LOW (ref 12–16)
IMM GRANULOCYTES NFR BLD AUTO: 0.2 % — SIGNIFICANT CHANGE UP (ref 0.1–0.3)
INR BLD: 0.83 RATIO — SIGNIFICANT CHANGE UP (ref 0.65–1.3)
KETONES UR-MCNC: NEGATIVE MG/DL — SIGNIFICANT CHANGE UP
LACTATE BLDV-MCNC: 0.8 MMOL/L — SIGNIFICANT CHANGE UP (ref 0.5–2)
LEUKOCYTE ESTERASE UR-ACNC: NEGATIVE — SIGNIFICANT CHANGE UP
LYMPHOCYTES # BLD AUTO: 1.54 K/UL — SIGNIFICANT CHANGE UP (ref 1.2–3.4)
LYMPHOCYTES # BLD AUTO: 37.4 % — SIGNIFICANT CHANGE UP (ref 20.5–51.1)
MAGNESIUM SERPL-MCNC: 1.9 MG/DL — SIGNIFICANT CHANGE UP (ref 1.8–2.4)
MCHC RBC-ENTMCNC: 33.2 PG — HIGH (ref 27–31)
MCHC RBC-ENTMCNC: 35.8 G/DL — SIGNIFICANT CHANGE UP (ref 32–37)
MCV RBC AUTO: 92.8 FL — SIGNIFICANT CHANGE UP (ref 81–99)
MONOCYTES # BLD AUTO: 0.46 K/UL — SIGNIFICANT CHANGE UP (ref 0.1–0.6)
MONOCYTES NFR BLD AUTO: 11.2 % — HIGH (ref 1.7–9.3)
NEUTROPHILS # BLD AUTO: 2.01 K/UL — SIGNIFICANT CHANGE UP (ref 1.4–6.5)
NEUTROPHILS NFR BLD AUTO: 48.8 % — SIGNIFICANT CHANGE UP (ref 42.2–75.2)
NITRITE UR-MCNC: NEGATIVE — SIGNIFICANT CHANGE UP
NRBC # BLD: 0 /100 WBCS — SIGNIFICANT CHANGE UP (ref 0–0)
OSMOLALITY SERPL: 286 MOS/KG — SIGNIFICANT CHANGE UP (ref 275–300)
PCO2 BLDV: 51 MMHG — HIGH (ref 39–42)
PH BLDV: 7.39 — SIGNIFICANT CHANGE UP (ref 7.32–7.43)
PH UR: 7.5 — SIGNIFICANT CHANGE UP (ref 5–8)
PLATELET # BLD AUTO: 218 K/UL — SIGNIFICANT CHANGE UP (ref 130–400)
PMV BLD: 9.6 FL — SIGNIFICANT CHANGE UP (ref 7.4–10.4)
PO2 BLDV: 32 MMHG — SIGNIFICANT CHANGE UP (ref 25–45)
POTASSIUM BLDV-SCNC: 4.2 MMOL/L — SIGNIFICANT CHANGE UP (ref 3.5–5.1)
POTASSIUM SERPL-MCNC: 5.2 MMOL/L — HIGH (ref 3.5–5)
POTASSIUM SERPL-SCNC: 5.2 MMOL/L — HIGH (ref 3.5–5)
PROT SERPL-MCNC: 6.4 G/DL — SIGNIFICANT CHANGE UP (ref 6–8)
PROT UR-MCNC: NEGATIVE MG/DL — SIGNIFICANT CHANGE UP
PROTHROM AB SERPL-ACNC: 9.4 SEC — LOW (ref 9.95–12.87)
RBC # BLD: 3.46 M/UL — LOW (ref 4.2–5.4)
RBC # FLD: 13.4 % — SIGNIFICANT CHANGE UP (ref 11.5–14.5)
SALICYLATES SERPL-MCNC: <0.3 MG/DL — LOW (ref 4–30)
SAO2 % BLDV: 62.8 % — LOW (ref 67–88)
SODIUM SERPL-SCNC: 138 MMOL/L — SIGNIFICANT CHANGE UP (ref 135–146)
SP GR SPEC: 1.03 — SIGNIFICANT CHANGE UP (ref 1–1.03)
UROBILINOGEN FLD QL: 0.2 MG/DL — SIGNIFICANT CHANGE UP (ref 0.2–1)
WBC # BLD: 4.12 K/UL — LOW (ref 4.8–10.8)
WBC # FLD AUTO: 4.12 K/UL — LOW (ref 4.8–10.8)

## 2024-04-12 PROCEDURE — 70498 CT ANGIOGRAPHY NECK: CPT | Mod: 26,MC

## 2024-04-12 PROCEDURE — 80053 COMPREHEN METABOLIC PANEL: CPT

## 2024-04-12 PROCEDURE — 70450 CT HEAD/BRAIN W/O DYE: CPT | Mod: MC

## 2024-04-12 PROCEDURE — 82746 ASSAY OF FOLIC ACID SERUM: CPT

## 2024-04-12 PROCEDURE — A9579: CPT

## 2024-04-12 PROCEDURE — 84132 ASSAY OF SERUM POTASSIUM: CPT

## 2024-04-12 PROCEDURE — 85018 HEMOGLOBIN: CPT

## 2024-04-12 PROCEDURE — 95819 EEG AWAKE AND ASLEEP: CPT

## 2024-04-12 PROCEDURE — 93005 ELECTROCARDIOGRAM TRACING: CPT

## 2024-04-12 PROCEDURE — 82962 GLUCOSE BLOOD TEST: CPT

## 2024-04-12 PROCEDURE — 80177 DRUG SCRN QUAN LEVETIRACETAM: CPT

## 2024-04-12 PROCEDURE — 85025 COMPLETE CBC W/AUTO DIFF WBC: CPT

## 2024-04-12 PROCEDURE — 70450 CT HEAD/BRAIN W/O DYE: CPT | Mod: 26,59,MC

## 2024-04-12 PROCEDURE — 84484 ASSAY OF TROPONIN QUANT: CPT

## 2024-04-12 PROCEDURE — 74177 CT ABD & PELVIS W/CONTRAST: CPT | Mod: 26,MC

## 2024-04-12 PROCEDURE — 82607 VITAMIN B-12: CPT

## 2024-04-12 PROCEDURE — 99222 1ST HOSP IP/OBS MODERATE 55: CPT | Mod: GC

## 2024-04-12 PROCEDURE — 92610 EVALUATE SWALLOWING FUNCTION: CPT | Mod: GN

## 2024-04-12 PROCEDURE — 80048 BASIC METABOLIC PNL TOTAL CA: CPT

## 2024-04-12 PROCEDURE — 82803 BLOOD GASES ANY COMBINATION: CPT

## 2024-04-12 PROCEDURE — 99285 EMERGENCY DEPT VISIT HI MDM: CPT

## 2024-04-12 PROCEDURE — 70553 MRI BRAIN STEM W/O & W/DYE: CPT | Mod: MC

## 2024-04-12 PROCEDURE — 95816 EEG AWAKE AND DROWSY: CPT | Mod: 26

## 2024-04-12 PROCEDURE — 82330 ASSAY OF CALCIUM: CPT

## 2024-04-12 PROCEDURE — 93010 ELECTROCARDIOGRAM REPORT: CPT

## 2024-04-12 PROCEDURE — 99223 1ST HOSP IP/OBS HIGH 75: CPT

## 2024-04-12 PROCEDURE — 84443 ASSAY THYROID STIM HORMONE: CPT

## 2024-04-12 PROCEDURE — 84295 ASSAY OF SERUM SODIUM: CPT

## 2024-04-12 PROCEDURE — 71045 X-RAY EXAM CHEST 1 VIEW: CPT | Mod: 26

## 2024-04-12 PROCEDURE — 97162 PT EVAL MOD COMPLEX 30 MIN: CPT | Mod: GP

## 2024-04-12 PROCEDURE — 70496 CT ANGIOGRAPHY HEAD: CPT | Mod: 26,MC

## 2024-04-12 PROCEDURE — 83735 ASSAY OF MAGNESIUM: CPT

## 2024-04-12 PROCEDURE — 85014 HEMATOCRIT: CPT

## 2024-04-12 PROCEDURE — 36415 COLL VENOUS BLD VENIPUNCTURE: CPT

## 2024-04-12 PROCEDURE — ZZZZZ: CPT

## 2024-04-12 PROCEDURE — 83605 ASSAY OF LACTIC ACID: CPT

## 2024-04-12 RX ORDER — ONDANSETRON 8 MG/1
1 TABLET, FILM COATED ORAL
Refills: 0 | DISCHARGE

## 2024-04-12 RX ORDER — ATORVASTATIN CALCIUM 80 MG/1
80 TABLET, FILM COATED ORAL AT BEDTIME
Refills: 0 | Status: DISCONTINUED | OUTPATIENT
Start: 2024-04-12 | End: 2024-04-17

## 2024-04-12 RX ORDER — CLONAZEPAM 1 MG
1 TABLET ORAL
Refills: 0 | Status: DISCONTINUED | OUTPATIENT
Start: 2024-04-12 | End: 2024-04-16

## 2024-04-12 RX ORDER — METOPROLOL TARTRATE 50 MG
25 TABLET ORAL
Refills: 0 | Status: DISCONTINUED | OUTPATIENT
Start: 2024-04-12 | End: 2024-04-17

## 2024-04-12 RX ORDER — UMECLIDINIUM 62.5 UG/1
0 AEROSOL, POWDER ORAL
Qty: 0 | Refills: 0 | DISCHARGE

## 2024-04-12 RX ORDER — CYCLOBENZAPRINE HYDROCHLORIDE 10 MG/1
1 TABLET, FILM COATED ORAL
Refills: 0 | DISCHARGE

## 2024-04-12 RX ORDER — LINACLOTIDE 145 UG/1
1 CAPSULE, GELATIN COATED ORAL
Qty: 0 | Refills: 0 | DISCHARGE

## 2024-04-12 RX ORDER — ENOXAPARIN SODIUM 100 MG/ML
40 INJECTION SUBCUTANEOUS EVERY 24 HOURS
Refills: 0 | Status: DISCONTINUED | OUTPATIENT
Start: 2024-04-12 | End: 2024-04-17

## 2024-04-12 RX ORDER — HYDRALAZINE HCL 50 MG
10 TABLET ORAL ONCE
Refills: 0 | Status: COMPLETED | OUTPATIENT
Start: 2024-04-12 | End: 2024-04-12

## 2024-04-12 RX ORDER — HYDROXYZINE HCL 10 MG
1 TABLET ORAL
Refills: 0 | DISCHARGE

## 2024-04-12 RX ORDER — PANTOPRAZOLE SODIUM 20 MG/1
40 TABLET, DELAYED RELEASE ORAL
Refills: 0 | Status: DISCONTINUED | OUTPATIENT
Start: 2024-04-12 | End: 2024-04-17

## 2024-04-12 RX ORDER — LEVETIRACETAM 250 MG/1
250 TABLET, FILM COATED ORAL
Refills: 0 | Status: DISCONTINUED | OUTPATIENT
Start: 2024-04-12 | End: 2024-04-15

## 2024-04-12 RX ORDER — LEVETIRACETAM 250 MG/1
750 TABLET, FILM COATED ORAL
Refills: 0 | Status: DISCONTINUED | OUTPATIENT
Start: 2024-04-12 | End: 2024-04-12

## 2024-04-12 RX ORDER — LINACLOTIDE 145 UG/1
1 CAPSULE, GELATIN COATED ORAL
Refills: 0 | DISCHARGE

## 2024-04-12 RX ORDER — ACETAMINOPHEN 500 MG
650 TABLET ORAL EVERY 6 HOURS
Refills: 0 | Status: DISCONTINUED | OUTPATIENT
Start: 2024-04-12 | End: 2024-04-17

## 2024-04-12 RX ORDER — MIRABEGRON 50 MG/1
1 TABLET, EXTENDED RELEASE ORAL
Refills: 0 | DISCHARGE

## 2024-04-12 RX ADMIN — Medication 25 MILLIGRAM(S): at 18:29

## 2024-04-12 RX ADMIN — LEVETIRACETAM 250 MILLIGRAM(S): 250 TABLET, FILM COATED ORAL at 18:29

## 2024-04-12 NOTE — ED PROVIDER NOTE - ATTENDING CONTRIBUTION TO CARE
59 yo F with hx as noted above here accompanied by her  who reports progressive confusion over this past week with atypical behaviors and confusion. Pt denies pain. Awake but sleepy appearing, answers some questions inappropriately such as current location, and with slurred speech but without focal deficit. Abd TTP (which when asked was not has prior). P:labs imaging and reeval 57 yo F with hx of HTN, CAD, COPD, HLD, anxiety, seizure d/o, migraine, here accompanied by her  who reports progressive confusion over this past week with atypical behaviors and confusion. Pt denies pain. Awake but sleepy appearing, answers some questions inappropriately such as current location, and with slurred speech but without focal deficit. Abd TTP (which when asked was not has prior). P:labs imaging and reeval

## 2024-04-12 NOTE — CONSULT NOTE ADULT - SUBJECTIVE AND OBJECTIVE BOX
NEUROLOGY CONSULT    HPI: Patient is a 58-year-old female past medical history of hypertension high cholesterol seizure disorder on Keppra last seizure 15 years ago, anxiety depression presenting to ED brought in by  for evaluation of altered mental status.   states that patient has been having bizarre behavior at home doing activities that make sense answering questions appropriately for the last week. pt is answering questions inappropriately on my exam A&Ox2 and complaining of abd pain. Otherwise denies any fever, chills, headache, changes in vision, cough, congestion, cp, palpitations, sob, constipation, urinary complaints, lower extremity pain/swelling.     Neurology was consulted for AMS. Per patient's notes, she was taking meds: Fioricet 3 tab/day, Tramadol 200mg/day, Cyclobenzaprine 30 mg/day, Clonazepam 2 mg, Keppra 750 mg bid, Tizanidine 4mg bid, Omeprozole 40 mg/day, Famotidine 40, Metoprolol 50, Atorvastatin 80. She is not sure about dosage, and the schedule of these meds      MEDICATIONS  Home Medications:  atorvastatin 80 mg oral tablet: 1 tab(s) orally once a day (2023 11:56)  busPIRone 10 mg oral tablet: orally 3 times a day (2023 11:56)  Fioricet oral tablet: 1 tab(s) orally 3 times a day, As Needed (2023 11:56)  hydrOXYzine hydrochloride 25 mg oral tablet: 1 tab(s) orally once a day (2023 11:56)  Incruse Ellipta 62.5 mcg/inh inhalation powder:  (2023 11:56)  KlonoPIN 2 mg oral tablet: 1 tab(s) orally 3 times a day (2023 11:56)  levETIRAcetam 250 mg oral tablet: 1 tab(s) orally 2 times a day (2023 11:56)  Linzess 290 mcg oral capsule: 1 cap(s) orally once a day (2023 11:56)  METOPROLOL SUCCINATE ER 50MG ER TAB: tab(s) orally once a day (2023 11:56)  Myrbetriq 50 mg oral tablet, extended release: 1 tab(s) orally once a day (2023 11:56)  Pepcid 40 mg oral tablet: orally 2 times a day (2023 11:56)    MEDICATIONS  (STANDING):    MEDICATIONS  (PRN):      FAMILY HISTORY:  Coronary artery disease (Father)  dad cad @78      SOCIAL HISTORY: negative for tobacco, alcohol, or ilicit drug use.    Allergies    Toradol (Rash; Urticaria; Anaphylaxis (Mild to Mod))  Toradol (Hives)  penicillin (Hives)  Cipro (Hives)  Neurontin (Unknown)    Intolerances        GEN: NAD, pleasant, cooperative    NEUROLOGICAL EXAMINATION:  GENERAL:  Appearance is consistent with chronologic age.   COGNITION/LANGUAGE:  Drowsy, but can be awaken on verbal addressing, oriented to person, place, year, months not in date.  Fluent, intact comprehension, repetition, naming. Slight dysarthria.    CRANIAL NERVES:   - Eyes:  Visual acuity intact. Pupils equal round and reactive, no RAPD. EOMI w/o nystagmus, skew or reported double vision. Normal visual field on confrontation. No ptosis/weakness of eyelid closure.   - Face:  Facial sensation normal V1 - 3, no facial asymmetry.    - Ears/Nose/Throat:  Hearing grossly intact b/l to finger rub.   Tongue and uvula midline.   MOTOR EXAM:  (R/L) 5/5 UE; 5/5 LE.  No observable drift. Normal tone and bulk. No tenderness, twitching, tremors or involuntary movements.  SENSORY EXAM:  Intact to light touch and pinprick, in all extremities.  REFLEXES:   1+ b/l biceps, triceps, patella and achilles.  Plantar response downgoing b/l.  Jaw jerk, Francisco, clonus absent.  CEREBELLUM:  Finger to nose/Heel to knee and shin intact.  No dysmetria.    GAIT: not tested.  Romberg: not tested       LABS:                        11.5   4.12  )-----------( 218      ( 2024 08:43 )             32.1     04-12    138  |  102  |  13  ----------------------------<  94  5.2<H>   |  25  |  0.9    Ca    9.1      2024 08:43  Mg     1.9     04-12    TPro  6.4  /  Alb  4.0  /  TBili  <0.2  /  DBili  x   /  AST  28  /  ALT  15  /  AlkPhos  122<H>      Hemoglobin A1C:   Vitamin B12   PT/INR - ( 2024 12:01 )   PT: TNP sec;   INR: TNP ratio         PTT - ( 2024 12:01 )  PTT:TNP sec  CAPILLARY BLOOD GLUCOSE          Urinalysis Basic - ( 2024 12:00 )    Color: Yellow / Appearance: Clear / S.026 / pH: x  Gluc: x / Ketone: Negative mg/dL  / Bili: Negative / Urobili: 0.2 mg/dL   Blood: x / Protein: Negative mg/dL / Nitrite: Negative   Leuk Esterase: Negative / RBC: x / WBC x   Sq Epi: x / Non Sq Epi: x / Bacteria: x            Microbiology:      RADIOLOGY, EKG AND ADDITIONAL TESTS: Reviewed.

## 2024-04-12 NOTE — CONSULT NOTE ADULT - ASSESSMENT
58-year-old F w PMH of HTN, HLD,  Sz ds on Keppra last seizure 15 years ago, anxiety, depression presented with for AMS x 7 days which per collaterals () worsening over time. She was on Fioricet 3 tab/day, Tramadol 200mg/day, Cyclobenzaprine 30 mg/day, Clonazepam 2 mg, Keppra 750 mg bid, Tizanidine 4mg bid, Omeprozole 40 mg/day, Famotidine 40. Neurological exam is remarkable for A&Ox2 and complaining of abd pain. CTH and routine labs unremarkable. Most likely polypharmacy with sedating medications in the settings of metabolizm slowers - Omeprozole, Famotidine. Needs to r/o ongoing non-convulsive seizures vs subacute encephalitis including but not to limited to HSV, MOGAD etc.    Recommendations:   - Routine EEG  - Routine labs with lytes, and f/u Urinetox, infectious w/up  - CTH reviewed  - MRI brain w/wo  - Consider LP with routine CSF studies and viral PCR, Gramm, Culture      Discussed w attending

## 2024-04-12 NOTE — H&P ADULT - HISTORY OF PRESENT ILLNESS
58 year old female PMHx HTN, HLD, Seizure disorder (last seizure 15 years ago), Anxiety, Depression, brought into the ED by  for 1 week of altered mental status.  states pt has been having "bizarre behaviour" and confusion for the last week. Pt reports abdominal pain for the past few days but no other complaints. Denies fever, headache, neck pain, chest pain, N/B/D, dysuria.    T 98, HR 95, /84, RR 18, 94% on RA  WBC 4, Hgb 11.5, K 5.2    CTA head/neck: 1.  No evidence of acute large vessel occlusion. 2.  Atheromatous changes notable for up to about 50% stenosis of the left  ICA origin and moderate stenoses of bilateral carotid siphons.  CTAP: No acute abdominopelvic abnormality.  CT head: No evidence of acute intracranial hemorrhage or large territory infarct.  CXR: No radiographic evidence of acute cardiopulmonary disease.  Admit to Medicine

## 2024-04-12 NOTE — H&P ADULT - ASSESSMENT
58 year old female PMHx HTN, HLD, Seizure disorder (last seizure 15 years ago), Anxiety, Depression, brought into the ED by  for 1 week of altered mental status.  states pt has been having "bizarre behaviour" and confusion for the last week. Pt reports abdominal pain for the past few days but no other complaints. Admit to Medicine for AMS work-up.    # Altered mental status (improving), suspected polypharmacy   - labs/UA/CXR unremarkable  - CTA head/neck: 1.  No evidence of acute large vessel occlusion. 2.  Atheromatous changes notable for up to about 50% stenosis of the left  ICA origin and moderate stenoses of bilateral carotid siphons.  - CT head: No evidence of acute intracranial hemorrhage or large territory infarct.  - f/u EEG  - f/u Utox  - hold home meds until mental status improves  - Neuro consulted; recommend MR brain w/wo  - hold off on LP for now per Neuro    # Abdominal pain   # IBS  - CTAP: No acute abdominopelvic abnormality  - monitor BMs    # Chronic LBP  - home meds: Fioricet 3 tab/day, Tramadol 200mg/day, Cyclobenzaprine 30 mg/day, Tizanidine 4mg bid  - on hold until mental status improves     # Seizure Disorder  - f/u EEG  - check AED level  - c/w Keppra 750mg BID    # Anxiety  # Depression  - home meds: Clonazepam 2 mg    # HTN  - c/w Metoprolol 25mg BID     # HLD  - c/w Atorvastatin 80mg    #DVT ppx: Lovenox  #GI ppx: PPI  #Diet: NPO, S&S  #Activity: IAT  #Dispo: Medicine        58 year old female PMHx HTN, HLD, Seizure disorder (last seizure 15 years ago), Anxiety, Depression, brought into the ED by  for 1 week of altered mental status.  states pt has been having "bizarre behaviour" and confusion for the last week. Pt reports abdominal pain for the past few days but no other complaints. Admit to Medicine for AMS work-up.    # Altered mental status (improving), suspected polypharmacy   - labs/UA/CXR unremarkable  - CTA head/neck: 1.  No evidence of acute large vessel occlusion. 2.  Atheromatous changes notable for up to about 50% stenosis of the left  ICA origin and moderate stenoses of bilateral carotid siphons.  - CT head: No evidence of acute intracranial hemorrhage or large territory infarct.  - f/u EEG  - f/u Utox  - hold home meds until mental status improves  - Neuro consulted; recommend MR brain w/wo  - hold off on LP for now per Neuro    # Abdominal pain   # IBS  - CTAP: No acute abdominopelvic abnormality  - monitor BMs    # Chronic LBP  - home meds: Fioricet 3 tab/day, Tramadol 200mg/day, Cyclobenzaprine 30 mg/day, Tizanidine 4mg bid  - on hold until mental status improves     # Seizure Disorder  - f/u EEG  - check AED level  - c/w Keppra 250mg BID    # Anxiety  # Depression  - home meds: Clonazepam 2 mg    # HTN  - c/w Metoprolol 25mg BID     # HLD  - c/w Atorvastatin 80mg    #DVT ppx: Lovenox  #GI ppx: PPI  #Diet: NPO, S&S  #Activity: IAT  #Dispo: Medicine        58 year old female PMHx HTN, HLD, Seizure disorder (last seizure 15 years ago), Anxiety, Depression, brought into the ED by  for 1 week of altered mental status.  states pt has been having "bizarre behaviour" and confusion for the last week. Pt reports abdominal pain for the past few days but no other complaints. Admit to Medicine for AMS work-up.    # Altered mental status (improving), suspected polypharmacy   - labs/UA/CXR unremarkable  - CTA head/neck: 1.  No evidence of acute large vessel occlusion. 2.  Atheromatous changes notable for up to about 50% stenosis of the left  ICA origin and moderate stenoses of bilateral carotid siphons.  - CT head: No evidence of acute intracranial hemorrhage or large territory infarct.  - f/u EEG  - f/u Utox  - hold home meds until mental status improves  - Neuro consulted; recommend MR brain w/wo  - hold off on LP for now per Neuro    # Abdominal pain   # IBS  - CTAP: No acute abdominopelvic abnormality  - monitor BMs    # Chronic LBP  - home meds: Fioricet 3 tab/day, Tramadol 200mg/day, Cyclobenzaprine 30 mg/day, Tizanidine 4mg bid  - on hold until mental status improves     # Seizure Disorder  - f/u EEG  - check AED level  - c/w Keppra 250mg BID    # Anxiety  # Depression  - home meds: Clonazepam 2 mg  - c/w 1mg BID PRN; resume home dose when mental status back to baseline     # HTN  - c/w Metoprolol 25mg BID     # HLD  - c/w Atorvastatin 80mg    #DVT ppx: Lovenox  #GI ppx: PPI  #Diet: NPO, S&S  #Activity: IAT  #Dispo: Medicine

## 2024-04-12 NOTE — ED PROVIDER NOTE - PHYSICAL EXAMINATION
CONSTITUTIONAL: calm, intermittently agitated  SKIN: Warm dry, normal skin turgor  HEAD: NCAT  EYES: EOMI, PERRLA, no scleral icterus, conjunctiva pink  ENT: normal pharynx with no erythema or exudates  NECK: Supple; non tender. Full ROM.  CARD: RRR, no murmurs.  RESP: clear to ausculation b/l. No crackles or wheezing.  ABD: soft, diffuse abd tenderness, no rebound or guarding.  EXT: Full ROM, no bony tenderness, no pedal edema, no calf tenderness  NEURO: normal motor. normal sensory. CN II-XII intact. not answering questions approrpiately  PSYCH: difficult to assess, pt not cooperative

## 2024-04-12 NOTE — CONSULT NOTE ADULT - ATTENDING COMMENTS
58-year-old F w PMH of HTN, HLD,  Sz ds on Keppra last seizure 15 years ago, anxiety, depression presented with for AMS x 7 days which per collaterals () worsening over time. She was on Fioricet 3 tab/day, Tramadol 200mg/day, Cyclobenzaprine 30 mg/day, Clonazepam 2 mg, Keppra 750 mg bid, Tizanidine 4mg bid, Omeprozole 40 mg/day, Famotidine 40. Neurological exam is remarkable for A&Ox2 and complaining of abd pain. CTH and routine labs unremarkable. Most likely polypharmacy with sedating medications in the settings of metabolizm slowers - Omeprozole, Famotidine. Needs to r/o ongoing non-convulsive seizures vs subacute encephalitis including but not to limited to HSV, MOGAD      Patient was seen at bedside.   Patient was AAOx3 to verbal stimulation. Follows simple commands.   There was no focality noted on exam.   PEr ED resident, patient noted to have decline ADLs over the past week and having doing bizarre things.   Agree with plan as above.   If mental status is not attributed to polypharmacy- than can consider LP   Please call for specific CSF studies- will need infectious, autoimmune and inflammatory csf labs. 58-year-old F w PMH of HTN, HLD,  Sz ds on Keppra last seizure 15 years ago, anxiety, depression presented with for AMS x 7 days which per collaterals () worsening over time. She was on Fioricet 3 tab/day, Tramadol 200mg/day, Cyclobenzaprine 30 mg/day, Clonazepam 2 mg, Keppra 750 mg bid, Tizanidine 4mg bid, Omeprozole 40 mg/day, Famotidine 40. Neurological exam is remarkable for A&Ox2 and complaining of abd pain. CTH and routine labs unremarkable. Most likely polypharmacy with sedating medications in the settings of metabolizm slowers - Omeprozole, Famotidine. Needs to r/o ongoing non-convulsive seizures vs subacute encephalitis including but not to limited to HSV, MOGAD      Patient was seen at bedside.   Patient was AAOx3 to verbal stimulation. Follows simple commands.   There was no focality noted on exam.   PEr ED resident, patient noted to have decline ADLs over the past week and having doing bizarre things.   Agree with plan as above.   Will plan for EEG and MRI brain w/wo contrast   If mental status is not attributed to polypharmacy- than can consider LP after MRI and EEG   Please call for specific CSF studies- will need infectious, autoimmune and inflammatory csf labs.

## 2024-04-12 NOTE — ED ADULT NURSE NOTE - IS THE PATIENT ABLE TO BE SCREENED?
Left message for callback to discuss.  
THIS HAS BEEN TAKEN CARE OF IN A SEPARATE PHONE NOTE. PATIENT JUST NEEDED A REFILL   
WANTS CALL BACK TO DISCUSS REDUCING DOSAGE ON AMLODIPINE.   
Yes

## 2024-04-12 NOTE — ED ADULT NURSE NOTE - OBJECTIVE STATEMENT
Patient presents to ED for AMS x`1 week. As per , patient has been acting "bizarre". Patient A&O x1. Patient A&O x4 at baseline. Denies fall/trauma.

## 2024-04-12 NOTE — H&P ADULT - NSHPREVIEWOFSYSTEMS_GEN_ALL_CORE
CONSTITUTIONAL: No weakness, fevers, or chills  EYES/ENT: No visual changes;  No vertigo or throat pain   NECK: No pain or stiffness  RESPIRATORY: No cough, wheezing, hemoptysis; No shortness of breath  CARDIOVASCULAR: No chest pain or palpitations  GASTROINTESTINAL: +abdominal pain. No nausea, vomiting, diarrhea, or constipation; No hematemesis, melena, or hematochezia  GENITOURINARY: No dysuria, frequency, or hematuria  NEUROLOGICAL: No numbness or weakness  SKIN: No itching or new rashes

## 2024-04-12 NOTE — ED PROVIDER NOTE - OBJECTIVE STATEMENT
Patient is a 58-year-old female past medical history of hypertension high cholesterol seizure disorder on Keppra last seizure 15 years ago, anxiety depression presenting to ED brought in by  for evaluation of altered mental status.   states that patient has been having bizarre behavior at home doing activities that make sense answering questions appropriately for the last week. pt is answering questions inappropriately on my exam A&Ox2 and complaining of abd pain. Otherwise denies any fever, chills, headache, changes in vision, cough, congestion, cp, palpitations, sob, constipation, urinary complaints, lower extremity pain/swelling.

## 2024-04-12 NOTE — ED ADULT NURSE REASSESSMENT NOTE - NS ED NURSE REASSESS COMMENT FT1
Patient belongings with security. Patient changed into hospital gown. Patient remains on 1:1 observation for safety. Safety & comfort measures maintained. Plan of care on going.
Pt received from previous RN Khushboo. Pt is resting comfortably in bed with stable vitals. Pts iv is intact. 1:1 is present for increased risk of elopement. Pt is awaiting transport for her bed upstairs. Bed is in lowest position with call bell in reach. Safety measures maintained.

## 2024-04-12 NOTE — H&P ADULT - NSHPPHYSICALEXAM_GEN_ALL_CORE
GENERAL: NAD, lying in bed comfortably  NECK: Supple, No JVD  CHEST/LUNG: CTAB  HEART: RRR no MRG  ABDOMEN: BSx4; soft, non-distended, right sided TTP  EXTREMITIES:  No clubbing, cyanosis, or edema  NERVOUS SYSTEM:  A&Ox3, confused, no focal deficits   SKIN: No rashes or lesions

## 2024-04-12 NOTE — PATIENT PROFILE ADULT - FUNCTIONAL ASSESSMENT - DAILY ACTIVITY 3.
3 = A little assistance Right Ankle Open Reduction, Internal Fixation Fracture Potential Syndesmotic Screws scheduled on 8/28/17.  Pre-op instructions provided. Pt verbalized understanding.   Pepcid provided for GI prophylaxis.   Chlorhexidine wash provided and instructions given.   Medical clearance in chart.

## 2024-04-12 NOTE — H&P ADULT - NSICDXPASTSURGICALHX_GEN_ALL_CORE_FT
Patient had not urinated. Orders received to bladder scan/ straight cath patient > 400. Bladder scan results >999. Patient was straight cath and pulled off 1200ml. MD Trever Sinha notified.    PAST SURGICAL HISTORY:  H/O rotator cuff surgery right    History of appendectomy     History of surgery 4 rght knee sx  1 left knee sx  cholecystectomy  rotator cuff right  abdominal sx-- adhesions  c section  ovarian cystectomy  breast cystectomies  colonoscopy   endoscopies  parital hysterectomy    S/P cholecystectomy     S/p partial hysterectomy with remaining cervical stump

## 2024-04-12 NOTE — ED PROVIDER NOTE - PROGRESS NOTE DETAILS
case endorsed to Dr. Best f/u with neuro and reeval pk: neuro consult placed, labs and imaging reviewed pk: disc with neuro who do not recc LP at this time, recc stat EEG and mr brain with and w/o contrast, will admit to medicine

## 2024-04-12 NOTE — ED PROVIDER NOTE - CLINICAL SUMMARY MEDICAL DECISION MAKING FREE TEXT BOX
59 yo F with hx of HTN, CAD, COPD, HLD, anxiety, seizure d/o, migraine, here accompanied by her  who reports progressive confusion over this past week with atypical behaviors and confusion. Patient is alert but answers some questions inappropriately such as current location, and with slurred speech but without focal deficit.  Labs were ordered and reviewed.  Imaging was ordered and reviewed by me.  Additional history was obtained from . Neurology consulted and evaluated patient. Recommending admission and EEG prior to lumbar puncture. Will defer LP at this time.  Escalation to admission/observation was considered. Patient requires inpatient hospitalization.

## 2024-04-12 NOTE — H&P ADULT - ATTENDING COMMENTS
58 year old female PMHx HTN, HLD, Seizure disorder (last seizure 15 years ago), Anxiety, Depression, brought into the ED by  for 1 week of altered mental status.  states pt has been having "bizarre behaviour" and confusion for the last week. Pt reports abdominal pain for the past few days but no other complaints.    Agree  with assessment  except for changes below.   Vital Signs Last 24 Hrs  T(C): 36.1 (12 Apr 2024 19:37), Max: 36.7 (12 Apr 2024 07:51)  T(F): 97 (12 Apr 2024 19:37), Max: 98 (12 Apr 2024 07:51)  HR: 75 (12 Apr 2024 19:37) (75 - 95)  BP: 166/68 (12 Apr 2024 19:37) (156/75 - 188/84)  BP(mean): --  RR: 18 (12 Apr 2024 19:37) (18 - 18)  SpO2: 93% (12 Apr 2024 19:37) (93% - 97%)    Parameters below as of 12 Apr 2024 19:37  Patient On (Oxygen Delivery Method): room air    CTH; No evidence of acute intracranial hemorrhage or large territory infarct.    CT H&N: 1.  No evidence of acute large vessel occlusion. Atheromatous changes notable for up to about 50% stenosis of the left  ICA origin and moderate stenoses of bilateral carotid siphons.    CT A/P: No acute abdominopelvic abnormality.    IMPRESSION  Toxic Metabolic Encephalopathy   Hx  Seizure  Disorder   Hx Chronic Lower Back Pain    Altered mental status (improving), Likley secondary to  polypharmacy   - labs/UA/CXR unremarkable  - f/u EEG  - f/u Utox  - hold home meds until mental status improves  - Neuro consulted; recommend MR brain w/wo  - hold off on LP for now per Neuro  - f/u EEG  - check AED level  - c/w Keppra 250mg BID  - Now  the mental status improved   >  Resume Fioricet 3 tab/day, y, Cyclobenzaprine 30 mg/day, Tizanidine 4mg bid  > Hold Tramadol 200mg/day  > Add Tylenol,   > Consider Pain management Eval       Abdominal pain   Hx  IBS  - CTAP: No acute abdominopelvic abnormality  - start bowel regimen     Hx  Anxiety  Hx  Depression  - home meds: Clonazepam 2 mg  - c/w 1mg BID PRN; resume home dose when mental status back to baseline     Hx  HTN  - c/w Metoprolol 25mg BID     Hx  HLD  - c/w Atorvastatin 80mg 58 year old female PMHx HTN, HLD, Seizure disorder (last seizure 15 years ago), Anxiety, Depression, brought into the ED by  for 1 week of altered mental status.  states pt has been having "bizarre behaviour" and confusion for the last week. Pt reports abdominal pain for the past few days but no other complaints.    Agree  with assessment  except for changes below.   Vital Signs Last 24 Hrs  T(C): 36.1 (12 Apr 2024 19:37), Max: 36.7 (12 Apr 2024 07:51)  T(F): 97 (12 Apr 2024 19:37), Max: 98 (12 Apr 2024 07:51)  HR: 75 (12 Apr 2024 19:37) (75 - 95)  BP: 166/68 (12 Apr 2024 19:37) (156/75 - 188/84)  BP(mean): --  RR: 18 (12 Apr 2024 19:37) (18 - 18)  SpO2: 93% (12 Apr 2024 19:37) (93% - 97%)    Parameters below as of 12 Apr 2024 19:37  Patient On (Oxygen Delivery Method): room air    CTH; No evidence of acute intracranial hemorrhage or large territory infarct.    CT H&N: 1.  No evidence of acute large vessel occlusion. Atheromatous changes notable for up to about 50% stenosis of the left  ICA origin and moderate stenoses of bilateral carotid siphons.    CT A/P: No acute abdominopelvic abnormality.    IMPRESSION  Toxic Metabolic Encephalopathy   Hx  Seizure  Disorder   Hx Chronic Lower Back Pain    Altered mental status (improving), Likley secondary to  polypharmacy   - labs/UA/CXR unremarkable  - f/u EEG  - f/u Utox  - hold home meds until mental status improves  - Neuro consulted; recommend MR brain w/wo  - hold off on LP for now per Neuro  - f/u EEG  - check AED level  - c/w Keppra 250mg BID  - Now  the mental status improved   >  Resume Fioricet 3 tab/day, y, Cyclobenzaprine 30 mg/day, Tizanidine 4mg bid  > Hold Tramadol 200mg/day  > Add Tylenol,   > Consider Pain management Eval       Abdominal pain   Hx  IBS  - CTAP: No acute abdominopelvic abnormality  - start bowel regimen   - consider bentyl     Hx  Anxiety  Hx  Depression  - home meds: Clonazepam 2 mg  - c/w 1mg BID PRN; resume home dose when mental status back to baseline     Hx  HTN  - c/w Metoprolol 25mg BID     Hx  HLD  - c/w Atorvastatin 80mg    Seen on 04/13

## 2024-04-12 NOTE — H&P ADULT - NSHPLABSRESULTS_GEN_ALL_CORE
LABS:                        11.5   4.12  )-----------( 218      ( 12 Apr 2024 08:43 )             32.1     04-12    138  |  102  |  13  ----------------------------<  94  5.2<H>   |  25  |  0.9    Ca    9.1      12 Apr 2024 08:43  Mg     1.9     04-12    TPro  6.4  /  Alb  4.0  /  TBili  <0.2  /  DBili  x   /  AST  28  /  ALT  15  /  AlkPhos  122<H>  04-12    PT/INR - ( 12 Apr 2024 12:01 )   PT: TNP sec;   INR: TNP ratio         PTT - ( 12 Apr 2024 12:01 )  PTT:TNP sec    < from: CT Angio Head w/ IV Cont (04.12.24 @ 09:28) >    IMPRESSION:    1.  No evidence of acute large vessel occlusion.    2.  Atheromatous changes notable for up to about 50% stenosis of the left   ICA origin and moderate stenoses of bilateral carotid siphons.    --- End of Report ---      < end of copied text >    < from: CT Angio Neck w/ IV Cont (04.12.24 @ 09:27) >    IMPRESSION:    1.  No evidence of acute large vessel occlusion.    2.  Atheromatous changes notable for up to about 50% stenosis of the left   ICA origin and moderate stenoses of bilateral carotid siphons.    --- End of Report ---      < end of copied text >    < from: CT Abdomen and Pelvis w/ IV Cont (04.12.24 @ 09:26) >    IMPRESSION:    No acute abdominopelvic abnormality.          < end of copied text >    < from: CT Head No Cont (04.12.24 @ 09:14) >    IMPRESSION:    No evidence of acute intracranial hemorrhage or large territory infarct.    --- End of Report ---      < end of copied text >    < from: Xray Chest 1 View- PORTABLE-Urgent (Xray Chest 1 View- PORTABLE-Urgent .) (04.12.24 @ 08:45) >    Impression:    No radiographic evidence of acute cardiopulmonary disease.    < end of copied text >

## 2024-04-13 LAB
ALBUMIN SERPL ELPH-MCNC: 4.1 G/DL — SIGNIFICANT CHANGE UP (ref 3.5–5.2)
ALP SERPL-CCNC: 128 U/L — HIGH (ref 30–115)
ALT FLD-CCNC: 19 U/L — SIGNIFICANT CHANGE UP (ref 0–41)
ANION GAP SERPL CALC-SCNC: <-26 MMOL/L — LOW (ref 7–14)
AST SERPL-CCNC: 40 U/L — SIGNIFICANT CHANGE UP (ref 0–41)
BASOPHILS # BLD AUTO: 0.03 K/UL — SIGNIFICANT CHANGE UP (ref 0–0.2)
BASOPHILS NFR BLD AUTO: 0.7 % — SIGNIFICANT CHANGE UP (ref 0–1)
BILIRUB SERPL-MCNC: 0.4 MG/DL — SIGNIFICANT CHANGE UP (ref 0.2–1.2)
BUN SERPL-MCNC: 10 MG/DL — SIGNIFICANT CHANGE UP (ref 10–20)
CALCIUM SERPL-MCNC: 9.7 MG/DL — SIGNIFICANT CHANGE UP (ref 8.4–10.5)
CHLORIDE SERPL-SCNC: >140 MMOL/L — HIGH (ref 98–110)
CO2 SERPL-SCNC: 19 MMOL/L — SIGNIFICANT CHANGE UP (ref 17–32)
CREAT SERPL-MCNC: 0.7 MG/DL — SIGNIFICANT CHANGE UP (ref 0.7–1.5)
CULTURE RESULTS: SIGNIFICANT CHANGE UP
EGFR: 100 ML/MIN/1.73M2 — SIGNIFICANT CHANGE UP
EOSINOPHIL # BLD AUTO: 0.06 K/UL — SIGNIFICANT CHANGE UP (ref 0–0.7)
EOSINOPHIL NFR BLD AUTO: 1.4 % — SIGNIFICANT CHANGE UP (ref 0–8)
GLUCOSE SERPL-MCNC: 91 MG/DL — SIGNIFICANT CHANGE UP (ref 70–99)
HCT VFR BLD CALC: 39.6 % — SIGNIFICANT CHANGE UP (ref 37–47)
HGB BLD-MCNC: 13.6 G/DL — SIGNIFICANT CHANGE UP (ref 12–16)
IMM GRANULOCYTES NFR BLD AUTO: 0.2 % — SIGNIFICANT CHANGE UP (ref 0.1–0.3)
LYMPHOCYTES # BLD AUTO: 1.15 K/UL — LOW (ref 1.2–3.4)
LYMPHOCYTES # BLD AUTO: 26.2 % — SIGNIFICANT CHANGE UP (ref 20.5–51.1)
MAGNESIUM SERPL-MCNC: 1.7 MG/DL — LOW (ref 1.8–2.4)
MCHC RBC-ENTMCNC: 31.8 PG — HIGH (ref 27–31)
MCHC RBC-ENTMCNC: 34.3 G/DL — SIGNIFICANT CHANGE UP (ref 32–37)
MCV RBC AUTO: 92.5 FL — SIGNIFICANT CHANGE UP (ref 81–99)
MONOCYTES # BLD AUTO: 0.34 K/UL — SIGNIFICANT CHANGE UP (ref 0.1–0.6)
MONOCYTES NFR BLD AUTO: 7.7 % — SIGNIFICANT CHANGE UP (ref 1.7–9.3)
NEUTROPHILS # BLD AUTO: 2.8 K/UL — SIGNIFICANT CHANGE UP (ref 1.4–6.5)
NEUTROPHILS NFR BLD AUTO: 63.8 % — SIGNIFICANT CHANGE UP (ref 42.2–75.2)
NRBC # BLD: 0 /100 WBCS — SIGNIFICANT CHANGE UP (ref 0–0)
PLATELET # BLD AUTO: 206 K/UL — SIGNIFICANT CHANGE UP (ref 130–400)
PMV BLD: 9.5 FL — SIGNIFICANT CHANGE UP (ref 7.4–10.4)
POTASSIUM SERPL-MCNC: 5.6 MMOL/L — HIGH (ref 3.5–5)
POTASSIUM SERPL-SCNC: 5.6 MMOL/L — HIGH (ref 3.5–5)
PROT SERPL-MCNC: 6.8 G/DL — SIGNIFICANT CHANGE UP (ref 6–8)
RBC # BLD: 4.28 M/UL — SIGNIFICANT CHANGE UP (ref 4.2–5.4)
RBC # FLD: 13.3 % — SIGNIFICANT CHANGE UP (ref 11.5–14.5)
SODIUM SERPL-SCNC: 133 MMOL/L — LOW (ref 135–146)
SPECIMEN SOURCE: SIGNIFICANT CHANGE UP
TSH SERPL-MCNC: 1.53 UIU/ML — SIGNIFICANT CHANGE UP (ref 0.27–4.2)
WBC # BLD: 4.39 K/UL — LOW (ref 4.8–10.8)
WBC # FLD AUTO: 4.39 K/UL — LOW (ref 4.8–10.8)

## 2024-04-13 PROCEDURE — 70553 MRI BRAIN STEM W/O & W/DYE: CPT | Mod: 26

## 2024-04-13 RX ORDER — SENNA PLUS 8.6 MG/1
2 TABLET ORAL AT BEDTIME
Refills: 0 | Status: DISCONTINUED | OUTPATIENT
Start: 2024-04-13 | End: 2024-04-17

## 2024-04-13 RX ORDER — POLYETHYLENE GLYCOL 3350 17 G/17G
17 POWDER, FOR SOLUTION ORAL
Refills: 0 | Status: DISCONTINUED | OUTPATIENT
Start: 2024-04-13 | End: 2024-04-17

## 2024-04-13 RX ADMIN — Medication 650 MILLIGRAM(S): at 19:29

## 2024-04-13 RX ADMIN — ENOXAPARIN SODIUM 40 MILLIGRAM(S): 100 INJECTION SUBCUTANEOUS at 17:45

## 2024-04-13 RX ADMIN — POLYETHYLENE GLYCOL 3350 17 GRAM(S): 17 POWDER, FOR SOLUTION ORAL at 17:46

## 2024-04-13 RX ADMIN — LEVETIRACETAM 250 MILLIGRAM(S): 250 TABLET, FILM COATED ORAL at 05:30

## 2024-04-13 RX ADMIN — ATORVASTATIN CALCIUM 80 MILLIGRAM(S): 80 TABLET, FILM COATED ORAL at 21:57

## 2024-04-13 RX ADMIN — Medication 25 MILLIGRAM(S): at 05:30

## 2024-04-13 RX ADMIN — SENNA PLUS 2 TABLET(S): 8.6 TABLET ORAL at 21:56

## 2024-04-13 RX ADMIN — Medication 25 MILLIGRAM(S): at 17:44

## 2024-04-13 RX ADMIN — PANTOPRAZOLE SODIUM 40 MILLIGRAM(S): 20 TABLET, DELAYED RELEASE ORAL at 05:30

## 2024-04-13 RX ADMIN — LEVETIRACETAM 250 MILLIGRAM(S): 250 TABLET, FILM COATED ORAL at 17:44

## 2024-04-13 NOTE — SWALLOW BEDSIDE ASSESSMENT ADULT - SLP PERTINENT HISTORY OF CURRENT PROBLEM
Pt is a 59 y/o F w/ PMHx: HTN, HLD, seizure d/o (last seizure 15 years ago), anxiety, depression, brought into the ED by  for 1 week of AMS. AMS suspected 2' polypharmacy, toxic metabolic encephalopathy. MRI, CTH (-).

## 2024-04-14 LAB
ALBUMIN SERPL ELPH-MCNC: 4.1 G/DL — SIGNIFICANT CHANGE UP (ref 3.5–5.2)
ALP SERPL-CCNC: 132 U/L — HIGH (ref 30–115)
ALT FLD-CCNC: 21 U/L — SIGNIFICANT CHANGE UP (ref 0–41)
ANION GAP SERPL CALC-SCNC: 16 MMOL/L — HIGH (ref 7–14)
AST SERPL-CCNC: 27 U/L — SIGNIFICANT CHANGE UP (ref 0–41)
BASOPHILS # BLD AUTO: 0.02 K/UL — SIGNIFICANT CHANGE UP (ref 0–0.2)
BASOPHILS NFR BLD AUTO: 0.5 % — SIGNIFICANT CHANGE UP (ref 0–1)
BILIRUB SERPL-MCNC: 0.4 MG/DL — SIGNIFICANT CHANGE UP (ref 0.2–1.2)
BUN SERPL-MCNC: 12 MG/DL — SIGNIFICANT CHANGE UP (ref 10–20)
CALCIUM SERPL-MCNC: 9.3 MG/DL — SIGNIFICANT CHANGE UP (ref 8.4–10.5)
CHLORIDE SERPL-SCNC: 104 MMOL/L — SIGNIFICANT CHANGE UP (ref 98–110)
CO2 SERPL-SCNC: 19 MMOL/L — SIGNIFICANT CHANGE UP (ref 17–32)
CREAT SERPL-MCNC: 0.6 MG/DL — LOW (ref 0.7–1.5)
EGFR: 103 ML/MIN/1.73M2 — SIGNIFICANT CHANGE UP
EOSINOPHIL # BLD AUTO: 0.02 K/UL — SIGNIFICANT CHANGE UP (ref 0–0.7)
EOSINOPHIL NFR BLD AUTO: 0.5 % — SIGNIFICANT CHANGE UP (ref 0–8)
FOLATE SERPL-MCNC: >20 NG/ML — SIGNIFICANT CHANGE UP
GLUCOSE SERPL-MCNC: 97 MG/DL — SIGNIFICANT CHANGE UP (ref 70–99)
HCT VFR BLD CALC: 37.9 % — SIGNIFICANT CHANGE UP (ref 37–47)
HGB BLD-MCNC: 13.3 G/DL — SIGNIFICANT CHANGE UP (ref 12–16)
IMM GRANULOCYTES NFR BLD AUTO: 0.2 % — SIGNIFICANT CHANGE UP (ref 0.1–0.3)
LYMPHOCYTES # BLD AUTO: 1.34 K/UL — SIGNIFICANT CHANGE UP (ref 1.2–3.4)
LYMPHOCYTES # BLD AUTO: 30.2 % — SIGNIFICANT CHANGE UP (ref 20.5–51.1)
MAGNESIUM SERPL-MCNC: 1.5 MG/DL — LOW (ref 1.8–2.4)
MCHC RBC-ENTMCNC: 31.9 PG — HIGH (ref 27–31)
MCHC RBC-ENTMCNC: 35.1 G/DL — SIGNIFICANT CHANGE UP (ref 32–37)
MCV RBC AUTO: 90.9 FL — SIGNIFICANT CHANGE UP (ref 81–99)
MONOCYTES # BLD AUTO: 0.44 K/UL — SIGNIFICANT CHANGE UP (ref 0.1–0.6)
MONOCYTES NFR BLD AUTO: 9.9 % — HIGH (ref 1.7–9.3)
NEUTROPHILS # BLD AUTO: 2.61 K/UL — SIGNIFICANT CHANGE UP (ref 1.4–6.5)
NEUTROPHILS NFR BLD AUTO: 58.7 % — SIGNIFICANT CHANGE UP (ref 42.2–75.2)
NRBC # BLD: 0 /100 WBCS — SIGNIFICANT CHANGE UP (ref 0–0)
PLATELET # BLD AUTO: 208 K/UL — SIGNIFICANT CHANGE UP (ref 130–400)
PMV BLD: 9.5 FL — SIGNIFICANT CHANGE UP (ref 7.4–10.4)
POTASSIUM SERPL-MCNC: 3.5 MMOL/L — SIGNIFICANT CHANGE UP (ref 3.5–5)
POTASSIUM SERPL-SCNC: 3.5 MMOL/L — SIGNIFICANT CHANGE UP (ref 3.5–5)
PROT SERPL-MCNC: 6.5 G/DL — SIGNIFICANT CHANGE UP (ref 6–8)
RBC # BLD: 4.17 M/UL — LOW (ref 4.2–5.4)
RBC # FLD: 13.1 % — SIGNIFICANT CHANGE UP (ref 11.5–14.5)
SODIUM SERPL-SCNC: 139 MMOL/L — SIGNIFICANT CHANGE UP (ref 135–146)
VIT B12 SERPL-MCNC: 396 PG/ML — SIGNIFICANT CHANGE UP (ref 232–1245)
WBC # BLD: 4.44 K/UL — LOW (ref 4.8–10.8)
WBC # FLD AUTO: 4.44 K/UL — LOW (ref 4.8–10.8)

## 2024-04-14 PROCEDURE — 99232 SBSQ HOSP IP/OBS MODERATE 35: CPT

## 2024-04-14 RX ORDER — MAGNESIUM SULFATE 500 MG/ML
2 VIAL (ML) INJECTION ONCE
Refills: 0 | Status: COMPLETED | OUTPATIENT
Start: 2024-04-14 | End: 2024-04-14

## 2024-04-14 RX ORDER — AMLODIPINE BESYLATE 2.5 MG/1
5 TABLET ORAL DAILY
Refills: 0 | Status: DISCONTINUED | OUTPATIENT
Start: 2024-04-14 | End: 2024-04-17

## 2024-04-14 RX ORDER — HYDRALAZINE HCL 50 MG
10 TABLET ORAL ONCE
Refills: 0 | Status: COMPLETED | OUTPATIENT
Start: 2024-04-14 | End: 2024-04-14

## 2024-04-14 RX ORDER — LANOLIN ALCOHOL/MO/W.PET/CERES
5 CREAM (GRAM) TOPICAL AT BEDTIME
Refills: 0 | Status: DISCONTINUED | OUTPATIENT
Start: 2024-04-14 | End: 2024-04-17

## 2024-04-14 RX ORDER — GUAIFENESIN/DEXTROMETHORPHAN 600MG-30MG
10 TABLET, EXTENDED RELEASE 12 HR ORAL EVERY 8 HOURS
Refills: 0 | Status: DISCONTINUED | OUTPATIENT
Start: 2024-04-14 | End: 2024-04-17

## 2024-04-14 RX ADMIN — Medication 5 MILLIGRAM(S): at 22:06

## 2024-04-14 RX ADMIN — ENOXAPARIN SODIUM 40 MILLIGRAM(S): 100 INJECTION SUBCUTANEOUS at 17:25

## 2024-04-14 RX ADMIN — Medication 25 MILLIGRAM(S): at 17:25

## 2024-04-14 RX ADMIN — LEVETIRACETAM 250 MILLIGRAM(S): 250 TABLET, FILM COATED ORAL at 06:19

## 2024-04-14 RX ADMIN — AMLODIPINE BESYLATE 5 MILLIGRAM(S): 2.5 TABLET ORAL at 13:52

## 2024-04-14 RX ADMIN — LEVETIRACETAM 250 MILLIGRAM(S): 250 TABLET, FILM COATED ORAL at 17:25

## 2024-04-14 RX ADMIN — Medication 10 MILLILITER(S): at 10:09

## 2024-04-14 RX ADMIN — Medication 10 MILLIGRAM(S): at 10:09

## 2024-04-14 RX ADMIN — Medication 10 MILLILITER(S): at 02:32

## 2024-04-14 RX ADMIN — Medication 10 MILLILITER(S): at 17:25

## 2024-04-14 RX ADMIN — Medication 650 MILLIGRAM(S): at 21:10

## 2024-04-14 RX ADMIN — PANTOPRAZOLE SODIUM 40 MILLIGRAM(S): 20 TABLET, DELAYED RELEASE ORAL at 06:19

## 2024-04-14 RX ADMIN — Medication 25 GRAM(S): at 13:52

## 2024-04-14 RX ADMIN — Medication 25 MILLIGRAM(S): at 06:19

## 2024-04-14 RX ADMIN — ATORVASTATIN CALCIUM 80 MILLIGRAM(S): 80 TABLET, FILM COATED ORAL at 21:09

## 2024-04-14 NOTE — PROGRESS NOTE ADULT - ASSESSMENT
58 year old female PMHx HTN, HLD, Seizure disorder (last seizure 15 years ago), Anxiety, Depression, brought into the ED by  for 1 week of altered mental status.  states pt has been having "bizarre behaviour" and confusion for the last week. Pt reports abdominal pain for the past few days but no other complaints. Admit to Medicine for AMS work-up.    # Altered mental status likely metabolic encephalopathy (improving),    suspected polypharmacy   - UA 4/12 neg  - CXR 4/12 normal  - Blood alcohol <10  - CTA head/neck 4/12: 1.  No evidence of acute large vessel occlusion. 2.  Atheromatous changes notable for up to about 50% stenosis of the left  ICA origin and moderate stenoses of bilateral carotid siphons.  - CT head 4/12: No evidence of acute intracranial hemorrhage or large territory infarct.  - Seen by neurology 4/12:  rEEG,   - EEG 4/12: 1. Mild nonspecific diffuse or multifocal cerebral dysfunction. 2. No epileptiform abnormalities were recorded.  - Hold home meds (Fioricet, Cyclobenzaprine, Tizanidine Tramadol) until mental status improves  - MR Head w/wo IV Cont (04.13.24 @ 08:27): No acute intracranial pathology or abnormal enhancement. Slightly progressed mild chronic microvascular ischemic changes since the prior brain MRI from 3/30/2014.  -  urine culture/  blood culture > No growth   -  Utox  >> Neg   -  TSH 1.53     # Abdominal pain   # IBS  - CTAP: No acute abdominopelvic abnormality  - monitor BMs  >> No abd pain at this time     # Chronic lower back pain  - home meds: Fioricet 3 tab/day, Tramadol 200mg/day, Cyclobenzaprine 30 mg/day, Tizanidine 4mg bid  - on hold until mental status improves     # Seizure Disorder  - c/w Keppra 250mg BID    # Anxiety  # Depression  - home meds: Clonazepam 2 mg  - c/w 1mg BID PRN; resume home dose when mental status back to baseline     # HTN  - c/w Metoprolol 25mg BID     # HLD  - c/w Atorvastatin 80mg      #DVT PPX: Lovenox  #GI PPX: Pantoprazole  #Diet: Was NPO for AMS, can start diet today if better mental status  #Activity Order: As tolerated  #Dispo: from home    #Handoff  Pending: Mental status improvement/ ? PT   Possible dc in 24 hrs   Dispo: Home

## 2024-04-15 ENCOUNTER — TRANSCRIPTION ENCOUNTER (OUTPATIENT)
Age: 59
End: 2024-04-15

## 2024-04-15 LAB
ALBUMIN SERPL ELPH-MCNC: 4.5 G/DL — SIGNIFICANT CHANGE UP (ref 3.5–5.2)
ALBUMIN SERPL ELPH-MCNC: 5 G/DL — SIGNIFICANT CHANGE UP (ref 3.5–5.2)
ALP SERPL-CCNC: 134 U/L — HIGH (ref 30–115)
ALP SERPL-CCNC: 142 U/L — HIGH (ref 30–115)
ALT FLD-CCNC: 19 U/L — SIGNIFICANT CHANGE UP (ref 0–41)
ALT FLD-CCNC: 21 U/L — SIGNIFICANT CHANGE UP (ref 0–41)
ANION GAP SERPL CALC-SCNC: 18 MMOL/L — HIGH (ref 7–14)
ANION GAP SERPL CALC-SCNC: 27 MMOL/L — HIGH (ref 7–14)
AST SERPL-CCNC: 24 U/L — SIGNIFICANT CHANGE UP (ref 0–41)
AST SERPL-CCNC: 26 U/L — SIGNIFICANT CHANGE UP (ref 0–41)
BASOPHILS # BLD AUTO: 0.02 K/UL — SIGNIFICANT CHANGE UP (ref 0–0.2)
BASOPHILS # BLD AUTO: 0.03 K/UL — SIGNIFICANT CHANGE UP (ref 0–0.2)
BASOPHILS NFR BLD AUTO: 0.3 % — SIGNIFICANT CHANGE UP (ref 0–1)
BASOPHILS NFR BLD AUTO: 0.3 % — SIGNIFICANT CHANGE UP (ref 0–1)
BILIRUB SERPL-MCNC: 0.3 MG/DL — SIGNIFICANT CHANGE UP (ref 0.2–1.2)
BILIRUB SERPL-MCNC: 0.5 MG/DL — SIGNIFICANT CHANGE UP (ref 0.2–1.2)
BUN SERPL-MCNC: 11 MG/DL — SIGNIFICANT CHANGE UP (ref 10–20)
BUN SERPL-MCNC: 12 MG/DL — SIGNIFICANT CHANGE UP (ref 10–20)
CALCIUM SERPL-MCNC: 9.8 MG/DL — SIGNIFICANT CHANGE UP (ref 8.4–10.5)
CALCIUM SERPL-MCNC: 9.9 MG/DL — SIGNIFICANT CHANGE UP (ref 8.4–10.5)
CHLORIDE SERPL-SCNC: 102 MMOL/L — SIGNIFICANT CHANGE UP (ref 98–110)
CHLORIDE SERPL-SCNC: 103 MMOL/L — SIGNIFICANT CHANGE UP (ref 98–110)
CO2 SERPL-SCNC: 13 MMOL/L — LOW (ref 17–32)
CO2 SERPL-SCNC: 20 MMOL/L — SIGNIFICANT CHANGE UP (ref 17–32)
CREAT SERPL-MCNC: 0.6 MG/DL — LOW (ref 0.7–1.5)
CREAT SERPL-MCNC: 0.8 MG/DL — SIGNIFICANT CHANGE UP (ref 0.7–1.5)
EGFR: 103 ML/MIN/1.73M2 — SIGNIFICANT CHANGE UP
EGFR: 85 ML/MIN/1.73M2 — SIGNIFICANT CHANGE UP
EOSINOPHIL # BLD AUTO: 0.03 K/UL — SIGNIFICANT CHANGE UP (ref 0–0.7)
EOSINOPHIL # BLD AUTO: 0.04 K/UL — SIGNIFICANT CHANGE UP (ref 0–0.7)
EOSINOPHIL NFR BLD AUTO: 0.4 % — SIGNIFICANT CHANGE UP (ref 0–8)
EOSINOPHIL NFR BLD AUTO: 0.5 % — SIGNIFICANT CHANGE UP (ref 0–8)
GAS PNL BLDA: SIGNIFICANT CHANGE UP
GLUCOSE BLDC GLUCOMTR-MCNC: 153 MG/DL — HIGH (ref 70–99)
GLUCOSE SERPL-MCNC: 106 MG/DL — HIGH (ref 70–99)
GLUCOSE SERPL-MCNC: 154 MG/DL — HIGH (ref 70–99)
HCT VFR BLD CALC: 38.8 % — SIGNIFICANT CHANGE UP (ref 37–47)
HCT VFR BLD CALC: 44.9 % — SIGNIFICANT CHANGE UP (ref 37–47)
HGB BLD-MCNC: 13.9 G/DL — SIGNIFICANT CHANGE UP (ref 12–16)
HGB BLD-MCNC: 15 G/DL — SIGNIFICANT CHANGE UP (ref 12–16)
IMM GRANULOCYTES NFR BLD AUTO: 0.2 % — SIGNIFICANT CHANGE UP (ref 0.1–0.3)
IMM GRANULOCYTES NFR BLD AUTO: 0.3 % — SIGNIFICANT CHANGE UP (ref 0.1–0.3)
LYMPHOCYTES # BLD AUTO: 1.64 K/UL — SIGNIFICANT CHANGE UP (ref 1.2–3.4)
LYMPHOCYTES # BLD AUTO: 26 % — SIGNIFICANT CHANGE UP (ref 20.5–51.1)
LYMPHOCYTES # BLD AUTO: 3.93 K/UL — HIGH (ref 1.2–3.4)
LYMPHOCYTES # BLD AUTO: 35.7 % — SIGNIFICANT CHANGE UP (ref 20.5–51.1)
MAGNESIUM SERPL-MCNC: 1.6 MG/DL — LOW (ref 1.8–2.4)
MAGNESIUM SERPL-MCNC: 2.2 MG/DL — SIGNIFICANT CHANGE UP (ref 1.8–2.4)
MCHC RBC-ENTMCNC: 32.1 PG — HIGH (ref 27–31)
MCHC RBC-ENTMCNC: 32.6 PG — HIGH (ref 27–31)
MCHC RBC-ENTMCNC: 33.4 G/DL — SIGNIFICANT CHANGE UP (ref 32–37)
MCHC RBC-ENTMCNC: 35.8 G/DL — SIGNIFICANT CHANGE UP (ref 32–37)
MCV RBC AUTO: 91.1 FL — SIGNIFICANT CHANGE UP (ref 81–99)
MCV RBC AUTO: 96.1 FL — SIGNIFICANT CHANGE UP (ref 81–99)
MONOCYTES # BLD AUTO: 0.64 K/UL — HIGH (ref 0.1–0.6)
MONOCYTES # BLD AUTO: 1.28 K/UL — HIGH (ref 0.1–0.6)
MONOCYTES NFR BLD AUTO: 10.1 % — HIGH (ref 1.7–9.3)
MONOCYTES NFR BLD AUTO: 11.6 % — HIGH (ref 1.7–9.3)
NEUTROPHILS # BLD AUTO: 3.96 K/UL — SIGNIFICANT CHANGE UP (ref 1.4–6.5)
NEUTROPHILS # BLD AUTO: 5.72 K/UL — SIGNIFICANT CHANGE UP (ref 1.4–6.5)
NEUTROPHILS NFR BLD AUTO: 51.8 % — SIGNIFICANT CHANGE UP (ref 42.2–75.2)
NEUTROPHILS NFR BLD AUTO: 62.8 % — SIGNIFICANT CHANGE UP (ref 42.2–75.2)
NRBC # BLD: 0 /100 WBCS — SIGNIFICANT CHANGE UP (ref 0–0)
NRBC # BLD: 0 /100 WBCS — SIGNIFICANT CHANGE UP (ref 0–0)
PLATELET # BLD AUTO: 241 K/UL — SIGNIFICANT CHANGE UP (ref 130–400)
PLATELET # BLD AUTO: 257 K/UL — SIGNIFICANT CHANGE UP (ref 130–400)
PMV BLD: 9.7 FL — SIGNIFICANT CHANGE UP (ref 7.4–10.4)
PMV BLD: 9.7 FL — SIGNIFICANT CHANGE UP (ref 7.4–10.4)
POTASSIUM SERPL-MCNC: 3.4 MMOL/L — LOW (ref 3.5–5)
POTASSIUM SERPL-MCNC: 3.5 MMOL/L — SIGNIFICANT CHANGE UP (ref 3.5–5)
POTASSIUM SERPL-SCNC: 3.4 MMOL/L — LOW (ref 3.5–5)
POTASSIUM SERPL-SCNC: 3.5 MMOL/L — SIGNIFICANT CHANGE UP (ref 3.5–5)
PROT SERPL-MCNC: 7.4 G/DL — SIGNIFICANT CHANGE UP (ref 6–8)
PROT SERPL-MCNC: 8 G/DL — SIGNIFICANT CHANGE UP (ref 6–8)
RBC # BLD: 4.26 M/UL — SIGNIFICANT CHANGE UP (ref 4.2–5.4)
RBC # BLD: 4.67 M/UL — SIGNIFICANT CHANGE UP (ref 4.2–5.4)
RBC # FLD: 13 % — SIGNIFICANT CHANGE UP (ref 11.5–14.5)
RBC # FLD: 13.2 % — SIGNIFICANT CHANGE UP (ref 11.5–14.5)
SODIUM SERPL-SCNC: 141 MMOL/L — SIGNIFICANT CHANGE UP (ref 135–146)
SODIUM SERPL-SCNC: 142 MMOL/L — SIGNIFICANT CHANGE UP (ref 135–146)
TROPONIN T, HIGH SENSITIVITY RESULT: 6 NG/L — SIGNIFICANT CHANGE UP (ref 6–13)
WBC # BLD: 11.02 K/UL — HIGH (ref 4.8–10.8)
WBC # BLD: 6.31 K/UL — SIGNIFICANT CHANGE UP (ref 4.8–10.8)
WBC # FLD AUTO: 11.02 K/UL — HIGH (ref 4.8–10.8)
WBC # FLD AUTO: 6.31 K/UL — SIGNIFICANT CHANGE UP (ref 4.8–10.8)

## 2024-04-15 PROCEDURE — 70450 CT HEAD/BRAIN W/O DYE: CPT | Mod: 26

## 2024-04-15 PROCEDURE — 99232 SBSQ HOSP IP/OBS MODERATE 35: CPT

## 2024-04-15 PROCEDURE — 93010 ELECTROCARDIOGRAM REPORT: CPT

## 2024-04-15 RX ORDER — LEVETIRACETAM 250 MG/1
500 TABLET, FILM COATED ORAL EVERY 12 HOURS
Refills: 0 | Status: DISCONTINUED | OUTPATIENT
Start: 2024-04-15 | End: 2024-04-15

## 2024-04-15 RX ORDER — LEVETIRACETAM 250 MG/1
1000 TABLET, FILM COATED ORAL ONCE
Refills: 0 | Status: DISCONTINUED | OUTPATIENT
Start: 2024-04-15 | End: 2024-04-15

## 2024-04-15 RX ORDER — MAGNESIUM SULFATE 500 MG/ML
2 VIAL (ML) INJECTION ONCE
Refills: 0 | Status: COMPLETED | OUTPATIENT
Start: 2024-04-15 | End: 2024-04-15

## 2024-04-15 RX ORDER — TIZANIDINE 4 MG/1
2 TABLET ORAL
Refills: 0 | DISCHARGE

## 2024-04-15 RX ORDER — CHLORHEXIDINE GLUCONATE 213 G/1000ML
1 SOLUTION TOPICAL
Refills: 0 | Status: DISCONTINUED | OUTPATIENT
Start: 2024-04-15 | End: 2024-04-17

## 2024-04-15 RX ORDER — AMLODIPINE BESYLATE 2.5 MG/1
1 TABLET ORAL
Qty: 30 | Refills: 1
Start: 2024-04-15 | End: 2024-06-13

## 2024-04-15 RX ORDER — POTASSIUM CHLORIDE 20 MEQ
20 PACKET (EA) ORAL ONCE
Refills: 0 | Status: COMPLETED | OUTPATIENT
Start: 2024-04-15 | End: 2024-04-15

## 2024-04-15 RX ORDER — LEVETIRACETAM 250 MG/1
1000 TABLET, FILM COATED ORAL ONCE
Refills: 0 | Status: COMPLETED | OUTPATIENT
Start: 2024-04-15 | End: 2024-04-15

## 2024-04-15 RX ORDER — LEVETIRACETAM 250 MG/1
500 TABLET, FILM COATED ORAL
Refills: 0 | Status: DISCONTINUED | OUTPATIENT
Start: 2024-04-15 | End: 2024-04-17

## 2024-04-15 RX ADMIN — Medication 5 MILLIGRAM(S): at 21:24

## 2024-04-15 RX ADMIN — Medication 25 GRAM(S): at 10:03

## 2024-04-15 RX ADMIN — Medication 10 MILLILITER(S): at 10:01

## 2024-04-15 RX ADMIN — ATORVASTATIN CALCIUM 80 MILLIGRAM(S): 80 TABLET, FILM COATED ORAL at 21:24

## 2024-04-15 RX ADMIN — Medication 2 MILLIGRAM(S): at 18:22

## 2024-04-15 RX ADMIN — AMLODIPINE BESYLATE 5 MILLIGRAM(S): 2.5 TABLET ORAL at 05:49

## 2024-04-15 RX ADMIN — Medication 10 MILLILITER(S): at 02:35

## 2024-04-15 RX ADMIN — PANTOPRAZOLE SODIUM 40 MILLIGRAM(S): 20 TABLET, DELAYED RELEASE ORAL at 05:48

## 2024-04-15 RX ADMIN — Medication 650 MILLIGRAM(S): at 23:40

## 2024-04-15 RX ADMIN — Medication 25 MILLIGRAM(S): at 18:37

## 2024-04-15 RX ADMIN — Medication 20 MILLIEQUIVALENT(S): at 10:03

## 2024-04-15 RX ADMIN — LEVETIRACETAM 250 MILLIGRAM(S): 250 TABLET, FILM COATED ORAL at 05:48

## 2024-04-15 RX ADMIN — SENNA PLUS 2 TABLET(S): 8.6 TABLET ORAL at 21:24

## 2024-04-15 RX ADMIN — Medication 25 MILLIGRAM(S): at 05:48

## 2024-04-15 RX ADMIN — Medication 1 MILLIGRAM(S): at 21:27

## 2024-04-15 RX ADMIN — LEVETIRACETAM 400 MILLIGRAM(S): 250 TABLET, FILM COATED ORAL at 18:37

## 2024-04-15 NOTE — PHYSICAL THERAPY INITIAL EVALUATION ADULT - ADDITIONAL COMMENTS
Pt lives in apt, + elevator (6th floor). Pt indep with amb and ADL's. Pt reports she has a SC but doesn't use much.

## 2024-04-15 NOTE — PROGRESS NOTE ADULT - ASSESSMENT
58 year old female PMHx HTN, HLD, Seizure disorder (last seizure 15 years ago), Anxiety, Depression, brought into the ED by  for 1 week of altered mental status.  states pt has been having "bizarre behaviour" and confusion for the last week. Pt reports abdominal pain for the past few days but no other complaints. Admit to Medicine for AMS work-up.    # Altered mental status likely metabolic encephalopathy (improving),    suspected polypharmacy   - UA 4/12 neg  - CXR 4/12 normal  - Blood alcohol <10  - CTA head/neck 4/12: 1.  No evidence of acute large vessel occlusion. 2.  Atheromatous changes notable for up to about 50% stenosis of the left  ICA origin and moderate stenoses of bilateral carotid siphons.  - CT head 4/12: No evidence of acute intracranial hemorrhage or large territory infarct.  - Seen by neurology 4/12:  rEEG,   - EEG 4/12: 1. Mild nonspecific diffuse or multifocal cerebral dysfunction. 2. No epileptiform abnormalities were recorded.  - Hold home meds (Fioricet, Cyclobenzaprine, Tizanidine Tramadol) until mental status improves  - MR Head w/wo IV Cont (04.13.24 @ 08:27): No acute intracranial pathology or abnormal enhancement. Slightly progressed mild chronic microvascular ischemic changes since the prior brain MRI from 3/30/2014.  -  urine culture/  blood culture > No growth   -  Utox  >> Neg   -  TSH 1.53   - Clinical pharmacologist recs noted.     # Abdominal pain   # IBS  - CTAP: No acute abdominopelvic abnormality  - monitor BMs  >> No abd pain at this time     # Chronic lower back pain  - home meds: Fioricet 3 tab/day, Tramadol 200mg/day, Cyclobenzaprine 30 mg/day, Tizanidine 4mg bid  - on hold until mental status improves     # Seizure Disorder  - c/w Keppra 250mg BID    # Anxiety  # Depression  - home meds: Clonazepam 2 mg  - c/w 1mg BID PRN; resume home dose when mental status back to baseline     # HTN  - c/w Metoprolol 25mg BID     # HLD  - c/w Atorvastatin 80mg      #DVT PPX: Lovenox  #GI PPX: Pantoprazole  #Diet: Was NPO for AMS, can start diet today if better mental status  #Activity Order: As tolerated  #Dispo: from home    #Handoff  DC planning today,  Dispo: Home

## 2024-04-15 NOTE — CHART NOTE - NSCHARTNOTEFT_GEN_A_CORE
REEG and MRI results noted, no acute abnormal changes. Her AMS is improving and her conditions was most likely related to polypharmacy with sedating medications in the settings of some alcohol use.     Recommendations:   - no neurological intervention at this point  - Clinical pharmacologist consult for optimization her home medications that may cause oversedation  - c/w primary team management plan      Discussed w attending

## 2024-04-15 NOTE — RAPID RESPONSE TEAM SUMMARY - NSOTHERINTERVENTIONSRRT_GEN_ALL_CORE
ABG and CT head non con with Ativan 2mg IV ordered. Labs (CBC, BMP, Mg, keppra levels sent). Added ativan 2mg q4 PRN for seizure. Standing Keppra dose increased from 250 to 500 mg PO BID. Neurology was consulted.    Patient was transported back into the bed and seizure broke after Ativan and Keppra.  ABG and CT head non con with Ativan 2mg IV ordered. Labs (CBC, BMP, Mg, keppra levels sent). Added ativan 2mg q4 PRN for seizure. Standing Keppra dose increased from 250 to 500 mg PO BID. Neurology was consulted. Patient placed on seizure precautions.    Patient was transported back into the bed and seizure broke after Ativan and Keppra.  ABG and CT head non con with Ativan 2mg IV ordered. Labs (CBC, BMP, Mg, keppra levels sent). Added ativan 2mg q4 PRN for seizure. Standing Keppra dose increased from 250 to 500 mg PO BID. Neurology was consulted. Patient placed on seizure  and falls precautions. Pending EEG, CT head completion. Continue close neuro assessments     Patient was transported back into the bed and seizure broke after Ativan and Keppra. Patient's mentation was back to baseline function.

## 2024-04-15 NOTE — PHYSICAL THERAPY INITIAL EVALUATION ADULT - FOLLOWS COMMANDS/ANSWERS QUESTIONS, REHAB EVAL
pt would stutter her words at times and needed extra time to find words to express, however inconsistently/100% of the time

## 2024-04-15 NOTE — DISCHARGE NOTE PROVIDER - HOSPITAL COURSE
58 year old female PMHx HTN, HLD, Seizure disorder (last seizure 15 years ago), Anxiety, Depression, brought into the ED by  for 1 week of altered mental status.  states pt has been having "bizarre behaviour" and confusion for the last week. Pt reports abdominal pain for the past few days but no other complaints. Denies fever, headache, neck pain, chest pain, N/B/D, dysuria.    T 98, HR 95, /84, RR 18, 94% on RA  WBC 4, Hgb 11.5, K 5.2    CTA head/neck: 1.  No evidence of acute large vessel occlusion. 2.  Atheromatous changes notable for up to about 50% stenosis of the left  ICA origin and moderate stenoses of bilateral carotid siphons.  CTAP: No acute abdominopelvic abnormality.  CT head: No evidence of acute intracranial hemorrhage or large territory infarct.  CXR: No radiographic evidence of acute cardiopulmonary disease.  Admit to Medicine     Hospital Course  # Altered mental status likely metabolic encephalopathy (improving),    suspected polypharmacy   - UA 4/12 neg  - CXR 4/12 normal  - Blood alcohol <10  - CTA head/neck 4/12: 1.  No evidence of acute large vessel occlusion. 2.  Atheromatous changes notable for up to about 50% stenosis of the left  ICA origin and moderate stenoses of bilateral carotid siphons.  - CT head 4/12: No evidence of acute intracranial hemorrhage or large territory infarct.  - Seen by neurology 4/12:  rEEG,   - EEG 4/12: 1. Mild nonspecific diffuse or multifocal cerebral dysfunction. 2. No epileptiform abnormalities were recorded.  - Hold home meds (Fioricet, Cyclobenzaprine, Tizanidine Tramadol) until mental status improves  - MR Head w/wo IV Cont (04.13.24 @ 08:27): No acute intracranial pathology or abnormal enhancement. Slightly progressed mild chronic microvascular ischemic changes since the prior brain MRI from 3/30/2014.  -  urine culture/  blood culture > No growth   -  Utox  >> Neg   -  TSH 1.53     # Abdominal pain   # IBS  - CTAP: No acute abdominopelvic abnormality  - monitor BMs  >> No abd pain at this time     # Chronic lower back pain  - home meds: Fioricet 3 tab/day, Tramadol 200mg/day, Cyclobenzaprine 30 mg/day, Tizanidine 4mg bid  - on hold until mental status improves     # Seizure Disorder  - c/w Keppra 250mg BID    # Anxiety  # Depression  - home meds: Clonazepam 2 mg  - c/w 1mg BID PRN; resume home dose when mental status back to baseline     # HTN  - c/w Metoprolol 25mg BID   - started amlodipine 5mg PO daily    # HLD  - c/w Atorvastatin 80mg    Patient is clinically stable for discharge. 58 year old female PMHx HTN, HLD, Seizure disorder (last seizure 15 years ago), Anxiety, Depression, brought into the ED by  for 1 week of altered mental status.  states pt has been having "bizarre behaviour" and confusion for the last week. Pt reports abdominal pain for the past few days but no other complaints. Denies fever, headache, neck pain, chest pain, N/B/D, dysuria.    T 98, HR 95, /84, RR 18, 94% on RA  WBC 4, Hgb 11.5, K 5.2    CTA head/neck: 1.  No evidence of acute large vessel occlusion. 2.  Atheromatous changes notable for up to about 50% stenosis of the left  ICA origin and moderate stenoses of bilateral carotid siphons.  CTAP: No acute abdominopelvic abnormality.  CT head: No evidence of acute intracranial hemorrhage or large territory infarct.  CXR: No radiographic evidence of acute cardiopulmonary disease.  Admit to Medicine     Hospital Course  # Altered mental status likely metabolic encephalopathy - resolved  - suspected polypharmacy   - UA 4/12 neg  - Bcx and Ucx: NGTD   - Utox: Neg   - TSH 1.53   - CXR 4/12 normal  - Blood alcohol <10  - CTA head/neck 4/12: 1.  No evidence of acute large vessel occlusion. 2. Atheromatous changes notable for up to about 50% stenosis of the left  ICA origin and moderate stenoses of bilateral carotid siphons.  - CT head 4/12: No evidence of acute intracranial hemorrhage or large territory infarct.  - Seen by neurology   - EEG 4/12: 1. Mild nonspecific diffuse or multifocal cerebral dysfunction. 2. No epileptiform abnormalities were recorded.  - MR Head w/wo IV Cont (04.13.24 @ 08:27): No acute intracranial pathology or abnormal enhancement. Slightly progressed mild chronic microvascular ischemic changes since the prior brain MRI from 3/30/2014  - Holding home meds (Fioricet, Cyclobenzaprine, Tizanidine Tramadol). Stop Tizanidine on d/c    # Seizure Disorder  - 4/15: RR for seizure. s/p Loading dose Keppra and Ativan 2mg IV. See note for details  - CTH 4/15: No evidence of acute intracranial pathology. Stable exam  - EEG 4/13: no seizure  - increased Keppra 250mg BID to 500mg BID  - started Ativan 2mg IV PRN for seizure   - repeat EEG  - neuro recs appreciated. Likely due to benzo withdraw    # Abdominal pain - resolved  # IBS  - CTAP: No acute abdominopelvic abnormality  - monitor BMs    # Chronic lower back pain  - home meds: Fioricet 3 tab/day, Tramadol 200mg/day, Cyclobenzaprine 30 mg/day, Tizanidine 4mg bid  - on hold until mental status improves. Stop Tizanidine on d/c    # Anxiety  # Depression  - home meds: Clonazepam 2 mg  - change Clonazepam 1mg BID PRN to 2mg TID (home dose). Was lowered in setting of AMS    # HTN  - c/w Metoprolol 25mg BID   - started amlodipine 5mg PO daily    # HLD  - c/w Atorvastatin 80mg      Patient is clinically stable for discharge.

## 2024-04-15 NOTE — DISCHARGE NOTE PROVIDER - NSDCCPCAREPLAN_GEN_ALL_CORE_FT
PRINCIPAL DISCHARGE DIAGNOSIS  Diagnosis: Altered mental status  Assessment and Plan of Treatment: You came to the hospital for altered mental status. All workup, imaging, and labs came back unremarkable. Your altered mental status is likely due to polypharmacy. Your mental status improved in the hospital and you are clincally stable for discharge. Please continue to take all of your medications as prescribed and follow up with your PCP.  Contact your healthcare provider if:  You have sudden changes in behavior.  You are more sleepy or confused than usual.  You have questions or concerns about your condition or care.  Seek care immediately or call 911 if:  Your speech is slurred or you are rambling.  You have a seizure.  You are not able to move any part of your body freely.  Someone close to you cannot wake you up.       PRINCIPAL DISCHARGE DIAGNOSIS  Diagnosis: Altered mental status  Assessment and Plan of Treatment: You came to the hospital for altered mental status. All workup, imaging, and labs came back unremarkable. Your altered mental status is likely due to polypharmacy. Your mental status improved in the hospital and you are clincally stable for discharge. Please continue to take all of your medications as prescribed and follow up with your PCP.  Contact your healthcare provider if:  You have sudden changes in behavior.  You are more sleepy or confused than usual.  You have questions or concerns about your condition or care.  Seek care immediately or call 911 if:  Your speech is slurred or you are rambling.  You have a seizure.  You are not able to move any part of your body freely.  Someone close to you cannot wake you up.        SECONDARY DISCHARGE DIAGNOSES  Diagnosis: Seizure  Assessment and Plan of Treatment: While in the hospital you had a seizure. Repeat EEG did not show active seizure. This was likely provoked by Klonopin withdraw. Neurology saw you again and recommended no changes to your medications. Please continue to take your home dose Klonopin and Keppra.

## 2024-04-15 NOTE — PHYSICAL THERAPY INITIAL EVALUATION ADULT - PERTINENT HX OF CURRENT PROBLEM, REHAB EVAL
58 year old female PMHx HTN, HLD, Seizure disorder (last seizure 15 years ago), Anxiety, Depression, brought into the ED by  for 1 week of altered mental status.  states pt has been having "bizarre behaviour" and confusion for the last week. Pt reports abdominal pain for the past few days but no other complaints. Denies fever, headache, neck pain, chest pain, N/B/D, dysuria.

## 2024-04-15 NOTE — PHYSICAL THERAPY INITIAL EVALUATION ADULT - GENERAL OBSERVATIONS, REHAB EVAL
13:15-13:32. Pt encountered semifowler in bed in NAD, 1:1 sit present at b/s, no complaints, agreeable to PT.

## 2024-04-15 NOTE — DISCHARGE NOTE PROVIDER - CARE PROVIDER_API CALL
Raghavendra Umana  [address] [address]  Phone: [phone]  Fax: [phone]  Established Patient  Follow Up Time: 2 weeks

## 2024-04-15 NOTE — RAPID RESPONSE TEAM SUMMARY - NSSITUATIONBACKGROUNDRRT_GEN_ALL_CORE
Patient was found to have fallen onto the bed after a scream was heard. Patient was not responsive, arms contracted, and shaking. Patient was in the progress of being discharged by the nurse.

## 2024-04-15 NOTE — DISCHARGE NOTE NURSING/CASE MANAGEMENT/SOCIAL WORK - PATIENT PORTAL LINK FT
You can access the FollowMyHealth Patient Portal offered by [facility] by registering at the following website: [email or web address]. By joining [employer]’s FollowMyHealth portal, you will also be able to view your health information using other applications (apps) compatible with our system.

## 2024-04-15 NOTE — DISCHARGE NOTE NURSING/CASE MANAGEMENT/SOCIAL WORK - NSDCPEFALRISK_GEN_ALL_CORE
For information on Fall & Injury Prevention, visit: [email or web address] OR  [email or web address] OR  https://www.cdc.gov/steadi/patient.html

## 2024-04-15 NOTE — DISCHARGE NOTE PROVIDER - NSDCFUSCHEDAPPT_GEN_ALL_CORE_FT
Valencia Moreno  [facility]  CARDIOLOGY [address]  Scheduled Appointment: 05/23/2024    Reggie Ortiz  [facility]  [address] [address]  Scheduled Appointment: 07/03/2024

## 2024-04-15 NOTE — DISCHARGE NOTE PROVIDER - NSDCMRMEDTOKEN_GEN_ALL_CORE_FT
atorvastatin 80 mg oral tablet: 1 tab(s) orally once a day  benzonatate 100 mg oral capsule: 1 cap(s) orally once a day  cyclobenzaprine 30 mg oral capsule, extended release: 1 cap(s) orally once a day  Fioricet oral tablet: 1 tab(s) orally 3 times a day, As Needed  KlonoPIN 2 mg oral tablet: 1 tab(s) orally 3 times a day  levETIRAcetam 250 mg oral tablet: 1 tab(s) orally 2 times a day  linaclotide 72 mcg oral capsule: 1 cap(s) orally once a day  METOPROLOL SUCCINATE ER 50MG ER TAB: tab(s) orally once a day  Narcan 4 mg/0.1 mL nasal spray: 4 milligram(s) intranasally once a day as needed for overdose  pantoprazole 40 mg oral delayed release tablet: 1 tab(s) orally once a day (before a meal)  Pepcid 40 mg oral tablet: orally 2 times a day  tiZANidine 4 mg oral tablet: 2 tab(s) orally 2 times a day  traMADol 50 mg oral tablet: 1 tab(s) orally every 6 hours MDD: 3 pills  Zofran 4 mg oral tablet: 1 tab(s) orally every 6 hours as needed for  nausea   amLODIPine 5 mg oral tablet: 1 tab(s) orally once a day  atorvastatin 80 mg oral tablet: 1 tab(s) orally once a day  benzonatate 100 mg oral capsule: 1 cap(s) orally once a day  cyclobenzaprine 30 mg oral capsule, extended release: 1 cap(s) orally once a day  Fioricet oral tablet: 1 tab(s) orally 3 times a day, As Needed  KlonoPIN 2 mg oral tablet: 1 tab(s) orally 3 times a day  levETIRAcetam 250 mg oral tablet: 1 tab(s) orally 2 times a day  linaclotide 72 mcg oral capsule: 1 cap(s) orally once a day  METOPROLOL SUCCINATE ER 50MG ER TAB: tab(s) orally once a day  Narcan 4 mg/0.1 mL nasal spray: 4 milligram(s) intranasally once a day as needed for overdose  pantoprazole 40 mg oral delayed release tablet: 1 tab(s) orally once a day (before a meal)  Pepcid 40 mg oral tablet: orally 2 times a day  traMADol 50 mg oral tablet: 1 tab(s) orally every 6 hours MDD: 3 pills  Zofran 4 mg oral tablet: 1 tab(s) orally every 6 hours as needed for  nausea

## 2024-04-16 LAB
ANION GAP SERPL CALC-SCNC: 18 MMOL/L — HIGH (ref 7–14)
BASOPHILS # BLD AUTO: 0.02 K/UL — SIGNIFICANT CHANGE UP (ref 0–0.2)
BASOPHILS NFR BLD AUTO: 0.3 % — SIGNIFICANT CHANGE UP (ref 0–1)
BUN SERPL-MCNC: 13 MG/DL — SIGNIFICANT CHANGE UP (ref 10–20)
CALCIUM SERPL-MCNC: 9.4 MG/DL — SIGNIFICANT CHANGE UP (ref 8.4–10.5)
CHLORIDE SERPL-SCNC: 104 MMOL/L — SIGNIFICANT CHANGE UP (ref 98–110)
CO2 SERPL-SCNC: 17 MMOL/L — SIGNIFICANT CHANGE UP (ref 17–32)
CREAT SERPL-MCNC: 0.6 MG/DL — LOW (ref 0.7–1.5)
EGFR: 103 ML/MIN/1.73M2 — SIGNIFICANT CHANGE UP
EOSINOPHIL # BLD AUTO: 0.03 K/UL — SIGNIFICANT CHANGE UP (ref 0–0.7)
EOSINOPHIL NFR BLD AUTO: 0.4 % — SIGNIFICANT CHANGE UP (ref 0–8)
GLUCOSE SERPL-MCNC: 133 MG/DL — HIGH (ref 70–99)
HCT VFR BLD CALC: 40.5 % — SIGNIFICANT CHANGE UP (ref 37–47)
HGB BLD-MCNC: 14.1 G/DL — SIGNIFICANT CHANGE UP (ref 12–16)
IMM GRANULOCYTES NFR BLD AUTO: 0.3 % — SIGNIFICANT CHANGE UP (ref 0.1–0.3)
LACTATE SERPL-SCNC: 0.7 MMOL/L — SIGNIFICANT CHANGE UP (ref 0.7–2)
LACTATE SERPL-SCNC: 1.1 MMOL/L — SIGNIFICANT CHANGE UP (ref 0.7–2)
LYMPHOCYTES # BLD AUTO: 1.71 K/UL — SIGNIFICANT CHANGE UP (ref 1.2–3.4)
LYMPHOCYTES # BLD AUTO: 25.2 % — SIGNIFICANT CHANGE UP (ref 20.5–51.1)
MAGNESIUM SERPL-MCNC: 2.2 MG/DL — SIGNIFICANT CHANGE UP (ref 1.8–2.4)
MCHC RBC-ENTMCNC: 32.5 PG — HIGH (ref 27–31)
MCHC RBC-ENTMCNC: 34.8 G/DL — SIGNIFICANT CHANGE UP (ref 32–37)
MCV RBC AUTO: 93.3 FL — SIGNIFICANT CHANGE UP (ref 81–99)
MONOCYTES # BLD AUTO: 0.78 K/UL — HIGH (ref 0.1–0.6)
MONOCYTES NFR BLD AUTO: 11.5 % — HIGH (ref 1.7–9.3)
NEUTROPHILS # BLD AUTO: 4.22 K/UL — SIGNIFICANT CHANGE UP (ref 1.4–6.5)
NEUTROPHILS NFR BLD AUTO: 62.3 % — SIGNIFICANT CHANGE UP (ref 42.2–75.2)
NRBC # BLD: 0 /100 WBCS — SIGNIFICANT CHANGE UP (ref 0–0)
PLATELET # BLD AUTO: 243 K/UL — SIGNIFICANT CHANGE UP (ref 130–400)
PMV BLD: 9.5 FL — SIGNIFICANT CHANGE UP (ref 7.4–10.4)
POTASSIUM SERPL-MCNC: 3.5 MMOL/L — SIGNIFICANT CHANGE UP (ref 3.5–5)
POTASSIUM SERPL-SCNC: 3.5 MMOL/L — SIGNIFICANT CHANGE UP (ref 3.5–5)
RBC # BLD: 4.34 M/UL — SIGNIFICANT CHANGE UP (ref 4.2–5.4)
RBC # FLD: 13.2 % — SIGNIFICANT CHANGE UP (ref 11.5–14.5)
SODIUM SERPL-SCNC: 139 MMOL/L — SIGNIFICANT CHANGE UP (ref 135–146)
WBC # BLD: 6.78 K/UL — SIGNIFICANT CHANGE UP (ref 4.8–10.8)
WBC # FLD AUTO: 6.78 K/UL — SIGNIFICANT CHANGE UP (ref 4.8–10.8)

## 2024-04-16 PROCEDURE — 99232 SBSQ HOSP IP/OBS MODERATE 35: CPT

## 2024-04-16 PROCEDURE — 95816 EEG AWAKE AND DROWSY: CPT | Mod: 26

## 2024-04-16 RX ORDER — CLONAZEPAM 1 MG
2 TABLET ORAL EVERY 8 HOURS
Refills: 0 | Status: DISCONTINUED | OUTPATIENT
Start: 2024-04-16 | End: 2024-04-17

## 2024-04-16 RX ORDER — CLONAZEPAM 1 MG
1 TABLET ORAL EVERY 12 HOURS
Refills: 0 | Status: DISCONTINUED | OUTPATIENT
Start: 2024-04-16 | End: 2024-04-16

## 2024-04-16 RX ADMIN — Medication 650 MILLIGRAM(S): at 06:04

## 2024-04-16 RX ADMIN — Medication 2 MILLIGRAM(S): at 21:02

## 2024-04-16 RX ADMIN — POLYETHYLENE GLYCOL 3350 17 GRAM(S): 17 POWDER, FOR SOLUTION ORAL at 05:50

## 2024-04-16 RX ADMIN — Medication 650 MILLIGRAM(S): at 00:00

## 2024-04-16 RX ADMIN — Medication 650 MILLIGRAM(S): at 06:24

## 2024-04-16 RX ADMIN — PANTOPRAZOLE SODIUM 40 MILLIGRAM(S): 20 TABLET, DELAYED RELEASE ORAL at 05:49

## 2024-04-16 RX ADMIN — Medication 1 MILLIGRAM(S): at 09:10

## 2024-04-16 RX ADMIN — ENOXAPARIN SODIUM 40 MILLIGRAM(S): 100 INJECTION SUBCUTANEOUS at 17:31

## 2024-04-16 RX ADMIN — Medication 25 MILLIGRAM(S): at 05:49

## 2024-04-16 RX ADMIN — SENNA PLUS 2 TABLET(S): 8.6 TABLET ORAL at 21:02

## 2024-04-16 RX ADMIN — Medication 5 MILLIGRAM(S): at 21:02

## 2024-04-16 RX ADMIN — Medication 2 MILLIGRAM(S): at 13:20

## 2024-04-16 RX ADMIN — Medication 10 MILLILITER(S): at 01:11

## 2024-04-16 RX ADMIN — AMLODIPINE BESYLATE 5 MILLIGRAM(S): 2.5 TABLET ORAL at 05:49

## 2024-04-16 RX ADMIN — LEVETIRACETAM 400 MILLIGRAM(S): 250 TABLET, FILM COATED ORAL at 05:49

## 2024-04-16 RX ADMIN — LEVETIRACETAM 400 MILLIGRAM(S): 250 TABLET, FILM COATED ORAL at 17:31

## 2024-04-16 RX ADMIN — ATORVASTATIN CALCIUM 80 MILLIGRAM(S): 80 TABLET, FILM COATED ORAL at 21:02

## 2024-04-16 RX ADMIN — Medication 25 MILLIGRAM(S): at 17:31

## 2024-04-16 RX ADMIN — CHLORHEXIDINE GLUCONATE 1 APPLICATION(S): 213 SOLUTION TOPICAL at 05:55

## 2024-04-16 NOTE — EEG REPORT - NS EEG TEXT BOX
[facility]   COMPREHENSIVE EPILEPSY CENTER   REPORT OF ROUTINE VIDEO EEG     [facility]: [address] [place] [address] , [address], [place] [address], Ph#: [phone]  [facility]: [id number], [place] [address], Ph#: [phone]  Office: [address] [place] [address], [place] [address], [place] [address] Ph#: [phone]    Patient Name: RAMÍREZ RODARTE  Age and : 58y ([birth year])  MRN #: [id number]  Location: [address]  Referring Physician: Amanda Gaspar    Study Date: 24    _____________________________________________________________  TECHNICAL INFORMATION    Placement and Labeling of Electrodes:  The EEG was performed utilizing 20 channels referential EEG connections (coronal over temporal over parasagittal montage) using all standard 10-20 electrode placements with EKG.  Recording was at a sampling rate of 256 samples per second per channel.  Time synchronized digital video recording was done simultaneously with EEG recording.  A low light infrared camera was used for low light recording.  Justyn and seizure detection algorithms were utilized.    _____________________________________________________________  HISTORY    Patient is a 58y old  Female    PERTINENT MEDICATION:  MEDICATIONS  (STANDING):  atorvastatin 80 milliGRAM(s) Oral at bedtime  enoxaparin Injectable 40 milliGRAM(s) SubCutaneous every 24 hours  levETIRAcetam 250 milliGRAM(s) Oral two times a day  metoprolol tartrate 25 milliGRAM(s) Oral two times a day  pantoprazole    Tablet 40 milliGRAM(s) Oral before breakfast    _____________________________________________________________  STUDY INTERPRETATION    Findings: The background was continuous, spontaneously variable and reactive. During wakefulness, the posterior dominant rhythm consisted of symmetric, well-modulated 6-7 Hz activity, with amplitude to 30 uV, that attenuated to eye opening.  Low amplitude frontal beta was noted in wakefulness.    Background Slowing:  -Slowing of PDR    Focal Slowing:   None was present.    Sleep Background:  Drowsiness was characterized by fragmentation, attenuation, and slowing of the background activity.      Other Non-Epileptiform Findings:  None were present.    Interictal Epileptiform Activity:   None were present.    Events:  Clinical events: None recorded.  Seizures: None recorded.    Artifacts:  Intermittent myogenic and movement artifacts were noted.    ECG:  The heart rate on single channel ECG was predominantly between 60-80 BPM.    _____________________________________________________________  EEG SUMMARY/CLASSIFICATION    Abnormal EEG in the awake, drowsy states.  -Generalized background slowing, mild  _____________________________________________________________  EEG IMPRESSION/CLINICAL CORRELATE    Abnormal EEG study.  1. Mild nonspecific diffuse or multifocal cerebral dysfunction.   2. No epileptiform abnormalities were recorded.    Ignacio Freeman MD  Neurology Attending Physician      
[facility] Department of Neurology  Inpatient Routine-EEG Report      Patient Name:	RAMÍREZ RODARTE  :	1965  MRN:	-  Study Date/Time:	4/15/2024, 8:00:27 PM  Referred by:	-    Brief Clinical History:  RAMÍREZ RODARTE is a 58 year old Female; study performed to investigate for seizures or markers of epilepsy.   Diagnosis Code: R56.9 convulsions/seizure    Patient Medication:  -    -    NORVASC    LIPITOR    LOVENOX    KEPPRA    ATIVAN    MELATONIN    LOPRESSOR    MIRALAX    TYLENOL    SENNA      Acquisition Details:  Electroencephalography was acquired using a minimum of 21 channels on an [employer] Neurology system v 9.3.1 with electrode placement according to the standard International 10-20 system following ACNS (American Clinical Neurophysiology Society) guidelines.  Anterior temporal T1 and T2 electrodes were utilized whenever possible.   The [employer] automated spike & seizure detections were all reviewed in detail, in addition to the entire raw EEG.    Findings:  Background:  continuous.   Voltage:  Normal (20uV)  Organization:  Appropriate anterior-posterior gradient  Posterior Dominant Rhythm:  9 Hz symmetric, well-organized, and well-modulated  Variability:  Yes	Reactivity:  Yes  Sleep:  Symmetric, synchronous spindles and K complexes.  Focal abnormalities:  No persistent asymmetries of voltage or frequency.  Interictal Activity:  None  Focal Slowing:  None  Generalized Slowing:  No  Events:  1)	No electrographic seizures or significant clinical events.  Provocations:  1)	Hyperventilation: was not performed.  2)	Photic stimulation: was not performed.  Impression:  Normal REEG    Clinical Correlation:  Normal study does not exclude diagnosis of seizure disorder    Shiloh Mckinley MD  Attending Neurologist, Division of Epilepsy

## 2024-04-16 NOTE — CHART NOTE - NSCHARTNOTEFT_GEN_A_CORE
Notified about GTCS yesterday lasting 1-2 min. Ativan and Keppra given. REEG - reviewed, no ictal pattern. Most likely provoked seizure 2/2 benzodiazepine withdrawal.     Recommendations (addition to consult note recommendations):   - c/w Home Klonopin  - maintain seizure precautions  - no other neurological interventions at this point Notified about GTCS yesterday lasting 1-2 min. Ativan and Keppra given. REEG - reviewed, no ictal pattern. Most likely provoked seizure 2/2 benzodiazepine withdrawal.     Recommendations (addition to consult note recommendations):   - c/w Home Klonopin  - d/c Keppra  - maintain seizure precautions  - no other neurological interventions at this point Notified about GTCS yesterday lasting 1-2 min. Ativan and Keppra given. REEG - reviewed, no ictal pattern. Most likely provoked seizure 2/2 benzodiazepine withdrawal.     Recommendations (addition to consult note recommendations):   - c/w Home Klonopin  - c/w Home Keppra  - maintain seizure precautions  - no other neurological interventions at this point

## 2024-04-16 NOTE — PROGRESS NOTE ADULT - ASSESSMENT
58 year old female PMHx HTN, HLD, Seizure disorder (last seizure 15 years ago), Anxiety, Depression, brought into the ED by  for 1 week of altered mental status.  states pt has been having "bizarre behaviour" and confusion for the last week. Pt reports abdominal pain for the past few days but no other complaints. Admit to Medicine for AMS work-up.    # Altered mental status likely metabolic encephalopathy - resolved  - suspected polypharmacy   - UA 4/12 neg  - Bcx and Ucx: NGTD   - Utox: Neg   - TSH 1.53   - CXR 4/12 normal  - Blood alcohol <10  - CTA head/neck 4/12: 1.  No evidence of acute large vessel occlusion. 2. Atheromatous changes notable for up to about 50% stenosis of the left  ICA origin and moderate stenoses of bilateral carotid siphons.  - CT head 4/12: No evidence of acute intracranial hemorrhage or large territory infarct.  - Seen by neurology   - EEG 4/12: 1. Mild nonspecific diffuse or multifocal cerebral dysfunction. 2. No epileptiform abnormalities were recorded.  - MR Head w/wo IV Cont (04.13.24 @ 08:27): No acute intracranial pathology or abnormal enhancement. Slightly progressed mild chronic microvascular ischemic changes since the prior brain MRI from 3/30/2014  - Holding home meds (Fioricet, Cyclobenzaprine, Tizanidine Tramadol). Stop Tizanidine on d/c    # Seizure Disorder  - 4/15: RR for seizure. s/p Loading dose Keppra and Ativan 2mg IV. See note for details  - CTH 4/15: No evidence of acute intracranial pathology. Stable exam  - EEG 4/13: no seizure  - increased Keppra 250mg BID to 500mg BID  - started Ativan 2mg IV PRN for seizure   - repeat EEG  - f/u neuro    # Abdominal pain - resolved  # IBS  - CTAP: No acute abdominopelvic abnormality  - monitor BMs    # Chronic lower back pain  - home meds: Fioricet 3 tab/day, Tramadol 200mg/day, Cyclobenzaprine 30 mg/day, Tizanidine 4mg bid  - on hold until mental status improves. Stop Tizanidine on d/c    # Anxiety  # Depression  - home meds: Clonazepam 2 mg  - c/w 1mg BID PRN; resume home dose when mental status back to baseline  - CONFIRM HOME DOSE CLONAZEPAM    # HTN  - c/w Metoprolol 25mg BID   - started amlodipine 5mg PO daily    # HLD  - c/w Atorvastatin 80mg    DVT ppx: Lovenox   GI ppx: PO protonix  Diet: DASH  Activity: IAT  Code: Full  Dispo: Acute 58 year old female PMHx HTN, HLD, Seizure disorder (last seizure 15 years ago), Anxiety, Depression, brought into the ED by  for 1 week of altered mental status.  states pt has been having "bizarre behaviour" and confusion for the last week. Pt reports abdominal pain for the past few days but no other complaints. Admit to Medicine for AMS work-up.    # Altered mental status likely metabolic encephalopathy - resolved  - suspected polypharmacy   - UA 4/12 neg  - Bcx and Ucx: NGTD   - Utox: Neg   - TSH 1.53   - CXR 4/12 normal  - Blood alcohol <10  - CTA head/neck 4/12: 1.  No evidence of acute large vessel occlusion. 2. Atheromatous changes notable for up to about 50% stenosis of the left  ICA origin and moderate stenoses of bilateral carotid siphons.  - CT head 4/12: No evidence of acute intracranial hemorrhage or large territory infarct.  - Seen by neurology   - EEG 4/12: 1. Mild nonspecific diffuse or multifocal cerebral dysfunction. 2. No epileptiform abnormalities were recorded.  - MR Head w/wo IV Cont (04.13.24 @ 08:27): No acute intracranial pathology or abnormal enhancement. Slightly progressed mild chronic microvascular ischemic changes since the prior brain MRI from 3/30/2014  - Holding home meds (Fioricet, Cyclobenzaprine, Tizanidine Tramadol). Stop Tizanidine on d/c    # Seizure Disorder  - 4/15: RR for seizure. s/p Loading dose Keppra and Ativan 2mg IV. See note for details  - CTH 4/15: No evidence of acute intracranial pathology. Stable exam  - EEG 4/13: no seizure  - increased Keppra 250mg BID to 500mg BID  - started Ativan 2mg IV PRN for seizure   - repeat EEG  - f/u neuro    # Abdominal pain - resolved  # IBS  - CTAP: No acute abdominopelvic abnormality  - monitor BMs    # Chronic lower back pain  - home meds: Fioricet 3 tab/day, Tramadol 200mg/day, Cyclobenzaprine 30 mg/day, Tizanidine 4mg bid  - on hold until mental status improves. Stop Tizanidine on d/c    # Anxiety  # Depression  - home meds: Clonazepam 2 mg  - change Clonazepam 1mg BID PRN to 2mg TID (home dose). Was lowered in setting of AMS    # HTN  - c/w Metoprolol 25mg BID   - started amlodipine 5mg PO daily    # HLD  - c/w Atorvastatin 80mg    DVT ppx: Lovenox   GI ppx: PO protonix  Diet: DASH  Activity: IAT  Code: Full  Dispo: Acute

## 2024-04-16 NOTE — PROGRESS NOTE ADULT - ASSESSMENT
58 year old female PMHx HTN, HLD, Seizure disorder (last seizure 15 years ago), Anxiety, Depression, brought into the ED by  for 1 week of altered mental status.  states pt has been having "bizarre behaviour" and confusion for the last week. Pt reports abdominal pain for the past few days but no other complaints. Admit to Medicine for AMS work-up.    # Altered mental status likely metabolic encephalopathy (improving),    suspected polypharmacy   - UA 4/12 neg  - CXR 4/12 normal  - Blood alcohol <10  - CTA head/neck 4/12: 1.  No evidence of acute large vessel occlusion. 2.  Atheromatous changes notable for up to about 50% stenosis of the left  ICA origin and moderate stenoses of bilateral carotid siphons.  - CT head 4/12: No evidence of acute intracranial hemorrhage or large territory infarct.  - Seen by neurology 4/12:  rEEG,   - EEG 4/12: 1. Mild nonspecific diffuse or multifocal cerebral dysfunction. 2. No epileptiform abnormalities were recorded.  - Hold home meds (Fioricet, Cyclobenzaprine, Tizanidine Tramadol) until mental status improves  - MR Head w/wo IV Cont (04.13.24 @ 08:27): No acute intracranial pathology or abnormal enhancement. Slightly progressed mild chronic microvascular ischemic changes since the prior brain MRI from 3/30/2014.  -  urine culture/  blood culture > No growth   -  Utox  >> Neg   -  TSH 1.53   - Clinical pharmacologist recs noted.     # Abdominal pain   # IBS  - CTAP: No acute abdominopelvic abnormality  - monitor BMs  >> No abd pain at this time     # Chronic lower back pain  - home meds: Fioricet 3 tab/day, Tramadol 200mg/day, Cyclobenzaprine 30 mg/day, Tizanidine 4mg bid  - on hold until mental status improves     # Seizure Disorder  - c/w Keppra 250mg BID  - s/p RRT yesterday for one episode of seizure   >> Very likely due to Benzo ( Clonazepam) withdrawal.   >> f/u Keppra level.     # Anxiety  # Depression  - home meds: Clonazepam 2 mg  - c/w 1mg BID PRN; resume home dose when mental status back to baseline     # HTN  - c/w Metoprolol 25mg BID     # HLD  - c/w Atorvastatin 80mg      #DVT PPX: Lovenox  #GI PPX: Pantoprazole  #Diet: Was NPO for AMS, can start diet today if better mental status  #Activity Order: As tolerated  #Dispo: from home    #Handoff  Pending: Breakthrough Seizure activity/ Keppra level   Dispo: Home

## 2024-04-17 VITALS
OXYGEN SATURATION: 100 % | RESPIRATION RATE: 18 BRPM | SYSTOLIC BLOOD PRESSURE: 108 MMHG | TEMPERATURE: 97 F | DIASTOLIC BLOOD PRESSURE: 55 MMHG | HEART RATE: 81 BPM

## 2024-04-17 LAB
ANION GAP SERPL CALC-SCNC: 12 MMOL/L — SIGNIFICANT CHANGE UP (ref 7–14)
BUN SERPL-MCNC: 17 MG/DL — SIGNIFICANT CHANGE UP (ref 10–20)
CALCIUM SERPL-MCNC: 9.3 MG/DL — SIGNIFICANT CHANGE UP (ref 8.4–10.4)
CHLORIDE SERPL-SCNC: 109 MMOL/L — SIGNIFICANT CHANGE UP (ref 98–110)
CO2 SERPL-SCNC: 20 MMOL/L — SIGNIFICANT CHANGE UP (ref 17–32)
CREAT SERPL-MCNC: 0.6 MG/DL — LOW (ref 0.7–1.5)
CULTURE RESULTS: SIGNIFICANT CHANGE UP
EGFR: 103 ML/MIN/1.73M2 — SIGNIFICANT CHANGE UP
GLUCOSE SERPL-MCNC: 110 MG/DL — HIGH (ref 70–99)
LEVETIRACETAM SERPL-MCNC: 9.6 UG/ML — LOW (ref 10–40)
MAGNESIUM SERPL-MCNC: 1.8 MG/DL — SIGNIFICANT CHANGE UP (ref 1.8–2.4)
POTASSIUM SERPL-MCNC: 3.7 MMOL/L — SIGNIFICANT CHANGE UP (ref 3.5–5)
POTASSIUM SERPL-SCNC: 3.7 MMOL/L — SIGNIFICANT CHANGE UP (ref 3.5–5)
SODIUM SERPL-SCNC: 141 MMOL/L — SIGNIFICANT CHANGE UP (ref 135–146)
SPECIMEN SOURCE: SIGNIFICANT CHANGE UP

## 2024-04-17 PROCEDURE — 99239 HOSP IP/OBS DSCHRG MGMT >30: CPT

## 2024-04-17 RX ADMIN — LEVETIRACETAM 400 MILLIGRAM(S): 250 TABLET, FILM COATED ORAL at 05:12

## 2024-04-17 RX ADMIN — Medication 25 MILLIGRAM(S): at 05:12

## 2024-04-17 RX ADMIN — AMLODIPINE BESYLATE 5 MILLIGRAM(S): 2.5 TABLET ORAL at 05:12

## 2024-04-17 RX ADMIN — POLYETHYLENE GLYCOL 3350 17 GRAM(S): 17 POWDER, FOR SOLUTION ORAL at 05:13

## 2024-04-17 RX ADMIN — PANTOPRAZOLE SODIUM 40 MILLIGRAM(S): 20 TABLET, DELAYED RELEASE ORAL at 05:13

## 2024-04-17 RX ADMIN — Medication 2 MILLIGRAM(S): at 05:13

## 2024-04-17 RX ADMIN — Medication 10 MILLILITER(S): at 02:04

## 2024-04-17 RX ADMIN — CHLORHEXIDINE GLUCONATE 1 APPLICATION(S): 213 SOLUTION TOPICAL at 05:13

## 2024-04-17 NOTE — PROGRESS NOTE ADULT - ATTENDING COMMENTS
58 year old female PMHx HTN, HLD, Seizure disorder (last seizure 15 years ago), Anxiety, Depression, brought into the ED by  for 1 week of altered mental status.  states pt has been having "bizarre behaviour" and confusion for the last week. Pt reports abdominal pain for the past few days but no other complaints. Admit to Medicine for AMS work-up.    # Altered mental status likely metabolic encephalopathy (improving),    suspected polypharmacy , pt states that she went to a birthday party and does not remember after that   cont keppra clonipine   - UA 4/12 neg  - CXR 4/12 normal  - Blood alcohol <10  - CTA head/neck 4/12: 1.  No evidence of acute large vessel occlusion. 2.  Atheromatous changes notable for up to about 50% stenosis of the left  ICA origin and moderate stenoses of bilateral carotid siphons.  - CT head 4/12: No evidence of acute intracranial hemorrhage or large territory infarct.  - Seen by neurology 4/12:  rEEG,   - EEG 4/12: 1. Mild nonspecific diffuse or multifocal cerebral dysfunction. 2. No epileptiform abnormalities were recorded.  - Hold home meds (Fioricet, Cyclobenzaprine, Tizanidine Tramadol) until mental status improves  - MR Head w/wo IV Cont (04.13.24 @ 08:27): No acute intracranial pathology or abnormal enhancement. Slightly progressed mild chronic microvascular ischemic changes since the prior brain MRI from 3/30/2014.  -  urine culture/  blood culture > No growth   -  Utox  >> Neg   -  TSH 1.53   - Clinical pharmacologist recs noted.     # Abdominal pain   # IBS  - CTAP: No acute abdominopelvic abnormality  - monitor BMs  >> No abd pain at this time     # Chronic lower back pain  - home meds: Fioricet 3 tab/day, Tramadol 200mg/day, Cyclobenzaprine 30 mg/day, Tizanidine 4mg bid  - on hold until mental status improves     # Seizure Disorder  - c/w Keppra 250mg BID  - s/p RRT yesterday for one episode of seizure   >> Very likely due to Benzo ( Clonazepam) withdrawal.   >> f/u Keppra level.     # Anxiety  # Depression  - home meds: Clonazepam 2 mg  - c/w 1mg BID PRN; resume home dose when mental status back to baseline     # HTN  - c/w Metoprolol 25mg BID     # HLD  - c/w Atorvastatin 80mg    #Progress Note Handoff    Pending :  discharge   Family discussion: dw pt   Disposition: home   code status: full code   time spent 35 min

## 2024-04-17 NOTE — PROGRESS NOTE ADULT - ASSESSMENT
58 year old female PMHx HTN, HLD, Seizure disorder (last seizure 15 years ago), Anxiety, Depression, brought into the ED by  for 1 week of altered mental status.  states pt has been having "bizarre behaviour" and confusion for the last week. Pt reports abdominal pain for the past few days but no other complaints. Admit to Medicine for AMS work-up.    # Altered mental status likely metabolic encephalopathy - resolved  - suspected polypharmacy   - UA 4/12 neg  - Bcx and Ucx: NGTD   - Utox: Neg   - TSH 1.53   - CXR 4/12 normal  - Blood alcohol <10  - CTA head/neck 4/12: 1.  No evidence of acute large vessel occlusion. 2. Atheromatous changes notable for up to about 50% stenosis of the left  ICA origin and moderate stenoses of bilateral carotid siphons.  - CT head 4/12: No evidence of acute intracranial hemorrhage or large territory infarct.  - Seen by neurology   - EEG 4/12: 1. Mild nonspecific diffuse or multifocal cerebral dysfunction. 2. No epileptiform abnormalities were recorded.  - MR Head w/wo IV Cont (04.13.24 @ 08:27): No acute intracranial pathology or abnormal enhancement. Slightly progressed mild chronic microvascular ischemic changes since the prior brain MRI from 3/30/2014  - Holding home meds (Fioricet, Cyclobenzaprine, Tizanidine Tramadol). Stop Tizanidine on d/c    # Seizure Disorder  - 4/15: RR for seizure. s/p Loading dose Keppra and Ativan 2mg IV. See note for details  - CTH 4/15: No evidence of acute intracranial pathology. Stable exam  - EEG 4/13: no seizure  - increased Keppra 250mg BID to 500mg BID  - started Ativan 2mg IV PRN for seizure   - EEG 4/16: no seizure   - neuro recs appreciated. Likely provoked by benzo withdraw    # Abdominal pain - resolved  # IBS  - CTAP: No acute abdominopelvic abnormality  - monitor BMs    # Chronic lower back pain  - home meds: Fioricet 3 tab/day, Tramadol 200mg/day, Cyclobenzaprine 30 mg/day, Tizanidine 4mg bid  - on hold until mental status improves. Stop Tizanidine on d/c    # Anxiety  # Depression  - home meds: Clonazepam 2 mg  - change Clonazepam 1mg BID PRN to 2mg TID (home dose). Was lowered in setting of AMS    # HTN  - c/w Metoprolol 25mg BID   - started amlodipine 5mg PO daily    # HLD  - c/w Atorvastatin 80mg    DVT ppx: Lovenox   GI ppx: PO protonix  Diet: DASH  Activity: IAT  Code: Full  Dispo: dc

## 2024-04-17 NOTE — PROGRESS NOTE ADULT - SUBJECTIVE AND OBJECTIVE BOX
24H events:    Patient is a 58y old Female who presents with a chief complaint of   Primary diagnosis of Altered mental status      Today is 1d of hospitalization. This morning patient was seen and examined at bedside, resting comfortably in bed.    No acute or major events overnight.    Code Status:    Family communication:  Contact date:  Name of person contacted:  Relationship to patient:  Communication details:  What matters most:    PAST MEDICAL & SURGICAL HISTORY  Back pain  henriated discs    Seizures  last one 6 yrs ago    Migraines    Hypercholesteremia    Seasonal allergic rhinitis, unspecified trigger    GERD (gastroesophageal reflux disease)    Hiatal hernia    Anxiety    HTN (hypertension)    Osteoarthritis    Sciatica    Tobacco use disorder    History of surgery  4 rght knee sx  1 left knee sx  cholecystectomy  rotator cuff right  abdominal sx-- adhesions  c section  ovarian cystectomy  breast cystectomies  colonoscopy   endoscopies  parital hysterectomy    S/p partial hysterectomy with remaining cervical stump    H/O rotator cuff surgery  right    History of appendectomy    S/P cholecystectomy      SOCIAL HISTORY:  Social History:      ALLERGIES:  Toradol (Rash; Urticaria; Anaphylaxis (Mild to Mod))  Toradol (Hives)  penicillin (Hives)  Cipro (Hives)  Neurontin (Unknown)    MEDICATIONS:  STANDING MEDICATIONS  atorvastatin 80 milliGRAM(s) Oral at bedtime  enoxaparin Injectable 40 milliGRAM(s) SubCutaneous every 24 hours  levETIRAcetam 250 milliGRAM(s) Oral two times a day  metoprolol tartrate 25 milliGRAM(s) Oral two times a day  pantoprazole    Tablet 40 milliGRAM(s) Oral before breakfast  polyethylene glycol 3350 17 Gram(s) Oral two times a day  senna 2 Tablet(s) Oral at bedtime    PRN MEDICATIONS  acetaminophen     Tablet .. 650 milliGRAM(s) Oral every 6 hours PRN  clonazePAM  Tablet 1 milliGRAM(s) Oral two times a day PRN    VITALS:   T(F): 97.3  HR: 63  BP: 151/75  RR: 18  SpO2: 94%    PHYSICAL EXAM:  GENERAL:   ( x) NAD, lying in bed comfortably     (  ) obtunded     (  ) lethargic     (  ) somnolent    HEART:  Rate -->     (x) normal rate     (  ) bradycardic     (  ) tachycardic  Rhythm -->     (x) regular     (  ) regularly irregular     (  ) irregularly irregular  Murmurs -->     (x) normal s1s2     (  ) systolic murmur     (  ) diastolic murmur     (  ) continuous murmur      (  ) S3 present     (  ) S4 present    LUNGS:   ( x)Unlabored respirations     (  ) tachypnea  ( x) B/L air entry     (  ) decreased breath sounds in:  (location     )    ( x) no adventitious sound     (  ) crackles     (  ) wheezing      (  ) rhonchi      (specify location:       )  (  ) chest wall tenderness (specify location:       )    ABDOMEN:   ( x) Soft     (  ) tense   |   (  ) nondistended     (  ) distended   |   (  ) +BS     (  ) hypoactive bowel sounds     (  ) hyperactive bowel sounds  ( x) nontender     (  ) RUQ tenderness     (  ) RLQ tenderness     (  ) LLQ tenderness     (  ) epigastric tenderness     (  ) diffuse tenderness  (  ) Splenomegaly      (  ) Hepatomegaly      (  ) Jaundice     (  ) ecchymosis     EXTREMITIES:  ( x) Normal     (  ) Rash     (  ) ecchymosis     (  ) varicose veins      (  ) pitting edema     (  ) non-pitting edema   (  ) ulceration     (  ) gangrene:     (location:     )        LABS:                        11.5   4.12  )-----------( 218      ( 2024 08:43 )             32.1         138  |  102  |  13  ----------------------------<  94  5.2<H>   |  25  |  0.9    Ca    9.1      2024 08:43  Mg     1.9         TPro  6.4  /  Alb  4.0  /  TBili  <0.2  /  DBili  x   /  AST  28  /  ALT  15  /  AlkPhos  122<H>  12    PT/INR - ( 2024 12:01 )   PT: TNP sec;   INR: TNP ratio         PTT - ( 2024 12:01 )  PTT:TNP sec  Urinalysis Basic - ( 2024 12:00 )    Color: Yellow / Appearance: Clear / S.026 / pH: x  Gluc: x / Ketone: Negative mg/dL  / Bili: Negative / Urobili: 0.2 mg/dL   Blood: x / Protein: Negative mg/dL / Nitrite: Negative   Leuk Esterase: Negative / RBC: x / WBC x   Sq Epi: x / Non Sq Epi: x / Bacteria: x                RADIOLOGY:  CT Head No Cont (24 @ 09:14) No evidence of acute intracranial hemorrhage or large territory infarct.    CT Abdomen and Pelvis w/ IV Cont (24 @ 09:26) No acute abdominopelvic abnormality.    CT Angio Head and Neck w/ IV Cont (24 @ 09:27) IMPRESSION:  1.  No evidence of acute large vessel occlusion.  2.  Atheromatous changes notable for up to about 50% stenosis of the left ICA origin and moderate stenoses of bilateral carotid siphons.    MR Head w/wo IV Cont (24 @ 08:27): No acute intracranial pathology or abnormal enhancement.  Slightly progressed mild chronic microvascular ischemic changes since the prior brain MRI from 3/30/2014.        ASSESSMENT AND PLAN  58 year old female PMHx HTN, HLD, Seizure disorder (last seizure 15 years ago), Anxiety, Depression, brought into the ED by  for 1 week of altered mental status.  states pt has been having "bizarre behaviour" and confusion for the last week. Pt reports abdominal pain for the past few days but no other complaints. Admit to Medicine for AMS work-up.    # Altered mental status (improving), suspected polypharmacy   - UA  neg  - CXR  normal  - Blood alcohol <10  - CTA head/neck : 1.  No evidence of acute large vessel occlusion. 2.  Atheromatous changes notable for up to about 50% stenosis of the left  ICA origin and moderate stenoses of bilateral carotid siphons.  - CT head : No evidence of acute intracranial hemorrhage or large territory infarct.  - Seen by neurology : Get rEEG, send U tox, ID consult, MRI Brain w/wo IV contrast, consider LP  - EEG : 1. Mild nonspecific diffuse or multifocal cerebral dysfunction. 2. No epileptiform abnormalities were recorded.  - Hold home meds (Fioricet, Cyclobenzaprine, Tizanidine Tramadol) until mental status improves  - MR Head w/wo IV Cont (24 @ 08:27): No acute intracranial pathology or abnormal enhancement. Slightly progressed mild chronic microvascular ischemic changes since the prior brain MRI from 3/30/2014.  - Consider LP  - Follow up urine cultures  - Follow up blood cultures  - Follow up Utox   - Follow up TSH    # Abdominal pain   # IBS  - CTAP: No acute abdominopelvic abnormality  - monitor BMs    # Chronic lower back pain  - home meds: Fioricet 3 tab/day, Tramadol 200mg/day, Cyclobenzaprine 30 mg/day, Tizanidine 4mg bid  - on hold until mental status improves     # Seizure Disorder  - c/w Keppra 250mg BID    # Anxiety  # Depression  - home meds: Clonazepam 2 mg  - c/w 1mg BID PRN; resume home dose when mental status back to baseline     # HTN  - c/w Metoprolol 25mg BID     # HLD  - c/w Atorvastatin 80mg        #DVT PPX: Lovenox  #GI PPX: Pantoprazole  #Diet: Was NPO for AMS, can start diet today if better mental status  #Activity Order: As tolerated  #Dispo: from home    #Handoff  - Follow up Utox   - Follow up TSH  - Monitor mental status  - Keep holding home meds until improvement in mental status   - Follow up urine and blood cultures          
SUBJECTIVE:    Patient is a 58y old Female who presents with a chief complaint of AMS (15 Apr 2024 10:38)    Currently admitted to medicine with the primary diagnosis of Altered mental status       Today is hospital day 4d. This morning she is resting comfortably in bed. Patient had RR last night for seizure    PAST MEDICAL & SURGICAL HISTORY  Back pain  henriated discs    Seizures  last one 6 yrs ago    Migraines    Hypercholesteremia    Seasonal allergic rhinitis, unspecified trigger    GERD (gastroesophageal reflux disease)    Hiatal hernia    Anxiety    HTN (hypertension)    Osteoarthritis    Sciatica    Tobacco use disorder    History of surgery  4 rght knee sx  1 left knee sx  cholecystectomy  rotator cuff right  abdominal sx-- adhesions  c section  ovarian cystectomy  breast cystectomies  colonoscopy   endoscopies  parital hysterectomy    S/p partial hysterectomy with remaining cervical stump    H/O rotator cuff surgery  right    History of appendectomy    S/P cholecystectomy      SOCIAL HISTORY:  Negative for smoking/alcohol/drug use.     ALLERGIES:  Toradol (Rash; Urticaria; Anaphylaxis (Mild to Mod))  Toradol (Hives)  penicillin (Hives)  Cipro (Hives)  Neurontin (Unknown)    MEDICATIONS:  STANDING MEDICATIONS  amLODIPine   Tablet 5 milliGRAM(s) Oral daily  atorvastatin 80 milliGRAM(s) Oral at bedtime  chlorhexidine 2% Cloths 1 Application(s) Topical <User Schedule>  clonazePAM  Tablet 2 milliGRAM(s) Oral every 8 hours  enoxaparin Injectable 40 milliGRAM(s) SubCutaneous every 24 hours  guaifenesin/dextromethorphan Oral Liquid 10 milliLiter(s) Oral every 8 hours  levETIRAcetam  IVPB 500 milliGRAM(s) IV Intermittent two times a day  melatonin 5 milliGRAM(s) Oral at bedtime  metoprolol tartrate 25 milliGRAM(s) Oral two times a day  pantoprazole    Tablet 40 milliGRAM(s) Oral before breakfast  polyethylene glycol 3350 17 Gram(s) Oral two times a day  senna 2 Tablet(s) Oral at bedtime    PRN MEDICATIONS  acetaminophen     Tablet .. 650 milliGRAM(s) Oral every 6 hours PRN    VITALS:   T(F): 97.1  HR: 74  BP: 143/68  RR: 18  SpO2: 100%    LABS:                        14.1   6.78  )-----------( 243      ( 16 Apr 2024 06:44 )             40.5     04-16    139  |  104  |  13  ----------------------------<  133<H>  3.5   |  17  |  0.6<L>    Ca    9.4      16 Apr 2024 06:44  Mg     2.2     04-16    TPro  8.0  /  Alb  5.0  /  TBili  0.3  /  DBili  x   /  AST  26  /  ALT  21  /  AlkPhos  142<H>  04-15      Urinalysis Basic - ( 16 Apr 2024 06:44 )    Color: x / Appearance: x / SG: x / pH: x  Gluc: 133 mg/dL / Ketone: x  / Bili: x / Urobili: x   Blood: x / Protein: x / Nitrite: x   Leuk Esterase: x / RBC: x / WBC x   Sq Epi: x / Non Sq Epi: x / Bacteria: x      ABG - ( 15 Apr 2024 18:24 )  pH, Arterial: 7.16  pH, Blood: x     /  pCO2: 30    /  pO2: 442   / HCO3: 11    / Base Excess: -16.7 /  SaO2: 99.8              Lactate, Blood: 1.1 mmol/L (04-16-24 @ 06:44)  Lactate, Blood: 0.7 mmol/L (04-16-24 @ 01:36)          RADIOLOGY:    PHYSICAL EXAM:  GEN: No acute distress  LUNGS: Clear to auscultation bilaterally   HEART: S1/S2 present. RRR.   ABD: Soft, non-tender, non-distended. Bowel sounds present  EXT: NC/NC/NE/2+PP/WARREN/Skin Intact.   NEURO: AAOX3    
  ARMÍREZ RODARTE  58y  Female      Patient is a 58y old  Female who presents with a chief complaint of altered mental status     INTERVAL HPI/OVERNIGHT EVENTS:      ******************************* REVIEW OF SYSTEMS:**********************************************    All other review of systems negative    *********************** VITALS ******************************************    T(F): 98.6 (04-14-24 @ 12:41)  HR: 70 (04-14-24 @ 12:41) (70 - 78)  BP: 165/70 (04-14-24 @ 12:41) (138/63 - 184/79)  RR: 18 (04-14-24 @ 12:41) (18 - 18)  SpO2: 99% (04-14-24 @ 12:41) (93% - 99%)            ******************************** PHYSICAL EXAM:**************************************************  GENERAL: NAD    PSYCH: no agitation, baseline mentation  HEENT:     NERVOUS SYSTEM:  Alert & Oriented X3,   PULMONARY: JORGE LUIS, CTA    CARDIOVASCULAR: S1S2 RRR    GI: Soft, NT, ND; BS present.    EXTREMITIES:  2+ Peripheral Pulses, No clubbing, cyanosis, or edema    LYMPH: No lymphadenopathy noted    SKIN: No rashes or lesions      **************************** LABS *******************************************************                          13.3   4.44  )-----------( 208      ( 14 Apr 2024 06:12 )             37.9     04-14    139  |  104  |  12  ----------------------------<  97  3.5   |  19  |  0.6<L>    Ca    9.3      14 Apr 2024 06:12  Mg     1.5     04-14    TPro  6.5  /  Alb  4.1  /  TBili  0.4  /  DBili  x   /  AST  27  /  ALT  21  /  AlkPhos  132<H>  04-14      Urinalysis Basic - ( 14 Apr 2024 06:12 )    Color: x / Appearance: x / SG: x / pH: x  Gluc: 97 mg/dL / Ketone: x  / Bili: x / Urobili: x   Blood: x / Protein: x / Nitrite: x   Leuk Esterase: x / RBC: x / WBC x   Sq Epi: x / Non Sq Epi: x / Bacteria: x        Lactate Trend        CAPILLARY BLOOD GLUCOSE              **************************Active Medications *******************************************  Toradol (Rash; Urticaria; Anaphylaxis (Mild to Mod))  Toradol (Hives)  penicillin (Hives)  Cipro (Hives)  Neurontin (Unknown)      acetaminophen     Tablet .. 650 milliGRAM(s) Oral every 6 hours PRN  atorvastatin 80 milliGRAM(s) Oral at bedtime  clonazePAM  Tablet 1 milliGRAM(s) Oral two times a day PRN  enoxaparin Injectable 40 milliGRAM(s) SubCutaneous every 24 hours  guaifenesin/dextromethorphan Oral Liquid 10 milliLiter(s) Oral every 8 hours  levETIRAcetam 250 milliGRAM(s) Oral two times a day  metoprolol tartrate 25 milliGRAM(s) Oral two times a day  pantoprazole    Tablet 40 milliGRAM(s) Oral before breakfast  polyethylene glycol 3350 17 Gram(s) Oral two times a day  senna 2 Tablet(s) Oral at bedtime      ***************************************************  RADIOLOGY & ADDITIONAL TESTS:    Imaging Personally Reviewed:  [ ] YES  [ ] NO    HEALTH ISSUES - PROBLEM Dx:      
  RAMÍREZ RODARTE  58y  Female      Patient is a 58y old  Female who presents with a chief complaint of AMS.      INTERVAL HPI/OVERNIGHT EVENTS:      ******************************* REVIEW OF SYSTEMS:*********************************************    All other review of systems negative    *********************** VITALS ******************************************    T(F): 98.6 (04-16-24 @ 12:30)  HR: 85 (04-16-24 @ 12:30) (74 - 107)  BP: 125/57 (04-16-24 @ 12:30) (125/57 - 161/77)  RR: 18 (04-16-24 @ 12:30) (18 - 20)  SpO2: 100% (04-16-24 @ 12:30) (98% - 100%)            ******************************** PHYSICAL EXAM:**************************************************  GENERAL: NAD    PSYCH: no agitation, baseline mentation  HEENT:     NERVOUS SYSTEM:  Alert & Oriented X3,     PULMONARY: JORGE LUIS, CTA    CARDIOVASCULAR: S1S2 RRR    GI: Soft, NT, ND; BS present.    EXTREMITIES:  2+ Peripheral Pulses, No clubbing, cyanosis, or edema    LYMPH: No lymphadenopathy noted    SKIN: No rashes or lesions      **************************** LABS *******************************************************                          14.1   6.78  )-----------( 243      ( 16 Apr 2024 06:44 )             40.5     04-16    139  |  104  |  13  ----------------------------<  133<H>  3.5   |  17  |  0.6<L>    Ca    9.4      16 Apr 2024 06:44  Mg     2.2     04-16    TPro  8.0  /  Alb  5.0  /  TBili  0.3  /  DBili  x   /  AST  26  /  ALT  21  /  AlkPhos  142<H>  04-15    ABG - ( 15 Apr 2024 18:24 )  pH, Arterial: 7.16  pH, Blood: x     /  pCO2: 30    /  pO2: 442   / HCO3: 11    / Base Excess: -16.7 /  SaO2: 99.8              Urinalysis Basic - ( 16 Apr 2024 06:44 )    Color: x / Appearance: x / SG: x / pH: x  Gluc: 133 mg/dL / Ketone: x  / Bili: x / Urobili: x   Blood: x / Protein: x / Nitrite: x   Leuk Esterase: x / RBC: x / WBC x   Sq Epi: x / Non Sq Epi: x / Bacteria: x        Lactate Trend  04-16 @ 06:44 Lactate:1.1   04-16 @ 01:36 Lactate:0.7         CAPILLARY BLOOD GLUCOSE      POCT Blood Glucose.: 153 mg/dL (15 Apr 2024 18:15)          **************************Active Medications *******************************************  Toradol (Rash; Urticaria; Anaphylaxis (Mild to Mod))  Toradol (Hives)  penicillin (Hives)  Cipro (Hives)  Neurontin (Unknown)      acetaminophen     Tablet .. 650 milliGRAM(s) Oral every 6 hours PRN  amLODIPine   Tablet 5 milliGRAM(s) Oral daily  atorvastatin 80 milliGRAM(s) Oral at bedtime  chlorhexidine 2% Cloths 1 Application(s) Topical <User Schedule>  clonazePAM  Tablet 2 milliGRAM(s) Oral every 8 hours  enoxaparin Injectable 40 milliGRAM(s) SubCutaneous every 24 hours  guaifenesin/dextromethorphan Oral Liquid 10 milliLiter(s) Oral every 8 hours  levETIRAcetam  IVPB 500 milliGRAM(s) IV Intermittent two times a day  melatonin 5 milliGRAM(s) Oral at bedtime  metoprolol tartrate 25 milliGRAM(s) Oral two times a day  pantoprazole    Tablet 40 milliGRAM(s) Oral before breakfast  polyethylene glycol 3350 17 Gram(s) Oral two times a day  senna 2 Tablet(s) Oral at bedtime      ***************************************************  RADIOLOGY & ADDITIONAL TESTS:    Imaging Personally Reviewed:  [ ] YES  [ ] NO    HEALTH ISSUES - PROBLEM Dx:      
  RAMÍREZ RODARTE  58y  Female      Patient is a 58y old  Female who presents with a chief complaint of AMS.    INTERVAL HPI/OVERNIGHT EVENTS:      ******************************* REVIEW OF SYSTEMS:**********************************************    All other review of systems negative    *********************** VITALS ******************************************    T(F): 97.6 (04-15-24 @ 12:16)  HR: 98 (04-15-24 @ 14:25) (84 - 101)  BP: 136/73 (04-15-24 @ 14:25) (136/73 - 182/92)  RR: 18 (04-15-24 @ 12:16) (18 - 18)  SpO2: 98% (04-15-24 @ 12:16) (98% - 98%)            ******************************** PHYSICAL EXAM:**************************************************  GENERAL: NAD    PSYCH: no agitation, baseline mentation  HEENT:     NERVOUS SYSTEM:  Alert & Oriented X3,   PULMONARY: JORGE LUIS, CTA    CARDIOVASCULAR: S1S2 RRR    GI: Soft, NT, ND; BS present.    EXTREMITIES:  2+ Peripheral Pulses, No clubbing, cyanosis, or edema    LYMPH: No lymphadenopathy noted    SKIN: No rashes or lesions      **************************** LABS *******************************************************                          13.9   6.31  )-----------( 241      ( 15 Apr 2024 06:06 )             38.8     04-15    141  |  103  |  12  ----------------------------<  106<H>  3.4<L>   |  20  |  0.6<L>    Ca    9.8      15 Apr 2024 06:06  Mg     1.6     04-15    TPro  7.4  /  Alb  4.5  /  TBili  0.5  /  DBili  x   /  AST  24  /  ALT  19  /  AlkPhos  134<H>  04-15      Urinalysis Basic - ( 15 Apr 2024 06:06 )    Color: x / Appearance: x / SG: x / pH: x  Gluc: 106 mg/dL / Ketone: x  / Bili: x / Urobili: x   Blood: x / Protein: x / Nitrite: x   Leuk Esterase: x / RBC: x / WBC x   Sq Epi: x / Non Sq Epi: x / Bacteria: x        Lactate Trend        CAPILLARY BLOOD GLUCOSE              **************************Active Medications *******************************************  Toradol (Rash; Urticaria; Anaphylaxis (Mild to Mod))  Toradol (Hives)  penicillin (Hives)  Cipro (Hives)  Neurontin (Unknown)      acetaminophen     Tablet .. 650 milliGRAM(s) Oral every 6 hours PRN  amLODIPine   Tablet 5 milliGRAM(s) Oral daily  atorvastatin 80 milliGRAM(s) Oral at bedtime  chlorhexidine 2% Cloths 1 Application(s) Topical <User Schedule>  clonazePAM  Tablet 1 milliGRAM(s) Oral two times a day PRN  enoxaparin Injectable 40 milliGRAM(s) SubCutaneous every 24 hours  guaifenesin/dextromethorphan Oral Liquid 10 milliLiter(s) Oral every 8 hours  levETIRAcetam 250 milliGRAM(s) Oral two times a day  melatonin 5 milliGRAM(s) Oral at bedtime  metoprolol tartrate 25 milliGRAM(s) Oral two times a day  pantoprazole    Tablet 40 milliGRAM(s) Oral before breakfast  polyethylene glycol 3350 17 Gram(s) Oral two times a day  senna 2 Tablet(s) Oral at bedtime      ***************************************************  RADIOLOGY & ADDITIONAL TESTS:    Imaging Personally Reviewed:  [ ] YES  [ ] NO    HEALTH ISSUES - PROBLEM Dx:      
SUBJECTIVE:    Patient is a 58y old Female who presents with a chief complaint of AMS (15 Apr 2024 10:38)    Currently admitted to medicine with the primary diagnosis of Altered mental status       Today is hospital day 5d. This morning she is resting comfortably in bed and reports no new issues or overnight events.     PAST MEDICAL & SURGICAL HISTORY  Back pain  henriated discs    Seizures  last one 6 yrs ago    Migraines    Hypercholesteremia    Seasonal allergic rhinitis, unspecified trigger    GERD (gastroesophageal reflux disease)    Hiatal hernia    Anxiety    HTN (hypertension)    Osteoarthritis    Sciatica    Tobacco use disorder    History of surgery  4 rght knee sx  1 left knee sx  cholecystectomy  rotator cuff right  abdominal sx-- adhesions  c section  ovarian cystectomy  breast cystectomies  colonoscopy   endoscopies  parital hysterectomy    S/p partial hysterectomy with remaining cervical stump    H/O rotator cuff surgery  right    History of appendectomy    S/P cholecystectomy      SOCIAL HISTORY:  Negative for smoking/alcohol/drug use.     ALLERGIES:  Toradol (Rash; Urticaria; Anaphylaxis (Mild to Mod))  Toradol (Hives)  penicillin (Hives)  Cipro (Hives)  Neurontin (Unknown)    MEDICATIONS:  STANDING MEDICATIONS  amLODIPine   Tablet 5 milliGRAM(s) Oral daily  atorvastatin 80 milliGRAM(s) Oral at bedtime  chlorhexidine 2% Cloths 1 Application(s) Topical <User Schedule>  clonazePAM  Tablet 2 milliGRAM(s) Oral every 8 hours  enoxaparin Injectable 40 milliGRAM(s) SubCutaneous every 24 hours  guaifenesin/dextromethorphan Oral Liquid 10 milliLiter(s) Oral every 8 hours  levETIRAcetam  IVPB 500 milliGRAM(s) IV Intermittent two times a day  melatonin 5 milliGRAM(s) Oral at bedtime  metoprolol tartrate 25 milliGRAM(s) Oral two times a day  pantoprazole    Tablet 40 milliGRAM(s) Oral before breakfast  polyethylene glycol 3350 17 Gram(s) Oral two times a day  senna 2 Tablet(s) Oral at bedtime    PRN MEDICATIONS  acetaminophen     Tablet .. 650 milliGRAM(s) Oral every 6 hours PRN    VITALS:   T(F): 97.2  HR: 81  BP: 108/55  RR: 18  SpO2: 100%    LABS:                        14.1   6.78  )-----------( 243      ( 16 Apr 2024 06:44 )             40.5     04-17    141  |  109  |  17  ----------------------------<  110<H>  3.7   |  20  |  0.6<L>    Ca    9.3      17 Apr 2024 07:38  Mg     1.8     04-17    TPro  8.0  /  Alb  5.0  /  TBili  0.3  /  DBili  x   /  AST  26  /  ALT  21  /  AlkPhos  142<H>  04-15      Urinalysis Basic - ( 17 Apr 2024 07:38 )    Color: x / Appearance: x / SG: x / pH: x  Gluc: 110 mg/dL / Ketone: x  / Bili: x / Urobili: x   Blood: x / Protein: x / Nitrite: x   Leuk Esterase: x / RBC: x / WBC x   Sq Epi: x / Non Sq Epi: x / Bacteria: x      ABG - ( 15 Apr 2024 18:24 )  pH, Arterial: 7.16  pH, Blood: x     /  pCO2: 30    /  pO2: 442   / HCO3: 11    / Base Excess: -16.7 /  SaO2: 99.8                      RADIOLOGY:    PHYSICAL EXAM:  GEN: No acute distress  LUNGS: Clear to auscultation bilaterally   HEART: S1/S2 present. RRR.   ABD: Soft, non-tender, non-distended. Bowel sounds present  EXT: NC/NC/NE/2+PP/WARREN/Skin Intact.   NEURO: AAOX3

## 2024-04-19 LAB
AMOBARBITAL RESULT, UR: NEGATIVE — SIGNIFICANT CHANGE UP
AMOBARBITAL UR QL SCN: NEGATIVE — SIGNIFICANT CHANGE UP
AMPHET UR-MCNC: NEGATIVE NG/ML — SIGNIFICANT CHANGE UP
BARBITURATES RESULT, UR: POSITIVE
BARBITURATES UR QL SCN: POSITIVE
BARBITURATES UR QL SCN: SIGNIFICANT CHANGE UP NG/ML
BARBITURATES UR-MCNC: SIGNIFICANT CHANGE UP NG/ML
BENZODIAZ UR-MCNC: NEGATIVE NG/ML — SIGNIFICANT CHANGE UP
BUTALBITAL RESULT, UR: POSITIVE
BUTALBITAL UR CFM-MCNC: >3000 NG/ML — SIGNIFICANT CHANGE UP
BUTALBITAL UR QL SCN: POSITIVE
COCAINE METAB.OTHER UR-MCNC: NEGATIVE NG/ML — SIGNIFICANT CHANGE UP
CREATININE, URINE THERAPEUTIC: 21 MG/DL — SIGNIFICANT CHANGE UP (ref 20–300)
FENTANYL RESULT, UR: NEGATIVE NG/ML — SIGNIFICANT CHANGE UP
FENTANYL UR QL SCN: NEGATIVE NG/ML — SIGNIFICANT CHANGE UP
Lab: SIGNIFICANT CHANGE UP
METHADONE UR-MCNC: NEGATIVE NG/ML — SIGNIFICANT CHANGE UP
OPIATES UR-MCNC: SIGNIFICANT CHANGE UP
OXYCODONE UR QL SCN: NEGATIVE NG/ML — SIGNIFICANT CHANGE UP
PCP UR-MCNC: NEGATIVE NG/ML — SIGNIFICANT CHANGE UP
PENTOBARB UR QL SCN: NEGATIVE — SIGNIFICANT CHANGE UP
PENTOBARBITAL RESULT, UR: NEGATIVE — SIGNIFICANT CHANGE UP
PH, URINE RESULT: 8 — SIGNIFICANT CHANGE UP (ref 4.5–8.9)
PHENOBARBITAL RESULT, UR: NEGATIVE — SIGNIFICANT CHANGE UP
SECOBARBITAL RESULT, UR: NEGATIVE — SIGNIFICANT CHANGE UP
SECOBARBITAL UR QL SCN: NEGATIVE — SIGNIFICANT CHANGE UP
THC UR QL: SIGNIFICANT CHANGE UP NG/ML

## 2024-04-29 DIAGNOSIS — F17.200 NICOTINE DEPENDENCE, UNSPECIFIED, UNCOMPLICATED: ICD-10-CM
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
## 2024-04-29 DIAGNOSIS — Z90.711 ACQUIRED ABSENCE OF UTERUS WITH REMAINING CERVICAL STUMP: ICD-10-CM

## 2024-04-29 DIAGNOSIS — G92.8 OTHER TOXIC ENCEPHALOPATHY: ICD-10-CM

## 2024-04-29 DIAGNOSIS — I10 ESSENTIAL (PRIMARY) HYPERTENSION: ICD-10-CM

## 2024-04-29 DIAGNOSIS — T40.425A ADVERSE EFFECT OF TRAMADOL, INITIAL ENCOUNTER: ICD-10-CM

## 2024-04-29 DIAGNOSIS — M19.90 UNSPECIFIED OSTEOARTHRITIS, UNSPECIFIED SITE: ICD-10-CM

## 2024-04-29 DIAGNOSIS — G43.909 MIGRAINE, UNSPECIFIED, NOT INTRACTABLE, WITHOUT STATUS MIGRAINOSUS: ICD-10-CM

## 2024-04-29 DIAGNOSIS — K44.9 DIAPHRAGMATIC HERNIA WITHOUT OBSTRUCTION OR GANGRENE: ICD-10-CM

## 2024-04-29 DIAGNOSIS — F13.239 SEDATIVE, HYPNOTIC OR ANXIOLYTIC DEPENDENCE WITH WITHDRAWAL, UNSPECIFIED: ICD-10-CM

## 2024-04-29 DIAGNOSIS — T39.1X5A ADVERSE EFFECT OF 4-AMINOPHENOL DERIVATIVES, INITIAL ENCOUNTER: ICD-10-CM

## 2024-04-29 DIAGNOSIS — Z88.0 ALLERGY STATUS TO PENICILLIN: ICD-10-CM

## 2024-04-29 DIAGNOSIS — M51.16 INTERVERTEBRAL DISC DISORDERS WITH RADICULOPATHY, LUMBAR REGION: ICD-10-CM

## 2024-04-29 DIAGNOSIS — F10.90 ALCOHOL USE, UNSPECIFIED, UNCOMPLICATED: ICD-10-CM

## 2024-04-29 DIAGNOSIS — E78.00 PURE HYPERCHOLESTEROLEMIA, UNSPECIFIED: ICD-10-CM

## 2024-04-29 DIAGNOSIS — K58.9 IRRITABLE BOWEL SYNDROME WITHOUT DIARRHEA: ICD-10-CM

## 2024-04-29 DIAGNOSIS — T48.1X5A ADVERSE EFFECT OF SKELETAL MUSCLE RELAXANTS [NEUROMUSCULAR BLOCKING AGENTS], INITIAL ENCOUNTER: ICD-10-CM

## 2024-04-29 DIAGNOSIS — R10.9 UNSPECIFIED ABDOMINAL PAIN: ICD-10-CM

## 2024-04-29 DIAGNOSIS — T42.8X5A ADVERSE EFFECT OF ANTIPARKINSONISM DRUGS AND OTHER CENTRAL MUSCLE-TONE DEPRESSANTS, INITIAL ENCOUNTER: ICD-10-CM

## 2024-04-29 DIAGNOSIS — G40.909 EPILEPSY, UNSPECIFIED, NOT INTRACTABLE, WITHOUT STATUS EPILEPTICUS: ICD-10-CM

## 2024-04-29 DIAGNOSIS — G89.29 OTHER CHRONIC PAIN: ICD-10-CM

## 2024-04-29 DIAGNOSIS — J30.2 OTHER SEASONAL ALLERGIC RHINITIS: ICD-10-CM

## 2024-04-29 DIAGNOSIS — E83.42 HYPOMAGNESEMIA: ICD-10-CM

## 2024-04-29 DIAGNOSIS — F32.A DEPRESSION, UNSPECIFIED: ICD-10-CM

## 2024-04-29 DIAGNOSIS — F41.9 ANXIETY DISORDER, UNSPECIFIED: ICD-10-CM

## 2024-04-29 DIAGNOSIS — Z88.9 ALLERGY STATUS TO UNSPECIFIED DRUGS, MEDICAMENTS AND BIOLOGICAL SUBSTANCES: ICD-10-CM

## 2024-04-29 DIAGNOSIS — Z90.49 ACQUIRED ABSENCE OF OTHER SPECIFIED PARTS OF DIGESTIVE TRACT: ICD-10-CM

## 2024-04-29 DIAGNOSIS — Z88.6 ALLERGY STATUS TO ANALGESIC AGENT: ICD-10-CM

## 2024-04-29 DIAGNOSIS — K21.9 GASTRO-ESOPHAGEAL REFLUX DISEASE WITHOUT ESOPHAGITIS: ICD-10-CM

## 2024-04-29 DIAGNOSIS — Z88.1 ALLERGY STATUS TO OTHER ANTIBIOTIC AGENTS STATUS: ICD-10-CM

## 2024-05-23 ENCOUNTER — APPOINTMENT (OUTPATIENT)
Dept: CARDIOLOGY | Facility: CLINIC | Age: 59
End: 2024-05-23
Payer: MEDICARE

## 2024-05-23 VITALS
BODY MASS INDEX: 27.44 KG/M2 | DIASTOLIC BLOOD PRESSURE: 80 MMHG | WEIGHT: 150 LBS | HEART RATE: 84 BPM | SYSTOLIC BLOOD PRESSURE: 140 MMHG

## 2024-05-23 DIAGNOSIS — I10 ESSENTIAL (PRIMARY) HYPERTENSION: ICD-10-CM

## 2024-05-23 DIAGNOSIS — I25.10 ATHEROSCLEROTIC HEART DISEASE OF NATIVE CORONARY ARTERY W/OUT ANGINA PECTORIS: ICD-10-CM

## 2024-05-23 PROCEDURE — 93000 ELECTROCARDIOGRAM COMPLETE: CPT

## 2024-05-23 PROCEDURE — 99214 OFFICE O/P EST MOD 30 MIN: CPT | Mod: 25

## 2024-05-23 RX ORDER — AMLODIPINE BESYLATE 10 MG/1
10 TABLET ORAL DAILY
Qty: 90 | Refills: 3 | Status: ACTIVE | COMMUNITY
Start: 2024-05-23 | End: 1900-01-01

## 2024-05-23 RX ORDER — AMLODIPINE BESYLATE 5 MG/1
5 TABLET ORAL DAILY
Qty: 90 | Refills: 3 | Status: DISCONTINUED | COMMUNITY
End: 2024-05-23

## 2024-05-23 RX ORDER — TRAMADOL HYDROCHLORIDE 100 MG/1
100 TABLET, EXTENDED RELEASE ORAL DAILY
Refills: 0 | Status: DISCONTINUED | COMMUNITY
End: 2024-05-23

## 2024-05-23 NOTE — HISTORY OF PRESENT ILLNESS
[FreeTextEntry1] : History of nonobstructive CAD, HTN, GERD, COPD, seizure disorder, current smoker, chronic arthritic pain.  Recent admission for AMS, hypertensive urgency and seizures.  CTH/MRI negative for stroke.  Nifedipine discontinued and A  No CP, SOB, palpitations, orthopnea/PND, dizziness or syncope Chronic abdominal pain, constipation, early satiety, nausea   TTE (9/2019):  EF >55%, Mild-mod AI CCTA (2019):  pLAD <50%, CACS 16 Adenosine NST (4/2021):  No ischemia LHC (1/2017):  Diffuse minimal luminal irregularities

## 2024-05-23 NOTE — DISCUSSION/SUMMARY
[FreeTextEntry1] : Nonobstructive CAD Labile HTN  BP log 130-150 / 70-90 BP today - 128/72   Continue aspirin and Atorvastatin Continue Metoprolol succinate 50 mg daily Increase Amlodipine to 10 mg daily in PM Follow-up 2 months

## 2024-06-03 NOTE — CONSULT NOTE ADULT - SUBJECTIVE AND OBJECTIVE BOX
Gastroenterology Consultation:    Patient is a 57y old  Female who presents with a chief complaint of SBO (24 Sep 2022 00:33)        Admitted on: 09-23-22      HPI:  57F significant PSH of ex-lap 11/2021 for SBO 2/2 to foreign body, appendectomy x2 (16y ago and again 12y ago), cholecystectomy, hysterectomy, PMH of HTN, HLD, anxiety, seizures presenting with a few month history of generalized abdominal pain which has been worsening. Patient acutely presenting to the ED secondary to N/V x4-5 episodes night prior to presentation and was recommended to come for evaluation by her GI doctor. Patient has been seeing GI as an outpatient and they have been treating her for IBS, last C-scope two weeks prior to eval where 7 biopsies were taken, unknown results. No F/chills. Last BM 9/22 AM and last gas 9/22 AM. (24 Sep 2022 00:33)        Prior EGD:    Prior Colonoscopy:      PAST MEDICAL & SURGICAL HISTORY:  Back pain  henriated discs      Seizures  last one 6 yrs ago      Migraines      Hypercholesteremia      Seasonal allergic rhinitis, unspecified trigger      GERD (gastroesophageal reflux disease)      Hiatal hernia      Anxiety      HTN (hypertension)      Osteoarthritis      Sciatica      Tobacco use disorder      History of surgery  4 rght knee sx  1 left knee sx  cholecystectomy  rotator cuff right  abdominal sx-- adhesions  c section  ovarian cystectomy  breast cystectomies  colonoscopy   endoscopies  parital hysterectomy      S/p partial hysterectomy with remaining cervical stump      H/O rotator cuff surgery  right      History of appendectomy      S/P cholecystectomy            FAMILY HISTORY:  Coronary artery disease (Father)  dad cad @78        Social History:  Tobacco:  Alcohol:  Drugs:    Home Medications:  atorvastatin 80 mg oral tablet: 1 tab(s) orally once a day (10 Dec 2020 00:54)  diclofenac sodium 75 mg oral delayed release tablet: 1 tab(s) orally 2 times a day (01 Nov 2021 22:45)  Fioricet oral tablet: 1 tab(s) orally 3 times a day, As Needed (01 Nov 2021 22:39)  Incruse Ellipta 62.5 mcg/inh inhalation powder:  (10 Dec 2020 00:54)  KlonoPIN 2 mg oral tablet: 1 tab(s) orally 4 times a day (01 Nov 2021 22:40)  levETIRAcetam 250 mg oral tablet: 1 tab(s) orally 2 times a day (10 Dec 2020 00:54)  Linzess 290 mcg oral capsule: 1 cap(s) orally once a day (01 Nov 2021 22:46)  NIFEdipine 60 mg oral tablet, extended release: 1 tab(s) orally 2 times a day (01 Nov 2021 22:47)  Protonix 40 mg oral delayed release tablet: 1 tab(s) orally 2 times a day (10 Dec 2020 00:54)  tiZANidine 4 mg oral tablet: 1 tab(s) orally once a day (01 Nov 2021 22:42)  traMADol 50 mg oral tablet: 1 tab(s) orally every 8 hours, As Needed - Severe Pain (7 - 10) - 10) MDD:2 full tabs (01 Nov 2021 22:39)        MEDICATIONS  (STANDING):  enoxaparin Injectable 40 milliGRAM(s) SubCutaneous every 24 hours  influenza   Vaccine 0.5 milliLiter(s) IntraMuscular once  lactated ringers. 1000 milliLiter(s) (125 mL/Hr) IV Continuous <Continuous>  levETIRAcetam  IVPB 250 milliGRAM(s) IV Intermittent every 12 hours  metoprolol tartrate Injectable 5 milliGRAM(s) IV Push every 6 hours  pantoprazole  Injectable 40 milliGRAM(s) IV Push every 12 hours  tiotropium 18 MICROgram(s) Capsule 1 Capsule(s) Inhalation daily    MEDICATIONS  (PRN):  LORazepam   Injectable 2 milliGRAM(s) IV Push every 6 hours PRN Anxiety      Allergies  Cipro (Anaphylaxis; Urticaria; Hives)  Cipro (Unknown)  Neurontin (Unknown)  penicillin (Unknown)  Toradol (Rash; Urticaria; Anaphylaxis (Mild to Mod))  Toradol (Unknown)      Review of Systems:   Constitutional:  No Fever, No Chills  ENT/Mouth:  No Hearing Changes,  No Difficulty Swallowing  Eyes:  No Eye Pain, No Vision Changes  Cardiovascular:  No Chest Pain, No Palpitations  Respiratory:  No Cough, No Dyspnea  Gastrointestinal:  As described in HPI  Musculoskeletal:  No Joint Swelling, No Back Pain  Skin:  No Skin Lesions, No Jaundice  Neuro:  No Syncope, No Dizziness  Heme/Lymph:  No Bruising, No Bleeding.          Physical Examination:  T(C): 35.8 (09-24-22 @ 09:31), Max: 37.1 (09-23-22 @ 15:17)  HR: 69 (09-24-22 @ 09:31) (60 - 96)  BP: 137/67 (09-24-22 @ 09:31) (137/67 - 186/80)  RR: 18 (09-24-22 @ 09:31) (18 - 20)  SpO2: 97% (09-24-22 @ 07:29) (95% - 98%)  Height (cm): 157.5 (09-23-22 @ 11:44)    09-23-22 @ 07:01  -  09-24-22 @ 07:00  --------------------------------------------------------  IN: 0 mL / OUT: 700 mL / NET: -700 mL          GENERAL: AAOx3, no acute distress.  HEAD:  Atraumatic, Normocephalic  EYES: conjunctiva and sclera clear  NECK: Supple, no JVD or thyromegaly  CHEST/LUNG: Clear to auscultation bilaterally; No wheeze, rhonchi, or rales  HEART: Regular rate and rhythm; normal S1, S2, No murmurs.  ABDOMEN: Soft, nontender, nondistended; Bowel sounds present  NEUROLOGY: No asterixis or tremor.   SKIN: Intact, no jaundice        Data:                        13.7   7.55  )-----------( 268      ( 23 Sep 2022 12:07 )             39.6     Hgb Trend:  13.7  09-23-22 @ 12:07        09-24    138  |  100  |  13  ----------------------------<  97  4.2   |  29  |  0.6<L>    Ca    9.0      24 Sep 2022 05:56  Phos  4.1     09-24  Mg     1.5     09-24    TPro  6.1  /  Alb  4.0  /  TBili  0.3  /  DBili  x   /  AST  25  /  ALT  34  /  AlkPhos  110  09-24    Liver panel trend:  TBili 0.3   /   AST 25   /   ALT 34   /   AlkP 110   /   Tptn 6.1   /   Alb 4.0    /   DBili --      09-24  TBili 0.3   /   AST 41   /   ALT 49   /   AlkP 121   /   Tptn 7.3   /   Alb 4.6    /   DBili --      09-23              Radiology:  CT Abdomen and Pelvis w/ Oral Cont and w/ IV Cont:   ACC: [id number] EXAM:  CT ABDOMEN AND PELVIS OC IC                          PROCEDURE DATE:  09/23/2022          INTERPRETATION:  CLINICAL STATEMENT: Abdominal tenderness    TECHNIQUE: Contiguous axial CT images were obtained from the lower chest   to the pubic symphysis following administration of 75 cc Omnipaque 350   intravenous contrast.  Oral contrast was administered.  Reformatted   images in the coronal and sagittal planes were acquired.    COMPARISON CT: May 15, 2022    OTHER STUDIES USEDFOR CORRELATION: None.      FINDINGS:    LOWER CHEST: Unremarkable.    HEPATOBILIARY: Post cholecystectomy with persistent mild   intra-/extrahepatic biliary ductal dilation. Unremarkable liver.    SPLEEN: Unremarkable.    PANCREAS: Unremarkable.    ADRENAL GLANDS: Unremarkable.    KIDNEYS: Symmetric renal enhancement bilaterally. No hydronephrosis.    ABDOMINOPELVIC NODES: No lymphadenopathy.    PELVIC ORGANS: Unremarkable.    PERITONEUM/MESENTERY/BOWEL: Multiple dilated dilute contrast-filled loops   seen throughout the abdomen measuring up to 4.5 cm in diameter with   marked mesenteric edema and trace interloop ascites. Fecalization of a   dilated small bowel loop noted in the lower mid abdomen with collapsed   loops distally, likely representing site of transition point (602/191).   Circumferential wall thickening/edema of several collapsed small bowel   loops seen in the right lower quadrant. Stable surgical sutures along the   cecum. No pneumoperitoneum or pneumatosis.    BONES/SOFTTISSUES: Osteopenia. Stable visualized osseous structures.    OTHER: Atherosclerotic vascular calcifications.      IMPRESSION:  1.  Small bowel obstruction with a transition point in the lower mid   abdomen; diffuse mesenteric edema and trace interloop ascites.    2.  Wall thickening and edema of several collapsed small bowel loops in   the right lower quadrant, which may be reactive or secondary to   infectious, inflammatory or ischemic enteritis.    --- End of Report ---            GLADYS WEBSTER; Attending Radiologist  This document has been electronically signed. Sep 23 2022  5:26PM (09-23-22 @ 16:53)      
Lipoma/Other specify

## 2024-06-07 RX ORDER — ONDANSETRON 4 MG/1
4 TABLET ORAL EVERY 8 HOURS
Qty: 90 | Refills: 5 | Status: ACTIVE | COMMUNITY
Start: 1900-01-01 | End: 1900-01-01

## 2024-06-10 ENCOUNTER — APPOINTMENT (OUTPATIENT)
Dept: GASTROENTEROLOGY | Facility: CLINIC | Age: 59
End: 2024-06-10
Payer: MEDICARE

## 2024-06-10 VITALS
HEIGHT: 62 IN | SYSTOLIC BLOOD PRESSURE: 146 MMHG | HEART RATE: 75 BPM | BODY MASS INDEX: 28.16 KG/M2 | WEIGHT: 153 LBS | DIASTOLIC BLOOD PRESSURE: 76 MMHG

## 2024-06-10 DIAGNOSIS — K59.00 CONSTIPATION, UNSPECIFIED: ICD-10-CM

## 2024-06-10 DIAGNOSIS — R10.9 UNSPECIFIED ABDOMINAL PAIN: ICD-10-CM

## 2024-06-10 PROCEDURE — 99214 OFFICE O/P EST MOD 30 MIN: CPT

## 2024-06-10 NOTE — PHYSICAL EXAM
[Alert] : alert [No Acute Distress] : no acute distress [Sclera] : the sclera and conjunctiva were normal [No Respiratory Distress] : no respiratory distress [No Acc Muscle Use] : no accessory muscle use [Heart Rate And Rhythm] : heart rate was normal and rhythm regular [Abdomen Soft] : soft [Rebound Tenderness] : no rebound tenderness [de-identified] : Abd soft, non distended, multiple well healed abdominal scars, mildly tender near umbilicus, no obvious hernia

## 2024-06-10 NOTE — HISTORY OF PRESENT ILLNESS
[Home] : at home, [unfilled] , at the time of the visit. [Medical Office: ([facility])___] : at the medical office located in  [Verbal consent obtained from patient] : the patient, [unfilled] [FreeTextEntry1] : 57yo female h/o HTN, SBO probably due to adhesions in setting of multiple abdominal surgeries including cholecystectomy, appendectomy, hysterectomy presents for follow up of abdominal pain and altered bowel pattern previously seen by Dr. Leal.  Pt reports longstanding intermittent diffuse abdominal pain and constipation. She has had extensive testing including EGD and a colonoscopy as well as a CT scan and a gastric emptying study that showed evidence of mild gastroparesis. Most recent CT 2/2023 showing focal narrowing at right colon, recent colonoscopy noted.  Pt states she has been feeling better overall, states she has adjusted diet avoiding dairy having more fish/protein with improvement. Takes linzess and dulcolax when needed, still with some alternating constipation and loose stool though improved. States she takes lactulose when needed which also helps. Reports occasional nausea. Reports mid abdominal discomfort near umbilicus worsening recently. Reports heartburn almost daily, takes omeprazole in mornings and famotidine when needed. Appetite ok. Denies recent weight loss. Takes tramadol occasionally. Drinks one cup coffee daily.  EGD 7/2023 revealing gastric erythema, hiatal hernia, no evidence of h pylori or celiac disease Colonoscopy 3/2023 revealing 3mm transverse colon polyp, int/ext hemorrhoids (path showed hyperplastic polyp)  Labs reviewed (4/2024): Hb 14.1, normal Alk phos 142, ast/alt normal, t bili normal  [FreeTextEntry4] : ASIM CRAWLEY

## 2024-06-10 NOTE — ASSESSMENT
[FreeTextEntry1] : 59yo female h/o SBO probably due to adhesions in setting of multiple abdominal surgeries including cholecystectomy, appendectomy, hysterectomy presents for follow up of abdominal pain and altered bowel pattern previously seen by Dr. Leal.   #Altered bowel pattern/constipation #Abdominal pain #GERD #Gastroparesis, mild -Obtain CT abd/pelvis to further evaluate abdominal pain and to assess for hernia -Continue omeprazole 40mg daily taken 20-30 minutes before breakfast -Famotidine 20mg qhs/prn -Antireflux measures discussed including elevating hob and to avoid lying down for 2-3 hours after meals  -Discussed avoidance of dietary triggers including spicy foods, coffee, etoh, citrus, tomatoes, chocolate, mints -Continue NSAID avoidance -Recommend resume miralax daily to help regulate bowel pattern -Advised lactulose sparingly as needed as she notes improvement -Continue dietary modifications, avoiding dairy -Repeat colonoscopy due in 2028, sooner if new symptoms develop  Follow up 1-2 months, sooner if needed Pt agreeable with plan, advised to contact us if questions/concerns or if new/worsening symptoms

## 2024-07-03 ENCOUNTER — APPOINTMENT (OUTPATIENT)
Dept: ORTHOPEDIC SURGERY | Facility: CLINIC | Age: 59
End: 2024-07-03
Payer: OTHER MISCELLANEOUS

## 2024-07-03 DIAGNOSIS — S93.409S SPRAIN OF UNSPECIFIED LIGAMENT OF UNSPECIFIED ANKLE, SEQUELA: ICD-10-CM

## 2024-07-03 DIAGNOSIS — M54.12 RADICULOPATHY, CERVICAL REGION: ICD-10-CM

## 2024-07-03 DIAGNOSIS — M54.16 RADICULOPATHY, LUMBAR REGION: ICD-10-CM

## 2024-07-03 DIAGNOSIS — S93.491S SPRAIN OF OTHER LIGAMENT OF RIGHT ANKLE, SEQUELA: ICD-10-CM

## 2024-07-03 DIAGNOSIS — M25.371 OTHER INSTABILITY, RIGHT ANKLE: ICD-10-CM

## 2024-07-03 DIAGNOSIS — M77.8 OTHER ENTHESOPATHIES, NOT ELSEWHERE CLASSIFIED: ICD-10-CM

## 2024-07-03 DIAGNOSIS — M17.0 BILATERAL PRIMARY OSTEOARTHRITIS OF KNEE: ICD-10-CM

## 2024-07-03 PROCEDURE — 99213 OFFICE O/P EST LOW 20 MIN: CPT

## 2024-07-12 ENCOUNTER — NON-APPOINTMENT (OUTPATIENT)
Age: 59
End: 2024-07-12

## 2024-07-18 ENCOUNTER — APPOINTMENT (OUTPATIENT)
Dept: CARDIOLOGY | Facility: CLINIC | Age: 59
End: 2024-07-18
Payer: MEDICARE

## 2024-07-18 ENCOUNTER — NON-APPOINTMENT (OUTPATIENT)
Age: 59
End: 2024-07-18

## 2024-07-18 VITALS
WEIGHT: 153 LBS | HEIGHT: 62 IN | DIASTOLIC BLOOD PRESSURE: 80 MMHG | BODY MASS INDEX: 28.16 KG/M2 | SYSTOLIC BLOOD PRESSURE: 132 MMHG | HEART RATE: 84 BPM

## 2024-07-18 DIAGNOSIS — I25.10 ATHEROSCLEROTIC HEART DISEASE OF NATIVE CORONARY ARTERY W/OUT ANGINA PECTORIS: ICD-10-CM

## 2024-07-18 DIAGNOSIS — I10 ESSENTIAL (PRIMARY) HYPERTENSION: ICD-10-CM

## 2024-07-18 PROCEDURE — G2211 COMPLEX E/M VISIT ADD ON: CPT

## 2024-07-18 PROCEDURE — 99214 OFFICE O/P EST MOD 30 MIN: CPT

## 2024-07-18 PROCEDURE — 93000 ELECTROCARDIOGRAM COMPLETE: CPT

## 2024-07-18 RX ORDER — PROPRANOLOL HYDROCHLORIDE 80 MG/1
80 CAPSULE, EXTENDED RELEASE ORAL DAILY
Qty: 90 | Refills: 3 | Status: ACTIVE | COMMUNITY
Start: 2024-07-18 | End: 1900-01-01

## 2024-08-25 ENCOUNTER — OUTPATIENT (OUTPATIENT)
Dept: OUTPATIENT SERVICES | Facility: HOSPITAL | Age: 59
LOS: 1 days | End: 2024-08-25
Payer: MEDICARE

## 2024-08-25 DIAGNOSIS — Z98.890 OTHER SPECIFIED POSTPROCEDURAL STATES: Chronic | ICD-10-CM

## 2024-08-25 DIAGNOSIS — Z90.49 ACQUIRED ABSENCE OF OTHER SPECIFIED PARTS OF DIGESTIVE TRACT: Chronic | ICD-10-CM

## 2024-08-25 DIAGNOSIS — R10.9 UNSPECIFIED ABDOMINAL PAIN: ICD-10-CM

## 2024-08-25 DIAGNOSIS — Z90.711 ACQUIRED ABSENCE OF UTERUS WITH REMAINING CERVICAL STUMP: Chronic | ICD-10-CM

## 2024-08-25 PROCEDURE — 74177 CT ABD & PELVIS W/CONTRAST: CPT

## 2024-08-25 PROCEDURE — 74177 CT ABD & PELVIS W/CONTRAST: CPT | Mod: 26

## 2024-08-26 DIAGNOSIS — R10.9 UNSPECIFIED ABDOMINAL PAIN: ICD-10-CM

## 2024-09-02 ENCOUNTER — EMERGENCY (EMERGENCY)
Facility: HOSPITAL | Age: 59
LOS: 0 days | Discharge: HOME | End: 2024-09-02
Attending: EMERGENCY MEDICINE
Payer: MEDICARE

## 2024-09-02 VITALS — SYSTOLIC BLOOD PRESSURE: 168 MMHG | HEART RATE: 72 BPM | DIASTOLIC BLOOD PRESSURE: 78 MMHG

## 2024-09-02 VITALS
WEIGHT: 154.98 LBS | SYSTOLIC BLOOD PRESSURE: 219 MMHG | OXYGEN SATURATION: 98 % | RESPIRATION RATE: 18 BRPM | TEMPERATURE: 98 F | DIASTOLIC BLOOD PRESSURE: 101 MMHG | HEART RATE: 88 BPM

## 2024-09-02 DIAGNOSIS — Z88.1 ALLERGY STATUS TO OTHER ANTIBIOTIC AGENTS: ICD-10-CM

## 2024-09-02 DIAGNOSIS — R51.9 HEADACHE, UNSPECIFIED: ICD-10-CM

## 2024-09-02 DIAGNOSIS — Z88.0 ALLERGY STATUS TO PENICILLIN: ICD-10-CM

## 2024-09-02 DIAGNOSIS — Z90.49 ACQUIRED ABSENCE OF OTHER SPECIFIED PARTS OF DIGESTIVE TRACT: Chronic | ICD-10-CM

## 2024-09-02 DIAGNOSIS — Z98.890 OTHER SPECIFIED POSTPROCEDURAL STATES: Chronic | ICD-10-CM

## 2024-09-02 DIAGNOSIS — E78.5 HYPERLIPIDEMIA, UNSPECIFIED: ICD-10-CM

## 2024-09-02 DIAGNOSIS — Z88.6 ALLERGY STATUS TO ANALGESIC AGENT: ICD-10-CM

## 2024-09-02 DIAGNOSIS — F32.A DEPRESSION, UNSPECIFIED: ICD-10-CM

## 2024-09-02 DIAGNOSIS — I10 ESSENTIAL (PRIMARY) HYPERTENSION: ICD-10-CM

## 2024-09-02 DIAGNOSIS — Z90.711 ACQUIRED ABSENCE OF UTERUS WITH REMAINING CERVICAL STUMP: Chronic | ICD-10-CM

## 2024-09-02 DIAGNOSIS — G40.909 EPILEPSY, UNSPECIFIED, NOT INTRACTABLE, WITHOUT STATUS EPILEPTICUS: ICD-10-CM

## 2024-09-02 DIAGNOSIS — F41.9 ANXIETY DISORDER, UNSPECIFIED: ICD-10-CM

## 2024-09-02 DIAGNOSIS — Z88.8 ALLERGY STATUS TO OTHER DRUGS, MEDICAMENTS AND BIOLOGICAL SUBSTANCES: ICD-10-CM

## 2024-09-02 LAB
ALBUMIN SERPL ELPH-MCNC: 4.9 G/DL — SIGNIFICANT CHANGE UP (ref 3.5–5.2)
ALP SERPL-CCNC: 135 U/L — HIGH (ref 30–115)
ALT FLD-CCNC: 28 U/L — SIGNIFICANT CHANGE UP (ref 0–41)
ANION GAP SERPL CALC-SCNC: 14 MMOL/L — SIGNIFICANT CHANGE UP (ref 7–14)
AST SERPL-CCNC: 22 U/L — SIGNIFICANT CHANGE UP (ref 0–41)
BASOPHILS # BLD AUTO: 0.04 K/UL — SIGNIFICANT CHANGE UP (ref 0–0.2)
BASOPHILS NFR BLD AUTO: 0.5 % — SIGNIFICANT CHANGE UP (ref 0–1)
BILIRUB SERPL-MCNC: 0.4 MG/DL — SIGNIFICANT CHANGE UP (ref 0.2–1.2)
BUN SERPL-MCNC: 16 MG/DL — SIGNIFICANT CHANGE UP (ref 10–20)
CALCIUM SERPL-MCNC: 10 MG/DL — SIGNIFICANT CHANGE UP (ref 8.4–10.5)
CHLORIDE SERPL-SCNC: 100 MMOL/L — SIGNIFICANT CHANGE UP (ref 98–110)
CO2 SERPL-SCNC: 27 MMOL/L — SIGNIFICANT CHANGE UP (ref 17–32)
CREAT SERPL-MCNC: 0.9 MG/DL — SIGNIFICANT CHANGE UP (ref 0.7–1.5)
EGFR: 73 ML/MIN/1.73M2 — SIGNIFICANT CHANGE UP
EOSINOPHIL # BLD AUTO: 0.04 K/UL — SIGNIFICANT CHANGE UP (ref 0–0.7)
EOSINOPHIL NFR BLD AUTO: 0.5 % — SIGNIFICANT CHANGE UP (ref 0–8)
GLUCOSE SERPL-MCNC: 113 MG/DL — HIGH (ref 70–99)
HCT VFR BLD CALC: 42.7 % — SIGNIFICANT CHANGE UP (ref 37–47)
HGB BLD-MCNC: 15.1 G/DL — SIGNIFICANT CHANGE UP (ref 12–16)
IMM GRANULOCYTES NFR BLD AUTO: 0.2 % — SIGNIFICANT CHANGE UP (ref 0.1–0.3)
LYMPHOCYTES # BLD AUTO: 2.42 K/UL — SIGNIFICANT CHANGE UP (ref 1.2–3.4)
LYMPHOCYTES # BLD AUTO: 29.9 % — SIGNIFICANT CHANGE UP (ref 20.5–51.1)
MCHC RBC-ENTMCNC: 33.1 PG — HIGH (ref 27–31)
MCHC RBC-ENTMCNC: 35.4 G/DL — SIGNIFICANT CHANGE UP (ref 32–37)
MCV RBC AUTO: 93.6 FL — SIGNIFICANT CHANGE UP (ref 81–99)
MONOCYTES # BLD AUTO: 0.71 K/UL — HIGH (ref 0.1–0.6)
MONOCYTES NFR BLD AUTO: 8.8 % — SIGNIFICANT CHANGE UP (ref 1.7–9.3)
NEUTROPHILS # BLD AUTO: 4.86 K/UL — SIGNIFICANT CHANGE UP (ref 1.4–6.5)
NEUTROPHILS NFR BLD AUTO: 60.1 % — SIGNIFICANT CHANGE UP (ref 42.2–75.2)
NRBC # BLD: 0 /100 WBCS — SIGNIFICANT CHANGE UP (ref 0–0)
PLATELET # BLD AUTO: 238 K/UL — SIGNIFICANT CHANGE UP (ref 130–400)
PMV BLD: 9.7 FL — SIGNIFICANT CHANGE UP (ref 7.4–10.4)
POTASSIUM SERPL-MCNC: 3.8 MMOL/L — SIGNIFICANT CHANGE UP (ref 3.5–5)
POTASSIUM SERPL-SCNC: 3.8 MMOL/L — SIGNIFICANT CHANGE UP (ref 3.5–5)
PROT SERPL-MCNC: 7.6 G/DL — SIGNIFICANT CHANGE UP (ref 6–8)
RBC # BLD: 4.56 M/UL — SIGNIFICANT CHANGE UP (ref 4.2–5.4)
RBC # FLD: 13 % — SIGNIFICANT CHANGE UP (ref 11.5–14.5)
SODIUM SERPL-SCNC: 141 MMOL/L — SIGNIFICANT CHANGE UP (ref 135–146)
WBC # BLD: 8.09 K/UL — SIGNIFICANT CHANGE UP (ref 4.8–10.8)
WBC # FLD AUTO: 8.09 K/UL — SIGNIFICANT CHANGE UP (ref 4.8–10.8)

## 2024-09-02 PROCEDURE — 70450 CT HEAD/BRAIN W/O DYE: CPT | Mod: MC

## 2024-09-02 PROCEDURE — 96374 THER/PROPH/DIAG INJ IV PUSH: CPT

## 2024-09-02 PROCEDURE — 96375 TX/PRO/DX INJ NEW DRUG ADDON: CPT

## 2024-09-02 PROCEDURE — 93010 ELECTROCARDIOGRAM REPORT: CPT

## 2024-09-02 PROCEDURE — 93005 ELECTROCARDIOGRAM TRACING: CPT

## 2024-09-02 PROCEDURE — 36415 COLL VENOUS BLD VENIPUNCTURE: CPT

## 2024-09-02 PROCEDURE — 70450 CT HEAD/BRAIN W/O DYE: CPT | Mod: 26,MC

## 2024-09-02 PROCEDURE — 99285 EMERGENCY DEPT VISIT HI MDM: CPT

## 2024-09-02 PROCEDURE — 85025 COMPLETE CBC W/AUTO DIFF WBC: CPT

## 2024-09-02 PROCEDURE — 99285 EMERGENCY DEPT VISIT HI MDM: CPT | Mod: 25

## 2024-09-02 PROCEDURE — 80053 COMPREHEN METABOLIC PANEL: CPT

## 2024-09-02 RX ORDER — DIPHENHYDRAMINE HCL 50 MG
50 CAPSULE ORAL ONCE
Refills: 0 | Status: COMPLETED | OUTPATIENT
Start: 2024-09-02 | End: 2024-09-02

## 2024-09-02 RX ORDER — METOCLOPRAMIDE HCL 5 MG
10 TABLET ORAL ONCE
Refills: 0 | Status: COMPLETED | OUTPATIENT
Start: 2024-09-02 | End: 2024-09-02

## 2024-09-02 RX ORDER — ACETAMINOPHEN 325 MG/1
650 TABLET ORAL ONCE
Refills: 0 | Status: COMPLETED | OUTPATIENT
Start: 2024-09-02 | End: 2024-09-02

## 2024-09-02 RX ORDER — TRAMADOL HYDROCHLORIDE 200 MG/1
25 TABLET, EXTENDED RELEASE ORAL ONCE
Refills: 0 | Status: DISCONTINUED | OUTPATIENT
Start: 2024-09-02 | End: 2024-09-02

## 2024-09-02 RX ORDER — TRAMADOL HYDROCHLORIDE 200 MG/1
1 TABLET, EXTENDED RELEASE ORAL
Qty: 9 | Refills: 0
Start: 2024-09-02 | End: 2024-09-04

## 2024-09-02 RX ADMIN — TRAMADOL HYDROCHLORIDE 25 MILLIGRAM(S): 200 TABLET, EXTENDED RELEASE ORAL at 04:40

## 2024-09-02 RX ADMIN — ACETAMINOPHEN 650 MILLIGRAM(S): 325 TABLET ORAL at 03:20

## 2024-09-02 RX ADMIN — Medication 104 MILLIGRAM(S): at 03:20

## 2024-09-02 RX ADMIN — Medication 1000 MILLILITER(S): at 03:20

## 2024-09-02 RX ADMIN — Medication 50 MILLIGRAM(S): at 03:20

## 2024-09-02 NOTE — ED ADULT NURSE NOTE - BEFAST ARM NUMBNESS
I have reviewed discharge instructions with the patient. The patient verbalized understanding. Discharge medications reviewed with patient and appropriate educational materials regarding medications and side effects teaching were provided. Site care instructions reviewed with patient. Pain management teaching completed. Patient instructed to make follow up appointments per discharge instructions. Patient belongings packed up and accounted for with patient and family. All patient belongings sent home with patient. Telemetry monitor and wires removed      Patient signed discharge instructions after reviewing them, and duplicate copy placed in chart. No

## 2024-09-02 NOTE — ED PROVIDER NOTE - OBJECTIVE STATEMENT
59 year old female PMHx HTN, HLD, Seizure disorder (last seizure 15 years ago), Anxiety, Depression Presents today for a headache and high blood pressure.  Patient states she woke up with with what was worse than her typical migraine headache that was not responsive to abortive medications.  Patient checked her blood pressure throughout the day and noticed it became higher eventually reaching 180s at home.  Patient denying visual disturbances nausea vomiting chest pain abdominal symptoms.

## 2024-09-02 NOTE — ED PROVIDER NOTE - PHYSICAL EXAMINATION
CONSTITUTIONAL: Well-developed; well-nourished;   SKIN: warm, dry  HEAD: Normocephalic; atraumatic.  EYES: PERRL, EOMI, no conjunctival erythema  ENT: No nasal discharge; airway clear.  NECK: Supple; non tender.  CARD: Regular rate and rhythm.   RESP: No wheezes, rales or rhonchi.  ABD: soft ntnd  EXT: Normal ROM.  No clubbing, cyanosis or edema.   LYMPH: No acute cervical adenopathy.  NEURO: Alert, oriented, grossly unremarkable  PSYCH: Cooperative, appropriate.

## 2024-09-02 NOTE — ED PROVIDER NOTE - PATIENT PORTAL LINK FT
You can access the FollowMyHealth Patient Portal offered by [facility] by registering at the following website: [email or web address]. By joining [employer]’s FollowMyHealth portal, you will also be able to view your health information using other applications (apps) compatible with our system.

## 2024-09-02 NOTE — ED ADULT NURSE NOTE - NSFALLUNIVINTERV_ED_ALL_ED
Monitor gait and stability/Monitor for mental status changes and reorient to person, place, and time, as needed/Bed/Stretcher in lowest position, wheels locked, appropriate side rails in place/Call bell, personal items and telephone in reach/Instruct patient to call for assistance before getting out of bed/chair/stretcher/Non-slip footwear applied when patient is off stretcher/[place] to call system/Physically safe environment - no spills, clutter or unnecessary equipment/Purposeful proactive rounding/Room/bathroom lighting operational, light cord in reach

## 2024-09-02 NOTE — ED PROVIDER NOTE - CLINICAL SUMMARY MEDICAL DECISION MAKING FREE TEXT BOX
Patient with history of migraines hypertension dyslipidemia seizure disorder follows with neurology presenting for evaluation headache.  States that this is worse than her typical migraines.  Took Fioricet Ubrelvy with no improvement.  Associated light sensitivity and sound sensitivity.  No numbness/focal weakness.  Also noted to be hypertensive at home.  No fevers chills.  Uncomfortable appearing, NAD, non toxic. NCAT PERRLA EOMI neck supple non tender normal wob cta bl rrr abdomen s nt nd no rebound no guarding WWPx4 neuro non focal 2+ equal pulses, less than 2-second capillary fill.  Labs imaging reviewed.  Patient feeling improved.  Neuroexam nonfocal.  Aware of all results, given a copy of all available results, comfortable with discharge and follow-up outpatient, strict return precautions given. Endorses understanding and aware they can return at any time for further evaluation. No further questions or concerns at this time.

## 2024-09-09 ENCOUNTER — APPOINTMENT (OUTPATIENT)
Dept: GASTROENTEROLOGY | Facility: CLINIC | Age: 59
End: 2024-09-09

## 2024-09-26 ENCOUNTER — APPOINTMENT (OUTPATIENT)
Dept: CARDIOLOGY | Facility: CLINIC | Age: 59
End: 2024-09-26

## 2024-10-02 RX ORDER — BENZONATATE 100 MG/1
100 CAPSULE ORAL
Refills: 0 | Status: ACTIVE | COMMUNITY

## 2024-10-02 RX ORDER — LINACLOTIDE 72 UG/1
72 CAPSULE, GELATIN COATED ORAL
Refills: 0 | Status: ACTIVE | COMMUNITY

## 2024-10-02 RX ORDER — NALOXONE HCL 0.4 MG/ML
0.4 AMPUL (ML) INJECTION
Refills: 0 | Status: ACTIVE | COMMUNITY

## 2024-10-03 ENCOUNTER — NON-APPOINTMENT (OUTPATIENT)
Age: 59
End: 2024-10-03

## 2024-10-03 ENCOUNTER — APPOINTMENT (OUTPATIENT)
Dept: CARDIOLOGY | Facility: CLINIC | Age: 59
End: 2024-10-03
Payer: MEDICARE

## 2024-10-03 VITALS
DIASTOLIC BLOOD PRESSURE: 80 MMHG | HEIGHT: 62 IN | SYSTOLIC BLOOD PRESSURE: 144 MMHG | BODY MASS INDEX: 28.52 KG/M2 | WEIGHT: 155 LBS

## 2024-10-03 DIAGNOSIS — I10 ESSENTIAL (PRIMARY) HYPERTENSION: ICD-10-CM

## 2024-10-03 DIAGNOSIS — I25.10 ATHEROSCLEROTIC HEART DISEASE OF NATIVE CORONARY ARTERY W/OUT ANGINA PECTORIS: ICD-10-CM

## 2024-10-03 PROCEDURE — 93000 ELECTROCARDIOGRAM COMPLETE: CPT

## 2024-10-03 PROCEDURE — 99214 OFFICE O/P EST MOD 30 MIN: CPT

## 2024-10-03 PROCEDURE — G2211 COMPLEX E/M VISIT ADD ON: CPT

## 2024-10-03 RX ORDER — PREDNISOLONE ACETATE 10 MG/ML
1 SUSPENSION/ DROPS OPHTHALMIC
Refills: 0 | Status: ACTIVE | COMMUNITY

## 2024-10-03 RX ORDER — ATOGEPANT 60 MG/1
60 TABLET ORAL
Refills: 0 | Status: ACTIVE | COMMUNITY

## 2024-10-03 RX ORDER — CLONIDINE HYDROCHLORIDE 0.2 MG/1
0.2 TABLET ORAL
Qty: 1 | Refills: 5 | Status: ACTIVE | COMMUNITY
Start: 2024-10-03 | End: 1900-01-01

## 2024-10-03 RX ORDER — PROPRANOLOL HYDROCHLORIDE 60 MG/1
60 TABLET ORAL
Refills: 0 | Status: ACTIVE | COMMUNITY
Start: 2024-10-03

## 2024-10-03 NOTE — DISCUSSION/SUMMARY
[FreeTextEntry1] : Uncontrolled HTN Nonobstructive CAD  BP log reviewed   Start Clonidine Continue Propranolol and Amlodipine Continue aspirin and Atorvastatin Follow-up 2 months

## 2024-10-03 NOTE — REVIEW OF SYSTEMS
[Negative] : Psychiatric [Headache] : headache [FreeTextEntry5] : See HPI [FreeTextEntry6] : See HPI [FreeTextEntry7] : See HPI [FreeTextEntry9] : +arthritis

## 2024-10-03 NOTE — PHYSICAL EXAM
[Well Developed] : well developed [No Acute Distress] : no acute distress [Normal Conjunctiva] : normal conjunctiva [No Carotid Bruit] : no carotid bruit [Normal S1, S2] : normal S1, S2 [No Murmur] : no murmur [Clear Lung Fields] : clear lung fields [Good Air Entry] : good air entry [No Respiratory Distress] : no respiratory distress  [Soft] : abdomen soft [Non Tender] : non-tender [No Edema] : no edema [No Cyanosis] : no cyanosis [No Rash] : no rash [Moves all extremities] : moves all extremities [No Focal Deficits] : no focal deficits [Alert and Oriented] : alert and oriented

## 2024-10-03 NOTE — HISTORY OF PRESENT ILLNESS
[FreeTextEntry1] : History of nonobstructive CAD, HTN, GERD, COPD, seizure disorder, current smoker, chronic arthritic pain.  Recent admission for AMS, hypertensive urgency and seizures.  CTH/MRI negative for stroke.  Nifedipine discontinued and Amlodipine started.  Second admission for uncontrolled hypertension.  Toprol changed to Propranolol.  Frequent migraines.  No CP, SOB, palpitations, orthopnea/PND, dizziness or syncope.   ECHO (9/2019):  EF >55%, Mild-mod AI CCTA (2019):  pLAD <50%, CACS 16 Adenosine NST (4/2021):  No ischemia LHC (1/2017):  Diffuse minimal luminal irregularities

## 2024-10-16 NOTE — ASU DISCHARGE PLAN (ADULT/PEDIATRIC). - NS DC INTERPRETER YES NO
Brief Operative Note    Patient: Jaime Cortes 67 year old male    MRN: [id number]    Surgeon(s): Maddy Allan MD  Phone Number: [phone]                       Surgeons and Role:     * Maddy Allan MD - Primary    Assistant(s): Rosalinda BELL    OLIVERIO CANC ALLOGRAFT VIABLE - [id number] key portions         No

## 2024-10-17 ENCOUNTER — EMERGENCY (EMERGENCY)
Facility: HOSPITAL | Age: 59
LOS: 0 days | Discharge: HOME | End: 2024-10-17
Attending: EMERGENCY MEDICINE
Payer: MEDICARE

## 2024-10-17 VITALS
HEIGHT: 62 IN | RESPIRATION RATE: 18 BRPM | OXYGEN SATURATION: 98 % | DIASTOLIC BLOOD PRESSURE: 73 MMHG | SYSTOLIC BLOOD PRESSURE: 110 MMHG | TEMPERATURE: 98 F | WEIGHT: 149.91 LBS | HEART RATE: 74 BPM

## 2024-10-17 DIAGNOSIS — F41.9 ANXIETY DISORDER, UNSPECIFIED: ICD-10-CM

## 2024-10-17 DIAGNOSIS — G40.909 EPILEPSY, UNSPECIFIED, NOT INTRACTABLE, WITHOUT STATUS EPILEPTICUS: ICD-10-CM

## 2024-10-17 DIAGNOSIS — F32.A DEPRESSION, UNSPECIFIED: ICD-10-CM

## 2024-10-17 DIAGNOSIS — R05.9 COUGH, UNSPECIFIED: ICD-10-CM

## 2024-10-17 DIAGNOSIS — Z98.890 OTHER SPECIFIED POSTPROCEDURAL STATES: Chronic | ICD-10-CM

## 2024-10-17 DIAGNOSIS — Z90.711 ACQUIRED ABSENCE OF UTERUS WITH REMAINING CERVICAL STUMP: Chronic | ICD-10-CM

## 2024-10-17 DIAGNOSIS — I10 ESSENTIAL (PRIMARY) HYPERTENSION: ICD-10-CM

## 2024-10-17 DIAGNOSIS — Z88.1 ALLERGY STATUS TO OTHER ANTIBIOTIC AGENTS: ICD-10-CM

## 2024-10-17 DIAGNOSIS — R06.02 SHORTNESS OF BREATH: ICD-10-CM

## 2024-10-17 DIAGNOSIS — Z88.0 ALLERGY STATUS TO PENICILLIN: ICD-10-CM

## 2024-10-17 DIAGNOSIS — Z90.49 ACQUIRED ABSENCE OF OTHER SPECIFIED PARTS OF DIGESTIVE TRACT: Chronic | ICD-10-CM

## 2024-10-17 DIAGNOSIS — J45.901 UNSPECIFIED ASTHMA WITH (ACUTE) EXACERBATION: ICD-10-CM

## 2024-10-17 DIAGNOSIS — Z86.69 PERSONAL HISTORY OF OTHER DISEASES OF THE NERVOUS SYSTEM AND SENSE ORGANS: ICD-10-CM

## 2024-10-17 DIAGNOSIS — K21.9 GASTRO-ESOPHAGEAL REFLUX DISEASE WITHOUT ESOPHAGITIS: ICD-10-CM

## 2024-10-17 DIAGNOSIS — G47.33 OBSTRUCTIVE SLEEP APNEA (ADULT) (PEDIATRIC): ICD-10-CM

## 2024-10-17 DIAGNOSIS — E78.5 HYPERLIPIDEMIA, UNSPECIFIED: ICD-10-CM

## 2024-10-17 DIAGNOSIS — Z88.6 ALLERGY STATUS TO ANALGESIC AGENT: ICD-10-CM

## 2024-10-17 PROCEDURE — 93010 ELECTROCARDIOGRAM REPORT: CPT

## 2024-10-17 PROCEDURE — 99285 EMERGENCY DEPT VISIT HI MDM: CPT

## 2024-10-17 PROCEDURE — 94640 AIRWAY INHALATION TREATMENT: CPT

## 2024-10-17 PROCEDURE — 99283 EMERGENCY DEPT VISIT LOW MDM: CPT | Mod: 25

## 2024-10-17 PROCEDURE — 71046 X-RAY EXAM CHEST 2 VIEWS: CPT | Mod: 26

## 2024-10-17 PROCEDURE — 93005 ELECTROCARDIOGRAM TRACING: CPT

## 2024-10-17 PROCEDURE — 71046 X-RAY EXAM CHEST 2 VIEWS: CPT

## 2024-10-17 RX ORDER — PREDNISONE 5 MG/1
1 TABLET ORAL
Qty: 5 | Refills: 0
Start: 2024-10-17 | End: 2024-10-21

## 2024-10-17 RX ORDER — IPRATROPIUM BROMIDE AND ALBUTEROL SULFATE .5; 3 MG/3ML; MG/3ML
3 SOLUTION RESPIRATORY (INHALATION) ONCE
Refills: 0 | Status: COMPLETED | OUTPATIENT
Start: 2024-10-17 | End: 2024-10-17

## 2024-10-17 RX ADMIN — IPRATROPIUM BROMIDE AND ALBUTEROL SULFATE 3 MILLILITER(S): .5; 3 SOLUTION RESPIRATORY (INHALATION) at 12:47

## 2024-10-17 RX ADMIN — Medication 10 MILLIGRAM(S): at 13:12

## 2024-10-17 NOTE — ED PROVIDER NOTE - PATIENT PORTAL LINK FT
You can access the FollowMyHealth Patient Portal offered by [facility] by registering at the following website: [email or web address]. By joining [employer]’s FollowMyHealth portal, you will also be able to view your health information using other applications (apps) compatible with our system.

## 2024-10-17 NOTE — ED PROVIDER NOTE - PHYSICAL EXAMINATION
VITAL SIGNS: I have reviewed nursing notes and confirm.  CONSTITUTIONAL: well-appearing, non-toxic, NAD  SKIN: Warm dry, normal skin turgor  HEAD: NCAT  CARD: RRR, no murmurs, rubs or gallops  RESP: Audible wheezing, in no acute respiratory distress, expiratory wheezing to the left lung.  Decreased lung sounds bilaterally.  No accessory muscle use.  Patient in no acute distress.  ABD: soft,  non-tender, non-distended, no rebound or guarding.  PSYCH: Cooperative, appropriate.

## 2024-10-17 NOTE — ED PROVIDER NOTE - DIFFERENTIAL DIAGNOSIS
Differential Diagnosis The differential diagnosis for patients clinical presentation includes but is not limited to:  asthma exacerbation   pna  ptx  viral illness

## 2024-10-17 NOTE — ED ADULT TRIAGE NOTE - GLASGOW COMA SCALE: BEST VERBAL RESPONSE, MLM
(V5) oriented Dutasteride Pregnancy And Lactation Text: This medication is absolutely contraindicated in women, especially during pregnancy and breast feeding. Feminization of male fetuses is possible if taking while pregnant.

## 2024-10-17 NOTE — ED PROVIDER NOTE - OBJECTIVE STATEMENT
59-year-old female with past medical history of asthma ( no hx of intubations), SARKIS, hypertension, sciatica, GERD, reportedly every day smoker, seizures, hyperlipidemia, migraines, presents to the ED due to 3 days of shortness of breath and cough.  Patient states she went to to an urgent care today where they gave her 1 DuoNeb treatment and did a chest x-ray that was normal.  Per patient, patient was told from urgent care to come to ED.  Patient verbalizes improvement of symptoms after she was given 1 resp treatment in urgent care.  Denies any fever, chest pain, abdominal pain or other complaints.

## 2024-10-17 NOTE — ED PROVIDER NOTE - CLINICAL SUMMARY MEDICAL DECISION MAKING FREE TEXT BOX
59-year-old female with past medical history of seizure disorder, high blood pressure, hyperlipidemia, anxiety, depression, asthma, current smoker, never been intubated, no recent hospitalizations for asthma, not on steroids, presents for shortness of breath associated with dry cough for 3 days, went to urgent care received a DuoNeb nebulizer, had EKG and chest x-ray that were normal, was still short of breath so sent to the ED.  Patient states she is actually feeling better at this time, mild shortness of breath with dry cough.  No recent travel, no hemoptysis, no family history of PE, DVT, or clotting disorders.  No fever, chills, n/v, cp,  pleuritic cp, palpitations, diaphoresis,  ha/lh/dizziness, numbness/tingling, neck pain/ stiffness, abd pain, diarrhea, constipation, melena/brbpr, urinary symptoms, trauma, weakness, edema, calf pain/swelling/erythema, sick contacts, recent travel or rash.    On Exam:  Vital Signs: I have reviewed the initial vital signs. Constitutional: Non toxic appearing pt sitting on stretcher speaking full sentences. Integumentary: No rash. ENT: MMM NECK: Supple, non-tender, no meningeal signs. Cardiovascular: RRR, radial pulses 2/4 b/l. No JVD. Respiratory: BS present b/l, minimal wheezing present b/l, worse on L,  no accessory muscle use, no stridor. Gastrointestinal: BS present throughout all 4 quadrants, soft, nd, nt, no rebound tenderness or guarding, no cvat. Musculoskeletal: FROM, no edema, no calf pain/swelling/erythema. Neurologic: AAOx3, motor 5/5 and sensation intact throughout upper and lowe ext, CN II-XII intact, No facial droop or slurring of speech. No focal deficits.    Plan: Monitor, EKG, CXR, duo nebs, steroids, reassess.      EKG were ordered and reviewed.  Imaging was ordered and reviewed by me.  Appropriate medications for patient's presenting complaints were ordered and effects were reassessed.  Patient's records (prior hospital, ED visit) were reviewed.  Additional history was obtained from family. 59-year-old female with past medical history of seizure disorder, high blood pressure, hyperlipidemia, anxiety, depression, asthma, current smoker, never been intubated, no recent hospitalizations for asthma, not on steroids, presents for shortness of breath associated with dry cough for 3 days, went to urgent care received a DuoNeb nebulizer, had EKG and chest x-ray that were normal, was still short of breath so sent to the ED.  Patient states she is actually feeling better at this time, mild shortness of breath with dry cough.  No recent travel, no hemoptysis, no family history of PE, DVT, or clotting disorders.  No fever, chills, n/v, cp,  pleuritic cp, palpitations, diaphoresis,  ha/lh/dizziness, numbness/tingling, neck pain/ stiffness, abd pain, diarrhea, constipation, melena/brbpr, urinary symptoms, trauma, weakness, edema, calf pain/swelling/erythema, sick contacts, recent travel or rash.    On Exam:  Vital Signs: I have reviewed the initial vital signs. Constitutional: Non toxic appearing pt sitting on stretcher speaking full sentences. Integumentary: No rash. ENT: MMM NECK: Supple, non-tender, no meningeal signs. Cardiovascular: RRR, radial pulses 2/4 b/l. No JVD. Respiratory: BS present b/l, minimal wheezing present b/l, worse on L,  no accessory muscle use, no stridor. Gastrointestinal: BS present throughout all 4 quadrants, soft, nd, nt, no rebound tenderness or guarding, no cvat. Musculoskeletal: FROM, no edema, no calf pain/swelling/erythema. Neurologic: AAOx3, motor 5/5 and sensation intact throughout upper and lowe ext, CN II-XII intact, No facial droop or slurring of speech. No focal deficits.    Plan: Monitor, EKG, CXR, duo nebs, steroids, reassess.      EKG were ordered and reviewed.  Imaging was ordered and reviewed by me.  Appropriate medications for patient's presenting complaints were ordered and effects were reassessed.  Patient's records (prior hospital, ED visit) were reviewed.  Additional history was obtained from family. Escalation to admission/observation was considered. However patient feels much better and is comfortable with discharge.  Appropriate follow-up was arranged.  Supportive care and home care discussed in detail. Patient aware they may have to return for re-evaluation and possible admission if outpatient treatment fails. Strict return precautions discussed.  Patient reports feeling much better, aware of signs and symptoms to return for, smoking cessation discussed, does not have a pulmonologist, given referral, states will follow-up with PMD and pulmonary as discussed, patient comfortable discharge and would like to go home.

## 2024-10-17 NOTE — ED PROVIDER NOTE - EKG/XRAY ADDITIONAL INFORMATION
no ptx or effusion no ptx or effusion  normal sinus rhythm at 79 bpm, PA interval 154, QRS 76, QTc 454 no ST elevations or depressions.

## 2024-10-17 NOTE — ED PROVIDER NOTE - PROGRESS NOTE DETAILS
ED Attending SHANON Garrido  Lung reexamination clear to auscultation bilaterally, saturation 99 at rest and with exertion, patient reports feeling much better, ED Attending SHANON Garrido  Patient reports feeling much better, aware of signs and symptoms to return for, smoking cessation discussed, does not have a pulmonologist, given referral, states will follow-up with PMD and pulmonary as discussed, patient comfortable discharge and would like to go home.

## 2024-10-17 NOTE — ED PROVIDER NOTE - ATTENDING CONTRIBUTION TO CARE
59-year-old female with past medical history of seizure disorder, high blood pressure, hyperlipidemia, anxiety, depression, asthma, current smoker, never been intubated, no recent hospitalizations for asthma, not on steroids, presents for shortness of breath associated with dry cough for 3 days, went to urgent care received a DuoNeb nebulizer, had EKG and chest x-ray that were normal, was still short of breath so sent to the ED.  Patient states she is actually feeling better at this time, mild shortness of breath with dry cough.  No recent travel, no hemoptysis, no family history of PE, DVT, or clotting disorders.  No fever, chills, n/v, cp,  pleuritic cp, palpitations, diaphoresis,  ha/lh/dizziness, numbness/tingling, neck pain/ stiffness, abd pain, diarrhea, constipation, melena/brbpr, urinary symptoms, trauma, weakness, edema, calf pain/swelling/erythema, sick contacts, recent travel or rash.    On Exam:  Vital Signs: I have reviewed the initial vital signs. Constitutional: Non toxic appearing pt sitting on stretcher speaking full sentences. Integumentary: No rash. ENT: MMM NECK: Supple, non-tender, no meningeal signs. Cardiovascular: RRR, radial pulses 2/4 b/l. No JVD. Respiratory: BS present b/l, minimal wheezing present b/l, worse on L,  no accessory muscle use, no stridor. Gastrointestinal: BS present throughout all 4 quadrants, soft, nd, nt, no rebound tenderness or guarding, no cvat. Musculoskeletal: FROM, no edema, no calf pain/swelling/erythema. Neurologic: AAOx3, motor 5/5 and sensation intact throughout upper and lowe ext, CN II-XII intact, No facial droop or slurring of speech. No focal deficits.    Plan: Monitor, EKG, CXR, duo nebs, steroids, reassess.

## 2024-10-17 NOTE — ED PROVIDER NOTE - NSFOLLOWUPINSTRUCTIONS_ED_ALL_ED_FT
Our Emergency Department Referral Coordinators will be reaching out to you in the next 24-48 hours from 9:00am to 5:00pm with a follow up appointment. Please expect a phone call from the hospital in that time frame. If you do not receive a call or if you have any questions or concerns, you can reach them at   [phone]     Asthma, Adult  Outline of a person's upper body showing the lungs, with close-ups of two airways, one normal and one narrow.  Asthma is a long-term (chronic) condition that causes recurrent episodes in which the lower airways in the lungs become tight and narrow. The narrowing is caused by inflammation and tightening of the smooth muscle around the lower airways.    Asthma episodes, also called asthma attacks or asthma flares, may cause coughing, making high-pitched whistling sounds when you breathe, most often when you breathe out (wheezing), shortness of breath, and chest pain. The airways may produce extra mucus caused by the inflammation and irritation. During an attack, it can be difficult to breathe. Asthma attacks can range from minor to life-threatening.    Asthma cannot be cured, but medicines and lifestyle changes can help control it and treat acute attacks. It is important to keep your asthma well controlled so the condition does not interfere with your daily life.    What are the causes?  This condition is believed to be caused by inherited (genetic) and environmental factors, but its exact cause is not known.    What can trigger an asthma attack?    Many things can bring on an asthma attack or make symptoms worse. These triggers are different for every person. Common triggers include:  Allergens and irritants like mold, dust, pet dander, cockroaches, pollen, air pollution, and chemical odors.  Cigarette smoke.  Weather changes and cold air.  Stress and strong emotional responses such as crying or laughing hard.  Certain medications such as aspirin or beta blockers.  Infections and inflammatory conditions, such as the flu, a cold, pneumonia, or inflammation of the nasal membranes (rhinitis).  Gastroesophageal reflux disease (GERD).  What are the signs or symptoms?  Symptoms may occur right after exposure to an asthma trigger or hours later and can vary by person. Common signs and symptoms include:  Wheezing.  Trouble breathing (shortness of breath).  Excessive nighttime or early morning coughing.  Chest tightness.  Tiredness (fatigue) with minimal activity.  Difficulty talking in complete sentences.  Poor exercise tolerance.  How is this diagnosed?  This condition is diagnosed based on:  A physical exam and your medical history.  Tests, which may include:  Lung function studies to evaluate the flow of air in your lungs.  Allergy tests.  Imaging tests, such as X-rays.  How is this treated?  There is no cure, but symptoms can be controlled with proper treatment. Treatment usually involves:  Identifying and avoiding your asthma triggers.  Inhaled medicines. Two types are commonly used to treat asthma, depending on severity:  Controller medicines. These help prevent asthma symptoms from occurring. They are taken every day.  Fast-acting reliever or rescue medicines. These quickly relieve asthma symptoms. They are used as needed and provide short-term relief.  Using other medicines, such as:  Allergy medicines, such as antihistamines, if your asthma attacks are triggered by allergens.  Immune medicines (immunomodulators). These are medicines that help control the immune system.  Using supplemental oxygen. This is only needed during a severe episode.  Creating an asthma action plan. An asthma action plan is a written plan for managing and treating your asthma attacks. This plan includes:  A list of your asthma triggers and how to avoid them.  Information about when medicines should be taken and when their dosage should be changed.  Instructions about using a device called a peak flow meter. A peak flow meter measures how well the lungs are working and the severity of your asthma. It helps you monitor your condition.  Follow these instructions at home:  Take over-the-counter and prescription medicines only as told by your health care provider.  Stay up to date on all vaccinations as recommended by your healthcare provider, including vaccines for the flu and pneumonia.  Use a peak flow meter and keep track of your peak flow readings.  Understand and use your asthma action plan to address any asthma flares.  Do not smoke or allow anyone to smoke in your home.  Contact a health care provider if:  You have wheezing, shortness of breath, or a cough that is not responding to medicines.  Your medicines are causing side effects, such as a rash, itching, swelling, or trouble breathing.  You need to use a reliever medicine more than 2–3 times a week.  Your peak flow reading is still at 50–79% of your personal best after following your action plan for 1 hour.  You have a fever and shortness of breath.  Get help right away if:  You are getting worse and do not respond to treatment during an asthma attack.  You are short of breath when at rest or when doing very little physical activity.  You have difficulty eating, drinking, or talking.  You have chest pain or tightness.  You develop a fast heartbeat or palpitations.  You have a bluish color to your lips or fingernails.  You are light-headed or dizzy, or you faint.  Your peak flow reading is less than 50% of your personal best.  You feel too tired to breathe normally.  These symptoms may be an emergency. Get help right away. Call 911.  Do not wait to see if the symptoms will go away.  Do not drive yourself to the hospital.  Summary  Asthma is a long-term (chronic) condition that causes recurrent episodes in which the airways become tight and narrow. Asthma episodes, also called asthma attacks or asthma flares, can cause coughing, wheezing, shortness of breath, and chest pain.  Asthma cannot be cured, but medicines and lifestyle changes can help keep it well controlled and prevent asthma flares.  Make sure you understand how to avoid triggers and how and when to use your medicines.  Asthma attacks can range from minor to life-threatening. Get help right away if you have an asthma attack and do not respond to treatment with your usual rescue medicines.  This information is not intended to replace advice given to you by your health care provider. Make sure you discuss any questions you have with your health care provider.

## 2024-10-18 NOTE — CHART NOTE - NSCHARTNOTESELECT_GEN_ALL_CORE
Non Clinical Note/Event Note Spiral Flap Text: The defect edges were debeveled with a #15 scalpel blade.  Given the location of the defect, shape of the defect and the proximity to free margins a spiral flap was deemed most appropriate.  Using a sterile surgical marker, an appropriate rotation flap was drawn incorporating the defect and placing the expected incisions within the relaxed skin tension lines where possible. The area thus outlined was incised deep to adipose tissue with a #15 scalpel blade.  The skin margins were undermined to an appropriate distance in all directions utilizing iris scissors.

## 2024-10-23 ENCOUNTER — APPOINTMENT (OUTPATIENT)
Dept: ORTHOPEDIC SURGERY | Facility: CLINIC | Age: 59
End: 2024-10-23
Payer: OTHER MISCELLANEOUS

## 2024-10-23 DIAGNOSIS — M54.12 RADICULOPATHY, CERVICAL REGION: ICD-10-CM

## 2024-10-23 DIAGNOSIS — S93.409S SPRAIN OF UNSPECIFIED LIGAMENT OF UNSPECIFIED ANKLE, SEQUELA: ICD-10-CM

## 2024-10-23 DIAGNOSIS — M54.16 RADICULOPATHY, LUMBAR REGION: ICD-10-CM

## 2024-10-23 DIAGNOSIS — M17.0 BILATERAL PRIMARY OSTEOARTHRITIS OF KNEE: ICD-10-CM

## 2024-10-23 DIAGNOSIS — M77.8 OTHER ENTHESOPATHIES, NOT ELSEWHERE CLASSIFIED: ICD-10-CM

## 2024-10-23 PROCEDURE — 99213 OFFICE O/P EST LOW 20 MIN: CPT

## 2024-11-01 NOTE — DISCUSSION/SUMMARY
[FreeTextEntry1] : Continue Metoprolol succinate 50 mg daily\par Continue Nifedipine 60 mg BID\par Continue aspirin 81 mg\par \par RCRI = 2, Class II risk, 6% risk (30-day risk of death, MI, or cardiac arrest)\par Moderate-risk for perioperative MACE DISPLAY PLAN FREE TEXT

## 2024-12-12 ENCOUNTER — NON-APPOINTMENT (OUTPATIENT)
Age: 59
End: 2024-12-12

## 2024-12-12 ENCOUNTER — APPOINTMENT (OUTPATIENT)
Dept: CARDIOLOGY | Facility: CLINIC | Age: 59
End: 2024-12-12
Payer: MEDICARE

## 2024-12-12 VITALS
HEART RATE: 72 BPM | WEIGHT: 142 LBS | BODY MASS INDEX: 26.13 KG/M2 | HEIGHT: 62 IN | DIASTOLIC BLOOD PRESSURE: 76 MMHG | SYSTOLIC BLOOD PRESSURE: 130 MMHG

## 2024-12-12 DIAGNOSIS — I25.10 ATHEROSCLEROTIC HEART DISEASE OF NATIVE CORONARY ARTERY W/OUT ANGINA PECTORIS: ICD-10-CM

## 2024-12-12 DIAGNOSIS — I10 ESSENTIAL (PRIMARY) HYPERTENSION: ICD-10-CM

## 2024-12-12 PROCEDURE — G2211 COMPLEX E/M VISIT ADD ON: CPT

## 2024-12-12 PROCEDURE — 93000 ELECTROCARDIOGRAM COMPLETE: CPT

## 2024-12-12 PROCEDURE — 99214 OFFICE O/P EST MOD 30 MIN: CPT

## 2024-12-12 RX ORDER — NIFEDIPINE 60 MG/1
60 TABLET, EXTENDED RELEASE ORAL
Qty: 180 | Refills: 1 | Status: ACTIVE | COMMUNITY
Start: 2024-12-12 | End: 1900-01-01

## 2025-01-09 NOTE — ED PROVIDER NOTE - CARE PLAN
1 Principal Discharge DX:	Closed fracture of left femur, initial encounter  Secondary Diagnosis:	Closed fracture of left fibula and tibia   1 Principal Discharge DX:	Acute asthma exacerbation  Secondary Diagnosis:	Cough

## 2025-02-03 RX ORDER — LINACLOTIDE 145 UG/1
145 CAPSULE, GELATIN COATED ORAL
Qty: 3 | Refills: 1 | Status: ACTIVE | COMMUNITY
Start: 2025-02-03 | End: 1900-01-01

## 2025-02-04 ENCOUNTER — APPOINTMENT (OUTPATIENT)
Dept: CARDIOLOGY | Facility: CLINIC | Age: 60
End: 2025-02-04
Payer: MEDICARE

## 2025-02-04 VITALS
DIASTOLIC BLOOD PRESSURE: 72 MMHG | BODY MASS INDEX: 26.13 KG/M2 | HEIGHT: 62 IN | WEIGHT: 142 LBS | SYSTOLIC BLOOD PRESSURE: 140 MMHG

## 2025-02-04 DIAGNOSIS — I10 ESSENTIAL (PRIMARY) HYPERTENSION: ICD-10-CM

## 2025-02-04 PROCEDURE — 99214 OFFICE O/P EST MOD 30 MIN: CPT

## 2025-02-04 RX ORDER — NIFEDIPINE 90 MG/1
90 TABLET, EXTENDED RELEASE ORAL
Qty: 180 | Refills: 0 | Status: ACTIVE | COMMUNITY
Start: 2025-02-04 | End: 1900-01-01

## 2025-02-06 ENCOUNTER — APPOINTMENT (OUTPATIENT)
Dept: CARDIOLOGY | Facility: CLINIC | Age: 60
End: 2025-02-06

## 2025-02-06 VITALS
HEIGHT: 62 IN | SYSTOLIC BLOOD PRESSURE: 120 MMHG | WEIGHT: 142 LBS | DIASTOLIC BLOOD PRESSURE: 70 MMHG | BODY MASS INDEX: 26.13 KG/M2 | HEART RATE: 78 BPM

## 2025-02-14 ENCOUNTER — APPOINTMENT (OUTPATIENT)
Dept: GASTROENTEROLOGY | Facility: CLINIC | Age: 60
End: 2025-02-14

## 2025-02-14 DIAGNOSIS — K59.00 CONSTIPATION, UNSPECIFIED: ICD-10-CM

## 2025-02-14 DIAGNOSIS — R14.0 ABDOMINAL DISTENSION (GASEOUS): ICD-10-CM

## 2025-02-14 PROCEDURE — 99213 OFFICE O/P EST LOW 20 MIN: CPT | Mod: 93

## 2025-02-19 ENCOUNTER — APPOINTMENT (OUTPATIENT)
Dept: ORTHOPEDIC SURGERY | Facility: CLINIC | Age: 60
End: 2025-02-19
Payer: OTHER MISCELLANEOUS

## 2025-02-19 DIAGNOSIS — M17.0 BILATERAL PRIMARY OSTEOARTHRITIS OF KNEE: ICD-10-CM

## 2025-02-19 DIAGNOSIS — M77.8 OTHER ENTHESOPATHIES, NOT ELSEWHERE CLASSIFIED: ICD-10-CM

## 2025-02-19 DIAGNOSIS — M54.16 RADICULOPATHY, LUMBAR REGION: ICD-10-CM

## 2025-02-19 DIAGNOSIS — S93.409S SPRAIN OF UNSPECIFIED LIGAMENT OF UNSPECIFIED ANKLE, SEQUELA: ICD-10-CM

## 2025-02-19 DIAGNOSIS — M54.12 RADICULOPATHY, CERVICAL REGION: ICD-10-CM

## 2025-02-19 PROCEDURE — 73610 X-RAY EXAM OF ANKLE: CPT | Mod: RT

## 2025-02-19 PROCEDURE — 99213 OFFICE O/P EST LOW 20 MIN: CPT

## 2025-02-21 RX ORDER — PANTOPRAZOLE 40 MG/1
40 TABLET, DELAYED RELEASE ORAL
Qty: 30 | Refills: 2 | Status: ACTIVE | COMMUNITY
Start: 2025-02-21 | End: 1900-01-01

## 2025-02-26 ENCOUNTER — APPOINTMENT (OUTPATIENT)
Dept: CARDIOLOGY | Facility: CLINIC | Age: 60
End: 2025-02-26
Payer: MEDICARE

## 2025-02-26 VITALS — SYSTOLIC BLOOD PRESSURE: 130 MMHG | DIASTOLIC BLOOD PRESSURE: 70 MMHG

## 2025-02-26 VITALS
HEART RATE: 79 BPM | BODY MASS INDEX: 25.76 KG/M2 | DIASTOLIC BLOOD PRESSURE: 78 MMHG | SYSTOLIC BLOOD PRESSURE: 160 MMHG | HEIGHT: 62 IN | WEIGHT: 140 LBS

## 2025-02-26 DIAGNOSIS — I10 ESSENTIAL (PRIMARY) HYPERTENSION: ICD-10-CM

## 2025-02-26 PROCEDURE — G2211 COMPLEX E/M VISIT ADD ON: CPT

## 2025-02-26 PROCEDURE — 99214 OFFICE O/P EST MOD 30 MIN: CPT

## 2025-02-26 PROCEDURE — 93000 ELECTROCARDIOGRAM COMPLETE: CPT

## 2025-02-26 RX ORDER — VALSARTAN AND HYDROCHLOROTHIAZIDE 80; 12.5 MG/1; MG/1
80-12.5 TABLET, FILM COATED ORAL DAILY
Qty: 30 | Refills: 0 | Status: ACTIVE | COMMUNITY
Start: 2025-02-26 | End: 1900-01-01

## 2025-03-03 ENCOUNTER — APPOINTMENT (OUTPATIENT)
Dept: CARDIOLOGY | Facility: CLINIC | Age: 60
End: 2025-03-03
Payer: MEDICARE

## 2025-03-03 PROCEDURE — 93880 EXTRACRANIAL BILAT STUDY: CPT

## 2025-03-08 LAB
ANION GAP SERPL CALC-SCNC: 12 MMOL/L
BUN SERPL-MCNC: 22 MG/DL
CALCIUM SERPL-MCNC: 10 MG/DL
CHLORIDE SERPL-SCNC: 99 MMOL/L
CO2 SERPL-SCNC: 28 MMOL/L
CREAT SERPL-MCNC: 1.4 MG/DL
EGFR: 43 ML/MIN/1.73M2
GLUCOSE SERPL-MCNC: 101 MG/DL
POTASSIUM SERPL-SCNC: 3.9 MMOL/L
SODIUM SERPL-SCNC: 139 MMOL/L
TSH SERPL-ACNC: 2.6 UIU/ML

## 2025-03-12 ENCOUNTER — APPOINTMENT (OUTPATIENT)
Dept: CARDIOLOGY | Facility: CLINIC | Age: 60
End: 2025-03-12
Payer: MEDICARE

## 2025-03-12 VITALS
HEART RATE: 84 BPM | HEIGHT: 62 IN | SYSTOLIC BLOOD PRESSURE: 98 MMHG | BODY MASS INDEX: 25.4 KG/M2 | DIASTOLIC BLOOD PRESSURE: 56 MMHG | WEIGHT: 138 LBS

## 2025-03-12 DIAGNOSIS — I10 ESSENTIAL (PRIMARY) HYPERTENSION: ICD-10-CM

## 2025-03-12 PROCEDURE — 99213 OFFICE O/P EST LOW 20 MIN: CPT

## 2025-03-12 PROCEDURE — G2211 COMPLEX E/M VISIT ADD ON: CPT

## 2025-03-12 RX ORDER — VALSARTAN 80 MG/1
80 TABLET, COATED ORAL DAILY
Qty: 90 | Refills: 3 | Status: ACTIVE | COMMUNITY
Start: 2025-03-12 | End: 1900-01-01

## 2025-04-14 NOTE — PATIENT PROFILE ADULT - FALL HARM RISK - FALLEN IN PAST
2025       Tomi Jovel MD  [address] [place] [address] [address] [place] [address]  Via Fax: [phone]      Patient: Jarocho Hinds   YOB: 2018   Date of Visit: 2025       Dear Dr. Jovel:    Thank you for referring Jarocho Hinds to me for evaluation. Below are my notes for this visit with him.    If you have questions, please do not hesitate to call me. I look forward to following your patient along with you.      Sincerely,        Jenelle Swain MD        CC: No Recipients  Jenelle Swain MD  2025  6:19 PM  Signed  PULMONARY NOTE  Patient: Jarocho Hinds Date: 2025  : 2018   6 year old male     Chief Complaint   Patient presents with   • Office Visit   • Follow-up     Mild persistent asthma       Doing well   He has been having nocturnal symptoms for the past several days he has not been using any inhaled steroid  No reactions to foods  No sognificant allergies to environmentals    Asthma  Associated symptoms include coughing. Pertinent negatives include no sore throat. His past medical history is significant for asthma.       Visit Vitals  /75 (BP Location: RUE - Right upper extremity, Patient Position: Sitting, Cuff Size: Pediatric)   Pulse 98   Temp 99.5 °F (37.5 °C) (Temporal)   Resp 24   Ht 4' 0.82\" (1.24 m)   Wt 22.9 kg (50 lb 7.8 oz)   SpO2 98%   BMI 14.89 kg/m²       ALLERGIES:  Patient has no known allergies.     Current Outpatient Medications   Medication Sig Dispense Refill   • fluticasone propionate (FLOVENT HFA) 44 MCG/ACT inhaler Inhale 2 puffs into the lungs in the morning and 2 puffs in the evening. 10.6 g 5   • albuterol 108 (90 Base) MCG/ACT inhaler Inhale 2 puffs into the lungs every 4 hours as needed for Shortness of Breath or Wheezing (May take up to 4 puffs every 4 hours as needed.). 1 each 5   • hydrOXYzine (ATARAX) 10 MG/5ML syrup GIVE 7.5 ML BY MOUTH TWICE DAILY (Patient not taking: Reported on 2025)       No current facility-administered  medications for this visit.       History reviewed. No pertinent past medical history.    History reviewed. No pertinent surgical history.    Family History   Problem Relation Age of Onset   • Asthma Father         Social History     Socioeconomic History   • Marital status: Single     Spouse name: Not on file   • Number of children: Not on file   • Years of education: Not on file   • Highest education level: Not on file   Occupational History   • Not on file   Tobacco Use   • Smoking status: Not on file   • Smokeless tobacco: Not on file   Substance and Sexual Activity   • Alcohol use: Not on file   • Drug use: Not on file   • Sexual activity: Not on file   Other Topics Concern   • Not on file   Social History Narrative   • Not on file     Social Drivers of Health     Financial Resource Strain: Not on file   Food Insecurity: Not on file   Transportation Needs: Not on file   Physical Activity: Not on file   Stress: Not on file   Social Connections: Not on file   Feeling safe in your relationship: Not on file       Review of Systems   Constitutional:  Negative for malaise/fatigue and weight loss.   HENT:  Negative for ear discharge, nosebleeds, sinus pain and sore throat.    Eyes:  Negative for discharge and redness.   Respiratory:  Positive for cough. Negative for hemoptysis, sputum production and shortness of breath.    Cardiovascular: Negative.    Gastrointestinal:  Negative for constipation, diarrhea and heartburn.   Genitourinary: Negative.    Musculoskeletal: Negative.    Skin:  Negative for itching and rash.   Neurological: Negative.    Endo/Heme/Allergies:  Positive for environmental allergies.   Psychiatric/Behavioral:  Negative for depression, substance abuse and suicidal ideas. The patient is not nervous/anxious and does not have insomnia.         Physical Exam  Constitutional:       Appearance: He is well-developed.   HENT:      Right Ear: Tympanic membrane normal.      Left Ear: Tympanic membrane normal.       Mouth/Throat:      Tonsils: No tonsillar exudate.   Cardiovascular:      Rate and Rhythm: Normal rate.   Pulmonary:      Effort: Pulmonary effort is normal.      Breath sounds: Normal air entry. No wheezing, rhonchi or rales.      Comments: No clubbing  Abdominal:      General: Bowel sounds are normal.      Palpations: Abdomen is soft.   Lymphadenopathy:      Cervical: No cervical adenopathy.   Skin:     General: Skin is warm and moist.      Findings: No rash.           ASSESSMENT/PLAN  Problem List Items Addressed This Visit        ENT    Allergic rhinitis       Pulmonary and Pneumonias    Mild persistent asthma without complication (CMD) - Primary    Relevant Medications    fluticasone propionate (FLOVENT HFA) 44 MCG/ACT inhaler    albuterol 108 (90 Base) MCG/ACT inhaler    Cough variant asthma (CMD)    Relevant Medications    fluticasone propionate (FLOVENT HFA) 44 MCG/ACT inhaler    albuterol 108 (90 Base) MCG/ACT inhaler   Other Visit Diagnoses       H/O food allergy            Problem List Items Addressed This Visit        ENT    Allergic rhinitis       Pulmonary and Pneumonias    Mild persistent asthma without complication (CMD) - Primary    Relevant Medications    fluticasone propionate (FLOVENT HFA) 44 MCG/ACT inhaler    albuterol 108 (90 Base) MCG/ACT inhaler    Cough variant asthma (CMD)    Relevant Medications    fluticasone propionate (FLOVENT HFA) 44 MCG/ACT inhaler    albuterol 108 (90 Base) MCG/ACT inhaler   Other Visit Diagnoses       H/O food allergy             - I pointed out to the child and mom that because of circadian variation in lung function nocturnal symptoms are very important sign of asthma starting to act up  - This is a note that he should be on inhaled steroids twice a day.  The night symptoms are gone.  - He may need a daily during this visit and possibly also during the fall season    RTC 6 months      Over 40 minutes were spent with visual contact with patient during this  visit    Jenelle Swain M.D.  Pediatric Pulmonology/Allergy Immunology  Director [facility]  Director [facility]  [facility]

## 2025-04-22 ENCOUNTER — EMERGENCY (EMERGENCY)
Facility: HOSPITAL | Age: 60
LOS: 0 days | Discharge: HOME | End: 2025-04-22
Attending: STUDENT IN AN ORGANIZED HEALTH CARE EDUCATION/TRAINING PROGRAM
Payer: MEDICARE

## 2025-04-22 VITALS
TEMPERATURE: 97 F | RESPIRATION RATE: 20 BRPM | HEART RATE: 75 BPM | SYSTOLIC BLOOD PRESSURE: 183 MMHG | DIASTOLIC BLOOD PRESSURE: 89 MMHG | WEIGHT: 145.06 LBS | HEIGHT: 62 IN | OXYGEN SATURATION: 100 %

## 2025-04-22 VITALS
HEART RATE: 68 BPM | DIASTOLIC BLOOD PRESSURE: 82 MMHG | OXYGEN SATURATION: 95 % | RESPIRATION RATE: 17 BRPM | SYSTOLIC BLOOD PRESSURE: 147 MMHG

## 2025-04-22 DIAGNOSIS — R51.9 HEADACHE, UNSPECIFIED: ICD-10-CM

## 2025-04-22 DIAGNOSIS — Z90.711 ACQUIRED ABSENCE OF UTERUS WITH REMAINING CERVICAL STUMP: Chronic | ICD-10-CM

## 2025-04-22 DIAGNOSIS — I10 ESSENTIAL (PRIMARY) HYPERTENSION: ICD-10-CM

## 2025-04-22 DIAGNOSIS — Z88.1 ALLERGY STATUS TO OTHER ANTIBIOTIC AGENTS: ICD-10-CM

## 2025-04-22 DIAGNOSIS — Z88.0 ALLERGY STATUS TO PENICILLIN: ICD-10-CM

## 2025-04-22 DIAGNOSIS — G40.909 EPILEPSY, UNSPECIFIED, NOT INTRACTABLE, WITHOUT STATUS EPILEPTICUS: ICD-10-CM

## 2025-04-22 DIAGNOSIS — Z88.6 ALLERGY STATUS TO ANALGESIC AGENT: ICD-10-CM

## 2025-04-22 DIAGNOSIS — Z98.890 OTHER SPECIFIED POSTPROCEDURAL STATES: Chronic | ICD-10-CM

## 2025-04-22 DIAGNOSIS — Z88.8 ALLERGY STATUS TO OTHER DRUGS, MEDICAMENTS AND BIOLOGICAL SUBSTANCES: ICD-10-CM

## 2025-04-22 DIAGNOSIS — Z86.69 PERSONAL HISTORY OF OTHER DISEASES OF THE NERVOUS SYSTEM AND SENSE ORGANS: ICD-10-CM

## 2025-04-22 DIAGNOSIS — Z90.49 ACQUIRED ABSENCE OF OTHER SPECIFIED PARTS OF DIGESTIVE TRACT: Chronic | ICD-10-CM

## 2025-04-22 LAB
ALBUMIN SERPL ELPH-MCNC: 4.4 G/DL — SIGNIFICANT CHANGE UP (ref 3.5–5.2)
ALP SERPL-CCNC: 127 U/L — HIGH (ref 30–115)
ALT FLD-CCNC: 38 U/L — SIGNIFICANT CHANGE UP (ref 0–41)
ANION GAP SERPL CALC-SCNC: 12 MMOL/L — SIGNIFICANT CHANGE UP (ref 7–14)
APTT BLD: 25 SEC — LOW (ref 27–39.2)
AST SERPL-CCNC: 36 U/L — SIGNIFICANT CHANGE UP (ref 0–41)
BILIRUB SERPL-MCNC: 0.3 MG/DL — SIGNIFICANT CHANGE UP (ref 0.2–1.2)
BUN SERPL-MCNC: 12 MG/DL — SIGNIFICANT CHANGE UP (ref 10–20)
CALCIUM SERPL-MCNC: 9.9 MG/DL — SIGNIFICANT CHANGE UP (ref 8.4–10.5)
CHLORIDE SERPL-SCNC: 102 MMOL/L — SIGNIFICANT CHANGE UP (ref 98–110)
CO2 SERPL-SCNC: 28 MMOL/L — SIGNIFICANT CHANGE UP (ref 17–32)
CREAT SERPL-MCNC: 0.6 MG/DL — LOW (ref 0.7–1.5)
EGFR: 103 ML/MIN/1.73M2 — SIGNIFICANT CHANGE UP
EGFR: 103 ML/MIN/1.73M2 — SIGNIFICANT CHANGE UP
GLUCOSE SERPL-MCNC: 109 MG/DL — HIGH (ref 70–99)
HCT VFR BLD CALC: 39.7 % — SIGNIFICANT CHANGE UP (ref 37–47)
HGB BLD-MCNC: 14.3 G/DL — SIGNIFICANT CHANGE UP (ref 12–16)
INR BLD: 0.89 RATIO — SIGNIFICANT CHANGE UP (ref 0.65–1.3)
MCHC RBC-ENTMCNC: 33.3 PG — HIGH (ref 27–31)
MCHC RBC-ENTMCNC: 36 G/DL — SIGNIFICANT CHANGE UP (ref 32–37)
MCV RBC AUTO: 92.5 FL — SIGNIFICANT CHANGE UP (ref 81–99)
NRBC BLD AUTO-RTO: 0 /100 WBCS — SIGNIFICANT CHANGE UP (ref 0–0)
PLATELET # BLD AUTO: 197 K/UL — SIGNIFICANT CHANGE UP (ref 130–400)
PMV BLD: 9.1 FL — SIGNIFICANT CHANGE UP (ref 7.4–10.4)
POTASSIUM SERPL-MCNC: 4.6 MMOL/L — SIGNIFICANT CHANGE UP (ref 3.5–5)
POTASSIUM SERPL-SCNC: 4.6 MMOL/L — SIGNIFICANT CHANGE UP (ref 3.5–5)
PROT SERPL-MCNC: 7.3 G/DL — SIGNIFICANT CHANGE UP (ref 6–8)
PROTHROM AB SERPL-ACNC: 10.5 SEC — SIGNIFICANT CHANGE UP (ref 9.95–12.87)
RBC # BLD: 4.29 M/UL — SIGNIFICANT CHANGE UP (ref 4.2–5.4)
RBC # FLD: 12.5 % — SIGNIFICANT CHANGE UP (ref 11.5–14.5)
SODIUM SERPL-SCNC: 142 MMOL/L — SIGNIFICANT CHANGE UP (ref 135–146)
WBC # BLD: 4.88 K/UL — SIGNIFICANT CHANGE UP (ref 4.8–10.8)
WBC # FLD AUTO: 4.88 K/UL — SIGNIFICANT CHANGE UP (ref 4.8–10.8)

## 2025-04-22 PROCEDURE — 36415 COLL VENOUS BLD VENIPUNCTURE: CPT

## 2025-04-22 PROCEDURE — 70496 CT ANGIOGRAPHY HEAD: CPT | Mod: MC

## 2025-04-22 PROCEDURE — 93005 ELECTROCARDIOGRAM TRACING: CPT

## 2025-04-22 PROCEDURE — 93010 ELECTROCARDIOGRAM REPORT: CPT

## 2025-04-22 PROCEDURE — 70498 CT ANGIOGRAPHY NECK: CPT | Mod: 26

## 2025-04-22 PROCEDURE — 85025 COMPLETE CBC W/AUTO DIFF WBC: CPT

## 2025-04-22 PROCEDURE — 85610 PROTHROMBIN TIME: CPT

## 2025-04-22 PROCEDURE — 80053 COMPREHEN METABOLIC PANEL: CPT

## 2025-04-22 PROCEDURE — 85027 COMPLETE CBC AUTOMATED: CPT

## 2025-04-22 PROCEDURE — 70450 CT HEAD/BRAIN W/O DYE: CPT | Mod: 26,XU

## 2025-04-22 PROCEDURE — 70450 CT HEAD/BRAIN W/O DYE: CPT | Mod: MC

## 2025-04-22 PROCEDURE — 96374 THER/PROPH/DIAG INJ IV PUSH: CPT | Mod: XU

## 2025-04-22 PROCEDURE — 85730 THROMBOPLASTIN TIME PARTIAL: CPT

## 2025-04-22 PROCEDURE — 99285 EMERGENCY DEPT VISIT HI MDM: CPT | Mod: 25

## 2025-04-22 PROCEDURE — 70498 CT ANGIOGRAPHY NECK: CPT | Mod: MC

## 2025-04-22 PROCEDURE — 70496 CT ANGIOGRAPHY HEAD: CPT | Mod: 26

## 2025-04-22 PROCEDURE — 99285 EMERGENCY DEPT VISIT HI MDM: CPT

## 2025-04-22 PROCEDURE — 96375 TX/PRO/DX INJ NEW DRUG ADDON: CPT | Mod: XU

## 2025-04-22 RX ORDER — ACETAMINOPHEN 500 MG/5ML
975 LIQUID (ML) ORAL ONCE
Refills: 0 | Status: COMPLETED | OUTPATIENT
Start: 2025-04-22 | End: 2025-04-22

## 2025-04-22 RX ORDER — DEXAMETHASONE 0.5 MG/1
10 TABLET ORAL ONCE
Refills: 0 | Status: COMPLETED | OUTPATIENT
Start: 2025-04-22 | End: 2025-04-22

## 2025-04-22 RX ORDER — NIFEDIPINE 30 MG
60 TABLET, EXTENDED RELEASE 24 HR ORAL ONCE
Refills: 0 | Status: COMPLETED | OUTPATIENT
Start: 2025-04-22 | End: 2025-04-22

## 2025-04-22 RX ORDER — MECLIZINE HCL 12.5 MG
25 TABLET ORAL ONCE
Refills: 0 | Status: COMPLETED | OUTPATIENT
Start: 2025-04-22 | End: 2025-04-22

## 2025-04-22 RX ORDER — METOCLOPRAMIDE HCL 10 MG
10 TABLET ORAL ONCE
Refills: 0 | Status: COMPLETED | OUTPATIENT
Start: 2025-04-22 | End: 2025-04-22

## 2025-04-22 RX ORDER — DIPHENHYDRAMINE HCL 12.5MG/5ML
25 ELIXIR ORAL ONCE
Refills: 0 | Status: COMPLETED | OUTPATIENT
Start: 2025-04-22 | End: 2025-04-22

## 2025-04-22 RX ORDER — MAGNESIUM SULFATE 500 MG/ML
2 SYRINGE (ML) INJECTION ONCE
Refills: 0 | Status: COMPLETED | OUTPATIENT
Start: 2025-04-22 | End: 2025-04-22

## 2025-04-22 RX ADMIN — Medication 25 GRAM(S): at 10:40

## 2025-04-22 RX ADMIN — Medication 10 MILLIGRAM(S): at 10:40

## 2025-04-22 RX ADMIN — Medication 60 MILLIGRAM(S): at 11:55

## 2025-04-22 RX ADMIN — DEXAMETHASONE 102 MILLIGRAM(S): 0.5 TABLET ORAL at 11:55

## 2025-04-22 RX ADMIN — Medication 25 MILLIGRAM(S): at 10:40

## 2025-04-22 RX ADMIN — Medication 1000 MILLILITER(S): at 10:40

## 2025-04-22 RX ADMIN — Medication 975 MILLIGRAM(S): at 10:40

## 2025-04-22 RX ADMIN — Medication 25 MILLIGRAM(S): at 11:57

## 2025-04-22 NOTE — ED PROVIDER NOTE - PHYSICAL EXAMINATION
CONSTITUTIONAL: well-appearing, in NAD  SKIN: Warm dry, normal skin turgor  HEAD: NCAT  EYES: EOMI, PERRLA, no scleral icterus, conjunctiva pink  ENT: normal pharynx with no erythema or exudates  NECK: Supple; non tender. Full ROM.  CARD: RRR  RESP: clear to ausculation b/l. No crackles or wheezing.  ABD: soft, non-tender, non-distended, no rebound or guarding.  EXT: Full ROM, no bony tenderness, no pedal edema, no calf tenderness  NEURO: normal motor. slightly decreased sensory on L face, LUE. CN II-XII intact. Cerebellar testing normal. Normal gait.  PSYCH: Cooperative, appropriate.

## 2025-04-22 NOTE — ED ADULT NURSE NOTE - CAS TRG GENERAL AIRWAY, MLM
Called [facility] after I spoke with the patient's wife to find out what was the liter flow order that they received and per Wenceslao Lin it was to be set at 3L not 2L  Per wife [facility] told her that they control the literflow  I called [facility] back to set up someone to go to the patient's home to help his wife out  Called [facility] and she is trying to get the wife some help  I asked that they keep me posted    Wenceslao Lin from [facility] called and explained that they could not get help today but will be on for tomorrow  Called and informed the patient's wife  Wife called and put the Wana from [address] was on the phone and he stated that recommends a script of 16/8 wtih 6l  Will let the provider know to enter a new order   Per Boris De La O the patient kept disating at the settings recommended by the hospital 
Order placed, he does have an appointment tomorrow with Dr Venecia Corral and this can be further discussed if needed
Wife calling saying Sam Cartagena wore his Bipap last night and his pulse ox was 88%, but then came up to 90%  She said she called [facility] and they told her the machine was running properly  Alonsos told her that the settings may need to be changed  She said he has oxygen with the Bipap and that [facility] said it was 2L, but wife says the patient wears 4L    Please assist 
Patent

## 2025-04-22 NOTE — ED ADULT NURSE NOTE - NS_SISCREENINGSR_GEN_ALL_ED
[facility] message sent to patient to clarify which pharmacy she uses. Year supply of medication was sent to [facility] in [place] on 12/7/17.    Drew Pino RN, BSN     Negative

## 2025-04-22 NOTE — ED PROVIDER NOTE - OBJECTIVE STATEMENT
59-year-old female past medical history of migraines, hypertension, seizure disorder on Keppra presenting to the ED for hypertension and headache.  Patient states for the past 3 days she has had a headache.  Headache is bilateral, bandlike and throbbing. HA is worse with light and movement. Pt is also endorsing dizziness.  Patient has not take anything for headache today.  Patient states she normally does get headaches with high blood pressure.  Patient had a similar presentation to the ED in September and ultimately patient was prescribed a fourth antihypertensive medicine. No NVD, fevers, chills, cp, sob, ab pain

## 2025-04-22 NOTE — ED PROVIDER NOTE - ATTENDING CONTRIBUTION TO CARE
59-year-old female history of migraines hypertension seizure disorder on Keppra  Patient presents for evaluation of migraine and hypertension.  Patient states 2 weeks ago her father passed and she has been having difficulty sleeping.  Patient states over the past 3 days she felt her typical migraine-like headache frontal and occipital headache more so on the right side bilateral blurry vision, no focal numbness or weakness.  Patient has tried Fioricet without significant improvement of pain.  No fevers or chills.  No chest pain shortness of breath.  Patient states baseline blood pressures between 120-140 systolic and over the past 2 or 3 days it has been higher around 170s systolic    vss  gen- uncomfortable appearing, aaox3  card-rrr  lungs-ctab, no wheezing or rhonchi  abd-sntnd, no guarding or rebound  neuro- full str/sensation, cn ii-xii grossly intact, normal coordination, speech clear, no facial asymmetry  eyes- eomi/perrl b/l

## 2025-04-22 NOTE — ED PROVIDER NOTE - CARE PROVIDER_API CALL
Raghavendra Umana  [address] [address]  Phone: [phone]  Fax: [phone]  Established Patient  Follow Up Time: 1-3 Days

## 2025-04-22 NOTE — ED ADULT NURSE NOTE - NSFALLRISKFACTORS_ED_ALL_ED
ambulated to bathroom/plan of care explained/repositioned/side rails up/wait time explained/warm blanket provided No indicators present

## 2025-04-22 NOTE — ED PROVIDER NOTE - CLINICAL SUMMARY MEDICAL DECISION MAKING FREE TEXT BOX
Throughout ED observation period, pt remained clinically stable.  exam w/o fnd or ams  Lab results as interpreted by me- no anemia, no significant electrolyte abnormalities, normal renal function  cth/cta w/o acute findings   sx improving w/ ed supportive care  advised continued OP f/u for Bp management and neuro f/u

## 2025-04-22 NOTE — ED PROVIDER NOTE - NSFOLLOWUPINSTRUCTIONS_ED_ALL_ED_FT
Please follow up with your primary care doctor for reevaluation.     Hypertension    Hypertension, commonly called high blood pressure, is when the force of blood pumping through your arteries is too strong. Hypertension forces your heart to work harder to pump blood. Your arteries may become narrow or stiff. Having untreated or uncontrolled hypertension for a long period of time can cause heart attack, stroke, kidney disease, and other problems. If started on a medication, take exactly as prescribed by your health care professional. Maintain a healthy lifestyle and follow up with your primary care physician.    SEEK IMMEDIATE MEDICAL CARE IF YOU HAVE THE FOLLOWING SYMPTOMS:  confusion, chest pain, abdominal pain, vomiting, or shortness of breath.

## 2025-04-22 NOTE — ED PROVIDER NOTE - PATIENT PORTAL LINK FT
You can access the FollowMyHealth Patient Portal offered by [facility] by registering at the following website: [email or web address]. By joining [facility]’s FollowMyHealth portal, you will also be able to view your health information using other applications (apps) compatible with our system.

## 2025-04-22 NOTE — ED PROVIDER NOTE - PROGRESS NOTE DETAILS
kz: Patient states she is feeling better and like to go home.  Patient is no follow-up with her PCP.  Return precautions given.  Patient agreeable to plan.  No other concerns at this time.

## 2025-04-22 NOTE — ED ADULT NURSE NOTE - NSFALLRISKASMT_ED_ALL_ED_DT
You can access the FollowMyHealth Patient Portal offered by [facility] by registering at the following website: [email or web address]. By joining [facility]’s FollowMyHealth portal, you will also be able to view your health information using other applications (apps) compatible with our system.
22-Apr-2025 10:49

## 2025-04-22 NOTE — ED ADULT NURSE NOTE - NSFALLUNIVINTERV_ED_ALL_ED
Aaron PGY3, Clif PGy5
Assistance with ambulation/Communicate risk of Fall with Harm to all staff, patient, and family/Monitor gait and stability/Monitor for mental status changes and reorient to person, place, and time, as needed/Reinforce activity limits and safety measures with patient and family/Use of alarms - bed, stretcher, chair and/or video monitoring/Bed/Stretcher in lowest position, wheels locked, appropriate side rails in place/Call bell, personal items and telephone in reach/Instruct patient to call for assistance before getting out of bed/chair/stretcher/Non-slip footwear applied when patient is off stretcher/[place] to call system/Physically safe environment - no spills, clutter or unnecessary equipment/Purposeful proactive rounding/Room/bathroom lighting operational, light cord in reach

## 2025-05-08 ENCOUNTER — APPOINTMENT (OUTPATIENT)
Dept: CARDIOLOGY | Facility: CLINIC | Age: 60
End: 2025-05-08
Payer: MEDICARE

## 2025-05-08 VITALS
DIASTOLIC BLOOD PRESSURE: 90 MMHG | HEART RATE: 78 BPM | BODY MASS INDEX: 26.13 KG/M2 | SYSTOLIC BLOOD PRESSURE: 180 MMHG | HEIGHT: 62 IN | WEIGHT: 142 LBS

## 2025-05-08 DIAGNOSIS — I10 ESSENTIAL (PRIMARY) HYPERTENSION: ICD-10-CM

## 2025-05-08 PROCEDURE — 99213 OFFICE O/P EST LOW 20 MIN: CPT

## 2025-05-08 PROCEDURE — G2211 COMPLEX E/M VISIT ADD ON: CPT

## 2025-05-19 ENCOUNTER — APPOINTMENT (OUTPATIENT)
Dept: ORTHOPEDIC SURGERY | Facility: CLINIC | Age: 60
End: 2025-05-19
Payer: OTHER MISCELLANEOUS

## 2025-05-19 DIAGNOSIS — M54.16 RADICULOPATHY, LUMBAR REGION: ICD-10-CM

## 2025-05-19 DIAGNOSIS — S93.491S SPRAIN OF OTHER LIGAMENT OF RIGHT ANKLE, SEQUELA: ICD-10-CM

## 2025-05-19 DIAGNOSIS — M54.12 RADICULOPATHY, CERVICAL REGION: ICD-10-CM

## 2025-05-19 DIAGNOSIS — M77.8 OTHER ENTHESOPATHIES, NOT ELSEWHERE CLASSIFIED: ICD-10-CM

## 2025-05-19 DIAGNOSIS — M17.0 BILATERAL PRIMARY OSTEOARTHRITIS OF KNEE: ICD-10-CM

## 2025-05-19 PROCEDURE — 99213 OFFICE O/P EST LOW 20 MIN: CPT

## 2025-05-21 ENCOUNTER — APPOINTMENT (OUTPATIENT)
Dept: CARDIOLOGY | Facility: CLINIC | Age: 60
End: 2025-05-21
Payer: MEDICARE

## 2025-05-21 VITALS
DIASTOLIC BLOOD PRESSURE: 72 MMHG | WEIGHT: 134 LBS | HEART RATE: 86 BPM | HEIGHT: 62 IN | SYSTOLIC BLOOD PRESSURE: 150 MMHG | BODY MASS INDEX: 24.66 KG/M2

## 2025-05-21 DIAGNOSIS — I10 ESSENTIAL (PRIMARY) HYPERTENSION: ICD-10-CM

## 2025-05-21 PROCEDURE — 99214 OFFICE O/P EST MOD 30 MIN: CPT

## 2025-05-21 RX ORDER — ERENUMAB-AOOE 70 MG/ML
70 INJECTION SUBCUTANEOUS
Refills: 0 | Status: ACTIVE | COMMUNITY

## 2025-05-21 RX ORDER — BUTALBITAL/ASPIRIN/CAFFEINE 50-325-40
50-325-40 CAPSULE ORAL
Refills: 0 | Status: ACTIVE | COMMUNITY

## 2025-05-21 RX ORDER — UBROGEPANT 50 MG/1
50 TABLET ORAL
Refills: 0 | Status: ACTIVE | COMMUNITY

## 2025-05-29 ENCOUNTER — OUTPATIENT (OUTPATIENT)
Dept: OUTPATIENT SERVICES | Facility: HOSPITAL | Age: 60
LOS: 1 days | End: 2025-05-29
Payer: MEDICARE

## 2025-05-29 ENCOUNTER — RESULT REVIEW (OUTPATIENT)
Age: 60
End: 2025-05-29

## 2025-05-29 DIAGNOSIS — Z98.890 OTHER SPECIFIED POSTPROCEDURAL STATES: Chronic | ICD-10-CM

## 2025-05-29 DIAGNOSIS — Z00.8 ENCOUNTER FOR OTHER GENERAL EXAMINATION: ICD-10-CM

## 2025-05-29 DIAGNOSIS — I70.1 ATHEROSCLEROSIS OF RENAL ARTERY: ICD-10-CM

## 2025-05-29 DIAGNOSIS — Z90.49 ACQUIRED ABSENCE OF OTHER SPECIFIED PARTS OF DIGESTIVE TRACT: Chronic | ICD-10-CM

## 2025-05-29 DIAGNOSIS — Z90.711 ACQUIRED ABSENCE OF UTERUS WITH REMAINING CERVICAL STUMP: Chronic | ICD-10-CM

## 2025-05-29 PROCEDURE — 76775 US EXAM ABDO BACK WALL LIM: CPT

## 2025-05-29 PROCEDURE — 76775 US EXAM ABDO BACK WALL LIM: CPT | Mod: 26

## 2025-05-30 DIAGNOSIS — I70.1 ATHEROSCLEROSIS OF RENAL ARTERY: ICD-10-CM

## 2025-06-04 NOTE — PATIENT PROFILE ADULT - VISION (WITH CORRECTIVE LENSES IF THE PATIENT USUALLY WEARS THEM):
Patient is a 47y old  Male who presents with a chief complaint of Urosepsis (04 Jun 2025 08:02)      FROM ADMISSION H+P:   HPI:  Pt is a 48 y/o male w/ PMHx of MS, paraplegia, DVT/PE, and chronic iraheta who presents with abdominal pain d/t urinary retention. Pt has visited [facility] ED several times d/t iraheta getting clogged of which his iraheta has been replaced. However, in the ED, pt had a fever of 101.7. Iraheta was replaced in the ED and urine testing was done. Pt endorses having bed sore on back of which is painful.    IN THE ED:  Temp 97.9F, , /120, RR 20, SpO2 98%  S/P Zosyn IVPB x2, 1g ofirmev, 2.3 L NS  EKG:Vent Rate: 138 BPM, [place] [id number] ms, QRS: 88 ms, QT/QTc: 292/442. Impression: Sinus tachycardia  Labs significant for WBC: 17.24,   UA (+): UTI     (30 May 2025 00:55)      INTERVAL HPI/OVERNIGHT EVENTS: Patient was seen and examined. No acute events occurred overnight. No new complaints.    I&O's Summary    03 Jun 2025 07:01  -  04 Jun 2025 07:00  --------------------------------------------------------  IN: 375 mL / OUT: 1750 mL / NET: -1375 mL    04 Jun 2025 07:01  -  04 Jun 2025 18:53  --------------------------------------------------------  IN: 1125 mL / OUT: 400 mL / NET: 725 mL        PAST MEDICAL & SURGICAL HISTORY:  MS (multiple sclerosis)  since 1995      PE (pulmonary embolism)  2013      Cellulitis  november 2014      Nephrolithiasis      Environmental allergies      MS (multiple sclerosis)      Lower paraplegia      DVT, lower extremity      Pulmonary embolism      Nephrostomy status      No significant past surgical history          LABS:                        15.6   8.41  )-----------( 231      ( 04 Jun 2025 09:10 )             45.4     06-04    141  |  108  |  17  ----------------------------<  129[H]  3.7   |  25  |  0.86    Ca    9.2      04 Jun 2025 09:10  Phos  1.9     06-03  Mg     2.1     06-03    TPro  7.8  /  Alb  3.1[L]  /  TBili  0.5  /  DBili  x   /  AST  56[H]  /  ALT  88[H]  /  AlkPhos  88  06-04      Urinalysis Basic - ( 04 Jun 2025 09:10 )    Color: x / Appearance: x / SG: x / pH: x  Gluc: 129 mg/dL / Ketone: x  / Bili: x / Urobili: x   Blood: x / Protein: x / Nitrite: x   Leuk Esterase: x / RBC: x / WBC x   Sq Epi: x / Non Sq Epi: x / Bacteria: x      CAPILLARY BLOOD GLUCOSE            Urinalysis Basic - ( 04 Jun 2025 09:10 )    Color: x / Appearance: x / SG: x / pH: x  Gluc: 129 mg/dL / Ketone: x  / Bili: x / Urobili: x   Blood: x / Protein: x / Nitrite: x   Leuk Esterase: x / RBC: x / WBC x   Sq Epi: x / Non Sq Epi: x / Bacteria: x        MEDICATIONS  (STANDING):  cefepime   IVPB 2000 milliGRAM(s) IV Intermittent every 8 hours  cefepime   IVPB      clotrimazole 1% Cream 1 Application(s) Topical two times a day  heparin   Injectable 5000 Unit(s) SubCutaneous every 8 hours  multivitamin 1 Tablet(s) Oral daily  sodium chloride 0.9%. 1000 milliLiter(s) (100 mL/Hr) IV Continuous <Continuous>    MEDICATIONS  (PRN):  acetaminophen     Tablet .. 650 milliGRAM(s) Oral every 6 hours PRN Temp greater or equal to 38C (100.4F), Mild Pain (1 - 3)  aluminum hydroxide/magnesium hydroxide/simethicone Suspension 30 milliLiter(s) Oral every 4 hours PRN Dyspepsia  melatonin 3 milliGRAM(s) Oral at bedtime PRN Insomnia  ondansetron Injectable 4 milliGRAM(s) IV Push every 8 hours PRN Nausea and/or Vomiting  tiZANidine 4 milliGRAM(s) Oral every 8 hours PRN Muscle Spasm      REVIEW OF SYSTEMS:  CONSTITUTIONAL: No fever or chills  HEENT:  No headache, no sore throat  RESPIRATORY: No cough, wheezing, or shortness of breath  CARDIOVASCULAR: No chest pain, palpitations  GASTROINTESTINAL: No abdominal pain, nausea, vomiting, or diarrhea  GENITOURINARY: No dysuria, frequency, or hematuria  NEUROLOGICAL: no focal weakness or dizziness  MUSCULOSKELETAL: no myalgias  PSYCH: no recent changes in mood    RADIOLOGY & ADDITIONAL TESTS:    Imaging Personally Reviewed:  [x] YES  [ ] NO    Consultant(s) Notes Reviewed:  [x] YES  [ ] NO    PHYSICAL EXAM:  T(C): 36.6 (06-04-25 @ 12:14), Max: 36.8 (06-04-25 @ 04:58)  HR: 84 (06-04-25 @ 12:14) (84 - 103)  BP: 126/78 (06-04-25 @ 12:14) (119/84 - 129/91)  RR: 18 (06-04-25 @ 12:14) (18 - 18)  SpO2: 95% (06-04-25 @ 12:14) (94% - 96%)    GENERAL: NAD, well-developed, well-groomed  HEENT:  anicteric, moist mucous membranes  CHEST/LUNG:  CTA b/l, no rales, wheezes, or rhonchi  HEART:  RRR, S1, S2  ABDOMEN:  BS+, soft, nontender, nondistended  EXTREMITIES: no edema, cyanosis, or calf tenderness  NERVOUS SYSTEM: answers questions and follows commands appropriately  PSYCH: normal affect    Care Discussed with Consultants/Other Providers [x] YES  [ ] NO Patient is a 47y old  Male who presents with a chief complaint of Urosepsis (04 Jun 2025 08:02)      FROM ADMISSION H+P:   HPI:  Pt is a 48 y/o male w/ PMHx of MS, paraplegia, DVT/PE, and chronic iraheta who presents with abdominal pain d/t urinary retention. Pt has visited [facility] ED several times d/t iraheta getting clogged of which his iraheta has been replaced. However, in the ED, pt had a fever of 101.7. Iraheta was replaced in the ED and urine testing was done. Pt endorses having bed sore on back of which is painful.    IN THE ED:  Temp 97.9F, , /120, RR 20, SpO2 98%  S/P Zosyn IVPB x2, 1g ofirmev, 2.3 L NS  EKG:Vent Rate: 138 BPM, [place] [id number] ms, QRS: 88 ms, QT/QTc: 292/442. Impression: Sinus tachycardia  Labs significant for WBC: 17.24,   UA (+): UTI     (30 May 2025 00:55)      INTERVAL HPI/OVERNIGHT EVENTS: Patient was seen and examined. No acute events occurred overnight. No new complaints. Spasms are improved. Overall much improved. No abd pain - mild transaminitis noted. [facility] bed has a step off and he feels like his left leg gives out sometimes bc of it. Plan is for dc on friday after last dose abx tmrw night. Had significant constipation - had massive BM after dulcolax suppository and felt much much better. No CP or SOB.    I&O's Summary    03 Jun 2025 07:01  -  04 Jun 2025 07:00  --------------------------------------------------------  IN: 375 mL / OUT: 1750 mL / NET: -1375 mL    04 Jun 2025 07:01  -  04 Jun 2025 18:53  --------------------------------------------------------  IN: 1125 mL / OUT: 400 mL / NET: 725 mL        PAST MEDICAL & SURGICAL HISTORY:  MS (multiple sclerosis)  since 1995      PE (pulmonary embolism)  2013      Cellulitis  november 2014      Nephrolithiasis      Environmental allergies      MS (multiple sclerosis)      Lower paraplegia      DVT, lower extremity      Pulmonary embolism      Nephrostomy status      No significant past surgical history          LABS:                        15.6   8.41  )-----------( 231      ( 04 Jun 2025 09:10 )             45.4     06-04    141  |  108  |  17  ----------------------------<  129[H]  3.7   |  25  |  0.86    Ca    9.2      04 Jun 2025 09:10  Phos  1.9     06-03  Mg     2.1     06-03    TPro  7.8  /  Alb  3.1[L]  /  TBili  0.5  /  DBili  x   /  AST  56[H]  /  ALT  88[H]  /  AlkPhos  88  06-04      Urinalysis Basic - ( 04 Jun 2025 09:10 )    Color: x / Appearance: x / SG: x / pH: x  Gluc: 129 mg/dL / Ketone: x  / Bili: x / Urobili: x   Blood: x / Protein: x / Nitrite: x   Leuk Esterase: x / RBC: x / WBC x   Sq Epi: x / Non Sq Epi: x / Bacteria: x      CAPILLARY BLOOD GLUCOSE            Urinalysis Basic - ( 04 Jun 2025 09:10 )    Color: x / Appearance: x / SG: x / pH: x  Gluc: 129 mg/dL / Ketone: x  / Bili: x / Urobili: x   Blood: x / Protein: x / Nitrite: x   Leuk Esterase: x / RBC: x / WBC x   Sq Epi: x / Non Sq Epi: x / Bacteria: x        MEDICATIONS  (STANDING):  cefepime   IVPB 2000 milliGRAM(s) IV Intermittent every 8 hours  cefepime   IVPB      clotrimazole 1% Cream 1 Application(s) Topical two times a day  heparin   Injectable 5000 Unit(s) SubCutaneous every 8 hours  multivitamin 1 Tablet(s) Oral daily  sodium chloride 0.9%. 1000 milliLiter(s) (100 mL/Hr) IV Continuous <Continuous>    MEDICATIONS  (PRN):  acetaminophen     Tablet .. 650 milliGRAM(s) Oral every 6 hours PRN Temp greater or equal to 38C (100.4F), Mild Pain (1 - 3)  aluminum hydroxide/magnesium hydroxide/simethicone Suspension 30 milliLiter(s) Oral every 4 hours PRN Dyspepsia  melatonin 3 milliGRAM(s) Oral at bedtime PRN Insomnia  ondansetron Injectable 4 milliGRAM(s) IV Push every 8 hours PRN Nausea and/or Vomiting  tiZANidine 4 milliGRAM(s) Oral every 8 hours PRN Muscle Spasm      REVIEW OF SYSTEMS:  CONSTITUTIONAL: No fever or chills  HEENT:  No headache, no sore throat  RESPIRATORY: No cough, wheezing, or shortness of breath  CARDIOVASCULAR: No chest pain, palpitations  GASTROINTESTINAL: No abdominal pain, nausea, vomiting, or diarrhea  GENITOURINARY: No dysuria, frequency, or hematuria  NEUROLOGICAL: no focal weakness or dizziness  MUSCULOSKELETAL: no myalgias  PSYCH: no recent changes in mood    RADIOLOGY & ADDITIONAL TESTS:    Imaging Personally Reviewed:  [x] YES  [ ] NO    Consultant(s) Notes Reviewed:  [x] YES  [ ] NO    PHYSICAL EXAM:  T(C): 36.6 (06-04-25 @ 12:14), Max: 36.8 (06-04-25 @ 04:58)  HR: 84 (06-04-25 @ 12:14) (84 - 103)  BP: 126/78 (06-04-25 @ 12:14) (119/84 - 129/91)  RR: 18 (06-04-25 @ 12:14) (18 - 18)  SpO2: 95% (06-04-25 @ 12:14) (94% - 96%)    GENERAL: NAD  HEENT:  anicteric, moist mucous membranes  CHEST/LUNG:  CTA b/l, no rales, wheezes, or rhonchi  HEART:  RRR, S1, S2  ABDOMEN:  BS+, soft, nontender, nondistended  EXTREMITIES: + chronic LE edema, skin changes, paraplegia, sensation intact  NERVOUS SYSTEM: answers questions and follows commands appropriately  PSYCH: normal affect    Care Discussed with Consultants/Other Providers [x] YES  [ ] NO Normal vision: sees adequately in most situations; can see medication labels, newsprint

## 2025-06-16 NOTE — PHYSICAL THERAPY INITIAL EVALUATION ADULT - SHORT TERM MEMORY, REHAB EVAL
----- Message from Mallory DONATO sent at 6/12/2025  9:13 AM EDT -----    ----- Message -----  From: Leonor Graff MD  Sent: 6/11/2025   8:05 PM EDT  To: [facility]    Let him know that his kidney numbers slightly up  He needs to drink plenty of water  He also needs to call his PCP.  He may need a reduction in his losartan dose  ----- Message -----  From: Lab, Background User  Sent: 6/10/2025   1:36 AM EDT  To: Leonor Graff MD     intact

## 2025-07-03 ENCOUNTER — APPOINTMENT (OUTPATIENT)
Dept: CARDIOLOGY | Facility: CLINIC | Age: 60
End: 2025-07-03
Payer: MEDICARE

## 2025-07-03 VITALS
BODY MASS INDEX: 26.5 KG/M2 | WEIGHT: 144 LBS | HEART RATE: 83 BPM | HEIGHT: 62 IN | DIASTOLIC BLOOD PRESSURE: 78 MMHG | SYSTOLIC BLOOD PRESSURE: 140 MMHG

## 2025-07-03 VITALS — SYSTOLIC BLOOD PRESSURE: 142 MMHG | DIASTOLIC BLOOD PRESSURE: 70 MMHG

## 2025-07-03 PROBLEM — I70.1 RENAL ARTERY STENOSIS: Status: ACTIVE | Noted: 2025-07-03

## 2025-07-03 PROCEDURE — 99214 OFFICE O/P EST MOD 30 MIN: CPT

## 2025-07-03 PROCEDURE — G2211 COMPLEX E/M VISIT ADD ON: CPT

## 2025-07-21 ENCOUNTER — APPOINTMENT (OUTPATIENT)
Dept: ORTHOPEDIC SURGERY | Facility: CLINIC | Age: 60
End: 2025-07-21
Payer: OTHER MISCELLANEOUS

## 2025-07-21 DIAGNOSIS — M17.0 BILATERAL PRIMARY OSTEOARTHRITIS OF KNEE: ICD-10-CM

## 2025-07-21 DIAGNOSIS — M77.8 OTHER ENTHESOPATHIES, NOT ELSEWHERE CLASSIFIED: ICD-10-CM

## 2025-07-21 DIAGNOSIS — S93.491S SPRAIN OF OTHER LIGAMENT OF RIGHT ANKLE, SEQUELA: ICD-10-CM

## 2025-07-21 DIAGNOSIS — M54.12 RADICULOPATHY, CERVICAL REGION: ICD-10-CM

## 2025-07-21 DIAGNOSIS — M54.16 RADICULOPATHY, LUMBAR REGION: ICD-10-CM

## 2025-07-21 DIAGNOSIS — S93.409S SPRAIN OF UNSPECIFIED LIGAMENT OF UNSPECIFIED ANKLE, SEQUELA: ICD-10-CM

## 2025-07-21 PROCEDURE — 73560 X-RAY EXAM OF KNEE 1 OR 2: CPT | Mod: RT

## 2025-07-21 PROCEDURE — 99213 OFFICE O/P EST LOW 20 MIN: CPT

## 2025-07-24 ENCOUNTER — INPATIENT (INPATIENT)
Facility: HOSPITAL | Age: 60
LOS: 1 days | Discharge: HOME | DRG: 101 | End: 2025-07-26
Attending: HOSPITALIST | Admitting: STUDENT IN AN ORGANIZED HEALTH CARE EDUCATION/TRAINING PROGRAM
Payer: MEDICARE

## 2025-07-24 VITALS
SYSTOLIC BLOOD PRESSURE: 127 MMHG | TEMPERATURE: 98 F | DIASTOLIC BLOOD PRESSURE: 72 MMHG | HEART RATE: 95 BPM | RESPIRATION RATE: 18 BRPM | OXYGEN SATURATION: 95 %

## 2025-07-24 DIAGNOSIS — Z90.49 ACQUIRED ABSENCE OF OTHER SPECIFIED PARTS OF DIGESTIVE TRACT: Chronic | ICD-10-CM

## 2025-07-24 DIAGNOSIS — Z90.711 ACQUIRED ABSENCE OF UTERUS WITH REMAINING CERVICAL STUMP: Chronic | ICD-10-CM

## 2025-07-24 DIAGNOSIS — R41.82 ALTERED MENTAL STATUS, UNSPECIFIED: ICD-10-CM

## 2025-07-24 DIAGNOSIS — Z98.890 OTHER SPECIFIED POSTPROCEDURAL STATES: Chronic | ICD-10-CM

## 2025-07-24 LAB
ALBUMIN SERPL ELPH-MCNC: 4.3 G/DL — SIGNIFICANT CHANGE UP (ref 3.5–5.2)
ALP SERPL-CCNC: 105 U/L — SIGNIFICANT CHANGE UP (ref 30–115)
ALT FLD-CCNC: 20 U/L — SIGNIFICANT CHANGE UP (ref 0–41)
APPEARANCE UR: CLEAR — SIGNIFICANT CHANGE UP
AST SERPL-CCNC: 28 U/L — SIGNIFICANT CHANGE UP (ref 0–41)
BACTERIA # UR AUTO: NEGATIVE /HPF — SIGNIFICANT CHANGE UP
BASOPHILS # BLD AUTO: 0.04 K/UL — SIGNIFICANT CHANGE UP (ref 0–0.2)
BASOPHILS NFR BLD AUTO: 0.6 % — SIGNIFICANT CHANGE UP (ref 0–1)
BILIRUB SERPL-MCNC: 0.3 MG/DL — SIGNIFICANT CHANGE UP (ref 0.2–1.2)
BILIRUB UR-MCNC: NEGATIVE — SIGNIFICANT CHANGE UP
BUN SERPL-MCNC: 14 MG/DL — SIGNIFICANT CHANGE UP (ref 10–20)
CALCIUM SERPL-MCNC: 9.2 MG/DL — SIGNIFICANT CHANGE UP (ref 8.4–10.5)
CAST: 0 /LPF — SIGNIFICANT CHANGE UP (ref 0–4)
CHLORIDE SERPL-SCNC: 102 MMOL/L — SIGNIFICANT CHANGE UP (ref 98–110)
CO2 SERPL-SCNC: 22 MMOL/L — SIGNIFICANT CHANGE UP (ref 17–32)
COLOR SPEC: YELLOW — SIGNIFICANT CHANGE UP
CREAT SERPL-MCNC: 0.7 MG/DL — SIGNIFICANT CHANGE UP (ref 0.7–1.5)
DIFF PNL FLD: NEGATIVE — SIGNIFICANT CHANGE UP
EGFR: 99 ML/MIN/1.73M2 — SIGNIFICANT CHANGE UP
EGFR: 99 ML/MIN/1.73M2 — SIGNIFICANT CHANGE UP
EOSINOPHIL # BLD AUTO: 0.09 K/UL — SIGNIFICANT CHANGE UP (ref 0–0.7)
EOSINOPHIL NFR BLD AUTO: 1.4 % — SIGNIFICANT CHANGE UP (ref 0–8)
GLUCOSE SERPL-MCNC: 100 MG/DL — HIGH (ref 70–99)
GLUCOSE UR QL: NEGATIVE MG/DL — SIGNIFICANT CHANGE UP
HCT VFR BLD CALC: 37.6 % — SIGNIFICANT CHANGE UP (ref 37–47)
HGB BLD-MCNC: 12.9 G/DL — SIGNIFICANT CHANGE UP (ref 12–16)
IMM GRANULOCYTES NFR BLD AUTO: 0.2 % — SIGNIFICANT CHANGE UP (ref 0.1–0.3)
KETONES UR QL: ABNORMAL MG/DL
LEUKOCYTE ESTERASE UR-ACNC: ABNORMAL
LYMPHOCYTES # BLD AUTO: 1.6 K/UL — SIGNIFICANT CHANGE UP (ref 1.2–3.4)
LYMPHOCYTES # BLD AUTO: 24.3 % — SIGNIFICANT CHANGE UP (ref 20.5–51.1)
MAGNESIUM SERPL-MCNC: 1.5 MG/DL — LOW (ref 1.8–2.4)
MCHC RBC-ENTMCNC: 32.7 PG — HIGH (ref 27–31)
MCHC RBC-ENTMCNC: 34.3 G/DL — SIGNIFICANT CHANGE UP (ref 32–37)
MCV RBC AUTO: 95.4 FL — SIGNIFICANT CHANGE UP (ref 81–99)
MONOCYTES # BLD AUTO: 0.51 K/UL — SIGNIFICANT CHANGE UP (ref 0.1–0.6)
MONOCYTES NFR BLD AUTO: 7.7 % — SIGNIFICANT CHANGE UP (ref 1.7–9.3)
NEUTROPHILS # BLD AUTO: 4.34 K/UL — SIGNIFICANT CHANGE UP (ref 1.4–6.5)
NEUTROPHILS NFR BLD AUTO: 65.8 % — SIGNIFICANT CHANGE UP (ref 42.2–75.2)
NITRITE UR-MCNC: NEGATIVE — SIGNIFICANT CHANGE UP
NRBC BLD AUTO-RTO: 0 /100 WBCS — SIGNIFICANT CHANGE UP (ref 0–0)
PH UR: 6 — SIGNIFICANT CHANGE UP (ref 5–8)
PLATELET # BLD AUTO: 212 K/UL — SIGNIFICANT CHANGE UP (ref 130–400)
PMV BLD: 10.4 FL — SIGNIFICANT CHANGE UP (ref 7.4–10.4)
POTASSIUM SERPL-MCNC: 4.4 MMOL/L — SIGNIFICANT CHANGE UP (ref 3.5–5)
POTASSIUM SERPL-SCNC: 4.4 MMOL/L — SIGNIFICANT CHANGE UP (ref 3.5–5)
PROT SERPL-MCNC: 6.7 G/DL — SIGNIFICANT CHANGE UP (ref 6–8)
PROT UR-MCNC: 30 MG/DL
RBC # BLD: 3.94 M/UL — LOW (ref 4.2–5.4)
RBC # FLD: 12 % — SIGNIFICANT CHANGE UP (ref 11.5–14.5)
RBC CASTS # UR COMP ASSIST: 2 /HPF — SIGNIFICANT CHANGE UP (ref 0–4)
SODIUM SERPL-SCNC: 139 MMOL/L — SIGNIFICANT CHANGE UP (ref 135–146)
SP GR SPEC: 1.02 — SIGNIFICANT CHANGE UP (ref 1–1.03)
SQUAMOUS # UR AUTO: 5 /HPF — SIGNIFICANT CHANGE UP (ref 0–5)
UROBILINOGEN FLD QL: 0.2 MG/DL — SIGNIFICANT CHANGE UP (ref 0.2–1)
WBC # BLD: 6.59 K/UL — SIGNIFICANT CHANGE UP (ref 4.8–10.8)
WBC # FLD AUTO: 6.59 K/UL — SIGNIFICANT CHANGE UP (ref 4.8–10.8)
WBC UR QL: 2 /HPF — SIGNIFICANT CHANGE UP (ref 0–5)

## 2025-07-24 PROCEDURE — 70450 CT HEAD/BRAIN W/O DYE: CPT | Mod: 26

## 2025-07-24 PROCEDURE — 80053 COMPREHEN METABOLIC PANEL: CPT

## 2025-07-24 PROCEDURE — 83735 ASSAY OF MAGNESIUM: CPT

## 2025-07-24 PROCEDURE — 85610 PROTHROMBIN TIME: CPT

## 2025-07-24 PROCEDURE — 80048 BASIC METABOLIC PNL TOTAL CA: CPT

## 2025-07-24 PROCEDURE — 80345 DRUG SCREENING BARBITURATES: CPT

## 2025-07-24 PROCEDURE — 82550 ASSAY OF CK (CPK): CPT

## 2025-07-24 PROCEDURE — 99285 EMERGENCY DEPT VISIT HI MDM: CPT

## 2025-07-24 PROCEDURE — 93010 ELECTROCARDIOGRAM REPORT: CPT

## 2025-07-24 PROCEDURE — 80307 DRUG TEST PRSMV CHEM ANLYZR: CPT

## 2025-07-24 PROCEDURE — 80354 DRUG SCREENING FENTANYL: CPT

## 2025-07-24 PROCEDURE — 71045 X-RAY EXAM CHEST 1 VIEW: CPT | Mod: 26

## 2025-07-24 PROCEDURE — 80177 DRUG SCRN QUAN LEVETIRACETAM: CPT

## 2025-07-24 PROCEDURE — 36415 COLL VENOUS BLD VENIPUNCTURE: CPT

## 2025-07-24 PROCEDURE — 80346 BENZODIAZEPINES1-12: CPT

## 2025-07-24 PROCEDURE — 95819 EEG AWAKE AND ASLEEP: CPT

## 2025-07-24 PROCEDURE — 85025 COMPLETE CBC W/AUTO DIFF WBC: CPT

## 2025-07-24 PROCEDURE — 85730 THROMBOPLASTIN TIME PARTIAL: CPT

## 2025-07-24 RX ORDER — MAGNESIUM SULFATE 500 MG/ML
2 SYRINGE (ML) INJECTION ONCE
Refills: 0 | Status: COMPLETED | OUTPATIENT
Start: 2025-07-24 | End: 2025-07-24

## 2025-07-24 RX ADMIN — Medication 25 GRAM(S): at 22:07

## 2025-07-25 LAB
ALBUMIN SERPL ELPH-MCNC: 4.3 G/DL — SIGNIFICANT CHANGE UP (ref 3.5–5.2)
ALP SERPL-CCNC: 114 U/L — SIGNIFICANT CHANGE UP (ref 30–115)
ALT FLD-CCNC: 21 U/L — SIGNIFICANT CHANGE UP (ref 0–41)
ANION GAP SERPL CALC-SCNC: 11 MMOL/L — SIGNIFICANT CHANGE UP (ref 7–14)
APTT BLD: 35.2 SEC — SIGNIFICANT CHANGE UP (ref 27–39.2)
AST SERPL-CCNC: 24 U/L — SIGNIFICANT CHANGE UP (ref 0–41)
BASOPHILS # BLD AUTO: 0.03 K/UL — SIGNIFICANT CHANGE UP (ref 0–0.2)
BASOPHILS NFR BLD AUTO: 0.6 % — SIGNIFICANT CHANGE UP (ref 0–1)
BILIRUB SERPL-MCNC: 0.3 MG/DL — SIGNIFICANT CHANGE UP (ref 0.2–1.2)
BUN SERPL-MCNC: 12 MG/DL — SIGNIFICANT CHANGE UP (ref 10–20)
CALCIUM SERPL-MCNC: 9.1 MG/DL — SIGNIFICANT CHANGE UP (ref 8.4–10.5)
CHLORIDE SERPL-SCNC: 107 MMOL/L — SIGNIFICANT CHANGE UP (ref 98–110)
CK SERPL-CCNC: 91 U/L — SIGNIFICANT CHANGE UP (ref 0–225)
CO2 SERPL-SCNC: 23 MMOL/L — SIGNIFICANT CHANGE UP (ref 17–32)
CREAT SERPL-MCNC: 0.5 MG/DL — LOW (ref 0.7–1.5)
EGFR: 107 ML/MIN/1.73M2 — SIGNIFICANT CHANGE UP
EGFR: 107 ML/MIN/1.73M2 — SIGNIFICANT CHANGE UP
EOSINOPHIL # BLD AUTO: 0.06 K/UL — SIGNIFICANT CHANGE UP (ref 0–0.7)
EOSINOPHIL NFR BLD AUTO: 1.2 % — SIGNIFICANT CHANGE UP (ref 0–8)
GLUCOSE SERPL-MCNC: 97 MG/DL — SIGNIFICANT CHANGE UP (ref 70–99)
HCT VFR BLD CALC: 38.8 % — SIGNIFICANT CHANGE UP (ref 37–47)
HGB BLD-MCNC: 13.6 G/DL — SIGNIFICANT CHANGE UP (ref 12–16)
IMM GRANULOCYTES NFR BLD AUTO: 0.2 % — SIGNIFICANT CHANGE UP (ref 0.1–0.3)
INR BLD: 0.94 RATIO — SIGNIFICANT CHANGE UP (ref 0.65–1.3)
LYMPHOCYTES # BLD AUTO: 1.89 K/UL — SIGNIFICANT CHANGE UP (ref 1.2–3.4)
LYMPHOCYTES # BLD AUTO: 38 % — SIGNIFICANT CHANGE UP (ref 20.5–51.1)
MAGNESIUM SERPL-MCNC: 1.9 MG/DL — SIGNIFICANT CHANGE UP (ref 1.8–2.4)
MCHC RBC-ENTMCNC: 33.1 PG — HIGH (ref 27–31)
MCHC RBC-ENTMCNC: 35.1 G/DL — SIGNIFICANT CHANGE UP (ref 32–37)
MCV RBC AUTO: 94.4 FL — SIGNIFICANT CHANGE UP (ref 81–99)
MONOCYTES # BLD AUTO: 0.56 K/UL — SIGNIFICANT CHANGE UP (ref 0.1–0.6)
MONOCYTES NFR BLD AUTO: 11.3 % — HIGH (ref 1.7–9.3)
NEUTROPHILS # BLD AUTO: 2.42 K/UL — SIGNIFICANT CHANGE UP (ref 1.4–6.5)
NEUTROPHILS NFR BLD AUTO: 48.7 % — SIGNIFICANT CHANGE UP (ref 42.2–75.2)
NRBC BLD AUTO-RTO: 0 /100 WBCS — SIGNIFICANT CHANGE UP (ref 0–0)
PCP SPEC-MCNC: SIGNIFICANT CHANGE UP
PLATELET # BLD AUTO: 196 K/UL — SIGNIFICANT CHANGE UP (ref 130–400)
PMV BLD: 9.4 FL — SIGNIFICANT CHANGE UP (ref 7.4–10.4)
POTASSIUM SERPL-MCNC: 3.3 MMOL/L — LOW (ref 3.5–5)
POTASSIUM SERPL-SCNC: 3.3 MMOL/L — LOW (ref 3.5–5)
PROT SERPL-MCNC: 6.8 G/DL — SIGNIFICANT CHANGE UP (ref 6–8)
PROTHROM AB SERPL-ACNC: 11.1 SEC — SIGNIFICANT CHANGE UP (ref 9.95–12.87)
RBC # BLD: 4.11 M/UL — LOW (ref 4.2–5.4)
RBC # FLD: 11.9 % — SIGNIFICANT CHANGE UP (ref 11.5–14.5)
SODIUM SERPL-SCNC: 141 MMOL/L — SIGNIFICANT CHANGE UP (ref 135–146)
WBC # BLD: 4.97 K/UL — SIGNIFICANT CHANGE UP (ref 4.8–10.8)
WBC # FLD AUTO: 4.97 K/UL — SIGNIFICANT CHANGE UP (ref 4.8–10.8)

## 2025-07-25 PROCEDURE — 99223 1ST HOSP IP/OBS HIGH 75: CPT

## 2025-07-25 PROCEDURE — 95819 EEG AWAKE AND ASLEEP: CPT | Mod: 26

## 2025-07-25 PROCEDURE — 99222 1ST HOSP IP/OBS MODERATE 55: CPT

## 2025-07-25 RX ORDER — ENOXAPARIN SODIUM 100 MG/ML
40 INJECTION SUBCUTANEOUS EVERY 24 HOURS
Refills: 0 | Status: DISCONTINUED | OUTPATIENT
Start: 2025-07-25 | End: 2025-07-26

## 2025-07-25 RX ORDER — VENLAFAXINE HYDROCHLORIDE 37.5 MG/1
37.5 CAPSULE, EXTENDED RELEASE ORAL DAILY
Refills: 0 | Status: DISCONTINUED | OUTPATIENT
Start: 2025-07-25 | End: 2025-07-26

## 2025-07-25 RX ORDER — CLONAZEPAM 0.5 MG/1
1 TABLET ORAL THREE TIMES A DAY
Refills: 0 | Status: DISCONTINUED | OUTPATIENT
Start: 2025-07-25 | End: 2025-07-25

## 2025-07-25 RX ORDER — NIFEDIPINE 30 MG
1 TABLET, EXTENDED RELEASE 24 HR ORAL
Refills: 0 | DISCHARGE

## 2025-07-25 RX ORDER — OXYCODONE HYDROCHLORIDE AND ACETAMINOPHEN 10; 325 MG/1; MG/1
1 TABLET ORAL ONCE
Refills: 0 | Status: DISCONTINUED | OUTPATIENT
Start: 2025-07-25 | End: 2025-07-25

## 2025-07-25 RX ORDER — VENLAFAXINE HYDROCHLORIDE 37.5 MG/1
1 CAPSULE, EXTENDED RELEASE ORAL
Refills: 0 | DISCHARGE

## 2025-07-25 RX ORDER — ONDANSETRON HCL/PF 4 MG/2 ML
4 VIAL (ML) INJECTION EVERY 8 HOURS
Refills: 0 | Status: DISCONTINUED | OUTPATIENT
Start: 2025-07-25 | End: 2025-07-26

## 2025-07-25 RX ORDER — PROPRANOLOL HCL 60 MG
1 TABLET ORAL
Refills: 0 | DISCHARGE

## 2025-07-25 RX ORDER — ATORVASTATIN CALCIUM 80 MG/1
80 TABLET, FILM COATED ORAL AT BEDTIME
Refills: 0 | Status: DISCONTINUED | OUTPATIENT
Start: 2025-07-25 | End: 2025-07-26

## 2025-07-25 RX ORDER — MELATONIN 5 MG
3 TABLET ORAL AT BEDTIME
Refills: 0 | Status: DISCONTINUED | OUTPATIENT
Start: 2025-07-25 | End: 2025-07-26

## 2025-07-25 RX ORDER — CLONAZEPAM 0.5 MG/1
2 TABLET ORAL THREE TIMES A DAY
Refills: 0 | Status: DISCONTINUED | OUTPATIENT
Start: 2025-07-25 | End: 2025-07-26

## 2025-07-25 RX ORDER — MAGNESIUM, ALUMINUM HYDROXIDE 200-200 MG
30 TABLET,CHEWABLE ORAL EVERY 4 HOURS
Refills: 0 | Status: DISCONTINUED | OUTPATIENT
Start: 2025-07-25 | End: 2025-07-26

## 2025-07-25 RX ORDER — LEVETIRACETAM 10 MG/ML
750 INJECTION, SOLUTION INTRAVENOUS
Refills: 0 | Status: DISCONTINUED | OUTPATIENT
Start: 2025-07-25 | End: 2025-07-26

## 2025-07-25 RX ORDER — ACETAMINOPHEN 500 MG/5ML
650 LIQUID (ML) ORAL EVERY 6 HOURS
Refills: 0 | Status: DISCONTINUED | OUTPATIENT
Start: 2025-07-25 | End: 2025-07-25

## 2025-07-25 RX ADMIN — CLONAZEPAM 2 MILLIGRAM(S): 0.5 TABLET ORAL at 11:50

## 2025-07-25 RX ADMIN — LEVETIRACETAM 750 MILLIGRAM(S): 10 INJECTION, SOLUTION INTRAVENOUS at 17:23

## 2025-07-25 RX ADMIN — ATORVASTATIN CALCIUM 80 MILLIGRAM(S): 80 TABLET, FILM COATED ORAL at 21:15

## 2025-07-25 RX ADMIN — Medication 1 APPLICATION(S): at 11:51

## 2025-07-25 RX ADMIN — Medication 3 MILLIGRAM(S): at 21:16

## 2025-07-25 RX ADMIN — Medication 650 MILLIGRAM(S): at 21:15

## 2025-07-25 RX ADMIN — CLONAZEPAM 2 MILLIGRAM(S): 0.5 TABLET ORAL at 17:24

## 2025-07-25 RX ADMIN — Medication 650 MILLIGRAM(S): at 02:39

## 2025-07-25 RX ADMIN — Medication 40 MILLIEQUIVALENT(S): at 06:45

## 2025-07-25 RX ADMIN — LEVETIRACETAM 750 MILLIGRAM(S): 10 INJECTION, SOLUTION INTRAVENOUS at 05:32

## 2025-07-25 RX ADMIN — Medication 650 MILLIGRAM(S): at 03:02

## 2025-07-25 RX ADMIN — Medication 40 MILLIGRAM(S): at 17:24

## 2025-07-25 RX ADMIN — CLONAZEPAM 2 MILLIGRAM(S): 0.5 TABLET ORAL at 21:16

## 2025-07-25 RX ADMIN — Medication 20 MILLIEQUIVALENT(S): at 13:06

## 2025-07-25 RX ADMIN — Medication 50 MILLIEQUIVALENT(S): at 08:37

## 2025-07-25 RX ADMIN — ENOXAPARIN SODIUM 40 MILLIGRAM(S): 100 INJECTION SUBCUTANEOUS at 05:34

## 2025-07-26 ENCOUNTER — TRANSCRIPTION ENCOUNTER (OUTPATIENT)
Age: 60
End: 2025-07-26

## 2025-07-26 VITALS
TEMPERATURE: 98 F | DIASTOLIC BLOOD PRESSURE: 75 MMHG | RESPIRATION RATE: 18 BRPM | OXYGEN SATURATION: 95 % | HEART RATE: 68 BPM | SYSTOLIC BLOOD PRESSURE: 126 MMHG

## 2025-07-26 LAB
ANION GAP SERPL CALC-SCNC: 13 MMOL/L — SIGNIFICANT CHANGE UP (ref 7–14)
BASOPHILS # BLD AUTO: 0.02 K/UL — SIGNIFICANT CHANGE UP (ref 0–0.2)
BASOPHILS NFR BLD AUTO: 0.4 % — SIGNIFICANT CHANGE UP (ref 0–1)
BUN SERPL-MCNC: 16 MG/DL — SIGNIFICANT CHANGE UP (ref 10–20)
CALCIUM SERPL-MCNC: 8.9 MG/DL — SIGNIFICANT CHANGE UP (ref 8.4–10.5)
CHLORIDE SERPL-SCNC: 104 MMOL/L — SIGNIFICANT CHANGE UP (ref 98–110)
CO2 SERPL-SCNC: 21 MMOL/L — SIGNIFICANT CHANGE UP (ref 17–32)
CREAT SERPL-MCNC: 0.7 MG/DL — SIGNIFICANT CHANGE UP (ref 0.7–1.5)
EGFR: 99 ML/MIN/1.73M2 — SIGNIFICANT CHANGE UP
EGFR: 99 ML/MIN/1.73M2 — SIGNIFICANT CHANGE UP
EOSINOPHIL # BLD AUTO: 0.03 K/UL — SIGNIFICANT CHANGE UP (ref 0–0.7)
EOSINOPHIL NFR BLD AUTO: 0.7 % — SIGNIFICANT CHANGE UP (ref 0–8)
GLUCOSE SERPL-MCNC: 102 MG/DL — HIGH (ref 70–99)
HCT VFR BLD CALC: 38.2 % — SIGNIFICANT CHANGE UP (ref 37–47)
HGB BLD-MCNC: 13.1 G/DL — SIGNIFICANT CHANGE UP (ref 12–16)
IMM GRANULOCYTES NFR BLD AUTO: 0.2 % — SIGNIFICANT CHANGE UP (ref 0.1–0.3)
LYMPHOCYTES # BLD AUTO: 1.61 K/UL — SIGNIFICANT CHANGE UP (ref 1.2–3.4)
LYMPHOCYTES # BLD AUTO: 35.2 % — SIGNIFICANT CHANGE UP (ref 20.5–51.1)
MAGNESIUM SERPL-MCNC: 1.5 MG/DL — LOW (ref 1.8–2.4)
MCHC RBC-ENTMCNC: 32.6 PG — HIGH (ref 27–31)
MCHC RBC-ENTMCNC: 34.3 G/DL — SIGNIFICANT CHANGE UP (ref 32–37)
MCV RBC AUTO: 95 FL — SIGNIFICANT CHANGE UP (ref 81–99)
MONOCYTES # BLD AUTO: 0.35 K/UL — SIGNIFICANT CHANGE UP (ref 0.1–0.6)
MONOCYTES NFR BLD AUTO: 7.7 % — SIGNIFICANT CHANGE UP (ref 1.7–9.3)
NEUTROPHILS # BLD AUTO: 2.55 K/UL — SIGNIFICANT CHANGE UP (ref 1.4–6.5)
NEUTROPHILS NFR BLD AUTO: 55.8 % — SIGNIFICANT CHANGE UP (ref 42.2–75.2)
NRBC BLD AUTO-RTO: 0 /100 WBCS — SIGNIFICANT CHANGE UP (ref 0–0)
PLATELET # BLD AUTO: 190 K/UL — SIGNIFICANT CHANGE UP (ref 130–400)
PMV BLD: 9.7 FL — SIGNIFICANT CHANGE UP (ref 7.4–10.4)
POTASSIUM SERPL-MCNC: 3.9 MMOL/L — SIGNIFICANT CHANGE UP (ref 3.5–5)
POTASSIUM SERPL-SCNC: 3.9 MMOL/L — SIGNIFICANT CHANGE UP (ref 3.5–5)
RBC # BLD: 4.02 M/UL — LOW (ref 4.2–5.4)
RBC # FLD: 12 % — SIGNIFICANT CHANGE UP (ref 11.5–14.5)
SODIUM SERPL-SCNC: 138 MMOL/L — SIGNIFICANT CHANGE UP (ref 135–146)
WBC # BLD: 4.57 K/UL — LOW (ref 4.8–10.8)
WBC # FLD AUTO: 4.57 K/UL — LOW (ref 4.8–10.8)

## 2025-07-26 PROCEDURE — 99239 HOSP IP/OBS DSCHRG MGMT >30: CPT

## 2025-07-26 RX ORDER — BENZONATATE 100 MG
1 CAPSULE ORAL
Refills: 0 | DISCHARGE

## 2025-07-26 RX ORDER — ALBUTEROL SULFATE 2.5 MG/3ML
2 VIAL, NEBULIZER (ML) INHALATION
Refills: 0 | DISCHARGE

## 2025-07-26 RX ORDER — LEVETIRACETAM 10 MG/ML
1 INJECTION, SOLUTION INTRAVENOUS
Qty: 60 | Refills: 0
Start: 2025-07-26 | End: 2025-08-24

## 2025-07-26 RX ORDER — UBROGEPANT 50 MG/1
1 TABLET ORAL
Refills: 0 | DISCHARGE

## 2025-07-26 RX ORDER — LINACLOTIDE 290 UG/1
1 CAPSULE, GELATIN COATED ORAL
Refills: 0 | DISCHARGE

## 2025-07-26 RX ORDER — LEVOCETIRIZINE DIHYDROCHLORIDE 5 MG/1
1 TABLET ORAL
Refills: 0 | DISCHARGE

## 2025-07-26 RX ORDER — CYCLOBENZAPRINE HYDROCHLORIDE 15 MG/1
1 CAPSULE, EXTENDED RELEASE ORAL
Refills: 0 | DISCHARGE

## 2025-07-26 RX ORDER — BUTALBITAL, ACETAMINOPHEN AND CAFFEINE 50; 325; 40 MG/1; MG/1; MG/1
1 TABLET ORAL
Refills: 0 | DISCHARGE

## 2025-07-26 RX ORDER — MAGNESIUM SULFATE 500 MG/ML
2 SYRINGE (ML) INJECTION
Refills: 0 | Status: COMPLETED | OUTPATIENT
Start: 2025-07-26 | End: 2025-07-26

## 2025-07-26 RX ORDER — MIRABEGRON 50 MG/1
1 TABLET, FILM COATED, EXTENDED RELEASE ORAL
Refills: 0 | DISCHARGE

## 2025-07-26 RX ORDER — OXYCODONE HYDROCHLORIDE AND ACETAMINOPHEN 10; 325 MG/1; MG/1
1 TABLET ORAL
Refills: 0 | DISCHARGE

## 2025-07-26 RX ORDER — LEVETIRACETAM 10 MG/ML
1 INJECTION, SOLUTION INTRAVENOUS
Qty: 0 | Refills: 0 | DISCHARGE
Start: 2025-07-26

## 2025-07-26 RX ADMIN — Medication 1 APPLICATION(S): at 11:20

## 2025-07-26 RX ADMIN — ENOXAPARIN SODIUM 40 MILLIGRAM(S): 100 INJECTION SUBCUTANEOUS at 05:46

## 2025-07-26 RX ADMIN — LEVETIRACETAM 750 MILLIGRAM(S): 10 INJECTION, SOLUTION INTRAVENOUS at 05:45

## 2025-07-26 RX ADMIN — Medication 25 GRAM(S): at 10:40

## 2025-07-26 RX ADMIN — VENLAFAXINE HYDROCHLORIDE 37.5 MILLIGRAM(S): 37.5 CAPSULE, EXTENDED RELEASE ORAL at 11:19

## 2025-07-26 RX ADMIN — Medication 25 GRAM(S): at 09:12

## 2025-07-26 RX ADMIN — Medication 0.2 MILLIGRAM(S): at 05:45

## 2025-07-26 RX ADMIN — Medication 40 MILLIGRAM(S): at 05:45

## 2025-07-26 RX ADMIN — CLONAZEPAM 2 MILLIGRAM(S): 0.5 TABLET ORAL at 05:45

## 2025-07-29 LAB — LEVETIRACETAM SERPL-MCNC: 3.6 UG/ML — LOW (ref 10–40)

## 2025-07-30 DIAGNOSIS — J45.909 UNSPECIFIED ASTHMA, UNCOMPLICATED: ICD-10-CM

## 2025-07-30 DIAGNOSIS — F17.210 NICOTINE DEPENDENCE, CIGARETTES, UNCOMPLICATED: ICD-10-CM

## 2025-07-30 DIAGNOSIS — E83.42 HYPOMAGNESEMIA: ICD-10-CM

## 2025-07-30 DIAGNOSIS — R41.82 ALTERED MENTAL STATUS, UNSPECIFIED: ICD-10-CM

## 2025-07-30 DIAGNOSIS — G43.909 MIGRAINE, UNSPECIFIED, NOT INTRACTABLE, WITHOUT STATUS MIGRAINOSUS: ICD-10-CM

## 2025-07-30 DIAGNOSIS — F32.9 MAJOR DEPRESSIVE DISORDER, SINGLE EPISODE, UNSPECIFIED: ICD-10-CM

## 2025-07-30 DIAGNOSIS — G40.909 EPILEPSY, UNSPECIFIED, NOT INTRACTABLE, WITHOUT STATUS EPILEPTICUS: ICD-10-CM

## 2025-07-30 DIAGNOSIS — G89.29 OTHER CHRONIC PAIN: ICD-10-CM

## 2025-07-30 DIAGNOSIS — I10 ESSENTIAL (PRIMARY) HYPERTENSION: ICD-10-CM

## 2025-08-08 ENCOUNTER — OUTPATIENT (OUTPATIENT)
Dept: OUTPATIENT SERVICES | Facility: HOSPITAL | Age: 60
LOS: 1 days | End: 2025-08-08
Payer: MEDICARE

## 2025-08-08 ENCOUNTER — RESULT REVIEW (OUTPATIENT)
Age: 60
End: 2025-08-08

## 2025-08-08 DIAGNOSIS — Z00.8 ENCOUNTER FOR OTHER GENERAL EXAMINATION: ICD-10-CM

## 2025-08-08 DIAGNOSIS — Z98.890 OTHER SPECIFIED POSTPROCEDURAL STATES: Chronic | ICD-10-CM

## 2025-08-08 DIAGNOSIS — Z90.49 ACQUIRED ABSENCE OF OTHER SPECIFIED PARTS OF DIGESTIVE TRACT: Chronic | ICD-10-CM

## 2025-08-08 DIAGNOSIS — Z90.711 ACQUIRED ABSENCE OF UTERUS WITH REMAINING CERVICAL STUMP: Chronic | ICD-10-CM

## 2025-08-08 DIAGNOSIS — I70.1 ATHEROSCLEROSIS OF RENAL ARTERY: ICD-10-CM

## 2025-08-08 PROCEDURE — 74174 CTA ABD&PLVS W/CONTRAST: CPT | Mod: 26

## 2025-08-08 PROCEDURE — 74174 CTA ABD&PLVS W/CONTRAST: CPT

## 2025-08-09 DIAGNOSIS — I70.1 ATHEROSCLEROSIS OF RENAL ARTERY: ICD-10-CM

## 2025-08-19 ENCOUNTER — APPOINTMENT (OUTPATIENT)
Dept: ORTHOPEDIC SURGERY | Facility: CLINIC | Age: 60
End: 2025-08-19

## 2025-08-22 ENCOUNTER — APPOINTMENT (OUTPATIENT)
Dept: GASTROENTEROLOGY | Facility: CLINIC | Age: 60
End: 2025-08-22
Payer: MEDICARE

## 2025-08-22 VITALS
SYSTOLIC BLOOD PRESSURE: 144 MMHG | DIASTOLIC BLOOD PRESSURE: 84 MMHG | WEIGHT: 145 LBS | HEIGHT: 62 IN | BODY MASS INDEX: 26.68 KG/M2 | HEART RATE: 71 BPM

## 2025-08-22 DIAGNOSIS — R10.9 UNSPECIFIED ABDOMINAL PAIN: ICD-10-CM

## 2025-08-22 DIAGNOSIS — K64.5 PERIANAL VENOUS THROMBOSIS: ICD-10-CM

## 2025-08-22 DIAGNOSIS — R14.0 ABDOMINAL DISTENSION (GASEOUS): ICD-10-CM

## 2025-08-22 DIAGNOSIS — K59.00 CONSTIPATION, UNSPECIFIED: ICD-10-CM

## 2025-08-22 PROCEDURE — 99214 OFFICE O/P EST MOD 30 MIN: CPT

## 2025-08-22 RX ORDER — HYDROCORTISONE 25 MG/G
2.5 CREAM TOPICAL
Qty: 1 | Refills: 0 | Status: ACTIVE | COMMUNITY
Start: 2025-08-22 | End: 1900-01-01

## 2025-08-22 RX ORDER — LIDOCAINE 50 MG/G
CREAM TOPICAL
Refills: 0 | Status: ACTIVE | COMMUNITY

## 2025-08-22 RX ORDER — MINERAL OIL, PETROLATUM, PHENYLEPHRINE HCL 2.5; 140; 749 MG/G; MG/G; MG/G
0.25-14-74.9 OINTMENT TOPICAL
Refills: 0 | Status: ACTIVE | COMMUNITY

## 2025-09-03 ENCOUNTER — APPOINTMENT (OUTPATIENT)
Dept: SURGERY | Facility: CLINIC | Age: 60
End: 2025-09-03
Payer: MEDICARE

## 2025-09-03 VITALS
HEART RATE: 75 BPM | DIASTOLIC BLOOD PRESSURE: 80 MMHG | SYSTOLIC BLOOD PRESSURE: 150 MMHG | WEIGHT: 145 LBS | HEIGHT: 62 IN | OXYGEN SATURATION: 96 % | BODY MASS INDEX: 26.68 KG/M2

## 2025-09-03 DIAGNOSIS — K64.5 PERIANAL VENOUS THROMBOSIS: ICD-10-CM

## 2025-09-03 PROCEDURE — 99203 OFFICE O/P NEW LOW 30 MIN: CPT

## 2025-09-11 ENCOUNTER — APPOINTMENT (OUTPATIENT)
Dept: CARDIOLOGY | Facility: CLINIC | Age: 60
End: 2025-09-11
Payer: MEDICARE

## 2025-09-11 VITALS
SYSTOLIC BLOOD PRESSURE: 134 MMHG | DIASTOLIC BLOOD PRESSURE: 70 MMHG | HEART RATE: 68 BPM | WEIGHT: 147 LBS | BODY MASS INDEX: 27.05 KG/M2 | HEIGHT: 62 IN

## 2025-09-11 DIAGNOSIS — I10 ESSENTIAL (PRIMARY) HYPERTENSION: ICD-10-CM

## 2025-09-11 DIAGNOSIS — Z86.79 PERSONAL HISTORY OF OTHER DISEASES OF THE CIRCULATORY SYSTEM: ICD-10-CM

## 2025-09-11 PROCEDURE — G2211 COMPLEX E/M VISIT ADD ON: CPT

## 2025-09-11 PROCEDURE — 99213 OFFICE O/P EST LOW 20 MIN: CPT

## 2025-09-15 PROBLEM — Z86.79 HISTORY OF STENOSIS OF RENAL ARTERY: Status: RESOLVED | Noted: 2025-07-03 | Resolved: 2025-09-15

## 2025-09-16 ENCOUNTER — TRANSCRIPTION ENCOUNTER (OUTPATIENT)
Age: 60
End: 2025-09-16

## 2025-09-16 RX ORDER — ONDANSETRON HCL/PF 4 MG/2 ML
1 VIAL (ML) INJECTION
Qty: 0 | Refills: 0 | DISCHARGE